# Patient Record
Sex: MALE | Race: WHITE | NOT HISPANIC OR LATINO | Employment: UNEMPLOYED | URBAN - METROPOLITAN AREA
[De-identification: names, ages, dates, MRNs, and addresses within clinical notes are randomized per-mention and may not be internally consistent; named-entity substitution may affect disease eponyms.]

---

## 2017-03-11 ENCOUNTER — APPOINTMENT (OUTPATIENT)
Dept: URGENT CARE | Age: 57
End: 2017-03-11
Payer: COMMERCIAL

## 2017-03-11 ENCOUNTER — APPOINTMENT (OUTPATIENT)
Dept: LAB | Age: 57
End: 2017-03-11
Payer: COMMERCIAL

## 2017-03-11 ENCOUNTER — TRANSCRIBE ORDERS (OUTPATIENT)
Dept: ADMINISTRATIVE | Age: 57
End: 2017-03-11

## 2017-03-11 DIAGNOSIS — E66.9 OBESITY, UNSPECIFIED: ICD-10-CM

## 2017-03-11 DIAGNOSIS — R73.01 IMPAIRED FASTING GLUCOSE: ICD-10-CM

## 2017-03-11 DIAGNOSIS — E78.1 PURE HYPERGLYCERIDEMIA: Primary | ICD-10-CM

## 2017-03-11 DIAGNOSIS — E03.9 UNSPECIFIED HYPOTHYROIDISM: ICD-10-CM

## 2017-03-11 DIAGNOSIS — E78.1 PURE HYPERGLYCERIDEMIA: ICD-10-CM

## 2017-03-11 LAB
ALBUMIN SERPL BCP-MCNC: 3.1 G/DL (ref 3.5–5)
ALP SERPL-CCNC: 82 U/L (ref 46–116)
ALT SERPL W P-5'-P-CCNC: 24 U/L (ref 12–78)
ANION GAP SERPL CALCULATED.3IONS-SCNC: 5 MMOL/L (ref 4–13)
AST SERPL W P-5'-P-CCNC: 10 U/L (ref 5–45)
BILIRUB SERPL-MCNC: 0.68 MG/DL (ref 0.2–1)
BUN SERPL-MCNC: 14 MG/DL (ref 5–25)
CALCIUM SERPL-MCNC: 8.9 MG/DL (ref 8.3–10.1)
CHLORIDE SERPL-SCNC: 108 MMOL/L (ref 100–108)
CHOLEST SERPL-MCNC: 161 MG/DL (ref 50–200)
CO2 SERPL-SCNC: 31 MMOL/L (ref 21–32)
CREAT SERPL-MCNC: 0.77 MG/DL (ref 0.6–1.3)
EST. AVERAGE GLUCOSE BLD GHB EST-MCNC: 126 MG/DL
GFR SERPL CREATININE-BSD FRML MDRD: >60 ML/MIN/1.73SQ M
GLUCOSE SERPL-MCNC: 89 MG/DL (ref 65–140)
HBA1C MFR BLD: 6 % (ref 4.2–6.3)
HDLC SERPL-MCNC: 36 MG/DL (ref 40–60)
LDLC SERPL CALC-MCNC: 101 MG/DL (ref 0–100)
POTASSIUM SERPL-SCNC: 4.2 MMOL/L (ref 3.5–5.3)
PROT SERPL-MCNC: 7.1 G/DL (ref 6.4–8.2)
SODIUM SERPL-SCNC: 144 MMOL/L (ref 136–145)
TRIGL SERPL-MCNC: 119 MG/DL
TSH SERPL DL<=0.05 MIU/L-ACNC: 2.79 UIU/ML (ref 0.36–3.74)

## 2017-03-11 PROCEDURE — 84443 ASSAY THYROID STIM HORMONE: CPT

## 2017-03-11 PROCEDURE — 36415 COLL VENOUS BLD VENIPUNCTURE: CPT

## 2017-03-11 PROCEDURE — 83036 HEMOGLOBIN GLYCOSYLATED A1C: CPT

## 2017-03-11 PROCEDURE — 80061 LIPID PANEL: CPT

## 2017-03-11 PROCEDURE — 80053 COMPREHEN METABOLIC PANEL: CPT

## 2017-03-13 ENCOUNTER — GENERIC CONVERSION - ENCOUNTER (OUTPATIENT)
Dept: OTHER | Facility: OTHER | Age: 57
End: 2017-03-13

## 2017-03-20 ENCOUNTER — ALLSCRIPTS OFFICE VISIT (OUTPATIENT)
Dept: OTHER | Facility: OTHER | Age: 57
End: 2017-03-20

## 2017-08-05 ENCOUNTER — TRANSCRIBE ORDERS (OUTPATIENT)
Dept: ADMINISTRATIVE | Age: 57
End: 2017-08-05

## 2017-08-05 ENCOUNTER — APPOINTMENT (OUTPATIENT)
Dept: LAB | Age: 57
End: 2017-08-05
Payer: COMMERCIAL

## 2017-08-05 DIAGNOSIS — R73.01 IMPAIRED FASTING GLUCOSE: ICD-10-CM

## 2017-08-05 DIAGNOSIS — R73.01 IMPAIRED FASTING GLUCOSE: Primary | ICD-10-CM

## 2017-08-05 LAB
ALBUMIN SERPL BCP-MCNC: 3.1 G/DL (ref 3.5–5)
ALP SERPL-CCNC: 77 U/L (ref 46–116)
ALT SERPL W P-5'-P-CCNC: 27 U/L (ref 12–78)
ANION GAP SERPL CALCULATED.3IONS-SCNC: 7 MMOL/L (ref 4–13)
AST SERPL W P-5'-P-CCNC: 10 U/L (ref 5–45)
BILIRUB SERPL-MCNC: 0.8 MG/DL (ref 0.2–1)
BUN SERPL-MCNC: 16 MG/DL (ref 5–25)
CALCIUM SERPL-MCNC: 8.2 MG/DL (ref 8.3–10.1)
CHLORIDE SERPL-SCNC: 106 MMOL/L (ref 100–108)
CHOLEST SERPL-MCNC: 163 MG/DL (ref 50–200)
CO2 SERPL-SCNC: 28 MMOL/L (ref 21–32)
CREAT SERPL-MCNC: 0.84 MG/DL (ref 0.6–1.3)
EST. AVERAGE GLUCOSE BLD GHB EST-MCNC: 134 MG/DL
GFR SERPL CREATININE-BSD FRML MDRD: 97 ML/MIN/1.73SQ M
GLUCOSE P FAST SERPL-MCNC: 94 MG/DL (ref 65–99)
HBA1C MFR BLD: 6.3 % (ref 4.2–6.3)
HDLC SERPL-MCNC: 31 MG/DL (ref 40–60)
LDLC SERPL CALC-MCNC: 108 MG/DL (ref 0–100)
POTASSIUM SERPL-SCNC: 4.3 MMOL/L (ref 3.5–5.3)
PROT SERPL-MCNC: 7 G/DL (ref 6.4–8.2)
SODIUM SERPL-SCNC: 141 MMOL/L (ref 136–145)
TRIGL SERPL-MCNC: 121 MG/DL
TSH SERPL DL<=0.05 MIU/L-ACNC: 2.01 UIU/ML (ref 0.36–3.74)

## 2017-08-05 PROCEDURE — 83036 HEMOGLOBIN GLYCOSYLATED A1C: CPT

## 2017-08-05 PROCEDURE — 80061 LIPID PANEL: CPT

## 2017-08-05 PROCEDURE — 80053 COMPREHEN METABOLIC PANEL: CPT

## 2017-08-05 PROCEDURE — 36415 COLL VENOUS BLD VENIPUNCTURE: CPT

## 2017-08-05 PROCEDURE — 84443 ASSAY THYROID STIM HORMONE: CPT

## 2017-08-06 ENCOUNTER — GENERIC CONVERSION - ENCOUNTER (OUTPATIENT)
Dept: OTHER | Facility: OTHER | Age: 57
End: 2017-08-06

## 2017-08-14 ENCOUNTER — ALLSCRIPTS OFFICE VISIT (OUTPATIENT)
Dept: OTHER | Facility: OTHER | Age: 57
End: 2017-08-14

## 2017-08-14 DIAGNOSIS — M54.42 LOW BACK PAIN WITH LEFT-SIDED SCIATICA: ICD-10-CM

## 2017-11-04 ENCOUNTER — TRANSCRIBE ORDERS (OUTPATIENT)
Dept: ADMINISTRATIVE | Age: 57
End: 2017-11-04

## 2017-11-04 ENCOUNTER — APPOINTMENT (OUTPATIENT)
Dept: LAB | Age: 57
End: 2017-11-04
Payer: COMMERCIAL

## 2017-11-04 DIAGNOSIS — I51.9 MYXEDEMA HEART DISEASE: ICD-10-CM

## 2017-11-04 DIAGNOSIS — R94.6 NONSPECIFIC ABNORMAL RESULTS OF THYROID FUNCTION STUDY: ICD-10-CM

## 2017-11-04 DIAGNOSIS — E78.1 PURE HYPERGLYCERIDEMIA: ICD-10-CM

## 2017-11-04 DIAGNOSIS — R73.01 IMPAIRED FASTING GLUCOSE: ICD-10-CM

## 2017-11-04 DIAGNOSIS — E03.9 MYXEDEMA HEART DISEASE: ICD-10-CM

## 2017-11-04 DIAGNOSIS — E78.1 PURE HYPERGLYCERIDEMIA: Primary | ICD-10-CM

## 2017-11-04 DIAGNOSIS — E66.9 LIFELONG OBESITY: ICD-10-CM

## 2017-11-04 DIAGNOSIS — Z12.5 SPECIAL SCREENING FOR MALIGNANT NEOPLASM OF PROSTATE: ICD-10-CM

## 2017-11-04 DIAGNOSIS — K40.90 INGUINAL HERNIA WITHOUT OBSTRUCTION OR GANGRENE, RECURRENCE NOT SPECIFIED, UNSPECIFIED LATERALITY: ICD-10-CM

## 2017-11-04 LAB
ALBUMIN SERPL BCP-MCNC: 3.3 G/DL (ref 3.5–5)
ALP SERPL-CCNC: 68 U/L (ref 46–116)
ALT SERPL W P-5'-P-CCNC: 25 U/L (ref 12–78)
ANION GAP SERPL CALCULATED.3IONS-SCNC: 5 MMOL/L (ref 4–13)
AST SERPL W P-5'-P-CCNC: 15 U/L (ref 5–45)
BASOPHILS # BLD AUTO: 0.02 THOUSANDS/ΜL (ref 0–0.1)
BASOPHILS NFR BLD AUTO: 0 % (ref 0–1)
BILIRUB SERPL-MCNC: 1.02 MG/DL (ref 0.2–1)
BUN SERPL-MCNC: 17 MG/DL (ref 5–25)
CALCIUM SERPL-MCNC: 8.2 MG/DL (ref 8.3–10.1)
CHLORIDE SERPL-SCNC: 105 MMOL/L (ref 100–108)
CHOLEST SERPL-MCNC: 173 MG/DL (ref 50–200)
CO2 SERPL-SCNC: 29 MMOL/L (ref 21–32)
CREAT SERPL-MCNC: 0.83 MG/DL (ref 0.6–1.3)
EOSINOPHIL # BLD AUTO: 0.1 THOUSAND/ΜL (ref 0–0.61)
EOSINOPHIL NFR BLD AUTO: 2 % (ref 0–6)
ERYTHROCYTE [DISTWIDTH] IN BLOOD BY AUTOMATED COUNT: 13 % (ref 11.6–15.1)
EST. AVERAGE GLUCOSE BLD GHB EST-MCNC: 117 MG/DL
GFR SERPL CREATININE-BSD FRML MDRD: 97 ML/MIN/1.73SQ M
GLUCOSE P FAST SERPL-MCNC: 91 MG/DL (ref 65–99)
HBA1C MFR BLD: 5.7 % (ref 4.2–6.3)
HCT VFR BLD AUTO: 41.7 % (ref 36.5–49.3)
HDLC SERPL-MCNC: 34 MG/DL (ref 40–60)
HGB BLD-MCNC: 13.4 G/DL (ref 12–17)
LDLC SERPL CALC-MCNC: 119 MG/DL (ref 0–100)
LYMPHOCYTES # BLD AUTO: 1.92 THOUSANDS/ΜL (ref 0.6–4.47)
LYMPHOCYTES NFR BLD AUTO: 34 % (ref 14–44)
MCH RBC QN AUTO: 29.5 PG (ref 26.8–34.3)
MCHC RBC AUTO-ENTMCNC: 32.1 G/DL (ref 31.4–37.4)
MCV RBC AUTO: 92 FL (ref 82–98)
MONOCYTES # BLD AUTO: 0.47 THOUSAND/ΜL (ref 0.17–1.22)
MONOCYTES NFR BLD AUTO: 8 % (ref 4–12)
NEUTROPHILS # BLD AUTO: 3.15 THOUSANDS/ΜL (ref 1.85–7.62)
NEUTS SEG NFR BLD AUTO: 56 % (ref 43–75)
NRBC BLD AUTO-RTO: 0 /100 WBCS
PLATELET # BLD AUTO: 282 THOUSANDS/UL (ref 149–390)
PMV BLD AUTO: 9.4 FL (ref 8.9–12.7)
POTASSIUM SERPL-SCNC: 4.6 MMOL/L (ref 3.5–5.3)
PROT SERPL-MCNC: 7.3 G/DL (ref 6.4–8.2)
PSA SERPL-MCNC: 1.7 NG/ML (ref 0–4)
RBC # BLD AUTO: 4.55 MILLION/UL (ref 3.88–5.62)
SODIUM SERPL-SCNC: 139 MMOL/L (ref 136–145)
TRIGL SERPL-MCNC: 100 MG/DL
TSH SERPL DL<=0.05 MIU/L-ACNC: 2.81 UIU/ML (ref 0.36–3.74)
WBC # BLD AUTO: 5.67 THOUSAND/UL (ref 4.31–10.16)

## 2017-11-04 PROCEDURE — 80061 LIPID PANEL: CPT

## 2017-11-04 PROCEDURE — 85025 COMPLETE CBC W/AUTO DIFF WBC: CPT

## 2017-11-04 PROCEDURE — 80053 COMPREHEN METABOLIC PANEL: CPT

## 2017-11-04 PROCEDURE — G0103 PSA SCREENING: HCPCS

## 2017-11-04 PROCEDURE — 84443 ASSAY THYROID STIM HORMONE: CPT

## 2017-11-04 PROCEDURE — 36415 COLL VENOUS BLD VENIPUNCTURE: CPT

## 2017-11-04 PROCEDURE — 83036 HEMOGLOBIN GLYCOSYLATED A1C: CPT

## 2017-11-06 ENCOUNTER — GENERIC CONVERSION - ENCOUNTER (OUTPATIENT)
Dept: OTHER | Facility: OTHER | Age: 57
End: 2017-11-06

## 2017-11-13 ENCOUNTER — ALLSCRIPTS OFFICE VISIT (OUTPATIENT)
Dept: OTHER | Facility: OTHER | Age: 57
End: 2017-11-13

## 2017-11-14 NOTE — PROGRESS NOTES
Assessment    1  Mixed hyperlipidemia (272 2) (E78 2)   2  Impaired fasting glucose (790 21) (R73 01)   3  Hypothyroidism (244 9) (E03 9)   4  Obesity (278 00) (E66 9)   5  Chronic bilateral low back pain with left-sided sciatica (724 2,724 3,338 29) (M54 42,G89 29)   6  BPH associated with nocturia (600 01,788 43) (N40 1,R35 1)    Plan  Borderline hypothyroidism, BPH associated with nocturia, Erectile dysfunction ofnon-organic origin, Essential hypertriglyceridemia, Hypothyroidism, Impaired fastingglucose    · (1) COMPREHENSIVE METABOLIC PANEL; Status:Hold For - Exact Date; Requestedfor:Approx [id number]; Borderline hypothyroidism, Essential hypertriglyceridemia, Hypothyroidism, Mixedhyperlipidemia, Paresthesia of both hands    · (1) TSH WITH FT4 REFLEX; Status:Hold For - Exact Date; Requested for:[id number];   BPH associated with nocturia, Chronic bilateral low back pain with left-sided sciatica,Essential hypertriglyceridemia, Hypothyroidism, Impaired fasting glucose    · (1) CBC/PLT/DIFF; Status:Hold For - Exact Date; Requested for:Approx [id number];   Essential hypertriglyceridemia    · (1) LIPID PANEL FASTING W DIRECT LDL REFLEX; Status:Hold For - Exact Date; Requested for:Approx [id number];   Essential hypertriglyceridemia, Impaired fasting glucose    · (1) HEMOGLOBIN A1C; Status:Hold For - Exact Date; Requested for:Approx [id number]; Health Maintenance    · *VB-Depression Screening; Status:Complete;   Done: [id number] 04:11PM  Obesity    · We recommend that you bring your body mass index down to 26 ; Status:Complete;  Done: [id number]    Discussion/Summary    1  Hypothyroidism- TSH normal  Continue Levothyroxine 25mcg daily  Hyperlipidemia- stable  Continue Omega 3  Follow a low fat/ low cholesterol diet  Obesity/ IFG- A1C better at 5 7%  Continue to work on weight loss  Follow a low carb diet  Chronic lower back pain- stable  Doing better with regular Yoga/ stretching  BPH- PSA at 1 7   We did discuss referring to Urology or starting Flomax for his symptoms  He does not want to pursue either of these options at this time, but will let us know if he changes his mind  last had in 2012- he is going to check his records to see where/ who performed this procedure  He thinks he may be due now for a repeat  the flu vaccine at his local [facility] 10/2017  to be done prior to next appointment6 months  Possible side effects of new medications were reviewed with the patient/guardian today  The treatment plan was reviewed with the patient/guardian  The patient/guardian understands and agrees with the treatment plan     Self Referrals: No      Chief Complaint  patient is here for a follow up for labs      Advance Directives  Advance Directive Located within [facility]:  NO - Patient does not have an advance health care directive  History of Present Illness  Pt presents by himself today for a routine follow up of chronic medical conditions  taking Levothyroxine 25mcg daily  Recent TSH normal at 2 810  recent lipid panel with / / / HDL 34  Taking Omega 3 daily  Approx  9 pound weight loss in the past 8 months  Is trying to be more mindful of his diet  Has also been doing Yoga a few times per week  recent FBS at 91, A1C improved at 5 7% (compared to 8/2017 at 6 3%)  B/L lower back pain- pain has improved with doing Yoga  No pain in about 2 months  Denies numbness or tingling down legs  No bowel or bladder incontinence  note nocturia once per night  Occasional trouble with initiating the flow of urine and/or maintaining the flow of urine  Does not happen often and is not bothersome to him  Denies dysuria, hematuria  Was told years ago he had BPH and was on Flomax for a few months but didn't see any improvement on this medication  Has never seen urology  Recent PSA at 1 7  last had in 2012      Review of Systems   Constitutional: no fever,-- not feeling poorly,-- no chills-- and-- not feeling tired    Cardiovascular: no chest pain,-- no intermittent leg claudication,-- no palpitations-- and-- no extremity edema  Respiratory: no shortness of breath,-- no cough,-- no wheezing-- and-- no shortness of breath during exertion  Gastrointestinal: no abdominal pain,-- no nausea,-- no constipation,-- no diarrhea-- and-- no blood in stools  Genitourinary: as noted in HPI,-- no dysuria-- and-- no incontinence--   The patient presents with complaints of nocturia (1 x per night)  The patient presents with complaints of lower back arthralgias  Integumentary: no rashes-- and-- no skin wound  Neurological: no headache,-- no numbness-- and-- no dizziness  Psychiatric: no anxiety-- and-- no depression  Endocrine: no feelings of weakness  Hematologic/Lymphatic: no swollen glands,-- no tendency for easy bleeding-- and-- no tendency for easy bruising  ROS reviewed  Active Problems    1  Borderline hypothyroidism (794 5) (R94 6)   2  Chronic bilateral low back pain with left-sided sciatica (724 2,724 3,338 29) (M54 42,G89 29)   3  Encounter for annual physical exam (V70 0) (Z00 00)   4  Encounter for prostate cancer screening (V76 44) (Z12 5)   5  Erectile dysfunction of non-organic origin (302 72) (F52 21)   6  Hypothyroidism (244 9) (E03 9)   7  Impaired fasting glucose (790 21) (R73 01)   8  Inguinal hernia (550 90) (K40 90)   9  Low back pain (724 2) (M54 5)   10  Mixed hyperlipidemia (272 2) (E78 2)   11  Musculoskeletal pain of left thigh (729 5) (M79 652)   12  Need for diphtheria-tetanus-pertussis (Tdap) vaccine (V06 1) (Z23)   13  Obesity (278 00) (E66 9)   14  Paresthesia of both hands (782 0) (R20 2)   15  Sciatica of right side (724 3) (M54 31)   16  Screening for colorectal cancer (V76 51) (Z12 11,Z12 12)   17  Screening for depression (V79 0) (Z13 89)    Past Medical History  1  History of Unable To Ejaculate (But Not Impotent)    The active problems and past medical history were reviewed and updated today        Surgical History  1  History of Appendectomy   2  History of Colonoscopy (Fiberoptic)    Family History  Mother    1  Family history of Diabetes Mellitus (V18 0)  Father    2  Family history of Hypertension    Social History     · Being A Social Drinker   · Former smoker (T07 28) (U36 277)  The social history was reviewed and updated today  The social history was reviewed and is unchanged  Current Meds   1  Levothyroxine Sodium 25 MCG Oral Tablet; TAKE 1 TABLET DAILY; Therapy: 86AHQ6201 to (Evaluate:22Jan2018)  Requested for: 65Sqq7178; Last Rx:57Mfz9531 Ordered   2  Multi-Vitamin Oral Tablet; TAKE 1 TABLET DAILY; Therapy: (Recorded:47Nkf1105) to Recorded   3  Omega-3-acid Ethyl Esters 1 GM Oral Capsule; TAKE 2 CAPSULES BY MOUTH EVERY DAY; Therapy: 51Qcs7092 to (Evaluate:16Apr2018)  Requested for: 59EPL8890; Last Rx:11Rhc4732 Ordered   4  Saw Palmetto CAPS; TAKE 1 CAPSULE DAILY; Therapy: (Recorded:26Jyv9754) to Recorded   5  Viagra 100 MG Oral Tablet; as directed; Therapy: 85HNS3894 to (Evaluate:15Mar2016); Last Rx:01Xof1889 Ordered    The medication list was reviewed and updated today  Allergies  1  Penicillins  2  Bee sting    Vitals  Vital Signs    Recorded: 42JHH3001 02:58PM   Temperature 97 5 F   Heart Rate 72   Respiration 16   Systolic 056   Diastolic 64   Height 5 ft 6 in   Weight 182 lb 6 4 oz   BMI Calculated 29 44   BSA Calculated 1 92       Physical Exam   Constitutional  General appearance: No acute distress, well appearing and well nourished  overweight  Eyes  Conjunctiva and lids: No swelling, erythema, or discharge  Pupils and irises: Equal, round and reactive to light  Pulmonary  Respiratory effort: No increased work of breathing or signs of respiratory distress  Auscultation of lungs: Clear to auscultation, equal breath sounds bilaterally, no wheezes, no rales, no rhonci  Cardiovascular  Auscultation of heart: Normal rate and rhythm, normal S1 and S2, without murmurs     Examination of extremities for edema and/or varicosities: Normal    Carotid pulses: Normal    Abdomen  Abdomen: Non-tender, no masses  Liver and spleen: No hepatomegaly or splenomegaly  Lymphatic  Palpation of lymph nodes in neck: No lymphadenopathy  Musculoskeletal  Gait and station: Normal    Skin  Skin and subcutaneous tissue: Normal without rashes or lesions  Neurologic Grossly intact  Psychiatric  Orientation to person, place and time: Normal    Mood and affect: Normal          Results/Data  *VB-Depression Screening [id number] 04:11PM Ike Bernabe     Test Name Result Flag Reference   Depression Scale Result      Depression Screen - Negative For Symptoms     (1) CBC/PLT/DIFF 80HBK7236 09:24AM Ike Bernabe     Test Name Result Flag Reference   WBC COUNT 5 67 Thousand/uL  4 31-10 16   RBC COUNT 4 55 Million/uL  3 88-5 62   HEMOGLOBIN 13 4 g/dL  12 0-17 0   HEMATOCRIT 41 7 %  36 5-49 3   MCV 92 fL  82-98   MCH 29 5 pg  26 8-34 3   MCHC 32 1 g/dL  31 4-37 4   RDW 13 0 %  11 6-15 1   MPV 9 4 fL  8 9-12 7   PLATELET COUNT 913 Thousands/uL  149-390   nRBC AUTOMATED 0 /100 WBCs     NEUTROPHILS RELATIVE PERCENT 56 %  43-75   LYMPHOCYTES RELATIVE PERCENT 34 %  14-44   MONOCYTES RELATIVE PERCENT 8 %  4-12   EOSINOPHILS RELATIVE PERCENT 2 %  0-6   BASOPHILS RELATIVE PERCENT 0 %  0-1   NEUTROPHILS ABSOLUTE COUNT 3 15 Thousands/? ??L  1 85-7 62   LYMPHOCYTES ABSOLUTE COUNT 1 92 Thousands/? ??L  0 60-4 47   MONOCYTES ABSOLUTE COUNT 0 47 Thousand/? ??L  0 17-1 22   EOSINOPHILS ABSOLUTE COUNT 0 10 Thousand/? ??L  0 00-0 61   BASOPHILS ABSOLUTE COUNT 0 02 Thousands/? ??L  0 00-0 10   This is a patient instruction: This test is non-fasting  Please drink two glasses of water morning of bloodwork  (1) COMPREHENSIVE METABOLIC PANEL 02ATY3356 06:86OE Ike Bernabe     Test Name Result Flag Reference   SODIUM 139 mmol/L  136-145   POTASSIUM 4 6 mmol/L  3 5-5 3   Slightly Hemolyzed;  Results May be Affected   CHLORIDE 105 mmol/L  100-108   CARBON DIOXIDE 29 mmol/L  21-32   ANION GAP (CALC) 5 mmol/L  4-13   BLOOD UREA NITROGEN 17 mg/dL  5-25   CREATININE 0 83 mg/dL  0 60-1 30   Standardized to IDMS reference method   CALCIUM 8 2 mg/dL L 8 3-10 1   BILI, TOTAL 1 02 mg/dL H 0 20-1 00   ALK PHOSPHATAS 68 U/L     ALT (SGPT) 25 U/L  12-78   Specimen collection should occur prior to Sulfasalazine and/or Sulfapyridine administration due to the potential for falsely depressed results  AST(SGOT) 15 U/L  5-45     Slightly Hemolyzed; Results May be Affected Specimen collection should occur prior to Sulfasalazine administration due to the potential for falsely depressed results  ALBUMIN 3 3 g/dL L 3 5-5 0   TOTAL PROTEIN 7 3 g/dL  6 4-8 2   eGFR 97 ml/min/1 73sq m       National Kidney Disease Education Program recommendations are as follows: GFR calculation is accurate only with a steady state creatinine Chronic Kidney disease less than 60 ml/min/1 73 sq  meters Kidney failure less than 15 ml/min/1 73 sq  meters  GLUCOSE FASTING 91 mg/dL  65-99   Specimen collection should occur prior to Sulfasalazine administration due to the potential for falsely depressed results  Specimen collection should occur prior to Sulfapyridine administration due to the potential for falsely elevated results  (1) PSA (SCREEN) (Dx V76 44 Screen for Prostate Cancer) [id number] 09:24AM Durga Gant     Test Name Result Flag Reference   PROSTATE SPECIFIC ANTIGEN 1 7 ng/mL  0 0-4 0     American Urological Association Guidelines define biochemical recurrence of prostate cancer as a detectable or rising PSA value post-radical prostatectomy that is greater than or equal to 0 2 ng/mL with a second confirmatory level of greater than or equal to 0 2 ng/mL  This is a patient instruction: This test is non-fasting  Please drink two glasses of water morning of bloodwork       (1) LIPID PANEL FASTING W DIRECT LDL REFLEX [id number] 09:24AM Durga Gant Test Name Result Flag Reference   CHOLESTEROL 173 mg/dL     LDL CHOLESTEROL CALCULATED 119 mg/dL H 0-100     This is a patient instruction: This is a fasting test  Water, black tea or black coffee only after 9:00pm the night before the test  Drink 2 glasses of water the morning of the test     Triglyceride:       Normal <150 mg/dl  Borderline High 150-199 mg/dl  High 200-499 mg/dl  Very High >499 mg/dl   Cholesterol:      Desirable <200 mg/dl   Borderline High 200-239 mg/dl   High >239 mg/dl   HDL Cholesterol:      High>59 mg/dL   Low <41 mg/dL   HDL Cholesterol:      High>59 mg/dL   Low <41 mg/dL   This screening LDL is a calculated result  It does not have the accuracy of the Direct Measured LDL in the monitoring of patients with hyperlipidemia and/or statin therapy  Direct Measure LDL (JRK763) must be ordered separately in these patients  TRIGLYCERIDES 100 mg/dL  <=150   Specimen collection should occur prior to N-Acetylcysteine or Metamizole administration due to the potential for falsely depressed results  HDL,DIRECT 34 mg/dL L 40-60   Specimen collection should occur prior to Metamizole administration due to the potential for falsley depressed results  (1) TSH WITH FT4 REFLEX [id number] 09:24AM Dulce Perez     Test Name Result Flag Reference   TSH 2 810 uIU/mL  0 358-3 740   Patients undergoing fluorescein dye angiography may retain small amounts of fluorescein in the body for 48-72 hours post procedure  Samples containing fluorescein can produce falsely depressed TSH values  If the patient had this procedure,a specimen should be resubmitted post fluorescein clearance  (1) HEMOGLOBIN A1C [id number] 09:24AM Dulce Perez     Test Name Result Flag Reference   HEMOGLOBIN A1C 5 7 %  4 2-6 3   EST  AVG  GLUCOSE 117 mg/dl       Attending Note  Collaborating Note: I did not interview and examine the patient-- and-- I agree with the Advanced Practitioner note        Future Appointments    Date/Time Provider Specialty Site   05/16/2018 03:00 PM Ramila Harden [facility] Family Medicine [facility]       Signatures   Electronically signed by : Ramila Padilla [facility]; Nov 13 2017  4:24PM EST                       (Author)    Electronically signed by :  Guy Galo MD; Nov 13 2017  5:29PM EST                       (Author)

## 2018-01-10 NOTE — RESULT NOTES
Message   Please let the patient know I reviewed his blood work results  His fasting glucose was elevated at 107- should be <100  Recommended to follow a low carb diet, exercise 30 minutes 5 x per week  Total cholesterol normal at 154  LDL (bad cholesterol) normal at 99  Triglycerides normalt at 101  HDL (good cholesterol) low at 35- should be at least >40  Blood counts (hemoglobin, WBC, platelets, etc ) all WNL  His TSH was okay for now- continue same dose of Levothyroxine  Please mail him lab slips to get done 1-2 weeks prior to his next appointment in March of 2017  Thank you  Verified Results  (1) CBC/PLT/DIFF [id number] 08:13AM Elfredia Cost     Test Name Result Flag Reference   WBC COUNT 5 96 Thousand/uL  4 31-10 16   RBC COUNT 4 52 Million/uL  3 88-5 62   HEMOGLOBIN 13 3 g/dL  12 0-17 0   HEMATOCRIT 41 3 %  36 5-49 3   MCV 91 fL  82-98   MCH 29 4 pg  26 8-34 3   MCHC 32 2 g/dL  31 4-37 4   RDW 13 0 %  11 6-15 1   MPV 9 4 fL  8 9-12 7   PLATELET COUNT 223 Thousands/uL  149-390   nRBC AUTOMATED 0 /100 WBCs     NEUTROPHILS RELATIVE PERCENT 58 %  43-75   LYMPHOCYTES RELATIVE PERCENT 30 %  14-44   MONOCYTES RELATIVE PERCENT 10 %  4-12   EOSINOPHILS RELATIVE PERCENT 2 %  0-6   BASOPHILS RELATIVE PERCENT 0 %  0-1   NEUTROPHILS ABSOLUTE COUNT 3 47 Thousands/?L  1 85-7 62   LYMPHOCYTES ABSOLUTE COUNT 1 79 Thousands/?L  0 60-4 47   MONOCYTES ABSOLUTE COUNT 0 57 Thousand/?L  0 17-1 22   EOSINOPHILS ABSOLUTE COUNT 0 10 Thousand/?L  0 00-0 61   BASOPHILS ABSOLUTE COUNT 0 02 Thousands/?L  0 00-0 10   - Patient Instructions: This bloodwork is non-fasting  Please drink two glasses of water morning of bloodwork  - Patient Instructions: This bloodwork is non-fasting  Please drink two glasses of water morning of bloodwork       (1) COMPREHENSIVE METABOLIC PANEL [id number] 19:07EN [employer] [facility]     Test Name Result Flag Reference   GLUCOSE,RANDM 107 mg/dL     If the patient is fasting, the ADA then defines impaired fasting glucose as > 100 mg/dL and diabetes as > or equal to 123 mg/dL  SODIUM 141 mmol/L  136-145   POTASSIUM 4 3 mmol/L  3 5-5 3   CHLORIDE 107 mmol/L  100-108   CARBON DIOXIDE 30 mmol/L  21-32   ANION GAP (CALC) 4 mmol/L  4-13   BLOOD UREA NITROGEN 15 mg/dL  5-25   CREATININE 0 84 mg/dL  0 60-1 30   Standardized to IDMS reference method   CALCIUM 8 9 mg/dL  8 3-10 1   BILI, TOTAL 0 62 mg/dL  0 20-1 00   ALK PHOSPHATAS 82 U/L     ALT (SGPT) 26 U/L  12-78   AST(SGOT) 11 U/L  5-45   ALBUMIN 3 3 g/dL L 3 5-5 0   TOTAL PROTEIN 7 0 g/dL  6 4-8 2   eGFR Non-African American      >60 0 ml/min/1 73sq m   - Patient Instructions: This is a fasting blood test  Water, black tea or black coffee only after 9:00pm the night before test Drink 2 glasses of water the morning of test   National Kidney Disease Education Program recommendations are as follows:  GFR calculation is accurate only with a steady state creatinine  Chronic Kidney disease less than 60 ml/min/1 73 sq  meters  Kidney failure less than 15 ml/min/1 73 sq  meters  (1) LIPID PANEL FASTING W DIRECT LDL REFLEX [id number] 08:13AM Dulce Perez     Test Name Result Flag Reference   CHOLESTEROL 154 mg/dL     LDL CHOLESTEROL CALCULATED 99 mg/dL  0-100   - Patient Instructions: This is a fasting blood test  Water, black tea or black coffee only after 9:00pm the night before test   Drink 2 glasses of water the morning of test     - Patient Instructions:  This is a fasting blood test  Water, black tea or black coffee only after 9:00pm the night before test Drink 2 glasses of water the morning of test   Triglyceride:         Normal              <150 mg/dl       Borderline High    150-199 mg/dl       High               200-499 mg/dl       Very High          >499 mg/dl  Cholesterol:         Desirable        <200 mg/dl      Borderline High  200-239 mg/dl      High             >239 mg/dl  HDL Cholesterol:        High    >59 mg/dL      Low     <41 mg/dL  LDL Cholesterol:        Optimal          <100 mg/dl        Near Optimal     100-129 mg/dl        Above Optimal          Borderline High   130-159 mg/dl          High              160-189 mg/dl          Very High        >189 mg/dl  LDL CALCULATED:    This screening LDL is a calculated result  It does not have the accuracy of the Direct Measured LDL in the monitoring of patients with hyperlipidemia and/or statin therapy  Direct Measure LDL ([id number]) must be ordered separately in these patients  TRIGLYCERIDES 101 mg/dL  <=150   Specimen collection should occur prior to N-Acetylcysteine or Metamizole administration due to the potential for falsely depressed results  HDL,DIRECT 35 mg/dL L 40-60   Specimen collection should occur prior to Metamizole administration due to the potential for falsely depressed results  (1) TSH WITH FT4 REFLEX [id number] 08:13AM Shen Amaya     Test Name Result Flag Reference   TSH 4 680 uIU/mL H 0 358-3 740   - Patient Instructions: This is a fasting blood test  Water, black tea or black coffee only after 9:00pm the night before test Drink 2 glasses of water the morning of test   Patients undergoing fluorescein dye angiography may retain small amounts of fluorescein in the body for 48-72 hours post procedure  Samples containing fluorescein can produce falsely depressed TSH values  If the patient had this procedure,a specimen should be resubmitted post fluorescein clearance  T4,FREE 0 81 ng/dL  0 76-1 46   - Patient Instructions: This is a fasting blood test  Water, black tea or black coffee only after 9:00pm the night before test Drink 2 glasses of water the morning of test        Plan  Essential hypertriglyceridemia, Hypothyroidism, Impaired fasting glucose, Obesity    · (1) LIPID PANEL FASTING W DIRECT LDL REFLEX; Status:Hold For - Exact Date;   Requested for:Approx [id number];   Hypothyroidism    · (1) TSH WITH FT4 REFLEX; Status:Hold For - Exact Date; Requested for:Approx  [id number];   Hypothyroidism, Impaired fasting glucose, Obesity    · (1) COMPREHENSIVE METABOLIC PANEL; Status:Hold For - Exact Date; Requested  for:Approx [id number];   Impaired fasting glucose    · (1) HEMOGLOBIN A1C; Status:Hold For - Exact Date;  Requested for:Approx [id number];

## 2018-01-12 VITALS
WEIGHT: 185.25 LBS | BODY MASS INDEX: 29.77 KG/M2 | TEMPERATURE: 98.7 F | DIASTOLIC BLOOD PRESSURE: 70 MMHG | RESPIRATION RATE: 16 BRPM | HEART RATE: 66 BPM | SYSTOLIC BLOOD PRESSURE: 100 MMHG | HEIGHT: 66 IN

## 2018-01-13 VITALS
HEIGHT: 66 IN | SYSTOLIC BLOOD PRESSURE: 108 MMHG | DIASTOLIC BLOOD PRESSURE: 64 MMHG | BODY MASS INDEX: 29.32 KG/M2 | HEART RATE: 72 BPM | WEIGHT: 182.4 LBS | RESPIRATION RATE: 16 BRPM | TEMPERATURE: 97.5 F

## 2018-01-13 VITALS
HEIGHT: 66 IN | DIASTOLIC BLOOD PRESSURE: 82 MMHG | HEART RATE: 76 BPM | TEMPERATURE: 98.5 F | SYSTOLIC BLOOD PRESSURE: 120 MMHG | WEIGHT: 191 LBS | RESPIRATION RATE: 16 BRPM | BODY MASS INDEX: 30.7 KG/M2

## 2018-01-13 NOTE — RESULT NOTES
Message   Please let the patient know I reviewed his blood work results  His TSH was now WNL- he should continue his Synthroid 25mcg once daily  His lipid panel also improved  His total cholesterol wss WNL at 166 (should be <200)  His LDL (bad cholesterol) was improved at 110  His Triglycerides greatly improved and are normal at 104 (should be <150 and were at 377 the last time we checked)  His HDL (good cholesterol) as improved and is 35  He should continue the Omega 3 as he is currently taking  We will recheck labs in 6 months  We can mail him these slips  Thank you  Verified Results  (1) LIPID PANEL FASTING W DIRECT LDL REFLEX [id number] 03:06PM Ike Harryinka   Triglyceride:         Normal              <150 mg/dl       Borderline High    150-199 mg/dl       High               200-499 mg/dl       Very High          >499 mg/dl  Cholesterol:         Desirable        <200 mg/dl      Borderline High  200-239 mg/dl      High             >239 mg/dl  HDL Cholesterol:        High    >59 mg/dL      Low     <41 mg/dL  LDL Cholesterol:        Optimal          <100 mg/dl         Near Optimal     100-129 mg/dl        Above Optimal          Borderline High   130-159 mg/dl          High              160-189 mg/dl          Very High        >189 mg/dl  LDL CALCULATED:    This screening LDL is a calculated result  It does not have the accuracy of the Direct Measured LDL in the monitoring of patients with hyperlipidemia and/or statin therapy  Direct Measure LDL ([id number]) must be ordered separately in these patients  Test Name Result Flag Reference   CHOLESTEROL 166 mg/dL     LDL CHOLESTEROL CALCULATED 110 mg/dL H 0-100   TRIGLYCERIDES 104 mg/dL  <=150   Specimen collection should occur prior to N-Acetylcysteine or Metamizole administration due to the potential for falsely depressed results     HDL,DIRECT 35 mg/dL L 40-60   Specimen collection should occur prior to Metamizole administration due to the potential for falsely depressed results  (1) TSH WITH FT4 REFLEX 27Apr2016 03:06PM Olivia Neil Query   Patients undergoing fluorescein dye angiography may retain small amounts of fluorescein in the body for 48-72 hours post procedure  Samples containing fluorescein can produce falsely depressed TSH values  If the patient had this procedure,a specimen should be resubmitted post fluorescein clearance  Test Name Result Flag Reference   TSH 2 850 uIU/mL  0 358-3 740       Plan  Essential hypertriglyceridemia    · (1) LIPID PANEL FASTING W DIRECT LDL REFLEX; Status:Active; Requested  for:28Apr2016;   Hypothyroidism    · (1) CBC/PLT/DIFF; Status:Hold For - Exact Date; Requested for:Approx [id number];    · (1) TSH WITH FT4 REFLEX; Status:Hold For - Exact Date; Requested for:Approx  [id number];   Hypothyroidism, Obesity    · (1) COMPREHENSIVE METABOLIC PANEL; Status:Hold For - Exact Date;  Requested  for:Approx [id number];

## 2018-01-14 NOTE — RESULT NOTES
Verified Results  (1) COMPREHENSIVE METABOLIC PANEL [id number] 75:44RB Barbara Copeland     Test Name Result Flag Reference   SODIUM 141 mmol/L  136-145   POTASSIUM 4 3 mmol/L  3 5-5 3   CHLORIDE 106 mmol/L  100-108   CARBON DIOXIDE 28 mmol/L  21-32   ANION GAP (CALC) 7 mmol/L  4-13   BLOOD UREA NITROGEN 16 mg/dL  5-25   CREATININE 0 84 mg/dL  0 60-1 30   Standardized to IDMS reference method   CALCIUM 8 2 mg/dL L 8 3-10 1   BILI, TOTAL 0 80 mg/dL  0 20-1 00   ALK PHOSPHATAS 77 U/L     ALT (SGPT) 27 U/L  12-78   AST(SGOT) 10 U/L  5-45   ALBUMIN 3 1 g/dL L 3 5-5 0   TOTAL PROTEIN 7 0 g/dL  6 4-8 2   eGFR 97 ml/min/1 73sq m     National Kidney Disease Education Program recommendations are as follows:  GFR calculation is accurate only with a steady state creatinine  Chronic Kidney disease less than 60 ml/min/1 73 sq  meters  Kidney failure less than 15 ml/min/1 73 sq  meters  GLUCOSE FASTING 94 mg/dL  65-99     (1) LIPID PANEL FASTING W DIRECT LDL REFLEX [id number] 10:06AM Barbara Copeland     Test Name Result Flag Reference   CHOLESTEROL 163 mg/dL     LDL CHOLESTEROL CALCULATED 108 mg/dL H 0-100   This is a fasting blood test  Water,black tea or black  coffee only after 9:00pm the night before test  Drink 2 glasses of water the morning of test         Triglyceride:         Normal              <150 mg/dl       Borderline High    150-199 mg/dl       High               200-499 mg/dl       Very High          >499 mg/dl  Cholesterol:         Desirable        <200 mg/dl      Borderline High  200-239 mg/dl      High             >239 mg/dl  HDL Cholesterol:        High    >59 mg/dL      Low     <41 mg/dL  LDL Cholesterol:        Optimal          <100 mg/dl        Near Optimal     100-129 mg/dl        Above Optimal          Borderline High   130-159 mg/dl          High              160-189 mg/dl          Very High        >189 mg/dl  LDL CALCULATED:    This screening LDL is a calculated result    It does not have the accuracy of the Direct Measured LDL in the monitoring of patients with hyperlipidemia and/or statin therapy  Direct Measure LDL (NAA524) must be ordered separately in these patients  TRIGLYCERIDES 121 mg/dL  <=150   Specimen collection should occur prior to N-Acetylcysteine or Metamizole administration due to the potential for falsely depressed results  HDL,DIRECT 31 mg/dL L 40-60   Specimen collection should occur prior to Metamizole administration due to the potential for falsely depressed results  (1) TSH WITH FT4 REFLEX [id number] 10:06AM Michaela Aspen     Test Name Result Flag Reference   TSH 2 010 uIU/mL  0 358-3 740   Patients undergoing fluorescein dye angiography may retain small amounts of fluorescein in the body for 48-72 hours post procedure  Samples containing fluorescein can produce falsely depressed TSH values  If the patient had this procedure,a specimen should be resubmitted post fluorescein clearance  (1) HEMOGLOBIN A1C [id number] 10:06AM Michaela Aspen     Test Name Result Flag Reference   HEMOGLOBIN A1C 6 3 %  4 2-6 3   EST  AVG   GLUCOSE 134 mg/dl

## 2018-01-14 NOTE — PROGRESS NOTES
Assessment    1  Erectile dysfunction of non-organic origin (302 72) (F52 21)   2  Inguinal hernia (550 90) (K40 90)   · BL   3  Encounter for preventive health examination (V70 0) (Z00 00)   4  Need for diphtheria-tetanus-pertussis (Tdap) vaccine (V06 1) (Z23)   5  Encounter for annual physical exam (V70 0) (Z00 00)   6  Obesity (278 00) (E66 9)   7  Screening for depression (V79 0) (Z13 89)    Plan  Erectile dysfunction of non-organic origin    · Viagra 100 MG Oral Tablet; as directed  Health Maintenance, Erectile dysfunction of non-organic origin    · (1) CBC/PLT/DIFF; Status:Resulted - Requires Verification;   Done: 06EWC9030 04:31PM   · (1) COMPREHENSIVE METABOLIC PANEL; Status:Resulted - Requires Verification;    Done: 26SCP5515 04:31PM   · (1) HEMOGLOBIN A1C; Status:Active; Requested for:36Clu5127;    · (1) LIPID PANEL FASTING W DIRECT LDL REFLEX; Status:Resulted - Requires  Verification;   Done: 37HMD1304 04:31PM   · (1) PSA (SCREEN) (Dx V76 44 Screen for Prostate Cancer); Status:Resulted - Requires  Verification;   Done: 90YTX3363 04:31PM   · (1) TSH WITH FT4 REFLEX; Status:Resulted - Requires Verification;   Done: 26FOV4535  04:31PM  Need for diphtheria-tetanus-pertussis (Tdap) vaccine    · Adacel 5-2-15 5 LF-MCG/0 5 Intramuscular Suspension  Screening for depression    · *VB-Depression Screening; Status:Complete;   Done: 13KHL8267 03:51PM   · Tdap (Adacel); INJECT 0 5  ML Intramuscular; To Be Done: 29RYJ4748    Discussion/Summary  Impression: health maintenance visit, healthy adult male  Currently, he eats an adequate diet and has an inadequate exercise regimen  Prostate cancer screening: the risks and benefits of prostate cancer screening were discussed and PSA was ordered  Testicular cancer screening: the risks and benefits of testicular cancer screening were discussed and monthly self testicular exam was advised   Colorectal cancer screening: the risks and benefits of colorectal cancer screening were discussed and colorectal cancer screening is current  Screening lab work includes glucose and lipid profile  The risks and benefits of immunizations were discussed and immunizations will be given as outlined in the orders  He was advised to be evaluated by a dentist  Advice and education were given regarding nutrition, aerobic exercise, calcium supplements, vitamin D supplements, cardiovascular risk reduction, sunscreen use, self skin examination and seat belt use  Patient discussion: discussed with the patient  1  Well adult exam   2  Small B/L inguinal hernias - patient is asymptomatic  Avoid heavy lifting, strenuous activities  Consider surgical consultation if develops pain, hernias increasing in size  3  Erectile dysfunction- script and coupon given for Viagra  To take 1/2 pill as discussed  4  Obesity- try to follow a low fat/ low cholesterol diet, try to exercise 30 minutes 5 x per week, lose weight  HM; Tdap vaccination administered today  Patient to find out name of urologist and GI doctor who performed colonoscopy and let us know so we can get notes  To get routine blood work done  f/u in one year / prn  Possible side effects of new medications were reviewed with the patient/guardian today  Chief Complaint    1  Erectile Dysfunction  Patient is here for his annual check visit exam physical      History of Present Illness  HM, Adult Male: The patient is being seen for a health maintenance evaluation  The last health maintenance visit was 2 year(s) ago  Social History: Household members include spouse and 1 daughter(s)  He is   Work status: working full time and occupation:  at [employer]  The patient is a former cigarette smoker and quit using smokeless tobacco 22  He has smoked for 20 year(s), has 20 pack year(s) of cigarette use and 1 ppd  He reports occasional alcohol use and drinking 1 drinks per week  The patient has no concerns about alcohol abuse   He has never used illicit drugs  General Health: The patient's health since the last visit is described as good  He does not have regular dental visits  He denies vision problems  Vision care includes wearing soft contact lenses and an eye examination within the last year  He denies hearing loss  Lifestyle:  He consumes a diverse and healthy diet  He does not have any weight concerns  He exercises regularly  He does not use tobacco  He consumes alcohol  He uses illicit drugs  Reproductive health:  the patient is sexually active  birth control is not being practiced  He complains of erectile dysfunction  Screening: Prostate cancer screening includes last prostate-specific antigen testing 2 years  and last digital rectal examination 2 years   Testicular cancer screening includes no self testicular examinations  Colorectal cancer screening includes last colonoscopy done 2 years ago , last fecal occult blood testing done 2 years ago  and no previous flexible sigmoidoscopy  Metabolic screening includes lipid profile performed 2 years , glucose screening performed 2 year ago, thyroid function test performed 2 years ago  and no previous DEXA  Cardiovascular risk factors: stress and family history of cardiovascular disease, but no hypertension, no diabetes, no high LDL cholesterol, no low HDL cholesterol, no obesity, no tobacco use, no illicit drug use and no sedentary lifestyle  General health risks: elevated prostate-specific antigen, but no undescended testis, no family history of prostate cancer, no previous colon polyps, no inflammatory bowel disease and no osteoporosis risk factors  Safety elements used: seat belt, sunscreen and smoke detector  Risk assessments performed include depression symptoms  Risk findings: no anxiety symptoms and no depression symptoms  HPI: 61year old male here for a routine physical exam  He presents by himself  Last dentist appt about 4 years ago     Last optometrist appointment one year ago  Last Tdap more than 10 years ago   Had colonoscopy 2 years ago- doesn't know name but will find out and let us know  C/o erectile dysfunction  Trouble maintaining erection  No trouble initiating erection  Reports trouble initiating flow of urine first thing in the morning  Denies nocturia  Has seen urologist about 3 years ago- enlarged prostate- no follow up needed per patient  Was never placed on meds  Is unsure of urologist's name, but will check with his wife and let us know  Erectile Dysfunction: Valente Villa presents with complaints of erectile dysfunction  Associated symptoms include inability to maintain erection, incomplete erections and inability to perform intercourse, but no inability to initiate erection, no excessive penile curvature, no lack of morning erections, no lack of nocturnal erections, no premature ejaculation, no decreased libido, no claudication, no back pain, no fatigue, no weight loss, no depression, no vision problems and no urinary impairment  Review of Systems    Constitutional: no fever, not feeling poorly, no chills and not feeling tired  Eyes: no eye pain and no eyesight problems  ENT: no earache, no sore throat, no hearing loss, no nasal discharge and no hoarseness  Cardiovascular: the heart rate was not slow, no chest pain, no intermittent leg claudication, the heart rate was not fast, no palpitations and no extremity edema  Respiratory: no shortness of breath, no cough, no wheezing and no shortness of breath during exertion  Gastrointestinal: No complaints of abdominal pain, no constipation, no nausea or vomiting, no diarrhea or bloody stools  Genitourinary: trouble initiating flow of urine first thing in the morning, but no dysuria, no incontinence, no nocturia and no testicular pain  Musculoskeletal: no arthralgias and no myalgias  Integumentary: no rashes and no skin lesions     Neurological: no headache, no numbness and no tingling  Psychiatric: no anxiety, no sleep disturbances and no depression  Endocrine: erectile dysfunction, but no muscle weakness and no feelings of weakness  Hematologic/Lymphatic: no swollen glands, no tendency for easy bleeding and no tendency for easy bruising  Over the past 2 weeks, how often have you been bothered by the following problems? 1 ) Little interest or pleasure in doing things? Not at all    2 ) Feeling down, depressed or hopeless? Not at all    3 ) Trouble falling asleep or sleeping too much? Not at all    4 ) Feeling tired or having little energy? Not at all    5 ) Poor appetite or overeating? Not at all    6 ) Feeling bad about yourself, or that you are a failure, or have let yourself or your family down? Not at all    7 ) Trouble concentrating on things, such as reading a newspaper or watching television? Not at all    8 ) Moving or speaking so slowly that other people could have noticed, or the opposite, moving or speaking faster than usual? Not at all  How difficult have these problems made it for you to do your work, take care of things at home, or get along with people? Not at all  Score 0     ROS reviewed  Active Problems    1  Borderline hypothyroidism (794 5) (R94 6)   2  Inguinal hernia (550 90) (K40 90)   3  Low back pain (724 2) (M54 5)   4  Musculoskeletal pain of left thigh (729 5) (M79 652)   5  Sciatica of right side (724 3) (M54 31)   6  Screening (V82 9) (Z13 9)    Past Medical History    · History of Unable To Ejaculate (But Not Impotent)    Surgical History    · History of Appendectomy   · History of Colonoscopy (Fiberoptic)    Family History    · Family history of Diabetes Mellitus (V18 0)    · Family history of Hypertension    Social History    · Being A Social Drinker   · Former smoker (W75 22) (I62 215)    Allergies    1   No Known Drug Allergies    Vitals   Recorded: 75Obz5801 03:04PM   Temperature 99 F   Heart Rate 86   Respiration 16   Systolic 867   Diastolic 80   Height 5 ft 6 2 in   Weight 188 lb 4 00 oz   BMI Calculated 30 2   BSA Calculated 1 96   O2 Saturation 98   Pain Scale 0     Physical Exam    Constitutional   General appearance: No acute distress, well appearing and well nourished  Head and Face   Head and face: Normal     Palpation of the face and sinuses: No sinus tenderness  Eyes   Conjunctiva and lids: No erythema, swelling or discharge  Pupils and irises: Equal, round, reactive to light  Ears, Nose, Mouth, and Throat   External inspection of ears and nose: Normal     Otoscopic examination: Tympanic membranes translucent with normal light reflex  Canals patent without erythema  Hearing: Normal     Nasal mucosa, septum, and turbinates: Normal without edema or erythema  Lips, teeth, and gums: Normal, good dentition  Oropharynx: Normal with no erythema, edema, exudate or lesions  Neck   Neck: Supple, symmetric, trachea midline, no masses  Thyroid: Normal, no thyromegaly  Pulmonary   Respiratory effort: No increased work of breathing or signs of respiratory distress  Auscultation of lungs: Clear to auscultation  Cardiovascular   Auscultation of heart: Normal rate and rhythm, normal S1 and S2, no murmurs  Carotid pulses: 2+ bilaterally  Abdominal aorta: Normal     Peripheral vascular exam: Normal     Examination of extremities for edema and/or varicosities: Normal     Abdomen   Abdomen: Non-tender, no masses  Liver and spleen: No hepatomegaly or splenomegaly  Examination for hernias: Abnormal   A(n) reducible right inguinal hernia was palpated  A(n) reducible left inguinal hernia was palpated  B/L inguinal hernias nontender  Stool sample for occult blood: Negative  Genitourinary   Digital rectal exam of prostate: Normal size, no masses  Lymphatic   Palpation of lymph nodes in neck: No lymphadenopathy      Musculoskeletal   Gait and station: Normal     Inspection/palpation of digits and nails: Normal without clubbing or cyanosis  Inspection/palpation of joints, bones, and muscles: Normal     Range of motion: Normal     Stability: Normal     Muscle strength/tone: Normal     Skin   Skin and subcutaneous tissue: Normal without rashes or lesions  Neurologic   Cranial nerves: Cranial nerves 2-12 intact  Reflexes: 2+ and symmetric  Sensation: No sensory loss  Psychiatric   Judgment and insight: Normal     Mood and affect: Normal        Results/Data  (1) CBC/PLT/DIFF [id number] 04:31PM Caitlyn Space Order Number: [id number]    TW Order Number: [id number]     Test Name Result Flag Reference   WBC COUNT 7 24 Thousand/uL  4 31-10 16   RBC COUNT 4 39 Million/uL  3 88-5 62   HEMOGLOBIN 13 1 g/dL  12 0-17 0   HEMATOCRIT 40 3 %  36 5-49 3   MCV 92 fL  82-98   MCH 29 8 pg  26 8-34 3   MCHC 32 5 g/dL  31 4-37 4   RDW 12 7 %  11 6-15 1   MPV 9 7 fL  8 9-12 7   PLATELET COUNT 950 Thousands/uL  149-390   nRBC AUTOMATED 0 /100 WBCs     NEUTROPHILS RELATIVE PERCENT 56 %  43-75   LYMPHOCYTES RELATIVE PERCENT 34 %  14-44   MONOCYTES RELATIVE PERCENT 9 %  4-12   EOSINOPHILS RELATIVE PERCENT 1 %  0-6   BASOPHILS RELATIVE PERCENT 0 %  0-1   NEUTROPHILS ABSOLUTE COUNT 3 99 Thousands/µL  1 85-7 62   LYMPHOCYTES ABSOLUTE COUNT 2 45 Thousands/µL  0 60-4 47   MONOCYTES ABSOLUTE COUNT 0 67 Thousand/µL  0 17-1 22   EOSINOPHILS ABSOLUTE COUNT 0 09 Thousand/µL  0 00-0 61   BASOPHILS ABSOLUTE COUNT 0 02 Thousands/µL  0 00-0 10     (1) COMPREHENSIVE METABOLIC PANEL [id number] 12:16RQ Kristi Aguilar    Order Number: [id number]      National Kidney Disease Education Program recommendations are as follows:  GFR calculation is accurate only with a steady state creatinine  Chronic Kidney disease less than 60 ml/min/1 73 sq  meters  Kidney failure less than 15 ml/min/1 73 sq  meters       Test Name Result Flag Reference   GLUCOSE,RANDM 110 mg/dL     SODIUM 140 mmol/L  136-145   POTASSIUM 4 5 mmol/L 3 5-5 3   CHLORIDE 107 mmol/L  100-108   CARBON DIOXIDE 29 mmol/L  21-32   ANION GAP (CALC) 4 mmol/L  4-13   BLOOD UREA NITROGEN 17 mg/dL  5-25   CREATININE 0 77 mg/dL  0 60-1 30   Standardized to IDMS reference method   CALCIUM 8 3 mg/dL  8 3-10 1   BILI, TOTAL 0 43 mg/dL  0 20-1 00   ALK PHOSPHATAS 79 U/L     ALT (SGPT) 29 U/L  12-78   AST(SGOT) 10 U/L  5-45   ALBUMIN 3 5 g/dL  3 5-5 0   TOTAL PROTEIN 6 9 g/dL  6 4-8 2   eGFR Non-African American      >60 0 ml/min/1 73sq m     (1) LIPID PANEL FASTING W DIRECT LDL REFLEX 26Feb2016 04:NATI Makd Order Number: [id number]      Triglyceride:         Normal              <150 mg/dl       Borderline High    150-199 mg/dl       High               200-499 mg/dl       Very High          >499 mg/dl  Cholesterol:         Desirable        <200 mg/dl      Borderline High  200-239 mg/dl      High             >239 mg/dl  HDL Cholesterol:        High    >59 mg/dL      Low     <41 mg/dL  LDL Cholesterol:        Optimal          <100 mg/dl         Near Optimal     100-129 mg/dl        Above Optimal          Borderline High   130-159 mg/dl          High              160-189 mg/dl          Very High        >189 mg/dl  LDL CALCULATED:    This screening LDL is a calculated result  It does not have the accuracy of the Direct Measured LDL in the monitoring of patients with hyperlipidemia and/or statin therapy  Direct Measure LDL (DBG191) must be ordered separately in these patients       Test Name Result Flag Reference   CHOLESTEROL 183 mg/dL     LDL CHOLESTEROL CALCULATED 81 mg/dL  0-100   TRIGLYCERIDES 377 mg/dL H <=150   HDL,DIRECT 27 mg/dL L 40-60     (1) TSH WITH FT4 REFLEX [id number] 04:31PJEAN CARLOS Toyin Siad Order Number: [id number]     Test Name Result Flag Reference   TSH 4 660 uIU/mL H 0 358-3 740   T4,FREE 0 73 ng/dL L 0 76-1 46     (1) PSA (SCREEN) (Dx V76 44 Screen for Prostate Cancer) [id number] 04:31P Elma Sky    Order Number: [id number]     Order Number: [id number]     Test Name Result Flag Reference   PROSTATE SPECIFIC ANTIGEN 1 4 ng/mL  0 0-4 0     *VB-Depression Screening [id number] 03:51PM Bartolo Almonte     Test Name Result Flag Reference   Depression Scale Result      Depression Screen - Negative For Symptoms       Attending Note  Collaborating Physician Note: Collaborating Physician: I agree with the Advanced Practitioner note  Future Appointments    Date/Time Provider Specialty Site   02/27/2017 03:00 PM Bartolo Almonte, [facility]     Signatures   Electronically signed by : CLAUDIO Padilla; Feb 26 2016  4:04PM EST                       (Author)    Electronically signed by :  Guy Galo MD; Feb 27 2016  8:25AM EST                       (Author)

## 2018-01-15 NOTE — RESULT NOTES
Verified Results  (1) CBC/PLT/DIFF [id number] 09:24AM Pipo Flattery     Test Name Result Flag Reference   WBC COUNT 5 67 Thousand/uL  4 31-10 16   RBC COUNT 4 55 Million/uL  3 88-5 62   HEMOGLOBIN 13 4 g/dL  12 0-17 0   HEMATOCRIT 41 7 %  36 5-49 3   MCV 92 fL  82-98   MCH 29 5 pg  26 8-34 3   MCHC 32 1 g/dL  31 4-37 4   RDW 13 0 %  11 6-15 1   MPV 9 4 fL  8 9-12 7   PLATELET COUNT 681 Thousands/uL  149-390   nRBC AUTOMATED 0 /100 WBCs     NEUTROPHILS RELATIVE PERCENT 56 %  43-75   LYMPHOCYTES RELATIVE PERCENT 34 %  14-44   MONOCYTES RELATIVE PERCENT 8 %  4-12   EOSINOPHILS RELATIVE PERCENT 2 %  0-6   BASOPHILS RELATIVE PERCENT 0 %  0-1   NEUTROPHILS ABSOLUTE COUNT 3 15 Thousands/? ??L  1 85-7 62   LYMPHOCYTES ABSOLUTE COUNT 1 92 Thousands/? ??L  0 60-4 47   MONOCYTES ABSOLUTE COUNT 0 47 Thousand/? ??L  0 17-1 22   EOSINOPHILS ABSOLUTE COUNT 0 10 Thousand/? ??L  0 00-0 61   BASOPHILS ABSOLUTE COUNT 0 02 Thousands/? ??L  0 00-0 10   This is a patient instruction: This test is non-fasting  Please drink two glasses of water morning of bloodwork  (1) COMPREHENSIVE METABOLIC PANEL [id number] 69:41AB [facility]     Test Name Result Flag Reference   SODIUM 139 mmol/L  136-145   POTASSIUM 4 6 mmol/L  3 5-5 3   Slightly Hemolyzed; Results May be Affected   CHLORIDE 105 mmol/L  100-108   CARBON DIOXIDE 29 mmol/L  21-32   ANION GAP (CALC) 5 mmol/L  4-13   BLOOD UREA NITROGEN 17 mg/dL  5-25   CREATININE 0 83 mg/dL  0 60-1 30   Standardized to IDMS reference method   CALCIUM 8 2 mg/dL L 8 3-10 1   BILI, TOTAL 1 02 mg/dL H 0 20-1 00   ALK PHOSPHATAS 68 U/L     ALT (SGPT) 25 U/L  12-78   Specimen collection should occur prior to Sulfasalazine and/or Sulfapyridine administration due to the potential for falsely depressed results  AST(SGOT) 15 U/L  5-45   Slightly Hemolyzed;  Results May be Affected  Specimen collection should occur prior to Sulfasalazine administration due to the potential for falsely depressed results  ALBUMIN 3 3 g/dL L 3 5-5 0   TOTAL PROTEIN 7 3 g/dL  6 4-8 2   eGFR 97 ml/min/1 73sq m     National Kidney Disease Education Program recommendations are as follows:  GFR calculation is accurate only with a steady state creatinine  Chronic Kidney disease less than 60 ml/min/1 73 sq  meters  Kidney failure less than 15 ml/min/1 73 sq  meters  GLUCOSE FASTING 91 mg/dL  65-99   Specimen collection should occur prior to Sulfasalazine administration due to the potential for falsely depressed results  Specimen collection should occur prior to Sulfapyridine administration due to the potential for falsely elevated results  (1) PSA (SCREEN) (Dx V76 44 Screen for Prostate Cancer) [id number] 09:24AM Jesse Stratton     Test Name Result Flag Reference   PROSTATE SPECIFIC ANTIGEN 1 7 ng/mL  0 0-4 0   American Urological Association Guidelines define biochemical recurrence of prostate cancer as a detectable or rising PSA value post-radical prostatectomy that is greater than or equal to 0 2 ng/mL with a second confirmatory level of greater than or equal to 0 2 ng/mL  This is a patient instruction: This test is non-fasting  Please drink two glasses of water morning of bloodwork  (1) LIPID PANEL FASTING W DIRECT LDL REFLEX [id number] 09:24AM Jesse Stratton     Test Name Result Flag Reference   CHOLESTEROL 173 mg/dL     LDL CHOLESTEROL CALCULATED 119 mg/dL H 0-100   This is a patient instruction:  This is a fasting test  Water, black tea or black coffee only after 9:00pm the night before the test  Drink 2 glasses of water the morning of the test         Triglyceride:        Normal <150 mg/dl   Borderline High 150-199 mg/dl   High 200-499 mg/dl   Very High >499 mg/dl      Cholesterol:       Desirable <200 mg/dl    Borderline High 200-239 mg/dl    High >239 mg/dl      HDL Cholesterol:       High>59 mg/dL    Low <41 mg/dL      HDL Cholesterol:       High>59 mg/dL    Low <41 mg/dL      This screening LDL is a calculated result  It does not have the accuracy of the Direct Measured LDL in the monitoring of patients with hyperlipidemia and/or statin therapy  Direct Measure LDL ([id number]) must be ordered separately in these patients  TRIGLYCERIDES 100 mg/dL  <=150   Specimen collection should occur prior to N-Acetylcysteine or Metamizole administration due to the potential for falsely depressed results  HDL,DIRECT 34 mg/dL L 40-60   Specimen collection should occur prior to Metamizole administration due to the potential for falsley depressed results  (1) TSH WITH FT4 REFLEX [id number] 09:24AM Romilda Crumble     Test Name Result Flag Reference   TSH 2 810 uIU/mL  0 358-3 740   Patients undergoing fluorescein dye angiography may retain small amounts of fluorescein in the body for 48-72 hours post procedure  Samples containing fluorescein can produce falsely depressed TSH values  If the patient had this procedure,a specimen should be resubmitted post fluorescein clearance  (1) HEMOGLOBIN A1C [id number] 09:24AM Romilda Crumble     Test Name Result Flag Reference   HEMOGLOBIN A1C 5 7 %  4 2-6 3   EST  AVG   GLUCOSE 117 mg/dl

## 2018-01-16 NOTE — RESULT NOTES
Verified Results  (1) HEMOGLOBIN A1C [id number] 10:10AM Faraz Adams     Test Name Result Flag Reference   HEMOGLOBIN A1C 6 0 %  4 2-6 3   EST  AVG  GLUCOSE 126 mg/dl       (1) COMPREHENSIVE METABOLIC PANEL [id number] 40:43YZ Faraz Adams     Test Name Result Flag Reference   GLUCOSE,RANDM 89 mg/dL     If the patient is fasting, the ADA then defines impaired fasting glucose as > 100 mg/dL and diabetes as > or equal to 123 mg/dL  SODIUM 144 mmol/L  136-145   POTASSIUM 4 2 mmol/L  3 5-5 3   CHLORIDE 108 mmol/L  100-108   CARBON DIOXIDE 31 mmol/L  21-32   ANION GAP (CALC) 5 mmol/L  4-13   BLOOD UREA NITROGEN 14 mg/dL  5-25   CREATININE 0 77 mg/dL  0 60-1 30   Standardized to IDMS reference method   CALCIUM 8 9 mg/dL  8 3-10 1   BILI, TOTAL 0 68 mg/dL  0 20-1 00   ALK PHOSPHATAS 82 U/L     ALT (SGPT) 24 U/L  12-78   AST(SGOT) 10 U/L  5-45   ALBUMIN 3 1 g/dL L 3 5-5 0   TOTAL PROTEIN 7 1 g/dL  6 4-8 2   eGFR Non-African American      >60 0 ml/min/1 73sq m   [facility] Disease Education Program recommendations are as follows:  GFR calculation is accurate only with a steady state creatinine  Chronic Kidney disease less than 60 ml/min/1 73 sq  meters  Kidney failure less than 15 ml/min/1 73 sq  meters       (1) LIPID PANEL FASTING W DIRECT LDL REFLEX [id number] 10:10AM Faraz Adams     Test Name Result Flag Reference   CHOLESTEROL 161 mg/dL     LDL CHOLESTEROL CALCULATED 101 mg/dL H 0-100   Triglyceride:         Normal              <150 mg/dl       Borderline High    150-199 mg/dl       High               200-499 mg/dl       Very High          >499 mg/dl  Cholesterol:         Desirable        <200 mg/dl      Borderline High  200-239 mg/dl      High             >239 mg/dl  HDL Cholesterol:        High    >59 mg/dL      Low     <41 mg/dL  LDL Cholesterol:        Optimal          <100 mg/dl        Near Optimal     100-129 mg/dl        Above Optimal          Borderline High   130-159 mg/dl          High              160-189 mg/dl          Very High        >189 mg/dl  LDL CALCULATED:    This screening LDL is a calculated result  It does not have the accuracy of the Direct Measured LDL in the monitoring of patients with hyperlipidemia and/or statin therapy  Direct Measure LDL ([id number]) must be ordered separately in these patients  TRIGLYCERIDES 119 mg/dL  <=150   Specimen collection should occur prior to N-Acetylcysteine or Metamizole administration due to the potential for falsely depressed results  HDL,DIRECT 36 mg/dL L 40-60   Specimen collection should occur prior to Metamizole administration due to the potential for falsely depressed results  (1) TSH WITH FT4 REFLEX [id number] 10:10AM Nerissaas Cata     Test Name Result Flag Reference   TSH 2 790 uIU/mL  0 358-3 740   Patients undergoing fluorescein dye angiography may retain small amounts of fluorescein in the body for 48-72 hours post procedure  Samples containing fluorescein can produce falsely depressed TSH values  If the patient had this procedure,a specimen should be resubmitted post fluorescein clearance

## 2018-01-16 NOTE — RESULT NOTES
Message   Please call the patient and have them make an appointment to discuss lab results  Thank you  Verified Results  (1) CBC/PLT/DIFF [id number] 04:31PM Malick Cruz   TW Order Number: [id number]    TW Order Number: [id number]     Test Name Result Flag Reference   WBC COUNT 7 24 Thousand/uL  4 31-10 16   RBC COUNT 4 39 Million/uL  3 88-5 62   HEMOGLOBIN 13 1 g/dL  12 0-17 0   HEMATOCRIT 40 3 %  36 5-49 3   MCV 92 fL  82-98   MCH 29 8 pg  26 8-34 3   MCHC 32 5 g/dL  31 4-37 4   RDW 12 7 %  11 6-15 1   MPV 9 7 fL  8 9-12 7   PLATELET COUNT 468 Thousands/uL  149-390   nRBC AUTOMATED 0 /100 WBCs     NEUTROPHILS RELATIVE PERCENT 56 %  43-75   LYMPHOCYTES RELATIVE PERCENT 34 %  14-44   MONOCYTES RELATIVE PERCENT 9 %  4-12   EOSINOPHILS RELATIVE PERCENT 1 %  0-6   BASOPHILS RELATIVE PERCENT 0 %  0-1   NEUTROPHILS ABSOLUTE COUNT 3 99 Thousands/µL  1 85-7 62   LYMPHOCYTES ABSOLUTE COUNT 2 45 Thousands/µL  0 60-4 47   MONOCYTES ABSOLUTE COUNT 0 67 Thousand/µL  0 17-1 22   EOSINOPHILS ABSOLUTE COUNT 0 09 Thousand/µL  0 00-0 61   BASOPHILS ABSOLUTE COUNT 0 02 Thousands/µL  0 00-0 10     (1) COMPREHENSIVE METABOLIC PANEL [id number] 52:74DB Malick Cruz    Order Number: [id number]      National Kidney Disease Education Program recommendations are as follows:  GFR calculation is accurate only with a steady state creatinine  Chronic Kidney disease less than 60 ml/min/1 73 sq  meters  Kidney failure less than 15 ml/min/1 73 sq  meters       Test Name Result Flag Reference   GLUCOSE,RANDM 110 mg/dL     SODIUM 140 mmol/L  136-145   POTASSIUM 4 5 mmol/L  3 5-5 3   CHLORIDE 107 mmol/L  100-108   CARBON DIOXIDE 29 mmol/L  21-32   ANION GAP (CALC) 4 mmol/L  4-13   BLOOD UREA NITROGEN 17 mg/dL  5-25   CREATININE 0 77 mg/dL  0 60-1 30   Standardized to IDMS reference method   CALCIUM 8 3 mg/dL  8 3-10 1   BILI, TOTAL 0 43 mg/dL  0 20-1 00   ALK PHOSPHATAS 79 U/L     ALT (SGPT) 29 U/L  12-78 AST(SGOT) 10 U/L  5-45   ALBUMIN 3 5 g/dL  3 5-5 0   TOTAL PROTEIN 6 9 g/dL  6 4-8 2   eGFR Non-African American      >60 0 ml/min/1 73sq m     (1) LIPID PANEL FASTING W DIRECT LDL REFLEX [id number] 04:NATI Santiago Jamie Order Number: [id number]      Triglyceride:         Normal              <150 mg/dl       Borderline High    150-199 mg/dl       High               200-499 mg/dl       Very High          >499 mg/dl  Cholesterol:         Desirable        <200 mg/dl      Borderline High  200-239 mg/dl      High             >239 mg/dl  HDL Cholesterol:        High    >59 mg/dL      Low     <41 mg/dL  LDL Cholesterol:        Optimal          <100 mg/dl         Near Optimal     100-129 mg/dl        Above Optimal          Borderline High   130-159 mg/dl          High              160-189 mg/dl          Very High        >189 mg/dl  LDL CALCULATED:    This screening LDL is a calculated result  It does not have the accuracy of the Direct Measured LDL in the monitoring of patients with hyperlipidemia and/or statin therapy  Direct Measure LDL ([id number]) must be ordered separately in these patients  Test Name Result Flag Reference   CHOLESTEROL 183 mg/dL     LDL CHOLESTEROL CALCULATED 81 mg/dL  0-100   TRIGLYCERIDES 377 mg/dL H <=150   HDL,DIRECT 27 mg/dL L 40-60     (1) HEMOGLOBIN A1C [id number] 04:NATI Santiago Jamie Order Number: [id number]      5 7-6 4% impaired fasting glucose  >=6 5% diagnosis of diabetes    Falsely low levels are seen in conditions linked to short RBC life span-  hemolytic anemia, and splenomegaly  Falsely elevated levels are seen in situations where there is an increased production of RBC- receipt of erythropoietin or blood transfusions  Adopted from ADA-Clinical Practice Recommendations     Test Name Result Flag Reference   HEMOGLOBIN A1C 5 8 % H 4 0-5 6   EST  AVG   GLUCOSE 120 mg/dl       (1) TSH WITH FT4 REFLEX [id number] 04:31PJEAN CARLOS SORENSEN Order Number: [id number]     Test Name Result Flag Reference   TSH 4 660 uIU/mL H 0 358-3 740   T4,FREE 0 73 ng/dL L 0 76-1 46     (1) PSA (SCREEN) (Dx V76 44 Screen for Prostate Cancer) [id number] 04:31PM Tyson Forde Order Number: [id number]     Order Number: [id number]     Test Name Result Flag Reference   PROSTATE SPECIFIC ANTIGEN 1 4 ng/mL  0 0-4 0

## 2018-02-13 DIAGNOSIS — E03.9 HYPOTHYROIDISM, UNSPECIFIED TYPE: Primary | ICD-10-CM

## 2018-02-13 PROBLEM — N40.1 BPH ASSOCIATED WITH NOCTURIA: Status: ACTIVE | Noted: 2017-11-13

## 2018-02-13 PROBLEM — R35.1 BPH ASSOCIATED WITH NOCTURIA: Status: ACTIVE | Noted: 2017-11-13

## 2018-02-13 PROBLEM — G89.29 CHRONIC BILATERAL LOW BACK PAIN WITH LEFT-SIDED SCIATICA: Status: ACTIVE | Noted: 2017-08-14

## 2018-02-13 PROBLEM — M54.42 CHRONIC BILATERAL LOW BACK PAIN WITH LEFT-SIDED SCIATICA: Status: ACTIVE | Noted: 2017-08-14

## 2018-02-13 RX ORDER — LEVOTHYROXINE SODIUM 0.03 MG/1
25 TABLET ORAL DAILY
Qty: 30 TABLET | Refills: 5 | Status: SHIPPED | OUTPATIENT
Start: 2018-02-13 | End: 2018-08-30 | Stop reason: SDUPTHER

## 2018-02-13 RX ORDER — LEVOTHYROXINE SODIUM 0.03 MG/1
TABLET ORAL
Qty: 90 TABLET | Refills: 1 | OUTPATIENT
Start: 2018-02-13

## 2018-02-13 RX ORDER — CHLORAL HYDRATE 500 MG
2 CAPSULE ORAL DAILY
COMMUNITY
Start: 2017-04-20 | End: 2018-05-16 | Stop reason: HOSPADM

## 2018-02-13 RX ORDER — SILDENAFIL 100 MG/1
TABLET, FILM COATED ORAL
COMMUNITY
Start: 2016-02-26 | End: 2019-01-11

## 2018-02-13 RX ORDER — BILBERRY FRUIT 1000 MG
1 CAPSULE ORAL DAILY
COMMUNITY
End: 2018-11-06 | Stop reason: ALTCHOICE

## 2018-02-13 RX ORDER — LEVOTHYROXINE SODIUM 0.03 MG/1
1 TABLET ORAL DAILY
COMMUNITY
Start: 2016-03-02 | End: 2018-02-13 | Stop reason: SDUPTHER

## 2018-03-17 DIAGNOSIS — E78.2 MIXED HYPERLIPIDEMIA: Primary | ICD-10-CM

## 2018-03-19 RX ORDER — OMEGA-3-ACID ETHYL ESTERS 1 G/1
CAPSULE, LIQUID FILLED ORAL
Qty: 60 CAPSULE | Refills: 5 | Status: SHIPPED | OUTPATIENT
Start: 2018-03-19 | End: 2018-10-31 | Stop reason: SDUPTHER

## 2018-05-12 ENCOUNTER — APPOINTMENT (OUTPATIENT)
Dept: LAB | Age: 58
End: 2018-05-12
Payer: COMMERCIAL

## 2018-05-12 ENCOUNTER — TRANSCRIBE ORDERS (OUTPATIENT)
Dept: ADMINISTRATIVE | Age: 58
End: 2018-05-12

## 2018-05-12 DIAGNOSIS — N13.8 ENLARGED PROSTATE WITH URINARY OBSTRUCTION: ICD-10-CM

## 2018-05-12 DIAGNOSIS — E78.1 PURE HYPERGLYCERIDEMIA: ICD-10-CM

## 2018-05-12 DIAGNOSIS — N13.8 ENLARGED PROSTATE WITH URINARY OBSTRUCTION: Primary | ICD-10-CM

## 2018-05-12 DIAGNOSIS — M54.42 ACUTE BACK PAIN WITH SCIATICA, LEFT: ICD-10-CM

## 2018-05-12 DIAGNOSIS — R20.2 PARESTHESIA: ICD-10-CM

## 2018-05-12 DIAGNOSIS — I51.9 MYXEDEMA HEART DISEASE: ICD-10-CM

## 2018-05-12 DIAGNOSIS — E03.9 MYXEDEMA HEART DISEASE: ICD-10-CM

## 2018-05-12 DIAGNOSIS — R73.01 IMPAIRED FASTING GLUCOSE: ICD-10-CM

## 2018-05-12 DIAGNOSIS — N40.1 ENLARGED PROSTATE WITH URINARY OBSTRUCTION: ICD-10-CM

## 2018-05-12 DIAGNOSIS — N40.1 ENLARGED PROSTATE WITH URINARY OBSTRUCTION: Primary | ICD-10-CM

## 2018-05-12 LAB
ALBUMIN SERPL BCP-MCNC: 3.4 G/DL (ref 3.5–5)
ALP SERPL-CCNC: 75 U/L (ref 46–116)
ALT SERPL W P-5'-P-CCNC: 32 U/L (ref 12–78)
ANION GAP SERPL CALCULATED.3IONS-SCNC: 4 MMOL/L (ref 4–13)
AST SERPL W P-5'-P-CCNC: 14 U/L (ref 5–45)
BASOPHILS # BLD AUTO: 0.02 THOUSANDS/ΜL (ref 0–0.1)
BASOPHILS NFR BLD AUTO: 0 % (ref 0–1)
BILIRUB SERPL-MCNC: 0.78 MG/DL (ref 0.2–1)
BUN SERPL-MCNC: 11 MG/DL (ref 5–25)
CALCIUM SERPL-MCNC: 8.8 MG/DL (ref 8.3–10.1)
CHLORIDE SERPL-SCNC: 104 MMOL/L (ref 100–108)
CHOLEST SERPL-MCNC: 174 MG/DL (ref 50–200)
CO2 SERPL-SCNC: 30 MMOL/L (ref 21–32)
CREAT SERPL-MCNC: 0.81 MG/DL (ref 0.6–1.3)
EOSINOPHIL # BLD AUTO: 0.1 THOUSAND/ΜL (ref 0–0.61)
EOSINOPHIL NFR BLD AUTO: 2 % (ref 0–6)
ERYTHROCYTE [DISTWIDTH] IN BLOOD BY AUTOMATED COUNT: 12.8 % (ref 11.6–15.1)
EST. AVERAGE GLUCOSE BLD GHB EST-MCNC: 123 MG/DL
GFR SERPL CREATININE-BSD FRML MDRD: 97 ML/MIN/1.73SQ M
GLUCOSE P FAST SERPL-MCNC: 94 MG/DL (ref 65–99)
HBA1C MFR BLD: 5.9 % (ref 4.2–6.3)
HCT VFR BLD AUTO: 44.5 % (ref 36.5–49.3)
HDLC SERPL-MCNC: 34 MG/DL (ref 40–60)
HGB BLD-MCNC: 14 G/DL (ref 12–17)
LDLC SERPL CALC-MCNC: 112 MG/DL (ref 0–100)
LYMPHOCYTES # BLD AUTO: 1.99 THOUSANDS/ΜL (ref 0.6–4.47)
LYMPHOCYTES NFR BLD AUTO: 36 % (ref 14–44)
MCH RBC QN AUTO: 29.2 PG (ref 26.8–34.3)
MCHC RBC AUTO-ENTMCNC: 31.5 G/DL (ref 31.4–37.4)
MCV RBC AUTO: 93 FL (ref 82–98)
MONOCYTES # BLD AUTO: 0.41 THOUSAND/ΜL (ref 0.17–1.22)
MONOCYTES NFR BLD AUTO: 7 % (ref 4–12)
NEUTROPHILS # BLD AUTO: 3 THOUSANDS/ΜL (ref 1.85–7.62)
NEUTS SEG NFR BLD AUTO: 55 % (ref 43–75)
NRBC BLD AUTO-RTO: 0 /100 WBCS
PLATELET # BLD AUTO: 272 THOUSANDS/UL (ref 149–390)
PMV BLD AUTO: 9.6 FL (ref 8.9–12.7)
POTASSIUM SERPL-SCNC: 4.6 MMOL/L (ref 3.5–5.3)
PROT SERPL-MCNC: 7.4 G/DL (ref 6.4–8.2)
RBC # BLD AUTO: 4.79 MILLION/UL (ref 3.88–5.62)
SODIUM SERPL-SCNC: 138 MMOL/L (ref 136–145)
TRIGL SERPL-MCNC: 141 MG/DL
TSH SERPL DL<=0.05 MIU/L-ACNC: 3.28 UIU/ML (ref 0.36–3.74)
WBC # BLD AUTO: 5.54 THOUSAND/UL (ref 4.31–10.16)

## 2018-05-12 PROCEDURE — 80061 LIPID PANEL: CPT

## 2018-05-12 PROCEDURE — 80053 COMPREHEN METABOLIC PANEL: CPT

## 2018-05-12 PROCEDURE — 36415 COLL VENOUS BLD VENIPUNCTURE: CPT

## 2018-05-12 PROCEDURE — 85025 COMPLETE CBC W/AUTO DIFF WBC: CPT

## 2018-05-12 PROCEDURE — 84443 ASSAY THYROID STIM HORMONE: CPT

## 2018-05-12 PROCEDURE — 83036 HEMOGLOBIN GLYCOSYLATED A1C: CPT

## 2018-05-16 ENCOUNTER — OFFICE VISIT (OUTPATIENT)
Dept: FAMILY MEDICINE CLINIC | Facility: CLINIC | Age: 58
End: 2018-05-16
Payer: COMMERCIAL

## 2018-05-16 VITALS
DIASTOLIC BLOOD PRESSURE: 76 MMHG | BODY MASS INDEX: 29.67 KG/M2 | TEMPERATURE: 97.5 F | HEIGHT: 66 IN | WEIGHT: 184.6 LBS | HEART RATE: 72 BPM | OXYGEN SATURATION: 97 % | SYSTOLIC BLOOD PRESSURE: 108 MMHG | RESPIRATION RATE: 16 BRPM

## 2018-05-16 DIAGNOSIS — Z12.11 SCREENING FOR COLORECTAL CANCER: ICD-10-CM

## 2018-05-16 DIAGNOSIS — E78.2 MIXED HYPERLIPIDEMIA: ICD-10-CM

## 2018-05-16 DIAGNOSIS — Z12.5 SCREENING FOR PROSTATE CANCER: ICD-10-CM

## 2018-05-16 DIAGNOSIS — N40.1 BPH ASSOCIATED WITH NOCTURIA: ICD-10-CM

## 2018-05-16 DIAGNOSIS — R35.1 BPH ASSOCIATED WITH NOCTURIA: ICD-10-CM

## 2018-05-16 DIAGNOSIS — R73.01 IMPAIRED FASTING GLUCOSE: ICD-10-CM

## 2018-05-16 DIAGNOSIS — H61.21 IMPACTED CERUMEN OF RIGHT EAR: ICD-10-CM

## 2018-05-16 DIAGNOSIS — Z00.00 ROUTINE PHYSICAL EXAMINATION: Primary | ICD-10-CM

## 2018-05-16 DIAGNOSIS — R86.9 SEMEN ABNORMAL: ICD-10-CM

## 2018-05-16 DIAGNOSIS — E03.9 ACQUIRED HYPOTHYROIDISM: ICD-10-CM

## 2018-05-16 DIAGNOSIS — Z12.12 SCREENING FOR COLORECTAL CANCER: ICD-10-CM

## 2018-05-16 PROCEDURE — 99396 PREV VISIT EST AGE 40-64: CPT | Performed by: NURSE PRACTITIONER

## 2018-05-16 PROCEDURE — 99214 OFFICE O/P EST MOD 30 MIN: CPT | Performed by: NURSE PRACTITIONER

## 2018-05-16 NOTE — PATIENT INSTRUCTIONS
Follow up with Urology   Schedule colonoscopy   Continue current medications  Work on regular exercise and weight loss  Labs to be done prior to next visit   Return to office in 6 months

## 2018-05-16 NOTE — PROGRESS NOTES
Assessment/Plan:   Diagnoses and all orders for this visit:    Acquired hypothyroidism  -     CBC and differential; Future  -     Lipid panel; Future  -     TSH, 3rd generation with T4 reflex; Future  -     Comprehensive metabolic panel; Future    Impaired fasting glucose  -     CBC and differential; Future  -     Lipid panel; Future  -     TSH, 3rd generation with T4 reflex; Future  -     Comprehensive metabolic panel; Future  -     HEMOGLOBIN A1C W/ EAG ESTIMATION; Future    BPH associated with nocturia  -     CBC and differential; Future  -     Comprehensive metabolic panel; Future  -     Ambulatory referral to Urology; Future    Mixed hyperlipidemia  -     CBC and differential; Future  -     Lipid panel; Future  -     TSH, 3rd generation with T4 reflex; Future  -     Comprehensive metabolic panel; Future    Impacted cerumen of right ear  -     Comprehensive metabolic panel; Future    Semen abnormal  -     Comprehensive metabolic panel; Future  -     Ambulatory referral to Urology; Future    Screening for prostate cancer  -     PSA, total and free; Future    Screening for colorectal cancer  -     Ambulatory referral to Colorectal Surgery; Future        Subjective:      Patient ID: Luis Oreilly is a 60 y o  male  Assessment/Plan    Healthy male exam     1  HM- no forms to complete today  Colonoscopy ordered  PSA UTD (had 11/2017)    2  Patient Counseling:  --Nutrition: Stressed importance of moderation in sodium/caffeine intake, saturated fat and cholesterol, caloric balance, sufficient intake of fresh fruits, vegetables, fiber, calcium, iron, and 1 mg of folate supplement per day (for females capable of pregnancy)  --Discussed the issue of estrogen replacement, calcium supplement, and the daily use of baby aspirin  --Exercise: Stressed the importance of regular exercise     --Substance Abuse: Discussed cessation/primary prevention of tobacco, alcohol, or other drug use; driving or other dangerous activities under the influence; availability of treatment for abuse  --Sexuality: Discussed sexually transmitted diseases, partner selection, use of condoms, avoidance of unintended pregnancy  and contraceptive alternatives  --Injury prevention: Discussed safety belts, safety helmets, smoke detector, smoking near bedding or upholstery  --Dental health: Discussed importance of regular tooth brushing, flossing, and dental visits  --Immunizations reviewed  --Discussed benefits of screening colonoscopy  --After hours service discussed with patient    3  Discussed the patient's BMI with him  The BMI is above average; BMI management plan is completed    4  Hypothyroidism-  Stable  Continue Levothyroxine 25 mcg daily     5  Hyperlipidemia- stable  Continue Omega 3 daily  Follow a low fat/ low cholesterol diet, work on weight loss    6  IFG- A1C at 5 9%, follow a low carb diet, watch sweets    7  BPH/ abnormal semen- referred to [address] [place] [address] Urology for further evaluation  PSA from 11/2017 at 1 7    8  Impacted cerumen- right ear lavage performed in the office today  Pt tolerated well, no complications  Right TM normal post lavage  No Qtips in inner ears  9  Follow up in 6 months  Subjective    Lida Letty MORENO is a 60 y o  male and is here for a comprehensive physical exam and a follow up of chronic medical conditions  Hyperlipidemia- recent lipid panel with / / / HDL 34, taking Omega 3 daily    IFG- recent A1C at 5 9%    Hypothyroidism TSH at 3 20, taking Levothyroxine 25 mcg     Obesity- was working out regularly but stopped over the past few months  He plans to start biking with his wife and following a more balanced diet     BPH-  No current medications, not following with urology  Voids once per night  PSA from 11/2017 at 1 7  Today, he c/o that his semen is "yellow" tinged and it takes a prolonged period of time to climax    Occasional ED issues but this has been better lately  He just recently a letter from Dr [facility] office that he is due for a Colonoscopy    Do you take any herbs or supplements that were not prescribed by a doctor? no  Are you taking calcium supplements? no  Are you taking aspirin daily? no    The following portions of the patient's history were reviewed and updated as appropriate: allergies, current medications, past medical history, past social history and problem list     Review of Systems  Do you have pain that bothers you in your daily life? No  Constitutional: negative  Eyes: negative  Ears, nose, mouth, throat, and face: negative, right ear feels blocked but no pain or drainage   Respiratory: negative  Cardiovascular: negative  Gastrointestinal: negative  Genitourinary:negative, nocturia, once per night  As noted above   Integument/breast: negative  Hematologic/lymphatic: negative  Musculoskeletal: occasional lower back pain, heating pad helps   Neurological: negative  Behavioral/Psych: negative  Endocrine: negative  Objective    General appearance: alert and oriented, in no acute distress  Head: Normocephalic, without obvious abnormality, atraumatic  Eyes: conjunctivae/corneas clear  PERRL, EOM's intact  Fundi benign  Ears: Right TM is completely obscured by impacted cerumen, left TM is normal without erythema   Nose: Nares normal  Septum midline  Mucosa normal  No drainage or sinus tenderness  Throat: lips, mucosa, and tongue normal; teeth and gums normal  Neck: no adenopathy, no carotid bruit, no JVD, supple, symmetrical, trachea midline and thyroid not enlarged, symmetric, no tenderness/mass/nodules  Back: symmetric, no curvature  ROM normal  No CVA tenderness    Lungs: clear to auscultation bilaterally  Heart: regular rate and rhythm, S1, S2 normal, no murmur, click, rub or gallop  Abdomen: soft, non-tender; bowel sounds normal; no masses,  no organomegaly  Extremities: extremities normal, warm and well-perfused; no cyanosis, clubbing, or edema  Skin: Skin color, texture, turgor normal  No rashes or lesions  Neurologic: Grossly normal                  The following portions of the patient's history were reviewed and updated as appropriate: allergies, current medications, past medical history, past social history and problem list     Review of Systems  see above     Objective:      /76 (BP Location: Left arm, Patient Position: Sitting, Cuff Size: Adult)   Pulse 72   Temp 97 5 °F (36 4 °C) (Tympanic)   Resp 16   Ht 5' 6" (1 676 m)   Wt 83 7 kg (184 lb 9 6 oz)   SpO2 97%   BMI 29 80 kg/m²          Physical Exam  see above

## 2018-05-16 NOTE — PROGRESS NOTES
Chief Complaint   Patient presents with    Follow-up     6 month follow up     Assessment/Plan:    No problem-specific Assessment & Plan notes found for this encounter  There are no diagnoses linked to this encounter  Subjective:      Patient ID: Spencer Tran is a 60 y o  male  HPI   Pt presents by himself today for a 6 month follow up    Hyperlipidemia- recent lipid panel with / / / HDL 34, taking Omega 3 daily    IFG- recent A1C at 5 9%    Hypothyroidism TSH at 3 20, taking Levothyroxine 25 mcg     Obesity-    BPH-  No current medications, not following with urology    PSA from 11/2017 at 1 7     Colonoscopy     The following portions of the patient's history were reviewed and updated as appropriate: allergies, current medications, past family history, past social history and problem list     Review of Systems      Objective:      /76 (BP Location: Left arm, Patient Position: Sitting, Cuff Size: Adult)   Pulse 72   Temp 97 5 °F (36 4 °C) (Tympanic)   Resp 16   Ht 5' 6" (1 676 m)   Wt 83 7 kg (184 lb 9 6 oz)   SpO2 97%   BMI 29 80 kg/m²          Physical Exam

## 2018-07-16 ENCOUNTER — TELEPHONE (OUTPATIENT)
Dept: UROLOGY | Facility: AMBULATORY SURGERY CENTER | Age: 58
End: 2018-07-16

## 2018-07-16 ENCOUNTER — OFFICE VISIT (OUTPATIENT)
Dept: UROLOGY | Facility: AMBULATORY SURGERY CENTER | Age: 58
End: 2018-07-16
Payer: COMMERCIAL

## 2018-07-16 VITALS
HEIGHT: 67 IN | WEIGHT: 184.4 LBS | SYSTOLIC BLOOD PRESSURE: 110 MMHG | DIASTOLIC BLOOD PRESSURE: 64 MMHG | BODY MASS INDEX: 28.94 KG/M2 | HEART RATE: 78 BPM

## 2018-07-16 DIAGNOSIS — N40.0 BENIGN PROSTATIC HYPERPLASIA, UNSPECIFIED WHETHER LOWER URINARY TRACT SYMPTOMS PRESENT: Primary | ICD-10-CM

## 2018-07-16 LAB
SL AMB  POCT GLUCOSE, UA: NORMAL
SL AMB LEUKOCYTE ESTERASE,UA: NORMAL
SL AMB POCT BILIRUBIN,UA: NORMAL
SL AMB POCT BLOOD,UA: NORMAL
SL AMB POCT CLARITY,UA: CLEAR
SL AMB POCT COLOR,UA: YELLOW
SL AMB POCT KETONES,UA: NORMAL
SL AMB POCT NITRITE,UA: NORMAL
SL AMB POCT PH,UA: 6
SL AMB POCT SPECIFIC GRAVITY,UA: 1.01
SL AMB POCT URINE PROTEIN: NORMAL

## 2018-07-16 PROCEDURE — 99244 OFF/OP CNSLTJ NEW/EST MOD 40: CPT | Performed by: UROLOGY

## 2018-07-16 PROCEDURE — 81002 URINALYSIS NONAUTO W/O SCOPE: CPT | Performed by: UROLOGY

## 2018-07-16 RX ORDER — TAMSULOSIN HYDROCHLORIDE 0.4 MG/1
0.4 CAPSULE ORAL
Qty: 30 CAPSULE | Refills: 11 | Status: SHIPPED | OUTPATIENT
Start: 2018-07-16 | End: 2018-11-20 | Stop reason: ALTCHOICE

## 2018-07-16 NOTE — LETTER
July 16, 2018     Silvana Gregorio, [address]    Patient: Lida Saenz II   YOB: 1960   Date of Visit: 7/16/2018       Dear Dr Victoria Hands:    Thank you for referring Gabby Duran to me for evaluation  Below are my notes for this consultation  If you have questions, please do not hesitate to call me  I look forward to following your patient along with you  Sincerely,        Liliana Araujo MD        CC: No Recipients  Liliana Araujo MD  7/16/2018  3:28 PM  Sign at close encounter  7/16/2018    Lida Saenz II  1960  [id number]        Assessment  Lower urinary tract symptoms  Ejaculatory dysfunction    Plan  We discussed possible alpha-blocker therapy  I explained that this may help with his urinary complaints  We discussed potential side effects, including ejaculatory dysfunction  He understands that this is a possibility but is willing to try it  He will follow up in 1 month re-evaluate his progress  We discussed that there is really no medication for Naomi Marquez is or dysfunction  May discuss imaging studies or cystoscopy in the future if needed based on his symptoms  History of Present Illness  Jacqueline Benitez is a 60 y o  male complaining of lower urinary tract symptoms and ejaculatory dysfunction  He complains of nocturia up to 2 times per night which has become bothersome  He also has episodes of daytime frequency and urgency  This has been ongoing for the last several months  He additionally complains of a checked or dysfunction, sometimes up to an hour until he can ejaculate  He has not had prostate examination  He has normal erections  AUA symptom score 14  AUA SYMPTOM SCORE      Most Recent Value   AUA SYMPTOM SCORE   How often have you had a sensation of not emptying your bladder completely after you finished urinating?   2   How often have you had to urinate again less than two hours after you finished urinating? 3   How often have you found you stopped and started again several times when you urinate? 2   How often have you found it difficult to postpone urination? 1   How often have you had a weak urinary stream?  3   How often have you had to push or strain to begin urination? 1   How many times did you most typically get up to urinate from the time you went to bed at night until the time you got up in the morning? 2   Quality of Life: If you were to spend the rest of your life with your urinary condition just the way it is now, how would you feel about that?  3   AUA SYMPTOM SCORE  14          Review of Systems  Review of Systems   Constitutional: Negative  HENT: Negative  Respiratory: Negative  Cardiovascular: Negative  Gastrointestinal: Negative  Genitourinary:        As per HPI   Musculoskeletal: Negative  Skin: Negative  Neurological: Negative  Hematological: Negative  Past Medical History  History reviewed  No pertinent past medical history  Past Social History  Past Surgical History:   Procedure Laterality Date    APPENDECTOMY      COLONOSCOPY  03/13/2012    NORMAL; RECHECK IN 5 YEARS       Past Family History  Family History   Problem Relation Age of Onset    Diabetes Mother     Hypertension Father        Past Social history  Social History     Social History    Marital status:      Spouse name: N/A    Number of children: N/A    Years of education: N/A     Occupational History    Not on file       Social History Main Topics    Smoking status: Former Smoker     Quit date: 5/16/1993    Smokeless tobacco: Never Used    Alcohol use Yes      Comment: SOCIAL     Drug use: No    Sexual activity: Not on file     Other Topics Concern    Not on file     Social History Narrative    No narrative on file     History   Smoking Status    Former Smoker    Quit date: 5/16/1993   Smokeless Tobacco    Never Used       Current Medications  Current Outpatient Prescriptions   Medication Sig Dispense Refill    levothyroxine 25 mcg tablet Take 1 tablet (25 mcg total) by mouth daily 30 tablet 5    Multiple Vitamin (MULTI-VITAMIN DAILY PO) Take 1 tablet by mouth daily      omega-3-acid ethyl esters (LOVAZA) 1 g capsule TAKE 2 CAPSULES BY MOUTH EVERY DAY 60 capsule 5    [place] 1000 MG CAPS Take 1 capsule by mouth daily      sildenafil (VIAGRA) 100 mg tablet Take by mouth       No current facility-administered medications for this visit  Allergies  Allergies   Allergen Reactions    Bee Venom Hives, Itching and Edema     Category: Allergy;     Penicillins Hives and Itching     Category: Allergy; Past Medical History, Social History, Family History, medications and allergies were reviewed  Vitals  Vitals:    07/16/18 1452   BP: 110/64   Pulse: 78   Weight: 83 6 kg (184 lb 6 4 oz)   Height: 5' 7" (1 702 m)       Physical Exam  Physical Exam   Constitutional: He is oriented to person, place, and time  He appears well-developed and well-nourished  Cardiovascular: Normal rate  Pulmonary/Chest: Effort normal    Abdominal: Soft  Genitourinary:   Genitourinary Comments: About 35 g, smooth, no nodules  Musculoskeletal: Normal range of motion  Neurological: He is alert and oriented to person, place, and time  Skin: Skin is warm, dry and intact  Psychiatric: He has a normal mood and affect  Vitals reviewed          Results  Lab Results   Component Value Date    PSA 1 7 11/04/2017    PSA 1 4 02/26/2016     Lab Results   Component Value Date    GLUCOSE 89 03/11/2017    CALCIUM 8 8 05/12/2018     05/12/2018    K 4 6 05/12/2018    CO2 30 05/12/2018     05/12/2018    BUN 11 05/12/2018    CREATININE 0 81 05/12/2018     Lab Results   Component Value Date    WBC 5 54 05/12/2018    HGB 14 0 05/12/2018    HCT 44 5 05/12/2018    MCV 93 05/12/2018     05/12/2018

## 2018-07-16 NOTE — PROGRESS NOTES
7/16/2018    Sariah Louie   1960  [id number]        Assessment  Lower urinary tract symptoms  Ejaculatory dysfunction    Plan  We discussed possible alpha-blocker therapy  I explained that this may help with his urinary complaints  We discussed potential side effects, including ejaculatory dysfunction  He understands that this is a possibility but is willing to try it  He will follow up in 1 month re-evaluate his progress  We discussed that there is really no medication for Karla Wallace is or dysfunction  May discuss imaging studies or cystoscopy in the future if needed based on his symptoms  History of Present Illness  Nichole Marshall is a 60 y o  male complaining of lower urinary tract symptoms and ejaculatory dysfunction  He complains of nocturia up to 2 times per night which has become bothersome  He also has episodes of daytime frequency and urgency  This has been ongoing for the last several months  He additionally complains of a checked or dysfunction, sometimes up to an hour until he can ejaculate  He has not had prostate examination  He has normal erections  AUA symptom score 14  AUA SYMPTOM SCORE      Most Recent Value   AUA SYMPTOM SCORE   How often have you had a sensation of not emptying your bladder completely after you finished urinating? 2   How often have you had to urinate again less than two hours after you finished urinating? 3   How often have you found you stopped and started again several times when you urinate? 2   How often have you found it difficult to postpone urination? 1   How often have you had a weak urinary stream?  3   How often have you had to push or strain to begin urination? 1   How many times did you most typically get up to urinate from the time you went to bed at night until the time you got up in the morning?   2   Quality of Life: If you were to spend the rest of your life with your urinary condition just the way it is now, how would you feel about that?  3   AUA SYMPTOM SCORE  14          Review of Systems  Review of Systems   Constitutional: Negative  HENT: Negative  Respiratory: Negative  Cardiovascular: Negative  Gastrointestinal: Negative  Genitourinary:        As per HPI   Musculoskeletal: Negative  Skin: Negative  Neurological: Negative  Hematological: Negative  Past Medical History  History reviewed  No pertinent past medical history  Past Social History  Past Surgical History:   Procedure Laterality Date    APPENDECTOMY      COLONOSCOPY  03/13/2012    NORMAL; RECHECK IN 5 YEARS       Past Family History  Family History   Problem Relation Age of Onset    Diabetes Mother     Hypertension Father        Past Social history  Social History     Social History    Marital status:      Spouse name: N/A    Number of children: N/A    Years of education: N/A     Occupational History    Not on file  Social History Main Topics    Smoking status: Former Smoker     Quit date: 5/16/1993    Smokeless tobacco: Never Used    Alcohol use Yes      Comment: SOCIAL     Drug use: No    Sexual activity: Not on file     Other Topics Concern    Not on file     Social History Narrative    No narrative on file     History   Smoking Status    Former Smoker    Quit date: 5/16/1993   Smokeless Tobacco    Never Used       Current Medications  Current Outpatient Prescriptions   Medication Sig Dispense Refill    levothyroxine 25 mcg tablet Take 1 tablet (25 mcg total) by mouth daily 30 tablet 5    Multiple Vitamin (MULTI-VITAMIN DAILY PO) Take 1 tablet by mouth daily      omega-3-acid ethyl esters (LOVAZA) 1 g capsule TAKE 2 CAPSULES BY MOUTH EVERY DAY 60 capsule 5    Saw [place] 1000 MG CAPS Take 1 capsule by mouth daily      sildenafil (VIAGRA) 100 mg tablet Take by mouth       No current facility-administered medications for this visit          Allergies  Allergies   Allergen Reactions    Bee Venom Hives, Itching and Edema     Category: Allergy;     Penicillins Hives and Itching     Category: Allergy; Past Medical History, Social History, Family History, medications and allergies were reviewed  Vitals  Vitals:    07/16/18 1452   BP: 110/64   Pulse: 78   Weight: 83 6 kg (184 lb 6 4 oz)   Height: 5' 7" (1 702 m)       Physical Exam  Physical Exam   Constitutional: He is oriented to person, place, and time  He appears well-developed and well-nourished  Cardiovascular: Normal rate  Pulmonary/Chest: Effort normal    Abdominal: Soft  Genitourinary:   Genitourinary Comments: About 35 g, smooth, no nodules  Musculoskeletal: Normal range of motion  Neurological: He is alert and oriented to person, place, and time  Skin: Skin is warm, dry and intact  Psychiatric: He has a normal mood and affect  Vitals reviewed          Results  Lab Results   Component Value Date    PSA 1 7 11/04/2017    PSA 1 4 02/26/2016     Lab Results   Component Value Date    GLUCOSE 89 03/11/2017    CALCIUM 8 8 05/12/2018     05/12/2018    K 4 6 05/12/2018    CO2 30 05/12/2018     05/12/2018    BUN 11 05/12/2018    CREATININE 0 81 05/12/2018     Lab Results   Component Value Date    WBC 5 54 05/12/2018    HGB 14 0 05/12/2018    HCT 44 5 05/12/2018    MCV 93 05/12/2018     05/12/2018

## 2018-08-30 DIAGNOSIS — E03.9 HYPOTHYROIDISM, UNSPECIFIED TYPE: ICD-10-CM

## 2018-08-30 RX ORDER — LEVOTHYROXINE SODIUM 0.03 MG/1
25 TABLET ORAL DAILY
Qty: 30 TABLET | Refills: 5 | Status: ON HOLD | OUTPATIENT
Start: 2018-08-30 | End: 2019-02-19 | Stop reason: CLARIF

## 2018-10-31 DIAGNOSIS — E78.2 MIXED HYPERLIPIDEMIA: ICD-10-CM

## 2018-10-31 RX ORDER — OMEGA-3-ACID ETHYL ESTERS 1 G/1
CAPSULE, LIQUID FILLED ORAL
Qty: 60 CAPSULE | Refills: 5 | Status: SHIPPED | OUTPATIENT
Start: 2018-10-31 | End: 2019-01-11

## 2018-11-06 ENCOUNTER — OFFICE VISIT (OUTPATIENT)
Dept: UROLOGY | Facility: AMBULATORY SURGERY CENTER | Age: 58
End: 2018-11-06
Payer: COMMERCIAL

## 2018-11-06 VITALS
SYSTOLIC BLOOD PRESSURE: 126 MMHG | WEIGHT: 185 LBS | BODY MASS INDEX: 29.03 KG/M2 | HEART RATE: 72 BPM | DIASTOLIC BLOOD PRESSURE: 78 MMHG | HEIGHT: 67 IN

## 2018-11-06 DIAGNOSIS — R35.0 URINARY FREQUENCY: Primary | ICD-10-CM

## 2018-11-06 DIAGNOSIS — N53.19 OTHER EJACULATORY DYSFUNCTION: ICD-10-CM

## 2018-11-06 PROCEDURE — 99213 OFFICE O/P EST LOW 20 MIN: CPT | Performed by: NURSE PRACTITIONER

## 2018-11-06 RX ORDER — OXYBUTYNIN CHLORIDE 10 MG/1
10 TABLET, EXTENDED RELEASE ORAL
Qty: 30 TABLET | Refills: 2 | Status: SHIPPED | OUTPATIENT
Start: 2018-11-06 | End: 2019-01-11

## 2018-11-06 NOTE — PROGRESS NOTES
11/6/2018    [facility]  1960  [id number]      Assessment  -Urinary urgency and frequency  -Routine prostate cancer screening    Discussion/Plan  Ofe Villegas is a 60 y o  male being managed by Dr Nolasco Record        -We discussed patient's lower urinary tract symptoms  Unfortunately he noticed no improvement with use of Flomax  His AUA symptom score has increased to 20 since last office visit  After further reviewing symptoms he states that they have been ongoing for the last 10 years  We discussed further evaluation with cystoscopy, however patient states this was performed over 3 years ago at our practice  Unfortunately, there are no records be on 2016 to confirm cystoscopy and findings  Will work on retrieving old records in all scripts  We discussed other medications such as anticholinergics for primary symptom if urinary frequency  Patient is amenable with this plan  Prescribed oxybutynin 10 mg  I reviewed potential side effects of urinary retention, constipation, dry mouth, and blurred vision  Unfortunately, there is limited treatment options for his report of ejaculation dysfunction  He states that this symptom has been ongoing for over 3 years  I did review the importance of hydration with water and component of stress  -Follow up in the next 4-6 weeks with [facility] and to evaluate urinary symptoms  -All questions answered, patient agrees with plan      History of Present Illness  60 y o  male with a history of urinary urgency, frequency, routine prostate cancer screening, and ejaculatory dysfunction presents today for follow up  At last office visit patient was started on flomax for reports of nocturia, urinary frequency, and urgency  He states that since starting medication he has seen absolutely no improvement in urinary symptoms  His AUA score today is now 20, previously 14  He continues to report episodes of urinary urgency, frequency, and straining with urination    He states the symptoms have been present for almost 15 years  He denies any episodes of gross hematuria or dysuria  Patient had also reported difficulty ejaculating  He states that this symptom is unchanged and continues to report delayed ejaculation  Patient denies any personal or work stress  He states that he continues to hydrate primarily with water and has an occasional small cup of coffee in the morning and juice throughout the day  Last PSA from 11/2017 was 1 7  Patient states that PCP performs routine prostate cancer screening  He denies any strong family history of prostate cancer  Review of Systems  Review of Systems   Constitutional: Negative  HENT: Negative  Respiratory: Negative  Cardiovascular: Negative  Gastrointestinal: Negative  Genitourinary: Positive for frequency and urgency  Negative for difficulty urinating, dysuria, flank pain and hematuria  Decreased urine volume: weak stream    Musculoskeletal: Negative  Skin: Negative  Neurological: Negative  Psychiatric/Behavioral: Negative  AUA SYMPTOM SCORE      Most Recent Value   AUA SYMPTOM SCORE   How often have you had a sensation of not emptying your bladder completely after you finished urinating? 3   How often have you had to urinate again less than two hours after you finished urinating? 2   How often have you found you stopped and started again several times when you urinate? 3   How often have you found it difficult to postpone urination? 3   How often have you had a weak urinary stream?  3   How often have you had to push or strain to begin urination? 2   How many times did you most typically get up to urinate from the time you went to bed at night until the time you got up in the morning?   2   Quality of Life: If you were to spend the rest of your life with your urinary condition just the way it is now, how would you feel about that?  2   AUA SYMPTOM SCORE  18            Past Medical History  No past medical history on file  Past Social History  Past Surgical History:   Procedure Laterality Date    APPENDECTOMY      COLONOSCOPY  03/13/2012    NORMAL; RECHECK IN 5 YEARS       Past Family History  Family History   Problem Relation Age of Onset    Diabetes Mother     Hypertension Father        Past Social history  Social History     Social History    Marital status:      Spouse name: N/A    Number of children: N/A    Years of education: N/A     Occupational History    Not on file  Social History Main Topics    Smoking status: Former Smoker     Quit date: 5/16/1993    Smokeless tobacco: Never Used    Alcohol use Yes      Comment: SOCIAL     Drug use: No    Sexual activity: Not on file     Other Topics Concern    Not on file     Social History Narrative    No narrative on file       Current Medications  Current Outpatient Prescriptions   Medication Sig Dispense Refill    levothyroxine 25 mcg tablet TAKE 1 TABLET (25 MCG TOTAL) BY MOUTH DAILY 30 tablet 5    Multiple Vitamin (MULTI-VITAMIN DAILY PO) Take 1 tablet by mouth daily      omega-3-acid ethyl esters (LOVAZA) 1 g capsule TAKE 2 CAPSULES BY MOUTH EVERY DAY 60 capsule 5    Saw [place] 1000 MG CAPS Take 1 capsule by mouth daily      sildenafil (VIAGRA) 100 mg tablet Take by mouth      tamsulosin (FLOMAX) 0 4 mg Take 1 capsule (0 4 mg total) by mouth daily with dinner for 30 days 30 capsule 11     No current facility-administered medications for this visit  Allergies  Allergies   Allergen Reactions    Bee Venom Hives, Itching and Edema     Category: Allergy;     Penicillins Hives and Itching     Category: Allergy; Past Medical History, Social History, Family History, medications and allergies were reviewed  Vitals  There were no vitals filed for this visit  Physical Exam  Physical Exam   Constitutional: He is oriented to person, place, and time  He appears well-developed and well-nourished     HENT:   Head: Normocephalic  Eyes: Pupils are equal, round, and reactive to light  Neck: Normal range of motion  Cardiovascular: Normal rate and regular rhythm  Pulmonary/Chest: Effort normal    Abdominal: Soft  Normal appearance  He exhibits no distension  There is no tenderness  There is no CVA tenderness  Genitourinary:   Genitourinary Comments: No suprapubic discomfort or distention   Musculoskeletal: Normal range of motion  Neurological: He is alert and oriented to person, place, and time  Skin: Skin is warm and dry  Psychiatric: He has a normal mood and affect  His behavior is normal  Judgment and thought content normal        Results    I have personally reviewed all pertinent lab results and reviewed with patient  Lab Results   Component Value Date    PSA 1 7 11/04/2017    PSA 1 4 02/26/2016     Lab Results   Component Value Date    CALCIUM 8 8 05/12/2018    K 4 6 05/12/2018    CO2 30 05/12/2018     05/12/2018    BUN 11 05/12/2018    CREATININE 0 81 05/12/2018     Lab Results   Component Value Date    WBC 5 54 05/12/2018    HGB 14 0 05/12/2018    HCT 44 5 05/12/2018    MCV 93 05/12/2018     05/12/2018     No results found for this or any previous visit (from the past 1 hour(s))

## 2018-11-09 ENCOUNTER — APPOINTMENT (OUTPATIENT)
Dept: LAB | Age: 58
End: 2018-11-09
Payer: COMMERCIAL

## 2018-11-09 DIAGNOSIS — E03.9 ACQUIRED HYPOTHYROIDISM: ICD-10-CM

## 2018-11-09 DIAGNOSIS — R86.9 SEMEN ABNORMAL: ICD-10-CM

## 2018-11-09 DIAGNOSIS — E78.2 MIXED HYPERLIPIDEMIA: ICD-10-CM

## 2018-11-09 DIAGNOSIS — N40.1 BPH ASSOCIATED WITH NOCTURIA: ICD-10-CM

## 2018-11-09 DIAGNOSIS — H61.21 IMPACTED CERUMEN OF RIGHT EAR: ICD-10-CM

## 2018-11-09 DIAGNOSIS — R35.1 BPH ASSOCIATED WITH NOCTURIA: ICD-10-CM

## 2018-11-09 DIAGNOSIS — R73.01 IMPAIRED FASTING GLUCOSE: ICD-10-CM

## 2018-11-09 DIAGNOSIS — Z12.5 SCREENING FOR PROSTATE CANCER: ICD-10-CM

## 2018-11-09 LAB
ALBUMIN SERPL BCP-MCNC: 3.7 G/DL (ref 3.5–5)
ALP SERPL-CCNC: 82 U/L (ref 46–116)
ALT SERPL W P-5'-P-CCNC: 39 U/L (ref 12–78)
ANION GAP SERPL CALCULATED.3IONS-SCNC: 6 MMOL/L (ref 4–13)
AST SERPL W P-5'-P-CCNC: 17 U/L (ref 5–45)
BASOPHILS # BLD AUTO: 0.03 THOUSANDS/ΜL (ref 0–0.1)
BASOPHILS NFR BLD AUTO: 1 % (ref 0–1)
BILIRUB SERPL-MCNC: 1.07 MG/DL (ref 0.2–1)
BUN SERPL-MCNC: 19 MG/DL (ref 5–25)
CALCIUM SERPL-MCNC: 8.9 MG/DL (ref 8.3–10.1)
CHLORIDE SERPL-SCNC: 102 MMOL/L (ref 100–108)
CHOLEST SERPL-MCNC: 190 MG/DL (ref 50–200)
CO2 SERPL-SCNC: 29 MMOL/L (ref 21–32)
CREAT SERPL-MCNC: 0.86 MG/DL (ref 0.6–1.3)
EOSINOPHIL # BLD AUTO: 0.1 THOUSAND/ΜL (ref 0–0.61)
EOSINOPHIL NFR BLD AUTO: 2 % (ref 0–6)
ERYTHROCYTE [DISTWIDTH] IN BLOOD BY AUTOMATED COUNT: 12.5 % (ref 11.6–15.1)
EST. AVERAGE GLUCOSE BLD GHB EST-MCNC: 126 MG/DL
GFR SERPL CREATININE-BSD FRML MDRD: 95 ML/MIN/1.73SQ M
GLUCOSE P FAST SERPL-MCNC: 95 MG/DL (ref 65–99)
HBA1C MFR BLD: 6 % (ref 4.2–6.3)
HCT VFR BLD AUTO: 44.2 % (ref 36.5–49.3)
HDLC SERPL-MCNC: 35 MG/DL (ref 40–60)
HGB BLD-MCNC: 13.8 G/DL (ref 12–17)
IMM GRANULOCYTES # BLD AUTO: 0.02 THOUSAND/UL (ref 0–0.2)
IMM GRANULOCYTES NFR BLD AUTO: 0 % (ref 0–2)
LDLC SERPL CALC-MCNC: 122 MG/DL (ref 0–100)
LYMPHOCYTES # BLD AUTO: 1.73 THOUSANDS/ΜL (ref 0.6–4.47)
LYMPHOCYTES NFR BLD AUTO: 29 % (ref 14–44)
MCH RBC QN AUTO: 29.4 PG (ref 26.8–34.3)
MCHC RBC AUTO-ENTMCNC: 31.2 G/DL (ref 31.4–37.4)
MCV RBC AUTO: 94 FL (ref 82–98)
MONOCYTES # BLD AUTO: 0.51 THOUSAND/ΜL (ref 0.17–1.22)
MONOCYTES NFR BLD AUTO: 9 % (ref 4–12)
NEUTROPHILS # BLD AUTO: 3.53 THOUSANDS/ΜL (ref 1.85–7.62)
NEUTS SEG NFR BLD AUTO: 59 % (ref 43–75)
NONHDLC SERPL-MCNC: 155 MG/DL
NRBC BLD AUTO-RTO: 0 /100 WBCS
PLATELET # BLD AUTO: 273 THOUSANDS/UL (ref 149–390)
PMV BLD AUTO: 9.7 FL (ref 8.9–12.7)
POTASSIUM SERPL-SCNC: 4.3 MMOL/L (ref 3.5–5.3)
PROT SERPL-MCNC: 7.7 G/DL (ref 6.4–8.2)
RBC # BLD AUTO: 4.7 MILLION/UL (ref 3.88–5.62)
SODIUM SERPL-SCNC: 137 MMOL/L (ref 136–145)
T4 FREE SERPL-MCNC: 0.77 NG/DL (ref 0.76–1.46)
TRIGL SERPL-MCNC: 167 MG/DL
TSH SERPL DL<=0.05 MIU/L-ACNC: 3.91 UIU/ML (ref 0.36–3.74)
WBC # BLD AUTO: 5.92 THOUSAND/UL (ref 4.31–10.16)

## 2018-11-09 PROCEDURE — 80053 COMPREHEN METABOLIC PANEL: CPT

## 2018-11-09 PROCEDURE — 84154 ASSAY OF PSA FREE: CPT

## 2018-11-09 PROCEDURE — 80061 LIPID PANEL: CPT

## 2018-11-09 PROCEDURE — 84439 ASSAY OF FREE THYROXINE: CPT

## 2018-11-09 PROCEDURE — 83036 HEMOGLOBIN GLYCOSYLATED A1C: CPT

## 2018-11-09 PROCEDURE — 85025 COMPLETE CBC W/AUTO DIFF WBC: CPT

## 2018-11-09 PROCEDURE — 84443 ASSAY THYROID STIM HORMONE: CPT

## 2018-11-09 PROCEDURE — 84153 ASSAY OF PSA TOTAL: CPT

## 2018-11-09 PROCEDURE — 36415 COLL VENOUS BLD VENIPUNCTURE: CPT

## 2018-11-10 LAB
PSA FREE MFR SERPL: 27.6 %
PSA FREE SERPL-MCNC: 0.47 NG/ML
PSA SERPL-MCNC: 1.7 NG/ML (ref 0–4)

## 2018-11-20 ENCOUNTER — OFFICE VISIT (OUTPATIENT)
Dept: FAMILY MEDICINE CLINIC | Facility: CLINIC | Age: 58
End: 2018-11-20
Payer: COMMERCIAL

## 2018-11-20 VITALS
TEMPERATURE: 99.5 F | HEIGHT: 67 IN | OXYGEN SATURATION: 96 % | DIASTOLIC BLOOD PRESSURE: 76 MMHG | HEART RATE: 74 BPM | WEIGHT: 187 LBS | SYSTOLIC BLOOD PRESSURE: 122 MMHG | RESPIRATION RATE: 16 BRPM | BODY MASS INDEX: 29.35 KG/M2

## 2018-11-20 DIAGNOSIS — E78.2 MIXED HYPERLIPIDEMIA: ICD-10-CM

## 2018-11-20 DIAGNOSIS — N40.1 BPH ASSOCIATED WITH NOCTURIA: ICD-10-CM

## 2018-11-20 DIAGNOSIS — Z12.11 SCREENING FOR COLON CANCER: Primary | ICD-10-CM

## 2018-11-20 DIAGNOSIS — R20.2 PARESTHESIA OF BILATERAL LEGS: ICD-10-CM

## 2018-11-20 DIAGNOSIS — R35.1 BPH ASSOCIATED WITH NOCTURIA: ICD-10-CM

## 2018-11-20 DIAGNOSIS — R29.898 WEAKNESS OF RIGHT LOWER EXTREMITY: ICD-10-CM

## 2018-11-20 DIAGNOSIS — E66.3 OVERWEIGHT (BMI 25.0-29.9): ICD-10-CM

## 2018-11-20 DIAGNOSIS — E03.9 ACQUIRED HYPOTHYROIDISM: Primary | ICD-10-CM

## 2018-11-20 DIAGNOSIS — R73.01 IMPAIRED FASTING GLUCOSE: ICD-10-CM

## 2018-11-20 DIAGNOSIS — M54.50 CHRONIC BILATERAL LOW BACK PAIN WITHOUT SCIATICA: ICD-10-CM

## 2018-11-20 DIAGNOSIS — G89.29 CHRONIC BILATERAL LOW BACK PAIN WITHOUT SCIATICA: ICD-10-CM

## 2018-11-20 PROCEDURE — 99215 OFFICE O/P EST HI 40 MIN: CPT | Performed by: FAMILY MEDICINE

## 2018-11-20 PROCEDURE — 1036F TOBACCO NON-USER: CPT | Performed by: FAMILY MEDICINE

## 2018-11-20 NOTE — PROGRESS NOTES
Chief Complaint   Patient presents with    Follow-up     6 month follow up     Health Maintenance   Topic Date Due    Hepatitis C Screening  1960    CRC Screening: Colonoscopy  1960    Depression Screening PHQ  11/20/2019    DTaP,Tdap,and Td Vaccines (2 - Td) 02/26/2026    INFLUENZA VACCINE  Completed     Assessment/Plan:    Hypothyroidism  Continue Levothyroxine 25 mcg daily  Will recheck TSH, T4 free in 6 months  Impaired fasting glucose  Recommended to follow a low carb diet, lose weight  Mixed hyperlipidemia  Goal for HDL > 40, trigl  < 150  Discussed a low-cholesterol, low-fat diet with patient  Recommended to increase exercise  Continue Omega 3 1000 mg daily  Recheck lipid panel in 6 months  Overweight (BMI 25 0-29  9)  Encouraged weight reduction  Chronic bilateral low back pain without sciatica  Check X-ray L-S spine to r/o bone abnormality  Recommended to schedule evaluation by orthopedic surgeon   Dr Mike Shay  Weakness of right lower extremity  Will order EMG  Consider ordering MRI of the lumbar spine  Paresthesia of bilateral legs  Schedule EMG  Check Vit B12 level to r/o Vit B12 deficiency  BPH associated with nocturia  Follow-up with urology on 12/4/18  HM: schedule screening colonoscopy  Discussed Shingrix vaccination  Patient will check with insurance regarding coverage  Diagnoses and all orders for this visit:    Acquired hypothyroidism  -     TSH, 3rd generation with Free T4 reflex; Future    Impaired fasting glucose  -     Comprehensive metabolic panel; Future  -     Hemoglobin A1C; Future    Mixed hyperlipidemia  -     Lipid panel; Future    Overweight (BMI 25 0-29  9)    Chronic bilateral low back pain without sciatica  -     Ambulatory referral to Orthopedic Surgery; Future  -     XR spine lumbar minimum 4 views non injury; Future    Weakness of right lower extremity  -     Ambulatory referral to Orthopedic Surgery;  Future  -     EMG 2 limb lower extremity; Future    Paresthesia of bilateral legs  -     Vitamin B12; Future    BPH associated with nocturia          Subjective:      Patient ID: Jina Cruz II is a 60 y o  male  HPI     Patient presents for 6 month follow-up office visit  He was previously seen by [facility]  PMHx: Hypothyroidism, impaired fasting glucose, BPH, Hyperlipidemia, chronic low back pain  Reviewed all current medications, blood work results from November 9, 2018  Hb A1c 6 0, fasting blood sugar 95, creatinine 0 86, potassium 4 3, AST, ALT - within normal limits  PSA total 1 7  Cholesterol 190, triglycerides 167, HDL 35, , TSH 3 910, T4 free 0 77  Hypothyroidism -patient takes Levothyroxine 25 mcg daily  Denies fatigue  Weight has been stable  Hyperlipidemia - currently taking Omega 3  1000 mg one caps  daily  He does a lot of walking at work  He wants to lose weight, plans to join gym  Patient has H/o chronic low back pain  C/o right lower extremity weakness, balance problems for the last 5 years  He tried physical therapy in the past with no significant improvement in symptoms  He had a few falls due to balance issues  C/o numbness, tingling in feet and lower extremities  Patient has not been seen by orthopedic specialist     Never had MRI of the lumbar spine  Denies history of falls or back injury  BPH - patient has been followed by urologist   Dr Ivonne Menezes  C/o nocturia  He stopped taking Flomax because did not see any  improvement in symptoms  He stopped taking Oxybutynin due to constipation  Former smoker  Drinks alcohol occasionally on weekends  Family H/o is positive for DM in his mother  Patient is due for colonoscopy  Flu shot done in 9/18      The following portions of the patient's history were reviewed and updated as appropriate: allergies, current medications, past medical history, past social history, past surgical history and problem list     Review of Systems   Constitutional: Negative for activity change, appetite change, chills, fatigue and fever  HENT: Negative for congestion, ear pain, nosebleeds, sore throat, tinnitus and trouble swallowing  Eyes: Negative for pain, discharge, redness, itching and visual disturbance  Respiratory: Negative for cough, chest tightness, shortness of breath and wheezing  Cardiovascular: Negative for chest pain, palpitations and leg swelling  Gastrointestinal: Positive for constipation  Negative for abdominal pain, blood in stool, diarrhea, nausea and vomiting  Genitourinary: Positive for frequency  Negative for difficulty urinating, dysuria, flank pain and hematuria  Nocturia   Musculoskeletal: Positive for back pain (chronic low back pain) and gait problem (balance problems)  Negative for joint swelling, myalgias and neck pain  R LE weakness   Skin: Negative for rash and wound  Neurological: Negative for dizziness, syncope and headaches  Tingling , numbness in feet, LE BL   Hematological: Negative  Psychiatric/Behavioral: Negative  Objective:      /76 (BP Location: Left arm, Patient Position: Sitting, Cuff Size: Adult)   Pulse 74   Temp 99 5 °F (37 5 °C) (Oral)   Resp 16   Ht 5' 6 75" (1 695 m)   Wt 84 8 kg (187 lb)   SpO2 96%   BMI 29 51 kg/m²          Physical Exam   Constitutional: He appears well-developed and well-nourished  HENT:   Head: Normocephalic and atraumatic  Right Ear: External ear normal    Left Ear: External ear normal    Mouth/Throat: Oropharynx is clear and moist    Eyes: Pupils are equal, round, and reactive to light  Conjunctivae are normal    Neck: Normal range of motion  Neck supple  No thyromegaly present  Cardiovascular: Normal rate, regular rhythm and normal heart sounds  No murmur heard    No BL LE edema  No carotid bruits BL  No abdominal bruits  DP pulses 2 + BL   Pulmonary/Chest: Effort normal and breath sounds normal  He has no wheezes  He has no rales  Abdominal: Soft  Bowel sounds are normal  There is no tenderness  Musculoskeletal:   Low back: no spinal or paraspinal tenderness  Slightly decreased ROM with flexion, extension  R foot drop   Neurological: Coordination normal    Slightly decreased sensation in lower legs  MS 4/5 in R LE    MS 5/5 in L LE  Skin: Skin is warm and dry  No rash noted  Psychiatric: He has a normal mood and affect  Nursing note and vitals reviewed  BMI Counseling: Body mass index is 29 51 kg/m²  Discussed with patient's BMI with him  The BMI is above average  BMI counseling and education was provided to the patient  Nutrition recommendations include reducing portion sizes, decreasing overall calorie intake, 3-5 servings of fruits/vegetables daily, reducing fast food intake, consuming healthier snacks, decreasing soda and/or juice intake, moderation in carbohydrate intake, increasing intake of lean protein, reducing intake of saturated fat and trans fat and reducing intake of cholesterol

## 2018-11-20 NOTE — ASSESSMENT & PLAN NOTE
Check X-ray L-S spine to r/o bone abnormality  Recommended to schedule evaluation by orthopedic surgeon   Dr Chela Brito

## 2018-11-20 NOTE — ASSESSMENT & PLAN NOTE
Goal for HDL > 40, trigl  < 150  Discussed a low-cholesterol, low-fat diet with patient  Recommended to increase exercise  Continue Omega 3 1000 mg daily  Recheck lipid panel in 6 months

## 2018-11-21 ENCOUNTER — TELEPHONE (OUTPATIENT)
Dept: FAMILY MEDICINE CLINIC | Facility: CLINIC | Age: 58
End: 2018-11-21

## 2018-11-21 DIAGNOSIS — R29.898 WEAKNESS OF RIGHT LOWER EXTREMITY: Primary | ICD-10-CM

## 2018-11-21 DIAGNOSIS — G89.29 CHRONIC BILATERAL LOW BACK PAIN WITHOUT SCIATICA: ICD-10-CM

## 2018-11-21 DIAGNOSIS — M54.50 CHRONIC BILATERAL LOW BACK PAIN WITHOUT SCIATICA: ICD-10-CM

## 2018-11-21 DIAGNOSIS — R20.2 PARESTHESIA OF BILATERAL LEGS: ICD-10-CM

## 2018-11-21 NOTE — TELEPHONE ENCOUNTER
Left message for patient telling him to schedule the MRI in order to see Dr Olivia Zuniga  Asked him to schedule it out a week or 2 to get authorization covered through insurance

## 2018-11-26 ENCOUNTER — HOSPITAL ENCOUNTER (OUTPATIENT)
Dept: RADIOLOGY | Facility: HOSPITAL | Age: 58
Discharge: HOME | End: 2018-11-26
Payer: COMMERCIAL

## 2018-11-26 ENCOUNTER — TRANSCRIBE ORDERS (OUTPATIENT)
Dept: RADIOLOGY | Facility: HOSPITAL | Age: 58
End: 2018-11-26

## 2018-11-26 ENCOUNTER — TELEPHONE (OUTPATIENT)
Dept: FAMILY MEDICINE CLINIC | Facility: CLINIC | Age: 58
End: 2018-11-26

## 2018-11-26 ENCOUNTER — LAB (OUTPATIENT)
Dept: LAB | Age: 58
End: 2018-11-26
Payer: COMMERCIAL

## 2018-11-26 DIAGNOSIS — G89.29 CHRONIC BILATERAL LOW BACK PAIN WITHOUT SCIATICA: ICD-10-CM

## 2018-11-26 DIAGNOSIS — R20.2 PARESTHESIA OF BILATERAL LEGS: ICD-10-CM

## 2018-11-26 DIAGNOSIS — M54.50 CHRONIC BILATERAL LOW BACK PAIN WITHOUT SCIATICA: ICD-10-CM

## 2018-11-26 PROCEDURE — 72110 X-RAY EXAM L-2 SPINE 4/>VWS: CPT

## 2018-11-26 PROCEDURE — 82607 VITAMIN B-12: CPT

## 2018-11-26 PROCEDURE — 36415 COLL VENOUS BLD VENIPUNCTURE: CPT

## 2018-11-26 NOTE — TELEPHONE ENCOUNTER
I called to let patient know we got the authorization for the MRI on 11/30/2018 approved  Authorization # is [id number] valid from 11/26/2018-2/24/2019

## 2018-11-27 LAB — VIT B12 SERPL-MCNC: 555 PG/ML (ref 100–900)

## 2018-11-30 ENCOUNTER — HOSPITAL ENCOUNTER (OUTPATIENT)
Dept: RADIOLOGY | Age: 58
Discharge: HOME | End: 2018-11-30
Payer: COMMERCIAL

## 2018-11-30 DIAGNOSIS — R20.2 PARESTHESIA OF BILATERAL LEGS: ICD-10-CM

## 2018-11-30 DIAGNOSIS — G89.29 CHRONIC BILATERAL LOW BACK PAIN WITHOUT SCIATICA: ICD-10-CM

## 2018-11-30 DIAGNOSIS — R29.898 WEAKNESS OF RIGHT LOWER EXTREMITY: ICD-10-CM

## 2018-11-30 DIAGNOSIS — M54.50 CHRONIC BILATERAL LOW BACK PAIN WITHOUT SCIATICA: ICD-10-CM

## 2018-11-30 PROCEDURE — 72158 MRI LUMBAR SPINE W/O & W/DYE: CPT

## 2018-11-30 PROCEDURE — A9585 GADOBUTROL INJECTION: HCPCS | Performed by: FAMILY MEDICINE

## 2018-11-30 RX ADMIN — GADOBUTROL 8 ML: 604.72 INJECTION INTRAVENOUS at 16:19

## 2018-12-04 ENCOUNTER — OFFICE VISIT (OUTPATIENT)
Dept: UROLOGY | Facility: CLINIC | Age: 58
End: 2018-12-04
Payer: COMMERCIAL

## 2018-12-04 VITALS
HEART RATE: 60 BPM | SYSTOLIC BLOOD PRESSURE: 118 MMHG | WEIGHT: 185 LBS | DIASTOLIC BLOOD PRESSURE: 66 MMHG | HEIGHT: 67 IN | BODY MASS INDEX: 29.03 KG/M2

## 2018-12-04 DIAGNOSIS — N13.8 BPH WITH OBSTRUCTION/LOWER URINARY TRACT SYMPTOMS: Primary | ICD-10-CM

## 2018-12-04 DIAGNOSIS — N40.1 BPH WITH OBSTRUCTION/LOWER URINARY TRACT SYMPTOMS: Primary | ICD-10-CM

## 2018-12-04 LAB — POST-VOID RESIDUAL VOLUME, ML POC: 119 ML

## 2018-12-04 PROCEDURE — 51798 US URINE CAPACITY MEASURE: CPT | Performed by: PHYSICIAN ASSISTANT

## 2018-12-04 PROCEDURE — 99213 OFFICE O/P EST LOW 20 MIN: CPT | Performed by: PHYSICIAN ASSISTANT

## 2018-12-04 NOTE — PROGRESS NOTES
UROLOGY PROGRESS NOTE   Patient Identifiers: Ivette Guthrie (MRN [id number])  Date of Service: 12/4/2018    Subjective:     29-year-old male history of voiding dysfunction and urinary frequency  He complains of slow flow he had a trial of tamsulosin complained of constipation and worsening symptoms on the medications so he stopped it  He thinks he had a cystoscopy several years ago all those records cannot be found  He is not on any medication currently he has no burning no hematuria  Patient has  complaints of   Slow flow and frequency  Objective:     VITALS:    Vitals:    12/04/18 1524   BP: 118/66   Pulse: 60     AUA SYMPTOM SCORE      Most Recent Value   AUA SYMPTOM SCORE   How often have you had a sensation of not emptying your bladder completely after you finished urinating? 3   How often have you had to urinate again less than two hours after you finished urinating? 2   How often have you found you stopped and started again several times when you urinate? 3   How often have you found it difficult to postpone urination? 2   How often have you had a weak urinary stream?  4   How often have you had to push or strain to begin urination? 4   How many times did you most typically get up to urinate from the time you went to bed at night until the time you got up in the morning?   2   Quality of Life: If you were to spend the rest of your life with your urinary condition just the way it is now, how would you feel about that?  3   AUA SYMPTOM SCORE  20            LABS:  Lab Results   Component Value Date    HGB 13 8 11/09/2018    HCT 44 2 11/09/2018    WBC 5 92 11/09/2018     11/09/2018   ]    Lab Results   Component Value Date    K 4 3 11/09/2018     11/09/2018    CO2 29 11/09/2018    BUN 19 11/09/2018    CREATININE 0 86 11/09/2018    CALCIUM 8 9 11/09/2018   ]        INPATIENT MEDS:    Current Outpatient Prescriptions:     levothyroxine 25 mcg tablet, TAKE 1 TABLET (25 MCG TOTAL) BY MOUTH DAILY, Disp: 30 tablet, Rfl: 5    Multiple Vitamin (MULTI-VITAMIN DAILY PO), Take 1 tablet by mouth daily, Disp: , Rfl:     omega-3-acid ethyl esters (LOVAZA) 1 g capsule, TAKE 2 CAPSULES BY MOUTH EVERY DAY, Disp: 60 capsule, Rfl: 5    oxybutynin (DITROPAN-XL) 10 MG 24 hr tablet, Take 1 tablet (10 mg total) by mouth daily at bedtime for 30 days (Patient not taking: Reported on 11/20/2018 ), Disp: 30 tablet, Rfl: 2    sildenafil (VIAGRA) 100 mg tablet, Take by mouth, Disp: , Rfl:       Physical Exam:   /66 (BP Location: Left arm, Patient Position: Sitting, Cuff Size: Adult)   Pulse 60   Ht 5' 6 75" (1 695 m)   Wt 83 9 kg (185 lb)   BMI 29 19 kg/m²   GEN: alert and oriented x 3    RESP: breathing comfortably with no accessory muscle use    ABD: soft, non-tender, non-distended   INCISION:    EXT: no significant peripheral edema       RADIOLOGY:    none     Assessment:    1   BPH     Plan:   - recommend he come back for a uroflow study  - will discuss trying a different alpha-blocker  - also consider cystoscopy in the office  -

## 2018-12-27 ENCOUNTER — OFFICE VISIT (OUTPATIENT)
Dept: OBGYN CLINIC | Facility: HOSPITAL | Age: 58
End: 2018-12-27
Payer: COMMERCIAL

## 2018-12-27 VITALS
BODY MASS INDEX: 29.73 KG/M2 | HEART RATE: 66 BPM | DIASTOLIC BLOOD PRESSURE: 75 MMHG | WEIGHT: 185 LBS | HEIGHT: 66 IN | SYSTOLIC BLOOD PRESSURE: 120 MMHG

## 2018-12-27 DIAGNOSIS — R29.898 WEAKNESS OF RIGHT LOWER EXTREMITY: ICD-10-CM

## 2018-12-27 DIAGNOSIS — R25.2 SPASTICITY: ICD-10-CM

## 2018-12-27 DIAGNOSIS — M54.50 CHRONIC BILATERAL LOW BACK PAIN WITHOUT SCIATICA: ICD-10-CM

## 2018-12-27 DIAGNOSIS — R26.9 NEUROLOGIC GAIT DYSFUNCTION: Primary | ICD-10-CM

## 2018-12-27 DIAGNOSIS — G89.29 CHRONIC BILATERAL LOW BACK PAIN WITHOUT SCIATICA: ICD-10-CM

## 2018-12-27 DIAGNOSIS — G95.9 MYELOPATHY (HCC): ICD-10-CM

## 2018-12-27 PROCEDURE — 99243 OFF/OP CNSLTJ NEW/EST LOW 30: CPT | Performed by: ORTHOPAEDIC SURGERY

## 2018-12-27 NOTE — PROGRESS NOTES
Assessment/Plan:    Myelopathy [facility])  Patient presents for evaluation of chronic right-sided lower back pain with radiation down into his extremities  He also reports bilateral upper extremity numbness as well as bilateral lower extremity numbness  He reports numbness from his right thigh all the way down into his toe  He has tried physical therapy in the past without relief  He has never had injections  He is not a diabetic        On physical exam he appears stated age in well-developed in no acute distress  He is awake alert and oriented x3  Affect is appropriate  He walks without assistive devices  He is unable to walk a tandem line  Tender to palpation right lumbosacral spine  5/5 strength L2-S1 bilaterally  Diminished sensation to light touch right L2-L3-L4-L5 distribution  Hyper reflexia at L4 and S1 symmetrically  Sustained clonus left foot  Present Babinski bilaterally  Good strength C5 through T1 bilaterally  Negative Noel's bilaterally  MRI lumbar spine demonstrates scattered areas of spondylosis and multilevel DDD from L3-S1  He has mostly left-sided stenosis L3-4 L4-5 and L5-S1    Minimal right-sided stenosis      Assessment and plan  Symptoms and concerning for upper motor neuron pathology including gait dysfunction, bilateral upper and lower extremity numbness  Spasticity to bilateral lower extremities and sustained clonus to the left foot and ankle  Given the above MRI of the cervical and thoracic spine are warranted to rule out spinal cord lesion or stenosis  EMG/nerve conduction velocity of the upper and lower extremities will be obtained  Follow-up after the above-listed studies    · Low back pain with right leg pain and anright leg weakness  · Bilateral upper extremity numbness and burning  · Positive clonus, babinski's, un-balanced, hypoactive reflexes  · Bilateral UE and LE EMG ordered  · Cervical and thoracic MRI ordered  · Follow up after diagnostics       Problem List Items Addressed This Visit     Chronic bilateral low back pain without sciatica    Relevant Orders    EMG 2 limb lower extremity    EMG 2 Limb Upper Extremity    MRI thoracic spine wo contrast    MRI cervical spine wo contrast    Weakness of right lower extremity    Relevant Orders    EMG 2 limb lower extremity    EMG 2 Limb Upper Extremity    MRI thoracic spine wo contrast    MRI cervical spine wo contrast    Neurologic gait dysfunction - Primary    Relevant Orders    EMG 2 limb lower extremity    EMG 2 Limb Upper Extremity    MRI thoracic spine wo contrast    MRI cervical spine wo contrast    Spasticity    Relevant Orders    EMG 2 limb lower extremity    EMG 2 Limb Upper Extremity    MRI thoracic spine wo contrast    MRI cervical spine wo contrast    Myelopathy (HCC)     Patient presents for evaluation of chronic right-sided lower back pain with radiation down into his extremities  He also reports bilateral upper extremity numbness as well as bilateral lower extremity numbness  He reports numbness from his right thigh all the way down into his toe  He has tried physical therapy in the past without relief  He has never had injections  He is not a diabetic        On physical exam he appears stated age in well-developed in no acute distress  He is awake alert and oriented x3  Affect is appropriate  He walks without assistive devices  He is unable to walk a tandem line  Tender to palpation right lumbosacral spine  5/5 strength L2-S1 bilaterally  Diminished sensation to light touch right L2-L3-L4-L5 distribution  Hyper reflexia at L4 and S1 symmetrically  Sustained clonus left foot  Present Babinski bilaterally  Good strength C5 through T1 bilaterally  Negative Noel's bilaterally  MRI lumbar spine demonstrates scattered areas of spondylosis and multilevel DDD from L3-S1  He has mostly left-sided stenosis L3-4 L4-5 and L5-S1    Minimal right-sided stenosis      Assessment and plan  Symptoms and concerning for upper motor neuron pathology including gait dysfunction, bilateral upper and lower extremity numbness  Spasticity to bilateral lower extremities and sustained clonus to the left foot and ankle  Given the above MRI of the cervical and thoracic spine are warranted to rule out spinal cord lesion or stenosis  EMG/nerve conduction velocity of the upper and lower extremities will be obtained  Follow-up after the above-listed studies         Relevant Orders    EMG 2 limb lower extremity    EMG 2 Limb Upper Extremity    MRI thoracic spine wo contrast    MRI cervical spine wo contrast            Subjective:      Patient ID: Kj Duffy II is a 60 y o  male  HPI   The patient present for low back pain  He is here from his PCP  He has had symptoms for 5 years initiated with a work related injury treated with physical therapy  Today he complains of intermittent right low back pain with numbness into right anterior thigh down to the foot and bilateral arm and hand numbness and tingling  He rates his pain at 5/10 and 9/10 at its worse  He has right ankle weakness increasing over time  He can fall due to the ankle weakness  Twisting, heavy lifting aggravates  Sitting and lying/sleeping alleviates  He did physical therapy 2 years ago with limited benefit  He denies injections or surgery  He will use ibuprofen on occasion  The following portions of the patient's history were reviewed and updated as appropriate: current medications, past family history, past medical history, past social history, past surgical history and problem list     Review of Systems   Constitutional: Negative for chills, fever and unexpected weight change  HENT: Negative for hearing loss, nosebleeds and sore throat  Eyes: Negative for pain, redness and visual disturbance  Respiratory: Negative for cough, shortness of breath and wheezing  Cardiovascular: Negative for chest pain, palpitations and leg swelling  Gastrointestinal: Negative for abdominal pain, nausea and vomiting  Endocrine: Negative for polydipsia and polyuria  Genitourinary: Negative for difficulty urinating and hematuria  Skin: Negative for rash and wound  Psychiatric/Behavioral: Negative for decreased concentration and suicidal ideas  The patient is not nervous/anxious  Objective:      /75   Pulse 66   Ht 5' 6" (1 676 m)   Wt 83 9 kg (185 lb)   BMI 29 86 kg/m²          Physical Exam   Constitutional: He is oriented to person, place, and time  He appears well-developed and well-nourished  HENT:   Right Ear: External ear normal    Left Ear: External ear normal    Nose: Nose normal    Eyes: Pupils are equal, round, and reactive to light  Conjunctivae and EOM are normal    Neck: Normal range of motion  Pulmonary/Chest: Effort normal    Neurological: He is alert and oriented to person, place, and time  Skin: Skin is warm and dry  Psychiatric: He has a normal mood and affect  His behavior is normal  Judgment and thought content normal        Patient ambulates without assistance   Tender to palpation: none  Moddified straight leg raise positive right, negative left  Strength L2-S1 5/5 bilaterally with exception of 4/5 right hip flexor  Sensation L2-S1 intact bilaterally  With diminished Right L2, L3 and L4  Reflexes hyper-reflexic at L4 and hyper-reflexic at S1  Clonus sustained left  Present Babinski bilaterally    Unable to balance on one foot    Imaging:  Lumbar MRI - 11/30/18      Radiologist IMPRESSION:     Scattered spondylolisthesis and spondylosis most pronounced on the left at L3-4 and L5-S1    Scribe Attestation    I,:   Lorena Silverio am acting as a scribe while in the presence of the attending physician :        I,:   Summer Chua MD personally performed the services described in this documentation    as scribed in my presence :

## 2018-12-27 NOTE — ASSESSMENT & PLAN NOTE
Patient presents for evaluation of chronic right-sided lower back pain with radiation down into his extremities  He also reports bilateral upper extremity numbness as well as bilateral lower extremity numbness  He reports numbness from his right thigh all the way down into his toe  He has tried physical therapy in the past without relief  He has never had injections  He is not a diabetic        On physical exam he appears stated age in well-developed in no acute distress  He is awake alert and oriented x3  Affect is appropriate  He walks without assistive devices  He is unable to walk a tandem line  Tender to palpation right lumbosacral spine  5/5 strength L2-S1 bilaterally  Diminished sensation to light touch right L2-L3-L4-L5 distribution  Hyper reflexia at L4 and S1 symmetrically  Sustained clonus left foot  Present Babinski bilaterally  Good strength C5 through T1 bilaterally  Negative Noel's bilaterally  MRI lumbar spine demonstrates scattered areas of spondylosis and multilevel DDD from L3-S1  He has mostly left-sided stenosis L3-4 L4-5 and L5-S1    Minimal right-sided stenosis      Assessment and plan  Symptoms and concerning for upper motor neuron pathology including gait dysfunction, bilateral upper and lower extremity numbness  Spasticity to bilateral lower extremities and sustained clonus to the left foot and ankle  Given the above MRI of the cervical and thoracic spine are warranted to rule out spinal cord lesion or stenosis  EMG/nerve conduction velocity of the upper and lower extremities will be obtained  Follow-up after the above-listed studies

## 2019-01-07 ENCOUNTER — TELEPHONE (OUTPATIENT)
Dept: PAIN MEDICINE | Facility: MEDICAL CENTER | Age: 59
End: 2019-01-07

## 2019-01-07 ENCOUNTER — HOSPITAL ENCOUNTER (OUTPATIENT)
Dept: RADIOLOGY | Age: 59
Discharge: HOME | End: 2019-01-07
Payer: COMMERCIAL

## 2019-01-07 DIAGNOSIS — R25.2 SPASTICITY: ICD-10-CM

## 2019-01-07 DIAGNOSIS — M54.50 CHRONIC BILATERAL LOW BACK PAIN WITHOUT SCIATICA: ICD-10-CM

## 2019-01-07 DIAGNOSIS — G89.29 CHRONIC BILATERAL LOW BACK PAIN WITHOUT SCIATICA: ICD-10-CM

## 2019-01-07 DIAGNOSIS — G95.9 MYELOPATHY (HCC): ICD-10-CM

## 2019-01-07 DIAGNOSIS — R26.9 NEUROLOGIC GAIT DYSFUNCTION: ICD-10-CM

## 2019-01-07 DIAGNOSIS — R29.898 WEAKNESS OF RIGHT LOWER EXTREMITY: ICD-10-CM

## 2019-01-07 PROCEDURE — 72156 MRI NECK SPINE W/O & W/DYE: CPT

## 2019-01-07 PROCEDURE — A9585 GADOBUTROL INJECTION: HCPCS | Performed by: ORTHOPAEDIC SURGERY

## 2019-01-07 PROCEDURE — 72157 MRI CHEST SPINE W/O & W/DYE: CPT

## 2019-01-07 RX ADMIN — GADOBUTROL 8 ML: 604.72 INJECTION INTRAVENOUS at 16:57

## 2019-01-07 NOTE — TELEPHONE ENCOUNTER
See previous task  Per Dr Tootie Jordan MRI w/o contrast as it was stated on original  order         Please call Dr Tootie Jordan at [facility]

## 2019-01-07 NOTE — TELEPHONE ENCOUNTER
MRI tech is calling stating that pt needs MRI of both cervical and thoracic with contrast     MRI will change it but will need an updated script co signed by Dr Rodri Douglass  He also stated that it will need to go through authorization to make sure insurance will cover  Pt is currently at the MRI location

## 2019-01-08 ENCOUNTER — HOSPITAL ENCOUNTER (OUTPATIENT)
Dept: NEUROLOGY | Facility: AMBULATORY SURGERY CENTER | Age: 59
Discharge: HOME | End: 2019-01-08
Payer: COMMERCIAL

## 2019-01-08 ENCOUNTER — TELEPHONE (OUTPATIENT)
Dept: OBGYN CLINIC | Facility: HOSPITAL | Age: 59
End: 2019-01-08

## 2019-01-08 DIAGNOSIS — G89.29 CHRONIC BILATERAL LOW BACK PAIN WITHOUT SCIATICA: ICD-10-CM

## 2019-01-08 DIAGNOSIS — R25.2 SPASTICITY: ICD-10-CM

## 2019-01-08 DIAGNOSIS — R26.9 NEUROLOGIC GAIT DYSFUNCTION: ICD-10-CM

## 2019-01-08 DIAGNOSIS — M54.50 CHRONIC BILATERAL LOW BACK PAIN WITHOUT SCIATICA: ICD-10-CM

## 2019-01-08 DIAGNOSIS — R29.898 WEAKNESS OF RIGHT LOWER EXTREMITY: ICD-10-CM

## 2019-01-08 DIAGNOSIS — G95.9 MYELOPATHY (HCC): ICD-10-CM

## 2019-01-08 PROCEDURE — 95886 MUSC TEST DONE W/N TEST COMP: CPT | Performed by: PSYCHIATRY & NEUROLOGY

## 2019-01-08 PROCEDURE — 95910 NRV CNDJ TEST 7-8 STUDIES: CPT | Performed by: PSYCHIATRY & NEUROLOGY

## 2019-01-08 NOTE — TELEPHONE ENCOUNTER
I will contact pt but received a message from the person preforming the emg that pt scheduling needs will be addressed

## 2019-01-08 NOTE — TELEPHONE ENCOUNTER
Caller: patient  Callback# [phone]  Dr Devon Reed        Patient called requesting to speak with Megan Meek in regards to EMG being cancel  Please advise thanks

## 2019-01-09 ENCOUNTER — OFFICE VISIT (OUTPATIENT)
Dept: OBGYN CLINIC | Facility: CLINIC | Age: 59
End: 2019-01-09
Payer: COMMERCIAL

## 2019-01-09 VITALS
BODY MASS INDEX: 29.73 KG/M2 | HEART RATE: 73 BPM | DIASTOLIC BLOOD PRESSURE: 72 MMHG | SYSTOLIC BLOOD PRESSURE: 132 MMHG | HEIGHT: 66 IN | WEIGHT: 185 LBS

## 2019-01-09 DIAGNOSIS — M54.50 CHRONIC BILATERAL LOW BACK PAIN WITHOUT SCIATICA: Primary | ICD-10-CM

## 2019-01-09 DIAGNOSIS — D49.2 THORACIC SPINE TUMOR: ICD-10-CM

## 2019-01-09 DIAGNOSIS — G89.29 CHRONIC BILATERAL LOW BACK PAIN WITHOUT SCIATICA: Primary | ICD-10-CM

## 2019-01-09 DIAGNOSIS — R29.898 WEAKNESS OF RIGHT LOWER EXTREMITY: ICD-10-CM

## 2019-01-09 DIAGNOSIS — D49.2 NEOPLASM OF CERVICAL SPINE: ICD-10-CM

## 2019-01-09 PROCEDURE — 99213 OFFICE O/P EST LOW 20 MIN: CPT | Performed by: ORTHOPAEDIC SURGERY

## 2019-01-09 NOTE — ASSESSMENT & PLAN NOTE
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

## 2019-01-09 NOTE — PROGRESS NOTES
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  Referral will be placed to Dr Peewee Whitney for further evaluation  In the interim patient is encouraged to use his walking cane        · Right low back pain with bilateral leg numbness  · Right leg weakness  · S/p cervical and thoracic MRI  · Patient referred to Neurosurgery  · Meghna Bailey  · Neoplasm of cervicothoracic   · Follow up PRN     Problem List Items Addressed This Visit     Chronic bilateral low back pain without sciatica - Primary    Weakness of right lower extremity    Neoplasm of cervical spine    Relevant Orders    Ambulatory referral to Neurosurgery    Thoracic spine tumor     Patient presents for follow-up regarding lower extremity spasticity  He reports ongoing symptoms  Denies any upper extremity symptoms at this time  He is here to review the MRI of the cervical and thoracic spine    Both MRIs are reviewed and these demonstrate an expansile intramedullary tumor stemming from C4 to the T3 level    Assessment and plan  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  Referral will be placed to Dr Peewee Whitney for further evaluation  In the interim patient is encouraged to use his walking cane                     Subjective:      Patient ID: Marie Fleming II is a 60 y o  male  HPI   The patient is here for follow up for low back pain with right leg pain and weakness    He is s/p cervical and thoracic MRI and LE EMG   He is scheduled for the UE EMG  Today he complains right low back pain with bilateral calf numbness and right leg weakness  He rates his pain at 4/10 and 10/10 months ago  Twisting, lifting aggravates  Sitting and lying alleviates  The following portions of the patient's history were reviewed and updated as appropriate: current medications, past family history, past medical history, past social history, past surgical history and problem list     Review of Systems   Constitutional: Negative for chills, fever and unexpected weight change  HENT: Negative for hearing loss, nosebleeds and sore throat  Eyes: Negative for pain, redness and visual disturbance  Respiratory: Negative for cough, shortness of breath and wheezing  Cardiovascular: Negative for chest pain, palpitations and leg swelling  Gastrointestinal: Negative for abdominal pain, nausea and vomiting  Endocrine: Negative for polydipsia and polyuria  Genitourinary: Negative for difficulty urinating and hematuria  Skin: Negative for rash and wound  Psychiatric/Behavioral: Negative for decreased concentration and suicidal ideas  The patient is not nervous/anxious  Objective:      /72   Pulse 73   Ht 5' 6" (1 676 m)   Wt 83 9 kg (185 lb)   BMI 29 86 kg/m²          Physical Exam   Constitutional: He is oriented to person, place, and time  He appears well-developed and well-nourished  HENT:   Right Ear: External ear normal    Left Ear: External ear normal    Nose: Nose normal    Eyes: Pupils are equal, round, and reactive to light  Conjunctivae and EOM are normal    Neck: Normal range of motion  Pulmonary/Chest: Effort normal    Neurological: He is alert and oriented to person, place, and time  Skin: Skin is warm and dry  Psychiatric: He has a normal mood and affect   His behavior is normal  Judgment and thought content normal        Patient ambulates without assistance  Tender to palpation; none  Modified straight leg raise positive right, negative left   Strength C5-T1 5/5 bilaterally  Strength L2-S1 5/5 bilaterally with exception of 4/5 right hip flexor  Sensation C5-T1 intact bilaterally  Reflexes1+ C5 C6  C7 1+ bilaterally  Sensation L2-S1 intact bilaterally with diminished right L2, L3 and L4  Reflexes 3+ at L4 and 3+ at S1  Clonus sustained bilaterally  Present babinski bilaterally  Unbalanced on one foot    Imaging:  Cervical and thoracic MRI reviewed with patient  Radiologist's IMPRESSION:     Heterogeneous neoplasm of the cervicothoracic junction consistent with primary glial tumor  Pathology extends from the C4-C5 disc space to the caudal T3 level  Mild peripheral enhancement   * I personally telephoned this result to [facility] on 1/8/2019 11:16 AM     Scribe Attestation    I,:   Rona Ramirez am acting as a scribe while in the presence of the attending physician :        I,:   Doris Wall MD personally performed the services described in this documentation    as scribed in my presence :

## 2019-01-11 ENCOUNTER — OFFICE VISIT (OUTPATIENT)
Dept: NEUROSURGERY | Facility: CLINIC | Age: 59
End: 2019-01-11
Payer: COMMERCIAL

## 2019-01-11 VITALS
WEIGHT: 180 LBS | HEART RATE: 84 BPM | BODY MASS INDEX: 28.25 KG/M2 | RESPIRATION RATE: 16 BRPM | TEMPERATURE: 98.4 F | SYSTOLIC BLOOD PRESSURE: 122 MMHG | HEIGHT: 67 IN | DIASTOLIC BLOOD PRESSURE: 68 MMHG

## 2019-01-11 DIAGNOSIS — D49.7 SPINAL CORD TUMOR: Primary | ICD-10-CM

## 2019-01-11 DIAGNOSIS — G95.9 INTRAMEDULLARY ABNORMALITY OF SPINAL CORD (HCC): ICD-10-CM

## 2019-01-11 DIAGNOSIS — D49.2 NEOPLASM OF CERVICAL SPINE: ICD-10-CM

## 2019-01-11 DIAGNOSIS — D49.7 SPINAL CORD NEOPLASM: ICD-10-CM

## 2019-01-11 DIAGNOSIS — G95.9 MYELOPATHY (HCC): ICD-10-CM

## 2019-01-11 DIAGNOSIS — G95.0 SYRINX OF SPINAL CORD (HCC): ICD-10-CM

## 2019-01-11 PROCEDURE — 99245 OFF/OP CONSLTJ NEW/EST HI 55: CPT | Performed by: NEUROLOGICAL SURGERY

## 2019-01-11 NOTE — LETTER
January 11, 2019     [facility]  [address] [address]    Patient: Aruna Sanchez III   YOB: 1960   Date of Visit: 1/11/2019       Dear Dr Brandi Conway: Thank you for referring Luis Fernando Nath to me for evaluation  Below are my notes for this consultation  If you have questions, please do not hesitate to call me  I look forward to following your patient along with you  Sincerely,        Calvin Day MD        CC: MD Calvin Jalloh MD  1/11/2019  1:16 PM  Sign at close encounter  Neurosurgery Office Note  Aruna Sanchez III 60 y o  male MRN: [id number]      Assessment/Plan      Diagnoses and all orders for this visit:    Spinal cord tumor    Neoplasm of cervical spine  -     Ambulatory referral to Neurosurgery  -     CT cervical spine without contrast; Future  -     CT thoracic spine without contrast; Future  -     XR entire spine (scoliosis) 6+ vw; Future    Myelopathy (HCC)    Intramedullary abnormality of spinal cord [facility])        Discussion:    53-year-old male who was noted numbness into his hands for multiple years  Over more recent years, has evolved into occasional burning in the left forearm and hand  This occurs about 1 time per day  He has noticed increasing difficulty with his walking, he has begun dragging the right leg over the last 2-3 years, and this is accelerating recently  He has had numerous falls over the last several months, and no longer feels study how ladder etc   He has had numbness in the right more than left leg and bilateral feet over this time as well  He notices in the shower his right lower extremity can be darker or more red than the left  From a bladder standpoint, he has been seen Urology over the last several months  He has had increased frequency at night, 3 times nocturia, more recently improved to just once nightly    He has had NC especially in the morning, some drilling, definitely urgency, but no obvious incontinence  Despite this he has been active, bowling, coughing until recently mostly because the right leg issues  He enjoys his yd work, gardening etc   He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, receiving, 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    He has had some back pain as well over the years, and was referred by his PCP to Dr Brandi Conway for gait issues  He had an MRI scan of the lumbar spine which was relatively unremarkable  Dr Brandi Conway noted him to have a spastic gait, and exam abnormalities, and referred him for MRI scans of the cervical and thoracic spine  Those findings led to referral here  On exam, the patient has decreased pinprick on the right forearm and hand, although strength is essentially full in the upper extremities  He does not have Noel's or hyperreflexia in the uppers  His lower extremities, iliopsoas strength on the right is 4/5, TA on the right is 4+/5, otherwise seems to be full strength  Decreased pinprick bilaterally shins downward at least   He has at least 2 beats of clonus bilaterally  His patellar reflexes are brisk 3-4 +bilaterally  Imaging demonstrates a heterogeneously enhancing intramedullary lesion 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/5 to T3, with a large cystic/syrinx from C7-T2  This was reviewed at [facility], and is felt to be most likely the pending mama, although astrocytoma and metastasis are not excluded  History certainly most consistent with ependymoma  I reviewed the patient and his son, Sixto Johnson, options for management  I am recommending posterior cervical laminectomy for decompression, fixation fusion because of the extensive bone work required, and then attempted resection  We will least fenestrate the cyst, and should have a reasonable plane, attempt to resect the mass rostrally and caudally as much as possible    In short, this would be a C4-T3 laminectomy, C2 or C3 fixation fusion to T4, resection of intramedullary mass/fenestration of intramedullary cyst/syrinx, additional levels as needed  I have referred for the patient for CT scan of the cervical and thoracic spine to better delineate bony anatomy for hardware placement  I have also sent the patient for scoliosis films to assess his baseline alignment  I will see the patient back in the next week or 2 after these are done to also discuss this process with the patient's wife  Tentatively he has agreed to surgery  We will obtain written consent at that follow-up appointment  I did review in great detail expected benefits and attendant risks to this approach  Risks include but not limited to infection, bleeding, VTE, spinal fluid leak, transient or permanent neurologic dysfunction  Metrics:  KPS 90, ECOG 0, mJOA 13/17, EQ5D5L 51938 or 0 840, VA S 75-80  CHIEF COMPLAINT    Chief Complaint   Patient presents with    Consult     NP Consult for cervicothoracic mass C4-T3; referred by Dr Crystal Zaldivar    History of Present Illness     60y o  year old male     HPI    See Discussion    REVIEW OF SYSTEMS    Review of Systems   HENT: Negative  Eyes: Negative  Respiratory: Negative  Cardiovascular: Negative  Gastrointestinal: Positive for abdominal pain (lower right abdomen)  Endocrine: Positive for cold intolerance (hands and feet are cold a lot)  Genitourinary: Positive for difficulty urinating  Nocturia was 3-4x, currently 1x  Occasional urinary leakage if waiting too long   Musculoskeletal: Positive for arthralgias (some right shoulder stiffness, comes and goes), back pain (LBP occasional) and gait problem (Right leg drag / drop)  6 falls in past 3 months, none in past 3 weeks   Skin: Negative  Allergic/Immunologic:        Bee venom   Neurological: Positive for tremors (hands B/L), weakness (right leg) and numbness (Right leg, occasional left leg, B/L hands)  Right leg drag/ drop   Hematological: Negative      Psychiatric/Behavioral: The patient is nervous/anxious (current health concerns only)  Meds/Allergies     Current Outpatient Prescriptions   Medication Sig Dispense Refill    levothyroxine 25 mcg tablet TAKE 1 TABLET (25 MCG TOTAL) BY MOUTH DAILY 30 tablet 5    Multiple Vitamin (MULTI-VITAMIN DAILY PO) Take 1 tablet by mouth daily       No current facility-administered medications for this visit  Allergies   Allergen Reactions    Bee Venom Hives, Itching and Edema     Category: Allergy;     Penicillins Hives and Itching     Category: Allergy;        PAST HISTORY    Past Medical History:   Diagnosis Date    BPH associated with nocturia     Disease of thyroid gland     Herniated disc, cervical     Hyperlipidemia     Impaired fasting glucose        Past Surgical History:   Procedure Laterality Date    APPENDECTOMY      COLONOSCOPY  03/13/2012    NORMAL; RECHECK IN 5 YEARS    WISDOM TOOTH EXTRACTION       No issues with anesthesia    Social History   Substance Use Topics    Smoking status: Former Smoker     Quit date: 5/16/1993    Smokeless tobacco: Never Used    Alcohol use Yes      Comment: SOCIAL        Family History   Problem Relation Age of Onset    Diabetes Mother     Hypertension Father          Above history personally reviewed  EXAM    Vitals:Blood pressure 122/68, pulse 84, temperature 98 4 °F (36 9 °C), temperature source Tympanic, resp  rate 16, height 5' 7" (1 702 m), weight 81 6 kg (180 lb)  ,Body mass index is 28 19 kg/m²  Physical Exam   Constitutional: He is oriented to person, place, and time  He appears well-developed  HENT:   Head: Normocephalic and atraumatic  Eyes: No scleral icterus  Neck: Neck supple  Cardiovascular: Normal rate  Pulmonary/Chest: Effort normal    Abdominal: Normal appearance  He exhibits no distension  Musculoskeletal:        Cervical back: He exhibits normal range of motion, no tenderness and no deformity  Neurological: He is alert and oriented to person, place, and time  No sensory deficit  Skin: Skin is warm and dry  Psychiatric: He has a normal mood and affect  His speech is normal and behavior is normal        Neurologic Exam     Mental Status   Oriented to person, place, and time  Attention: normal    Speech: speech is normal   Level of consciousness: alert    Cranial Nerves     CN VII   Facial expression full, symmetric  Motor Exam   Muscle bulk: normal  Overall muscle tone: normal    Strength   Right deltoid: 5/5  Left deltoid: 5/5  Right biceps: 5/5  Left biceps: 5/5  Right triceps: 5/5  Left triceps: 5/5  Right wrist extension: 5/5  Left wrist extension: 5/5  Right iliopsoas: 4/5  Left iliopsoas: 5/5  Right quadriceps: 5/5  Left quadriceps: 5/5  Right hamstrin/5  Left hamstrin/5  Right anterior tibial: 4/5  Left anterior tibial: 5/5  Moves all extremities, grossly normal     Sensory Exam   Right arm light touch: decreased from elbow  Right leg light touch: decreased from knee  Left leg light touch: decreased from knee  Right leg proprioception: decreased from toes  Right arm pinprick: decreased from elbow  Right leg pinprick: decreased from knee  Left leg pinprick: decreased from knee  Joint position sense 0/3 on right, 3/3 on left lower extremity     Gait, Coordination, and Reflexes     Tremor   Resting tremor: absent  Intention tremor: absent  Action tremor: absent  No aids  MEDICAL DECISION MAKING    Imaging Studies:     Mri Cervical Spine W Wo Contrast    Result Date: 2019  Narrative: MRI CERVICAL SPINE WITH AND WITHOUT CONTRAST INDICATION: M54 5: Low back pain G89 29: Other chronic pain R29 898: Other symptoms and signs involving the musculoskeletal system R26 9: Unspecified abnormalities of gait and mobility R25 2: Cramp and spasm G95 9: Disease of spinal cord, unspecified  COMPARISON:  None  TECHNIQUE:  Sagittal T1, sagittal T2, sagittal inversion recovery, axial 2D merge and axial T2   Sagittal T1 and axial T1 postcontrast   IV Contrast:  8 mL of Gadobutrol injection (SINGLE-DOSE) IMAGE QUALITY:  Diagnostic  FINDINGS: ALIGNMENT:  Minor degenerative changes with slight straightening of normal cervical lordosis, no subluxation or scoliosis  MARROW SIGNAL:  Normal marrow signal is identified within the visualized bony structures  No discrete marrow lesion  CERVICAL AND VISUALIZED UPPER THORACIC CORD:  A 43 mm in cephalocaudad dimension high T2 signal focus expands the cord from the caudal aspect of the C7 vertebral body to the cephalad aspect of the T3 vertebral body  Minor marginal enhancement  Signal abnormality extends cephalad within the cord from the upper border of the lesion to the upper C5 level  There is intermittent high signal with the low signal suggesting extension of tumor and associated hemosiderin, more commonly seen with ependymoma, within the central canal   Similar changes extend a short distance caudally from the lower margin of the lesion, to approximately the caudal T3 level  Slight cord edema is present inferiorly  No pathologic vascular enlargement  This likely represents a primary glial tumor  PREVERTEBRAL AND PARASPINAL SOFT TISSUES:  Normal  VISUALIZED POSTERIOR FOSSA:  The visualized posterior fossa demonstrates no abnormal signal  CERVICAL DISC SPACES: C2-C3:  Normal  C3-C4:  Minor left facet arthrosis C4-C5:  Minor bilateral facet arthrosis, slight bulge  C5-C6:  Moderate bilateral facet arthrosis, minor bulge, small marginal osteophytes  C6-C7:  Minor bulge, mild bilateral facet arthrosis C7-T1:  Normal  UPPER THORACIC DISC SPACES:  Normal  POSTCONTRAST IMAGING:  Irregular enhancement of the tumor much of which occurs along the periphery of the cystic appearing C7 T2 central mass  Impression: Heterogeneous neoplasm of the cervicothoracic junction consistent with primary glial tumor  Pathology extends from the C4-C5 disc space to the caudal T3 level  Mild peripheral enhancement   * I personally telephoned this result to [facility] on 1/8/2019 11:16 AM  Workstation performed: [id number]     Mri Thoracic Spine W Wo Contrast    Result Date: 1/8/2019  Narrative: MRI THORACIC SPINE WITH AND WITHOUT CONTRAST INDICATION: M54 5: Low back pain G89 29: Other chronic pain R29 898: Other symptoms and signs involving the musculoskeletal system R26 9: Unspecified abnormalities of gait and mobility R25 2: Cramp and spasm G95 9: Disease of spinal cord, unspecified  COMPARISON:  Contemporary MR of the cervical spine, also MR lumbar spine 11/30/2018 TECHNIQUE:  Sagittal T1, sagittal T2, sagittal inversion recovery, axial T2,  axial 2D MERGE  Sagittal and axial T1 postcontrast  IV Contrast:  8 mL of Gadobutrol injection (SINGLE-DOSE) IMAGE QUALITY:  Diagnostic  FINDINGS: ALIGNMENT:  Normal alignment of the thoracic spine  No compression fracture  No subluxation  No evidence of scoliosis  MARROW SIGNAL:  Normal marrow signal is identified within the visualized bony structures  No discrete marrow lesion  THORACIC CORD:  Complex cord tumor described in more detail on MR of the cervical spine  The prominent area of cord expansion in the presumed central cyst extending up to 45 mm in the cephalocaudad dimension from the caudal border of C7 to the cephalad border of T3  Signal abnormalities extend beyond this within the cervical cord and caudally to the T4 level  Mild edema extends caudally as well a short distance, to the mid T4  There appear to be blood products within the trailing edges of the lesion  PREVERTEBRAL AND PARASPINAL SOFT TISSUES:   Normal  THORACIC DEGENERATIVE CHANGE:  No disc herniation, canal stenosis or foraminal narrowing  No degenerative changes   POSTCONTRAST:  As described, enhancement along the periphery of the cord predominantly at the level of the cystic lesion     Impression: Cervicothoracic junction neoplasm with potential extension from the C4-C5 disc space to the T3-T4 disc space, findings consistent with primary glial tumor  * I personally telephoned this result to [facility] on 1/8/2019 11:16 AM  Workstation performed: [id number]       I have personally reviewed pertinent reports     and I have personally reviewed pertinent films in PACS with a Radiologist

## 2019-01-11 NOTE — PROGRESS NOTES
Neurosurgery Office Note  Aruna Sanchez III 60 y o  male MRN: [id number]      Assessment/Plan      Diagnoses and all orders for this visit:    Spinal cord tumor    Neoplasm of cervical spine  -     Ambulatory referral to Neurosurgery  -     CT cervical spine without contrast; Future  -     CT thoracic spine without contrast; Future  -     XR entire spine (scoliosis) 6+ vw; Future    Myelopathy (HCC)    Intramedullary abnormality of spinal cord [facility])        Discussion:    39-year-old male who was noted numbness into his hands for multiple years  Over more recent years, has evolved into occasional burning in the left forearm and hand  This occurs about 1 time per day  He has noticed increasing difficulty with his walking, he has begun dragging the right leg over the last 2-3 years, and this is accelerating recently  He has had numerous falls over the last several months, and no longer feels study how ladder etc   He has had numbness in the right more than left leg and bilateral feet over this time as well  He notices in the shower his right lower extremity can be darker or more red than the left  From a bladder standpoint, he has been seen Urology over the last several months  He has had increased frequency at night, 3 times nocturia, more recently improved to just once nightly  He has had NC especially in the morning, some drilling, definitely urgency, but no obvious incontinence  Despite this he has been active, bowling, coughing until recently mostly because the right leg issues  He enjoys his yd work, gardening etc   He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, receiving, 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    He has had some back pain as well over the years, and was referred by his PCP to Dr Brandi Conway for gait issues  He had an MRI scan of the lumbar spine which was relatively unremarkable  Dr Brandi Conway noted him to have a spastic gait, and exam abnormalities, and referred him for MRI scans of the cervical and thoracic spine  Those findings led to referral here  On exam, the patient has decreased pinprick on the right forearm and hand, although strength is essentially full in the upper extremities  He does not have Noel's or hyperreflexia in the uppers  His lower extremities, iliopsoas strength on the right is 4/5, TA on the right is 4+/5, otherwise seems to be full strength  Decreased pinprick bilaterally shins downward at least   He has at least 2 beats of clonus bilaterally  His patellar reflexes are brisk 3-4 +bilaterally  Imaging demonstrates a heterogeneously enhancing intramedullary lesion 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/5 to T3, with a large cystic/syrinx from C7-T2  This was reviewed at [facility], and 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, although astrocytoma and metastasis are not excluded  History certainly most consistent with ependymoma  I reviewed the patient and his son, Sixto Johnson, options for management  I am recommending posterior cervical laminectomy for decompression, fixation fusion because of the extensive bone work required, and then attempted resection  We will least fenestrate the cyst, and should have a reasonable plane, attempt to resect the mass rostrally and caudally as much as possible  In short, this would be a C4-T3 laminectomy, C2 or C3 fixation fusion to T4, resection of intramedullary mass/fenestration of intramedullary cyst/syrinx, additional levels as needed  I have referred for the patient for CT scan of the cervical and thoracic spine to better delineate bony anatomy for hardware placement  I have also sent the patient for scoliosis films to assess his baseline alignment  I will see the patient back in the next week or 2 after these are done to also discuss this process with the patient's wife  Tentatively he has agreed to surgery  We will obtain written consent at that follow-up appointment  I did review in great detail expected benefits and attendant risks to this approach    Risks include but not limited to infection, bleeding, VTE, spinal fluid leak, hardware failure, transient or permanent neurologic dysfunction  Metrics:  KPS 90, ECOG 0, mJOA 13/17, EQ5D5L 38450 or 0 840, VA S 75-80  CHIEF COMPLAINT    Chief Complaint   Patient presents with    Consult     NP Consult for cervicothoracic mass C4-T3; referred by Dr Cliff Cates    History of Present Illness     60y o  year old male     HPI    See Discussion    REVIEW OF SYSTEMS    Review of Systems   HENT: Negative  Eyes: Negative  Respiratory: Negative  Cardiovascular: Negative  Gastrointestinal: Positive for abdominal pain (lower right abdomen)  Endocrine: Positive for cold intolerance (hands and feet are cold a lot)  Genitourinary: Positive for difficulty urinating  Nocturia was 3-4x, currently 1x  Occasional urinary leakage if waiting too long   Musculoskeletal: Positive for arthralgias (some right shoulder stiffness, comes and goes), back pain (LBP occasional) and gait problem (Right leg drag / drop)  6 falls in past 3 months, none in past 3 weeks   Skin: Negative  Allergic/Immunologic:        Bee venom   Neurological: Positive for tremors (hands B/L), weakness (right leg) and numbness (Right leg, occasional left leg, B/L hands)  Right leg drag/ drop   Hematological: Negative  Psychiatric/Behavioral: The patient is nervous/anxious (current health concerns only)  Meds/Allergies     Current Outpatient Prescriptions   Medication Sig Dispense Refill    levothyroxine 25 mcg tablet TAKE 1 TABLET (25 MCG TOTAL) BY MOUTH DAILY 30 tablet 5    Multiple Vitamin (MULTI-VITAMIN DAILY PO) Take 1 tablet by mouth daily       No current facility-administered medications for this visit  Allergies   Allergen Reactions    Bee Venom Hives, Itching and Edema     Category: Allergy;     Penicillins Hives and Itching     Category:  Allergy;        PAST HISTORY    Past Medical History: Diagnosis Date    BPH associated with nocturia     Disease of thyroid gland     Herniated disc, cervical     Hyperlipidemia     Impaired fasting glucose        Past Surgical History:   Procedure Laterality Date    APPENDECTOMY      COLONOSCOPY  03/13/2012    NORMAL; RECHECK IN 5 YEARS    WISDOM TOOTH EXTRACTION       No issues with anesthesia    Social History   Substance Use Topics    Smoking status: Former Smoker     Quit date: 5/16/1993    Smokeless tobacco: Never Used    Alcohol use Yes      Comment: SOCIAL        Family History   Problem Relation Age of Onset    Diabetes Mother     Hypertension Father          Above history personally reviewed  EXAM    Vitals:Blood pressure 122/68, pulse 84, temperature 98 4 °F (36 9 °C), temperature source Tympanic, resp  rate 16, height 5' 7" (1 702 m), weight 81 6 kg (180 lb)  ,Body mass index is 28 19 kg/m²  Physical Exam   Constitutional: He is oriented to person, place, and time  He appears well-developed  HENT:   Head: Normocephalic and atraumatic  Eyes: No scleral icterus  Neck: Neck supple  Cardiovascular: Normal rate  Pulmonary/Chest: Effort normal    Abdominal: Normal appearance  He exhibits no distension  Musculoskeletal:        Cervical back: He exhibits normal range of motion, no tenderness and no deformity  Neurological: He is alert and oriented to person, place, and time  No sensory deficit  Skin: Skin is warm and dry  Psychiatric: He has a normal mood and affect  His speech is normal and behavior is normal        Neurologic Exam     Mental Status   Oriented to person, place, and time  Attention: normal    Speech: speech is normal   Level of consciousness: alert    Cranial Nerves     CN VII   Facial expression full, symmetric       Motor Exam   Muscle bulk: normal  Overall muscle tone: normal    Strength   Right deltoid: 5/5  Left deltoid: 5/5  Right biceps: 5/5  Left biceps: 5/5  Right triceps: 5/5  Left triceps: 5/5  Right wrist extension: 5/5  Left wrist extension: 5/5  Right iliopsoas: 4/5  Left iliopsoas: 5/5  Right quadriceps: 5/5  Left quadriceps: 5/5  Right hamstrin/5  Left hamstrin/5  Right anterior tibial: 4/5  Left anterior tibial: 5/5  Moves all extremities, grossly normal     Sensory Exam   Right arm light touch: decreased from elbow  Right leg light touch: decreased from knee  Left leg light touch: decreased from knee  Right leg proprioception: decreased from toes  Right arm pinprick: decreased from elbow  Right leg pinprick: decreased from knee  Left leg pinprick: decreased from knee  Joint position sense 0/3 on right, 3/3 on left lower extremity     Gait, Coordination, and Reflexes     Tremor   Resting tremor: absent  Intention tremor: absent  Action tremor: absent  No aids  MEDICAL DECISION MAKING    Imaging Studies:     Mri Cervical Spine W Wo Contrast    Result Date: 2019  Narrative: MRI CERVICAL SPINE WITH AND WITHOUT CONTRAST INDICATION: M54 5: Low back pain G89 29: Other chronic pain R29 898: Other symptoms and signs involving the musculoskeletal system R26 9: Unspecified abnormalities of gait and mobility R25 2: Cramp and spasm G95 9: Disease of spinal cord, unspecified  COMPARISON:  None  TECHNIQUE:  Sagittal T1, sagittal T2, sagittal inversion recovery, axial 2D merge and axial T2  Sagittal T1 and axial T1 postcontrast   IV Contrast:  8 mL of Gadobutrol injection (SINGLE-DOSE) IMAGE QUALITY:  Diagnostic  FINDINGS: ALIGNMENT:  Minor degenerative changes with slight straightening of normal cervical lordosis, no subluxation or scoliosis  MARROW SIGNAL:  Normal marrow signal is identified within the visualized bony structures  No discrete marrow lesion  CERVICAL AND VISUALIZED UPPER THORACIC CORD:  A 43 mm in cephalocaudad dimension high T2 signal focus expands the cord from the caudal aspect of the C7 vertebral body to the cephalad aspect of the T3 vertebral body    Minor marginal enhancement  Signal abnormality extends cephalad within the cord from the upper border of the lesion to the upper C5 level  There is intermittent high signal with the low signal suggesting extension of tumor and associated hemosiderin, more commonly seen with ependymoma, within the central canal   Similar changes extend a short distance caudally from the lower margin of the lesion, to approximately the caudal T3 level  Slight cord edema is present inferiorly  No pathologic vascular enlargement  This likely represents a primary glial tumor  PREVERTEBRAL AND PARASPINAL SOFT TISSUES:  Normal  VISUALIZED POSTERIOR FOSSA:  The visualized posterior fossa demonstrates no abnormal signal  CERVICAL DISC SPACES: C2-C3:  Normal  C3-C4:  Minor left facet arthrosis C4-C5:  Minor bilateral facet arthrosis, slight bulge  C5-C6:  Moderate bilateral facet arthrosis, minor bulge, small marginal osteophytes  C6-C7:  Minor bulge, mild bilateral facet arthrosis C7-T1:  Normal  UPPER THORACIC DISC SPACES:  Normal  POSTCONTRAST IMAGING:  Irregular enhancement of the tumor much of which occurs along the periphery of the cystic appearing C7 T2 central mass  Impression: Heterogeneous neoplasm of the cervicothoracic junction consistent with primary glial tumor  Pathology extends from the C4-C5 disc space to the caudal T3 level  Mild peripheral enhancement  * I personally telephoned this result to [facility] on 1/8/2019 11:16 AM  Workstation performed: [id number]     Mri Thoracic Spine W Wo Contrast    Result Date: 1/8/2019  Narrative: MRI THORACIC SPINE WITH AND WITHOUT CONTRAST INDICATION: M54 5: Low back pain G89 29: Other chronic pain R29 898: Other symptoms and signs involving the musculoskeletal system R26 9: Unspecified abnormalities of gait and mobility R25 2: Cramp and spasm G95 9: Disease of spinal cord, unspecified   COMPARISON:  Contemporary MR of the cervical spine, also MR lumbar spine 11/30/2018 TECHNIQUE: Sagittal T1, sagittal T2, sagittal inversion recovery, axial T2,  axial 2D MERGE  Sagittal and axial T1 postcontrast  IV Contrast:  8 mL of Gadobutrol injection (SINGLE-DOSE) IMAGE QUALITY:  Diagnostic  FINDINGS: ALIGNMENT:  Normal alignment of the thoracic spine  No compression fracture  No subluxation  No evidence of scoliosis  MARROW SIGNAL:  Normal marrow signal is identified within the visualized bony structures  No discrete marrow lesion  THORACIC CORD:  Complex cord tumor described in more detail on MR of the cervical spine  The prominent area of cord expansion in the presumed central cyst extending up to 45 mm in the cephalocaudad dimension from the caudal border of C7 to the cephalad border of T3  Signal abnormalities extend beyond this within the cervical cord and caudally to the T4 level  Mild edema extends caudally as well a short distance, to the mid T4  There appear to be blood products within the trailing edges of the lesion  PREVERTEBRAL AND PARASPINAL SOFT TISSUES:   Normal  THORACIC DEGENERATIVE CHANGE:  No disc herniation, canal stenosis or foraminal narrowing  No degenerative changes  POSTCONTRAST:  As described, enhancement along the periphery of the cord predominantly at the level of the cystic lesion     Impression: Cervicothoracic junction neoplasm with potential extension from the C4-C5 disc space to the T3-T4 disc space, findings consistent with primary glial tumor  * I personally telephoned this result to [facility] on 1/8/2019 11:16 AM  Workstation performed: [id number]       I have personally reviewed pertinent reports     and I have personally reviewed pertinent films in PACS with a Radiologist

## 2019-01-18 ENCOUNTER — HOSPITAL ENCOUNTER (OUTPATIENT)
Dept: RADIOLOGY | Age: 59
Discharge: HOME | End: 2019-01-18
Payer: COMMERCIAL

## 2019-01-18 ENCOUNTER — APPOINTMENT (OUTPATIENT)
Dept: RADIOLOGY | Age: 59
End: 2019-01-18
Payer: COMMERCIAL

## 2019-01-18 DIAGNOSIS — D49.2 NEOPLASM OF CERVICAL SPINE: ICD-10-CM

## 2019-01-18 PROCEDURE — 72125 CT NECK SPINE W/O DYE: CPT

## 2019-01-18 PROCEDURE — 72082 X-RAY EXAM ENTIRE SPI 2/3 VW: CPT

## 2019-01-18 PROCEDURE — 72128 CT CHEST SPINE W/O DYE: CPT

## 2019-01-20 ENCOUNTER — ANESTHESIA EVENT (OUTPATIENT)
Dept: PERIOP | Facility: HOSPITAL | Age: 59
DRG: 029 | End: 2019-01-20
Payer: COMMERCIAL

## 2019-01-21 ENCOUNTER — OFFICE VISIT (OUTPATIENT)
Dept: NEUROSURGERY | Facility: CLINIC | Age: 59
End: 2019-01-21
Payer: COMMERCIAL

## 2019-01-21 VITALS
DIASTOLIC BLOOD PRESSURE: 74 MMHG | BODY MASS INDEX: 28.09 KG/M2 | HEART RATE: 68 BPM | RESPIRATION RATE: 16 BRPM | TEMPERATURE: 97.4 F | HEIGHT: 67 IN | WEIGHT: 179 LBS | SYSTOLIC BLOOD PRESSURE: 140 MMHG

## 2019-01-21 DIAGNOSIS — G95.0 SYRINX OF SPINAL CORD (HCC): ICD-10-CM

## 2019-01-21 DIAGNOSIS — G95.9 MYELOPATHY (HCC): ICD-10-CM

## 2019-01-21 DIAGNOSIS — D49.2 NEOPLASM OF CERVICAL SPINE: ICD-10-CM

## 2019-01-21 DIAGNOSIS — D49.7 SPINAL CORD TUMOR: ICD-10-CM

## 2019-01-21 DIAGNOSIS — G95.9 INTRAMEDULLARY ABNORMALITY OF SPINAL CORD (HCC): Primary | ICD-10-CM

## 2019-01-21 PROCEDURE — 99215 OFFICE O/P EST HI 40 MIN: CPT | Performed by: NEUROLOGICAL SURGERY

## 2019-01-21 NOTE — ANESTHESIA PREPROCEDURE EVALUATION
Review of Systems/Medical History  Patient summary reviewed  Chart reviewed  No history of anesthetic complications     Cardiovascular  Exercise tolerance (METS): >4,  Hyperlipidemia,    Pulmonary  Negative pulmonary ROS        GI/Hepatic  Negative GI/hepatic ROS               Endo/Other  History of thyroid disease , hypothyroidism,      GYN       Hematology  Negative hematology ROS      Musculoskeletal  Negative musculoskeletal ROS        Neurology      Comment: Enhancing intramedullary lesion 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/5 to T3, with a large cystic/syrinx from C7-T2  Bilateral feet/hand parasthesia, not worsened by head movement/extension Psychology   Negative psychology ROS              Physical Exam    Airway    Mallampati score: II  TM Distance: >3 FB  Neck ROM: full     Dental       Cardiovascular      Pulmonary      Other Findings        Anesthesia Plan  ASA Score- 2     Anesthesia Type- general with ASA Monitors  Additional Monitors: arterial line  Airway Plan: ETT  Plan Factors-    Induction- intravenous  Postoperative Plan- Plan for postoperative opioid use  Informed Consent- Anesthetic plan and risks discussed with patient  I personally reviewed this patient with the CRNA  Discussed and agreed on the Anesthesia Plan with the CRNA  Cedric Slaughter

## 2019-01-21 NOTE — PROGRESS NOTES
Neurosurgery Office Note  Eduardo Nair III 60 y o  male MRN: [id number]      Assessment/Plan      Diagnoses and all orders for this visit:    Intramedullary abnormality of spinal cord ([facility] [address] )    Myelopathy ([facility] [address] )    Spinal cord tumor    Syrinx of spinal cord ([facility] [address] )    Neoplasm of cervical spine          Discussion:       79-year-old male who was noted numbness into his hands for multiple years  Over more recent years, has evolved into occasional burning in the left forearm and hand  This occurs about 1 time per day  He has noticed increasing difficulty with his walking, he has begun dragging the right leg over the last 2-3 years, and this is accelerating recently  He has had numerous falls over the last several months, and no longer feels steady on a lladder etc   He has had numbness in the right more than left leg and bilateral feet over this time as well  He notices in the shower his right lower extremity can be darker or more red than the left  From a bladder standpoint, he has been seen by Urology over the last several months  He has had increased frequency at night, 3 times nocturia, more recently improved to just once nightly  He has had urgency especially in the morning, some dribbling, definitely urgency, but no obvious incontinence  Despite this he has been active, bowling, golfing, etc , until recently mostly because the right leg issues  He enjoys his yard work, gardening etc   He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, receiving, 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     He has had some back pain as well over the years, and was referred by his PCP to Dr Venice Melgar for gait issues  He had an MRI scan of the lumbar spine which was relatively unremarkable  Dr Latonya Small as well as Venice Melgar noted him to have a spastic gait, and exam abnormalities, and referred him for MRI scans of the cervical and thoracic spine    Those findings led to referral here      On exam, the patient has decreased pinprick on the right forearm and hand, although strength is essentially full in the upper extremities  He does not have Noel's or hyperreflexia in the uppers  His lower extremities, iliopsoas strength on the right is 4/5, TA on the right is 4+/5, otherwise seems to be full strength  Decreased pinprick bilaterally shins downward at least   He has at least 2 beats of clonus bilaterally  His patellar reflexes are brisk 3-4 +bilaterally      Imaging demonstrates a heterogeneously enhancing intramedullary lesion 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/5 to T3, with a large cystic/syrinx from C7-T2  This was reviewed at [facility], and 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, although astrocytoma and metastasis are not excluded  History certainly most consistent with ependymoma      I reviewed the patient and his son, Teresa Late previously, and again with the patient and his wife Abhijeet Velazquez, his options for management  I am recommending posterior cervical laminectomy for decompression, fixation fusion because of the extensive bone work required, and then attempted resection  We will least fenestrate the cyst, and should have a reasonable plane, attempt to resect the mass rostrally and caudally as much as possible  In short, this would be a C4-T3 laminectomy, C3 fixation fusion to T4, additional levels as necessary, resection of intramedullary mass/fenestration of intramedullary cyst/syrinx  Written consent was obtained today  I did review in great detail expected benefits and attendant risks to this approach  Risks include but not limited to infection, bleeding, VTE, spinal fluid leak, hardware failure, transient or permanent neurologic dysfunction     I did refer for the patient for CT scan of the cervical and thoracic spine to better delineate bony anatomy for hardware placement, as well as for scoliosis films to assess his baseline alignment       Metrics:  KPS 90, ECOG 0, mJOA 13/17, EQ5D5L 39829 or 0 840, VA S 75-80  Time spent was a significant portion of this encounter   36' were spent, over half of which was on counseling and coordination of care  CHIEF COMPLAINT    Chief Complaint   Patient presents with    Follow-up     follow up with CT scans of C-spine and T-spine and XRs of entire spine       HISTORY    History of Present Illness     60y o  year old male     HPI    See Discussion    REVIEW OF SYSTEMS    Review of Systems   HENT: Negative  Eyes: Negative  Respiratory: Negative  Cardiovascular: Negative  Gastrointestinal: Negative  Endocrine: Positive for cold intolerance  Hands and feet are cold a lot   Genitourinary:        Nocturia 1x  Occasional leakage if waiting too long   Musculoskeletal: Positive for arthralgias (some right shoulder stiffness, comes and goes), back pain (occasional LBP), gait problem (right leg drag / drop) and neck pain (yesterday (and for a couple days prior) had neck pain at the base of the neck towards right side)  Skin: Negative  Allergic/Immunologic: Positive for environmental allergies (bee venom)  Neurological: Positive for tremors (hands B/L), weakness (right leg) and numbness (right leg > left, B/L hands)  Some trouble x3-6 months expressing himself, word finding and/or using the correct word    Hematological: Negative  Psychiatric/Behavioral: Positive for agitation (occasional with stress)  The patient is nervous/anxious (current health)  Meds/Allergies     Current Outpatient Prescriptions   Medication Sig Dispense Refill    levothyroxine 25 mcg tablet TAKE 1 TABLET (25 MCG TOTAL) BY MOUTH DAILY 30 tablet 5    Multiple Vitamin (MULTI-VITAMIN DAILY PO) Take 1 tablet by mouth daily       No current facility-administered medications for this visit  Allergies   Allergen Reactions    Bee Venom Hives, Itching and Edema     Category: Allergy;     Penicillins Hives and Itching     Category:  Allergy;        PAST HISTORY    Past Medical History:   Diagnosis Date    BPH associated with nocturia     Cervical spinal mass (HCC) 2019    cervicothoracic    Disease of thyroid gland     Herniated disc, cervical     Hyperlipidemia     Impaired fasting glucose        Past Surgical History:   Procedure Laterality Date    APPENDECTOMY      COLONOSCOPY  2012    NORMAL; RECHECK IN 5 YEARS    WISDOM TOOTH EXTRACTION         Social History   Substance Use Topics    Smoking status: Former Smoker     Quit date: 1993    Smokeless tobacco: Never Used    Alcohol use Yes      Comment: SOCIAL        Family History   Problem Relation Age of Onset    Diabetes Mother     Hypertension Father     Cerebral palsy Sister          Above history personally reviewed  EXAM    Vitals:Blood pressure 140/74, pulse 68, temperature (!) 97 4 °F (36 3 °C), temperature source Tympanic, resp  rate 16, height 5' 7" (1 702 m), weight 81 2 kg (179 lb)  ,Body mass index is 28 04 kg/m²  Physical Exam   Constitutional: He appears well-developed  HENT:   Head: Normocephalic and atraumatic  Eyes: No scleral icterus  Neck: Neck supple  Cardiovascular: Normal rate  Pulmonary/Chest: Effort normal    Abdominal: Normal appearance  Neurological: He is alert  Skin: Skin is warm and dry  Psychiatric: He has a normal mood and affect  His behavior is normal        Neurologic Exam    Mental Status   Oriented to person, place, and time     Attention: normal    Speech: speech is normal   Level of consciousness: alert     Cranial Nerves      CN VII   Facial expression full, symmetric       Motor Exam   Muscle bulk: normal  Overall muscle tone: normal     Strength   Right deltoid: 5/5  Left deltoid: 5/5  Right biceps: 5/5  Left biceps: 5/5  Right triceps: 5/5  Left triceps: 5/5  Right wrist extension: 5/5  Left wrist extension: 5/5  Right iliopsoas: 4/5  Left iliopsoas: 5/5  Right quadriceps: 5/5  Left quadriceps: 5/5  Right hamstrin/5  Left hamstrin/5  Right anterior tibial: 4/5  Left anterior tibial: 5/5  Moves all extremities, grossly normal      Sensory Exam   Right arm light touch: decreased from elbow  Right leg light touch: decreased from knee  Left leg light touch: decreased from knee  Right leg proprioception: decreased from toes  Right arm pinprick: decreased from elbow  Right leg pinprick: decreased from knee  Left leg pinprick: decreased from knee  Joint position sense 0/3 on right, 3/3 on left lower extremity      Gait, Coordination, and Reflexes      Tremor   Resting tremor: absent  Intention tremor: absent  Action tremor: absent  No aids          MEDICAL DECISION MAKING    Imaging Studies:     Ct Cervical Spine Without Contrast    Result Date: 1/21/2019  Narrative: CT CERVICAL SPINE - WITHOUT CONTRAST INDICATION:   D49 2: Neoplasm of unspecified behavior of bone, soft tissue, and skin  COMPARISON:  MRI cervical spine 1/7/2019 TECHNIQUE:  CT examination of the cervical spine was performed without intravenous contrast   Contiguous axial images were obtained  Sagittal and coronal reconstructions were performed  Radiation dose length product (DLP) for this visit:  632 mGy-cm   This examination, like all CT scans performed in the [facility], was performed utilizing techniques to minimize radiation dose exposure, including the use of iterative reconstruction and automated exposure control  IMAGE QUALITY:  Diagnostic  FINDINGS: ALIGNMENT:  Normal alignment of the cervical spine  No subluxation  VERTEBRAL BODIES:  No fracture or bony destructive process  DEGENERATIVE CHANGES:  Multilevel cervical degenerative changes are noted with disc space narrowing, endplate osteophytes and hypertrophic uncovertebral joint changes  The findings are most pronounced at C5-6 with a posterior disc osteophyte complex causing mild central canal stenosis and moderate bilateral neuroforaminal narrowing at this level  Scattered multilevel facet arthropathy   PREVERTEBRAL AND PARASPINAL SOFT TISSUES: Known cervicothoracic cord tumor is not well visualized on this noncontrast study  The thyroid is minimally heterogeneous without focal lesions detected  THORACIC INLET:  3 mm nodular density right apex series 2/127  Mucosal thickening visualized left maxillary sinus  Impression: No cervical spine fracture or bony destruction  Known cervicothoracic cord tumor is not well visualized on this noncontrast study  Multilevel cervical spondylosis most pronounced at C5-6  3 mm right apical pulmonary nodule  Follow-up CT chest in 3 months recommended to ensure stability  Workstation performed: [id number]     Ct Thoracic Spine Without Contrast    Result Date: 1/21/2019  Narrative: CT THORACIC SPINE INDICATION:   D49 2: Neoplasm of unspecified behavior of bone, soft tissue, and skin  COMPARISON:  MRI thoracic spine 1/7/2019 TECHNIQUE:  Contiguous axial images were obtained  Sagittal and coronal reconstructions were performed  Radiation dose length product (DLP) for this visit:  705 mGy-cm   This examination, like all CT scans performed in the [facility], was performed utilizing techniques to minimize radiation dose exposure, including the use of iterative reconstruction and automated exposure control  IMAGE QUALITY:  Diagnostic  FINDINGS: ALIGNMENT:  Normal alignment of the thoracic spine  No subluxation  VERTEBRAL BODIES: No fracture  DEGENERATIVE CHANGES:  Mild multilevel degenerative changes  PARASPINAL SOFT TISSUES:  Known cervicothoracic cord tumor on previous MRI is not well visualized without contrast  2-3 mm right apical pulmonary nodule  Mild basilar hypoventilatory changes  Mild fatty involution of the pancreas without acute findings  No adrenal nodules detected  Impression: No fracture or bony destruction  Mild thoracic degenerative changes  Known cervicothoracic cord tumor on previous MRI is not well visualized on this noncontrast study  2-3 mm right apical pulmonary nodule  Workstation performed: [id number]     Mri Cervical Spine W Wo Contrast    Result Date: 1/8/2019  Narrative: MRI CERVICAL SPINE WITH AND WITHOUT CONTRAST INDICATION: M54 5: Low back pain G89 29: Other chronic pain R29 898: Other symptoms and signs involving the musculoskeletal system R26 9: Unspecified abnormalities of gait and mobility R25 2: Cramp and spasm G95 9: Disease of spinal cord, unspecified  COMPARISON:  None  TECHNIQUE:  Sagittal T1, sagittal T2, sagittal inversion recovery, axial 2D merge and axial T2  Sagittal T1 and axial T1 postcontrast   IV Contrast:  8 mL of Gadobutrol injection (SINGLE-DOSE) IMAGE QUALITY:  Diagnostic  FINDINGS: ALIGNMENT:  Minor degenerative changes with slight straightening of normal cervical lordosis, no subluxation or scoliosis  MARROW SIGNAL:  Normal marrow signal is identified within the visualized bony structures  No discrete marrow lesion  CERVICAL AND VISUALIZED UPPER THORACIC CORD:  A 43 mm in cephalocaudad dimension high T2 signal focus expands the cord from the caudal aspect of the C7 vertebral body to the cephalad aspect of the T3 vertebral body  Minor marginal enhancement  Signal abnormality extends cephalad within the cord from the upper border of the lesion to the upper C5 level  There is intermittent high signal with the low signal suggesting extension of tumor and associated hemosiderin, more commonly seen with ependymoma, within the central canal   Similar changes extend a short distance caudally from the lower margin of the lesion, to approximately the caudal T3 level  Slight cord edema is present inferiorly  No pathologic vascular enlargement  This likely represents a primary glial tumor   PREVERTEBRAL AND PARASPINAL SOFT TISSUES:  Normal  VISUALIZED POSTERIOR FOSSA:  The visualized posterior fossa demonstrates no abnormal signal  CERVICAL DISC SPACES: C2-C3:  Normal  C3-C4:  Minor left facet arthrosis C4-C5:  Minor bilateral facet arthrosis, slight bulge  C5-C6:  Moderate bilateral facet arthrosis, minor bulge, small marginal osteophytes  C6-C7:  Minor bulge, mild bilateral facet arthrosis C7-T1:  Normal  UPPER THORACIC DISC SPACES:  Normal  POSTCONTRAST IMAGING:  Irregular enhancement of the tumor much of which occurs along the periphery of the cystic appearing C7 T2 central mass  Impression: Heterogeneous neoplasm of the cervicothoracic junction consistent with primary glial tumor  Pathology extends from the C4-C5 disc space to the caudal T3 level  Mild peripheral enhancement  * I personally telephoned this result to [facility] on 1/8/2019 11:16 AM  Workstation performed: [id number]     Mri Thoracic Spine W Wo Contrast    Result Date: 1/8/2019  Narrative: MRI THORACIC SPINE WITH AND WITHOUT CONTRAST INDICATION: M54 5: Low back pain G89 29: Other chronic pain R29 898: Other symptoms and signs involving the musculoskeletal system R26 9: Unspecified abnormalities of gait and mobility R25 2: Cramp and spasm G95 9: Disease of spinal cord, unspecified  COMPARISON:  Contemporary MR of the cervical spine, also MR lumbar spine 11/30/2018 TECHNIQUE:  Sagittal T1, sagittal T2, sagittal inversion recovery, axial T2,  axial 2D MERGE  Sagittal and axial T1 postcontrast  IV Contrast:  8 mL of Gadobutrol injection (SINGLE-DOSE) IMAGE QUALITY:  Diagnostic  FINDINGS: ALIGNMENT:  Normal alignment of the thoracic spine  No compression fracture  No subluxation  No evidence of scoliosis  MARROW SIGNAL:  Normal marrow signal is identified within the visualized bony structures  No discrete marrow lesion  THORACIC CORD:  Complex cord tumor described in more detail on MR of the cervical spine  The prominent area of cord expansion in the presumed central cyst extending up to 45 mm in the cephalocaudad dimension from the caudal border of C7 to the cephalad border of T3  Signal abnormalities extend beyond this within the cervical cord and caudally to the T4 level  Mild edema extends caudally as well a short distance, to the mid T4  There appear to be blood products within the trailing edges of the lesion  PREVERTEBRAL AND PARASPINAL SOFT TISSUES:   Normal  THORACIC DEGENERATIVE CHANGE:  No disc herniation, canal stenosis or foraminal narrowing  No degenerative changes  POSTCONTRAST:  As described, enhancement along the periphery of the cord predominantly at the level of the cystic lesion     Impression: Cervicothoracic junction neoplasm with potential extension from the C4-C5 disc space to the T3-T4 disc space, findings consistent with primary glial tumor  * I personally telephoned this result to [facility] on 1/8/2019 11:16 AM  Workstation performed: [id number]     Xr Entire Spine (scoliosis) 2-3 Vw    Result Date: 1/19/2019  Narrative: SCOLIOSIS INDICATION:   D49 2: Neoplasm of unspecified behavior of bone, soft tissue, and skin  COMPARISON:  Lumbar spine radiographs 11/26/2018 VIEWS:  XR ENTIRE SPINE (SCOLIOSIS) 2-3 VW FINDINGS: There is no fracture, pathologic bone lesion or congenital segmentation anomaly  No significant scoliosis  Straightening of the normal cervical lordosis  Minimal grade 1 anterolisthesis L5 on S1  Mild multilevel cervical degenerative changes more prominent at C5-6  Mild multilevel lumbar degenerative changes most prominent at L5-S1 and to lesser extent L3-4  No osseous destruction  Impression: No significant scoliosis  Mild multilevel cervical lumbar degenerative changes  Minimal grade 1 anterolisthesis of L5 on S1  Workstation performed: [id number]       I have personally reviewed pertinent reports     and I have personally reviewed pertinent films in PACS

## 2019-01-21 NOTE — PRE-PROCEDURE INSTRUCTIONS
Pre-Surgery Instructions:   Medication Instructions    levothyroxine 25 mcg tablet Instructed patient per Anesthesia Guidelines   Multiple Vitamin (MULTI-VITAMIN DAILY PO) Instructed patient per Anesthesia Guidelines  Spoke to pt  Medication list reviewed & instructed  As of 1 21 19 pt to stop MV  Instructed on tylenol only  Am DOS pt to take levothyroxine with 1-2 sips of water   Showering instructions to be given by office at appt 4:00 pm  All instructions verbally understood by patient  No further questions  Thyroxine Med Class     Continue to take this medication on your normal schedule  If this is an oral medication and you take it in the morning, then you may take this medicine with a sip of water

## 2019-01-22 ENCOUNTER — APPOINTMENT (OUTPATIENT)
Dept: LAB | Age: 59
DRG: 029 | End: 2019-01-22
Payer: COMMERCIAL

## 2019-01-22 ENCOUNTER — TELEPHONE (OUTPATIENT)
Dept: PREADMISSION TESTING | Facility: HOSPITAL | Age: 59
End: 2019-01-22

## 2019-01-22 DIAGNOSIS — D49.7 SPINAL CORD TUMOR: ICD-10-CM

## 2019-01-22 DIAGNOSIS — D49.7 SPINAL CORD NEOPLASM: ICD-10-CM

## 2019-01-22 DIAGNOSIS — G95.0 SYRINX OF SPINAL CORD (HCC): ICD-10-CM

## 2019-01-22 DIAGNOSIS — G95.9 INTRAMEDULLARY ABNORMALITY OF SPINAL CORD (HCC): ICD-10-CM

## 2019-01-22 DIAGNOSIS — D49.2 NEOPLASM OF CERVICAL SPINE: ICD-10-CM

## 2019-01-22 DIAGNOSIS — G95.9 MYELOPATHY (HCC): ICD-10-CM

## 2019-01-22 LAB
ALBUMIN SERPL BCP-MCNC: 3.7 G/DL (ref 3.5–5)
ALP SERPL-CCNC: 76 U/L (ref 46–116)
ALT SERPL W P-5'-P-CCNC: 30 U/L (ref 12–78)
ANION GAP SERPL CALCULATED.3IONS-SCNC: 5 MMOL/L (ref 4–13)
APTT PPP: 32 SECONDS (ref 26–38)
AST SERPL W P-5'-P-CCNC: 15 U/L (ref 5–45)
BASOPHILS # BLD AUTO: 0.02 THOUSANDS/ΜL (ref 0–0.1)
BASOPHILS NFR BLD AUTO: 0 % (ref 0–1)
BILIRUB SERPL-MCNC: 0.67 MG/DL (ref 0.2–1)
BILIRUB UR QL STRIP: NEGATIVE
BUN SERPL-MCNC: 21 MG/DL (ref 5–25)
CALCIUM SERPL-MCNC: 8.6 MG/DL (ref 8.3–10.1)
CHLORIDE SERPL-SCNC: 103 MMOL/L (ref 100–108)
CLARITY UR: CLEAR
CO2 SERPL-SCNC: 27 MMOL/L (ref 21–32)
COLOR UR: YELLOW
CREAT SERPL-MCNC: 0.8 MG/DL (ref 0.6–1.3)
EOSINOPHIL # BLD AUTO: 0.08 THOUSAND/ΜL (ref 0–0.61)
EOSINOPHIL NFR BLD AUTO: 1 % (ref 0–6)
ERYTHROCYTE [DISTWIDTH] IN BLOOD BY AUTOMATED COUNT: 12.4 % (ref 11.6–15.1)
EST. AVERAGE GLUCOSE BLD GHB EST-MCNC: 134 MG/DL
GFR SERPL CREATININE-BSD FRML MDRD: 98 ML/MIN/1.73SQ M
GLUCOSE SERPL-MCNC: 92 MG/DL (ref 65–140)
GLUCOSE UR STRIP-MCNC: NEGATIVE MG/DL
HBA1C MFR BLD: 6.3 % (ref 4.2–6.3)
HCT VFR BLD AUTO: 41 % (ref 36.5–49.3)
HGB BLD-MCNC: 13 G/DL (ref 12–17)
HGB UR QL STRIP.AUTO: NEGATIVE
IMM GRANULOCYTES # BLD AUTO: 0.03 THOUSAND/UL (ref 0–0.2)
IMM GRANULOCYTES NFR BLD AUTO: 0 % (ref 0–2)
INR PPP: 0.99 (ref 0.86–1.17)
KETONES UR STRIP-MCNC: NEGATIVE MG/DL
LEUKOCYTE ESTERASE UR QL STRIP: NEGATIVE
LYMPHOCYTES # BLD AUTO: 1.88 THOUSANDS/ΜL (ref 0.6–4.47)
LYMPHOCYTES NFR BLD AUTO: 22 % (ref 14–44)
MCH RBC QN AUTO: 29.3 PG (ref 26.8–34.3)
MCHC RBC AUTO-ENTMCNC: 31.7 G/DL (ref 31.4–37.4)
MCV RBC AUTO: 93 FL (ref 82–98)
MONOCYTES # BLD AUTO: 0.55 THOUSAND/ΜL (ref 0.17–1.22)
MONOCYTES NFR BLD AUTO: 7 % (ref 4–12)
NEUTROPHILS # BLD AUTO: 5.88 THOUSANDS/ΜL (ref 1.85–7.62)
NEUTS SEG NFR BLD AUTO: 70 % (ref 43–75)
NITRITE UR QL STRIP: NEGATIVE
NRBC BLD AUTO-RTO: 0 /100 WBCS
PH UR STRIP.AUTO: 6.5 [PH] (ref 4.5–8)
PLATELET # BLD AUTO: 303 THOUSANDS/UL (ref 149–390)
PMV BLD AUTO: 9.6 FL (ref 8.9–12.7)
POTASSIUM SERPL-SCNC: 4.2 MMOL/L (ref 3.5–5.3)
PROT SERPL-MCNC: 7.5 G/DL (ref 6.4–8.2)
PROT UR STRIP-MCNC: NEGATIVE MG/DL
PROTHROMBIN TIME: 13.2 SECONDS (ref 11.8–14.2)
RBC # BLD AUTO: 4.43 MILLION/UL (ref 3.88–5.62)
SODIUM SERPL-SCNC: 135 MMOL/L (ref 136–145)
SP GR UR STRIP.AUTO: 1 (ref 1–1.03)
UROBILINOGEN UR QL STRIP.AUTO: 0.2 E.U./DL
WBC # BLD AUTO: 8.44 THOUSAND/UL (ref 4.31–10.16)

## 2019-01-22 PROCEDURE — 86920 COMPATIBILITY TEST SPIN: CPT

## 2019-01-22 PROCEDURE — 85025 COMPLETE CBC W/AUTO DIFF WBC: CPT

## 2019-01-22 PROCEDURE — 85610 PROTHROMBIN TIME: CPT

## 2019-01-22 PROCEDURE — 85730 THROMBOPLASTIN TIME PARTIAL: CPT

## 2019-01-22 PROCEDURE — 36415 COLL VENOUS BLD VENIPUNCTURE: CPT

## 2019-01-22 PROCEDURE — 86850 RBC ANTIBODY SCREEN: CPT

## 2019-01-22 PROCEDURE — 86901 BLOOD TYPING SEROLOGIC RH(D): CPT

## 2019-01-22 PROCEDURE — 80053 COMPREHEN METABOLIC PANEL: CPT

## 2019-01-22 PROCEDURE — 86900 BLOOD TYPING SEROLOGIC ABO: CPT

## 2019-01-22 PROCEDURE — 81003 URINALYSIS AUTO W/O SCOPE: CPT | Performed by: NEUROLOGICAL SURGERY

## 2019-01-22 PROCEDURE — 83036 HEMOGLOBIN GLYCOSYLATED A1C: CPT

## 2019-01-22 PROCEDURE — 87081 CULTURE SCREEN ONLY: CPT

## 2019-01-23 ENCOUNTER — DOCUMENTATION (OUTPATIENT)
Dept: NEUROSURGERY | Facility: CLINIC | Age: 59
End: 2019-01-23

## 2019-01-23 LAB
ABO GROUP BLD: NORMAL
BLD GP AB SCN SERPL QL: NEGATIVE
RH BLD: POSITIVE
SPECIMEN EXPIRATION DATE: NORMAL

## 2019-01-24 ENCOUNTER — ANESTHESIA (OUTPATIENT)
Dept: PERIOP | Facility: HOSPITAL | Age: 59
DRG: 029 | End: 2019-01-24
Payer: COMMERCIAL

## 2019-01-24 ENCOUNTER — HOSPITAL ENCOUNTER (OUTPATIENT)
Dept: RADIOLOGY | Facility: HOSPITAL | Age: 59
Setting detail: SURGERY ADMIT
Discharge: HOME | DRG: 029 | End: 2019-01-24
Payer: COMMERCIAL

## 2019-01-24 ENCOUNTER — HOSPITAL ENCOUNTER (INPATIENT)
Facility: HOSPITAL | Age: 59
LOS: 7 days | DRG: 029 | End: 2019-01-31
Attending: NEUROLOGICAL SURGERY | Admitting: NEUROLOGICAL SURGERY
Payer: COMMERCIAL

## 2019-01-24 DIAGNOSIS — E78.2 MIXED HYPERLIPIDEMIA: ICD-10-CM

## 2019-01-24 DIAGNOSIS — D49.2 THORACIC SPINE TUMOR: ICD-10-CM

## 2019-01-24 DIAGNOSIS — D49.7 SPINAL CORD TUMOR: ICD-10-CM

## 2019-01-24 DIAGNOSIS — R29.898 WEAKNESS OF RIGHT LOWER EXTREMITY: ICD-10-CM

## 2019-01-24 DIAGNOSIS — G95.9 MYELOPATHY (HCC): ICD-10-CM

## 2019-01-24 DIAGNOSIS — E03.9 ACQUIRED HYPOTHYROIDISM: ICD-10-CM

## 2019-01-24 DIAGNOSIS — G95.9 INTRAMEDULLARY ABNORMALITY OF SPINAL CORD (HCC): ICD-10-CM

## 2019-01-24 DIAGNOSIS — D49.2 NEOPLASM OF CERVICAL SPINE: ICD-10-CM

## 2019-01-24 DIAGNOSIS — R26.9 NEUROLOGIC GAIT DYSFUNCTION: ICD-10-CM

## 2019-01-24 DIAGNOSIS — G95.0 SYRINX OF SPINAL CORD (HCC): ICD-10-CM

## 2019-01-24 DIAGNOSIS — R20.2 PARESTHESIA OF BILATERAL LEGS: ICD-10-CM

## 2019-01-24 DIAGNOSIS — C72.0 GLIOMA OF SPINAL CORD (HCC): Primary | ICD-10-CM

## 2019-01-24 LAB
GLUCOSE SERPL-MCNC: 191 MG/DL (ref 65–140)
GLUCOSE SERPL-MCNC: 194 MG/DL (ref 65–140)
MRSA NOSE QL CULT: NORMAL

## 2019-01-24 PROCEDURE — 88307 TISSUE EXAM BY PATHOLOGIST: CPT | Performed by: PATHOLOGY

## 2019-01-24 PROCEDURE — C1713 ANCHOR/SCREW BN/BN,TIS/BN: HCPCS | Performed by: NEUROLOGICAL SURGERY

## 2019-01-24 PROCEDURE — 22600 ARTHRD PST TQ 1NTRSPC CRV: CPT | Performed by: NEUROLOGICAL SURGERY

## 2019-01-24 PROCEDURE — 88331 PATH CONSLTJ SURG 1 BLK 1SPC: CPT | Performed by: NEUROLOGICAL SURGERY

## 2019-01-24 PROCEDURE — 72040 X-RAY EXAM NECK SPINE 2-3 VW: CPT

## 2019-01-24 PROCEDURE — 95940 IONM IN OPERATNG ROOM 15 MIN: CPT | Performed by: NEUROLOGICAL SURGERY

## 2019-01-24 PROCEDURE — 22843 INSERT SPINE FIXATION DEVICE: CPT | Performed by: NEUROLOGICAL SURGERY

## 2019-01-24 PROCEDURE — 88331 PATH CONSLTJ SURG 1 BLK 1SPC: CPT | Performed by: PATHOLOGY

## 2019-01-24 PROCEDURE — 82948 REAGENT STRIP/BLOOD GLUCOSE: CPT

## 2019-01-24 PROCEDURE — 22614 ARTHRD PST TQ 1NTRSPC EA ADD: CPT | Performed by: NEUROLOGICAL SURGERY

## 2019-01-24 PROCEDURE — 0RH404Z INSERTION OF INTERNAL FIXATION DEVICE INTO CERVICOTHORACIC VERTEBRAL JOINT, OPEN APPROACH: ICD-10-PCS | Performed by: NEUROLOGICAL SURGERY

## 2019-01-24 PROCEDURE — 63286 BX/EXC IDRL IMED LESN THRC: CPT | Performed by: NEUROLOGICAL SURGERY

## 2019-01-24 PROCEDURE — 88333 PATH CONSLTJ SURG CYTO XM 1: CPT | Performed by: PATHOLOGY

## 2019-01-24 PROCEDURE — 00BW0ZX EXCISION OF CERVICAL SPINAL CORD, OPEN APPROACH, DIAGNOSTIC: ICD-10-PCS | Performed by: NEUROLOGICAL SURGERY

## 2019-01-24 PROCEDURE — 20930 SP BONE ALGRFT MORSEL ADD-ON: CPT | Performed by: NEUROLOGICAL SURGERY

## 2019-01-24 PROCEDURE — 00BW0ZZ EXCISION OF CERVICAL SPINAL CORD, OPEN APPROACH: ICD-10-PCS | Performed by: NEUROLOGICAL SURGERY

## 2019-01-24 PROCEDURE — 99233 SBSQ HOSP IP/OBS HIGH 50: CPT | Performed by: ANESTHESIOLOGY

## 2019-01-24 PROCEDURE — 20936 SP BONE AGRFT LOCAL ADD-ON: CPT | Performed by: NEUROLOGICAL SURGERY

## 2019-01-24 DEVICE — CROSSLINK 3602526 MAS TO MAS M
Type: IMPLANTABLE DEVICE | Site: POSTERIOR CERVICAL | Status: FUNCTIONAL
Brand: INFINITY™ OCCIPITOCERVICAL UPPER THORACIC SYSTEM

## 2019-01-24 DEVICE — DBM 006010 PROGENIX PLUS 10CC
Type: IMPLANTABLE DEVICE | Status: FUNCTIONAL
Brand: PROGENIX® PUTTY AND PROGENIX® PLUS

## 2019-01-24 RX ORDER — FENTANYL CITRATE/PF 50 MCG/ML
25 SYRINGE (ML) INJECTION
Status: COMPLETED | OUTPATIENT
Start: 2019-01-24 | End: 2019-01-24

## 2019-01-24 RX ORDER — MEPERIDINE HYDROCHLORIDE 25 MG/ML
12.5 INJECTION INTRAMUSCULAR; INTRAVENOUS; SUBCUTANEOUS
Status: DISCONTINUED | OUTPATIENT
Start: 2019-01-24 | End: 2019-01-24 | Stop reason: HOSPADM

## 2019-01-24 RX ORDER — ALBUMIN, HUMAN INJ 5% 5 %
SOLUTION INTRAVENOUS CONTINUOUS PRN
Status: DISCONTINUED | OUTPATIENT
Start: 2019-01-24 | End: 2019-01-24 | Stop reason: SURG

## 2019-01-24 RX ORDER — LIDOCAINE HYDROCHLORIDE 10 MG/ML
INJECTION, SOLUTION INFILTRATION; PERINEURAL AS NEEDED
Status: DISCONTINUED | OUTPATIENT
Start: 2019-01-24 | End: 2019-01-24 | Stop reason: SURG

## 2019-01-24 RX ORDER — SUCCINYLCHOLINE CHLORIDE 20 MG/ML
INJECTION INTRAMUSCULAR; INTRAVENOUS AS NEEDED
Status: DISCONTINUED | OUTPATIENT
Start: 2019-01-24 | End: 2019-01-24 | Stop reason: SURG

## 2019-01-24 RX ORDER — PROPOFOL 10 MG/ML
INJECTION, EMULSION INTRAVENOUS CONTINUOUS PRN
Status: DISCONTINUED | OUTPATIENT
Start: 2019-01-24 | End: 2019-01-24 | Stop reason: SURG

## 2019-01-24 RX ORDER — OXYCODONE HYDROCHLORIDE 10 MG/1
10 TABLET ORAL EVERY 4 HOURS PRN
Status: DISCONTINUED | OUTPATIENT
Start: 2019-01-24 | End: 2019-01-31 | Stop reason: HOSPADM

## 2019-01-24 RX ORDER — MIDAZOLAM HYDROCHLORIDE 1 MG/ML
INJECTION INTRAMUSCULAR; INTRAVENOUS AS NEEDED
Status: DISCONTINUED | OUTPATIENT
Start: 2019-01-24 | End: 2019-01-24 | Stop reason: SURG

## 2019-01-24 RX ORDER — METHOCARBAMOL 750 MG/1
750 TABLET, FILM COATED ORAL EVERY 6 HOURS SCHEDULED
Status: DISCONTINUED | OUTPATIENT
Start: 2019-01-25 | End: 2019-01-31

## 2019-01-24 RX ORDER — MAGNESIUM HYDROXIDE/ALUMINUM HYDROXICE/SIMETHICONE 120; 1200; 1200 MG/30ML; MG/30ML; MG/30ML
30 SUSPENSION ORAL EVERY 6 HOURS PRN
Status: DISCONTINUED | OUTPATIENT
Start: 2019-01-24 | End: 2019-01-31 | Stop reason: HOSPADM

## 2019-01-24 RX ORDER — KETAMINE HCL IN NACL, ISO-OSM 100MG/10ML
SYRINGE (ML) INJECTION AS NEEDED
Status: DISCONTINUED | OUTPATIENT
Start: 2019-01-24 | End: 2019-01-24 | Stop reason: SURG

## 2019-01-24 RX ORDER — CHLORHEXIDINE GLUCONATE 0.12 MG/ML
15 RINSE ORAL ONCE
Status: COMPLETED | OUTPATIENT
Start: 2019-01-24 | End: 2019-01-24

## 2019-01-24 RX ORDER — SODIUM CHLORIDE 9 MG/ML
INJECTION, SOLUTION INTRAVENOUS CONTINUOUS PRN
Status: DISCONTINUED | OUTPATIENT
Start: 2019-01-24 | End: 2019-01-24 | Stop reason: SURG

## 2019-01-24 RX ORDER — VANCOMYCIN HYDROCHLORIDE 1 G/20ML
INJECTION, POWDER, LYOPHILIZED, FOR SOLUTION INTRAVENOUS AS NEEDED
Status: DISCONTINUED | OUTPATIENT
Start: 2019-01-24 | End: 2019-01-24 | Stop reason: HOSPADM

## 2019-01-24 RX ORDER — HEPARIN SODIUM 5000 [USP'U]/ML
5000 INJECTION, SOLUTION INTRAVENOUS; SUBCUTANEOUS EVERY 8 HOURS SCHEDULED
Status: DISCONTINUED | OUTPATIENT
Start: 2019-01-24 | End: 2019-01-24

## 2019-01-24 RX ORDER — ACETAMINOPHEN 325 MG/1
975 TABLET ORAL ONCE
Status: COMPLETED | OUTPATIENT
Start: 2019-01-24 | End: 2019-01-24

## 2019-01-24 RX ORDER — LEVOTHYROXINE SODIUM 0.03 MG/1
25 TABLET ORAL
Status: DISCONTINUED | OUTPATIENT
Start: 2019-01-25 | End: 2019-01-31 | Stop reason: HOSPADM

## 2019-01-24 RX ORDER — OXYCODONE HYDROCHLORIDE 5 MG/1
5 TABLET ORAL EVERY 4 HOURS PRN
Status: DISCONTINUED | OUTPATIENT
Start: 2019-01-24 | End: 2019-01-31 | Stop reason: HOSPADM

## 2019-01-24 RX ORDER — MAGNESIUM HYDROXIDE 1200 MG/15ML
LIQUID ORAL AS NEEDED
Status: DISCONTINUED | OUTPATIENT
Start: 2019-01-24 | End: 2019-01-24 | Stop reason: HOSPADM

## 2019-01-24 RX ORDER — ONDANSETRON 2 MG/ML
INJECTION INTRAMUSCULAR; INTRAVENOUS AS NEEDED
Status: DISCONTINUED | OUTPATIENT
Start: 2019-01-24 | End: 2019-01-24 | Stop reason: SURG

## 2019-01-24 RX ORDER — PROPOFOL 10 MG/ML
INJECTION, EMULSION INTRAVENOUS AS NEEDED
Status: DISCONTINUED | OUTPATIENT
Start: 2019-01-24 | End: 2019-01-24 | Stop reason: SURG

## 2019-01-24 RX ORDER — HYDROMORPHONE HCL/PF 1 MG/ML
0.5 SYRINGE (ML) INJECTION ONCE
Status: COMPLETED | OUTPATIENT
Start: 2019-01-24 | End: 2019-01-24

## 2019-01-24 RX ORDER — ONDANSETRON 2 MG/ML
4 INJECTION INTRAMUSCULAR; INTRAVENOUS ONCE AS NEEDED
Status: DISCONTINUED | OUTPATIENT
Start: 2019-01-24 | End: 2019-01-24 | Stop reason: HOSPADM

## 2019-01-24 RX ORDER — LIDOCAINE HYDROCHLORIDE AND EPINEPHRINE 10; 10 MG/ML; UG/ML
INJECTION, SOLUTION INFILTRATION; PERINEURAL AS NEEDED
Status: DISCONTINUED | OUTPATIENT
Start: 2019-01-24 | End: 2019-01-24 | Stop reason: HOSPADM

## 2019-01-24 RX ORDER — VANCOMYCIN HYDROCHLORIDE 1 G/200ML
1000 INJECTION, SOLUTION INTRAVENOUS EVERY 12 HOURS
Status: COMPLETED | OUTPATIENT
Start: 2019-01-24 | End: 2019-01-25

## 2019-01-24 RX ORDER — EPHEDRINE SULFATE 50 MG/ML
INJECTION, SOLUTION INTRAVENOUS AS NEEDED
Status: DISCONTINUED | OUTPATIENT
Start: 2019-01-24 | End: 2019-01-24 | Stop reason: SURG

## 2019-01-24 RX ORDER — SODIUM CHLORIDE, SODIUM LACTATE, POTASSIUM CHLORIDE, CALCIUM CHLORIDE 600; 310; 30; 20 MG/100ML; MG/100ML; MG/100ML; MG/100ML
125 INJECTION, SOLUTION INTRAVENOUS CONTINUOUS
Status: DISCONTINUED | OUTPATIENT
Start: 2019-01-24 | End: 2019-01-24

## 2019-01-24 RX ORDER — SODIUM CHLORIDE 9 MG/ML
50 INJECTION, SOLUTION INTRAVENOUS CONTINUOUS
Status: DISCONTINUED | OUTPATIENT
Start: 2019-01-24 | End: 2019-01-28

## 2019-01-24 RX ORDER — ALBUTEROL SULFATE 2.5 MG/3ML
2.5 SOLUTION RESPIRATORY (INHALATION) ONCE AS NEEDED
Status: DISCONTINUED | OUTPATIENT
Start: 2019-01-24 | End: 2019-01-24 | Stop reason: HOSPADM

## 2019-01-24 RX ORDER — HYDROMORPHONE HCL/PF 1 MG/ML
0.4 SYRINGE (ML) INJECTION
Status: DISCONTINUED | OUTPATIENT
Start: 2019-01-24 | End: 2019-01-24 | Stop reason: HOSPADM

## 2019-01-24 RX ORDER — ONDANSETRON 2 MG/ML
4 INJECTION INTRAMUSCULAR; INTRAVENOUS EVERY 6 HOURS PRN
Status: DISCONTINUED | OUTPATIENT
Start: 2019-01-24 | End: 2019-01-25

## 2019-01-24 RX ORDER — VANCOMYCIN HYDROCHLORIDE 1 G/200ML
1000 INJECTION, SOLUTION INTRAVENOUS ONCE
Status: COMPLETED | OUTPATIENT
Start: 2019-01-24 | End: 2019-01-24

## 2019-01-24 RX ORDER — ACETAMINOPHEN 325 MG/1
975 TABLET ORAL EVERY 8 HOURS SCHEDULED
Status: DISCONTINUED | OUTPATIENT
Start: 2019-01-24 | End: 2019-01-25

## 2019-01-24 RX ORDER — HYDROMORPHONE HCL/PF 1 MG/ML
0.5 SYRINGE (ML) INJECTION
Status: DISCONTINUED | OUTPATIENT
Start: 2019-01-24 | End: 2019-01-25

## 2019-01-24 RX ORDER — FENTANYL CITRATE 50 UG/ML
INJECTION, SOLUTION INTRAMUSCULAR; INTRAVENOUS AS NEEDED
Status: DISCONTINUED | OUTPATIENT
Start: 2019-01-24 | End: 2019-01-24 | Stop reason: SURG

## 2019-01-24 RX ORDER — DEXAMETHASONE SODIUM PHOSPHATE 4 MG/ML
4 INJECTION, SOLUTION INTRA-ARTICULAR; INTRALESIONAL; INTRAMUSCULAR; INTRAVENOUS; SOFT TISSUE EVERY 6 HOURS SCHEDULED
Status: DISCONTINUED | OUTPATIENT
Start: 2019-01-25 | End: 2019-01-26

## 2019-01-24 RX ORDER — BUPIVACAINE HYDROCHLORIDE AND EPINEPHRINE 5; 5 MG/ML; UG/ML
INJECTION, SOLUTION EPIDURAL; INTRACAUDAL; PERINEURAL AS NEEDED
Status: DISCONTINUED | OUTPATIENT
Start: 2019-01-24 | End: 2019-01-24 | Stop reason: HOSPADM

## 2019-01-24 RX ORDER — GABAPENTIN 100 MG/1
100 CAPSULE ORAL ONCE
Status: COMPLETED | OUTPATIENT
Start: 2019-01-24 | End: 2019-01-24

## 2019-01-24 RX ORDER — AMOXICILLIN 250 MG
2 CAPSULE ORAL DAILY
Status: DISCONTINUED | OUTPATIENT
Start: 2019-01-25 | End: 2019-01-31 | Stop reason: HOSPADM

## 2019-01-24 RX ADMIN — DEXAMETHASONE SODIUM PHOSPHATE 4 MG: 10 INJECTION INTRAMUSCULAR; INTRAVENOUS at 19:05

## 2019-01-24 RX ADMIN — Medication 40 MG: at 12:53

## 2019-01-24 RX ADMIN — METHOCARBAMOL 750 MG: 750 TABLET, FILM COATED ORAL at 23:31

## 2019-01-24 RX ADMIN — FENTANYL CITRATE 50 MCG: 50 INJECTION, SOLUTION INTRAMUSCULAR; INTRAVENOUS at 13:58

## 2019-01-24 RX ADMIN — FENTANYL CITRATE 100 MCG: 50 INJECTION, SOLUTION INTRAMUSCULAR; INTRAVENOUS at 12:46

## 2019-01-24 RX ADMIN — MIDAZOLAM 2 MG: 1 INJECTION INTRAMUSCULAR; INTRAVENOUS at 12:40

## 2019-01-24 RX ADMIN — EPHEDRINE SULFATE 10 MG: 50 INJECTION, SOLUTION INTRAMUSCULAR; INTRAVENOUS; SUBCUTANEOUS at 13:20

## 2019-01-24 RX ADMIN — VANCOMYCIN HYDROCHLORIDE 1000 MG: 1 INJECTION, SOLUTION INTRAVENOUS at 12:16

## 2019-01-24 RX ADMIN — FENTANYL CITRATE 25 MCG: 50 INJECTION, SOLUTION INTRAMUSCULAR; INTRAVENOUS at 22:09

## 2019-01-24 RX ADMIN — ALBUMIN (HUMAN): 12.5 SOLUTION INTRAVENOUS at 15:55

## 2019-01-24 RX ADMIN — PHENYLEPHRINE HYDROCHLORIDE 20 MCG/MIN: 10 INJECTION INTRAVENOUS at 21:45

## 2019-01-24 RX ADMIN — PROPOFOL 200 MG: 10 INJECTION, EMULSION INTRAVENOUS at 12:46

## 2019-01-24 RX ADMIN — FENTANYL CITRATE 50 MCG: 50 INJECTION, SOLUTION INTRAMUSCULAR; INTRAVENOUS at 20:50

## 2019-01-24 RX ADMIN — EPHEDRINE SULFATE 5 MG: 50 INJECTION, SOLUTION INTRAMUSCULAR; INTRAVENOUS; SUBCUTANEOUS at 13:38

## 2019-01-24 RX ADMIN — FENTANYL CITRATE 100 MCG: 50 INJECTION, SOLUTION INTRAMUSCULAR; INTRAVENOUS at 13:25

## 2019-01-24 RX ADMIN — ACETAMINOPHEN 975 MG: 325 TABLET ORAL at 23:31

## 2019-01-24 RX ADMIN — FENTANYL CITRATE 25 MCG: 50 INJECTION, SOLUTION INTRAMUSCULAR; INTRAVENOUS at 22:13

## 2019-01-24 RX ADMIN — DEXAMETHASONE SODIUM PHOSPHATE 4 MG: 4 INJECTION, SOLUTION INTRAMUSCULAR; INTRAVENOUS at 23:31

## 2019-01-24 RX ADMIN — FENTANYL CITRATE 25 MCG: 50 INJECTION, SOLUTION INTRAMUSCULAR; INTRAVENOUS at 22:05

## 2019-01-24 RX ADMIN — CHLORHEXIDINE GLUCONATE 15 ML: 1.2 RINSE ORAL at 11:00

## 2019-01-24 RX ADMIN — FENTANYL CITRATE 25 MCG: 50 INJECTION, SOLUTION INTRAMUSCULAR; INTRAVENOUS at 21:57

## 2019-01-24 RX ADMIN — PHENYLEPHRINE HYDROCHLORIDE 30 MCG/MIN: 10 INJECTION INTRAVENOUS at 17:05

## 2019-01-24 RX ADMIN — FENTANYL CITRATE 50 MCG: 50 INJECTION, SOLUTION INTRAMUSCULAR; INTRAVENOUS at 20:45

## 2019-01-24 RX ADMIN — ALBUMIN (HUMAN): 12.5 SOLUTION INTRAVENOUS at 15:38

## 2019-01-24 RX ADMIN — HYDROMORPHONE HYDROCHLORIDE 0.5 MG: 1 INJECTION, SOLUTION INTRAMUSCULAR; INTRAVENOUS; SUBCUTANEOUS at 23:04

## 2019-01-24 RX ADMIN — ACETAMINOPHEN 975 MG: 325 TABLET ORAL at 11:00

## 2019-01-24 RX ADMIN — FENTANYL CITRATE 50 MCG: 50 INJECTION, SOLUTION INTRAMUSCULAR; INTRAVENOUS at 13:51

## 2019-01-24 RX ADMIN — INSULIN HUMAN 8 UNITS: 100 INJECTION, SOLUTION PARENTERAL at 21:30

## 2019-01-24 RX ADMIN — EPHEDRINE SULFATE 10 MG: 50 INJECTION, SOLUTION INTRAMUSCULAR; INTRAVENOUS; SUBCUTANEOUS at 13:30

## 2019-01-24 RX ADMIN — SODIUM CHLORIDE: 0.9 INJECTION, SOLUTION INTRAVENOUS at 12:52

## 2019-01-24 RX ADMIN — GABAPENTIN 100 MG: 100 CAPSULE ORAL at 11:00

## 2019-01-24 RX ADMIN — SODIUM CHLORIDE, SODIUM LACTATE, POTASSIUM CHLORIDE, AND CALCIUM CHLORIDE: .6; .31; .03; .02 INJECTION, SOLUTION INTRAVENOUS at 15:25

## 2019-01-24 RX ADMIN — DEXMEDETOMIDINE HYDROCHLORIDE 0.3 MCG/KG/HR: 100 INJECTION, SOLUTION INTRAVENOUS at 13:05

## 2019-01-24 RX ADMIN — ONDANSETRON 4 MG: 2 INJECTION INTRAMUSCULAR; INTRAVENOUS at 22:55

## 2019-01-24 RX ADMIN — SODIUM CHLORIDE, SODIUM LACTATE, POTASSIUM CHLORIDE, AND CALCIUM CHLORIDE 125 ML/HR: .6; .31; .03; .02 INJECTION, SOLUTION INTRAVENOUS at 11:24

## 2019-01-24 RX ADMIN — ONDANSETRON 4 MG: 2 INJECTION INTRAMUSCULAR; INTRAVENOUS at 12:58

## 2019-01-24 RX ADMIN — PROPOFOL 110 MCG/KG/MIN: 10 INJECTION, EMULSION INTRAVENOUS at 13:05

## 2019-01-24 RX ADMIN — SODIUM CHLORIDE 100 ML/HR: 0.9 INJECTION, SOLUTION INTRAVENOUS at 21:59

## 2019-01-24 RX ADMIN — EPHEDRINE SULFATE 10 MG: 50 INJECTION, SOLUTION INTRAMUSCULAR; INTRAVENOUS; SUBCUTANEOUS at 13:00

## 2019-01-24 RX ADMIN — Medication 10 MG: at 19:05

## 2019-01-24 RX ADMIN — DEXAMETHASONE SODIUM PHOSPHATE 10 MG: 10 INJECTION INTRAMUSCULAR; INTRAVENOUS at 12:58

## 2019-01-24 RX ADMIN — SUCCINYLCHOLINE CHLORIDE 100 MG: 20 INJECTION, SOLUTION INTRAMUSCULAR; INTRAVENOUS at 12:46

## 2019-01-24 RX ADMIN — SODIUM CHLORIDE: 0.9 INJECTION, SOLUTION INTRAVENOUS at 18:15

## 2019-01-24 RX ADMIN — LIDOCAINE HYDROCHLORIDE 50 MG: 10 INJECTION, SOLUTION INFILTRATION; PERINEURAL at 12:45

## 2019-01-24 RX ADMIN — REMIFENTANIL HYDROCHLORIDE 0.25 MCG/KG/MIN: 1 INJECTION, POWDER, LYOPHILIZED, FOR SOLUTION INTRAVENOUS at 13:05

## 2019-01-24 NOTE — ANESTHESIA PROCEDURE NOTES
Arterial Line Insertion  Performed by: Nguyễn Villalta by: Moses Quiroga   Consent: Verbal consent obtained  Written consent obtained  Risks and benefits: risks, benefits and alternatives were discussed  Consent given by: patient  Patient understanding: patient states understanding of the procedure being performed  Patient consent: the patient's understanding of the procedure matches consent given  Procedure consent: procedure consent matches procedure scheduled  Required items: required blood products, implants, devices, and special equipment available  Patient identity confirmed: verbally with patient and arm band  Preparation: Patient was prepped and draped in the usual sterile fashion    Indications: hemodynamic monitoring  Orientation:  Right  Location: radial artery  Procedure Details:  Needle gauge: 20  Seldinger technique: Seldinger technique used  Number of attempts: 1    Post-procedure:  Post-procedure: dressing applied  Waveform: good waveform and waveform confirmed  Patient tolerance: Patient tolerated the procedure well with no immediate complications

## 2019-01-24 NOTE — H&P (VIEW-ONLY)
Neurosurgery Office Note  Quang Hinojosa III 60 y o  male MRN: [id number]      Assessment/Plan      Diagnoses and all orders for this visit:    Intramedullary abnormality of spinal cord ([facility] [address] )    Myelopathy ([facility] )    Spinal cord tumor    Syrinx of spinal cord ([facility] )    Neoplasm of cervical spine          Discussion:        61-year-old male who was noted numbness into his hands for multiple years  Over more recent years, has evolved into occasional burning in the left forearm and hand  This occurs about 1 time per day  He has noticed increasing difficulty with his walking, he has begun dragging the right leg over the last 2-3 years, and this is accelerating recently  He has had numerous falls over the last several months, and no longer feels steady on a lladder etc   He has had numbness in the right more than left leg and bilateral feet over this time as well  He notices in the shower his right lower extremity can be darker or more red than the left  From a bladder standpoint, he has been seen by Urology over the last several months  He has had increased frequency at night, 3 times nocturia, more recently improved to just once nightly  He has had urgency especially in the morning, some dribbling, definitely urgency, but no obvious incontinence  Despite this he has been active, bowling, golfing, etc , until recently mostly because the right leg issues  He enjoys his yard work, gardening etc   He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, receiving, 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     He has had some back pain as well over the years, and was referred by his PCP to Dr Tia Carrasquillo for gait issues  He had an MRI scan of the lumbar spine which was relatively unremarkable  Dr Stephen Stephenson as well as Tia Carrasquillo noted him to have a spastic gait, and exam abnormalities, and referred him for MRI scans of the cervical and thoracic spine    Those findings led to referral here      On exam, the patient has decreased pinprick on the right forearm and hand, although strength is essentially full in the upper extremities  He does not have Noel's or hyperreflexia in the uppers  His lower extremities, iliopsoas strength on the right is 4/5, TA on the right is 4+/5, otherwise seems to be full strength  Decreased pinprick bilaterally shins downward at least   He has at least 2 beats of clonus bilaterally  His patellar reflexes are brisk 3-4 +bilaterally      Imaging demonstrates a heterogeneously enhancing intramedullary lesion 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/5 to T3, with a large cystic/syrinx from C7-T2  This was reviewed at [facility], and 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, although astrocytoma and metastasis are not excluded  History certainly most consistent with ependymoma      I reviewed the patient and his son, Anjali Leal previously, and again with the patient and his wife Priscilla Opitz, his options for management  I am recommending posterior cervical laminectomy for decompression, fixation fusion because of the extensive bone work required, and then attempted resection  We will least fenestrate the cyst, and should have a reasonable plane, attempt to resect the mass rostrally and caudally as much as possible  In short, this would be a C4-T3 laminectomy, C3 fixation fusion to T4, additional levels as necessary, resection of intramedullary mass/fenestration of intramedullary cyst/syrinx  Written consent was obtained today  I did review in great detail expected benefits and attendant risks to this approach  Risks include but not limited to infection, bleeding, VTE, spinal fluid leak, hardware failure, transient or permanent neurologic dysfunction     I did refer for the patient for CT scan of the cervical and thoracic spine to better delineate bony anatomy for hardware placement, as well as for scoliosis films to assess his baseline alignment       Metrics:  KPS 90, ECOG 0, mJOA 13/17, EQ5D5L 75409 or 0 840, VA S 75-80  Time spent was a significant portion of this encounter   36' were spent, over half of which was on counseling and coordination of care  CHIEF COMPLAINT    Chief Complaint   Patient presents with    Follow-up     follow up with CT scans of C-spine and T-spine and XRs of entire spine       HISTORY    History of Present Illness     60y o  year old male     HPI    See Discussion    REVIEW OF SYSTEMS    Review of Systems   HENT: Negative  Eyes: Negative  Respiratory: Negative  Cardiovascular: Negative  Gastrointestinal: Negative  Endocrine: Positive for cold intolerance  Hands and feet are cold a lot   Genitourinary:        Nocturia 1x  Occasional leakage if waiting too long   Musculoskeletal: Positive for arthralgias (some right shoulder stiffness, comes and goes), back pain (occasional LBP), gait problem (right leg drag / drop) and neck pain (yesterday (and for a couple days prior) had neck pain at the base of the neck towards right side)  Skin: Negative  Allergic/Immunologic: Positive for environmental allergies (bee venom)  Neurological: Positive for tremors (hands B/L), weakness (right leg) and numbness (right leg > left, B/L hands)  Some trouble x3-6 months expressing himself, word finding and/or using the correct word    Hematological: Negative  Psychiatric/Behavioral: Positive for agitation (occasional with stress)  The patient is nervous/anxious (current health)  Meds/Allergies     Current Outpatient Prescriptions   Medication Sig Dispense Refill    levothyroxine 25 mcg tablet TAKE 1 TABLET (25 MCG TOTAL) BY MOUTH DAILY 30 tablet 5    Multiple Vitamin (MULTI-VITAMIN DAILY PO) Take 1 tablet by mouth daily       No current facility-administered medications for this visit  Allergies   Allergen Reactions    Bee Venom Hives, Itching and Edema     Category: Allergy;     Penicillins Hives and Itching     Category:  Allergy;        PAST HISTORY    Past Medical History:   Diagnosis Date    BPH associated with nocturia     Cervical spinal mass (HCC) 2019    cervicothoracic    Disease of thyroid gland     Herniated disc, cervical     Hyperlipidemia     Impaired fasting glucose        Past Surgical History:   Procedure Laterality Date    APPENDECTOMY      COLONOSCOPY  2012    NORMAL; RECHECK IN 5 YEARS    WISDOM TOOTH EXTRACTION         Social History   Substance Use Topics    Smoking status: Former Smoker     Quit date: 1993    Smokeless tobacco: Never Used    Alcohol use Yes      Comment: SOCIAL        Family History   Problem Relation Age of Onset    Diabetes Mother     Hypertension Father     Cerebral palsy Sister          Above history personally reviewed  EXAM    Vitals:Blood pressure 140/74, pulse 68, temperature (!) 97 4 °F (36 3 °C), temperature source Tympanic, resp  rate 16, height 5' 7" (1 702 m), weight 81 2 kg (179 lb)  ,Body mass index is 28 04 kg/m²  Physical Exam   Constitutional: He appears well-developed  HENT:   Head: Normocephalic and atraumatic  Eyes: No scleral icterus  Neck: Neck supple  Cardiovascular: Normal rate  Pulmonary/Chest: Effort normal    Abdominal: Normal appearance  Neurological: He is alert  Skin: Skin is warm and dry  Psychiatric: He has a normal mood and affect  His behavior is normal        Neurologic Exam    Mental Status   Oriented to person, place, and time     Attention: normal    Speech: speech is normal   Level of consciousness: alert     Cranial Nerves      CN VII   Facial expression full, symmetric       Motor Exam   Muscle bulk: normal  Overall muscle tone: normal     Strength   Right deltoid: 5/5  Left deltoid: 5/5  Right biceps: 5/5  Left biceps: 5/5  Right triceps: 5/5  Left triceps: 5/5  Right wrist extension: 5/5  Left wrist extension: 5/5  Right iliopsoas: 4/5  Left iliopsoas: 5/5  Right quadriceps: 5/5  Left quadriceps: 5/5  Right hamstrin/5  Left hamstrin/5  Right anterior tibial: 4/5  Left anterior tibial: 5/5  Moves all extremities, grossly normal      Sensory Exam   Right arm light touch: decreased from elbow  Right leg light touch: decreased from knee  Left leg light touch: decreased from knee  Right leg proprioception: decreased from toes  Right arm pinprick: decreased from elbow  Right leg pinprick: decreased from knee  Left leg pinprick: decreased from knee  Joint position sense 0/3 on right, 3/3 on left lower extremity      Gait, Coordination, and Reflexes      Tremor   Resting tremor: absent  Intention tremor: absent  Action tremor: absent  No aids          MEDICAL DECISION MAKING    Imaging Studies:     Ct Cervical Spine Without Contrast    Result Date: 1/21/2019  Narrative: CT CERVICAL SPINE - WITHOUT CONTRAST INDICATION:   D49 2: Neoplasm of unspecified behavior of bone, soft tissue, and skin  COMPARISON:  MRI cervical spine 1/7/2019 TECHNIQUE:  CT examination of the cervical spine was performed without intravenous contrast   Contiguous axial images were obtained  Sagittal and coronal reconstructions were performed  Radiation dose length product (DLP) for this visit:  632 mGy-cm   This examination, like all CT scans performed in the [facility], was performed utilizing techniques to minimize radiation dose exposure, including the use of iterative reconstruction and automated exposure control  IMAGE QUALITY:  Diagnostic  FINDINGS: ALIGNMENT:  Normal alignment of the cervical spine  No subluxation  VERTEBRAL BODIES:  No fracture or bony destructive process  DEGENERATIVE CHANGES:  Multilevel cervical degenerative changes are noted with disc space narrowing, endplate osteophytes and hypertrophic uncovertebral joint changes  The findings are most pronounced at C5-6 with a posterior disc osteophyte complex causing mild central canal stenosis and moderate bilateral neuroforaminal narrowing at this level  Scattered multilevel facet arthropathy   PREVERTEBRAL AND PARASPINAL SOFT TISSUES: Known cervicothoracic cord tumor is not well visualized on this noncontrast study  The thyroid is minimally heterogeneous without focal lesions detected  THORACIC INLET:  3 mm nodular density right apex series 2/127  Mucosal thickening visualized left maxillary sinus  Impression: No cervical spine fracture or bony destruction  Known cervicothoracic cord tumor is not well visualized on this noncontrast study  Multilevel cervical spondylosis most pronounced at C5-6  3 mm right apical pulmonary nodule  Follow-up CT chest in 3 months recommended to ensure stability  Workstation performed: [id number]     Ct Thoracic Spine Without Contrast    Result Date: 1/21/2019  Narrative: CT THORACIC SPINE INDICATION:   D49 2: Neoplasm of unspecified behavior of bone, soft tissue, and skin  COMPARISON:  MRI thoracic spine 1/7/2019 TECHNIQUE:  Contiguous axial images were obtained  Sagittal and coronal reconstructions were performed  Radiation dose length product (DLP) for this visit:  705 mGy-cm   This examination, like all CT scans performed in the [facility], was performed utilizing techniques to minimize radiation dose exposure, including the use of iterative reconstruction and automated exposure control  IMAGE QUALITY:  Diagnostic  FINDINGS: ALIGNMENT:  Normal alignment of the thoracic spine  No subluxation  VERTEBRAL BODIES: No fracture  DEGENERATIVE CHANGES:  Mild multilevel degenerative changes  PARASPINAL SOFT TISSUES:  Known cervicothoracic cord tumor on previous MRI is not well visualized without contrast  2-3 mm right apical pulmonary nodule  Mild basilar hypoventilatory changes  Mild fatty involution of the pancreas without acute findings  No adrenal nodules detected  Impression: No fracture or bony destruction  Mild thoracic degenerative changes  Known cervicothoracic cord tumor on previous MRI is not well visualized on this noncontrast study  2-3 mm right apical pulmonary nodule  Workstation performed: [id number]     Mri Cervical Spine W Wo Contrast    Result Date: 1/8/2019  Narrative: MRI CERVICAL SPINE WITH AND WITHOUT CONTRAST INDICATION: M54 5: Low back pain G89 29: Other chronic pain R29 898: Other symptoms and signs involving the musculoskeletal system R26 9: Unspecified abnormalities of gait and mobility R25 2: Cramp and spasm G95 9: Disease of spinal cord, unspecified  COMPARISON:  None  TECHNIQUE:  Sagittal T1, sagittal T2, sagittal inversion recovery, axial 2D merge and axial T2  Sagittal T1 and axial T1 postcontrast   IV Contrast:  8 mL of Gadobutrol injection (SINGLE-DOSE) IMAGE QUALITY:  Diagnostic  FINDINGS: ALIGNMENT:  Minor degenerative changes with slight straightening of normal cervical lordosis, no subluxation or scoliosis  MARROW SIGNAL:  Normal marrow signal is identified within the visualized bony structures  No discrete marrow lesion  CERVICAL AND VISUALIZED UPPER THORACIC CORD:  A 43 mm in cephalocaudad dimension high T2 signal focus expands the cord from the caudal aspect of the C7 vertebral body to the cephalad aspect of the T3 vertebral body  Minor marginal enhancement  Signal abnormality extends cephalad within the cord from the upper border of the lesion to the upper C5 level  There is intermittent high signal with the low signal suggesting extension of tumor and associated hemosiderin, more commonly seen with ependymoma, within the central canal   Similar changes extend a short distance caudally from the lower margin of the lesion, to approximately the caudal T3 level  Slight cord edema is present inferiorly  No pathologic vascular enlargement  This likely represents a primary glial tumor   PREVERTEBRAL AND PARASPINAL SOFT TISSUES:  Normal  VISUALIZED POSTERIOR FOSSA:  The visualized posterior fossa demonstrates no abnormal signal  CERVICAL DISC SPACES: C2-C3:  Normal  C3-C4:  Minor left facet arthrosis C4-C5:  Minor bilateral facet arthrosis, slight bulge  C5-C6:  Moderate bilateral facet arthrosis, minor bulge, small marginal osteophytes  C6-C7:  Minor bulge, mild bilateral facet arthrosis C7-T1:  Normal  UPPER THORACIC DISC SPACES:  Normal  POSTCONTRAST IMAGING:  Irregular enhancement of the tumor much of which occurs along the periphery of the cystic appearing C7 T2 central mass  Impression: Heterogeneous neoplasm of the cervicothoracic junction consistent with primary glial tumor  Pathology extends from the C4-C5 disc space to the caudal T3 level  Mild peripheral enhancement  * I personally telephoned this result to [facility] on 1/8/2019 11:16 AM  Workstation performed: [id number]     Mri Thoracic Spine W Wo Contrast    Result Date: 1/8/2019  Narrative: MRI THORACIC SPINE WITH AND WITHOUT CONTRAST INDICATION: M54 5: Low back pain G89 29: Other chronic pain R29 898: Other symptoms and signs involving the musculoskeletal system R26 9: Unspecified abnormalities of gait and mobility R25 2: Cramp and spasm G95 9: Disease of spinal cord, unspecified  COMPARISON:  Contemporary MR of the cervical spine, also MR lumbar spine 11/30/2018 TECHNIQUE:  Sagittal T1, sagittal T2, sagittal inversion recovery, axial T2,  axial 2D MERGE  Sagittal and axial T1 postcontrast  IV Contrast:  8 mL of Gadobutrol injection (SINGLE-DOSE) IMAGE QUALITY:  Diagnostic  FINDINGS: ALIGNMENT:  Normal alignment of the thoracic spine  No compression fracture  No subluxation  No evidence of scoliosis  MARROW SIGNAL:  Normal marrow signal is identified within the visualized bony structures  No discrete marrow lesion  THORACIC CORD:  Complex cord tumor described in more detail on MR of the cervical spine  The prominent area of cord expansion in the presumed central cyst extending up to 45 mm in the cephalocaudad dimension from the caudal border of C7 to the cephalad border of T3  Signal abnormalities extend beyond this within the cervical cord and caudally to the T4 level  Mild edema extends caudally as well a short distance, to the mid T4  There appear to be blood products within the trailing edges of the lesion  PREVERTEBRAL AND PARASPINAL SOFT TISSUES:   Normal  THORACIC DEGENERATIVE CHANGE:  No disc herniation, canal stenosis or foraminal narrowing  No degenerative changes  POSTCONTRAST:  As described, enhancement along the periphery of the cord predominantly at the level of the cystic lesion     Impression: Cervicothoracic junction neoplasm with potential extension from the C4-C5 disc space to the T3-T4 disc space, findings consistent with primary glial tumor  * I personally telephoned this result to [facility] on 1/8/2019 11:16 AM  Workstation performed: [id number]     Xr Entire Spine (scoliosis) 2-3 Vw    Result Date: 1/19/2019  Narrative: SCOLIOSIS INDICATION:   D49 2: Neoplasm of unspecified behavior of bone, soft tissue, and skin  COMPARISON:  Lumbar spine radiographs 11/26/2018 VIEWS:  XR ENTIRE SPINE (SCOLIOSIS) 2-3 VW FINDINGS: There is no fracture, pathologic bone lesion or congenital segmentation anomaly  No significant scoliosis  Straightening of the normal cervical lordosis  Minimal grade 1 anterolisthesis L5 on S1  Mild multilevel cervical degenerative changes more prominent at C5-6  Mild multilevel lumbar degenerative changes most prominent at L5-S1 and to lesser extent L3-4  No osseous destruction  Impression: No significant scoliosis  Mild multilevel cervical lumbar degenerative changes  Minimal grade 1 anterolisthesis of L5 on S1  Workstation performed: [id number]       I have personally reviewed pertinent reports     and I have personally reviewed pertinent films in PACS

## 2019-01-25 ENCOUNTER — APPOINTMENT (INPATIENT)
Dept: RADIOLOGY | Facility: HOSPITAL | Age: 59
DRG: 029 | End: 2019-01-25
Payer: COMMERCIAL

## 2019-01-25 LAB
ANION GAP SERPL CALCULATED.3IONS-SCNC: 8 MMOL/L (ref 4–13)
BUN SERPL-MCNC: 21 MG/DL (ref 5–25)
CALCIUM SERPL-MCNC: 7.7 MG/DL (ref 8.3–10.1)
CHLORIDE SERPL-SCNC: 108 MMOL/L (ref 100–108)
CO2 SERPL-SCNC: 22 MMOL/L (ref 21–32)
CREAT SERPL-MCNC: 0.75 MG/DL (ref 0.6–1.3)
ERYTHROCYTE [DISTWIDTH] IN BLOOD BY AUTOMATED COUNT: 12.8 % (ref 11.6–15.1)
GFR SERPL CREATININE-BSD FRML MDRD: 100 ML/MIN/1.73SQ M
GLUCOSE SERPL-MCNC: 139 MG/DL (ref 65–140)
GLUCOSE SERPL-MCNC: 145 MG/DL (ref 65–140)
GLUCOSE SERPL-MCNC: 151 MG/DL (ref 65–140)
HCT VFR BLD AUTO: 29.8 % (ref 36.5–49.3)
HGB BLD-MCNC: 9.5 G/DL (ref 12–17)
MCH RBC QN AUTO: 29.4 PG (ref 26.8–34.3)
MCHC RBC AUTO-ENTMCNC: 31.9 G/DL (ref 31.4–37.4)
MCV RBC AUTO: 92 FL (ref 82–98)
PLATELET # BLD AUTO: 226 THOUSANDS/UL (ref 149–390)
PMV BLD AUTO: 9 FL (ref 8.9–12.7)
POTASSIUM SERPL-SCNC: 4 MMOL/L (ref 3.5–5.3)
RBC # BLD AUTO: 3.23 MILLION/UL (ref 3.88–5.62)
SODIUM SERPL-SCNC: 138 MMOL/L (ref 136–145)
WBC # BLD AUTO: 9.96 THOUSAND/UL (ref 4.31–10.16)

## 2019-01-25 PROCEDURE — G8978 MOBILITY CURRENT STATUS: HCPCS

## 2019-01-25 PROCEDURE — 72040 X-RAY EXAM NECK SPINE 2-3 VW: CPT

## 2019-01-25 PROCEDURE — G8987 SELF CARE CURRENT STATUS: HCPCS

## 2019-01-25 PROCEDURE — G8979 MOBILITY GOAL STATUS: HCPCS

## 2019-01-25 PROCEDURE — 97167 OT EVAL HIGH COMPLEX 60 MIN: CPT

## 2019-01-25 PROCEDURE — 80048 BASIC METABOLIC PNL TOTAL CA: CPT | Performed by: EMERGENCY MEDICINE

## 2019-01-25 PROCEDURE — 97163 PT EVAL HIGH COMPLEX 45 MIN: CPT

## 2019-01-25 PROCEDURE — 72072 X-RAY EXAM THORAC SPINE 3VWS: CPT

## 2019-01-25 PROCEDURE — 99222 1ST HOSP IP/OBS MODERATE 55: CPT | Performed by: PHYSICAL MEDICINE & REHABILITATION

## 2019-01-25 PROCEDURE — 99233 SBSQ HOSP IP/OBS HIGH 50: CPT | Performed by: EMERGENCY MEDICINE

## 2019-01-25 PROCEDURE — 85027 COMPLETE CBC AUTOMATED: CPT | Performed by: NEUROLOGICAL SURGERY

## 2019-01-25 PROCEDURE — G8988 SELF CARE GOAL STATUS: HCPCS

## 2019-01-25 PROCEDURE — 82948 REAGENT STRIP/BLOOD GLUCOSE: CPT

## 2019-01-25 RX ORDER — ACETAMINOPHEN 325 MG/1
650 TABLET ORAL EVERY 6 HOURS SCHEDULED
Status: DISCONTINUED | OUTPATIENT
Start: 2019-01-25 | End: 2019-01-28

## 2019-01-25 RX ORDER — TAMSULOSIN HYDROCHLORIDE 0.4 MG/1
0.4 CAPSULE ORAL
Status: DISCONTINUED | OUTPATIENT
Start: 2019-01-25 | End: 2019-01-31 | Stop reason: HOSPADM

## 2019-01-25 RX ORDER — ONDANSETRON 2 MG/ML
4 INJECTION INTRAMUSCULAR; INTRAVENOUS ONCE
Status: COMPLETED | OUTPATIENT
Start: 2019-01-25 | End: 2019-01-25

## 2019-01-25 RX ORDER — ONDANSETRON 2 MG/ML
4 INJECTION INTRAMUSCULAR; INTRAVENOUS EVERY 4 HOURS PRN
Status: DISCONTINUED | OUTPATIENT
Start: 2019-01-25 | End: 2019-01-31 | Stop reason: HOSPADM

## 2019-01-25 RX ORDER — BUTALBITAL, ACETAMINOPHEN AND CAFFEINE 50; 325; 40 MG/1; MG/1; MG/1
1 TABLET ORAL EVERY 4 HOURS PRN
Status: DISCONTINUED | OUTPATIENT
Start: 2019-01-25 | End: 2019-01-31 | Stop reason: HOSPADM

## 2019-01-25 RX ORDER — HYDROMORPHONE HCL/PF 1 MG/ML
0.5 SYRINGE (ML) INJECTION
Status: DISCONTINUED | OUTPATIENT
Start: 2019-01-25 | End: 2019-01-28

## 2019-01-25 RX ADMIN — OXYCODONE HYDROCHLORIDE 5 MG: 5 TABLET ORAL at 04:13

## 2019-01-25 RX ADMIN — ACETAMINOPHEN 650 MG: 325 TABLET, FILM COATED ORAL at 10:50

## 2019-01-25 RX ADMIN — HYDROMORPHONE HYDROCHLORIDE 0.5 MG: 1 INJECTION, SOLUTION INTRAMUSCULAR; INTRAVENOUS; SUBCUTANEOUS at 11:16

## 2019-01-25 RX ADMIN — OXYCODONE HYDROCHLORIDE 5 MG: 5 TABLET ORAL at 00:10

## 2019-01-25 RX ADMIN — METHOCARBAMOL 750 MG: 750 TABLET, FILM COATED ORAL at 05:36

## 2019-01-25 RX ADMIN — METHOCARBAMOL 750 MG: 750 TABLET, FILM COATED ORAL at 17:45

## 2019-01-25 RX ADMIN — ONDANSETRON 4 MG: 2 INJECTION INTRAMUSCULAR; INTRAVENOUS at 12:56

## 2019-01-25 RX ADMIN — DEXAMETHASONE SODIUM PHOSPHATE 4 MG: 4 INJECTION, SOLUTION INTRAMUSCULAR; INTRAVENOUS at 05:36

## 2019-01-25 RX ADMIN — TAMSULOSIN HYDROCHLORIDE 0.4 MG: 0.4 CAPSULE ORAL at 16:30

## 2019-01-25 RX ADMIN — ONDANSETRON 4 MG: 2 INJECTION INTRAMUSCULAR; INTRAVENOUS at 17:45

## 2019-01-25 RX ADMIN — PHENYLEPHRINE HYDROCHLORIDE 20 MCG/MIN: 10 INJECTION INTRAVENOUS at 00:52

## 2019-01-25 RX ADMIN — OXYCODONE HYDROCHLORIDE 5 MG: 5 TABLET ORAL at 08:19

## 2019-01-25 RX ADMIN — DEXAMETHASONE SODIUM PHOSPHATE 4 MG: 4 INJECTION, SOLUTION INTRAMUSCULAR; INTRAVENOUS at 23:52

## 2019-01-25 RX ADMIN — VANCOMYCIN HYDROCHLORIDE 1000 MG: 1 INJECTION, SOLUTION INTRAVENOUS at 00:09

## 2019-01-25 RX ADMIN — INSULIN LISPRO 2 UNITS: 100 INJECTION, SOLUTION INTRAVENOUS; SUBCUTANEOUS at 00:49

## 2019-01-25 RX ADMIN — LEVOTHYROXINE SODIUM 25 MCG: 25 TABLET ORAL at 05:36

## 2019-01-25 RX ADMIN — BUTALBITAL, ACETAMINOPHEN, AND CAFFEINE 1 TABLET: 50; 325; 40 TABLET ORAL at 23:52

## 2019-01-25 RX ADMIN — SENNOSIDES AND DOCUSATE SODIUM 2 TABLET: 8.6; 5 TABLET ORAL at 08:19

## 2019-01-25 RX ADMIN — BUTALBITAL, ACETAMINOPHEN, AND CAFFEINE 1 TABLET: 50; 325; 40 TABLET ORAL at 14:10

## 2019-01-25 RX ADMIN — BUTALBITAL, ACETAMINOPHEN, AND CAFFEINE 1 TABLET: 50; 325; 40 TABLET ORAL at 17:56

## 2019-01-25 RX ADMIN — ACETAMINOPHEN 975 MG: 325 TABLET ORAL at 05:36

## 2019-01-25 RX ADMIN — SODIUM CHLORIDE 100 ML/HR: 0.9 INJECTION, SOLUTION INTRAVENOUS at 08:19

## 2019-01-25 RX ADMIN — INSULIN LISPRO 2 UNITS: 100 INJECTION, SOLUTION INTRAVENOUS; SUBCUTANEOUS at 06:25

## 2019-01-25 RX ADMIN — METHOCARBAMOL 750 MG: 750 TABLET, FILM COATED ORAL at 23:52

## 2019-01-25 RX ADMIN — DEXAMETHASONE SODIUM PHOSPHATE 4 MG: 4 INJECTION, SOLUTION INTRAMUSCULAR; INTRAVENOUS at 17:45

## 2019-01-25 RX ADMIN — ONDANSETRON 4 MG: 2 INJECTION INTRAMUSCULAR; INTRAVENOUS at 10:59

## 2019-01-25 RX ADMIN — DEXAMETHASONE SODIUM PHOSPHATE 4 MG: 4 INJECTION, SOLUTION INTRAMUSCULAR; INTRAVENOUS at 11:07

## 2019-01-25 RX ADMIN — ACETAMINOPHEN 650 MG: 325 TABLET, FILM COATED ORAL at 20:10

## 2019-01-25 RX ADMIN — HYDROMORPHONE HYDROCHLORIDE 0.5 MG: 1 INJECTION, SOLUTION INTRAMUSCULAR; INTRAVENOUS; SUBCUTANEOUS at 00:44

## 2019-01-25 NOTE — ORTHOTIC NOTE
Orthotic Note            Date: 1/25/2019      Patient Name: Gavin Galan III        Time: 14:50pm    Reason for Consult:  Patient Active Problem List   Diagnosis    BPH associated with nocturia    Chronic bilateral low back pain without sciatica    Erectile dysfunction of non-organic origin    Hypothyroidism    Impaired fasting glucose    Mixed hyperlipidemia    Overweight (BMI 25 0-29  9)    Weakness of right lower extremity    Paresthesia of bilateral legs    Neurologic gait dysfunction    Spasticity    Myelopathy (HCC)    Neoplasm of cervical spine    Thoracic spine tumor    Intramedullary abnormality of spinal cord (HCC)    Glioma of spinal cord (HCC)    Syrinx of spinal cord (HCC)   RLE MAFO   RLE Shoe Size: 10    I measured, fit and donned patient in Large RLE MAFO while patient was supine in bed  Patient tolerated well and MAFO is currently in Right shoe at bedside  The restorative team an I will continue to follow up daily  My contact information and instructions at bedside  RN present and aware at time  Recommendations:  Please call Mobility Coordinator at ext  [phone] in regards to bracing instruction and/or adjustment  Maynor Henriquez Mobility Coordinator LCFo, LCOF, ASOP R  O T, O B T

## 2019-01-25 NOTE — CONSULTS
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]  Syrinx of spinal cord ([address] ) [G95 0]  Neoplasm of cervical spine [D49 2]  Rehabilitation Diagnosis: NTSCI    CC: intramedullary mass with syrinx   History of Present Illness:  Jo Grider III is a 60 y o  male who  has a past medical history of BPH associated with nocturia; Cervical spinal mass ([address] ) (01/07/2019); Disease of thyroid gland; Herniated disc, cervical; Hyperlipidemia; and Impaired fasting glucose  who presented to the [address] [place] [address] for posterior C4-T3 laminectomy, resection of intramedullary mass, & C2-T5 fixation/fusion by Dr Karen Ruelas on 1/24 for treatment of intramedullary mass with syrinx     Location: spine  Quality: mass lesion  Severity: severe  Duration: unknown  Timing: acute on chronic  Context: spinal mass  Modifying factors: NA  Associating signs & symptoms: UE neuropathy       PM&R consulted for rehabilitation recommendations          Hospital Complications/Comorbidities:   Complications: As above  Comorbidities: As above        Functional History:     Prior to Admission:     I PTA      Present:  Physical Therapy Occupational Therapy   PENDING  PENDING      Past Medical History:   Past Surgical History:   Family History:     Past Medical History:   Diagnosis Date    BPH associated with nocturia     Cervical spinal mass ([address] ) 01/07/2019    cervicothoracic    Disease of thyroid gland     Herniated disc, cervical     Hyperlipidemia     Impaired fasting glucose     Past Surgical History:   Procedure Laterality Date    APPENDECTOMY      COLONOSCOPY  03/13/2012    NORMAL; RECHECK IN 5 YEARS    NC BX/EXCIS SPIN PATEL,INDUR,INMED,CERV N/A 1/24/2019    Procedure: Posterior C4-T3 laminectomy; resection of intramedullary mass C5-T3, including fenestration of syrinx C7-T2; C3-T4 fixation/fusion; additional levels as needed;  Surgeon: Pauline Bell MD;  Location: [facility];  Service: Neurosurgery    WISDOM TOOTH EXTRACTION       Family History   Problem Relation Age of Onset    Diabetes Mother     Hypertension Father     Cerebral palsy Sister            Medications:    Current Facility-Administered Medications:     acetaminophen (TYLENOL) tablet 650 mg, 650 mg, Oral, Q6H [address], Ochoa Brito MD, 650 mg at 01/25/19 1050    aluminum-magnesium hydroxide-simethicone (MYLANTA) 200-200-20 mg/5 mL oral suspension 30 mL, 30 mL, Oral, Q6H PRN, Pauline Bell MD    butalbital-acetaminophen-caffeine (FIORICET,ESGIC) -40 mg per tablet 1 tablet, 1 tablet, Oral, Q4H PRN, Simin Bell PA-C    dexamethasone (DECADRON) injection 4 mg, 4 mg, Intravenous, Q6H [address], Haven Reddy MD, 4 mg at 01/25/19 1107    [START ON 1/26/2019] enoxaparin (LOVENOX) subcutaneous injection 40 mg, 40 mg, Subcutaneous, Daily, Pauline Bell MD    HYDROmorphone (DILAUDID) injection 0 5 mg, 0 5 mg, Intravenous, Q3H PRN, Ochoa Brito MD, 0 5 mg at 01/25/19 1116    insulin lispro (HumaLOG) 100 units/mL subcutaneous injection 2-12 Units, 2-12 Units, Subcutaneous, Q6H [address], Haven Reddy MD, 2 Units at 01/25/19 8926    levothyroxine tablet 25 mcg, 25 mcg, Oral, Early Morning, Pauline Bell MD, 25 mcg at 01/25/19 0536    methocarbamol (ROBAXIN) tablet 750 mg, 750 mg, Oral, Q6H [address], Pauline Bell MD, 750 mg at 01/25/19 0536    naloxone (NARCAN) 0 04 mg/mL syringe 0 04 mg, 0 04 mg, Intravenous, Q1MIN PRN, Pauline Bell MD    ondansetron [facility]) injection 4 mg, 4 mg, Intravenous, Q4H PRN, Alva Lucas, 4 mg at 01/25/19 1256    oxyCODONE (ROXICODONE) immediate release tablet 10 mg, 10 mg, Oral, Q4H PRN, Balta De La Torre MD    oxyCODONE (ROXICODONE) IR tablet 5 mg, 5 mg, Oral, Q4H PRN, Balta De La Torre MD, 5 mg at 01/25/19 3383    phenylephrine (EBONIE-SYNEPHRINE) 50 mg (STANDARD CONCENTRATION) in sodium chloride 0 9% 250 mL,  mcg/min, Intravenous, Titrated, Porfirio Shone, MD, Last Rate: 14 mL/hr at 01/25/19 1137, 46 667 mcg/min at 01/25/19 1137    senna-docusate sodium (SENOKOT S) 8 6-50 mg per tablet 2 tablet, 2 tablet, Oral, Daily, Balta De La Torre MD, 2 tablet at 01/25/19 0819    sodium chloride 0 9 % infusion, 100 mL/hr, Intravenous, Continuous, Balta De La Torre MD, Last Rate: 100 mL/hr at 01/25/19 0819, 100 mL/hr at 01/25/19 0819    Allergies: Allergies   Allergen Reactions    Bee Venom Hives, Itching and Edema     Category: Allergy;     Penicillins Hives and Itching     Category: Allergy;         Social History:    Social History     Social History    Marital status: /Civil Union     Spouse name: Jackelyn Hoang Number of children: 3    Years of education: 15     Occupational History          Social History Main Topics    Smoking status: Former Smoker     Quit date: 5/16/1993    Smokeless tobacco: Never Used    Alcohol use Yes      Comment: SOCIAL     Drug use: No    Sexual activity: Not Asked     Other Topics Concern    None     Social History Narrative    Lives at home with spouse and 3 children    Has 3 biological children and 3 of spouses children total of 6 children        Quang Hinojosa III Lives with: lives with their family  He lives in a(n) single family home  The living area: bedroom on 2ND floor and bathroom on 2ND floor    The patient will not have 24 hour ARC Supervision/physical assistance: supervision/physical assistance available upon discharge  Review of Systems: A 10-point review of systems was performed  Negative except as listed above       Physical Exam:      Laboratory:      Lab Results   Component Value Date    HGB 9 5 (L) 01/25/2019    HCT 29 8 (L) 01/25/2019    WBC 9 96 01/25/2019     Lab Results   Component Value Date    BUN 21 01/25/2019    K 4 0 01/25/2019     01/25/2019    CREATININE 0 75 01/25/2019     Lab Results   Component Value Date    PROTIME 13 2 01/22/2019    INR 0 99 01/22/2019        General: alert, no apparent distress, cooperative and comfortable  HEENT: Normal, normocephalic, atraumatic  Eye: No conjunctival injection   Ears: Normal external ears  Nose: Normal external nose, mucus membranes  Neck: trachea midline  Pulmonary: respirations unlabored  Cardiovascular: +S1/2  Abdomen: no rebound tenderness noted  Neurologic: mental status, speech normal, alert and oriented x3, cranial nerves 2-12 intact and decreased lt touch RLE, 5/5 UE B/L, 4/5 RLE, 4-/5 LLE hip fleixon, 3 to 4-/5 dorsiflexion/plantar flexion  Psych: mood/affect currently stable       Imaging: Reviewed  Xr Spine Cervical 2 Or 3 Vw Injury    Result Date: 1/25/2019  Impression: Fluoroscopic guidance provided for surgical procedure  Please refer to the separate procedure notes for additional details  Workstation performed: [id number]       Assessment and Recommendations:  Malgorzata Hinson is a 60 y o  male who  has a past medical history of BPH associated with nocturia; Cervical spinal mass ([address] ) (01/07/2019); Disease of thyroid gland; Herniated disc, cervical; Hyperlipidemia; and Impaired fasting glucose  who presented to the [facility] for posterior C4-T3 laminectomy, resection of intramedullary mass, & C2-T5 fixation/fusion by Dr Jesse Zhang on 1/24 for treatment of intramedullary mass with syrinx  PM&R consulted for rehabilitation recommendations  Impairments:  Impaired functional mobility and ability to perform ADL's      Recommendations:      - awaiting PT/OT evals prior to making recs      Thank you for allowing the PM&R service to participate in the care of this patient   We will continue to follow Hailey Shepard Darling Harper III's progress with you   Please do not hesitate to call with questions or concerns

## 2019-01-25 NOTE — PROGRESS NOTES
Progress Note - 19 [facility] III 60 y o  male MRN: [id number]  Unit/Bed#: 3150 [facility] -01 Encounter: [id number]    Attending Physician: Lillie Greene MD      ______________________________________________________________________  Assessment and Plan:   Principal Problem:    Glioma of spinal cord [facility]  Active Problems:    Neurologic gait dysfunction    Neoplasm of cervical spine    Intramedullary abnormality of spinal cord (HCC)    Syrinx of spinal cord ([facility] )  Resolved Problems:    * No resolved hospital problems  *    Neuro:   · POD 1 s/p resection of intramedullary spinal cord tumor with C4-T5 laminectomy and C2-T5 fixation and fusion and resection of intramedullary mass  · Frozen section consistent with ependymoma  · MRI 1/7 demonstrated a heterogeneous neoplasm of the cervicothoracic junction consistent with a primary glial tumor, extending from C4-C5 to caudal T3 with syrinx of spinal cord  ·  Complains pain upper back, numbness tingling upper extremities as well as numbness in lower extremities more on the right than the left  · On exam, strength 5/5 both upper extremities, 5/5 LLE and 4/5 RLE  · Continue Decadron 4 mg every 6 hr  · Pain control:  -Tylenol 975 mg Q 8  ·                       - oxycodone 5 mg Q 4 p r n  Moderate pain and 10 mg q 4 severe                       - Dilaudid 0 5 mg  Q 3h  p r n  · Robaxin 750 mg Q 6  · Neurochecks Q 2 hours  · X-rays cervical spine s/p surgery  · PT/OT consult  · Neurosurgery following          CV:      · No acute issues  MAP goal > 85   · Salbador-Synephrine has been ordered  Currently @ 25    · We will administer peripherally for the time being                   Lung:   ·  No acute issues  Maintain O2 sats > 92%  ·  Incentive spirometry and pulmonary toilet                 GI:   ·  No acute issues     ·  Regular diet   ·  Bowel regimen:  Senokot 2 tablets daily                 FEN:   · IVF NS , will d/c pt on regular diet  · UOP-1 1L, net +2 2L, suction drain right back 350 mls  · Potassium 4 0, sodium 138, Ca 7 7     :   · Mann out this morning   · Will continue to monitor for voiding                 ID:   ·  No acute issues  · No leukocytosis WBC 9 9, no fevers                   Heme/Onc:   · Hemoglobin 9 5 status post surgery  · Plts 226  · DVT prophylaxis with Lovenox 40 mg subcutaneous  · F/u pathology for spinal cord neoplasm  · Management per Neurosurgery          Endo:   · Blood glucose 151-194, in the setting of prediabetes, CLQ0e-0 3%  · SSI  · Will continue to monitor    Hypothyroidism  · Levothyroxine 25 mcg daily    HLD  · History hyperlipidemia- diet controlled                            Msk/Skin:      · No acute issues  · Pressure ulcer prophylaxis  Disposition:  Per neurosurgery    Code Status: Level 1 - Full Code    Counseling / Coordination of Care  Total Critical Care time spent 30 minutes excluding procedures, teaching and family updates  ______________________________________________________________________    Chief Complaint:  Pain upper back    24 Hour Events:    patient complained of pain and headache overnight    ______________________________________________________________________    Physical Exam:   Physical Exam   Constitutional: He is oriented to person, place, and time  He appears well-developed and well-nourished  HENT:   Head: Normocephalic and atraumatic  Rigid cervical collar     Round surgical incision left temporal head   Eyes: Pupils are equal, round, and reactive to light  Cardiovascular: Normal rate, regular rhythm and normal heart sounds  Distal pulses palpable   Pulmonary/Chest: Effort normal and breath sounds normal    Abdominal: Soft  Bowel sounds are normal  He exhibits no distension  Neurological: He is alert and oriented to person, place, and time  No cranial nerve deficit     Strength 5/5 bilateral upper extremities  Left lower extremity 5/5, right lower extremity 4/5    Decreased sensation right lower extremity   Skin: Skin is warm and dry  Capillary refill takes less than 2 seconds  Vitals reviewed  ______________________________________________________________________  Vitals:    19 0410 19 0418 19 0500 19 0600   BP:       BP Location:       Pulse: 82 82  88   Resp:  20   Temp:       TempSrc:       SpO2: 99% 99%  99%   Weight:       Height:           Temperature:   Temp (24hrs), Av 2 °F (36 8 °C), Min:97 3 °F (36 3 °C), Max:99 3 °F (37 4 °C)    Current Temperature: 98 8 °F (37 1 °C)  Weights:   IBW: 66 1 kg    Body mass index is 29 kg/m²  Weight (last 2 days)     Date/Time   Weight    19 2245  84 (185 19)    19 2230  84 (185 19)            Hemodynamic Monitoring:  N/A     Non-Invasive/Invasive Ventilation Settings:  Respiratory    Lab Data (Last 4 hours)    None         O2/Vent Data (Last 4 hours)    None              No results found for: PHART, LEK0QNX, PO2ART, FGS1KBI, E0PXANDP, BEART, SOURCE    Intake and Outputs:  I/O        07 -  0700  07 -  0700    I V  (mL/kg)  3152 6 (37 5)    IV Piggyback  700    Total Intake(mL/kg)  3852 6 (45 9)    Urine (mL/kg/hr)  1150    Drains  350    Blood  350    Total Output   1850    Net   +2002                Nutrition:        Diet Orders            Start     Ordered    19  Diet Regular; Regular House; Consistent Carbohydrate Diet Level 3 (6 carb servings/90 grams CHO/meal)  Diet effective now     Question Answer Comment   Diet Type Regular    Regular Regular House    Other Restriction(s): Consistent Carbohydrate Diet Level 3 (6 carb servings/90 grams CHO/meal)    RD to adjust diet per protocol?  Yes        19        Labs:     Results from last 7 days  Lab Units 19  0540 19  1523   WBC Thousand/uL 9 96 8 44   HEMOGLOBIN g/dL 9 5* 13 0   HEMATOCRIT % 29 8* 41 0   PLATELETS Thousands/uL 226 303   NEUTROS PCT %  --  70 MONOS PCT %  --  7       Results from last 7 days  Lab Units 01/25/19  0540 01/22/19  1523   POTASSIUM mmol/L 4 0 4 2   CHLORIDE mmol/L 108 103   CO2 mmol/L 22 27   BUN mg/dL 21 21   CREATININE mg/dL 0 75 0 80   CALCIUM mg/dL 7 7* 8 6   ALK PHOS U/L  --  76   ALT U/L  --  30   AST U/L  --  15         No results found for: PHOS     Results from last 7 days  Lab Units 01/22/19  1523   INR  0 99   PTT seconds 32     No results found for: TROPONINI      ABG:No results found for: PHART, IIR4FVO, PO2ART, NCR0LIF, Q3XNRJFR, BEART, SOURCE     Imaging: MRI cervical spine 1/7:Heterogeneous neoplasm of the cervicothoracic junction consistent with primary glial tumor  Pathology extends from the C4-C5 disc space to the caudal T3 level  Mild peripheral enhancement  Micro:  No results found for: Indra Mura, SPUTUMCULTUR  Allergies: Allergies   Allergen Reactions    Bee Venom Hives, Itching and Edema     Category: Allergy;     Penicillins Hives and Itching     Category: Allergy;       Medications:   Scheduled Meds:    Current Facility-Administered Medications:  acetaminophen 975 mg Oral [facility] Renee Small MD    aluminum-magnesium hydroxide-simethicone 30 mL Oral Q6H PRN Renee Small MD    dexamethasone 4 mg Intravenous Q6H [facility] Krysten Desai MD    [START ON 1/26/2019] enoxaparin 40 mg Subcutaneous Daily Renee Small MD    HYDROmorphone 0 5 mg Intravenous Q1H PRN Renee Small MD    insulin lispro 2-12 Units Subcutaneous Q6H [facility] Krysten Desai MD    levothyroxine 25 mcg Oral Early Morning Renee Small MD    methocarbamol 750 mg Oral Q6H [facility] Renee Small MD    naloxone 0 04 mg Intravenous Q1MIN PRN Renee Small MD    ondansetron 4 mg Intravenous Q6H PRN Renee Small MD    oxyCODONE 10 mg Oral Q4H PRN Renee Small MD    oxyCODONE 5 mg Oral Q4H PRN Renee Small MD    phenylephine  mcg/min Intravenous Titrated Krysten Desai MD Last Rate: 25 mcg/min (01/25/19 0357) senna-docusate sodium 2 tablet Oral Daily Deuce Perla MD    sodium chloride 100 mL/hr Intravenous Continuous Deuce Perla MD Last Rate: 100 mL/hr (01/24/19 2159)     Continuous Infusions:    phenylephine  mcg/min Last Rate: 25 mcg/min (01/25/19 0357)   sodium chloride 100 mL/hr Last Rate: 100 mL/hr (01/24/19 2159)     PRN Meds:    aluminum-magnesium hydroxide-simethicone 30 mL Q6H PRN   HYDROmorphone 0 5 mg Q1H PRN   naloxone 0 04 mg Q1MIN PRN   ondansetron 4 mg Q6H PRN   oxyCODONE 10 mg Q4H PRN   oxyCODONE 5 mg Q4H PRN     VTE Pharmacologic Prophylaxis: Enoxaparin (Lovenox)  VTE Mechanical Prophylaxis: sequential compression device  Invasive lines and devices: Invasive Devices     Peripheral Intravenous Line            Peripheral IV 01/24/19 Left Hand less than 1 day          Arterial Line            Arterial Line 01/24/19 Right Radial less than 1 day          Drain            Closed/Suction Drain Right Back Bulb 7 Fr  less than 1 day                     Portions of the record may have been created with voice recognition software  Occasional wrong word or "sound a like" substitutions may have occurred due to the inherent limitations of voice recognition software  Read the chart carefully and recognize, using context, where substitutions have occurred      Therese López MD

## 2019-01-25 NOTE — OCCUPATIONAL THERAPY NOTE
[address] Evaluation     Patient Name: Loreta Zarco Date: 1/25/2019  Problem List  Patient Active Problem List   Diagnosis    BPH associated with nocturia    Chronic bilateral low back pain without sciatica    Erectile dysfunction of non-organic origin    Hypothyroidism    Impaired fasting glucose    Mixed hyperlipidemia    Overweight (BMI 25 0-29  9)    Weakness of right lower extremity    Paresthesia of bilateral legs    Neurologic gait dysfunction    Spasticity    Myelopathy (HCC)    Neoplasm of cervical spine    Thoracic spine tumor    Intramedullary abnormality of spinal cord (HCC)    Glioma of spinal cord (HCC)    Syrinx of spinal cord [facility])     Past Medical History  Past Medical History:   Diagnosis Date    BPH associated with nocturia     Cervical spinal mass ([address] ) 01/07/2019    cervicothoracic    Disease of thyroid gland     Herniated disc, cervical     Hyperlipidemia     Impaired fasting glucose      Past Surgical History  Past Surgical History:   Procedure Laterality Date    APPENDECTOMY      COLONOSCOPY  03/13/2012    NORMAL; RECHECK IN 5 YEARS    CO BX/EXCIS SPIN PATEL,INDUR,INMED,CERV N/A 1/24/2019    Procedure: Posterior C4-T3 laminectomy; resection of intramedullary mass C5-T3, including fenestration of syrinx C7-T2; C3-T4 fixation/fusion; additional levels as needed;  Surgeon: Phoenix Nieto MD;  [facility]: [facility];  Service: Neurosurgery    WISDOM TOOTH EXTRACTION             01/25/19 1248   Note Type   Note type Eval/Treat   Restrictions/Precautions   Weight Bearing Precautions Per Order No   Braces or Orthoses C/S Collar;MAFO;Other (Comment)  (MAFO R LE while ambulating)   Other Precautions Chair Alarm; Bed Alarm;Multiple lines;Telemetry; Fall Risk;Pain;Spinal precautions   Pain Assessment   Pain Assessment FLACC   Pain Type Acute pain   Pain Location Head   Pain Rating: FLACC (Rest) - Face 0   Pain Rating: FLACC (Rest) - Legs 0   Pain Rating: FLACC (Rest) - Activity 0   Pain Rating: FLACC (Rest) - Cry 0   Pain Rating: FLACC (Rest) - Consolability 0   Score: FLACC (Rest) 0   Pain Rating: FLACC (Activity) - Face 0   Pain Rating: FLACC (Activity) - Legs 0   Pain Rating: FLACC (Activity) - Activity 0   Pain Rating: FLACC (Activity) - Cry 0   Pain Rating: FLACC (Activity) - Consolability 0   Score: FLACC (Activity) 0   Home Living   Type of Home House   Home Layout Two level; Other (Comment);1/2 bath on main level;Bed/bath upstairs  (1 LYNNE)   Bathroom Shower/Tub Walk-in shower   Bathroom Toilet Standard   Bathroom Equipment Shower chair   Home Equipment Other (Comment)  (denies any)   Additional Comments Pt reports living in 2 SH, 1 LYNNE, 1/2 bath on 1st but main bed/bath upstairs   Prior Function   Level of [place] Independent with ADLs and functional mobility   Lives With Spouse;Daughter; Son   Ples Button Help From Family   ADL Assistance Independent   IADLs Independent   Falls in the last 6 months 1 to 4  (4)   Vocational Full time employment   Comments Pt reports being I w/ ADLS/IADLS, transfers and functional mobility PTA   Lifestyle   Autonomy I ADLS/IADLS, transfers/functional mobility PTA   Reciprocal Relationships Pt lives w/ spouse, dght, son and son's    Service to Others Pt works full time as a  at [employer] Pt enjoys being w/ family   Psychosocial   Psychosocial (WDL) WDL   Length of Time/Family Visitation 16-30 min  (spouse)   ADL   Eating Assistance 7  Independent   Grooming Assistance 5  Supervision/Setup   UB [address] 5  Supervision/Setup   LB [address] 4  Minimal Assistance   UB Dressing Assistance 5  Supervision/Setup   LB Dressing Assistance 4  [id number]  3  Moderate [address] 3  Moderate Assistance   Functional Deficit Steadying;Supervision/safety; Increased time to complete  (Ax2)   Bed Mobility   Supine to Sit 4  Minimal assistance   Additional items Assist x 1;HOB elevated; Increased time required;Verbal cues;LE management   Sit to Supine Unable to assess   Additional Comments Pt went from supine to sit w/ Min A x1 for UB support, LE management and HOB elevated for assist  Pt sat EOB w/ CTG for safety/balance   Transfers   Sit to Stand 3  Moderate assistance   Additional items Assist x 2; Increased time required;Verbal cues   Stand to Sit 3  Moderate assistance   Additional items Assist x 2; Increased time required;Verbal cues   Stand pivot 3  Moderate assistance   Additional items Assist x 2; Increased time required;Verbal cues   Additional Comments Pt performed sit-stand from EOB w/ Mod A x2 for moderate force production into standing and VC for safety  Pt then performed SPT from EOB to chair w/ Mod A x2, HHA used  Functional Mobility   Functional Mobility 3  Moderate assistance   Additional Comments Pt required Mod A x2, HHA, for small steps from EOB to chair   Additional items Hand hold assistance   Balance   Static Sitting Fair +   Static Standing Poor   Ambulatory Poor -   Activity Tolerance   Activity Tolerance Patient limited by fatigue;Patient limited by pain; Other (Comment)  (nausea)   Medical Staff Made Aware PTAriella   Nurse Made Aware yes, KJ   RUE Assessment   RUE Assessment WFL  (mild weakness/numbness)   LUE Assessment   LUE Assessment WFL   Hand Function   Gross Motor Coordination Functional   Fine Motor Coordination Functional   Cognition   Overall Cognitive Status WFL   Arousal/Participation Responsive; Cooperative   Attention Within functional limits   Orientation Level Oriented X4   Memory Within functional limits   Following Commands Follows all commands and directions without difficulty   Comments Pt is pleasant and cooperative   Assessment   Limitation Decreased ADL status; Decreased Safe judgement during ADL;Decreased UE strength;Decreased endurance;Decreased sensation;Decreased self-care trans;Decreased high-level ADLs   Prognosis Fair   Assessment Pt is a 59 y/o male seen for OT eval s/p adm to [facility] for planned neurosurgical procedure: posterior C4-T3 laminectomy, resection of intramedullary mass, C2-T5 fixation/fusion 2* to spinal cord tumor  Pt is dx'd w/ glioma of spinal cord  Pt has a C/S collar and active spinal precautions; pt has R LE MAFO for foot drop  Pt is s/p the following procedure performed on 19 "Posterior C4-T3 laminectomy; resection of intramedullary mass; C2-T5 fixation/fusion"  Comorbidities include a h/o glioma of spinal cord, BPH, chronic B/L low back pain, HLD, paresthesia of B/L legs, spasticity, myelopathy, thoracic spine tumor  Pt with active OT orders and up and OOB as tolerated orders  Pt lives with spouse, dght, and son in 2 SH, 1 LYNNE, 1/2 bath on  but main bed/bath upstairs  Pt was I w/ ADLS and IADLS, drove, & required no use of DME PTA  Pt is currently demonstrating the following occupational deficits: S UB ADLS, Min A LB ADLS, Mod A x2 transfers and functional mobility w/ HHA  These deficits that are impacting pt's baseline areas of occupation are a result of the following impairments: pain, endurance, activity tolerance, functional mobility, forward functional reach, functional standing tolerance, decreased I w/ ADLS/IADLS and strength  The following Occupational Performance Areas to address include: eating, grooming, bathing/shower, toilet hygiene, dressing, health maintenance, functional mobility, clothing management and job performance/volunteering  Pt scored overall 50/100 on the Barthel Index  Based on the aforementioned OT evaluation, functional performance deficits, and assessments, pt has been identified as a high complexity evaluation  Recommend STR upon D/C when medically stable   Pt to continue to benefit from acute immediate OT services to address the following goals 3-5x/week to  w/in 7-10 days:    Goals   Patient Goals to have less nausea and get back to work   LTG Time Frame 7-10   Long Term Goal #1 see below listed goals   Plan   Treatment Interventions ADL retraining;Functional transfer training;UE strengthening/ROM; Endurance training;Patient/family training;Equipment evaluation/education; Compensatory technique education;Continued evaluation; Energy conservation; Activityengagement   Goal Expiration Date 02/04/19   OT Frequency 3-5x/wk   Recommendation   Recommendation Physiatry Consult   OT Discharge Recommendation Short Term Rehab   OT - OK to Discharge Yes  (when medically stable)   Barthel Index   Feeding 10   Bathing 0   Grooming Score 5   Dressing Score 5   Bladder Score 10   Bowels Score 10   Toilet Use Score 5   Transfers (Bed/Chair) Score 5   Mobility (Level Surface) Score 0   Stairs Score 0   Barthel Index Score 50   Modified Placedo Scale   Modified Placedo Scale 4      GOALS    1) Pt will improve activity tolerance to G for min 30 min txment sessions for increase engagement in functional tasks    2) Pt will complete UB/LB dressing/self care w/ mod I using adaptive device and DME as needed    3) Pt will complete bathing w/ Mod I w/ use of AE and DME as needed    4) Pt will complete toileting w/ mod I w/ G hygiene/thoroughness using DME as needed    5) Pt will improve functional transfers to Mod I on/off all surfaces using DME as needed w/ G balance/safety     6) Pt will improve functional mobility during ADL/IADL/leisure tasks to Mod I using DME as needed w/ G balance/safety     7) Pt will be attentive 100% of the time during ongoing cognitive assessment w/ G participation to assist w/ safe d/c planning/recommendations    8) Pt will demonstrate G carryover of pt/caregiver education and training as appropriate w/o cues w/ good tolerance to increase safety during functional tasks    9) Pt will demonstrate 100% carryover of energy conservation techniques t/o functional I/ADL/leisure tasks w/o cues s/p skilled education to increase endurance during functional tasks       Tres Collier MS, OTR/L

## 2019-01-25 NOTE — UTILIZATION REVIEW
Initial Clinical Review    Admission: Date/Time/Statement: 1/24/19 @ 2218   Orders Placed This Encounter   Procedures    Inpatient Admission     Standing Status:   Standing     Number of Occurrences:   1     Order Specific Question:   Admitting Physician     Answer:   Kay Xiao [[id number]]     Order Specific Question:   Level of Care     Answer:   Critical Care [15]     Order Specific Question:   Estimated length of stay     Answer:   Inpatient Only Surgery     [phone]  Yulissa Gr         History of Illness:       60year old male admitted for  heterogeneously enhancing intramedullary lesion 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/5 to T3, with a large cystic/syrinx from C7-T2  Neuro surgery recommending posterior cervical laminectomy for decompression, fixation fusion because of the extensive bone work required, and then attempted resection  Marichuy Jarvis will least fenestrate the cyst, and should have a reasonable plane, attempt to resect the mass rostrally and caudally as much as possible   In short, this would be a C4-T3 laminectomy, C3 fixation fusion to T4, additional levels as necessary, resection of intramedullary mass/fenestration of intramedullary cyst/syrinx    01/24/19 1044 01/24/19 1044 01/24/19 1044 01/24/19 1044 01/24/19 1044   99 3 °F (37 4 °C) 77 20 119/77 100 %         Pain Score       2       01/24/19 84 kg (185 lb 3 oz)       Past Medical/Surgical History:     Diagnosis    BPH associated with nocturia    Chronic bilateral low back pain without sciatica    Erectile dysfunction of non-organic origin    Hypothyroidism    Impaired fasting glucose    Mixed hyperlipidemia    Overweight (BMI 25 0-29  9)    Weakness of right lower extremity    Paresthesia of bilateral legs    Neurologic gait dysfunction    Spasticity    Myelopathy (HCC)    Neoplasm of cervical spine    Thoracic spine tumor    Intramedullary abnormality of spinal cord (HCC)    Glioma of spinal cord (HCC)       Admitting Diagnosis:         Spinal cord tumor [D49 7]  Intramedullary abnormality of spinal cord (HCC) [G95 9]  Syrinx of spinal cord ([address] ) [G95 0]  Neoplasm of cervical spine [D49 2]         Age/Sex: 60 y o  male   Post op  Pain  9/19   Iv dilaudid  1-24 x1   1-25 x1         Assessment/Plan:        Anesthesia Start Date/Time: 01/24/19 1240   Procedure: Posterior C4-T3 laminectomy; resection of intramedullary mass C5-T3, including fenestration of syrinx C7-T2; C3-T4 fixation/fusion; additional levels as needed (N/A Spine Cervical)   Anesthesia type: general   Diagnosis:       Spinal cord tumor [D49 7]      Intramedullary abnormality of spinal cord ([address] ) [G95 9]      Syrinx of spinal cord ([address] ) [G95 0]      Neoplasm of cervical spine [D49 2]     Procedure:  Procedure(s):  Posterior C4-T3 laminectomy; resection of intramedullary mass; C2-T5 fixation/fusion    Findings:  Intramedullary mass - brownish tan, glioma on frozen section, gross appearance c/w ependymoma     Complications:  none    Admission Orders:    Wound drain   Neuro checks  q2 hr   Sequential compression device  Regular diet    acetaminophen 650 mg Oral Q6H [facility]   aluminum-magnesium hydroxide-simethicone 30 mL Oral Q6H PRN   butalbital-acetaminophen-caffeine 1 tablet Oral Q4H PRN   dexamethasone 4 mg Intravenous Q6H [facility]   [START ON 1/26/2019] enoxaparin 40 mg Subcutaneous Daily   HYDROmorphone 0 5 mg Intravenous Q3H PRN   insulin lispro 2-12 Units Subcutaneous Q6H [facility]   levothyroxine 25 mcg Oral Early Morning   methocarbamol 750 mg Oral Q6H [facility]   naloxone 0 04 mg Intravenous Q1MIN PRN   ondansetron 4 mg Intravenous Q4H PRN   oxyCODONE 10 mg Oral Q4H PRN   oxyCODONE 5 mg Oral Q4H PRN   phenylephine  mcg/min Intravenous Titrated   senna-docusate sodium 2 tablet Oral Daily   sodium chloride 100 mL/hr Intravenous Continuous

## 2019-01-25 NOTE — OP NOTE
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]     * Syrinx of spinal cord ([address] ) [G95 0]     * Neoplasm of cervical spine [D49 2]    Procedure:  Procedure(s):  Posterior C4-T3 laminectomy; resection of intramedullary mass; C2-T5 fixation/fusion    Surgeon: Surgeon(s) and Role:     * Krupa Magana MD - Primary     * Cristian Lemos MD - Assisting     Anesthesia: General    Staff:   Circulator: Beth Stern RN  Radiology Technologist: Monica Yee  Relief Circulator: Mela Mcneil RN; Flakito Coello RN; Larry Benavides RN  Relief Scrub: Zuleika Banks; Verito Sender  Scrub Person: Lorena Hatfield; Leonila Wilkins RN  No qualified Resident was available  Estimated Blood Loss: Minimal    Specimens:                  Order Name Source Comment Collection Info Order Time   PREPARE RBC    1/24/2019  1:25 PM    Special Requirements  None       Has consent been obtained? Yes       Date of Surgery  1/24/2019       Where is the Surgery Scheduled? 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, gross appearance c/w ependymoma    Complications:  none    OR note:    Indications    58-year-old male who was noted numbness into his hands for multiple years   Over more recent years, has evolved into occasional burning in the left forearm and hand   This occurs about 1 time per day  [facility] has noticed increasing difficulty with his walking, he has begun dragging the right leg over the last 2-3 years, and this is accelerating recently  [facility] has had numerous falls over the last several months, and no longer feels steady on a lladder etc  [facility] has had numbness in the right more than left leg and bilateral feet over this time as well   He notices in the shower his right lower extremity can be darker or more red than the left   From a bladder standpoint, he has been seen by Urology over the last several months  [facility] has had increased frequency at night, 3 times nocturia, more recently improved to just once nightly  [facility] has had urgency especially in the morning, some dribbling, definitely urgency, but no obvious incontinence   Despite this he has been active, bowling, golfing, etc , until recently mostly because the right leg issues  [facility] enjoys his yard work, gardening etc  [facility]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, receiving, 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     He has had some back pain as well over the years, and was referred by his PCP to Dr Kilo Emerson for gait issues  [facility] had an MRI scan of the lumbar spine which was relatively unremarkable   Dr Oliverio Mackenzie as well as Kathleen noted him to have a spastic gait, and exam abnormalities, and referred him for MRI scans of the cervical and thoracic spine   Those findings led to referral to me      On exam, the patient has decreased pinprick on the right forearm and hand, although strength is essentially full in the upper extremities   He does not have Noel's or hyperreflexia in the uppers   His lower extremities, iliopsoas strength on the right is 4/5, TA on the right is 4+/5, otherwise seems to be full strength   Decreased pinprick bilaterally shins downward at least   He has at least 2 beats of clonus bilaterally   His patellar reflexes are brisk 3-4 +bilaterally      Imaging demonstrates a heterogeneously enhancing intramedullary lesion 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/5 to T3, with a large cystic/syrinx from C7-T2   This was reviewed at [facility], and 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, although astrocytoma and metastasis are not excluded  [place] [facility] certainly most consistent with ependymoma      I reviewed the patient and his son, Daija Watson previously, and again with the patient and his wife Bebeto Arizmendi, his options for management   I am recommending posterior cervical laminectomy for decompression, fixation fusion because of the extensive bone work required, and then attempted resection   We will least fenestrate the cyst, and should have a reasonable plane, attempt to resect the mass rostrally and caudally as much as possible   In short, this would be a C4-T3 laminectomy, C3 fixation fusion to T4, additional levels as necessary, resection of intramedullary mass/fenestration of intramedullary cyst/syrinx  Written consent was obtained today   Nicholas Augustin did review in great detail expected benefits and attendant risks to this approach   Risks include but not limited to infection, bleeding, VTE, spinal fluid leak, hardware failure, transient or permanent neurologic dysfunction       Details of Procedure    The patient was brought to OR and successfully induced with general endotracheal anesthesia  A radial arterial line was placed  The patient was placed in Morton pins, and then log rolled onto chest rolls on the OR table, and the Morton connected to the OR table  A/P and lateral fluoroscopy was used to confirm good alignment, lordosis, etc      A midline posterior incision was marked from C2-T5  Hair was minimally clipped  The posterior cervical area was prepped and draped in the standard fashion  A timeout was performed  The patient received antibiotics per protocol, 10mg of Decadron, and MAPs were kept > 85 at all times       The skin was incised with a skin scalpel and dissection carried down through the midline raphe, and a subperiosteal dissection carried out over the intended posterior cervical and thoracic spine  A fluorograph was taken to confirm the levels exposed prior to any bone/ligamentous removal/disruption  The medial aspect of the pars of C2 was palpated with a nerve hook  A  2 mm round ball bur was used to create an entry point in the peak of the lamina/lateral mass ridge of C2  Directing toward the medial border, using lateral fluoroscopy to assess the angle, the trauma drill with the 16 mm depth guard was used to cannulate the bilateral pars  These  holes were sounded, found to be competent  They were tapped with a 3 5 mm tap, resounded, and then bilaterally 16 mm by 4 0 mm diameter screws were placed from the [address] [place] [address] set  The ball bur on the [place] Wilman drill was to create entry points to the posterior cortex of the lateral masses of C3-6 bilaterally  The lateral masses were then prepared by drilling in the typical up and out trajectory using a 14 mm depth guard  These were sounded and found to be competent  They were tapped as above  14 mm long by 3 5 mm diameter lateral mass screws were placed at these levels without difficulty  In line with the lateral mass screws, an entry point was selected at the superior portion of T1, at the C7-T1 facet  A/P fluoroscopic imaging was also used as needed  An entry point over the pedicle was started with a ball bur on the [place] Wilman drill  The Lenke probe was used to cannulate the T1 pedicle, as confirmed on lateral fluoroscopy  This was sounded, and there were no breaches in the pedicle  3 5mm then 4 0mm tap was used to prepare the pedicle, and after sounding, a 4 5 mm diameter by 26 mm long screw was placed in the pedicle of T1  This was repeated on the opposite side without difficulty  The same steps undertaken at T2, same size screws  Same steps undertaken at T3, screws here a 4 0 mm diameter by 30 mm long    Same steps and same sizes were placed at T4  At T5, same steps, and screws were 4 5 mm diameter by 30 mm long  Rods were cut and bent to fit, and partly secured this point  They were titanium, about 170 mm in length  We then proceeded to decompression  Troughs were drilled in the lamina bilaterally at C4 to T3  The laminar shelf spanning this range was elevated off and removed  Signals remained stable  Intraoperative ultrasound was used to evaluate the decompression  There did appear to be some overlap with the inferior aspect of the mass and our exposure, then thus the superior aspect of the T4 lamina was also removed  We then opened the dura and the midline 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-T3  The dura was tacked up  The underlying spinal cord was evaluated  The operative microscope was brought in to allow better visualization, illumination, and magnification, and micro-instruments were utilized for micro-dissection  The arachnoid was microdissected off the spinal cord  Posteriorly in the cord at T1 and T2, the cord was nearly translucent in places  The patient's symptoms were worse on the right side, and thus we made a myelotomy and the right posterior aspect, medial to the entering nerve roots, entering the tumor cyst   Straw yellow clear fluid emanated  The arachnoid was used to tack up the left side of the cord to maintain visualization  Within the center of the cord was a brownish tan neoplastic material   It did have a plane which we dissected using Rhoton micro dissector tools  Portions mass were sampled and sent for frozen pathology  Pathology felt this was consistent with glial abnormal tissue, final diagnosis deferred  We continued with the dissection, common circumferentially around the mass, developing the plane between it and the peripheral spinal cord    We we were able to come essentially completely around the mass on left and right side, but at this time, monitoring notified us that they had lost the motor evoked potentials to the right foot  They also saw a decrease in the somatosensory evoked potentials  Mean arterial pressures were kept above 85  At this point we ceased dissection and resection  The large portion of the mass at T1 and T2 we were able to excise  There was residual material caudally from T3 down  Also, there was material rostral   However, the cord was very well decompressed  There was no active bleeding from the resection cavity  We made the decision to defer any further resection and cause any further neurologic deficits  We began to close the dura  During this time, signals in the right leg improved to near baseline by the end of the case, both motor and somatosensory evoked potentials  The dura was closed with a running 6 0 Herndon-Mike suture  The suture line was sealed with DuraSeal and achieved a watertight closure  All tulips were secured to the alvin with set screws  A medium MAS crosslink was placed utilizing the tulips at C4  A medium alvin to alvin cross-link was placed in between the T3 and 4 pedicle screws  All set screws were final tightened  The incision was irrigated with 2 L of antibiotic containing saline  The exposed remaining cervical lateral mass as well as the facets and the thoracic transverse processes, pars and those facets were drilled out with the [address] drill  Morcelized autograft from the laminectomy was mixed with Progenix putty and applied over the decorticated bone and into the decorticated facet joints to create an arthrodesis from C2 to T5     1G of vancomycin powder was applied over the hardware to minimize infection risk  A 7 flat Jose-Vera drain was placed epidural and tunneled out through separate stab incision  The deep musculature and then the fascia was closed with interrupted 0 Vicryl Plus sutures  The fascia was again oversewn with 0 Vicryl running suture    The deep dermis was closed with inverted interrupted 2-0 Vicryl Plus sutures  The skin was closed running 3 0 Monocryl  Drain secured with drain stitch  Incision was blocked with 0 5% Marcaine with epinephrine  All instrument counts, sponge counts, and needle counts were correct prior to closure the skin  Incision was dressed with Acticoat, 4x4s, and Tegaderms  The drapes were withdrawn  The [place] head ayala detached from the OR table, and the patient placed in his cervical collar, and rotated supine onto his hospital bed  [place] pins were removed, there was no bleeding from the pin sites  The patient was awoken from general endotracheal anesthesia, extubated, and taken to the PACU in cardiovascularly stable condition  No qualified resident was available  Dr Jeet Beckman was present for the entire procedure, and provided essential assistance with with proper exposure, retraction, hemostasis, instrumentation placement, arthrodesis, tumor resection, and wound closure, which was necessary secondary to the complex nature of this case  Time was also significant factor here    Total case duration was approximately 7 hours, which was 50% longer than typical, due to the extensive rostral caudal nature of this surgery, the extensive size of mass, etc       Balta De La Torre MD     Date: 1/24/2019  Time: 9:27 PM

## 2019-01-25 NOTE — PROGRESS NOTES
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  3  Intramedullary abnormality of spinal cord  4  Syrinx of spinal cord  5  Chronic bilateral low back pain  6  Spinal cord injury   7  Weakness right lower extremity  8  Neurologic gait dysfunction  9  Lower extremity myelopathy  10  Spasticity  11  BPH associated with nocturia   12  Hypothyroidism  13  Mixed hyperlipidemia  14  Impaired Fasting glucose    Plan:  Neuro:  · Exam GCS 15,  Motor: Strength BUE 5/5, RLE HF/KF 4/5, PF 5/5, LLE 5/5, proprioception normal BUE/BLE, sensation normal to LT on BUE and LLE, decreased to LT on R foot, decreased to pinprick in bilateral hands from wrist, decreased to pinprick in RLE > LLE, RLE decreased medial, lateral and anterior from right hip to ankle, though slightly better on bilateral feet, Reflexes 3+ BUE/BLE, clonus bilaterally L> R  No drift bilaterally  Aspen Tx cervical brace in place  · Cervicothoracic incision clean,dry and intact with dressing  · Right thoracic JAMAR Drain with serosanguinous fluid  Output 350 cc/24 hrs, 170 cc/8hrs  Will maintain JAMAR drain due to high output  · Continue regular neurologic checks  · Imaging reviewed personally and by attending  Final results as below  · Upright  Xray Cervical and thoracic spine ordered  Will review when completed  · MRI Cervical and Thoracic w wo contrast 1/7/19: Heterogeneous neoplasm of the cervicothoracic junction consistent with primary glial tumor  Pathology extends from the C4-C5 disc space to the caudal T3 level  · Pain control   · Tylenol 650 mg p o  Q 8 hours scheduled  · Oxycodone 5 and 10 mg p o  Q 4 hours p r n   For moderate-to-severe pain  · Dilaudid 0 5 mg inj q 3hrs prn for breakthrough pain  · Fioricet q 4 hrs prn for HA  · Robaxin 750 mg p o  Q 6 hours scheduled for muscle spasms  · Narcan 0 04mg/ml syringe q 1 min prn for respiratory depression or opioid reversal    · Eval and mobilize per PT/OT-  Will likely need rehab- PMR consulted  · DVT PPX:  SCDs, hold pharm dvt today, start Lovenox 40 mg injection tomorrow  · Steroid therapy :  Decadron injection 4 mg IV q 6 hours  · Pathology: Intramedullary mass sent to pathology, results pending  Glioma on frozen section  · Aspen Tx cervical brace to be worn at all times, for showers switch to [place] collar  CV: MAP goal> 85 x 3 days, pt on [place]  Pulm: Pulmonary toileting with Incentive Spirometry  GI:   · No BM since surgery yesterday  · Bowel regimen with Senokot (Senna+ Docusate sodium) 8 6-50mg/tab 2 tabs PO daily  · Zofran inj 4mg q 6 hrs prn for nausea  · Mylanta prn for indigestion, heart burn  :  Bladder catheter taken out this AM, pt has not voided yet, continue to monitor I/O   Heme: Labs:  · BMP 1/25/19 : Na wnl at 138, K wnl at 4 0, Caden low at 7 7, corrected Ca at 8 1  · CBC 1/25/19: WBC wnl at 9 96, Hgb decreased at 9 5  · Will continue to monitor  BMP and CBC ordered for AM draw  Endo:   · Impaired fasting glucose/Hyperglycemia secondary to steroid use  · Insulin (Humalog) 100 units/ml subq injection per algorithm  · HgbA1C 6 3 %  · Glucose readings:  · Glucose on BMP 1/25/19 at 0540 151  · 1/24/19 fingerstick glucose at 23 56: 191  · Hypothyroidism: Levothyroxine 25 mcg PO daily  · Hyperlipidemia: Diet controlled  FEN:  · Fluids: NaCl 100ml/hr infusion, consider discontinuing once pt tolerating oral intake  · Nutrition: Regular carbohydrate controlled diet  ID: WBC wnl, Temp wnl at 98 5, no signs of infection at cervicothoracic incision  Rounding completed with RN- Rohit Quispe at bedside   Discussed the plan of care with the Critical Care resident- Dr Costa Eng       Neurosurgery will continue to follow as primary  Please call with any questions or concerns  Subjective/Objective   Chief Complaint: "I have some pain along the incision and I think the numbness in my right leg is alittle more than before" / Inpatient follow up POD 1 s/p Posterior C4-T3 laminectomy; resection of intramedullary mass; C2-T5 fixation/fusion    Subjective:   From prior report by Dr Dev Venegas, pt is a 59 yo male who had presented with numbness in his hands for multiple yrs  Recently had occasional burning in left forearm and hand  Pt also noted difficulty with walking with dragging the right leg  Pt has hx of mulple falls over the past few months  Pt also notes numbness in right greater than left leg and bilateral feet  For his BPH and bladder, pt noted increased frequency at night with nocturia and urgency especially in the morning with some dribbling  Pt sees urology  He has also had bilateral chronic low back pain and has seen Dr Delgado Stokes  Pt was deemed an appropriate candidate for a cervicothoracic decompression surgery and resection of the cervicothoracic  mass  Prior to surgery exam per Dr Dev Venegas: "decreased pinprick in right forearm and hand, strength full in BUE, No zacarias's or hyperreflexia in rle: ilipsoas 4/5, TA on right 4+/5, otherwise full strength  Decreased pinprick bilateral shins, 2 beats of clonus bilaterally, patellar reflexes brisk at 3-4+ " Pt was admitted for surgery yesterday and had the procedure  Pt is being seen today POD 1 s/p Posterior C4-T3 laminectomy; resection of intramedullary mass; C2-T5 fixation/fusion  Pt reports that he has constant pain in his upper and mid back along the incision  Pt reports the pain is made worse by moving and better when he is still   Currently as he lays still in bed he rates his pain as 2/10 on the pain scale  He reports that he was given pain medication which helped his pain   He reports that prior to pain medication his pain was 6-7/10 on the pain scale  Pt reports he also had a HA that resolved with Dialudid  Per RN- Glenys Lazo, pt began to have HA again and he was nauseas with 1 time vomiting  Pt denies low back pain at this time  Pt reports tingling in bilateral hands and numbness in bilateral legs, right greater then left as well as in bilateral feet  Pt thinks his numbness in the right leg is a little worse than prior to surgery  Pt reports he was told by Dr Alycia Harris this could get worse before it gets better, so he was expecting this  Pt also reports mild weakness in the right leg  Pt reports he has been able to tolerate a regular carbohydrate controlled diet and has a fair appetite  He ate most of his breakfast  Pt had his bladder catheter taken out this morning and he reports he has not been to the bathroom to void  He also reports he has not had a bowel movement yet since surgery yesterday  Pt denies chest pain, SOB, abdominal pain  Objective: Sitting in bed, alert and awake, NAD  I/O       01/23 0701 - 01/24 0700 01/24 0701 - 01/25 0700 01/25 0701 - 01/26 0700    I V  (mL/kg)  3367 6 (40 1)     IV Piggyback  700     Total Intake(mL/kg)  4067 6 (48 4)     Urine (mL/kg/hr)  1150     Drains  350     Blood  350     Total Output   1850      Net   +2217 6                   Invasive Devices     Peripheral Intravenous Line            Peripheral IV 01/24/19 Left Hand less than 1 day    Peripheral IV 01/25/19 Right Antecubital less than 1 day          Arterial Line            Arterial Line 01/24/19 Right Radial less than 1 day          Drain            Closed/Suction Drain Right Back Bulb 7 Fr  less than 1 day                Physical Exam:  Vitals: Blood pressure 118/61, pulse 76, temperature 98 5 °F (36 9 °C), temperature source Oral, resp  rate (!) 9, height 5' 7" (1 702 m), weight 84 kg (185 lb 3 oz), SpO2 95 %  ,Body mass index is 29 kg/m²      Hemodynamic Monitoring: MAP: Arterial Line MAP (mmHg): 86 mmHg    General appearance: alert, appears stated age, cooperative and no distress  Head: Normocephalic, without obvious abnormality, atraumatic  Eyes: EOMI, PERRL  Neck: supple, symmetrical, cervical aspen Tx brace in place, cervicothoracic incision with dressing intact, JAMAR drain on the right thoracic back with serosanguinous fluid  Lungs: non labored breathing  Heart: regular heart rate  Neurologic:   Mental status: Alert, thought content appropriate  Cranial nerves: grossly intact (Cranial nerves II-XII)  Sensory: normal to LT on BUE and LLE, decreased to LT on R foot, decreased to pinprick in bilateral hands from wrist, decreased to pinprick in RLE > LLE, RLE decreased medial, lateral and anterior from right hip to ankle, though slightly better on bilateral feet,  Motor: moving all extremities, Strength BUE 5/5, RLE HF/KF 4/5, PF 5/5, LLE 5/5  Reflexes: 3+ BUE/BLE, clonus bilaterally L> R  Coordination: no drift bilaterally      Lab Results:    Results from last 7 days  Lab Units 01/25/19  0540 01/22/19  1523   WBC Thousand/uL 9 96 8 44   HEMOGLOBIN g/dL 9 5* 13 0   HEMATOCRIT % 29 8* 41 0   PLATELETS Thousands/uL 226 303   NEUTROS PCT %  --  70   MONOS PCT %  --  7       Results from last 7 days  Lab Units 01/25/19  0540 01/22/19  1523   POTASSIUM mmol/L 4 0 4 2   CHLORIDE mmol/L 108 103   CO2 mmol/L 22 27   BUN mg/dL 21 21   CREATININE mg/dL 0 75 0 80   CALCIUM mg/dL 7 7* 8 6   ALK PHOS U/L  --  76   ALT U/L  --  30   AST U/L  --  15               Results from last 7 days  Lab Units 01/22/19  1523   INR  0 99   PTT seconds 32     Imaging Studies: I have personally reviewed pertinent reports  and I have personally reviewed pertinent films in PACS  Attending reviewed pertinent reports and films in PACS  MRI Cervical and Thoracic w wo contrast 1/7/19: Heterogeneous neoplasm of the cervicothoracic junction consistent with primary glial tumor  Pathology extends from the C4-C5 disc space to the caudal T3 level      EKG, Pathology, and Other Studies: I have personally reviewed pertinent reports  VTE Pharmacologic Prophylaxis: Reason for no pharmacologic prophylaxis hold today due to recent thoracolumbar spine surgery, start Lovenox tomorrow  VTE Mechanical Prophylaxis: sequential compression device    PLEASE NOTE:  This encounter was completed utilizing the [employer] Direct Speech Voice Recognition Software  Grammatical errors, random word insertions, pronoun errors and incomplete sentences are occasional consequences of the system due to software limitations, ambient noise and hardware issues  These may be missed by proof reading prior to affixing electronic signature  Any questions or concerns about the content, text or information contained within the body of this dictation should be directly addressed to the advanced practitioner or physician for clarification  Please do not hesitate to call me directly if you have any questions or concerns

## 2019-01-25 NOTE — ADDENDUM NOTE
Addendum  created 01/24/19 2125 by Lesa Gowers, CRNA    Anesthesia Intra Meds edited, Order list changed, Order sets accessed

## 2019-01-25 NOTE — PROGRESS NOTES
Pt seen by neurosurgery PA and medical critical care team  Pt continues with decreased sensation R lower extremity, but he feels is improved from pre op  C/O headache, given dose or Oxycodone at 0815  Headache worsened, pt C/O nausea  Given prn Zofran and Tylenol changed to q4hr  Required IV Dilaudid because headache did not improve

## 2019-01-25 NOTE — ADDENDUM NOTE
Addendum  created 01/24/19 2135 by Anthony Evans MD    Actions taken from a BestPractice Advisory, Order list changed

## 2019-01-25 NOTE — PLAN OF CARE
Problem: PHYSICAL THERAPY ADULT  Goal: Performs mobility at highest level of function for planned discharge setting  See evaluation for individualized goals  Treatment/Interventions: Functional transfer training, LE strengthening/ROM, Elevations, Therapeutic exercise, Patient/family training, Equipment eval/education, Bed mobility, Gait training, OT, Spoke to case management, Spoke to nursing, Spoke to MD  Equipment Recommended: Andrzej Dickson (JEY)       See flowsheet documentation for full assessment, interventions and recommendations  Prognosis: Good  Problem List: Decreased strength, Decreased endurance, Impaired balance, Decreased mobility, Impaired sensation, Pain  Assessment: PT COMPLETED EVALUATION OF 60YEAR OLD MALE AD[facility]ITTED TO [facility] ON 1/24/19 FOR THE FOLLOWING PLANNED NEUROSURGICAL PROCEDURE: POSTERIOR C4-T3 LAMINECTOMY, RESECTION OF INTRAMEDULLARY MASS, C2-T5 FIXATION/FUSION SECONDARY TO SPINAL CORD TUMOR  CURRENT MEDICAL AND PHYSICAL INSTABILITIES INCLUDE PAIN, NAUSEA, VOMITING, DIZZINESS, ICU ADMISSION, CONTINUOUS O2/HR/BP MONITORING, BED/CHAIR ALARM, AND FALLS RISK  PATIENT HAS CERVICAL COLLAR AND ACTIVE CERVICAL/THORACIC SPINAL PRECAUTIONS (NO BENDING, LIFTING > 10 LBS, OR TWISTING)  ALSO F LE MAFO FOR FOOT DROP  PMH IS SIGNIFICANT FOR HLD, HYPOTHYROIDISM, AND THE AFORE MENTIONED SC TUMOR  PRIOR TO THIS ADMISSION PATIENT RESIDED WITH SPOUSE AND CHILDREN IN A MULTILEVEL HOME (1/2 BATH FIRST FLOOR) WHERE HE WAS PREVIOUSLY I WITH MOBILITY (NO USE OF DME, 4 FALLS REPORTEDLY DUE TO ONSET OF R LE FOOT DROP), ADLS, AND IADLS  +  AND FULL TIME EMPLOYMENT  CURRENT IMPAIRMENTS INCLUDE PAIN, MILD STRENGTH DEFICITS IN R LE, DECREASED SENSATION IN R LE, DECREASED BALANCE AND GAIT DEVIATIONS  DURING PT EVALUATION PATIENT REQUIRED VENKAT-X1 FOR SUPINE-->SIT TRANSFER AND MAINTAINED GOOD STATIC SEATED BALANCE  HE REQUIRED MOD-AX2 FOR SIT-->STAND TRANSFER (RETROPULSIVE UPON STANDING WITH R SIDED KNEE BUCKLING)   HE REQUIRED MOD-AX2 FOR SHORT DISTANCE AMBULATION (2 FEET BED TO CHAIR) W/ B/L HHA PRESENTING WITH R FOOT DROP AND THEREFORE DECREASED R LE CLEARANCE  THIS PATIENT IS FUNCTIONING BELOW HIS I BASELINE POST-OP AND WOULD BENEFIT FROM STR (PM&R CONSULT)  HE WILL BENEFIT FROM CONTINUED SKILLED PT THIS ADMISSION TO ACHIEVE MAXIMAL FUNCTION AND SAFETY  Recommendation: (S) Short-term skilled PT (PM&R CONSULT )     PT - OK to Discharge: (S) Yes (TO STR WHEN MED CONCHIS )    See flowsheet documentation for full assessment     Varghese Merida, PT

## 2019-01-25 NOTE — PLAN OF CARE
Problem: OCCUPATIONAL THERAPY ADULT  Goal: Performs self-care activities at highest level of function for planned discharge setting  See evaluation for individualized goals  Treatment Interventions: ADL retraining, Functional transfer training, UE strengthening/ROM, Endurance training, Patient/family training, Equipment evaluation/education, Compensatory technique education, Continued evaluation, Energy conservation, Activityengagement          See flowsheet documentation for full assessment, interventions and recommendations  Limitation: Decreased ADL status, Decreased Safe judgement during ADL, Decreased UE strength, Decreased endurance, Decreased sensation, Decreased self-care trans, Decreased high-level ADLs  Prognosis: Fair  Assessment: Pt is a 61 y/o male seen for OT eval s/p adm to B for planned neurosurgical procedure: posterior C4-T3 laminectomy, resection of intramedullary mass, C2-T5 fixation/fusion 2* to spinal cord tumor  Pt is dx'd w/ glioma of spinal cord  Pt has a C/S collar and active spinal precautions; pt has R LE MAFO for foot drop  Pt is s/p the following procedure performed on 1/24/19 "Posterior C4-T3 laminectomy; resection of intramedullary mass; C2-T5 fixation/fusion"  Comorbidities include a h/o glioma of spinal cord, BPH, chronic B/L low back pain, HLD, paresthesia of B/L legs, spasticity, myelopathy, thoracic spine tumor  Pt with active OT orders and up and OOB as tolerated orders  Pt lives with spouse, dght, and son in 2 SH, 1 LYNNE, 1/2 bath on 1st but main bed/bath upstairs  Pt was I w/ ADLS and IADLS, drove, & required no use of DME PTA  Pt is currently demonstrating the following occupational deficits: S UB ADLS, Min A LB ADLS, Mod A x2 transfers and functional mobility w/ HHA   These deficits that are impacting pt's baseline areas of occupation are a result of the following impairments: pain, endurance, activity tolerance, functional mobility, forward functional reach, functional standing tolerance, decreased I w/ ADLS/IADLS and strength  The following Occupational Performance Areas to address include: eating, grooming, bathing/shower, toilet hygiene, dressing, health maintenance, functional mobility, clothing management and job performance/volunteering  Pt scored overall 50/100 on the Barthel Index  Based on the aforementioned OT evaluation, functional performance deficits, and assessments, pt has been identified as a high complexity evaluation  Recommend STR upon D/C when medically stable   Pt to continue to benefit from acute immediate OT services to address the following goals 3-5x/week to  w/in 7-10 days:   Recommendation: Physiatry Consult  OT Discharge Recommendation: Short Term Rehab  OT - OK to Discharge: Yes (when medically stable)      Comments: Kristi Ramachandran MS, OTR/L

## 2019-01-25 NOTE — PROGRESS NOTES
Pt C/O recurring headache, but denies incisional pain  Developed nausea and vomited small amount of bile emesis  Given extra dose of IV Zofran per medical CC  Neurosurgery PA notified of headache, Fioricet ordered

## 2019-01-25 NOTE — SOCIAL WORK
CM met with pt and his wife, Vicente Johnson, at bedside  CM introduced self/role with dcp  Pt resides with his wife, daughter and son, in a 2 story home with 1 LYNNE and bedroom/bathroom on 2nd floor  Pt has half bath on first floor  Pt independent with ADLs and ambulation PTA  Pt working full time and able to drive  Pt wife also works full time and able to drive  Pt has no hx of VNA, STR, MH, or drug/alcohol abuse  Pharmacy is [facility] [employer] on [address]  PT/OT recommending STR at d/c  Pt agreeable  CM reviewed acute rehabs and offered freedom of choice  Pt preference is [facility]  CM sent referral via [facility]  CM reviewed d/c planning process including the following: identifying help at home, patient preference for d/c planning needs, Discharge Lounge, [facility] Meds to Bed program, availability of treatment team to discuss questions or concerns patient and/or family may have regarding understanding medications and recognizing signs and symptoms once discharged  CM also encouraged patient to follow up with all recommended appointments after discharge  Patient advised of importance for patient and family to participate in managing patients medical well being

## 2019-01-25 NOTE — ANESTHESIA POSTPROCEDURE EVALUATION
Post-Op Assessment Note      CV Status:  Stable    Mental Status:  Alert and awake    Hydration Status:  Euvolemic    PONV Controlled:  Controlled    Airway Patency:  Patent    Post Op Vitals Reviewed: Yes          Staff: Anesthesiologist           BP   104/66   Temp 97 5   Pulse 109   Resp 16   SpO2 100

## 2019-01-25 NOTE — PHYSICAL THERAPY NOTE
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]  Syrinx of spinal cord ([address] ) [G95 0]  Neoplasm of cervical spine [D49 2]    Problem List  Patient Active Problem List   Diagnosis    BPH associated with nocturia    Chronic bilateral low back pain without sciatica    Erectile dysfunction of non-organic origin    Hypothyroidism    Impaired fasting glucose    Mixed hyperlipidemia    Overweight (BMI 25 0-29  9)    Weakness of right lower extremity    Paresthesia of bilateral legs    Neurologic gait dysfunction    Spasticity    Myelopathy (HCC)    Neoplasm of cervical spine    Thoracic spine tumor    Intramedullary abnormality of spinal cord (HCC)    Glioma of spinal cord (HCC)    Syrinx of spinal cord (HCC)       Past Medical History  Past Medical History:   Diagnosis Date    BPH associated with nocturia     Cervical spinal mass (HCC) 01/07/2019    cervicothoracic    Disease of thyroid gland     Herniated disc, cervical     Hyperlipidemia     Impaired fasting glucose        Past Surgical History  Past Surgical History:   Procedure Laterality Date    APPENDECTOMY      COLONOSCOPY  03/13/2012    NORMAL; RECHECK IN 5 YEARS    AL BX/EXCIS SPIN PATEL,INDUR,INMED,CERV N/A 1/24/2019    Procedure: Posterior C4-T3 laminectomy; resection of intramedullary mass C5-T3, including fenestration of syrinx C7-T2; C3-T4 fixation/fusion; additional levels as needed;  Surgeon: Deuce Perla MD;  [facility]: [facility];  Service: Neurosurgery    WISDOM TOOTH EXTRACTION        01/25/19 7660   Note Type   Note type Eval only   Pain Assessment   Pain Assessment FLACC   Pain Type Acute pain   Pain Location Head   Pain Rating: FLACC (Rest) - Face 0   Pain Rating: FLACC (Rest) - Legs 0   Pain Rating: FLACC (Rest) - Activity 0   Pain Rating: FLACC (Rest) - Cry 0   Pain Rating: FLACC (Rest) - Consolability 0   Score: FLACC (Rest) 0   Pain Rating: FLACC (Activity) - Face 0   Pain Rating: FLACC (Activity) - Legs 0   Pain Rating: FLACC (Activity) - Activity 0   Pain Rating: FLACC (Activity) - Cry 0   Pain Rating: FLACC (Activity) - Consolability 0   Score: FLACC (Activity) 0   Home Living   Type of Home House   Home Layout Multi-level; Able to live on main level with bedroom/bathroom   Home Equipment (DENIES)   Prior Function   Level of Sac Independent with ADLs and functional mobility   Lives With Spouse;Daughter; Son   Bean Help From Family   ADL Assistance Independent   IADLs Independent   Falls in the last 6 months 1 to 4  (4)   Vocational Full time employment   Restrictions/Precautions   [place] Bearing Precautions Per Order No   Braces or Orthoses MAFO;C/S Collar  (MAFO R LE WHILE AMBULATING )   Other Precautions Spinal precautions;Pain; Fall Risk;Telemetry;Multiple lines; Chair Alarm  (CHAIR ALARM ACTIVE POST PT EVAL )   General   Family/Caregiver Present Yes  (SPOUSE)   Cognition   Overall Cognitive Status WFL   RUE Assessment   RUE Assessment WFL  (REPORTS MILD WEAKNESS/NUMBNESS)   LUE Assessment   LUE Assessment WFL   RLE Assessment   RLE Assessment X   Strength RLE   R Hip Flexion 3+/5   R Knee Flexion 4-/5   R Knee Extension 4-/5   R Ankle Dorsiflexion 2-/5   LLE Assessment   LLE Assessment WFL   Light Touch   RLE Light Touch Impaired   RLE Light Touch Comments REPORTS DECREASED SENSATION    LLE Light Touch Grossly intact   Bed Mobility   Supine to Sit 4  Minimal assistance   Additional items Assist x 1; Increased time required;Verbal cues;HOB elevated   Sit to Supine Unable to assess   Transfers   Sit to Stand 3  Moderate assistance   Additional items Assist x 2; Increased time required;Verbal cues   Stand to Sit 3  Moderate assistance   Additional items Assist x 2; Increased time required;Verbal cues   Ambulation/Elevation   Gait pattern Excessively slow; Step to;Short stride;Decreased foot clearance   Gait Assistance 3  Moderate assist   Additional items Assist x 2   Assistive Device Other (Comment)  (B/L HHA)   Distance 2 FEET (LIMITED BY IBANEZ, NAUSEA)    Balance   Static Sitting Good   Static Standing Poor   Ambulatory Poor -   Endurance Deficit   Endurance Deficit Yes   Endurance Deficit Description HA, NAUSEA, VOMITTING   Activity Tolerance   Activity Tolerance Treatment limited secondary to medical complications (Comment)  (HA, NAUSEA, VOMITTING)   Medical Staff Made Aware TIGER TEXT Hayley Padilla WITH NEUROSURGERY TO REQUEST MAFO  ORDER PLACED  TIGER TEXT LISA WILSON TO MAKE HIM AWARE OF ORDER  [address] [place] [address]   Nurse Made Aware RN KJ    Assessment   Prognosis Good   Problem List Decreased strength;Decreased endurance; Impaired balance;Decreased mobility; Impaired sensation;Pain   Assessment PT COMPLETED EVALUATION OF 60YEAR OLD MALE AD[facility]ITTED [facility] ON [facility]/24[facility] FOR THE FOLLOWING PLANNED NEUROSURGICAL PROCEDURE: POSTERIOR C4-T3 LAMINECTOMY, RESECTION OF INTRAMEDULLARY MASS, C2-T5 FIXATION/FUSION SECONDARY TO SPINAL CORD TUMOR  CURRENT MEDICAL AND PHYSICAL INSTABILITIES INCLUDE PAIN, NAUSEA, VOMITING, DIZZINESS, ICU ADMISSION, CONTINUOUS O2/HR/BP MONITORING, BED/CHAIR ALARM, AND FALLS RISK  PATIENT HAS CERVICAL COLLAR AND ACTIVE CERVICAL/THORACIC SPINAL PRECAUTIONS (NO BENDING, LIFTING > 10 LBS, OR TWISTING)  ALSO F LE MAFO FOR FOOT DROP  PMH IS SIGNIFICANT FOR HLD, HYPOTHYROIDISM, AND THE AFORE MENTIONED SC TUMOR  PRIOR TO THIS ADMISSION PATIENT RESIDED WITH SPOUSE AND CHILDREN IN A MULTILEVEL HOME (1/2 BATH FIRST FLOOR) WHERE HE WAS PREVIOUSLY I WITH MOBILITY (NO USE OF DME, 4 FALLS REPORTEDLY DUE TO ONSET OF R LE FOOT DROP), ADLS, AND IADLS  +  AND FULL TIME EMPLOYMENT  CURRENT IMPAIRMENTS INCLUDE PAIN, MILD STRENGTH DEFICITS IN R LE, DECREASED SENSATION IN R LE, DECREASED BALANCE AND GAIT DEVIATIONS   DURING PT EVALUATION PATIENT REQUIRED VENKAT-X1 FOR SUPINE-->SIT TRANSFER AND MAINTAINED GOOD STATIC SEATED BALANCE  HE REQUIRED MOD-AX2 FOR SIT-->STAND TRANSFER (RETROPULSIVE UPON STANDING WITH R SIDED KNEE BUCKLING)  HE REQUIRED MOD-AX2 FOR SHORT DISTANCE AMBULATION (2 FEET BED TO CHAIR) W/ B/L HHA PRESENTING WITH R FOOT DROP AND THEREFORE DECREASED R LE CLEARANCE  THIS PATIENT IS FUNCTIONING BELOW HIS I BASELINE POST-OP AND WOULD BENEFIT FROM STR (PM&R CONSULT)  HE WILL BENEFIT FROM CONTINUED SKILLED PT THIS ADMISSION TO ACHIEVE MAXIMAL FUNCTION AND SAFETY  Goals   Patient Goals TO HAVE LESS NAUSEA    LTG Expiration Date 02/08/19   Long Term Goal #1 1) PERFORM BED MOBILITY MOD-I TO PARTICIPATE IN FREQUENT REPOSITIONING AND IMPROVE SKIN INTEGRITY; 2) PERFORM SIT<-->STAND TRANSFER MOD-I TO PROMOTE I WITH TOILETING AND OOB MOBILITY; 3) AMBULATE 200 FEET MOD-I W/ LEAST RESTRICTIVE DEVICE TO PARTICIPATE IN HOUSEHOLD AND COMMUNITY LEVEL AMBULATION; 4) IMPROVE B/L LE STRENGTH BY 1/2 GRADE TO IMPROVE EFFICIENCY OF TRANSFERS; 5) IMPROVE BALANCE BY 1 GRADE TO REDUCE RISK FOR FALLS; 6) NAVIGATE 12 STEPS S LEVEL IN ORDER TO SAFELY NAVIGATE MULTIPLE FLOORS AT HOME  Treatment Day 0   Plan   Treatment/Interventions Functional transfer training;LE strengthening/ROM; Elevations; Therapeutic exercise;Patient/family training;Equipment eval/education; Bed mobility;Gait training;OT;Spoke to case management;Spoke to nursing;Spoke to MD   PT Frequency Other (Comment)  (4-6X/WK)   Recommendation   Recommendation Short-term skilled PT  (PM&R CONSULT )   Equipment Recommended Walker  (RW)   PT - OK to Discharge Yes  (TO STR WHEN MED CONCHIS )   Barthel Index   Feeding 10   Bathing 5   Grooming Score 5   Dressing Score 5   Bladder Score 10   Bowels Score 10   Toilet Use Score 5   Transfers (Bed/Chair) Score 10   Mobility (Level Surface) Score 0   Stairs Score 0   Barthel Index Score 60           Gwendolyn Aftably, PT

## 2019-01-25 NOTE — ORTHOTIC NOTE
Orthotic Note            Date: 1/25/2019      Patient Name: Osiris Marlow III        Time: 11:00am    Reason for Consult:  Patient Active Problem List   Diagnosis    BPH associated with nocturia    Chronic bilateral low back pain without sciatica    Erectile dysfunction of non-organic origin    Hypothyroidism    Impaired fasting glucose    Mixed hyperlipidemia    Overweight (BMI 25 0-29  9)    Weakness of right lower extremity    Paresthesia of bilateral legs    Neurologic gait dysfunction    Spasticity    Myelopathy (HCC)    Neoplasm of cervical spine    Thoracic spine tumor    Intramedullary abnormality of spinal cord (HCC)    Glioma of spinal cord (HCC)    Syrinx of spinal cord [facility])   [place]/Dana Shower Collar       I was present to follow up with patient  I re-measured, fit, and donned [employer]  Pt tolerated well and instructions/adjustments reviewed at this time  I molded bilateral sides and adjusted anterior chin plate to level 2 for optimal fit  Instructions, vista replacement pads, my contact information, and Dana shower collar at bedside  RN aware  I will continue daily follow up with patient  RN present and aware  Recommendations:  Please call Mobility Coordinator at ext  [phone] in regards to bracing instruction and/or adjustment  Uma Prajapati Mobility Coordinator LCFo, LCOF, ASOP R  O T, O B T

## 2019-01-26 LAB
ERYTHROCYTE [DISTWIDTH] IN BLOOD BY AUTOMATED COUNT: 13.1 % (ref 11.6–15.1)
GLUCOSE SERPL-MCNC: 130 MG/DL (ref 65–140)
GLUCOSE SERPL-MCNC: 131 MG/DL (ref 65–140)
GLUCOSE SERPL-MCNC: 143 MG/DL (ref 65–140)
GLUCOSE SERPL-MCNC: 173 MG/DL (ref 65–140)
HCT VFR BLD AUTO: 27.8 % (ref 36.5–49.3)
HGB BLD-MCNC: 8.8 G/DL (ref 12–17)
MCH RBC QN AUTO: 29.3 PG (ref 26.8–34.3)
MCHC RBC AUTO-ENTMCNC: 31.7 G/DL (ref 31.4–37.4)
MCV RBC AUTO: 93 FL (ref 82–98)
PLATELET # BLD AUTO: 206 THOUSANDS/UL (ref 149–390)
PMV BLD AUTO: 9 FL (ref 8.9–12.7)
RBC # BLD AUTO: 3 MILLION/UL (ref 3.88–5.62)
WBC # BLD AUTO: 14.29 THOUSAND/UL (ref 4.31–10.16)

## 2019-01-26 PROCEDURE — 99233 SBSQ HOSP IP/OBS HIGH 50: CPT | Performed by: EMERGENCY MEDICINE

## 2019-01-26 PROCEDURE — 85027 COMPLETE CBC AUTOMATED: CPT | Performed by: PHYSICIAN ASSISTANT

## 2019-01-26 PROCEDURE — 82948 REAGENT STRIP/BLOOD GLUCOSE: CPT

## 2019-01-26 RX ORDER — DEXAMETHASONE SODIUM PHOSPHATE 4 MG/ML
4 INJECTION, SOLUTION INTRA-ARTICULAR; INTRALESIONAL; INTRAMUSCULAR; INTRAVENOUS; SOFT TISSUE EVERY 8 HOURS SCHEDULED
Status: COMPLETED | OUTPATIENT
Start: 2019-01-26 | End: 2019-01-28

## 2019-01-26 RX ORDER — SODIUM CHLORIDE, SODIUM GLUCONATE, SODIUM ACETATE, POTASSIUM CHLORIDE, MAGNESIUM CHLORIDE, SODIUM PHOSPHATE, DIBASIC, AND POTASSIUM PHOSPHATE .53; .5; .37; .037; .03; .012; .00082 G/100ML; G/100ML; G/100ML; G/100ML; G/100ML; G/100ML; G/100ML
1000 INJECTION, SOLUTION INTRAVENOUS ONCE
Status: COMPLETED | OUTPATIENT
Start: 2019-01-26 | End: 2019-01-26

## 2019-01-26 RX ADMIN — DEXAMETHASONE SODIUM PHOSPHATE 4 MG: 4 INJECTION, SOLUTION INTRAMUSCULAR; INTRAVENOUS at 21:45

## 2019-01-26 RX ADMIN — PHENYLEPHRINE HYDROCHLORIDE 130 MCG/MIN: 10 INJECTION INTRAVENOUS at 16:00

## 2019-01-26 RX ADMIN — BUTALBITAL, ACETAMINOPHEN, AND CAFFEINE 1 TABLET: 50; 325; 40 TABLET ORAL at 05:51

## 2019-01-26 RX ADMIN — ACETAMINOPHEN 650 MG: 325 TABLET, FILM COATED ORAL at 18:22

## 2019-01-26 RX ADMIN — ACETAMINOPHEN 650 MG: 325 TABLET, FILM COATED ORAL at 23:50

## 2019-01-26 RX ADMIN — DEXAMETHASONE SODIUM PHOSPHATE 4 MG: 4 INJECTION, SOLUTION INTRAMUSCULAR; INTRAVENOUS at 13:59

## 2019-01-26 RX ADMIN — BUTALBITAL, ACETAMINOPHEN, AND CAFFEINE 1 TABLET: 50; 325; 40 TABLET ORAL at 21:44

## 2019-01-26 RX ADMIN — SODIUM CHLORIDE 100 ML/HR: 0.9 INJECTION, SOLUTION INTRAVENOUS at 07:31

## 2019-01-26 RX ADMIN — ENOXAPARIN SODIUM 40 MG: 40 INJECTION SUBCUTANEOUS at 09:08

## 2019-01-26 RX ADMIN — DEXAMETHASONE SODIUM PHOSPHATE 4 MG: 4 INJECTION, SOLUTION INTRAMUSCULAR; INTRAVENOUS at 05:51

## 2019-01-26 RX ADMIN — SENNOSIDES AND DOCUSATE SODIUM 2 TABLET: 8.6; 5 TABLET ORAL at 09:08

## 2019-01-26 RX ADMIN — METHOCARBAMOL 750 MG: 750 TABLET, FILM COATED ORAL at 18:22

## 2019-01-26 RX ADMIN — METHOCARBAMOL 750 MG: 750 TABLET, FILM COATED ORAL at 23:51

## 2019-01-26 RX ADMIN — METHOCARBAMOL 750 MG: 750 TABLET, FILM COATED ORAL at 05:51

## 2019-01-26 RX ADMIN — SODIUM CHLORIDE, SODIUM GLUCONATE, SODIUM ACETATE, POTASSIUM CHLORIDE, MAGNESIUM CHLORIDE, SODIUM PHOSPHATE, DIBASIC, AND POTASSIUM PHOSPHATE 1000 ML: .53; .5; .37; .037; .03; .012; .00082 INJECTION, SOLUTION INTRAVENOUS at 12:46

## 2019-01-26 RX ADMIN — LEVOTHYROXINE SODIUM 25 MCG: 25 TABLET ORAL at 05:51

## 2019-01-26 RX ADMIN — BUTALBITAL, ACETAMINOPHEN, AND CAFFEINE 1 TABLET: 50; 325; 40 TABLET ORAL at 13:59

## 2019-01-26 RX ADMIN — TAMSULOSIN HYDROCHLORIDE 0.4 MG: 0.4 CAPSULE ORAL at 16:37

## 2019-01-26 RX ADMIN — METHOCARBAMOL 750 MG: 750 TABLET, FILM COATED ORAL at 11:50

## 2019-01-26 RX ADMIN — ACETAMINOPHEN 650 MG: 325 TABLET, FILM COATED ORAL at 11:49

## 2019-01-26 RX ADMIN — PHENYLEPHRINE HYDROCHLORIDE 30 MCG/MIN: 10 INJECTION INTRAVENOUS at 07:29

## 2019-01-26 NOTE — ORTHOTIC NOTE
Orthotic Note            Date: 1/26/2019     Time: 12:40     Patient Name: Osiris Marlow III         Reason for Consult:  Patient Active Problem List   Diagnosis    BPH associated with nocturia    Chronic bilateral low back pain without sciatica    Erectile dysfunction of non-organic origin    Hypothyroidism    Impaired fasting glucose    Mixed hyperlipidemia    Overweight (BMI 25 0-29  9)    Weakness of right lower extremity    Paresthesia of bilateral legs    Neurologic gait dysfunction    Spasticity    Myelopathy (HCC)    Neoplasm of cervical spine    Thoracic spine tumor    Intramedullary abnormality of spinal cord (HCC)    Glioma of spinal cord (HCC)    Syrinx of spinal cord (HCC)     Pt sitting up in his chair, but nursing stating the [facility] is not fitting correctly  The patient did have the MAFO in place with his sneakers on, but his heel was not fully placed into the MAFO  Sneaker and MAFO were removed on the right foot  PROM performed on the patient's right foot to achieve a 90* bend at the ankle  The MAFO was then able to be donned with pt's heel fitting fully into the brace  Sneaker placed back onto pt's right foot  Assisted pt's nurse with ambulating the patient  Will continue to follow daily  Recommendations:  Please call the ortho tech, ext [phone], with any bracing questions and/or adjustments      [employer],

## 2019-01-26 NOTE — PROGRESS NOTES
Progress Note - Critical Care   Haseeb Ponce III 60 y o  male MRN: [id number]  Unit/Bed#: 3150 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  · Continue Decadron  · Continue Tylenol, oxycodone, Dilaudid for pain  · Continue scheduled Robaxin  · Neuro checks q 4 hours  · PT/OT consult  · Continue to appreciate neurosurgery input - likely removal of drain on 01/27/2019                 CV:   · Continue with MAP goal greater than 85 mmHg  · Continue Salbador GTT and titrate as necessary  · Continue cardiopulmonary monitoring                 Lung:   · No active issues  · Oxygen saturation goal greater than 92%  · Incentive spirometry                 GI:   · Last bowel movement 01/23/2019  · Continue Senokot  · Continue to monitor and consider MiraLax if no bowel movement by tomorrow                 FEN:   · Continue normal saline at 100 cc/hour  · Will bolus with 1 L isolyte in an attempt to decrease phenylephrine load  · Goal potassium greater than 4, phosphorus greater than 3, magnesium greater than 2  · Regular diet                 :   · Started on tamsulosin yesterday, will continue  · Patient with urinary retention which has resolved  · Continue urinary retention protocol                 ID:   · Mild leukocytosis, no fever  · Will trend fever curve, WBC  · Will monitor off antibiotics at this time                 Heme:   · Hemoglobin 8 8 with MCV 93  · Continue to monitor for bleeding  · Continue Lovenox and SCD for VTE prophylaxis                 Endo:   · Blood sugar goal 140-180  · Continue SSI  · Continue levothyroxine                            Msk/Skin:   · Ambulate patient                 Disposition:  Continue ICU level care for monitoring post neuro surgical procedure, to maintain mean arterial pressure goals greater than 85 mm Hg  Chief Complaint:  Patient with mild 3/10 frontal headache which he states is bearable  No bowel movement since 2019  No blurry vision, dizziness, abdominal pain, nausea, chest pain, shortness of breath, pelvic pressure, fever, diarrhea  HPI/24hr events:  Patient had multiple headaches yesterday which were relieved by Fioricet  Urine output decreased after Mann removed yesterday, was started on tamsulosin  Patient's urine output improved drastically after starting this medication  Physical Exam:     Vitals:    19 1200 19 1230 19 1300 19 1400   BP: 106/61 128/56 149/71 127/67   Pulse: 68 64 64 60   Resp: (!) 24 (!) 30 (!) 31 21   Temp: 98 2 °F (36 8 °C)      TempSrc: Oral      SpO2: 100% 100% 100% 99%   Weight:       Height:         Arterial Line BP: 138/62  Arterial Line MAP (mmHg): 88 mmHg    Temperature:   Temp (24hrs), Av 7 °F (37 1 °C), Min:98 2 °F (36 8 °C), Max:99 1 °F (37 3 °C)    Current: Temperature: 98 2 °F (36 8 °C)    Weights:   IBW: 66 1 kg    Body mass index is 29 kg/m²    Weight (last 2 days)     Date/Time   Weight    19 2245  84 (185 19)    19 2230  84 (185 19)              Hemodynamic Monitoring:  N/A     Non-Invasive/Invasive Ventilation Settings:  Respiratory    Lab Data (Last 4 hours)    None         O2/Vent Data (Last 4 hours)    None              No results found for: PHART, HHE6YUJ, PO2ART, JOB6BXW, A6QXZFJB, BEART, SOURCE  SpO2: SpO2: 99 %, SpO2 Device: O2 Device: None (Room air)    Intake and Outputs:  I/O        0701 -  0700 01/25 0701 - 01/26 0700 01/26 0701 - 01/27 0700    P  O   1120 360    I V  (mL/kg) 3367 6 (40 1) 2610 4 (31 1) 983 8 (11 7)    IV Piggyback 700      Total Intake(mL/kg) 4067 6 (48 4) 3730 4 (44 4) 1343 8 (16)    Urine (mL/kg/hr) 1150 4410 (2 2) 1025 (1 5)    Drains 350 570 173    Blood 350      Total Output 1850 4980 1198    Net +2217 6 -1249 6 +145 8           Unmeasured Urine Occurrence  1 x     Unmeasured Emesis Occurrence  1 x         UOP: 1 5 cc/kg/hour   Nutrition:        Diet Orders            Start     Ordered    01/24/19 2241  Diet Regular; Regular House; Consistent Carbohydrate Diet Level 3 (6 carb servings/90 grams CHO/meal)  Diet effective now     Question Answer Comment   Diet Type Regular    Regular Regular House    Other Restriction(s): Consistent Carbohydrate Diet Level 3 (6 carb servings/90 grams CHO/meal)    RD to adjust diet per protocol? Yes        01/24/19 2241          Physical Exam   Constitutional: He is oriented to person, place, and time  He appears well-developed and well-nourished  No distress  HENT:   Head: Normocephalic and atraumatic  Mouth/Throat: No oropharyngeal exudate  Eyes: Pupils are equal, round, and reactive to light  Conjunctivae and EOM are normal  No scleral icterus  Neck: Normal range of motion  Neck supple  No JVD present  No thyromegaly present  JAMAR drain, site C/D/I, C-spine collar   Cardiovascular: Normal rate, regular rhythm and intact distal pulses  Exam reveals no gallop and no friction rub  No murmur heard  Pulmonary/Chest: Effort normal and breath sounds normal  No respiratory distress  He has no wheezes  Abdominal: Soft  Bowel sounds are normal  He exhibits no distension  There is no tenderness  Musculoskeletal: Normal range of motion  He exhibits no edema or deformity  Lymphadenopathy:     He has no cervical adenopathy  Neurological: He is alert and oriented to person, place, and time  No cranial nerve deficit   He exhibits normal muscle tone    Partially medially rotated ankles bilaterally, decreased tactile sensation right foot   Skin: Skin is warm and dry  No rash noted  He is not diaphoretic  No erythema  No pallor  Psychiatric: He has a normal mood and affect  His behavior is normal    Nursing note and vitals reviewed  Labs:     Results from last 7 days  Lab Units 01/26/19  0532 01/25/19  0540 01/22/19  1523   WBC Thousand/uL 14 29* 9 96 8 44   HEMOGLOBIN g/dL 8 8* 9 5* 13 0   HEMATOCRIT % 27 8* 29 8* 41 0   PLATELETS Thousands/uL 206 226 303   NEUTROS PCT %  --   --  70   MONOS PCT %  --   --  7      Results from last 7 days  Lab Units 01/25/19  0540 01/22/19  1523   SODIUM mmol/L 138 135*   POTASSIUM mmol/L 4 0 4 2   CHLORIDE mmol/L 108 103   CO2 mmol/L 22 27   BUN mg/dL 21 21   CREATININE mg/dL 0 75 0 80   CALCIUM mg/dL 7 7* 8 6   ALK PHOS U/L  --  76   ALT U/L  --  30   AST U/L  --  15                Results from last 7 days  Lab Units 01/22/19  1523   INR  0 99   PTT seconds 32         No results found for: TROPONINI    Imaging:  Pending x-ray of thoracic and cervical spine  I have personally reviewed pertinent reports  EKG:  No EKG over the last 24 hours  Micro:  No results found for: TATA Burns    Allergies: Allergies   Allergen Reactions    Bee Venom Hives, Itching and Edema     Category: Allergy;     Penicillins Hives and Itching     Category: Allergy;         Medications:   Scheduled Meds:  Current Facility-Administered Medications:  acetaminophen 650 mg Oral Q6H [facility] Merleen Jeans, MD    aluminum-magnesium hydroxide-simethicone 30 mL Oral Q6H PRN Kleber Boyd MD    butalbital-acetaminophen-caffeine 1 tablet Oral Q4H PRN Abhijit Cochran PA-C    dexamethasone 4 mg Intravenous [facility] Kleber Boyd MD    enoxaparin 40 mg Subcutaneous Daily Kleber Boyd MD    HYDROmorphone 0 5 mg Intravenous Q3H PRN Merleen Jeans, MD    insulin lispro 2-12 Units Subcutaneous 4x Daily (AC & HS) Nicci Gann MD    levothyroxine 25 mcg Oral Early Morning Candy Henderson MD    methocarbamol 750 mg Oral Q6H [facility] Candy Henderson MD    naloxone 0 04 mg Intravenous Q1MIN PRN Candy Henderson MD    ondansetron 4 mg Intravenous Q4H PRN Kjdmitriy Tonytia    oxyCODONE 10 mg Oral Q4H PRN Candy Henderson MD    oxyCODONE 5 mg Oral Q4H PRN Candy Henderson MD    phenylephine  mcg/min Intravenous Titrated Kimi Watts MD Last Rate: 150 mcg/min (01/26/19 1203)   senna-docusate sodium 2 tablet Oral Daily Candy Henderson MD    sodium chloride 100 mL/hr Intravenous Continuous Candy Henderson MD Last Rate: 100 mL/hr (01/26/19 0731)   tamsulosin 0 4 mg Oral Daily With Hira Hoffman MD      Continuous Infusions:  phenylephine  mcg/min Last Rate: 150 mcg/min (01/26/19 1203)   sodium chloride 100 mL/hr Last Rate: 100 mL/hr (01/26/19 0731)     PRN Meds:    aluminum-magnesium hydroxide-simethicone 30 mL Q6H PRN   butalbital-acetaminophen-caffeine 1 tablet Q4H PRN   HYDROmorphone 0 5 mg Q3H PRN   naloxone 0 04 mg Q1MIN PRN   ondansetron 4 mg Q4H PRN   oxyCODONE 10 mg Q4H PRN   oxyCODONE 5 mg Q4H PRN       VTE Pharmacologic Prophylaxis: Enoxaparin (Lovenox)  VTE Mechanical Prophylaxis: sequential compression device    Invasive lines and devices: Invasive Devices     Peripheral Intravenous Line            Peripheral IV 01/24/19 Left Hand 2 days    Peripheral IV 01/25/19 Right Antecubital 1 day          Arterial Line            Arterial Line 01/24/19 Right Radial 1 day          Drain            Closed/Suction Drain Right Back Bulb 7 Fr  1 day                      Code Status: Level 1 - Full Code     Portions of the record may have been created with voice recognition software  Occasional wrong word or "sound a like" substitutions may have occurred due to the inherent limitations of voice recognition software  Read the chart carefully and recognize, using context, where substitutions have occurred       Liannehy July, MD

## 2019-01-26 NOTE — RESTORATIVE TECHNICIAN NOTE
Restorative Specialist Mobility Note       Activity: Ambulate in castaneda     Assistive Device: Front wheel walker

## 2019-01-26 NOTE — PROGRESS NOTES
Progress Note - Neurosurgery   Baldemar Masterson III 60 y o  male MRN: [id number]  Unit/Bed#:  -01 Encounter: [id number]    Assessment/Plan:    · POD #2 from Procedure(s): Posterior C4-T3 laminectomy; resection of intramedullary mass,  C 2-T 5 fixation/fusion  · Neuro:  Stable to mildly improved  PT OT to assess today  Drain to gravity, consistent with spinal fluid  Spinal headaches addressed with Fioricet  Likely will DC tomorrow  · Path pending  · Collar at all times  · CV:  Continue map greater than 85 until tomorrow  · Pulm:  Room air, no issues, IS   · FEN:  Tolerating p o  Ricardo Po · ID:  Off antibiotics, leukocytosis, secondary to steroids  · Heme:  Hemoglobin 8 8, dilutional   Follow vital signs  · GI:  Tolerating p o  · Endo:  Last glucose 131, sliding-scale  On 4Q 6 steroid, wean to 4 Q 8 today  Ordered  · :  Mann out, voiding  · VTE ppx:  SCDs, Enox  · Pain control, her the clock Tylenol, Robaxin, with as needed narcotic      Subjective:     I am doing okay    Denies new weakness numbness tingling  Feels sensation right leg might be slightly better than yesterday  Incisional pain mild, rated 1-3/10    Objective:     Vitals: Blood pressure 132/66, pulse 68, temperature 99 1 °F (37 3 °C), temperature source Oral, resp  rate 19, height 5' 7" (1 702 m), weight 84 kg (185 lb 3 oz), SpO2 99 %  ,Body mass index is 29 kg/m²  JAMAR 570 yesterday, 150 last 12, nearly clear in character    Awake, alert    No acute distress  Fluent  Collar well fitted  Upper Extremities full strength  Right lower extremity IP 4/5, dorsiflexion plantar flexion on the right 4+/5  Clean dry intact    Data:    Labs:      Results from last 7 days  Lab Units 01/26/19  0532 01/25/19  0540 01/22/19  1523   WBC Thousand/uL 14 29* 9 96 8 44   HEMOGLOBIN g/dL 8 8* 9 5* 13 0   HEMATOCRIT % 27 8* 29 8* 41 0   PLATELETS Thousands/uL 206 226 303   NEUTROS PCT %  --   --  70   MONOS PCT %  --   --  7       Results from last 7 days  Lab Units 01/25/19  0540 01/22/19  1523   POTASSIUM mmol/L 4 0 4 2   CHLORIDE mmol/L 108 103   CO2 mmol/L 22 27   BUN mg/dL 21 21   CREATININE mg/dL 0 75 0 80   CALCIUM mg/dL 7 7* 8 6   ALK PHOS U/L  --  76   ALT U/L  --  30   AST U/L  --  15       Results from last 7 days  Lab Units 01/22/19  1523   INR  0 99   PTT seconds 32       Imaging:    Cervical and thoracic spine x-rays postop:    Imaging Studies: I have personally reviewed pertinent reports     and I have personally reviewed pertinent films in PACS

## 2019-01-27 LAB
ABO GROUP BLD BPU: NORMAL
ABO GROUP BLD BPU: NORMAL
ANION GAP SERPL CALCULATED.3IONS-SCNC: 5 MMOL/L (ref 4–13)
BPU ID: NORMAL
BPU ID: NORMAL
BUN SERPL-MCNC: 15 MG/DL (ref 5–25)
CALCIUM SERPL-MCNC: 7.7 MG/DL (ref 8.3–10.1)
CHLORIDE SERPL-SCNC: 110 MMOL/L (ref 100–108)
CO2 SERPL-SCNC: 25 MMOL/L (ref 21–32)
CREAT SERPL-MCNC: 0.65 MG/DL (ref 0.6–1.3)
CROSSMATCH: NORMAL
CROSSMATCH: NORMAL
ERYTHROCYTE [DISTWIDTH] IN BLOOD BY AUTOMATED COUNT: 13.5 % (ref 11.6–15.1)
GFR SERPL CREATININE-BSD FRML MDRD: 106 ML/MIN/1.73SQ M
GLUCOSE SERPL-MCNC: 104 MG/DL (ref 65–140)
GLUCOSE SERPL-MCNC: 108 MG/DL (ref 65–140)
GLUCOSE SERPL-MCNC: 116 MG/DL (ref 65–140)
GLUCOSE SERPL-MCNC: 145 MG/DL (ref 65–140)
GLUCOSE SERPL-MCNC: 171 MG/DL (ref 65–140)
GLUCOSE SERPL-MCNC: 191 MG/DL (ref 65–140)
HCT VFR BLD AUTO: 29 % (ref 36.5–49.3)
HGB BLD-MCNC: 9.1 G/DL (ref 12–17)
MCH RBC QN AUTO: 29.1 PG (ref 26.8–34.3)
MCHC RBC AUTO-ENTMCNC: 31.4 G/DL (ref 31.4–37.4)
MCV RBC AUTO: 93 FL (ref 82–98)
PLATELET # BLD AUTO: 241 THOUSANDS/UL (ref 149–390)
PMV BLD AUTO: 9.3 FL (ref 8.9–12.7)
POTASSIUM SERPL-SCNC: 3.9 MMOL/L (ref 3.5–5.3)
RBC # BLD AUTO: 3.13 MILLION/UL (ref 3.88–5.62)
SODIUM SERPL-SCNC: 140 MMOL/L (ref 136–145)
UNIT DISPENSE STATUS: NORMAL
UNIT DISPENSE STATUS: NORMAL
UNIT PRODUCT CODE: NORMAL
UNIT PRODUCT CODE: NORMAL
UNIT RH: NORMAL
UNIT RH: NORMAL
WBC # BLD AUTO: 16.04 THOUSAND/UL (ref 4.31–10.16)

## 2019-01-27 PROCEDURE — 97116 GAIT TRAINING THERAPY: CPT

## 2019-01-27 PROCEDURE — 85027 COMPLETE CBC AUTOMATED: CPT | Performed by: STUDENT IN AN ORGANIZED HEALTH CARE EDUCATION/TRAINING PROGRAM

## 2019-01-27 PROCEDURE — 99233 SBSQ HOSP IP/OBS HIGH 50: CPT | Performed by: EMERGENCY MEDICINE

## 2019-01-27 PROCEDURE — 82948 REAGENT STRIP/BLOOD GLUCOSE: CPT

## 2019-01-27 PROCEDURE — 97535 SELF CARE MNGMENT TRAINING: CPT | Performed by: STUDENT IN AN ORGANIZED HEALTH CARE EDUCATION/TRAINING PROGRAM

## 2019-01-27 PROCEDURE — 97112 NEUROMUSCULAR REEDUCATION: CPT

## 2019-01-27 PROCEDURE — 80048 BASIC METABOLIC PNL TOTAL CA: CPT | Performed by: STUDENT IN AN ORGANIZED HEALTH CARE EDUCATION/TRAINING PROGRAM

## 2019-01-27 RX ADMIN — SENNOSIDES AND DOCUSATE SODIUM 2 TABLET: 8.6; 5 TABLET ORAL at 08:24

## 2019-01-27 RX ADMIN — SODIUM CHLORIDE 50 ML/HR: 0.9 INJECTION, SOLUTION INTRAVENOUS at 23:30

## 2019-01-27 RX ADMIN — TAMSULOSIN HYDROCHLORIDE 0.4 MG: 0.4 CAPSULE ORAL at 17:47

## 2019-01-27 RX ADMIN — DEXAMETHASONE SODIUM PHOSPHATE 4 MG: 4 INJECTION, SOLUTION INTRAMUSCULAR; INTRAVENOUS at 05:59

## 2019-01-27 RX ADMIN — METHOCARBAMOL 750 MG: 750 TABLET, FILM COATED ORAL at 12:19

## 2019-01-27 RX ADMIN — SODIUM CHLORIDE 100 ML/HR: 0.9 INJECTION, SOLUTION INTRAVENOUS at 03:13

## 2019-01-27 RX ADMIN — BUTALBITAL, ACETAMINOPHEN, AND CAFFEINE 1 TABLET: 50; 325; 40 TABLET ORAL at 21:58

## 2019-01-27 RX ADMIN — ACETAMINOPHEN 650 MG: 325 TABLET, FILM COATED ORAL at 17:47

## 2019-01-27 RX ADMIN — BUTALBITAL, ACETAMINOPHEN, AND CAFFEINE 1 TABLET: 50; 325; 40 TABLET ORAL at 13:40

## 2019-01-27 RX ADMIN — PHENYLEPHRINE HYDROCHLORIDE 25 MCG/MIN: 10 INJECTION INTRAVENOUS at 10:39

## 2019-01-27 RX ADMIN — LEVOTHYROXINE SODIUM 25 MCG: 25 TABLET ORAL at 06:00

## 2019-01-27 RX ADMIN — SODIUM CHLORIDE 50 ML/HR: 0.9 INJECTION, SOLUTION INTRAVENOUS at 15:58

## 2019-01-27 RX ADMIN — METHOCARBAMOL 750 MG: 750 TABLET, FILM COATED ORAL at 06:00

## 2019-01-27 RX ADMIN — DEXAMETHASONE SODIUM PHOSPHATE 4 MG: 4 INJECTION, SOLUTION INTRAMUSCULAR; INTRAVENOUS at 13:40

## 2019-01-27 RX ADMIN — ACETAMINOPHEN 650 MG: 325 TABLET, FILM COATED ORAL at 12:18

## 2019-01-27 RX ADMIN — ENOXAPARIN SODIUM 40 MG: 40 INJECTION SUBCUTANEOUS at 08:24

## 2019-01-27 RX ADMIN — METHOCARBAMOL 750 MG: 750 TABLET, FILM COATED ORAL at 17:47

## 2019-01-27 RX ADMIN — BUTALBITAL, ACETAMINOPHEN, AND CAFFEINE 1 TABLET: 50; 325; 40 TABLET ORAL at 06:04

## 2019-01-27 RX ADMIN — METHOCARBAMOL 750 MG: 750 TABLET, FILM COATED ORAL at 23:22

## 2019-01-27 RX ADMIN — DEXAMETHASONE SODIUM PHOSPHATE 4 MG: 4 INJECTION, SOLUTION INTRAMUSCULAR; INTRAVENOUS at 21:21

## 2019-01-27 NOTE — PROGRESS NOTES
Progress Note - ICU Transfer to SD/MS tele   Samson Groves III 60 y o  male MRN: [id number]  [facility]   Unit/Bed#El Dietrich -01 Encounter: [id number]    Code Status: Level 1 - Full Code  POA:    POLST:      Reason for ICU admission: Post neurosurgery monitoring    Active problems:   Principal Problem:    Glioma of spinal cord ([address] )  Active Problems:    Neurologic gait dysfunction    Neoplasm of cervical spine    Intramedullary abnormality of spinal cord ([facility] )    Syrinx of spinal cord ([facility] [address] )  Resolved Problems:    * No resolved hospital problems  *      Consultants:   Neurosurgery    History of Present Illness:  50-year-old male with past medical history of hypothyroidism, prediabetes and hyperlipidemia who presented to the ICU status post C4-T3 laminectomy, resection of intramedullary mass and C2-T5 fixation/fusion  Frozen section was  consistent with ependymoma  Patient had JAMAR drain placed  Patient has been experiencing bilateral hand numbness for the past several years that has been recently worsening with associated weakness in both legs especially on the right lower extremity  He also reported frequent falls  MRI of the lumbar spine was negative  Due to spastic gait and abnormality on his  neuro exam, a concern about having an upper motor neuron lesion was entertained and an MRI of the cervical spine was performed which demonstrated a probable neoplasm of the cervical spine extending from C4-C5 to caudal T3  Neurosurgery was consulted and surgery was scheduled  Summary of clinical course:   Patient has been doing well postsurgery  Headaches well controlled on pain meds  His neurological symptoms have mildly improved  Per Neurosurgery, There was considerable output when the drain was placed suction today  Will allow drainage to gravity with likely removal this afternoon         Outstanding/pending diagnostics:   Tissue exam         Mobilization Plan:   PT/OT following and recommend post acute IP rehab    Nutrition Plan:   Regular diet    VTE Pharmacologic Prophylaxis: Enoxaparin (Lovenox)  VTE Mechanical Prophylaxis: sequential compression device    Discharge Plan:   Per Neurosurgery team      Specific Diagnosis Plan:    Please refer to progress note      Spoke with Armando Potter  regarding transfer  Please call [phone] with any questions or concerns  Portions of the record may have been created with voice recognition software  Occasional wrong word or "sound a like" substitutions may have occurred due to the inherent limitations of voice recognition software  Read the chart carefully and recognize, using context, where substitutions have occurred      Claudia Hopkins MD

## 2019-01-27 NOTE — OCCUPATIONAL THERAPY NOTE
01/27/19 1017   Restrictions/Precautions   Weight Bearing Precautions Per Order No   Braces or Orthoses MAFO;C/S Collar  (MAFO RLE while ambulating)   Other Precautions Chair Alarm; Bed Alarm;Multiple lines;Telemetry;Spinal precautions; Fall Risk   Pain Assessment   Pain Assessment No/denies pain   Pain Score No Pain   ADL   Where Assessed Edge of bed   Grooming Assistance 5  Supervision/Setup   Grooming Deficit Setup   UB Bathing Assistance 5  Supervision/Setup   UB Bathing Deficit Setup   LB Bathing Assistance 3  Moderate Assistance   LB Bathing Deficit Setup; Buttocks;Right lower leg including foot   UB Dressing Assistance 4  Minimal Assistance   UB Dressing Deficit Setup;Pull around back   LB Dressing Assistance 3  Moderate Assistance   LB Dressing Deficit Setup;Don/doff R sock; Thread RLE into pants;Supervision/safety   LB Dressing Comments don/doff socks and pants   Toileting Assistance  1  Total Assistance   Toileting Deficit Setup;Steadying;Use of bedpan/urinal setup;Supervison/safety   Toileting Comments bladder management   Functional Standing Tolerance   Time 5 min   Activity static standing HHA   Bed Mobility   Supine to Sit 4  Minimal assistance   Additional items Assist x 1   Sit to Supine 3  Moderate assistance   Additional items Assist x 1   Transfers   Sit to Stand 4  Minimal assistance   Additional items Assist x 1   Stand to Sit 4  Minimal assistance   Additional items Assist x 1   Stand pivot 4  Minimal assistance   Additional items Assist x 1   Functional Mobility   Functional Mobility 4  Minimal assistance   Additional Comments SBAx1 line management, HHA min Ax1   Additional items Hand hold assistance   Cognition   Overall Cognitive Status WFL   Arousal/Participation Responsive   Attention Within functional limits   Orientation Level Oriented X4   Memory Decreased recall of precautions   Following Commands Follows all commands and directions without difficulty   Activity Tolerance   Activity Tolerance Patient tolerated treatment well   Assessment   Assessment Pt participates in OT session with focus on bathing, dressing, toileting, activity tolerance, and transfers to increase I for d/c  Pt min A supine to sit EOB  Pt min Ax1 sit to stand with HHA and SBAx1 for line management for functional mobility into hallway  Pt trialed ambulation without AD this session with min Ax1  Pt CGA standing tolerance activity for reaching activity to reach for common everyday objects from table and maintaining spinal precautions during activity  Pt dependent toileting with steadying A for use of urnial for bladder management  Pt min A bathing with SB while seated EOB and requires A to wash R foot and buttocks 2* decreased ROM  Pt min A UB dressing to don/doff gown  Pt mod A LB dressing to don/doff socks and pants and requires A to don sock on RLE and to thread RLE into pants  Pt requires vc for rest breaks and reminders to take deep breaths  Pt setup grooming to brush teeth and wash face  Pt mod A sit to supine from EOB  Bed alarm turned on post session  Pt will continue to benefit from activity tolerance, adls, and transfers  Plan   Treatment Interventions ADL retraining;Functional transfer training; Endurance training; Activityengagement   Goal Expiration Date 02/04/19   Treatment Day 1   OT Frequency 3-5x/wk   Recommendation   OT Discharge Recommendation Short Term Rehab

## 2019-01-27 NOTE — PROGRESS NOTES
Progress Note - Neurosurgery   [facility] III 60 y o  male MRN: [id number]  Unit/Bed#:  -01 Encounter: [id number]    Assessment/Plan:    · POD #3 from Procedure(s): Posterior C4-T3 laminectomy; resection of intramedullary mass,  C 2-T 5 fixation/fusion  · Neuro:  Stable to mildly improved  · PT recommended MAFO for right foot  Recommend short-term skilled PT  Will place PM&R consult  · Drain to gravity, consistent with spinal fluid  When placed briefly on suction today, considerable output  Will maintain until least this afternoon  Spinal headaches addressed with Fioricet  · Path pending  · Collar at all times   · CV:  Relax MAP parameter for exam, wean pressor off as neurologically tolerated  · Pulm:  Room air, no issues, IS   · FEN:  Tolerating p o ; wean IV fluids down to 50 cc/hour as tolerated  Watch for headaches  · ID:  Off antibiotics, leukocytosis, secondary to steroids  · Heme:  Hemoglobin 9 1, stable  · GI:  Tolerating p o  · Endo:  Last glucose 104, sliding-scale  On 4Q 8h, wean down tomorrow  · :  Mann out, voiding, on Flomax  · VTE ppx:  SCDs, Enox  · Pain control, round the clock Tylenol, Robaxin, with as needed narcotic  · Discussed with CC JEAN CARLOS      Subjective:     I am doing fine    Denies new weakness numbness tingling  Ambulated yesterday with walker  Feels sensation right leg might be slightly better than yesterday  Incisional pain mild  Headaches bifrontal, improved sitting up  Objective:     Vitals: Blood pressure 115/72, pulse (!) 52, temperature 98 7 °F (37 1 °C), temperature source Oral, resp  rate 18, height 5' 7" (1 702 m), weight 84 kg (185 lb 3 oz), SpO2 96 %  ,Body mass index is 29 kg/m²  JAMAR 243 yesterday, 70 last 12, nearly clear in character  Additional 60 cc briskly emanated 1 drain placed on suction    Awake, alert    No acute distress  Fluent  Collar well fitted  Upper Extremities full strength  Right lower extremity IP 4 +/5, dorsiflexion 4/5, plantar flexion 4+/5  Mild drainage around drain site  Dressing changed, incision clean dry intact flat    Data:    Labs:      Results from last 7 days  Lab Units 01/27/19  0550 01/26/19  0532 01/25/19  0540 01/22/19  1523   WBC Thousand/uL 16 04* 14 29* 9 96 8 44   HEMOGLOBIN g/dL 9 1* 8 8* 9 5* 13 0   HEMATOCRIT % 29 0* 27 8* 29 8* 41 0   PLATELETS Thousands/uL 241 206 226 303   NEUTROS PCT %  --   --   --  70   MONOS PCT %  --   --   --  7       Results from last 7 days  Lab Units 01/27/19  0550 01/25/19  0540 01/22/19  1523   POTASSIUM mmol/L 3 9 4 0 4 2   CHLORIDE mmol/L 110* 108 103   CO2 mmol/L 25 22 27   BUN mg/dL 15 21 21   CREATININE mg/dL 0 65 0 75 0 80   CALCIUM mg/dL 7 7* 7 7* 8 6   ALK PHOS U/L  --   --  76   ALT U/L  --   --  30   AST U/L  --   --  15       Results from last 7 days  Lab Units 01/22/19  1523   INR  0 99   PTT seconds 32       Imaging:    Cervical and thoracic spine x-rays postop:    Imaging Studies: I have personally reviewed pertinent reports     and I have personally reviewed pertinent films in PACS

## 2019-01-27 NOTE — PROGRESS NOTES
Progress Note - 19 [facility] III 60 y o  male MRN: [id number]  Unit/Bed#: [address] -01 Encounter: [id number]    Attending Physician: Renee Small MD      ______________________________________________________________________  Assessment and Plan:   Principal Problem:    Glioma of spinal cord [facility])  Active Problems:    Neurologic gait dysfunction    Neoplasm of cervical spine    Intramedullary abnormality of spinal cord (HCC)    Syrinx of spinal cord ([facility] [address] )  Resolved Problems:    * No resolved hospital problems  *        Neuro:  POD 3 Status post laminectomy and resection of intramedullary mass  Alert and oriented x3, GCS 15,   Neuro exam normal today, muscle strength 5/5 in all 4 extremities  Decreased sensation right lower extremity improving per patient  JAMAR drain in place  Continue Decadron 4 mg Q 8  Continue pain meds:  Tylenol, oxycodone and  Fioricet  Continue Robaxin 750 Q 6  Neurochecks Q 4  PT/OT consult  Per Neurosurgery, patient may be transferred to the floor today if blood pressures acceptable after weaning pressors          CV:   Gradually wean off Salbador and monitor blood pressures  Continuous telemetry  Pulm:   No active issues  Maintain O2 sats fall more than 92%  Incentive spirometry       GI:   Patient reports he has not had a bowel movement  Bowel regimen:  Continue Senokot, may add MiraLax    :  IV fluids decreased to 50 cc/hour  · UOP 3 7 L over past 24H , JAMAR drain to 43 males serosanguineous fluid for last 24 hr  · Continue tamsulosin 0 4 mg daily  · Monitor I&Os, net+ 239 mL over past 24H  · Cr 0 65,     F/E/N:   1   Fluids/Electrolytes  · IVF normal saline running at 50 mL/hr currently  · Replete as needed       2   Nutrition, regular diet    ID:   No evidence of infection, afebrile,   leukocytosis at 16, likely due to steroid use      Heme:   Hemoglobin 9 1 , platelets 740  Will continue to monitor for bleeding  Will continue Lovenox for DVT prophylaxis      Endo:   Glucose well controlled over the last 24 hr  Continue SSI  Continue levothyroxine     Msk/Skin:   · General skin care  · PT/OT following    Disposition:  Per Neurosurgery patient may be transferred to the floor if blood pressure is at goal after weaned off pressors  Code Status: Level 1 - Full Code    Counseling / Coordination of Care  Total Critical Care time spent 30 minutes excluding procedures, teaching and family updates  ______________________________________________________________________    Chief Complaint:  Has no complaints today    24 Hour Events:   Reported headaches the resolved on Fioricet    ______________________________________________________________________    Physical Exam:   Physical Exam   Constitutional: He is oriented to person, place, and time  He appears well-developed and well-nourished  HENT:   Head: Normocephalic and atraumatic  Surgical wounds healing well   Neck:   C spine - collar in place   Cardiovascular: Normal rate, regular rhythm, normal heart sounds and intact distal pulses  Pulmonary/Chest: Effort normal  He has no rales  Abdominal: Soft  Bowel sounds are normal    Neurological: He is alert and oriented to person, place, and time  No cranial nerve deficit  Sensation right lower extremity improving   Skin: Skin is warm and dry  Midline incision scar lower neck to mid back, surgical incision clean dry and intact  Psychiatric: He has a normal mood and affect  Vitals reviewed          ______________________________________________________________________  Vitals:    19 0400 19 0500 19 0600 19 0700   BP: 133/72 144/61 150/77 115/72   Pulse: (!) 46 74 (!) 50 (!) 52   Resp: 18 19 18 18   Temp:       TempSrc:       SpO2: 98% 97% 100% 96%   Weight:       Height:           Temperature:   Temp (24hrs), Av 8 °F (37 1 °C), Min:98 2 °F (36 8 °C), Max:99 5 °F (37 5 °C)    Current Temperature: 98 7 °F (37 1 °C)  Weights:   IBW: 66 1 kg    Body mass index is 29 kg/m²  Weight (last 2 days)     None        Hemodynamic Monitoring:  N/A     Non-Invasive/Invasive Ventilation Settings:  Respiratory    Lab Data (Last 4 hours)    None         O2/Vent Data (Last 4 hours)    None              No results found for: PHART, BGV6PZN, PO2ART, PEY7MSS, F7DLXMVV, BEART, SOURCE    Intake and Outputs:  I/O       01/25 0701 - 01/26 0700 01/26 0701 - 01/27 0700    P  O  1120 480    I V  (mL/kg) 2610 4 (31 1) 3875 6 (46 1)    Total Intake(mL/kg) 3730 4 (44 4) 4355 6 (51 9)    Urine (mL/kg/hr) 4410 (2 2) 3775 (1 9)    Drains 570 203    Total Output 4980 3978    Net -1249 6 +377 6          Unmeasured Urine Occurrence 1 x     Unmeasured Emesis Occurrence 1 x           Nutrition:        Diet Orders            Start     Ordered    01/24/19 2241  Diet Regular; Regular House; Consistent Carbohydrate Diet Level 3 (6 carb servings/90 grams CHO/meal)  Diet effective now     Question Answer Comment   Diet Type Regular    Regular Regular House    Other Restriction(s): Consistent Carbohydrate Diet Level 3 (6 carb servings/90 grams CHO/meal)    RD to adjust diet per protocol?  Yes        01/24/19 2241          Labs:     Results from last 7 days  Lab Units 01/27/19  0550 01/26/19  0532 01/25/19  0540 01/22/19  1523   WBC Thousand/uL 16 04* 14 29* 9 96 8 44   HEMOGLOBIN g/dL 9 1* 8 8* 9 5* 13 0   HEMATOCRIT % 29 0* 27 8* 29 8* 41 0   PLATELETS Thousands/uL 241 206 226 303   NEUTROS PCT %  --   --   --  70   MONOS PCT %  --   --   --  7       Results from last 7 days  Lab Units 01/27/19  0550 01/25/19  0540 01/22/19  1523   POTASSIUM mmol/L 3 9 4 0 4 2   CHLORIDE mmol/L 110* 108 103   CO2 mmol/L 25 22 27   BUN mg/dL 15 21 21   CREATININE mg/dL 0 65 0 75 0 80   CALCIUM mg/dL 7 7* 7 7* 8 6   ALK PHOS U/L  --   --  76   ALT U/L  --   --  30   AST U/L  --   --  15         No results found for: PHOS     Results from last 7 days  Lab Units 01/22/19  1523   INR 0 99   PTT seconds 32     No results found for: TROPONINI      ABG:No results found for: PHART, PWL9NDZ, PO2ART, VSI1LQH, D7RZDTTQ, BEART, SOURCE       Imaging:  XR Spine 1/25: Posterior spinal fusion instrumentation from C2 through T5 is present  There is no evidence of hardware failure    Thoracic vertebral alignment is within normal limits  There is mild degenerative disc disease at C5-C6  Micro:  No results found for: Saray Stewart, SPUTUMCULTUR  Allergies: Allergies   Allergen Reactions    Bee Venom Hives, Itching and Edema     Category: Allergy;     Penicillins Hives and Itching     Category: Allergy;       Medications:   Scheduled Meds:    Current Facility-Administered Medications:  acetaminophen 650 mg Oral Q6H [address] Meka Thomas MD    aluminum-magnesium hydroxide-simethicone 30 mL Oral Q6H PRN Bolivar Wells MD    butalbital-acetaminophen-caffeine 1 tablet Oral Q4H PRN Forest Storey PA-C    dexamethasone 4 mg Intravenous [facility] Bolivar Wells MD    enoxaparin 40 mg Subcutaneous Daily Bolivar Wells MD    HYDROmorphone 0 5 mg Intravenous Q3H PRN Meka Thomas MD    insulin lispro 2-12 Units Subcutaneous 4x Daily (AC & HS) Alok Goldstein MD    levothyroxine 25 mcg Oral Early Morning Bolivar Wells MD    methocarbamol 750 mg Oral Q6H [address] Bolivar Wells MD    naloxone 0 04 mg Intravenous Q1MIN PRN Bolivar Wells MD    ondansetron 4 mg Intravenous Q4H PRN Alva Lucas    oxyCODONE 10 mg Oral Q4H PRN Bolivar Wells MD    oxyCODONE 5 mg Oral Q4H PRN Bolivar Wells MD    phenylephine  mcg/min Intravenous Titrated Stepan Irizarry MD Last Rate: 45 mcg/min (01/27/19 0827)   senna-docusate sodium 2 tablet Oral Daily Bolivar Wells MD    sodium chloride 50 mL/hr Intravenous Continuous Alva Lucas Last Rate: 50 mL/hr (01/27/19 0828)   tamsulosin 0 4 mg Oral Daily With Lesly Eng MD      Continuous Infusions:    phenylephine  mcg/min Last Rate: 45 mcg/min (01/27/19 0827)   sodium chloride 50 mL/hr Last Rate: 50 mL/hr (01/27/19 0828)     PRN Meds:    aluminum-magnesium hydroxide-simethicone 30 mL Q6H PRN   butalbital-acetaminophen-caffeine 1 tablet Q4H PRN   HYDROmorphone 0 5 mg Q3H PRN   naloxone 0 04 mg Q1MIN PRN   ondansetron 4 mg Q4H PRN   oxyCODONE 10 mg Q4H PRN   oxyCODONE 5 mg Q4H PRN     VTE Pharmacologic Prophylaxis: Enoxaparin (Lovenox)  VTE Mechanical Prophylaxis: sequential compression device  Invasive lines and devices: Invasive Devices     Peripheral Intravenous Line            Peripheral IV 01/24/19 Left Hand 2 days    Peripheral IV 01/25/19 Right Antecubital 2 days          Arterial Line            Arterial Line 01/24/19 Right Radial 2 days          Drain            Closed/Suction Drain Right Back Bulb 7 Fr  2 days                     Portions of the record may have been created with voice recognition software  Occasional wrong word or "sound a like" substitutions may have occurred due to the inherent limitations of voice recognition software  Read the chart carefully and recognize, using context, where substitutions have occurred      David Cook MD

## 2019-01-27 NOTE — PLAN OF CARE
Problem: PHYSICAL THERAPY ADULT  Goal: Performs mobility at highest level of function for planned discharge setting  See evaluation for individualized goals  Treatment/Interventions: Functional transfer training, LE strengthening/ROM, Elevations, Therapeutic exercise, Patient/family training, Equipment eval/education, Bed mobility, Gait training, OT, Spoke to case management, Spoke to nursing, Spoke to MD  Equipment Recommended: Andrzej Dickson (RW)       See flowsheet documentation for full assessment, interventions and recommendations  Outcome: Progressing  Prognosis: Good  Problem List: Decreased strength, Decreased range of motion, Decreased endurance, Decreased mobility, Impaired balance, Decreased coordination, Impaired sensation, Decreased skin integrity, Pain, Orthopedic restrictions  Assessment: Patient engaged in PT treatment session focussing on gait training and neuro re-ed  PT/OT co-treat performed and appropriate at this time 2* medical complexity in ICU, balance deficits, and generalized weakness  Gait performed in hallways with no use of RW in which patient required moderate Ax1 + CGA/Suzy x1 with HHA  Patient with improved gait mechanics this session using RLE MAFO however, patient with occasional LOB in which he demonstrated impaired righting reactions and need for assist to recover  Patient notable unsteady during unsupported stance and dynamic reach activites in which Suzy was required during tasks  OT  assessing self care, reach, etc at this time while PT focussing on balance  Patient is a good rehab candidate and requires skilled PT intervention at this time to maximize funciton and reduce caregiver burden  Recommendation: Post acute IP rehab     PT - OK to Discharge: Yes (to rehab when medically appropriate )    See flowsheet documentation for full assessment

## 2019-01-27 NOTE — PLAN OF CARE
Problem: Potential for Falls  Goal: Patient will remain free of falls  INTERVENTIONS:  - Assess patient frequently for physical needs  -  Identify cognitive and physical deficits and behaviors that affect risk of falls  -  [place] fall precautions as indicated by assessment   - Educate patient/family on patient safety including physical limitations  - Instruct patient to call for assistance with activity based on assessment  - Modify environment to reduce risk of injury  - Consider OT/PT consult to assist with strengthening/mobility   Outcome: Progressing      Problem: NEUROSENSORY - ADULT  Goal: Achieves stable or improved neurological status  INTERVENTIONS  - Monitor and report changes in neurological status  - Initiate measures to prevent increased intracranial pressure  - Maintain blood pressure and fluid volume within ordered parameters to optimize cerebral perfusion  - Monitor temperature, glucose, and sodium or any other associated labs   Initiate appropriate interventions as ordered  - Monitor for seizure activity   - Administer anti-seizure medications as ordered   Outcome: Progressing    Goal: Achieves maximal functionality and self care  INTERVENTIONS  - Monitor swallowing and airway patency with patient fatigue and changes in neurological status  - Encourage and assist patient to increase activity and self care with guidance from rehab services  - Encourage visually impaired, hearing impaired and aphasic patients to use assistive/communication devices   Outcome: Progressing      Problem: SKIN/TISSUE INTEGRITY - ADULT  Goal: Skin integrity remains intact  INTERVENTIONS  - Identify patients at risk for skin breakdown  - Assess and monitor skin integrity  - Assess and monitor nutrition and hydration status  - Monitor labs (i e  albumin)  - Assess for incontinence   - Turn and reposition patient  - Assist with mobility/ambulation  - Relieve pressure over bony prominences  - Avoid friction and shearing  - Provide appropriate hygiene as needed including keeping skin clean and dry  - Evaluate need for skin moisturizer/barrier cream  - Collaborate with interdisciplinary team (i e  Nutrition, Rehabilitation, etc )   - Patient/family teaching   Outcome: Progressing    Goal: Incision(s), wounds(s) or drain site(s) healing without S/S of infection  INTERVENTIONS  - Assess and document risk factors for skin impairment   - Assess and document dressing, incision, wound bed, drain sites and surrounding tissue  - Initiate Nutrition services consult and/or wound management as needed   Outcome: Progressing    Goal: Oral mucous membranes remain intact  INTERVENTIONS  - Assess oral mucosa and hygiene practices  - Implement preventative oral hygiene regimen  - Implement oral medicated treatments as ordered  - Initiate Nutrition services referral as needed   Outcome: Progressing      Problem: MUSCULOSKELETAL - ADULT  Goal: Maintain or return mobility to safest level of function  INTERVENTIONS:  - Assess patient's ability to carry out ADLs; assess patient's baseline for ADL function and identify physical deficits which impact ability to perform ADLs (bathing, care of mouth/teeth, toileting, grooming, dressing, etc )  - Assess/evaluate cause of self-care deficits   - Assess range of motion  - Assess patient's mobility; develop plan if impaired  - Assess patient's need for assistive devices and provide as appropriate  - Encourage maximum independence but intervene and supervise when necessary  - Involve family in performance of ADLs  - Assess for home care needs following discharge   - Request OT consult to assist with ADL evaluation and planning for discharge  - Provide patient education as appropriate   Outcome: Progressing    Goal: Maintain proper alignment of affected body part  INTERVENTIONS:  - Support, maintain and protect limb and body alignment  - Provide pt/fam with appropriate education   Outcome: Progressing      Problem: Prexisting or High Potential for Compromised Skin Integrity  Goal: Skin integrity is maintained or improved  INTERVENTIONS:  - Identify patients at risk for skin breakdown  - Assess and monitor skin integrity  - Assess and monitor nutrition and hydration status  - Monitor labs (i e  albumin)  - Assess for incontinence   - Turn and reposition patient  - Assist with mobility/ambulation  - Relieve pressure over bony prominences  - Avoid friction and shearing  - Provide appropriate hygiene as needed including keeping skin clean and dry  - Evaluate need for skin moisturizer/barrier cream  - Collaborate with interdisciplinary team (i e  Nutrition, Rehabilitation, etc )   - Patient/family teaching   Outcome: Progressing      Problem: Nutrition/Hydration-ADULT  Goal: Nutrient/Hydration intake appropriate for improving, restoring or maintaining nutritional needs  Monitor and assess patient's nutrition/hydration status for malnutrition (ex- brittle hair, bruises, dry skin, pale skin and conjunctiva, muscle wasting, smooth red tongue, and disorientation)  Collaborate with interdisciplinary team and initiate plan and interventions as ordered  Monitor patient's weight and dietary intake as ordered or per policy  Utilize nutrition screening tool and intervene per policy  Determine patient's food preferences and provide high-protein, high-caloric foods as appropriate       INTERVENTIONS:  - Monitor oral intake, urinary output, labs, and treatment plans  - Assess nutrition and hydration status and recommend course of action  - Evaluate amount of meals eaten  - Assist patient with eating if necessary   - Allow adequate time for meals  - Recommend/ encourage appropriate diets, oral nutritional supplements, and vitamin/mineral supplements  - Order, calculate, and assess calorie counts as needed  - Recommend, monitor, and adjust tube feedings and TPN/PPN based on assessed needs  - Assess need for intravenous fluids  - Provide specific nutrition/hydration education as appropriate  - Include patient/family/caregiver in decisions related to nutrition   Outcome: Progressing      Problem: PAIN - ADULT  Goal: Verbalizes/displays adequate comfort level or baseline comfort level  Interventions:  - Encourage patient to monitor pain and request assistance  - Assess pain using appropriate pain scale  - Administer analgesics based on type and severity of pain and evaluate response  - Implement non-pharmacological measures as appropriate and evaluate response  - Consider cultural and social influences on pain and pain management  - Notify physician/advanced practitioner if interventions unsuccessful or patient reports new pain   Outcome: Progressing      Problem: DISCHARGE PLANNING - CARE MANAGEMENT  Goal: Discharge to post-acute care or home with appropriate resources  INTERVENTIONS:  - Conduct assessment to determine patient/family and health care team treatment goals, and need for post-acute services based on payer coverage, community resources, and patient preferences, and barriers to discharge  - Address psychosocial, clinical, and financial barriers to discharge as identified in assessment in conjunction with the patient/family and health care team  - Arrange appropriate level of post-acute services according to patient's   needs and preference and payer coverage in collaboration with the physician and health care team  - Communicate with and update the patient/family, physician, and health care team regarding progress on the discharge plan  - Arrange appropriate transportation to post-acute venues  - Pt to d/c with appropriate resources when medically stable     Outcome: Progressing

## 2019-01-27 NOTE — PLAN OF CARE
Problem: OCCUPATIONAL THERAPY ADULT  Goal: Performs self-care activities at highest level of function for planned discharge setting  See evaluation for individualized goals  Treatment Interventions: ADL retraining, Functional transfer training, UE strengthening/ROM, Endurance training, Patient/family training, Equipment evaluation/education, Compensatory technique education, Continued evaluation, Energy conservation, Activityengagement          See flowsheet documentation for full assessment, interventions and recommendations  Outcome: Progressing  Limitation: Decreased ADL status, Decreased Safe judgement during ADL, Decreased UE strength, Decreased endurance, Decreased sensation, Decreased self-care trans, Decreased high-level ADLs  Prognosis: Fair  Assessment: Pt participates in OT session with focus on bathing, dressing, toileting, activity tolerance, and transfers to increase I for d/c  Pt min A supine to sit EOB  Pt min Ax1 sit to stand with HHA and SBAx1 for line management for functional mobility into hallway  Pt trialed ambulation without AD this session with min Ax1  Pt CGA standing tolerance activity for reaching activity to reach for common everyday objects from table and maintaining spinal precautions during activity  Pt dependent toileting with steadying A for use of urnial for bladder management  Pt min A bathing with SB while seated EOB and requires A to wash R foot and buttocks 2* decreased ROM  Pt min A UB dressing to don/doff gown  Pt mod A LB dressing to don/doff socks and pants and requires A to don sock on RLE and to thread RLE into pants  Pt requires vc for rest breaks and reminders to take deep breaths  Pt setup grooming to brush teeth and wash face  Pt mod A sit to supine from EOB  Bed alarm turned on post session  Pt will continue to benefit from activity tolerance, adls, and transfers    Recommendation: Physiatry Consult  OT Discharge Recommendation: Short Term Rehab  OT - OK to Discharge: Yes (when medically stable)

## 2019-01-27 NOTE — PHYSICAL THERAPY NOTE
PT TREATMENT       01/27/19 0954   Pain Assessment   Pain Assessment No/denies pain   Pain Score No Pain   Precautions   Spinal Precautions Cervical Spine Precuations: no bending, no lifting, no twisting  Upon arrival, patient did not recall 3/3 spinal precautions; continue to reinforce    Restrictions/Precautions   Weight Bearing Precautions Per Order No   Braces or Orthoses MAFO;C/S Collar  (MAFO RLE )   Other Precautions Chair Alarm; Bed Alarm;Multiple lines;Telemetry; Fall Risk;Pain;Spinal precautions   General   Chart Reviewed Yes   Response to Previous Treatment Patient with no complaints from previous session  Family/Caregiver Present No   Cognition   Overall Cognitive Status WFL   Arousal/Participation Cooperative   Attention Within functional limits   Orientation Level Oriented X4   Memory Decreased recall of precautions   Following Commands Follows multistep commands with increased time or repetition   Subjective   Subjective Upon arrival, patient working with OT  Agreeable for PT session   Bed Mobility   Additional Comments Pt sitting EOB upon arrival and remained EOB with OT upon PT departure    Transfers   Sit to Stand 4  Minimal assistance   Additional items Assist x 1   Stand to Sit 4  Minimal assistance   Additional items Assist x 1   Stand pivot 3  Moderate assistance  (HHA, no RW)   Additional items Assist x 1   Ambulation/Elevation   Gait pattern R Hemiparesis; Poor UE support; Improper Weight shift; Antalgic;Narrow LEAH; Decreased foot clearance;Decreased R stance; Short stride; Step to   Gait Assistance 3  Moderate assist   Additional items Assist x 2  (overall Ax1 with CGA 2nd person and assist with lines )   Assistive Device Rolling walker;None   Distance 5' with RW + 100' with HHA    Balance   Static Sitting Fair +   Dynamic Sitting Fair   Static Standing Fair   Dynamic Standing Fair -   Ambulatory Poor   Endurance Deficit   Endurance Deficit Yes   Endurance Deficit Description fatigue, rest breaks required    Activity Tolerance   Activity Tolerance Patient tolerated treatment well;Patient limited by fatigue   Medical Staff Made Aware Tiana Ramos for PT/OT co-treat   Nurse Made Aware KIMBERLY Grajeda   Exercises   Balance training  ambulation within room with HHA- lateral and retro ambulation, single step forward/back for counter access, reaching activity with no UE support; 60 sec x2 normal LEAH with no UE support    Assessment   Prognosis Good   Problem List Decreased strength;Decreased range of motion;Decreased endurance;Decreased mobility; Impaired balance;Decreased coordination; Impaired sensation;Decreased skin integrity;Pain;Orthopedic restrictions   Assessment Patient engaged in PT treatment session focussing on gait training and neuro re-ed  PT/OT co-treat performed and appropriate at this time 2* medical complexity in ICU, balance deficits, and generalized weakness  Gait performed in hallways with no use of RW in which patient required moderate Ax1 + CGA/Suzy x1 with HHA  Patient with improved gait mechanics this session using RLE MAFO however, patient with occasional LOB in which he demonstrated impaired righting reactions and need for assist to recover  Patient notable unsteady during unsupported stance and dynamic reach activites in which Suzy was required during tasks  OT  assessing self care, reach, etc at this time while PT focussing on balance  Patient is a good rehab candidate and requires skilled PT intervention at this time to maximize funciton and reduce caregiver burden      Goals   Patient Goals Patient would like to walk    LTG Expiration Date 02/08/19   Long Term Goal #1 1) PERFORM BED MOBILITY MOD-I TO PARTICIPATE IN FREQUENT REPOSITIONING AND IMPROVE SKIN INTEGRITY; 2) PERFORM SIT<-->STAND TRANSFER MOD-I TO PROMOTE I WITH TOILETING AND OOB MOBILITY; 3) AMBULATE 200 FEET MOD-I W/ LEAST RESTRICTIVE DEVICE TO PARTICIPATE IN HOUSEHOLD AND COMMUNITY LEVEL AMBULATION; 4) IMPROVE B/L LE STRENGTH BY 1/2 GRADE TO IMPROVE EFFICIENCY OF TRANSFERS; 5) IMPROVE BALANCE BY 1 GRADE TO REDUCE RISK FOR FALLS; 6) NAVIGATE 12 STEPS S LEVEL IN ORDER TO SAFELY NAVIGATE MULTIPLE FLOORS AT HOME  Treatment Day 1   Plan   Treatment/Interventions Functional transfer training;LE strengthening/ROM; Therapeutic exercise; Endurance training;Cognitive reorientation;Patient/family training;Equipment eval/education; Bed mobility;Gait training; Compensatory technique education   Progress Progressing toward goals   PT Frequency Other (Comment)  (4-6x/wk)   Recommendation   Recommendation Post acute IP rehab   PT - OK to Discharge Yes  (to rehab when medically appropriate )   Additional Comments Sitting EOB upon PT departure with OT at bedside          Lorrie Cabot, PT

## 2019-01-28 PROBLEM — R00.1 BRADYCARDIA: Status: ACTIVE | Noted: 2019-01-28

## 2019-01-28 PROBLEM — D72.829 LEUKOCYTOSIS: Status: ACTIVE | Noted: 2019-01-28

## 2019-01-28 PROBLEM — R73.03 PREDIABETES: Status: ACTIVE | Noted: 2019-01-28

## 2019-01-28 LAB
GLUCOSE SERPL-MCNC: 103 MG/DL (ref 65–140)
GLUCOSE SERPL-MCNC: 119 MG/DL (ref 65–140)
GLUCOSE SERPL-MCNC: 170 MG/DL (ref 65–140)
GLUCOSE SERPL-MCNC: 177 MG/DL (ref 65–140)

## 2019-01-28 PROCEDURE — 82948 REAGENT STRIP/BLOOD GLUCOSE: CPT

## 2019-01-28 PROCEDURE — 99232 SBSQ HOSP IP/OBS MODERATE 35: CPT | Performed by: NURSE PRACTITIONER

## 2019-01-28 PROCEDURE — 99024 POSTOP FOLLOW-UP VISIT: CPT | Performed by: PHYSICIAN ASSISTANT

## 2019-01-28 PROCEDURE — 97535 SELF CARE MNGMENT TRAINING: CPT

## 2019-01-28 RX ORDER — DEXAMETHASONE 2 MG/1
2 TABLET ORAL EVERY 12 HOURS SCHEDULED
Status: DISCONTINUED | OUTPATIENT
Start: 2019-02-04 | End: 2019-01-31 | Stop reason: HOSPADM

## 2019-01-28 RX ORDER — DEXAMETHASONE 2 MG/1
2 TABLET ORAL EVERY 6 HOURS SCHEDULED
Status: DISCONTINUED | OUTPATIENT
Start: 2019-01-29 | End: 2019-01-31 | Stop reason: HOSPADM

## 2019-01-28 RX ORDER — DEXAMETHASONE 2 MG/1
2 TABLET ORAL DAILY
Status: DISCONTINUED | OUTPATIENT
Start: 2019-02-07 | End: 2019-01-31 | Stop reason: HOSPADM

## 2019-01-28 RX ORDER — DEXAMETHASONE 2 MG/1
2 TABLET ORAL EVERY 8 HOURS SCHEDULED
Status: DISCONTINUED | OUTPATIENT
Start: 2019-02-01 | End: 2019-01-31 | Stop reason: HOSPADM

## 2019-01-28 RX ORDER — ACETAMINOPHEN 325 MG/1
975 TABLET ORAL EVERY 8 HOURS SCHEDULED
Status: DISCONTINUED | OUTPATIENT
Start: 2019-01-28 | End: 2019-01-31 | Stop reason: HOSPADM

## 2019-01-28 RX ADMIN — TAMSULOSIN HYDROCHLORIDE 0.4 MG: 0.4 CAPSULE ORAL at 17:28

## 2019-01-28 RX ADMIN — METHOCARBAMOL 750 MG: 750 TABLET, FILM COATED ORAL at 05:56

## 2019-01-28 RX ADMIN — ACETAMINOPHEN 650 MG: 325 TABLET, FILM COATED ORAL at 05:56

## 2019-01-28 RX ADMIN — DEXAMETHASONE SODIUM PHOSPHATE 4 MG: 4 INJECTION, SOLUTION INTRAMUSCULAR; INTRAVENOUS at 14:28

## 2019-01-28 RX ADMIN — DEXAMETHASONE SODIUM PHOSPHATE 4 MG: 4 INJECTION, SOLUTION INTRAMUSCULAR; INTRAVENOUS at 05:56

## 2019-01-28 RX ADMIN — METHOCARBAMOL 750 MG: 750 TABLET, FILM COATED ORAL at 12:50

## 2019-01-28 RX ADMIN — ENOXAPARIN SODIUM 40 MG: 40 INJECTION SUBCUTANEOUS at 08:50

## 2019-01-28 RX ADMIN — LEVOTHYROXINE SODIUM 25 MCG: 25 TABLET ORAL at 05:56

## 2019-01-28 RX ADMIN — SENNOSIDES AND DOCUSATE SODIUM 2 TABLET: 8.6; 5 TABLET ORAL at 08:50

## 2019-01-28 RX ADMIN — DEXAMETHASONE SODIUM PHOSPHATE 4 MG: 4 INJECTION, SOLUTION INTRAMUSCULAR; INTRAVENOUS at 21:54

## 2019-01-28 RX ADMIN — ACETAMINOPHEN 975 MG: 325 TABLET, FILM COATED ORAL at 21:54

## 2019-01-28 RX ADMIN — METHOCARBAMOL 750 MG: 750 TABLET, FILM COATED ORAL at 17:28

## 2019-01-28 NOTE — SOCIAL WORK
Cm reviewed patient during care coordination rounds  Cm informed [facility] willing to accept  Per rehab facility insurance authorization request was submitted  Cm awaiting insurance determination

## 2019-01-28 NOTE — PROGRESS NOTES
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  3  Intramedullary abnormality of spinal cord  4  Syrinx of spinal cord  5  Chronic bilateral low back pain  6  Spinal cord injury   7  Weakness right lower extremity  8  Neurologic gait dysfunction  9  Lower extremity myelopathy  10  Spasticity  11  BPH associated with nocturia   12  Hypothyroidism  13  Mixed hyperlipidemia  14  Impaired Fasting glucose    Plan:  Neuro:  · Exam GCS 15,  Motor: Strength BUE 5/5, RLE HF 4/5, HF/PF /DF 5/5, LLE 5/5, proprioception normal BUE/BLE, sensation normal to LTx 4, decreased to LT on R foot, decreased to pinprick in bilateral hands from wrist, decreased to pinprick in RLE > LLE, Reflexes 3+ BUE/BLE, clonus bilaterally L> R  No drift bilaterally  Aspen Tx cervical brace in place  ? Posterior cervicothoracic incision clean,dry and intact with dressing  Dressing changed with sterile gauze and tape  · Right thoracic JAMAR Drain fluid consistent with spinal fluid- clear with mild blood tinge  Output 145 cc/24 hrs, 85 cc/8hrs  Discontinued today, suture tied  Pt tolerated drain removal well  No drainage after drain removal    · Continue regular neurologic checks  · Imaging reviewed personally and by attending  Final results as below  ? Upright  Xray Cervical and thoracic spine 1/25/19 Posterior spinal fusion instrumentation from C2 through T5 without evidence of hardware failure  ? MRI Cervical and Thoracic w wo contrast 1/7/19: Heterogeneous neoplasm of the cervicothoracic junction consistent with primary glial tumor   Pathology extends from the C4-C5 disc space to the caudal T3 level  · Pain control per primary team- SLIM  · Eval and mobilize per PT/OT-    ?  MAFO boot for right foot drop  ? Will likely need rehab- Plan is for discharge to [facility]  · DVT PPX:  SCDs, Lovenox  · Steroid therapy :  Decadron injection 4 mg IV q  8 hrs today  Begin 2 mg po q 6 hrs tomorrow and wean every 72 hrs  · Pathology: Intramedullary mass sent to pathology, results pending  Glioma on frozen section  · Aspen Tx cervical brace to be worn at all times, for showers switch to [place] collar       CV:Bradycardia- SLIM monitoring  Pulm: Pulmonary toileting with Incentive Spirometry  GI:   · Bowel regimen with Senokot (Senna+ Docusate sodium) 8 6-50mg/tab 2 tabs PO daily  · Zofran inj 4mg q 6 hrs prn for nausea  · Mylanta prn for indigestion, heart burn  :  pt has been voiding on urinal, Flomax 0 4 mg PO daily  Heme: Labs:  · BMP 1/27/19 : Na wnl at 140, K wnl at 3 9  · CBC 1/27/19: Hgb decreased at 9 1 but improved from prior 1/26/19 at 8 8  · Will continue to monitor  BMP and CBC ordered for AM draw  Endo:   · Impaired fasting glucose/Hyperglycemia secondary to steroid use  ? Insulin (Humalog) 100 units/ml subq injection per algorithm  ? HgbA1C 6 3 %  · Hypothyroidism: Levothyroxine 25 mcg PO daily   · Hyperlipidemia: Diet controlled  FEN:  · Nutrition:Pt tolerating regular carbohydrate controlled diet  ID: Leukocytosis: WBC at 16 04 secondary to steroids,  Temp at 99  No signs of infection at incision site       · Dispo: Pt will be discharged to [facility] for rehab today vs tomorrow  Pt is okay for discharge to [facility] from neurosurgery standpoint  · Neurosurgery will see pt as needed during the remainder of his hospitalization  Pt has a 2 week post op visit with Dr Maxx Ivy on Feb 6th for discussion of pathology and incision check   Please call with any questions or concerns       Subjective/Objective   Chief Complaint: "I just have pain around my incision, but do not have a headache today"/ Follow up POD 4 s/p Posterior C4-T3 laminectomy; resection of intramedullary mass; C2-T5 fixation/fusion    Subjective:  Patient reports that he continues to have posterior cervicothoracic pain around the incision  Patient reports that his pain at present is well controlled on current pain medication  Patient denies headache at this time reports that the Fioricet helped him and he has not needed it the past 24 hours  Patient reports he continues to numbness in the right lower extremity  Patient reports that he believes this has slightly improved since his surgery  Patient reports the chronic numbness in bilateral hands has not changed  Patient reports has not had a bowel movement in the last 2 days but denies nausea, vomiting or abdominal pain  Patient reports he has been able to void using the urinal   Patient denies losing control of his bowel or bladder movement  Patient denies chest pain, palpitations or shortness of breath  Objective: AAO X 3, NAD  I/O       01/26 0701 - 01/27 0700 01/27 0701 - 01/28 0700 01/28 0701 - 01/29 0700    P  O  480 770 300    I V  (mL/kg) 4127 4 (49 1) 1080 (12 9)     Total Intake(mL/kg) 4607 4 (54 9) 1850 (22) 300 (3 6)    Urine (mL/kg/hr) 4125 (2) 2070 (1) 225 (1 2)    Drains 243 145     Total Output 4368 2215 225    Net +239 4 -365 +75                 Invasive Devices     Peripheral Intravenous Line            Peripheral IV 01/24/19 Left Hand 3 days    Peripheral IV 01/25/19 Right Antecubital 3 days          Drain            Closed/Suction Drain Right Back Bulb 7 Fr  3 days                Physical Exam:  Vitals: Blood pressure 116/58, pulse (!) 53, temperature 99 °F (37 2 °C), resp  rate 18, height 5' 7" (1 702 m), weight 84 kg (185 lb 3 oz), SpO2 97 %  ,Body mass index is 29 kg/m²      Hemodynamic Monitoring: MAP: Arterial Line MAP (mmHg): 73 mmHg    General appearance: alert, appears stated age, cooperative and no distress  Head: Normocephalic, without obvious abnormality, atraumatic  Eyes: EOMI, PERRL   Neck: supple, symmetrical, trachea midline, cervical collar in place, posterior cervicothoracic incision with clean dressing and sutures intact, clean and dry  Drain had clear fluid with mild blood tinge consistent with spinal fluid  Drain was discontinued and suture tied  No signs of infection at incisions  Back: no kyphosis present, no tenderness to percussion or palpation  Lungs: non labored breathing  Heart: Bradycardia  Neurologic:   Mental status: Alert, oriented x 3, thought content appropriate  Cranial nerves: grossly intact (Cranial nerves II-XII)  Sensory: normal to LT X 4  Motor: moving all extremities,  Strength BUE 5/5, RLE HF 4/5, HF/PF /DF 5/5, LLE 5/5  Reflexes: decreased to LT on R foot, decreased to pinprick in bilateral hands from wrist, decreased to pinprick in RLE > LLE  Coordination: finger to nose normal bilaterally, no drift bilaterally       Lab Results:    Results from last 7 days  Lab Units 01/27/19  0550 01/26/19  0532 01/25/19  0540 01/22/19  1523   WBC Thousand/uL 16 04* 14 29* 9 96 8 44   HEMOGLOBIN g/dL 9 1* 8 8* 9 5* 13 0   HEMATOCRIT % 29 0* 27 8* 29 8* 41 0   PLATELETS Thousands/uL 241 206 226 303   NEUTROS PCT %  --   --   --  70   MONOS PCT %  --   --   --  7       Results from last 7 days  Lab Units 01/27/19  0550 01/25/19  0540 01/22/19  1523   POTASSIUM mmol/L 3 9 4 0 4 2   CHLORIDE mmol/L 110* 108 103   CO2 mmol/L 25 22 27   BUN mg/dL 15 21 21   CREATININE mg/dL 0 65 0 75 0 80   CALCIUM mg/dL 7 7* 7 7* 8 6   ALK PHOS U/L  --   --  76   ALT U/L  --   --  30   AST U/L  --   --  15               Results from last 7 days  Lab Units 01/22/19  1523   INR  0 99   PTT seconds 32     Imaging Studies: I have personally reviewed pertinent reports  and I have personally reviewed pertinent films in PACS  Attending reviewed pertinent reports and films in PACS  Xr Cervical Spine 2 Or 3 Views    Result Date: 1/26/2019  Impression: Posterior spinal fusion instrumentation from C2 through T5 without evidence of hardware failure  Workstation performed: [id number]     Xr Spine Cervical 2 Or 3 Vw Injury    Result Date: 1/25/2019  Impression: Fluoroscopic guidance provided for surgical procedure  Please refer to the separate procedure notes for additional details  Workstation performed: [id number]     Xr Spine Thoracic 3 Vw    Result Date: 1/26/2019  Impression: Posterior spinal fusion instrumentation from C2 through T5 without radiographic evidence of hardware failure  Workstation performed: [id number]       EKG, Pathology, and Other Studies: I have personally reviewed pertinent reports  VTE Pharmacologic Prophylaxis: Enoxaparin (Lovenox)    VTE Mechanical Prophylaxis: sequential compression device    PLEASE NOTE:  This encounter was completed utilizing the [employer]/Fluency Direct Speech Voice Recognition Software  Grammatical errors, random word insertions, pronoun errors and incomplete sentences are occasional consequences of the system due to software limitations, ambient noise and hardware issues  These may be missed by proof reading prior to affixing electronic signature  Any questions or concerns about the content, text or information contained within the body of this dictation should be directly addressed to the advanced practitioner or physician for clarification  Please do not hesitate to call me directly if you have any questions or concerns

## 2019-01-28 NOTE — PROGRESS NOTES
[address] [facility]    Progress Note - Rosmery Mercer 1960, 60 y o  male MRN: [id number]    Unit/Bed#: [facility] 626-01 Encounter: [id number]    Primary Care Provider: Patrice Pretty MD   Date and time admitted to hospital: 1/24/2019 10:34 AM    * Glioma of spinal cord (HCC)   Assessment & Plan    · POD 4 Posterior C4-T3 Laminectomy, resection of intramedullary mass, C2-T5 fixation/fusion by Dr Mee Ivy  Transferred from ICU 1/27  · JAMAR drain in place, neurosurgery to discontinue today  · Neuro exam is stable  Continue neurochecks Q4 hours and nursing to notify neurosurgery with changes  · Pain control with ATC tylenol, Robaxin and PRN Oxycodone  Fiorcet for HA  Pain well controlled per patient  · Continue IV Decadron, appreciate neurosurgery recommendations in regards to steroid course  · Plan for discharge to rehab [facility]) pending insurance authorization  Neurologic gait dysfunction   Assessment & Plan    · PT/OT recommending rehab  Awaiting insurance auth to [facility] which has accepted patient  Leukocytosis   Assessment & Plan    · Has been trending up  Afebrile  Likely 2/2 steroids  · Will monitor closely     Hypothyroidism   Assessment & Plan    · Continue Synthroid  Prediabetes   Assessment & Plan    · A1C 6 3  Currently on SSI given IV Steroids  Will continue along with diabetic diet  · Monitor accuchecks   · Outpatient follow up     Bradycardia   Assessment & Plan    · Noted, rates into upper 40's at times  Not on any medications that can alter HR  Patient is asymptomatic  · Will discontinue telemetry and monitor on [employer] system  · Patient states that his HR is always on the lower side  Pharmacologic: Enoxaparin (Lovenox)  Mechanical VTE Prophylaxis in Place: Yes    Patient Centered Rounds: I have performed bedside rounds with nursing staff today      Discussions with Specialists or Other Care Team Provider: nursing, case management, neurosurgery Education and Discussions with Family / Patient: patient  Declined update to wife  Time Spent for Care: 30 minutes  More than 50% of total time spent on counseling and coordination of care as described above  Current Length of Stay: 4 day(s)    Current Patient Status: Inpatient   Certification Statement: The patient will continue to require additional inpatient hospital stay due to pending neurosurgery clearance, discharge plan to rehab [facility]    Discharge Plan / Estimated Discharge Date: Pending neuro surgery clearance and insurance auth to [facility], likely tomorrow  Code Status: Level 1 - Full Code      Subjective:   Patient offers no complaints  Denies any chest pain, shortness of breath, palpitations, dizziness or lightheadedness  States that he feels like his heart rate is always on the lower side  Pain is manageable with current regimen  Eating and drinking okay  Feeling well overall  Has not been OOB today, but yesterday was a minimal assist   He was able to wash himself up today without much difficulty  Objective:     Vitals:   Temp (24hrs), Av °F (37 2 °C), Min:98 7 °F (37 1 °C), Max:99 3 °F (37 4 °C)    Temp:  [98 7 °F (37 1 °C)-99 3 °F (37 4 °C)] 99 °F (37 2 °C)  HR:  [52-62] 53  Resp:  [16-23] 18  BP: ()/(51-59) 116/58  SpO2:  [96 %-100 %] 97 %  Body mass index is 29 kg/m²  Input and Output Summary (last 24 hours): Intake/Output Summary (Last 24 hours) at 19 1348  Last data filed at 19 0838   Gross per 24 hour   Intake             1150 ml   Output             1730 ml   Net             -580 ml       Physical Exam:     Physical Exam   Constitutional: He is oriented to person, place, and time  No distress  HENT:   Head: Normocephalic  Eyes: Pupils are equal, round, and reactive to light  Neck: Normal range of motion  Cardiovascular: Normal rate and intact distal pulses  No murmur heard    Bradycardic at times   Pulmonary/Chest: Effort normal and breath sounds normal    Abdominal: Soft  Bowel sounds are normal    Neurological: He is alert and oriented to person, place, and time  Skin: Skin is warm and dry  Nursing note and vitals reviewed  Additional Data:     Labs:      Results from last 7 days  Lab Units 01/27/19  0550  01/22/19  1523   WBC Thousand/uL 16 04*  < > 8 44   HEMOGLOBIN g/dL 9 1*  < > 13 0   HEMATOCRIT % 29 0*  < > 41 0   PLATELETS Thousands/uL 241  < > 303   NEUTROS PCT %  --   --  70   LYMPHS PCT %  --   --  22   MONOS PCT %  --   --  7   EOS PCT %  --   --  1   < > = values in this interval not displayed  Results from last 7 days  Lab Units 01/27/19  0550  01/22/19  1523   POTASSIUM mmol/L 3 9  < > 4 2   CHLORIDE mmol/L 110*  < > 103   CO2 mmol/L 25  < > 27   BUN mg/dL 15  < > 21   CREATININE mg/dL 0 65  < > 0 80   CALCIUM mg/dL 7 7*  < > 8 6   ALK PHOS U/L  --   --  76   ALT U/L  --   --  30   AST U/L  --   --  15   < > = values in this interval not displayed      Results from last 7 days  Lab Units 01/22/19  1523   INR  0 99         Recent Cultures (last 7 days):           Last 24 Hours Medication List:     Current Facility-Administered Medications:  acetaminophen 975 mg Oral Q8H [address] CLAUDIO Villa   aluminum-magnesium hydroxide-simethicone 30 mL Oral Q6H PRN Renee Small MD   butalbital-acetaminophen-caffeine 1 tablet Oral Q4H PRN Johanna Calhoun PA-C   dexamethasone 4 mg Intravenous Q8H [address] Renee Small MD   enoxaparin 40 mg Subcutaneous Daily Renee Small MD   insulin lispro 2-12 Units Subcutaneous 4x Daily (AC & HS) Gibson Murrell MD   levothyroxine 25 mcg Oral Early Morning Renee Small MD   methocarbamol 750 mg Oral Q6H [address] Renee Small MD   naloxone 0 04 mg Intravenous Q1MIN PRN Renee Small MD   ondansetron 4 mg Intravenous Q4H PRN Alva Lucas   oxyCODONE 10 mg Oral Q4H PRN Renee Small MD   oxyCODONE 5 mg Oral Q4H PRN Renee Small MD   senna-docusate sodium 2 tablet Oral Daily Renee Small, MD   tamsulosin 0 4 mg Oral Daily With Mardee Bamberger, MD        Today, Patient Was Seen By: CLAUDIO Brown    ** Please Note: Dragon 360 Dictation voice to text software may have been used in the creation of this document   **

## 2019-01-28 NOTE — ASSESSMENT & PLAN NOTE
· A1C 6 3  Currently on SSI given IV Steroids  Will continue along with diabetic diet     · Monitor accuchecks   · Outpatient follow up

## 2019-01-28 NOTE — PLAN OF CARE
DISCHARGE PLANNING - CARE MANAGEMENT     Discharge to post-acute care or home with appropriate resources Progressing        MUSCULOSKELETAL - ADULT     Maintain or return mobility to safest level of function Progressing     Maintain proper alignment of affected body part Progressing        NEUROSENSORY - ADULT     Achieves stable or improved neurological status Progressing     Achieves maximal functionality and self care Progressing        Nutrition/Hydration-ADULT     Nutrient/Hydration intake appropriate for improving, restoring or maintaining nutritional needs Progressing        PAIN - ADULT     Verbalizes/displays adequate comfort level or baseline comfort level Progressing        Potential for Falls     Patient will remain free of falls Progressing        Prexisting or High Potential for Compromised Skin Integrity     Skin integrity is maintained or improved Progressing        SKIN/TISSUE INTEGRITY - ADULT     Skin integrity remains intact Progressing     Incision(s), wounds(s) or drain site(s) healing without S/S of infection Progressing     Oral mucous membranes remain intact Progressing

## 2019-01-28 NOTE — ASSESSMENT & PLAN NOTE
· POD 4 Posterior C4-T3 Laminectomy, resection of intramedullary mass, C2-T5 fixation/fusion by Dr Leeanna Guaman  Transferred from ICU 1/27  · JAMAR drain in place, neurosurgery to discontinue today  · Neuro exam is stable  Continue neurochecks Q4 hours and nursing to notify neurosurgery with changes  · Pain control with ATC tylenol, Robaxin and PRN Oxycodone  Fiorcet for HA  Pain well controlled per patient  · Continue IV Decadron, appreciate neurosurgery recommendations in regards to steroid course  · Plan for discharge to rehab [facility]) pending insurance authorization

## 2019-01-28 NOTE — ASSESSMENT & PLAN NOTE
· Noted, rates into upper 40's at times  Not on any medications that can alter HR  Patient is asymptomatic  · Will discontinue telemetry and monitor on [employer] system  · Patient states that his HR is always on the lower side

## 2019-01-28 NOTE — PROGRESS NOTES
PHYSICAL MEDICINE AND REHABILITATION PROGRESS NOTE  Una Cobian III 60 y o  male MRN: [id number]  Unit/Bed#: [facility] 626-01 Encounter: [id number]    Requested by (Physician/Service): Bulmaro Harper MD      Chief complaint: right foot drop and right sided numbness     HPI: Miko Duff is a 60 y o  male with a PMH of bilateral hand numbness for several years  He was also experiencing increased difficulty with walking with multiple falls over the last several months as well as occasional burning pain in the left forearm and hand  Imaging showed heterogeneously enhancing intramedullary lesion 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/5 to T3, with a large cystic/syrinx from C7-T and C2-T5 fixation/fusion  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  He underwent a posterior C4-T3 laminectomy for resection of the mass  He is currently POD #4  PM&R was consulted for rehabilitation recommendations        Subjective: The patient was seen in his room  He reports he continues with right foot drop and he still has numbness right > left but has improved  His neuropathic pain has improved as well  He has been fitted for a MAFO which he states has helped with his ambulation  He denies any urinary retention, he has not had a bowel movement post op  He lives with his wife who does work and he will be alone from about 11 to 5 or 6 during the week  He does not have 1st floor set up available        Review of Systems: A 10-point review of systems was performed  Negative except as listed above  - Chart reviewed  - Labs reviewed  - Imaging reviewed     Assessment/Plan:   - Agree with MAFO for right foot drop  - Mann has been d/c and the patient is voiding, he is on flomax as an outpt, continue to monitor for urinary retention  Patient reports that he has had PVR checked and they have been WNL  - The patient reports he has not has a bowel movement post op  Senokot ordered, consider suppository PRN    - The patient is a good candidate for acute inpatient rehabilitation once medically stable  The patient will require insurance authorization  Thank you for allowing the PM&R service to participate in the care of this patient  We will continue to follow Jo Grider III's progress with you  Please do not hesitate to call with questions or concerns       PT OT    01/27/19 0909   Pain Assessment   Pain Assessment No/denies pain   Pain Score No Pain   Precautions   Spinal Precautions Cervical Spine Precuations: no bending, no lifting, no twisting  Upon arrival, patient did not recall 3/3 spinal precautions; continue to reinforce    Restrictions/Precautions   Weight Bearing Precautions Per Order No   Braces or Orthoses MAFO;C/S Collar  (MAFO RLE )   Other Precautions Chair Alarm; Bed Alarm;Multiple lines;Telemetry; Fall Risk;Pain;Spinal precautions   General   Chart Reviewed Yes   Response to Previous Treatment Patient with no complaints from previous session  Family/Caregiver Present No   Cognition   Overall Cognitive Status WFL   Arousal/Participation Cooperative   Attention Within functional limits   Orientation Level Oriented X4   Memory Decreased recall of precautions   Following Commands Follows multistep commands with increased time or repetition   Subjective   Subjective Upon arrival, patient working with OT  Agreeable for PT session   Bed Mobility   Additional Comments Pt sitting EOB upon arrival and remained EOB with OT upon PT departure    Transfers   Sit to Stand 4  Minimal assistance   Additional items Assist x 1   Stand to Sit 4  Minimal assistance   Additional items Assist x 1   Stand pivot 3  Moderate assistance  (HHA, no RW)   Additional items Assist x 1   Ambulation/Elevation   Gait pattern R Hemiparesis; Poor UE support; Improper Weight shift; Antalgic;Narrow LEAH; Decreased foot clearance;Decreased R stance; Short stride; Step to   Gait Assistance 3  Moderate assist   Additional items Assist x 2  (overall Ax1 with CGA 2nd person and assist with lines )   Assistive Device Rolling walker;None   Distance 5' with RW + 100' with HHA    Balance   Static Sitting Fair +   Dynamic Sitting Fair   Static Standing Fair   Dynamic Standing Fair -   Ambulatory Poor   Endurance Deficit   Endurance Deficit Yes   Endurance Deficit Description fatigue, rest breaks required    Activity Tolerance   Activity Tolerance Patient tolerated treatment well;Patient limited by fatigue   Medical Staff Made Aware Odilia Hernandez for PT/OT co-treat   Nurse Made Aware KIMBERLY Grajeda   Exercises   Balance training  ambulation within room with HHA- lateral and retro ambulation, single step forward/back for counter access, reaching activity with no UE support; 60 sec x2 normal LEAH with no UE support    Assessment   Prognosis Good   Problem List Decreased strength;Decreased range of motion;Decreased endurance;Decreased mobility; Impaired balance;Decreased coordination; Impaired sensation;Decreased skin integrity;Pain;Orthopedic restrictions   Assessment Patient engaged in PT treatment session focussing on gait training and neuro re-ed  PT/OT co-treat performed and appropriate at this time 2* medical complexity in ICU, balance deficits, and generalized weakness  Gait performed in hallways with no use of RW in which patient required moderate Ax1 + CGA/Suzy x1 with HHA  Patient with improved gait mechanics this session using RLE MAFO however, patient with occasional LOB in which he demonstrated impaired righting reactions and need for assist to recover  Patient notable unsteady during unsupported stance and dynamic reach activites in which Suzy was required during tasks  OT  assessing self care, reach, etc at this time while PT focussing on balance  Patient is a good rehab candidate and requires skilled PT intervention at this time to maximize funciton and reduce caregiver burden          01/27/19 1017   Restrictions/Precautions   Weight Bearing Precautions Per Order No   Braces or Orthoses MAFO;C/S Collar  (MAFO RLE while ambulating)   Other Precautions Chair Alarm; Bed Alarm;Multiple lines;Telemetry;Spinal precautions; Fall Risk   Pain Assessment   Pain Assessment No/denies pain   Pain Score No Pain   ADL   Where Assessed Edge of bed   Grooming Assistance 5  Supervision/Setup   Grooming Deficit Setup   UB Bathing Assistance 5  Supervision/Setup   UB Bathing Deficit Setup   LB Bathing Assistance 3  Moderate Assistance   LB Bathing Deficit Setup; Buttocks;Right lower leg including foot   UB Dressing Assistance 4  Minimal Assistance   UB Dressing Deficit Setup;Pull around back   LB Dressing Assistance 3  Moderate Assistance   LB Dressing Deficit Setup;Don/doff R sock; Thread RLE into pants;Supervision/safety   LB Dressing Comments don/doff socks and pants   Toileting Assistance  1  Total Assistance   Toileting Deficit Setup;Steadying;Use of bedpan/urinal setup;Supervison/safety   Toileting Comments bladder management   Functional Standing Tolerance   Time 5 min   Activity static standing HHA   Bed Mobility   Supine to Sit 4  Minimal assistance   Additional items Assist x 1   Sit to Supine 3  Moderate assistance   Additional items Assist x 1   Transfers   Sit to Stand 4  Minimal assistance   Additional items Assist x 1   Stand to Sit 4  Minimal assistance   Additional items Assist x 1   Stand pivot 4  Minimal assistance   Additional items Assist x 1   Functional Mobility   Functional Mobility 4  Minimal assistance   Additional Comments SBAx1 line management, HHA min Ax1   Additional items Hand hold assistance   Cognition   Overall Cognitive Status WFL   Arousal/Participation Responsive   Attention Within functional limits   Orientation Level Oriented X4   Memory Decreased recall of precautions   Following Commands Follows all commands and directions without difficulty   Activity Tolerance   Activity Tolerance Patient tolerated treatment well   Assessment   Assessment Pt participates in OT session with focus on bathing, dressing, toileting, activity tolerance, and transfers to increase I for d/c  Pt min A supine to sit EOB  Pt min Ax1 sit to stand with HHA and SBAx1 for line management for functional mobility into hallway  Pt trialed ambulation without AD this session with min Ax1  Pt CGA standing tolerance activity for reaching activity to reach for common everyday objects from table and maintaining spinal precautions during activity  Pt dependent toileting with steadying A for use of urnial for bladder management  Pt min A bathing with SB while seated EOB and requires A to wash R foot and buttocks 2* decreased ROM  Pt min A UB dressing to don/doff gown  Pt mod A LB dressing to don/doff socks and pants and requires A to don sock on RLE and to thread RLE into pants  Pt requires vc for rest breaks and reminders to take deep breaths  Pt setup grooming to brush teeth and wash face  Pt mod A sit to supine from EOB  Bed alarm turned on post session  Pt will continue to benefit from activity tolerance, adls, and transfers  Physical Exam:  General: alert, no apparent distress, cooperative and comfortable, cervical collar in place  HEENT:  Head: Normal, normocephalic, atraumatic  ABDOMEN:  normal findings: non-tender  EXTREMITIES:  extremities normal, warm and well-perfused; no cyanosis, clubbing, or edema  NEURO:   right foot drop, right > left sided numbness to touch  PSYCH:  Normal   Musculoskeletal - Strength:   Right  Left  Site  Right  Left  Site    5 5  S Ab: Shoulder Abductors  4 4- HF: Hip Flexors    5 5  EF: Elbow Flexors       5  5  EE: Elbow Extensors  4 4 KE: Knee Extensors    5  5  WE: Wrist Extensors  3 4 DR: Dorsi Flexors    5  5  FF: Finger Flexors  4 5 PF: Plantar Flexors    5  5  HI: Hand Intrinsics  3 4 EHL: Extensor Hallucis Longus     Mliss Bouquet III Lives with: lives with their spouse    He lives in [facility]) single family home  The living area: bedroom on 2nd floor and bathroom on 2nd floor  Equipment in home: None  There 1 step to enter the home  Patient/family's goals: Return to previous home/apartment  The patient will not have 24 hour ARC Supervision/physical assistance: supervision/physical assistance available upon discharge      Allergies: Allergies   Allergen Reactions    Bee Venom Hives, Itching and Edema     Category: Allergy;     Penicillins Hives and Itching     Category: Allergy;          Past Medical History:   Past Surgical History:   Family History:   Social history:   Past Medical History:   Diagnosis Date    BPH associated with nocturia     Cervical spinal mass ([facility] [address] ) 01/07/2019    cervicothoracic    Disease of thyroid gland     Herniated disc, cervical     Hyperlipidemia     Impaired fasting glucose     Past Surgical History:   Procedure Laterality Date    APPENDECTOMY      COLONOSCOPY  03/13/2012    NORMAL; RECHECK IN 5 YEARS    NV BX/EXCIS SPIN PATEL,INDUR,INMED,CERV N/A 1/24/2019    Procedure: Posterior C4-T3 laminectomy; resection of intramedullary mass C5-T3, including fenestration of syrinx C7-T2; C3-T4 fixation/fusion; additional levels as needed;  Surgeon: Mp Bolanos MD;  [facility]: [facility];  Service: Neurosurgery    WISDOM TOOTH EXTRACTION       Family History   Problem Relation Age of Onset    Diabetes Mother     Hypertension Father     Cerebral palsy Sister       Social History     Social History    Marital status: /Civil Union     Spouse name: Sakshi De La Torre Number of children: 3    Years of education: 15     Occupational History          Social History Main Topics    Smoking status: Former Smoker     Quit date: 5/16/1993    Smokeless tobacco: Never Used    Alcohol use Yes      Comment: SOCIAL     Drug use: No    Sexual activity: Not Asked     Other Topics Concern    None     Social History Narrative    Lives at home with spouse and 3 children    Has 3 biological children and 3 of spouses children total of 6 children      Vital Signs: Reviewed    Temp:  [98 7 °F (37 1 °C)-99 3 °F (37 4 °C)] 99 °F (37 2 °C)  HR:  [52-62] 53  Resp:  [16-23] 18  BP: ()/(51-59) 116/58  Arterial Line BP: (101-108)/(48-60) 101/60   Intake/Output Summary (Last 24 hours) at 01/28/19 1043  Last data filed at 01/28/19 6253   Gross per 24 hour   Intake             1470 ml   Output             2180 ml   Net             -710 ml        Laboratory: Reviewed    Lab Results   Component Value Date    HGB 9 1 (L) 01/27/2019    HCT 29 0 (L) 01/27/2019    WBC 16 04 (H) 01/27/2019     Lab Results   Component Value Date    BUN 15 01/27/2019    K 3 9 01/27/2019     (H) 01/27/2019    CREATININE 0 65 01/27/2019     Lab Results   Component Value Date    PROTIME 13 2 01/22/2019    INR 0 99 01/22/2019        Imaging: Reviewed  Xr Cervical Spine 2 Or 3 Views    Result Date: 1/26/2019  Impression: Posterior spinal fusion instrumentation from C2 through T5 without evidence of hardware failure  Workstation performed: [id number]     Xr Spine Cervical 2 Or 3 Vw Injury    Result Date: 1/25/2019  Impression: Fluoroscopic guidance provided for surgical procedure  Please refer to the separate procedure notes for additional details  Workstation performed: [id number]     Xr Spine Thoracic 3 Vw    Result Date: 1/26/2019  Impression: Posterior spinal fusion instrumentation from C2 through T5 without radiographic evidence of hardware failure   Workstation performed: [id number]       Current Medications:     Current Facility-Administered Medications:     acetaminophen (TYLENOL) tablet 650 mg, 650 mg, Oral, Q6H [address], Laura Acuna MD, 650 mg at 01/28/19 0556    aluminum-magnesium hydroxide-simethicone (MYLANTA) 200-200-20 mg/5 mL oral suspension 30 mL, 30 mL, Oral, Q6H PRN, Lucila Tirado MD    butalbital-acetaminophen-[facility]) -40 mg per tablet 1 tablet, 1 tablet, Oral, Q4H PRN, Payton Hashimoto, PA-C, 1 tablet at 01/27/19 8454    dexamethasone (DECADRON) injection 4 mg, 4 mg, Intravenous, Q8H [facility], Shannan Gomez MD, 4 mg at 01/28/19 0556    enoxaparin (LOVENOX) subcutaneous injection 40 mg, 40 mg, Subcutaneous, Daily, Shannan Gomez MD, 40 mg at 01/28/19 0850    HYDROmorphone (DILAUDID) injection 0 5 mg, 0 5 mg, Intravenous, Q3H PRN, Daria Myers MD, 0 5 mg at 01/25/19 1116    insulin lispro (HumaLOG) 100 units/mL subcutaneous injection 2-12 Units, 2-12 Units, Subcutaneous, 4x Daily (AC & HS), Constance Rodriguez MD, 2 Units at 01/27/19 2202    levothyroxine tablet 25 mcg, 25 mcg, Oral, Early Morning, Shannan Gomez MD, 25 mcg at 01/28/19 0556    methocarbamol (ROBAXIN) tablet 750 mg, 750 mg, Oral, Q6H [facility], Shannan Gomez MD, 750 mg at 01/28/19 0556    naloxone (NARCAN) 0 04 mg/mL syringe 0 04 mg, 0 04 mg, Intravenous, Q1MIN PRN, Shannan Gomez MD    ondansetron [facility]) injection 4 mg, 4 mg, Intravenous, Q4H PRN, Alva Lucas, 4 mg at 01/25/19 1745    oxyCODONE (ROXICODONE) immediate release tablet 10 mg, 10 mg, Oral, Q4H PRN, Shannan Gomez MD    oxyCODONE (ROXICODONE) IR tablet 5 mg, 5 mg, Oral, Q4H PRN, Shannan Gomez MD, 5 mg at 01/25/19 8951    senna-docusate sodium (SENOKOT S) 8 6-50 mg per tablet 2 tablet, 2 tablet, Oral, Daily, Shannan Gomez MD, 2 tablet at 01/28/19 0850    sodium chloride 0 9 % infusion, 50 mL/hr, Intravenous, Continuous, Alva Lucas, Last Rate: 50 mL/hr at 01/27/19 2330, 50 mL/hr at 01/27/19 2330    tamsulosin (FLOMAX) capsule 0 4 mg, 0 4 mg, Oral, Daily With Katelynn Singleton MD, 0 4 mg at 01/27/19 4601

## 2019-01-29 LAB
ANION GAP SERPL CALCULATED.3IONS-SCNC: 8 MMOL/L (ref 4–13)
BASOPHILS # BLD AUTO: 0 THOUSANDS/ΜL (ref 0–0.1)
BASOPHILS NFR BLD AUTO: 0 % (ref 0–1)
BUN SERPL-MCNC: 22 MG/DL (ref 5–25)
CALCIUM SERPL-MCNC: 8.2 MG/DL (ref 8.3–10.1)
CHLORIDE SERPL-SCNC: 105 MMOL/L (ref 100–108)
CO2 SERPL-SCNC: 26 MMOL/L (ref 21–32)
CREAT SERPL-MCNC: 0.73 MG/DL (ref 0.6–1.3)
EOSINOPHIL # BLD AUTO: 0.01 THOUSAND/ΜL (ref 0–0.61)
EOSINOPHIL NFR BLD AUTO: 0 % (ref 0–6)
ERYTHROCYTE [DISTWIDTH] IN BLOOD BY AUTOMATED COUNT: 13.4 % (ref 11.6–15.1)
GFR SERPL CREATININE-BSD FRML MDRD: 102 ML/MIN/1.73SQ M
GLUCOSE SERPL-MCNC: 101 MG/DL (ref 65–140)
GLUCOSE SERPL-MCNC: 118 MG/DL (ref 65–140)
GLUCOSE SERPL-MCNC: 136 MG/DL (ref 65–140)
GLUCOSE SERPL-MCNC: 143 MG/DL (ref 65–140)
GLUCOSE SERPL-MCNC: 160 MG/DL (ref 65–140)
HCT VFR BLD AUTO: 28.8 % (ref 36.5–49.3)
HGB BLD-MCNC: 9.3 G/DL (ref 12–17)
IMM GRANULOCYTES # BLD AUTO: 0.17 THOUSAND/UL (ref 0–0.2)
IMM GRANULOCYTES NFR BLD AUTO: 2 % (ref 0–2)
LYMPHOCYTES # BLD AUTO: 1.81 THOUSANDS/ΜL (ref 0.6–4.47)
LYMPHOCYTES NFR BLD AUTO: 17 % (ref 14–44)
MCH RBC QN AUTO: 29.8 PG (ref 26.8–34.3)
MCHC RBC AUTO-ENTMCNC: 32.3 G/DL (ref 31.4–37.4)
MCV RBC AUTO: 92 FL (ref 82–98)
MONOCYTES # BLD AUTO: 0.61 THOUSAND/ΜL (ref 0.17–1.22)
MONOCYTES NFR BLD AUTO: 6 % (ref 4–12)
NEUTROPHILS # BLD AUTO: 8.18 THOUSANDS/ΜL (ref 1.85–7.62)
NEUTS SEG NFR BLD AUTO: 75 % (ref 43–75)
NRBC BLD AUTO-RTO: 0 /100 WBCS
PLATELET # BLD AUTO: 238 THOUSANDS/UL (ref 149–390)
PMV BLD AUTO: 9.3 FL (ref 8.9–12.7)
POTASSIUM SERPL-SCNC: 3.9 MMOL/L (ref 3.5–5.3)
RBC # BLD AUTO: 3.12 MILLION/UL (ref 3.88–5.62)
SODIUM SERPL-SCNC: 139 MMOL/L (ref 136–145)
TSH SERPL DL<=0.05 MIU/L-ACNC: 1.93 UIU/ML (ref 0.36–3.74)
WBC # BLD AUTO: 10.78 THOUSAND/UL (ref 4.31–10.16)

## 2019-01-29 PROCEDURE — 97530 THERAPEUTIC ACTIVITIES: CPT

## 2019-01-29 PROCEDURE — 80048 BASIC METABOLIC PNL TOTAL CA: CPT | Performed by: NURSE PRACTITIONER

## 2019-01-29 PROCEDURE — 82948 REAGENT STRIP/BLOOD GLUCOSE: CPT

## 2019-01-29 PROCEDURE — 84443 ASSAY THYROID STIM HORMONE: CPT | Performed by: NURSE PRACTITIONER

## 2019-01-29 PROCEDURE — 85025 COMPLETE CBC W/AUTO DIFF WBC: CPT | Performed by: NURSE PRACTITIONER

## 2019-01-29 PROCEDURE — 99024 POSTOP FOLLOW-UP VISIT: CPT | Performed by: PHYSICIAN ASSISTANT

## 2019-01-29 PROCEDURE — 99231 SBSQ HOSP IP/OBS SF/LOW 25: CPT | Performed by: NURSE PRACTITIONER

## 2019-01-29 PROCEDURE — 97116 GAIT TRAINING THERAPY: CPT

## 2019-01-29 RX ADMIN — DEXAMETHASONE 2 MG: 2 TABLET ORAL at 11:55

## 2019-01-29 RX ADMIN — DEXAMETHASONE 2 MG: 2 TABLET ORAL at 00:14

## 2019-01-29 RX ADMIN — DEXAMETHASONE 2 MG: 2 TABLET ORAL at 06:05

## 2019-01-29 RX ADMIN — TAMSULOSIN HYDROCHLORIDE 0.4 MG: 0.4 CAPSULE ORAL at 17:10

## 2019-01-29 RX ADMIN — ACETAMINOPHEN 975 MG: 325 TABLET, FILM COATED ORAL at 06:05

## 2019-01-29 RX ADMIN — METHOCARBAMOL 750 MG: 750 TABLET, FILM COATED ORAL at 00:13

## 2019-01-29 RX ADMIN — LEVOTHYROXINE SODIUM 25 MCG: 25 TABLET ORAL at 06:05

## 2019-01-29 RX ADMIN — METHOCARBAMOL 750 MG: 750 TABLET, FILM COATED ORAL at 23:53

## 2019-01-29 RX ADMIN — SENNOSIDES AND DOCUSATE SODIUM 2 TABLET: 8.6; 5 TABLET ORAL at 08:45

## 2019-01-29 RX ADMIN — ACETAMINOPHEN 975 MG: 325 TABLET, FILM COATED ORAL at 22:05

## 2019-01-29 RX ADMIN — DEXAMETHASONE 2 MG: 2 TABLET ORAL at 23:53

## 2019-01-29 RX ADMIN — METHOCARBAMOL 750 MG: 750 TABLET, FILM COATED ORAL at 06:05

## 2019-01-29 RX ADMIN — ACETAMINOPHEN 975 MG: 325 TABLET, FILM COATED ORAL at 13:17

## 2019-01-29 RX ADMIN — METHOCARBAMOL 750 MG: 750 TABLET, FILM COATED ORAL at 17:10

## 2019-01-29 RX ADMIN — ENOXAPARIN SODIUM 40 MG: 40 INJECTION SUBCUTANEOUS at 08:45

## 2019-01-29 RX ADMIN — METHOCARBAMOL 750 MG: 750 TABLET, FILM COATED ORAL at 11:55

## 2019-01-29 RX ADMIN — DEXAMETHASONE 2 MG: 2 TABLET ORAL at 17:10

## 2019-01-29 NOTE — ASSESSMENT & PLAN NOTE
· A1C 6 3  Currently on SSI given Steroids  Will continue along with diabetic diet     · Monitor accuchecks   · Outpatient follow up

## 2019-01-29 NOTE — ASSESSMENT & PLAN NOTE
· PT/OT recommending rehab  Awaiting insurance auth to [facility] which has accepted patient

## 2019-01-29 NOTE — PLAN OF CARE
Problem: PHYSICAL THERAPY ADULT  Goal: Performs mobility at highest level of function for planned discharge setting  See evaluation for individualized goals  Treatment/Interventions: Functional transfer training, LE strengthening/ROM, Elevations, Therapeutic exercise, Patient/family training, Equipment eval/education, Bed mobility, Gait training, OT, Spoke to case management, Spoke to nursing, Spoke to MD  Equipment Recommended: Elizabeth Lopez (JEY)       See flowsheet documentation for full assessment, interventions and recommendations  Outcome: Progressing  Prognosis: Good  Problem List: Decreased strength, Decreased endurance, Impaired balance, Decreased mobility, Impaired sensation, Orthopedic restrictions, Pain  Assessment: Patient lying supine in bed agreeable to participate in therapy  He demonstrates improved bed mobility, able to recite 3/3 cervical spinal precautions  He sat EOB able to don socks and L shoe, requiring assitance with R shoe/MAFO  He was able to ambulate increased distances wtih standing rest breaks inside RW, improved gait with RW  He demonstrates noted gait deviations and able to self correct throughout session  He would continue to benefit from skilled PT to maximize functional independence  Recommendation: Post acute IP rehab     PT - OK to Discharge: Yes (to rehab when medically stable)    See flowsheet documentation for full assessment

## 2019-01-29 NOTE — PHYSICAL THERAPY NOTE
Physical Therapy Progress Note        01/29/19 1035   Pain Assessment   Pain Assessment 0-10   Pain Score No Pain   Hospital Pain Intervention(s) Ambulation/increased activity;Repositioned   Response to Interventions Improving   Precautions   Spinal Precautions (Able to recall 3/3 precautions)   Restrictions/Precautions   Braces or Orthoses MAFO;C/S Collar  (MAFO RLE)   General   Chart Reviewed Yes   Response to Previous Treatment Patient with no complaints from previous session  Family/Caregiver Present No   Cognition   Overall Cognitive Status WFL   Arousal/Participation Alert; Cooperative   Comments Patient is pleasant and cooperative throughtout session   Bed Mobility   Supine to Sit 4  Minimal assistance   Additional items Assist x 1   Transfers   Sit to Stand 4  Minimal assistance   Additional items Assist x 1   Stand to Sit 4  Minimal assistance   Additional items Assist x 1   Stand pivot 4  Minimal assistance   Additional items Assist x 1; Increased time required   Ambulation/Elevation   Gait pattern R Hemiparesis; Improper Weight shift;Scissoring; Antalgic;Narrow LEAH; Decreased R stance; Excessively slow; Step to;Short stride   Gait Assistance 4  Minimal assist   Additional items Assist x 1   Assistive Device Rolling walker   Distance 60 feet x 1, 120 feet x 1, 80 feet x1, 40 feet x1, 100 feet x1    Balance   Static Sitting Fair +   Dynamic Sitting Fair   Static Standing Fair   Dynamic Standing Fair -   Endurance Deficit   Endurance Deficit Yes   Endurance Deficit Description Requires rest breaks due to fatigue   Activity Tolerance   Activity Tolerance Patient tolerated treatment well;Patient limited by fatigue   Nurse Made Aware Appropriate to see per RN   Exercises   Knee AROM Long Arc Quad Sitting;20 reps;AROM; Bilateral   Ankle Pumps Sitting;20 reps;AROM; Bilateral   Balance training  Standing balance inside RW less UE support, however unstable    Assessment   Prognosis Good   Problem List Decreased strength;Decreased endurance; Impaired balance;Decreased mobility; Impaired sensation;Orthopedic restrictions;Pain   Assessment Patient lying supine in bed agreeable to participate in therapy  He demonstrates improved bed mobility, able to recite 3/3 cervical spinal precautions  He sat EOB able to don socks and L shoe, requiring assitance with R shoe/MAFO  He was able to ambulate increased distances wtih standing rest breaks inside RW, improved gait with RW  He demonstrates noted gait deviations and able to self correct throughout session  He would continue to benefit from skilled PT to maximize functional independence  Goals   Patient Goals To go to rehab   LTG Expiration Date 02/08/19   Treatment Day 2   Plan   Treatment/Interventions Functional transfer training;LE strengthening/ROM; Therapeutic exercise; Endurance training;Bed mobility;Gait training;Spoke to nursing   Progress Progressing toward goals   PT Frequency (4-6x a week)   Recommendation   Recommendation Post acute IP rehab   Equipment Recommended Walker  (RW)   PT - OK to Discharge Yes  (to rehab when medically stable)     Jefry Fortune, PTA

## 2019-01-29 NOTE — PROGRESS NOTES
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  3  Intramedullary abnormality of spinal cord  4  Syrinx of spinal cord  5  Chronic bilateral low back pain  6  Spinal cord injury   7  Weakness right lower extremity  8  Neurologic gait dysfunction  9  Lower extremity myelopathy  10  Spasticity  11  BPH associated with nocturia   12  Hypothyroidism  13  Mixed hyperlipidemia  14  Impaired Fasting glucose    Plan:  Neuro:  · Exam GCS 15,  Motor: Strength BUE 5/5, RLE HF 4/5, HF/PF /DF 5/5, LLE 5/5, proprioception normal BUE/BLE, sensation normal to LTx 4  Reflexes 3+ BUE/BLE, clonus bilaterally L> R  No drift bilaterally  Aspen Tx cervical brace in place  ? Posterior cervicothoracic incision clean,dry and intact with dressing  ? Right thoracic JAMAR Drain was discontinued yesterday  The gauze at the drain site from yesterday was clean this morning without drainage  RN- confirmed that gauze had remained dry throughout the night to this morning  Clean sterile gauze applied today with tape  · Continue regular neurologic checks  · Imaging reviewed personally and by attending  Final results as below  ? Upright  Xray Cervical and thoracic spine 1/25/19 Posterior spinal fusion instrumentation from C2 through T5 without evidence of hardware failure  ? MRI Cervical and Thoracic w wo contrast 1/7/19: Heterogeneous neoplasm of the cervicothoracic junction consistent with primary glial tumor   Pathology extends from the C4-C5 disc space to the caudal T3 level  · Ongoing medical management by primary team- SLIM   ?   Insulin algorithm for hyperglycemia secondary to steroid use  · Pain control per primary team- SLIM  · Eval and mobilize per PT/OT-    ? SHIRAZO boot for right foot drop  ? Will likely need rehab- Plan is for discharge to [facility]  · DVT PPX:  SCDs, Lovenox  · Steroid therapy :  Decadron injection 4 mg IV q  8 hrs today  Begin 2 mg po q 6 hrs tomorrow and wean every 72 hrs  · Pathology: Intramedullary mass sent to pathology, results pending  Glioma on frozen section  · Aspen Tx cervical brace to be worn at all times, for showers switch to magnetU 88 collar        · Dispo: Pt will be discharged to [facility] for rehab  Pt is okay for discharge to [facility] from neurosurgery standpoint        · Neurosurgery will see pt as needed during the remainder of his hospitalization  Pt has a 2 week post op visit with Dr Alycia Harris on Feb 6th for discussion of pathology and incision check  Please call with any questions or concerns  Subjective/Objective   Chief Complaint: "I have some neck pain"/  Follow up POD 5 s/p Posterior C4-T3 laminectomy; resection of intramedullary mass; C2-T5 fixation/fusion    Subjective: Pt denies having a HA last night  He admits to neck stiffness and posterior cervicothoracic incisional pain  Pt reports he was able to ambulate with PT/OT down the hallway  He continues to have numbness in RLE  He reports he continues to tolerate a regular diet without nausea, vomiting or abdominal pain  He denies chest pain or SOB  Objective: Sitting in bed, alert and awake  NAD  I/O       01/27 0701 - 01/28 0700 01/28 0701 - 01/29 0700 01/29 0701 - 01/30 0700    P  O  770 780     I V  (mL/kg) 1080 (12 9) 675 (8)     Total Intake(mL/kg) 1850 (22) 1455 (17 3)     Urine (mL/kg/hr) 2070 (1) 1800 (0 9) 300 (3 2)    Drains 145      Total Output 2215 1800 300    Net -365 -345 -300                 Invasive Devices     Peripheral Intravenous Line            Peripheral IV 01/25/19 Right Antecubital 4 days          Drain            Closed/Suction Drain Right Back Bulb 7 Fr  4 days                Physical Exam:  Vitals: Blood pressure 108/51, pulse 56, temperature 97 9 °F (36 6 °C), temperature source Oral, resp  rate 18, height 5' 7" (1 702 m), weight 84 kg (185 lb 3 oz), SpO2 96 %  ,Body mass index is 29 kg/m²  General appearance: alert, appears stated age, cooperative and no distress  Head: Normocephalic, without obvious abnormality, atraumatic  Eyes: EOMI, PERRL  Neck: supple, symmetrical, trachea midline, collar in place, posterior cervicothoracic incision with sutures and drain site with suture, CDI   Back: no kyphosis present, no tenderness to percussion or palpation  Lungs: non labored breathing  Heart: regular heart rate  Neurologic:   Mental status: Alert,  thought content appropriate  Cranial nerves: grossly intact (Cranial nerves II-XII)  Sensory: normal to LT X4  Motor: moving all extremities, Strength BUE 5/5, RLE HF 4/5, HF/PF /DF 5/5, LLE 5/5  Reflexes: 3+ BUE/BLE, clonus bilaterally L> R  Coordination: no drift bilaterally    Lab Results:    Results from last 7 days  Lab Units 01/29/19  0546 01/27/19  0550 01/26/19  0532  01/22/19  1523   WBC Thousand/uL 10 78* 16 04* 14 29*  < > 8 44   HEMOGLOBIN g/dL 9 3* 9 1* 8 8*  < > 13 0   HEMATOCRIT % 28 8* 29 0* 27 8*  < > 41 0   PLATELETS Thousands/uL 238 241 206  < > 303   NEUTROS PCT % 75  --   --   --  70   MONOS PCT % 6  --   --   --  7   < > = values in this interval not displayed  Results from last 7 days  Lab Units 01/29/19  0546 01/27/19  0550 01/25/19  0540 01/22/19  1523   POTASSIUM mmol/L 3 9 3 9 4 0 4 2   CHLORIDE mmol/L 105 110* 108 103   CO2 mmol/L 26 25 22 27   BUN mg/dL 22 15 21 21   CREATININE mg/dL 0 73 0 65 0 75 0 80   CALCIUM mg/dL 8 2* 7 7* 7 7* 8 6   ALK PHOS U/L  --   --   --  76   ALT U/L  --   --   --  30   AST U/L  --   --   --  15               Results from last 7 days  Lab Units 01/22/19  1523   INR  0 99   PTT seconds 32     Imaging Studies: I have personally reviewed pertinent reports     and I have personally reviewed pertinent films in PACS    EKG, Pathology, and Other Studies: I have personally reviewed pertinent reports  VTE Pharmacologic Prophylaxis: Enoxaparin (Lovenox)    VTE Mechanical Prophylaxis: sequential compression device    PLEASE NOTE:  This encounter was completed utilizing the [employer] Direct Speech Voice Recognition Software  Grammatical errors, random word insertions, pronoun errors and incomplete sentences are occasional consequences of the system due to software limitations, ambient noise and hardware issues  These may be missed by proof reading prior to affixing electronic signature  Any questions or concerns about the content, text or information contained within the body of this dictation should be directly addressed to the advanced practitioner or physician for clarification  Please do not hesitate to call me directly if you have any questions or concerns

## 2019-01-29 NOTE — ASSESSMENT & PLAN NOTE
· POD 5 Posterior C4-T3 Laminectomy, resection of intramedullary mass, C2-T5 fixation/fusion by Dr Magdi Messer  Pathology is pending  Transferred from ICU 1/27  · C-Collar at all times  Dana collar for showers  · JAMAR drain pulled 1/28  Mid upper back with dressing CDI  · Neuro exam is stable  Continue neurochecks Qshift and nursing to notify neurosurgery with changes  · Pain control with ATC tylenol, Robaxin and PRN Oxycodone  Minimal pain per patient  · Continue with Decacron taper as per neurosx  · Medically stable for discharge to [facility] pending insurance auth

## 2019-01-29 NOTE — OCCUPATIONAL THERAPY NOTE
Occupational Therapy Treatment Note:       01/28/19 1641   Restrictions/Precautions   Other Precautions Chair Alarm   Functional Mobility   Functional Mobility 3  Moderate assistance   Additional Comments ataxic * requires trunk support with mobility no a d  recommend s of a second  for safety   Cognition   Arousal/Participation Alert; Cooperative   Attention Within functional limits   Orientation Level Oriented X4   Memory Within functional limits   Following Commands Follows all commands and directions without difficulty   Activity Tolerance   Activity Tolerance Patient tolerated treatment well   Assessment   Assessment pt participated in pm ot session and was seen focusing on toileting, functional mobility short distance and pt / family education  pt with ataxia during functional mobility, pt required mod asst with body support and or with use of rw  pt with slight improvement with rw , asst to manage / steer  pt reports numbness r le, difficulty to tell posture in sitting " am I sliding" pt required minimal encouragement for oob sitting in chair however after explaination did so with ease  pt is cooperaitive and had very supportive female visitor present   Plan   Treatment Interventions ADL retraining;Functional transfer training; Activityengagement; Endurance training;Patient/family training;Equipment evaluation/education   Goal Expiration Date 02/04/19   Treatment Day 2   OT Frequency 3-5x/wk   Recommendation   Recommendation Physiatry Consult   OT Discharge Recommendation Short Term Rehab   Barthel Index   Feeding 10   Bathing 5   Grooming Score 5   Dressing Score 5   Bladder Score 10   Bowels Score 10   Toilet Use Score 5   Transfers (Bed/Chair) Score 5   Mobility (Level Surface) Score 0   Stairs Score 0   Barthel Index Score 55   Modified Paula Scale   Modified Van Scale 4   April A HAMILTON Daley

## 2019-01-29 NOTE — PROGRESS NOTES
[facility]    Progress Note - Gibson Benavides 1960, 60 y o  male MRN: [id number]    Unit/Bed#: [facility] 626-01 Encounter: [id number]    Primary Care Provider: Danielle Palma MD   Date and time admitted to hospital: 1/24/2019 10:34 AM    * Glioma of spinal cord (HCC)   Assessment & Plan    · POD 5 Posterior C4-T3 Laminectomy, resection of intramedullary mass, C2-T5 fixation/fusion by Dr Aris Jeronimo  Pathology is pending  Transferred from ICU 1/27  · C-Collar at all times  Dana collar for showers  · JAMAR drain pulled 1/28  Mid upper back with dressing CDI  · Neuro exam is stable  Continue neurochecks Qshift and nursing to notify neurosurgery with changes  · Pain control with ATC tylenol, Robaxin and PRN Oxycodone  Minimal pain per patient  · Continue with Decacron taper as per neurosx  · Medically stable for discharge to [facility] pending insurance auth  Neurologic gait dysfunction   Assessment & Plan    · PT/OT recommending rehab  Awaiting insurance auth to [facility] which has accepted patient  Leukocytosis   Assessment & Plan    · Stable at 28 8  Afebrile  Likely 2/2 steroids  · Will monitor closely     Hypothyroidism   Assessment & Plan    · Continue Synthroid  Prediabetes   Assessment & Plan    · A1C 6 3  Currently on SSI given Steroids  Will continue along with diabetic diet  · Monitor accuchecks   · Outpatient follow up     Bradycardia   Assessment & Plan    · Noted, rates into upper 40's at times  Not on any medications that can alter HR  Patient is asymptomatic  · Monitor on [employer] system  · Patient states that his HR is always on the lower side  Pharmacologic: Enoxaparin (Lovenox)  Mechanical VTE Prophylaxis in Place: No    Patient Centered Rounds: I have performed bedside rounds with nursing staff today      Discussions with Specialists or Other Care Team Provider: nursing, case management     Education and Discussions with Family / Patient: patient  Refused family update  Time Spent for Care: 20 minutes  More than 50% of total time spent on counseling and coordination of care as described above  Current Length of Stay: 5 day(s)    Current Patient Status: Inpatient   Certification Statement: The patient will continue to require additional inpatient hospital stay due to awaiting insurance authorization to [facility]    Discharge Plan / Estimated Discharge Date: pending insurance auth to [facility]      Code Status: Level 1 - Full Code      Subjective:   Patient feels good today  Minimal pain  States that it felt good to get up and walk with PT  Still not at baseline and he is hopeful for rehab ASAP  No headaches  No fevers or chills  No CP, SOB or palpitations  Objective:     Vitals:   Temp (24hrs), Av 5 °F (36 9 °C), Min:97 9 °F (36 6 °C), Max:99 °F (37 2 °C)    Temp:  [97 9 °F (36 6 °C)-99 °F (37 2 °C)] 97 9 °F (36 6 °C)  HR:  [56-60] 56  Resp:  [18] 18  BP: (108-109)/(51-53) 108/51  SpO2:  [96 %-98 %] 96 %  Body mass index is 29 kg/m²  Input and Output Summary (last 24 hours): Intake/Output Summary (Last 24 hours) at 19 1350  Last data filed at 19 1201   Gross per 24 hour   Intake             1080 ml   Output             1875 ml   Net             -795 ml       Physical Exam:     Physical Exam   Constitutional: He is oriented to person, place, and time  No distress  HENT:   Head: Normocephalic  Eyes: Pupils are equal, round, and reactive to light  Neck: Normal range of motion  Cardiovascular: Normal rate, regular rhythm and intact distal pulses  No murmur heard  Bradycardic at times  Pulmonary/Chest: Effort normal and breath sounds normal    Abdominal: Soft  Bowel sounds are normal    Musculoskeletal: Normal range of motion  He exhibits no edema  Neurological: He is alert and oriented to person, place, and time  Skin: Skin is warm and dry     Mid upper back with dressing CDI s/p JAMAR drain removal  Psychiatric: He has a normal mood and affect  Nursing note and vitals reviewed  Additional Data:     Labs:      Results from last 7 days  Lab Units 01/29/19  0546   WBC Thousand/uL 10 78*   HEMOGLOBIN g/dL 9 3*   HEMATOCRIT % 28 8*   PLATELETS Thousands/uL 238   NEUTROS PCT % 75   LYMPHS PCT % 17   MONOS PCT % 6   EOS PCT % 0       Results from last 7 days  Lab Units 01/29/19  0546  01/22/19  1523   POTASSIUM mmol/L 3 9  < > 4 2   CHLORIDE mmol/L 105  < > 103   CO2 mmol/L 26  < > 27   BUN mg/dL 22  < > 21   CREATININE mg/dL 0 73  < > 0 80   CALCIUM mg/dL 8 2*  < > 8 6   ALK PHOS U/L  --   --  76   ALT U/L  --   --  30   AST U/L  --   --  15   < > = values in this interval not displayed      Results from last 7 days  Lab Units 01/22/19  1523   INR  0 99         Recent Cultures (last 7 days):           Last 24 Hours Medication List:     Current Facility-Administered Medications:  acetaminophen 975 mg Oral Q8H [facility] CLAUDIO Villa   aluminum-magnesium hydroxide-simethicone 30 mL Oral Q6H PRN Neisha Jean MD   butalbital-acetaminophen-caffeine 1 tablet Oral Q4H PRN Edwin Garcia PA-C   dexamethasone 2 mg Oral Q6H [facility] Edwin Garcia PA-C   [START ON 2/1/2019] dexamethasone 2 mg Oral Q8H [facility] Yessi Bell PA-C   [START ON 2/4/2019] dexamethasone 2 mg Oral Q12H [facility] Yessi Bell PA-C   [START ON 2/7/2019] dexamethasone 2 mg Oral Daily Belen Bell PA-C   enoxaparin 40 mg Subcutaneous Daily Neisha Jean MD   insulin lispro 2-12 Units Subcutaneous 4x Daily (AC & HS) Sony Spears MD   levothyroxine 25 mcg Oral Early Morning Neisha Jean MD   methocarbamol 750 mg Oral Q6H [facility] Neisha Jean MD   naloxone 0 04 mg Intravenous Q1MIN PRN Neisha Jean MD   ondansetron 4 mg Intravenous Q4H PRN Alva Lucas   oxyCODONE 10 mg Oral Q4H PRN Neisha Jean MD   oxyCODONE 5 mg Oral Q4H PRN Neisha Jean MD   senna-docusate sodium 2 tablet Oral Daily Neisha Jean MD   tamsulosin 0 4 mg Oral Daily With Matthew Mary MD        Today, Patient Was Seen By: CLAUDIO Dubois    ** Please Note: Dragon 360 Dictation voice to text software may have been used in the creation of this document   **

## 2019-01-29 NOTE — UTILIZATION REVIEW
Continued Stay Review    Date: 1/29    Vital Signs: /51 (BP Location: Right arm)   Pulse 56   Temp 97 9 °F (36 6 °C) (Oral)   Resp 18   Ht 5' 7" (1 702 m)   Wt 84 kg (185 lb 3 oz)   SpO2 96%   BMI 29 00 kg/m²      Age/Sex: 60 y o  male     Assessment/Plan:   Glioma of spinal cord  JAMAR drain pulled 1/28  Mid upper back with dressing CDI  Pain control with ATC tylenol, Robaxin and PRN Oxycodone  Continue with Decacron taper as per neurosx  Medications:   Scheduled Meds:   Current Facility-Administered Medications:  acetaminophen 975 mg Oral Q8H [address]   dexamethasone 2 mg Oral Q6H [address]   [START ON 2/1/2019] dexamethasone 2 mg Oral Q8H [address]   [START ON 2/4/2019] dexamethasone 2 mg Oral Q12H [address]   [START ON 2/7/2019] dexamethasone 2 mg Oral Daily   enoxaparin 40 mg Subcutaneous Daily   insulin lispro 2-12 Units Subcutaneous 4x Daily (AC & HS)   levothyroxine 25 mcg Oral Early Morning   methocarbamol 750 mg Oral Q6H [address]   senna-docusate sodium 2 tablet Oral Daily   tamsulosin 0 4 mg Oral Daily With Dinner     Continuous Infusions:    PRN Meds:    butalbital-acetaminophen-caffeine    naloxone    ondansetron    oxyCODONE    Pertinent Labs/Diagnostic Results:    01/29/19 0546    WBC 4 31 - 10 16 Thousand/uL 10 78     RBC 3 88 - 5 62 Million/uL 3 12     Hemoglobin 12 0 - 17 0 g/dL 9 3     Hematocrit 36 5 - 49 3 % 28 8        Discharge Plan: Accepted by [facility]   [address] [facility] [address]

## 2019-01-29 NOTE — PROGRESS NOTES
01/29/19 [address]  Affiliation None   Psychosocial   Patient Behaviors/Mood Appropriate for situation   Coping Responses   Patient Coping Open/discussion   Plan of Care   Comments Provided chaplaincy education, listened empathetically, cultivated relationship of care and support   Assessment Completed by: Unit visit

## 2019-01-29 NOTE — ASSESSMENT & PLAN NOTE
· Noted, rates into upper 40's at times  Not on any medications that can alter HR  Patient is asymptomatic  · Monitor on [employer] system  · Patient states that his HR is always on the lower side

## 2019-01-29 NOTE — PLAN OF CARE
Problem: OCCUPATIONAL THERAPY ADULT  Goal: Performs self-care activities at highest level of function for planned discharge setting  See evaluation for individualized goals  Treatment Interventions: ADL retraining, Functional transfer training, UE strengthening/ROM, Endurance training, Patient/family training, Equipment evaluation/education, Compensatory technique education, Continued evaluation, Energy conservation, Activityengagement          See flowsheet documentation for full assessment, interventions and recommendations  Outcome: Progressing  Limitation: Decreased ADL status, Decreased Safe judgement during ADL, Decreased UE strength, Decreased endurance, Decreased sensation, Decreased self-care trans, Decreased high-level ADLs  Prognosis: Fair  Assessment: pt participated in pm ot session and was seen focusing on toileting, functional mobility short distance and pt / family education  pt with ataxia during functional mobility, pt required mod asst with body support and or with use of rw  pt with slight improvement with rw , asst to manage / steer  pt reports numbness r le, difficulty to tell posture in sitting " am I sliding" pt required minimal encouragement for oob sitting in chair however after explaination did so with ease   pt is cooperaitive and had very supportive female visitor present  Recommendation: [address]  OT Discharge Recommendation: Short Term Rehab  OT - OK to Discharge: Yes (when medically stable)  April A HAMILTON Daley

## 2019-01-30 ENCOUNTER — TELEPHONE (OUTPATIENT)
Dept: NEUROSURGERY | Facility: CLINIC | Age: 59
End: 2019-01-30

## 2019-01-30 LAB
GLUCOSE SERPL-MCNC: 123 MG/DL (ref 65–140)
GLUCOSE SERPL-MCNC: 178 MG/DL (ref 65–140)
GLUCOSE SERPL-MCNC: 192 MG/DL (ref 65–140)
GLUCOSE SERPL-MCNC: 97 MG/DL (ref 65–140)

## 2019-01-30 PROCEDURE — 97535 SELF CARE MNGMENT TRAINING: CPT

## 2019-01-30 PROCEDURE — 99024 POSTOP FOLLOW-UP VISIT: CPT | Performed by: PHYSICIAN ASSISTANT

## 2019-01-30 PROCEDURE — 82948 REAGENT STRIP/BLOOD GLUCOSE: CPT

## 2019-01-30 RX ADMIN — LEVOTHYROXINE SODIUM 25 MCG: 25 TABLET ORAL at 05:45

## 2019-01-30 RX ADMIN — DEXAMETHASONE 2 MG: 2 TABLET ORAL at 23:56

## 2019-01-30 RX ADMIN — DEXAMETHASONE 2 MG: 2 TABLET ORAL at 05:45

## 2019-01-30 RX ADMIN — OXYCODONE HYDROCHLORIDE 5 MG: 5 TABLET ORAL at 09:26

## 2019-01-30 RX ADMIN — SENNOSIDES AND DOCUSATE SODIUM 2 TABLET: 8.6; 5 TABLET ORAL at 09:25

## 2019-01-30 RX ADMIN — DEXAMETHASONE 2 MG: 2 TABLET ORAL at 17:26

## 2019-01-30 RX ADMIN — TAMSULOSIN HYDROCHLORIDE 0.4 MG: 0.4 CAPSULE ORAL at 17:25

## 2019-01-30 RX ADMIN — METHOCARBAMOL 750 MG: 750 TABLET, FILM COATED ORAL at 23:56

## 2019-01-30 RX ADMIN — ACETAMINOPHEN 975 MG: 325 TABLET, FILM COATED ORAL at 13:25

## 2019-01-30 RX ADMIN — METHOCARBAMOL 750 MG: 750 TABLET, FILM COATED ORAL at 05:45

## 2019-01-30 RX ADMIN — ENOXAPARIN SODIUM 40 MG: 40 INJECTION SUBCUTANEOUS at 09:25

## 2019-01-30 RX ADMIN — ACETAMINOPHEN 975 MG: 325 TABLET, FILM COATED ORAL at 21:41

## 2019-01-30 RX ADMIN — METHOCARBAMOL 750 MG: 750 TABLET, FILM COATED ORAL at 11:43

## 2019-01-30 RX ADMIN — METHOCARBAMOL 750 MG: 750 TABLET, FILM COATED ORAL at 17:25

## 2019-01-30 RX ADMIN — DEXAMETHASONE 2 MG: 2 TABLET ORAL at 11:43

## 2019-01-30 RX ADMIN — ACETAMINOPHEN 975 MG: 325 TABLET, FILM COATED ORAL at 05:45

## 2019-01-30 NOTE — OCCUPATIONAL THERAPY NOTE
Occupational Therapy Treatment Note       01/30/19 0855   Restrictions/Precautions   Weight Bearing Precautions Per Order No   Braces or Orthoses MAFO;C/S Collar  (MAFO RLE)   Other Precautions Fall Risk   Lifestyle   Autonomy I ADLS/IADLS, transfers/functional mobility PTA   Reciprocal Relationships Pt lives w/ spouse, dght, son and son's    Service to Others Pt works full time as a  at [employer] Pt enjoys being w/ family   Pain Assessment   Pain Assessment 0-10   Pain Score 2   Pain Type Acute pain   Pain Location Neck   ADL   Where Assessed Standing at sink   Grooming Assistance 4  Minimal Assistance   Grooming Deficit Increased time to complete; Teeth care   UB Bathing Assistance 5  Supervision/Setup   UB Bathing Deficit Setup; Increased time to complete   LB Bathing Assistance 4  Minimal Assistance   LB Bathing Deficit Right lower leg including foot; Left lower leg including foot   UB Dressing Assistance 5  Supervision/Setup   UB Dressing Deficit Increased time to complete   LB Dressing Assistance 4  Minimal Assistance   LB Dressing Deficit Thread RLE into pants   Toileting Assistance  5  Supervision/Setup   Toileting Deficit Setup   Bed Mobility   Supine to Sit 5  Supervision   Additional items Increased time required   Transfers   Sit to Stand 4  Minimal assistance   Additional items Assist x 1   Stand to Sit 4  Minimal assistance   Additional items Assist x 1   Functional Mobility   Functional Mobility 4  Minimal assistance   Additional items Rolling walker   Cognition   Overall Cognitive Status [facility]   Arousal/Participation Alert; Cooperative   Attention Within functional limits   Orientation Level Oriented X4   Memory Within functional limits   Following Commands Follows all commands and directions without difficulty   Activity Tolerance   Activity Tolerance Patient tolerated treatment well   Assessment   Assessment Pt participated in occupational therapy with focus on activity tolerance, bed mob, functional transfers/mob, standing tolerance and balance for pt engagement in UB/LB self-care and toileting/toilet transfers  Pt cleared by RN/Pb for pt participation in occupational therapy  Pt received /HOB raised/supine pt sitting upright and agreeable to therapy following pt Identifiers confirmed  Pt reported his goal today to get stronger and go home with family  Pt required Min A for functional transfers including toilet transfers 2* pt balance deficits  Pt was able to trial standing at bathroom sink/with chair set up to brush teeth  Pt however required assist 2* pt balance deficits and pt unable to release his grasp on RW with his L UE   Pt then required to complete AM self-care seated at bathroom sink with Assist required for SBA and Min A for LB dressing 2* pt balance deficits  Pt will require in-pt rehab to continue to address pt noted deficits which currently impair pt ADL and functional mob       Plan   Goal Expiration Date 02/04/19   Treatment Day 3   OT Frequency 3-5x/wk   Recommendation   OT Discharge Recommendation Short Term Rehab   Barthel Index   Feeding 10   Bathing 5   Grooming Score 5   Dressing Score 5   Bladder Score 10   Bowels Score 10   Toilet Use Score 5   Transfers (Bed/Chair) Score 5   Mobility (Level Surface) Score 0   Stairs Score 0   Barthel Index Score 55   Modified Paula Scale   Modified Paula Scale 4     Curly Imus  VILLASENOR/L

## 2019-01-30 NOTE — TELEPHONE ENCOUNTER
01/30/2019-DAVID (01/28/2019)PT TO BE DISCHARGED TO [facility]  2 WK FOLLOW UP APT WITH DR TAN ON FEB 6th FOR PATH DISCUSSION/INCISION CHECK      [address] [place] [address]  02/06/2019-2WK REY TRIANA IS AWARE AND WILL CONTACT PT ONCE DISCHARGED TO CONFIRM APTS

## 2019-01-30 NOTE — PLAN OF CARE
Problem: OCCUPATIONAL THERAPY ADULT  Goal: Performs self-care activities at highest level of function for planned discharge setting  See evaluation for individualized goals  Treatment Interventions: ADL retraining, Functional transfer training, UE strengthening/ROM, Endurance training, Patient/family training, Equipment evaluation/education, Compensatory technique education, Continued evaluation, Energy conservation, Activityengagement          See flowsheet documentation for full assessment, interventions and recommendations  Outcome: Progressing  Limitation: Decreased ADL status, Decreased Safe judgement during ADL, Decreased UE strength, Decreased endurance, Decreased sensation, Decreased self-care trans, Decreased high-level ADLs  Prognosis: Fair  Assessment: Pt participated in occupational therapy with focus on activity tolerance, bed mob, functional transfers/mob, standing tolerance and balance for pt engagement in UB/LB self-care and toileting/toilet transfers  Pt cleared by RN/Pb for pt participation in occupational therapy  Pt received /HOB raised/supine pt sitting upright and agreeable to therapy following pt Identifiers confirmed  Pt reported his goal today to get stronger and go home with family  Pt required Min A for functional transfers including toilet transfers 2* pt balance deficits  Pt was able to trial standing at bathroom sink/with chair set up to brush teeth  Pt however required assist 2* pt balance deficits and pt unable to release his grasp on RW with his L UE   Pt then required to complete AM self-care seated at bathroom sink with Assist required for SBA and Min A for LB dressing 2* pt balance deficits  Pt will require in-pt rehab to continue to address pt noted deficits which currently impair pt ADL and functional mob      Recommendation: Physiatry Consult  OT Discharge Recommendation: Short Term Rehab  OT - OK to Discharge: Yes (when medically stable)

## 2019-01-30 NOTE — PROGRESS NOTES
Patient not seen or examined however chart reviewed and patient discussed with neurosurgery  Per Rich Woods PA-C neurosx would like to assume primary care for patient  Attending will be switched to Dr Larry Son and patient will remain under care of neurosx  Medicine will sign off  Patient is medically stable for discharge, he remains in house pending insurance authorization to rehab ([facility])--CM is following   Rest of plan as per primary service which is now neurosx  Kerri Mcnamara

## 2019-01-30 NOTE — SOCIAL WORK
Cm reviewed patient during care coordination rounds  Cm informed by [facility] liaison patient is approved for admission  [facility] liaison also stated insurance company provided authorization  However, no bed available today at rehab facility but patient will be transported over tomorrow to room 455  Cm informed medical team and patient

## 2019-01-30 NOTE — PROGRESS NOTES
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  3  Intramedullary abnormality of spinal cord  4  Syrinx of spinal cord  5  Chronic bilateral low back pain  6  Spinal cord injury   7  Weakness right lower extremity  8  Neurologic gait dysfunction  9  Lower extremity myelopathy  10  Spasticity  11  BPH associated with nocturia   12  Hypothyroidism  13  Mixed hyperlipidemia  14  Impaired Fasting glucose    Plan:  Neuro:  · Exam GCS 15,  Motor: Strength 5/5 throughout, proprioception normal BUE/BLE, sensation normal to LTx 3  Decreased to LT on RLE, decreased to pinprick RLE> LLE and bilat hands from wrist, Reflexes 3+ BUE/BLE, clonus bilaterally L> R  No drift bilaterally  Aspen Tx cervical brace in place  ? Posterior cervicothoracic incision clean,dry and intact with dressing  ? Right thoracic JAMAR Drain was discontinued 1/28/19 and suture tied  Dressing in place, CDI  · Continue regular neurologic checks  · Imaging reviewed personally and by attending  Final results as below  ? Upright  Xray Cervical and thoracic spine 1/25/19 Posterior spinal fusion instrumentation from C2 through T5 without evidence of hardware failure  ? MRI Cervical and Thoracic w wo contrast 1/7/19: Heterogeneous neoplasm of the cervicothoracic junction consistent with primary glial tumor   Pathology extends from the C4-C5 disc space to the caudal T3 level  · Pain control   ? Tylenol 975 mg p o  Q 8 hours scheduled  ? Oxycodone 5 and 10 mg p o  Q 4 hours p r n  For moderate-to-severe pain  ? Fioricet q 4 hrs prn for HA  ? Robaxin 750 mg p o  Q 6 hours scheduled for muscle spasms  ?  Narcan 0 04mg/ml syringe q 1 min prn for respiratory depression or opioid reversal  · Eval and mobilize per PT/OT-    ? MAFO boot for right foot drop   ? PT/OT Recommend inpatient rehab- Plan is for discharge to [facility]  · DVT PPX:  SCDs, Lovenox  · Steroid therapy :  Decadron injection 2 mg IV q  6 hrs today, wean every 72 hrs to off  · Pathology: Intramedullary mass sent to pathology, results pending  Glioma on frozen section  · Aspen Tx cervical brace to be worn at all times, for showers switch to [place] collar  CV: Regular rate  Pulm: Pulmonary toileting with Incentive Spirometry    GI:   · Bowel regimen with Senokot (Senna+ Docusate sodium) 8 6-50mg/tab 2 tabs PO daily  · Zofran inj 4mg q 6 hrs prn for nausea  · Mylanta prn for indigestion, heart burn  :  pt has been voiding on urinal, Flomax 0 4 mg PO daily  Heme: Labs:  · BMP 1/29/19 : Na wnl at 139, K wnl at 3 9, Ca at 8 2, Cr wnl at 0 73, BUN wnl at 22  · CBC 1/29/19: Hgb decreased at 9 3 but improved from prior 1/27/19 at 9 1, WBC improved/decreased at 10 78 from 16 04 on 1/27/19    Endo:   · Impaired fasting glucose/Hyperglycemia secondary to steroid use  ? Insulin (Humalog) 100 units/ml subq injection per algorithm  ? HgbA1C 6 3 %  · Hypothyroidism: Levothyroxine 25 mcg PO daily   · Hyperlipidemia: Diet controlled  FEN:  · Nutrition:Pt tolerating regular carbohydrate controlled diet  ID: Leukocytosis: WBC at 10 78 (improved),  Tmax 99 5  No signs of infection at incision site       · Rounding completed with KIMBERLY Roberts at bedside  · Dispo: Pt awaiting insurance authorization for [facility]  Pt is okay for discharge to [facility] from neurosurgery standpoint        · Neurosurgery will continue to follow up as primary  Pt has a 2 week post op visit with Dr Karol Frias on Feb 6th for discussion of pathology and incision check  Please call with any questions or concerns       Subjective/Objective   Chief Complaint: "I am doing okay, no pain in my neck today"/ Follow up POD 6 s/p Posterior C4-T3 laminectomy; resection of intramedullary mass; C2-T5 fixation/fusion    Subjective:  Patient reports he is doing okay today  Patient denies neck pain or headache  She reports continues to tolerate a regular diet without nausea or vomiting or abdominal pain  Patient denies chest pain, shortness of breath, or palpitations  Patient reports has been able to ambulate to where we had PT and OT to the hallway and to the bathroom  Patient reports has not had any drainage from his back and has noted a change in the dressing since a clean dressing was placed yesterday  Objective:  Sitting in chair, alert and awake, no acute distress  I/O       01/28 0701 - 01/29 0700 01/29 0701 - 01/30 0700 01/30 0701 - 01/31 0700    P  O  780 1640     I V  (mL/kg) 675 (8)      Total Intake(mL/kg) 1455 (17 3) 1640 (20)     Urine (mL/kg/hr) 1800 (0 9) 2850 (1 4)     Total Output 1800 2850      Net -345 -1210                   Invasive Devices     Drain            Closed/Suction Drain Right Back Bulb 7 Fr  5 days                Physical Exam:  Vitals: Blood pressure 111/60, pulse 56, temperature 99 5 °F (37 5 °C), resp  rate 18, height 5' 7" (1 702 m), weight 82 1 kg (181 lb), SpO2 96 %  ,Body mass index is 28 35 kg/m²  General appearance: alert, appears stated age, cooperative and no distress  Head: Normocephalic, without obvious abnormality, atraumatic  Eyes: EOMI, PERRL  Neck: supple, symmetrical, trachea midline, cervical brace in place  Posterior cervical/thoracic incision with dressing, clean, dry and intact  Lungs: non labored breathing  Heart: regular heart rate  Neurologic:   Mental status: Alert, thought content appropriate  Cranial nerves: grossly intact (Cranial nerves II-XII)  Sensory: normal to LT x 3, decreased to LT on RLE, decreased to pinprick RLE> LLE and bilat hands from wrist  Motor: moving all extremities, strength 5/5 throughout    Reflexes: 3+ and symmetric  Coordination: no drift bilaterally    Lab Results:    Results from last 7 days  Lab Units 01/29/19  0546 01/27/19  0550 01/26/19  0532   WBC Thousand/uL 10 78* 16 04* 14 29*   HEMOGLOBIN g/dL 9 3* 9 1* 8 8*   HEMATOCRIT % 28 8* 29 0* 27 8*   PLATELETS Thousands/uL 238 241 206   NEUTROS PCT % 75  --   --    MONOS PCT % 6  --   --        Results from last 7 days  Lab Units 01/29/19  0546 01/27/19  0550 01/25/19  0540   POTASSIUM mmol/L 3 9 3 9 4 0   CHLORIDE mmol/L 105 110* 108   CO2 mmol/L 26 25 22   BUN mg/dL 22 15 21   CREATININE mg/dL 0 73 0 65 0 75   CALCIUM mg/dL 8 2* 7 7* 7 7*                 Imaging Studies: I have personally reviewed pertinent reports  and I have personally reviewed pertinent films in PACS    Xr Cervical Spine 2 Or 3 Views    Result Date: 1/26/2019  Impression: Posterior spinal fusion instrumentation from C2 through T5 without evidence of hardware failure  Workstation performed: [id number]     Xr Spine Thoracic 3 Vw    Result Date: 1/26/2019  Impression: Posterior spinal fusion instrumentation from C2 through T5 without radiographic evidence of hardware failure  Workstation performed: [id number]       EKG, Pathology, and Other Studies: I have personally reviewed pertinent reports  VTE Pharmacologic Prophylaxis: Enoxaparin (Lovenox)    VTE Mechanical Prophylaxis: sequential compression device    PLEASE NOTE:  This encounter was completed utilizing the [employer] Direct Speech Voice Recognition Software  Grammatical errors, random word insertions, pronoun errors and incomplete sentences are occasional consequences of the system due to software limitations, ambient noise and hardware issues  These may be missed by proof reading prior to affixing electronic signature  Any questions or concerns about the content, text or information contained within the body of this dictation should be directly addressed to the advanced practitioner or physician for clarification  Please do not hesitate to call me directly if you have any questions or concerns

## 2019-01-31 ENCOUNTER — HOSPITAL ENCOUNTER (INPATIENT)
Facility: HOSPITAL | Age: 59
LOS: 19 days | Discharge: HOME | DRG: 055 | End: 2019-02-19
Attending: PHYSICAL MEDICINE & REHABILITATION | Admitting: PHYSICAL MEDICINE & REHABILITATION
Payer: COMMERCIAL

## 2019-01-31 ENCOUNTER — TRANSITIONAL CARE MANAGEMENT (OUTPATIENT)
Dept: FAMILY MEDICINE CLINIC | Facility: CLINIC | Age: 59
End: 2019-01-31

## 2019-01-31 VITALS
RESPIRATION RATE: 18 BRPM | SYSTOLIC BLOOD PRESSURE: 117 MMHG | OXYGEN SATURATION: 98 % | BODY MASS INDEX: 28.41 KG/M2 | HEART RATE: 55 BPM | DIASTOLIC BLOOD PRESSURE: 59 MMHG | WEIGHT: 181 LBS | HEIGHT: 67 IN | TEMPERATURE: 98.2 F

## 2019-01-31 DIAGNOSIS — R73.01 IMPAIRED FASTING GLUCOSE: Primary | ICD-10-CM

## 2019-01-31 DIAGNOSIS — D64.9 ANEMIA: ICD-10-CM

## 2019-01-31 DIAGNOSIS — C72.0 SPINAL CORD EPENDYMOMA (HCC): ICD-10-CM

## 2019-01-31 PROBLEM — E78.5 HYPERLIPIDEMIA: Status: ACTIVE | Noted: 2019-01-31

## 2019-01-31 PROBLEM — G95.89 SPINAL CORD MASS (HCC): Status: ACTIVE | Noted: 2019-01-11

## 2019-01-31 LAB
GLUCOSE SERPL-MCNC: 107 MG/DL (ref 65–140)
GLUCOSE SERPL-MCNC: 141 MG/DL (ref 65–140)
GLUCOSE SERPL-MCNC: 82 MG/DL (ref 65–140)

## 2019-01-31 PROCEDURE — 82948 REAGENT STRIP/BLOOD GLUCOSE: CPT

## 2019-01-31 PROCEDURE — 99024 POSTOP FOLLOW-UP VISIT: CPT | Performed by: PHYSICIAN ASSISTANT

## 2019-01-31 PROCEDURE — 99223 1ST HOSP IP/OBS HIGH 75: CPT | Performed by: PHYSICAL MEDICINE & REHABILITATION

## 2019-01-31 RX ORDER — LEVOTHYROXINE SODIUM 0.03 MG/1
25 TABLET ORAL
Status: DISCONTINUED | OUTPATIENT
Start: 2019-02-01 | End: 2019-02-19 | Stop reason: HOSPADM

## 2019-01-31 RX ORDER — DEXAMETHASONE 2 MG/1
2 TABLET ORAL DAILY
Status: COMPLETED | OUTPATIENT
Start: 2019-02-07 | End: 2019-02-09

## 2019-01-31 RX ORDER — AMOXICILLIN 250 MG
2 CAPSULE ORAL DAILY
Status: DISCONTINUED | OUTPATIENT
Start: 2019-02-01 | End: 2019-02-02 | Stop reason: ALTCHOICE

## 2019-01-31 RX ORDER — DEXAMETHASONE 2 MG/1
2 TABLET ORAL EVERY 6 HOURS SCHEDULED
Status: COMPLETED | OUTPATIENT
Start: 2019-01-31 | End: 2019-01-31

## 2019-01-31 RX ORDER — OXYCODONE HYDROCHLORIDE 10 MG/1
10 TABLET ORAL EVERY 4 HOURS PRN
Status: CANCELLED | OUTPATIENT
Start: 2019-01-31

## 2019-01-31 RX ORDER — MAGNESIUM HYDROXIDE/ALUMINUM HYDROXICE/SIMETHICONE 120; 1200; 1200 MG/30ML; MG/30ML; MG/30ML
30 SUSPENSION ORAL EVERY 6 HOURS PRN
Status: CANCELLED | OUTPATIENT
Start: 2019-01-31

## 2019-01-31 RX ORDER — TAMSULOSIN HYDROCHLORIDE 0.4 MG/1
0.4 CAPSULE ORAL
Status: DISCONTINUED | OUTPATIENT
Start: 2019-02-01 | End: 2019-01-31

## 2019-01-31 RX ORDER — TAMSULOSIN HYDROCHLORIDE 0.4 MG/1
0.4 CAPSULE ORAL
Status: DISCONTINUED | OUTPATIENT
Start: 2019-01-31 | End: 2019-02-04

## 2019-01-31 RX ORDER — OXYCODONE HYDROCHLORIDE 5 MG/1
5 TABLET ORAL EVERY 4 HOURS PRN
Status: DISCONTINUED | OUTPATIENT
Start: 2019-01-31 | End: 2019-02-19

## 2019-01-31 RX ORDER — METHOCARBAMOL 500 MG/1
500 TABLET, FILM COATED ORAL EVERY 8 HOURS SCHEDULED
Status: DISCONTINUED | OUTPATIENT
Start: 2019-01-31 | End: 2019-01-31 | Stop reason: HOSPADM

## 2019-01-31 RX ORDER — ACETAMINOPHEN 325 MG/1
975 TABLET ORAL EVERY 8 HOURS SCHEDULED
Status: CANCELLED | OUTPATIENT
Start: 2019-01-31

## 2019-01-31 RX ORDER — AMOXICILLIN 250 MG
2 CAPSULE ORAL DAILY
Status: CANCELLED | OUTPATIENT
Start: 2019-02-01

## 2019-01-31 RX ORDER — POLYETHYLENE GLYCOL 3350 17 G/17G
17 POWDER, FOR SOLUTION ORAL DAILY PRN
Status: DISCONTINUED | OUTPATIENT
Start: 2019-01-31 | End: 2019-02-09 | Stop reason: SDUPTHER

## 2019-01-31 RX ORDER — DEXAMETHASONE 2 MG/1
2 TABLET ORAL EVERY 6 HOURS SCHEDULED
Status: CANCELLED | OUTPATIENT
Start: 2019-01-31 | End: 2019-01-31

## 2019-01-31 RX ORDER — TAMSULOSIN HYDROCHLORIDE 0.4 MG/1
0.4 CAPSULE ORAL
Status: CANCELLED | OUTPATIENT
Start: 2019-01-31

## 2019-01-31 RX ORDER — OXYCODONE HYDROCHLORIDE 5 MG/1
5 TABLET ORAL EVERY 4 HOURS PRN
Status: CANCELLED | OUTPATIENT
Start: 2019-01-31

## 2019-01-31 RX ORDER — DEXAMETHASONE 2 MG/1
2 TABLET ORAL EVERY 8 HOURS SCHEDULED
Status: CANCELLED | OUTPATIENT
Start: 2019-02-01 | End: 2019-02-04

## 2019-01-31 RX ORDER — LEVOTHYROXINE SODIUM 0.03 MG/1
25 TABLET ORAL
Status: CANCELLED | OUTPATIENT
Start: 2019-02-01

## 2019-01-31 RX ORDER — DEXAMETHASONE 2 MG/1
2 TABLET ORAL EVERY 8 HOURS
Status: COMPLETED | OUTPATIENT
Start: 2019-02-01 | End: 2019-02-03

## 2019-01-31 RX ORDER — DEXAMETHASONE 2 MG/1
2 TABLET ORAL EVERY 12 HOURS SCHEDULED
Status: COMPLETED | OUTPATIENT
Start: 2019-02-04 | End: 2019-02-06

## 2019-01-31 RX ORDER — ONDANSETRON 2 MG/ML
4 INJECTION INTRAMUSCULAR; INTRAVENOUS EVERY 8 HOURS PRN
Status: CANCELLED | OUTPATIENT
Start: 2019-01-31

## 2019-01-31 RX ORDER — DEXAMETHASONE 2 MG/1
2 TABLET ORAL DAILY
Status: CANCELLED | OUTPATIENT
Start: 2019-02-07 | End: 2019-02-10

## 2019-01-31 RX ORDER — ACETAMINOPHEN 325 MG/1
975 TABLET ORAL EVERY 8 HOURS SCHEDULED
Status: DISCONTINUED | OUTPATIENT
Start: 2019-01-31 | End: 2019-02-19

## 2019-01-31 RX ORDER — DEXAMETHASONE 2 MG/1
2 TABLET ORAL EVERY 12 HOURS SCHEDULED
Status: CANCELLED | OUTPATIENT
Start: 2019-02-04 | End: 2019-02-07

## 2019-01-31 RX ORDER — OXYCODONE HYDROCHLORIDE 10 MG/1
10 TABLET ORAL EVERY 4 HOURS PRN
Status: DISCONTINUED | OUTPATIENT
Start: 2019-01-31 | End: 2019-02-19

## 2019-01-31 RX ORDER — METHOCARBAMOL 500 MG/1
500 TABLET, FILM COATED ORAL EVERY 8 HOURS SCHEDULED
Status: CANCELLED | OUTPATIENT
Start: 2019-01-31

## 2019-01-31 RX ORDER — METHOCARBAMOL 500 MG/1
500 TABLET, FILM COATED ORAL EVERY 6 HOURS PRN
Status: DISCONTINUED | OUTPATIENT
Start: 2019-01-31 | End: 2019-02-19

## 2019-01-31 RX ORDER — BUTALBITAL, ACETAMINOPHEN AND CAFFEINE 50; 325; 40 MG/1; MG/1; MG/1
1 TABLET ORAL EVERY 4 HOURS PRN
Status: CANCELLED | OUTPATIENT
Start: 2019-01-31

## 2019-01-31 RX ADMIN — LEVOTHYROXINE SODIUM 25 MCG: 25 TABLET ORAL at 05:58

## 2019-01-31 RX ADMIN — TAMSULOSIN HYDROCHLORIDE 0.4 MG: 0.4 CAPSULE ORAL at 17:26

## 2019-01-31 RX ADMIN — METHOCARBAMOL 500 MG: 500 TABLET, FILM COATED ORAL at 13:24

## 2019-01-31 RX ADMIN — ACETAMINOPHEN 975 MG: 325 TABLET, FILM COATED ORAL at 05:59

## 2019-01-31 RX ADMIN — METHOCARBAMOL 750 MG: 750 TABLET, FILM COATED ORAL at 05:58

## 2019-01-31 RX ADMIN — ACETAMINOPHEN 975 MG: 325 TABLET, FILM COATED ORAL at 13:24

## 2019-01-31 RX ADMIN — METHOCARBAMOL 500 MG: 500 TABLET, FILM COATED ORAL at 23:24

## 2019-01-31 RX ADMIN — POLYETHYLENE GLYCOL 3350 17 G: 17 POWDER, FOR SOLUTION ORAL at 19:29

## 2019-01-31 RX ADMIN — DEXAMETHASONE 2 MG: 2 TABLET ORAL at 12:06

## 2019-01-31 RX ADMIN — DEXAMETHASONE 2 MG: 2 TABLET ORAL at 23:23

## 2019-01-31 RX ADMIN — ACETAMINOPHEN 975 MG: 325 TABLET ORAL at 21:26

## 2019-01-31 RX ADMIN — DEXAMETHASONE 2 MG: 2 TABLET ORAL at 17:27

## 2019-01-31 RX ADMIN — DEXAMETHASONE 2 MG: 2 TABLET ORAL at 05:59

## 2019-01-31 RX ADMIN — SENNOSIDES AND DOCUSATE SODIUM 2 TABLET: 8.6; 5 TABLET ORAL at 09:20

## 2019-01-31 RX ADMIN — ENOXAPARIN SODIUM 40 MG: 40 INJECTION SUBCUTANEOUS at 09:20

## 2019-01-31 NOTE — ASSESSMENT & PLAN NOTE
· was Omega 3 FA 1000 mg qd PTA   · D/W neurosx and have cleared patient to resume home Omega 3 FA 1000 mg qd

## 2019-01-31 NOTE — PROGRESS NOTES
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  3  Intramedullary abnormality of spinal cord  4  Syrinx of spinal cord  5  Chronic bilateral low back pain  6  Spinal cord injury   7  Weakness right lower extremity  8  Neurologic gait dysfunction  9  Lower extremity myelopathy  10  Spasticity  11  BPH associated with nocturia   12  Hypothyroidism  13  Mixed hyperlipidemia  14  Impaired Fasting glucose    Plan:  Neuro:  · Exam GCS 15,  Motor: Strength 5/5 throughout, proprioception normal BUE/BLE, sensation normal to LTx 3  Decreased to LT on RLE, Reflexes 3+ BUE/BLE, clonus bilaterally L> R  No drift bilaterally  Aspen Tx cervical brace in place  ? Posterior cervicothoracic incision clean,dry and intact with dressing  ? Right thoracic JAMAR Drain was discontinued 1/28/19 and suture tied  Dressing in place, CDI  · Continue regular neurologic checks  · Imaging reviewed personally and by attending  Final results as below  ? Upright  Xray Cervical and thoracic spine 1/25/19 Posterior spinal fusion instrumentation from C2 through T5 without evidence of hardware failure  ? MRI Cervical and Thoracic w wo contrast 1/7/19: Heterogeneous neoplasm of the cervicothoracic junction consistent with primary glial tumor   Pathology extends from the C4-C5 disc space to the caudal T3 level  · Pain control   ? Tylenol 975 mg p o  Q 8 hours scheduled  ? Oxycodone 5 and 10 mg p o  Q 4 hours p r n  For moderate-to-severe pain  ? Fioricet q 4 hrs prn for HA, not used since 1/27  ? Robaxin decreased from 750 to 500 mg p o  Q 8 hours scheduled for muscle spasms  ?  Narcan 0 04mg/ml syringe q 1 min prn for respiratory depression or opioid reversal    · Eval and mobilize per PT/OT-    ? MAFO boot for right foot drop   ? PT/OT Recommend inpatient rehab- Plan is for discharge to [facility]  · DVT PPX:  SCDs, Lovenox  · Steroid therapy :  Decadron injection 2 mg IV q  6 hrs today, wean every 72 hrs to off  · Pathology: Intramedullary mass sent to pathology, results pending  Glioma on frozen section  · Aspen Tx cervical brace to be worn at all times, for showers switch to [place] collar  CV: Regular rate  Pulm: Pulmonary toileting with Incentive Spirometry    GI:   · Bowel regimen with Senokot (Senna+ Docusate sodium) 8 6-50mg/tab 2 tabs PO daily  · Zofran inj 4mg q 6 hrs prn for nausea  · Mylanta prn for indigestion, heart burn  :  pt has been voiding on urinal, Flomax 0 4 mg PO daily  Heme: Labs:  · BMP 1/29/19 : Na wnl at 139, K wnl at 3 9, Ca at 8 2, Cr wnl at 0 73, BUN wnl at 22  · CBC 1/29/19: Hgb decreased at 9 3 but improved from prior 1/27/19 at 9 1, WBC improved/decreased at 10 78 from 16 04 on 1/27/19     Endo:   · Impaired fasting glucose/Hyperglycemia secondary to steroid use  ? Insulin (Humalog) 100 units/ml subq injection per algorithm  ? HgbA1C 6 3 %  · Hypothyroidism: Levothyroxine 25 mcg PO daily   · Hyperlipidemia: Diet controlled  FEN:  · Nutrition:Pt tolerating regular carbohydrate controlled diet  ID: 1/29/19 WBC at 10 78 (improved),  Temp 98 2  No signs of infection at incision site       · Rounding completed with KIMBERLY Mack  · Dispo: Pt to be disharged to [facility] today     · Neurosurgery will continue to follow up as primary  Pt has a 2 week post op visit with Dr Jayesh Disla on Feb 6th for discussion of pathology and incision check  Please call with any questions or concerns        Subjective/Objective      Chief Complaint: "I am doing okay, I do not have pain at this time" / Follow up POD 7 s/p Posterior C4-T3 laminectomy; resection of intramedullary mass; C2-T5 fixation/fusion    Subjective: Pt reports he does not have pain today   He reports yesterday evening the had a mild HA and some pain that was well controlled with Tylenol and he only used Oxycodone 5 mg once yesterday  Pt reports he continues to work with PT/OT and has been able to ambulate with them  Pt reports the chronic numbness in his right leg remains stable  He reports he also continue to have chronic mild numbness in the left leg and bilateral hands  Patient reports has not had a bowel movement since his surgery though he denies abdominal pain  He reports he continues to void without concerns  Patient reports he continues to tolerate a regular diet without nausea, vomiting  Patient denies chest pain, shortness of breath, fever or chills  Objective:  Alert and awake, no acute distress  I/O       01/29 0701 - 01/30 0700 01/30 0701 - 01/31 0700 01/31 0701 - 02/01 0700    P  O  1640 1080     Total Intake(mL/kg) 1640 (20) 1080 (13 2)     Urine (mL/kg/hr) 2850 (1 4) 1825 (0 9) 250 (0 8)    Total Output 2850 1825 250    Net -1210 -745 -250                 Invasive Devices     Drain            Closed/Suction Drain Right Back Bulb 7 Fr  6 days                Physical Exam:  Vitals: Blood pressure 117/59, pulse 55, temperature 98 2 °F (36 8 °C), resp  rate 18, height 5' 7" (1 702 m), weight 82 1 kg (181 lb), SpO2 98 %  ,Body mass index is 28 35 kg/m²  General appearance: alert, appears stated age, cooperative and no distress  Head: Normocephalic, without obvious abnormality, atraumatic  Eyes: EOMI, PERRL  Neck: supple, symmetrical, trachea midline   Back: no kyphosis present, no tenderness to percussion or palpation  Lungs: non labored breathing  Heart: regular heart rate  Neurologic:   Mental status: Alert, thought content appropriate  Cranial nerves: grossly intact (Cranial nerves II-XII)  Sensory: normal to LT X 3, decreased to LT on the right shin from right knee to right ankle  Motor: moving all extremities without focal weakness, strength 5/5 throughout     Reflexes: 3+ and symmetric  Coordination: no drift bilaterally      Lab Results:    Results from last 7 days  Lab Units 01/29/19  0546 01/27/19  0550 01/26/19  0532   WBC Thousand/uL 10 78* 16 04* 14 29*   HEMOGLOBIN g/dL 9 3* 9 1* 8 8*   HEMATOCRIT % 28 8* 29 0* 27 8*   PLATELETS Thousands/uL 238 241 206   NEUTROS PCT % 75  --   --    MONOS PCT % 6  --   --        Results from last 7 days  Lab Units 01/29/19  0546 01/27/19  0550 01/25/19  0540   POTASSIUM mmol/L 3 9 3 9 4 0   CHLORIDE mmol/L 105 110* 108   CO2 mmol/L 26 25 22   BUN mg/dL 22 15 21   CREATININE mg/dL 0 73 0 65 0 75   CALCIUM mg/dL 8 2* 7 7* 7 7*                   Imaging Studies: I have personally reviewed pertinent reports  and I have personally reviewed pertinent films in PACS  Xr Cervical Spine 2 Or 3 Views    Result Date: 1/26/2019  Impression: Posterior spinal fusion instrumentation from C2 through T5 without evidence of hardware failure  Workstation performed: [id number]     Xr Spine Cervical 2 Or 3 Vw Injury    Result Date: 1/25/2019  Impression: Fluoroscopic guidance provided for surgical procedure  Please refer to the separate procedure notes for additional details  Workstation performed: [id number]     Xr Spine Thoracic 3 Vw    Result Date: 1/26/2019  Impression: Posterior spinal fusion instrumentation from C2 through T5 without radiographic evidence of hardware failure  Workstation performed: [id number]       EKG, Pathology, and Other Studies: I have personally reviewed pertinent reports  VTE Pharmacologic Prophylaxis: Enoxaparin (Lovenox)    VTE Mechanical Prophylaxis: sequential compression device    PLEASE NOTE:  This encounter was completed utilizing the [employer] Direct Speech Voice Recognition Software  Grammatical errors, random word insertions, pronoun errors and incomplete sentences are occasional consequences of the system due to software limitations, ambient noise and hardware issues  These may be missed by proof reading prior to affixing electronic signature  Any questions or concerns about the content, text or information contained within the body of this dictation should be directly addressed to the advanced practitioner or physician for clarification  Please do not hesitate to call me directly if you have any questions or concerns

## 2019-01-31 NOTE — PROGRESS NOTES
PHYSICAL MEDICINE AND REHABILITATION   PREADMISSION ASSESSMENT     Projected [facility] Diagnoses:  Impairment of mobility, safety and Activities of Daily Living (ADLs) due to Spinal Cord Dysfunction:  Non-Traumatic:  04 130 Other Non-Traumatic     Etiologic Dx: Cervical Spinal Cord Glioma with Myelopathy  Date of Onset: 1/24/2019   Date of surgery: 1/24/2019 Posterior C4-T3 laminectomy; resection of intramedullary mass; C2-T5 fixation/fusion    PATIENT INFORMATION  Name: Ilya Feng III Phone #: [phone] (home) [phone] (work)  Address: [address] [place] [id number]  YOB: 1960 Age: 60 y o  SS#   Marital Status: /Civil Union  Ethnicity:   Employment Status: currently employed  Extended Emergency Contact Information  Primary Emergency Contact: Nieves Ibrahim  Address: [address] [place] [address] of [address] [place] [address] Phone: [phone]  Relation: Spouse  Secondary Emergency Contact: [address] [place] [address] of [address] [place] [address] Phone: [phone]  Relation: Son  Advance Directive: Level 1 Full Code - unknown advanced directive    INSURANCE/COVERAGE:     Primary Payor: Beth Becket / Plan: Beth Becket PPO / Product Type: PPO /   Secondary Payer: Private Pay   Payer Contact: Sheela Phillips Contact:   Contact Phone: [phone]  Fax #: [phone] Contact Phone:   Authorization #: [id number]  Coverage Dates: 1/31-2/6 (7 days)  LCD: 2/6 with review  Medicare Days:  Medical Record #: [id number]    REFERRAL SOURCE:   Referring provider: Pauline Bell MD  Referring facility: [address] [place] [address]  Room: [facility] 62/[facility] 626-  PCP: Karol Jacobsen MD PCP phone number: [phone]    MEDICAL INFORMATION  HPI: Patient is a 60year old male that presented to [place] [address] on 1/24/2019 for an elective neurosurgical procedure   Patient had presented to Neurosurgery outpatient office with complaints of hand numbness for multiple years, that had progressed to numbness in bilateral lower extremities (right greater than left) with increased difficulty walking  Patient was also noted to have increased urinary frequency at night, and has been following with Urology  MRI of the lumbar spine was relatively unremarkable, despite patient having severe back pain  Cervical/thoracic spine MRI revealed heterogeneously enhancing intramedullary lesion 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/5 to T3 with large cystic/syrinx from C7 to T2; 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  Patient underwent posterior C4 to T3 laminectomy, resection of intramedullary mass, C2 to T5 fixation and fusion with Neurosurgery on 1/24, with JAMAR drain intact  OR findings were significant for intramedullary mass brownish tan in color with glioma on frozen section, and gross appearance consistent with ependymoma  Recommendations were made to maintain MAP greater than 85, and required Salbador-Synephrine gtt, and was initiated on Decadron  Aspen collar was in place, and is to be worn at all times  During patient's hospital course, patient had complaints of severe headache, which was likely spinal headache in nature, and was managed with Fioricet  Patient had urinary retention, with resolved after initiation of Flomax  Patient states that numbness and weakness has improved slightly, however still continues with symptoms, right greater than left  JAMAR drain was discontinued on 1/28 without difficulty  Patient is currently on a Diabetic diet with aspiration precautions in place due to cervical collar, and is tolerating well  PT/OT therapies and PMR were consulted, and they are recommending patient for inpatient Acute Rehab  He has demonstrated that he can tolerate and participate in 3 hours of therapy per day  Past Medical History:   Past Surgical History:    Allergies:     Past Medical History:   Diagnosis Date    BPH associated with nocturia     Cervical spinal mass ([facility] [address] ) 01/07/2019    cervicothoracic    Disease of thyroid gland     Herniated disc, cervical     Hyperlipidemia     Impaired fasting glucose     Past Surgical History:   Procedure Laterality Date    APPENDECTOMY      COLONOSCOPY  03/13/2012    NORMAL; RECHECK IN 5 YEARS    SC BX/EXCIS SPIN PATEL,INDUR,INMED,CERV N/A 1/24/2019    Procedure: Posterior C4-T3 laminectomy; resection of intramedullary mass C5-T3, including fenestration of syrinx C7-T2; C3-T4 fixation/fusion; additional levels as needed;  Surgeon: Lucila Tirado MD;  Location: [facility];  Service: Neurosurgery    WISDOM TOOTH EXTRACTION       Allergies   Allergen Reactions    Bee Venom Hives, Itching and Edema     Category: Allergy;     Penicillins Hives and Itching     Category: Allergy; Comorbidities: Ambulatory dysfunction, B/L UE & B/L LE parasthesias (R>L), myelopathy, ABLA, urinary retention, leukocytosis, BPH, hypothyroid, HLD, appendectomy, former smoker  CURRENT VITAL SIGNS:   Temp:  [98 2 °F (36 8 °C)-98 4 °F (36 9 °C)] 98 2 °F (36 8 °C)  HR:  [55-67] 55  Resp:  [16-18] 18  BP: ()/(48-59) 117/59   Intake/Output Summary (Last 24 hours) at 01/31/19 1139  Last data filed at 01/31/19 0745   Gross per 24 hour   Intake              840 ml   Output             1775 ml   Net             -935 ml        LABORATORY RESULTS:      Lab Results   Component Value Date    HGB 9 3 (L) 01/29/2019    HCT 28 8 (L) 01/29/2019    WBC 10 78 (H) 01/29/2019     Lab Results   Component Value Date    BUN 22 01/29/2019    K 3 9 01/29/2019     01/29/2019    CREATININE 0 73 01/29/2019     Lab Results   Component Value Date    PROTIME 13 2 01/22/2019    INR 0 99 01/22/2019        DIAGNOSTIC STUDIES:  Xr Cervical Spine 2 Or 3 Views    Result Date: 1/26/2019  Impression: Posterior spinal fusion instrumentation from C2 through T5 without evidence of hardware failure   Workstation performed: [id number]     Xr Spine Cervical 2 Or 3 Vw Injury    Result Date: 1/25/2019  Impression: Fluoroscopic guidance provided for surgical procedure  Please refer to the separate procedure notes for additional details  Workstation performed: [id number]     Xr Spine Thoracic 3 Vw    Result Date: 1/26/2019  Impression: Posterior spinal fusion instrumentation from C2 through T5 without radiographic evidence of hardware failure  Workstation performed: [id number]       PRECAUTIONS/SPECIAL NEEDS:  Tobacco:   History   Smoking Status    Former Smoker    Quit date: 5/16/1993   Smokeless Tobacco    Never Used   , Alcohol:    History   Alcohol Use    Yes     Comment: SOCIAL    , Splints/Braces: Aspen collar at all times, Anticoagulation:  Lovenox 40mg SQ daily, Blood Sugar Management: as per MD recommendations, Edema Management, Safety Concerns, Pain Management,  Aspiration Risk/Precautions, Dietary Restrictions: Diabetic, Language Preference: English, Cervical Spinal Precautions and Fall Precautions      MEDICATIONS:     Current Facility-Administered Medications:     acetaminophen (TYLENOL) tablet 975 mg, 975 mg, Oral, Q8H [facility], CLAUDIO Villa, 975 mg at 01/31/19 0559    aluminum-magnesium hydroxide-simethicone (MYLANTA) 200-200-20 mg/5 mL oral suspension 30 mL, 30 mL, Oral, Q6H PRN, Darion Espinoza MD    butalbital-acetaminophen-[facility]) -40 mg per tablet 1 tablet, 1 tablet, Oral, Q4H PRN, Rachell Velasco PA-C, 1 tablet at 01/27/19 2158    dexamethasone (DECADRON) tablet 2 mg, 2 mg, Oral, Q6H [facility], 2 mg at 01/31/19 0559    [START ON 2/1/2019] dexamethasone (DECADRON) tablet 2 mg, 2 mg, Oral, Q8H [facility], Rachell Velasco PA-C    [START ON 2/4/2019] dexamethasone (DECADRON) tablet 2 mg, 2 mg, Oral, Q12H [facility]    [START ON 2/7/2019] dexamethasone (DECADRON) tablet 2 mg, 2 mg, Oral, Daily, Yessi Bell PA-C    enoxaparin (LOVENOX) subcutaneous injection 40 mg, 40 mg, Subcutaneous, Daily, Darion Espinoza MD, 40 mg at 01/31/19 0920    insulin lispro (HumaLOG) 100 units/mL subcutaneous injection 2-12 Units, 2-12 Units, Subcutaneous, 4x Daily (AC & HS), Josiane Parnell MD, 2 Units at 01/30/19 1727    levothyroxine tablet 25 mcg, 25 mcg, Oral, Early Morning, Jie Maradiaga MD, 25 mcg at 01/31/19 0558    methocarbamol (ROBAXIN) tablet 500 mg, 500 mg, Oral, Q8H [address], Yessi Bell PA-C    naloxone (NARCAN) 0 04 mg/mL syringe 0 04 mg, 0 04 mg, Intravenous, Q1MIN PRN, Jie Maradiaga MD    ondansetron [facility]) injection 4 mg, 4 mg, Intravenous, Q4H PRN, Alva Lucas, 4 mg at 01/25/19 1745    oxyCODONE (ROXICODONE) immediate release tablet 10 mg, 10 mg, Oral, Q4H PRN, Jie Maradiaga MD    oxyCODONE (ROXICODONE) IR tablet 5 mg, 5 mg, Oral, Q4H PRN, Jie Maradiaga MD, 5 mg at 01/30/19 5437    senna-docusate sodium (SENOKOT S) 8 6-50 mg per tablet 2 tablet, 2 tablet, Oral, Daily, Jie Maradiaga MD, 2 tablet at 01/31/19 0920    tamsulosin (FLOMAX) capsule 0 4 mg, 0 4 mg, Oral, Daily With Wang Adames MD, 0 4 mg at 01/30/19 1725    SKIN INTEGRITY:   no rashes, no erythema, Incision: healing well, no significant drainage, no dehiscence, no significant erythema, posterior neck incision with DSD    PRIOR LEVEL OF FUNCTION:  He lives in [facility] single family home  Kj Nones III is  and lives with their spouse, daughter and son  Self Care: Independent, Indoor Mobility: Independent, Stairs (in/outdoor): Independent and Cognition: Independent    HOME ENVIRONMENT:  The living area: bedroom on 2nd floor and bathroom on 2nd floor; 1/2 bath on main level  There is 1 step to enter the home, with full flight of stairs to second level of home  The patient will not have 24 hour supervision/physical assistance available upon discharge  Patient's spouse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, and patient is home alone for approximately 6-7 hours per day  PREVIOUS DME:  Equipment in home (previous DME):  Shower Chair    FUNCTIONAL STATUS:  Physical Therapy Occupational Therapy Speech Therapy   1/29/2019, per PTA    Bed Mobility   Supine to Sit 4  Minimal assistance   Additional items Assist x 1   Transfers   Sit to Stand 4  Minimal assistance   Additional items Assist x 1   Stand to Sit 4  Minimal assistance   Additional items Assist x 1   Stand pivot 4  Minimal assistance   Additional items Assist x 1; Increased time required   Ambulation/Elevation   Gait pattern R Hemiparesis; Improper Weight shift;Scissoring; Antalgic;Narrow LEAH; Decreased R stance; Excessively slow; Step to;Short stride   Gait Assistance 4  Minimal assist   Additional items Assist x 1   Assistive Device Rolling walker   Distance 60 feet x 1, 120 feet x 1, 80 feet x1, 40 feet x1, 100 feet x1    Balance   Static Sitting Fair +   Dynamic Sitting Fair   Static Standing Fair   Dynamic Standing Fair -   Endurance Deficit   Endurance Deficit Yes   Endurance Deficit Description Requires rest breaks due to fatigue   Activity Tolerance   Activity Tolerance Patient tolerated treatment well;Patient limited by fatigue   Nurse Made Aware Appropriate to see per RN   Exercises   Knee AROM Long Arc Quad Sitting;20 reps;AROM; Bilateral   Ankle Pumps Sitting;20 reps;AROM; Bilateral   Balance training  Standing balance inside RW less UE support, however unstable    Assessment   Prognosis Good   Problem List Decreased strength;Decreased endurance; Impaired balance;Decreased mobility; Impaired sensation;Orthopedic restrictions;Pain   Assessment Patient lying supine in bed agreeable to participate in therapy  He demonstrates improved bed mobility, able to recite 3/3 cervical spinal precautions  He sat EOB able to don socks and L shoe, requiring assitance with R shoe/MAFO  He was able to ambulate increased distances wtih standing rest breaks inside RW, improved gait with RW  He demonstrates noted gait deviations and able to self correct throughout session   He would continue to benefit from skilled PT to maximize functional independence  1/30/2019, per VILLASENOR    ADL   Where Assessed Standing at sink   Grooming Assistance 4  Minimal Assistance   Grooming Deficit Increased time to complete; Teeth care   UB Bathing Assistance 5  Supervision/Setup   UB Bathing Deficit Setup; Increased time to complete   LB Bathing Assistance 4  Minimal Assistance   LB Bathing Deficit Right lower leg including foot; Left lower leg including foot   UB Dressing Assistance 5  Supervision/Setup   UB Dressing Deficit Increased time to complete   LB Dressing Assistance 4  Minimal Assistance   LB Dressing Deficit Thread RLE into pants   Toileting Assistance  5  Supervision/Setup   Toileting Deficit Setup   Bed Mobility   Supine to Sit 5  Supervision   Additional items Increased time required   Transfers   Sit to Stand 4  Minimal assistance   Additional items Assist x 1   Stand to Sit 4  Minimal assistance   Additional items Assist x 1   Functional Mobility   Functional Mobility 4  Minimal assistance   Additional items Rolling walker   Cognition   Overall Cognitive Status [facility]   Arousal/Participation Alert; Cooperative   Attention Within functional limits   Orientation Level Oriented X4   Memory Within functional limits   Following Commands Follows all commands and directions without difficulty   Activity Tolerance   Activity Tolerance Patient tolerated treatment well   Assessment   Assessment Pt participated in occupational therapy with focus on activity tolerance, bed mob, functional transfers/mob, standing tolerance and balance for pt engagement in UB/LB self-care and toileting/toilet transfers  Pt cleared by RN/Pb for pt participation in occupational therapy  Pt received /HOB raised/supine pt sitting upright and agreeable to therapy following pt Identifiers confirmed  Pt reported his goal today to get stronger and go home with family    Pt required Min A for functional transfers including toilet transfers 2* pt balance deficits  Pt was able to trial standing at bathroom sink/with chair set up to brush teeth  Pt however required assist 2* pt balance deficits and pt unable to release his grasp on RW with his L UE   Pt then required to complete AM self-care seated at bathroom sink with Assist required for SBA and Min A for LB dressing 2* pt balance deficits  Pt will require in-pt rehab to continue to address pt noted deficits which currently impair pt ADL and functional mob  N/A     CURRENT GAP IN FUNCTION     Prior to Admission:     Functional Status: Patient was independent with mobility/ambulation, transfers, ADL's, IADL's  Estimated length of stay: 10 to 14 days    Anticipated Post-Discharge Disposition/Treatment  Disposition: Return to previous home/apartment  Outpatient Services: Physical Therapy (PT) and Occupational Therapy (OT)    BARRIERS TO DISCHARGE  Lovenox, Weakness, Pain, Fatigue, Home Accessibility, Caregiver Accessibility, Financial Resources, Equipment Needs and Resource Availability    INTERVENTIONS FOR DISCHARGE  Adaptive equipment, Patient/Family/Caregiver Education, [employer], Home Evaluation, Support Group, Financial Assistance, Arrange DME needs, Medication Changes as per MD recommendations and Therapy exercises    REQUIRED THERAPY:  Patient will require PT and OT 90 minutes each per day, five days per week to achieve rehab goals       REQUIRED FUNCTIONAL AND MEDICAL MANAGEMENT FOR INPATIENT REHABILITATION:  Skin:  There are no pressure sores currently, posterior neck incision with DSD, Pain Management: Overall pain is well controlled, Deep Vein Thrombosis (DVT) Prophylaxis:  SCD's while in bed and Lovenox 40mg SQ daily, Diabetes Management: continue sliding scale insulin, patient to do finger sticks as ordered, SLIM to continue to manage diabetes, and further IM management of additional medical conditions while on the AR[facility], he needs PT/OT intervention, patient/family education and training, possible Neuropsych and Neurosurgery consults with any other needed consults prn, nursing medication review and management of bowel/bladder function  RECOMMENDED LEVEL OF CARE:  Patient presented to [place] [address] on 1/24/2019 for an elective neurosurgical procedure  Patient had presented to Neurosurgery outpatient office with complaints of hand numbness for multiple years, that had progressed to numbness in bilateral lower extremities (right greater than left) with increased difficulty walking  Patient was also noted to have increased urinary frequency at night, and has been following with Urology  Cervical/thoracic spine MRI revealed heterogeneously enhancing intramedullary lesion 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/5 to T3 with large cystic/syrinx from C7 to T2; 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  Patient underwent posterior C4 to T3 laminectomy, resection of intramedullary mass, C2 to T5 fixation and fusion with Neurosurgery on 1/24, with JAMAR drain intact  OR findings were significant for intramedullary mass brownish tan in color with glioma on frozen section, and gross appearance consistent with ependymoma  Aspen collar was in place, and is to be worn at all times  Patient had urinary retention, with resolved after initiation of Flomax  Patient states that numbness and weakness has improved slightly, however still continues with symptoms, right greater than left  JAMAR drain was discontinued on 1/28 without difficulty  Patient is currently on a Diabetic diet with aspiration precautions in place due to cervical collar, and is tolerating well  Prior to patient's admission, patient was fully Independent with ADL's and IADL's, including working full-time and driving  Currently, patient is Min assist with RW for gait and transfers, and Supervision with UB ADL's, Min assist with LB ADL's   Close medical management and PM&R management is recommended at this time while patient is on the [facility]  Inpatient acute rehab is recommended for patient to maximize overall strength and mobility to Modified Independent upon discharge to home with support of family

## 2019-01-31 NOTE — PLAN OF CARE
MUSCULOSKELETAL - ADULT     Maintain or return mobility to safest level of function Progressing     Maintain proper alignment of affected body part Progressing        NEUROSENSORY - ADULT     Achieves stable or improved neurological status Progressing     Achieves maximal functionality and self care Progressing        Nutrition/Hydration-ADULT     Nutrient/Hydration intake appropriate for improving, restoring or maintaining nutritional needs Progressing        PAIN - ADULT     Verbalizes/displays adequate comfort level or baseline comfort level Progressing        Potential for Falls     Patient will remain free of falls Progressing        Prexisting or High Potential for Compromised Skin Integrity     Skin integrity is maintained or improved Progressing        SKIN/TISSUE INTEGRITY - ADULT     Skin integrity remains intact Progressing     Incision(s), wounds(s) or drain site(s) healing without S/S of infection Progressing     Oral mucous membranes remain intact Progressing

## 2019-01-31 NOTE — H&P
PHYSICAL MEDICINE AND REHABILITATION H&P/ADMISSION NOTE  Samson Groves III 60 y o  male MRN: [id number]  Unit/Bed#: -01 Encounter: [id number]     Rehab Diagnosis: NTSCI    History of Present Illness:   Samson Groves III is a 60 y o  male who presented to the [address] with h/o sensory deficits and was found to have spinal cord mass therefore underwent tumor resection and C4-T3 laminectomy and C2-T5 fixation/fusion by Dr Wil Christianson on 1/24    Subjective: d/w patient rehab program     Review of Systems: A 10-point review of systems was performed  Negative except as listed above      Plan:     Anemia   Assessment & Plan    · Likely ABLA  · Hg currently stable at 9 3     Leukocytosis   Assessment & Plan    · Mild at 10 78 (ULN at 10 16)  · On decadron taper      Hyperlipidemia   Assessment & Plan    · was Omega 3 FA 1000 mg qd PTA   · Unlikely to pose risk of increase bleeding after surgery however will confirm with neurosurgery if they would like to continue to hold      Spinal cord mass [facility])   Assessment & Plan    · S/p resection and C4-T3 laminectomy and C2-T5 fixation/fusion by Dr Bhavik Mcmanus and Dr Tarah Christianson on 1/24  · decardron taper complete on 2/9  · Spine precautions  · c-collar at all times  · Path pending  · OP FU on 2/6     Impaired fasting glucose   Assessment & Plan    · PCP aware  · Diet controlled      Hypothyroidism   Assessment & Plan    · On home synthroid 25 mcg qd      BPH associated with nocturia   Assessment & Plan    · Per patient stopped taking flomax 0 4 mg qpm in the past because it was not helping the urinary retention   · Per patient stopped taking ditropan XL 10 mg qd in the past because  it was not helping the urinary retention   · Per patient stopped taking viagra 100 mg in the past because  it was not helping the urinary retention   · Per patient was no longer on any meds for urinary retention/BPH PTA as he was working with urology as OP and further tests were being planned as patient medications had not been effective however in acute care patient was restarted on flomax and post-operatively patient has been urinating well   · Will check PVR and if PVR is sufficiently low will trial off of flomax (which patient had stopped taking PTA because it was not effective)  · Follows with Edna Fox PA-C/Dr Kimberly Walker as OP        Drug regimen reviewed, all potential adverse effects identified and addressed:    Scheduled Meds:    Current Facility-Administered Medications:  acetaminophen 975 mg Oral Q8H Scott Crawley MD   [START ON 2/1/2019] dexamethasone 2 mg Oral Edinson Wallace MD   [START ON 2/4/2019] dexamethasone 2 mg Oral Q12H MD Yousif Hernández Presume ON 2/7/2019] dexamethasone 2 mg Oral Daily MD Yousif Hameed Presume ON 2/1/2019] enoxaparin 40 mg Subcutaneous Daily Romulo Alexis MD   [START ON 2/1/2019] levothyroxine 25 mcg Oral Early Morning Romulo Alexis MD   methocarbamol 500 mg Oral Q6H PRN Romulo Alexis MD   oxyCODONE 10 mg Oral Q4H PRN Romulo Alexis MD   oxyCODONE 5 mg Oral Q4H PRN Romulo Alexis MD   polyethylene glycol 17 g Oral Daily PRN MD Yousif Hameed Presume ON 2/1/2019] senna-docusate sodium 2 tablet Oral Daily Romulo Alexis MD   tamsulosin 0 4 mg Oral Daily With Mary Ann Lopes MD        Incidental findings:  1) 2-3 mm pulmonary nodule: per radiology report of 1/18/19 recommend repeat CT chest in 3 months however will defer to PCP  2) fatty involution of pancreas: OP FU with PCP with further testing/treatment and/or specialist referral at PCP's discretion  3) L maxillary sinus thickening: asymptomatic, OP FU with PCP with further testing/treatment and/or specialist referral at PCP's discretion      DVT ppx: SCDs, cleared for lovenox 40 mg sc qd by neurosx in acute care (and has been on it since 1/26              Functional History - Prior to Admission:      I PTA     Functional Status Upon Admission to [facility]:  Mobility: min  Transfers: min  ADLs: min     Isaiah Almazan III lives with their family  He lives in [facility]) single family home  The living area: bedroom on 2nd floor and bathroom on 2nd floor    There 1 step to enter the home        The patient will not have 24 hour supervision/physical assistance available upon discharge      Physical Exam:    General: alert, no apparent distress, cooperative and comfortable  HEENT:  +C-Collar  CARDIAC:  +S1/2  LUNGS:  respirations unlabored   ABDOMEN:  soft NT  EXTREMITIES:  no significant LE edema  NEURO:   mental status, speech normal, alert and oriented x3, cranial nerves 2-12 intact, finger to nose and cerebellar exam normal and lt touch decreased LE B/L, 5/5 UE B/L, 4+/5 LLE, 3 to 4-/5 RLE  PSYCH:  mood/affect currently stable      Laboratory:      Results from last 7 days  Lab Units 01/29/19  0546 01/27/19  0550 01/26/19  0532   HEMOGLOBIN g/dL 9 3* 9 1* 8 8*   HEMATOCRIT % 28 8* 29 0* 27 8*   WBC Thousand/uL 10 78* 16 04* 14 29*       Results from last 7 days  Lab Units 01/29/19  0546 01/27/19  0550 01/25/19  0540   BUN mg/dL 22 15 21   SODIUM mmol/L 139 140 138   POTASSIUM mmol/L 3 9 3 9 4 0   CHLORIDE mmol/L 105 110* 108   CREATININE mg/dL 0 73 0 65 0 75                    Past Medical History:   Past Surgical History:   Family History:   Social history:   Past Medical History:   Diagnosis Date    BPH associated with nocturia     Cervical spinal mass ([facility] [address] ) 01/07/2019    cervicothoracic    Disease of thyroid gland     Herniated disc, cervical     Hyperlipidemia     Impaired fasting glucose     Past Surgical History:   Procedure Laterality Date    APPENDECTOMY      COLONOSCOPY  03/13/2012    NORMAL; RECHECK IN 5 YEARS    [place] BX/EXCIS SPIN PATEL,INDUR,INMED,CERV N/A 1/24/2019    Procedure: Posterior C4-T3 laminectomy; resection of intramedullary mass C5-T3, including fenestration of syrinx C7-T2; C3-T4 fixation/fusion; additional levels as needed;  Surgeon: Mp Bolanos MD; Location: [facility];  Service: Neurosurgery    WISDOM TOOTH EXTRACTION       Family History   Problem Relation Age of Onset    Diabetes Mother     Hypertension Father     Cerebral palsy Sister       Social History     Social History    Marital status: /Civil Union     Spouse name: Joyce Vazquez Number of children: 3    Years of education: 15     Occupational History          Social History Main Topics    Smoking status: Former Smoker     Quit date: 5/16/1993    Smokeless tobacco: Never Used    Alcohol use Yes      Comment: SOCIAL     Drug use: No    Sexual activity: Not on file     Other Topics Concern    Not on file     Social History Narrative    Lives at home with spouse and 3 children    Has 3 biological children and 3 of spouses children total of 6 children          Current Medical Diagnosis Allergies   Patient Active Problem List   Diagnosis    BPH associated with nocturia    Hypothyroidism    Impaired fasting glucose    Spinal cord mass (HCC)    Leukocytosis    Anemia    Hyperlipidemia    Allergies   Allergen Reactions    Bee Venom Hives, Itching and Edema     Category: Allergy;     Penicillins Hives and Itching     Category: Allergy; Medical Necessity Criteria for [facility] Admission: ABLA  In addition, the preadmission screen, post-admission physical evaluation, overall plan of care and admissions order demonstrate a reasonable expectation that the following criteria were met at the time of admission to the [facility]  1  The patient requires active and ongoing therapeutic intervention of multiple therapy disciplines (physical therapy, occupational therapy, speech-language pathology, or prosthetics/orthotics), one of which is physical or occupational therapy      2  Patient requires an intensive rehabilitation therapy program, as defined in Chapter 1, section 110 2 2 of the CMS Medicare Policy Manual  This intensive rehabilitation therapy program will consist of at least 3 hours of therapy per day at least 5 days per week or at least 15 hours of intensive rehabilitation therapy within a 7 consecutive day period, beginning with the date of admission to the [facility]  3  The patient is reasonably expected to actively participate in, and benefit significantly from, the intensive rehabilitation therapy program as defined in Chapter 1, section 110 2 2 of the CMS Medicare Policy Manual at this time of admission to the [facility]  He can reasonably be expected to make measurable improvement (that will be of practical value to improve the patients functional capacity or adaptation to impairments) as a result of the rehabilitation treatment, as defined in section 110 3, and such improvement can be expected to be made within the prescribed period of time  As noted in the CMS Medicare Policy Manual, the patient need not be expected to achieve complete independence in the domain of self-care nor be expected to return to his or her prior level of functioning in order to meet this standard  4  The patient must require physician supervision by a rehabilitation physician  As such, a rehabilitation physician will conduct face-to-face visits with the patient at least 3 days per week throughout the patients stay in the [facility] to assess the patient both medically and functionally, as well as to modify the course of treatment as needed to maximize the patients capacity to benefit from the rehabilitation process    5  The patient requires an intensive and coordinated interdisciplinary approach to providing rehabilitation, as defined in Chapter 1, section 110 2 5 of the CMS Medicare Policy Manual  This will be achieved through periodic team conferences, conducted at least once in a 7-day period, and comprising of an interdisciplinary team of medical professionals consisting of: a rehabilitation physician, registered nurse,  and/or , and a licensed/certified therapist from each therapy discipline involved in treating the patient  Changes Since Pre-admission Assessment: None -This patient's participation in rehab continues to be reasonable, necessary and appropriate  CMS Required Post-Admission Physician Evaluation Elements  History and Physical, including medical history, functional history and active comorbidities as in above text      PostAdmission Physician Evaluation:  The patient has the potential to make improvement and is in need of physical, occupational, and/or therapy services  The patient may also need nutritional services  Given the patient's complex medical condition and risk of further medical complications, rehabilitative services cannot be safely provided at a lower level of care, such as a skilled nursing facility  I have reviewed the patient's functional and medical status at the time of the preadmission screening and they are the same as on the day of this admission  I acknowledge that I have personally performed a full physical examination on this patient within 24 hours of admission   The patient and/or family demonstrated understanding the rehabilitation program and the discharge process after we discussed them      Agree in entirety: yes  Minor adaptions: none    Major changes: none     Linda Huizar MD  Physical Medicine and Rehabilitation

## 2019-01-31 NOTE — SOCIAL WORK
Cm contacted [facility]-liasion to confirm patient still has a bed at facility for today patient to transfer to room 455  Cm awaiting transfer time from [facility]  Cm later informed by liaison that patient will transfer to rehab room at 2pm      Delay in transfer due to bed availability  Transfer to room 960 will occur at 4:45pm today  RN and patient aware  Patient to contact family, once they are done with work with room transfer information

## 2019-01-31 NOTE — ASSESSMENT & PLAN NOTE
· Per patient stopped taking flomax 0 4 mg qpm in the past because it was not helping the urinary retention   · Per patient stopped taking ditropan XL 10 mg qd in the past because  it was not helping the urinary retention   · Per patient stopped taking viagra 100 mg in the past because  it was not helping the urinary retention   · Per patient was no longer on any meds for urinary retention/BPH PTA as he was working with urology as OP and further tests were being planned as patient medications had not been effective however in acute care patient was restarted on flomax and post-operatively patient has been urinating well   · successful trial off of flomax (which patient had stopped taking PTA because it was not effective); PVRs were 57, 38, & 87 (PVRs are from greater than 24 hours after last dose of flomax)   · Follows with Jeanette Salvador PA-C/Dr Jamie Roque as OP

## 2019-01-31 NOTE — DISCHARGE SUMMARY
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]  Syrinx of spinal cord ([address] ) [G95 0]  Neoplasm of cervical spine [D49 2]  Glioma of spinal cord ([address] ) [C72 0]    Discharge Diagnosis:   1  s/p Posterior C4-T3 laminectomy; resection of intramedullary mass; C2-T5 fixation/fusion    2  Intramedullary abnormality of spinal cord  3  Syrinx of spinal cord  4  Neurologic gait dysfunction  5  Lower extremity myelopathy  6  BPH associated with nocturia   7  Hypothyroidism  8  Mixed hyperlipidemia  9  Impaired Fasting glucose    Resolved Problems  Date Reviewed: 1/29/2019    None          Attending: Dr Darion Espinoza    Procedures Performed:Posterior C4-T3 laminectomy; resection of intramedullary mass; C2-T5 fixation/fusion     Pathology: Intramedullary mass sent to pathology, results pending  Hospital Course: Patient is a 59 yo male who had presented to the neurosurgery with numbness in his hands for multiple yrs  Recently had occasional burning in left forearm and hand  Pt also noted difficulty with walking with dragging the right leg  Pt has hx of mulple falls over the past few months  Pt also notes numbness in right greater than left leg and bilateral feet  MRI of Cervical and Thoracic spine in 1/7/19 showed a heterogenous neoplasm from the C4-C5 disc space to the Caudal T3 level  Pt was deemed an appropriate candidate for decompressive surgery and resection of the neoplasm  On 1/24/19 pt was admitted for surgery  He had Posterior C4-T3 laminectomy; resection of intramedullary mass and C2-T5 fixation/fusion  A specimen of the intramedullary was sent to pathology, results pending   There were no complications during the procedure and the pt was taken to the recovery room in stable condition  POD 1 pt had pain around his incision in the posterior cervicothoracic region  His pain decreased with administration of pain medication  He also had a HA and was given Fioricet which helped with his HA  Pt was started on Decadron 4 mg q 6 hrs which was then weaned every 72 hrs and will be weaned to off  On exam: He had full strength bilateral upper extremities (BUE) and mild decreased strength in RLE- chronic  Pt  Also had decreased sensation to light touch and pinprick in RLE which was chronic  Post op Upright X rays of the Cervical/Thoracic spine showed hardware in good position  Labs showed Na and K wnl, WBC wnl and Hgb decreased at 9 5  Pt's glucose was monitored due to steroid use  He also had insulin algorithm placed for hyperglycemia  Pt was able to void without concerns after his martinez was removed  POD 4 pt had the drain removed and the drain suture tied  No further drainage after drain removed  Pt was clear to discharge for discharge from neurosurgery standpoint  Pt remained stable during the remainder of his hospitalization POD 5- POD 7  Pt was evaluated by PT/OT who recommended discharge to inpatient acute rehab center  Pt was medically and neurologically stable for discharge  Pt has a 2 week post op visit with neurosurgery with Dr Judith Aparicio for pathology discussion and incision check  Condition at Discharge: good     Discharge instructions/Information to patient and family:   See after visit summary for information provided to patient and family  Provisions for Follow-Up Care:  See after visit summary for information related to follow-up care and any pertinent home health orders  Disposition: Short-term rehab at [facility]  Planned Readmission: No    Discharge Statement   I spent 30 minutes discharging the patient  This time was spent on the day of discharge   I had direct contact with the patient on the day of discharge  Additional documentation is required if more than 30 minutes were spent on discharge  Discharge Medications:  See after visit summary for reconciled discharge medications provided to patient and family

## 2019-01-31 NOTE — ASSESSMENT & PLAN NOTE
· S/p resection and C4-T3 laminectomy and C2-T5 fixation/fusion by Dr Dede Givens and Dr Kareem Sherman on 1/24  · decardron taper completed  · Spine precautions  · c-collar at all times  · Path revealed grade 2 ependymoma; further management per team at [place] [address] [facility] tumor [facility] (appt scheduled for 2 pm on 3/6)   · All required FU imaging (MRI brain & spine, XR spine, LP) already ordered by Neurosx team electronically in EMR  · OP FU w/neurosx scheduled for 2/6 done as inpt by neurosx team

## 2019-01-31 NOTE — PLAN OF CARE
DISCHARGE PLANNING     Discharge to home or other facility with appropriate resources Progressing        INFECTION - ADULT     Absence or prevention of progression during hospitalization Progressing        PAIN - ADULT     Verbalizes/displays adequate comfort level or baseline comfort level Progressing        Potential for Falls     Patient will remain free of falls Progressing        SAFETY ADULT     Patient will remain free of falls Progressing     Maintain or return to baseline ADL function Progressing     Maintain or return mobility status to optimal level Progressing

## 2019-01-31 NOTE — DISCHARGE INSTRUCTIONS
[facility] Neurosurgery discharge instructions   Please keep incision clean and dry  Avoid applying creams, lotion or antiseptic to incision area   If you have steri-strips you may remove them after 14 days if they do not fall off   Check the wound daily  If the incision becomes red, swollen, tender, warm, or has increased drainage please notify MD immediately   May shower since pt is more than 3 days since his surgery, but do not soak in a tub and no swimming   Pat incision dry after showering   No bending or heavy lifting greater than 10lbs   No prolonged sitting greater than 30 minutes, but may walk as tolerated   Please take pain medications to relieve incision pain, and muscle relaxants to prevent spasms as directed by MD or Extender  See Med  Rec  completed at the time of discharge   No driving for 2 weeks or longer if taking narcotics or muscle relaxers   OK to be passenger in car for short distances   If taking Coumadin, Aspirin, or Plavix, you may resume these medications when cleared by Neurosurgery   To avoid constipation while on narcotics increase your water and fiber intake   May use over the counter stool softeners such as colace and senna   Limit steps to 2-3 times a day   Continue use of Incentive spirometer   Return for your follow up appointment in 2 weeks for an incision check and staple/suture removal as scheduled  Follow-up 6 weeks after surgery as scheduled  **Please notify MD if you experience a fever 101  F, chills or have increased pain, numbness, or weakness in your legs**

## 2019-01-31 NOTE — NURSING NOTE
Clinically Significant Medication Issue  Time of Stay: Admission     Did a complete drug regimen review identify potential clinically significant medication issues?     No

## 2019-02-01 LAB
GLUCOSE SERPL-MCNC: 120 MG/DL (ref 65–140)
GLUCOSE SERPL-MCNC: 127 MG/DL (ref 65–140)
GLUCOSE SERPL-MCNC: 127 MG/DL (ref 65–140)
GLUCOSE SERPL-MCNC: 144 MG/DL (ref 65–140)
GLUCOSE SERPL-MCNC: 191 MG/DL (ref 65–140)

## 2019-02-01 PROCEDURE — 82948 REAGENT STRIP/BLOOD GLUCOSE: CPT

## 2019-02-01 PROCEDURE — 97530 THERAPEUTIC ACTIVITIES: CPT

## 2019-02-01 PROCEDURE — 97162 PT EVAL MOD COMPLEX 30 MIN: CPT

## 2019-02-01 PROCEDURE — 97167 OT EVAL HIGH COMPLEX 60 MIN: CPT

## 2019-02-01 PROCEDURE — 97110 THERAPEUTIC EXERCISES: CPT

## 2019-02-01 PROCEDURE — 99232 SBSQ HOSP IP/OBS MODERATE 35: CPT | Performed by: PHYSICAL MEDICINE & REHABILITATION

## 2019-02-01 PROCEDURE — 97535 SELF CARE MNGMENT TRAINING: CPT

## 2019-02-01 RX ADMIN — ACETAMINOPHEN 975 MG: 325 TABLET ORAL at 05:25

## 2019-02-01 RX ADMIN — SENNOSIDES AND DOCUSATE SODIUM 2 TABLET: 8.6; 5 TABLET ORAL at 09:00

## 2019-02-01 RX ADMIN — LEVOTHYROXINE SODIUM 25 MCG: 25 TABLET ORAL at 05:25

## 2019-02-01 RX ADMIN — DEXAMETHASONE 2 MG: 2 TABLET ORAL at 16:07

## 2019-02-01 RX ADMIN — ENOXAPARIN SODIUM 40 MG: 40 INJECTION SUBCUTANEOUS at 09:00

## 2019-02-01 RX ADMIN — ACETAMINOPHEN 975 MG: 325 TABLET ORAL at 13:06

## 2019-02-01 RX ADMIN — DEXAMETHASONE 2 MG: 2 TABLET ORAL at 23:47

## 2019-02-01 RX ADMIN — DEXAMETHASONE 2 MG: 2 TABLET ORAL at 08:59

## 2019-02-01 RX ADMIN — TAMSULOSIN HYDROCHLORIDE 0.4 MG: 0.4 CAPSULE ORAL at 16:07

## 2019-02-01 RX ADMIN — ACETAMINOPHEN 975 MG: 325 TABLET ORAL at 21:27

## 2019-02-01 NOTE — PROGRESS NOTES
Internal Medicine Progress Note  Patient: Lukasz Fishman III  Age/sex: 60 y o  male  Medical Record #: [id number]      ASSESSMENT/PLAN:  Lukasz Fishman III is seen and examined and management for following issues:    Medullary mass removal with fixation/fusion of C2-3 T5 1/24/19:  cytology is pending  Maintain cervical collar all times; steroid wean per Neurosurgery  Monitor incision site/ fever curve/WBC count   Still has numbness of legs to hip level R>L; burning has resolved right hand/arm with slight burning left hand; still with some slight numbness sensation palms of hands      Post-op urinary retention/hx BPH: resolved with starting Flomax     Hypothyroidism:  Continue Levothyroxine 25 mcg qd     Elevated fasting blood sugars:  Continue DM diet/ Accuchecks QID with SSI; will assess if needs Metformin      Asymptomatic bradycardia:  noted postoperatively; monitor for symptoms --> will consult Cardiology if needed     ABLA:  Stable; w/o sx; cont to monitor      Subjective: offers no complaints    ROS:   GI: denies abdominal pain, change bowel habits or reflux symptoms  Neuro: No new neurologic changes  Respiratory: No Cough, SOB  Cardiovascular: No CP, palpitations     Scheduled Meds:    Current Facility-Administered Medications:  acetaminophen 975 mg Oral Q8H [address] Romulo Alexis MD   dexamethasone 2 mg Oral Q8H Romulo Alexis MD   [START ON 2/4/2019] dexamethasone 2 mg Oral Q12H MD Yousif Richmond Presume ON 2/7/2019] dexamethasone 2 mg Oral Daily Romulo Alexis MD   enoxaparin 40 mg Subcutaneous Daily Romulo Alexis MD   insulin lispro 1-5 Units Subcutaneous TID AC Ulises Thompson, DO   insulin lispro 1-5 Units Subcutaneous HS Ulises Thompson, DO   levothyroxine 25 mcg Oral Early Morning Romulo Alexis MD   methocarbamol 500 mg Oral Q6H PRN Romulo Alexis MD   oxyCODONE 10 mg Oral Q4H PRN Romulo Alexis MD   oxyCODONE 5 mg Oral Q4H PRN Romulo Alexis MD   polyethylene glycol 17 g Oral Daily PRN Romulo Alexis MD senna-docusate sodium 2 tablet Oral Daily Santosh Jackson MD   tamsulosin 0 4 mg Oral Daily With Lynda Perea MD       Labs:       Results from last 7 days  Lab Units 01/29/19  0546 01/27/19  0550   WBC Thousand/uL 10 78* 16 04*   HEMOGLOBIN g/dL 9 3* 9 1*   HEMATOCRIT % 28 8* 29 0*   PLATELETS Thousands/uL 238 241       Results from last 7 days  Lab Units 01/29/19  0546 01/27/19  0550   SODIUM mmol/L 139 140   POTASSIUM mmol/L 3 9 3 9   CHLORIDE mmol/L 105 110*   CO2 mmol/L 26 25   BUN mg/dL 22 15   CREATININE mg/dL 0 73 0 65   CALCIUM mg/dL 8 2* 7 7*                  Results from last 7 days  Lab Units 02/01/19  0632 01/31/19  2135 01/31/19  1206   POC GLUCOSE mg/dl 127 141* 107     [unfilled]    Labs reviewed    Physical Examination:  Vitals:   Vitals:    01/31/19 1747 01/31/19 1812 01/31/19 2040 02/01/19 0519   BP: 107/55  106/57 103/60   BP Location: Left arm  Left arm Left arm   Pulse: 60  58 57   Resp: 18  18 20   Temp: 98 7 °F (37 1 °C)  98 9 °F (37 2 °C) 98 4 °F (36 9 °C)   TempSrc: Oral  Oral Oral   SpO2: 97%  96% 96%   Weight:  78 kg (172 lb)     Height: 5' 7" (1 702 m)        Constitutional:  NAD; pleasant; nontoxic  HEENT:  AT/NC; oropharynx negative for thrush on tongue   Neck: negative for JVD  CV:  +S1, S2;  RRR; no rub/murmur  Pulmonary:  BBS without crackles/wheeze/rhonci; resp are unlabored  Abdominal:  soft, +BS, ND/NT; no mass  Skin:  no rashes  Musculoskeletal:  no edema  :  no martinez  Neurological/Psych:  AAO;  CAIN 5/5; no depression/anxiety        [x ] Total time spent: 30 Mins and greater than 50% of this time was spent counseling/coordinating care  ** Please Note: Dragon 360 Dictation voice to text software may have been used in the creation of this document   **

## 2019-02-01 NOTE — CONSULTS
Consultation - Eduardo Nair III 60 y o  male MRN: [id number]    Unit/Bed#: -01 Encounter: [id number]        History of Present Illness     HPI: Eduardo Nair III is a 60y o  year old male who presents with chronic history of arm and hand numbness which led to evaluation imaging found evidence of cervical mass patient underwent elective procedure for removal this appeared to be benign lesion consistent with glioma, at same time fixation fusion of cervical C2 through T5 undertaken  Patient did well postoperatively showed improvement in arm and hand numbness  Did have some postoperative urinary retention but relieved after a few days with the institution of Flomax  Now in need of acute rehab services met criteria for acute rehab services      ROS:  Constitutional: Negative  HENT: Negative  Respiratory: Negative  Cardiovascular: Negative  Gastrointestinal: Negative  Musculoskeletal: Negative  Neurological: Negative  Psychiatric/Behavioral: Negative          Historical Information   Past Medical History:   Diagnosis Date    BPH associated with nocturia     Cervical spinal mass ([facility] [address] ) 01/07/2019    cervicothoracic    Disease of thyroid gland     Herniated disc, cervical     Hyperlipidemia     Impaired fasting glucose      Past Surgical History:   Procedure Laterality Date    APPENDECTOMY      COLONOSCOPY  03/13/2012    NORMAL; RECHECK IN 5 YEARS    [place] BX/EXCIS SPIN PATEL,INDUR,INMED,CERV N/A 1/24/2019    Procedure: Posterior C4-T3 laminectomy; resection of intramedullary mass C5-T3, including fenestration of syrinx C7-T2; C3-T4 fixation/fusion; additional levels as needed;  Surgeon: Krupa Magana MD;  Location: [facility];  Service: Neurosurgery    WISDOM TOOTH EXTRACTION       Social History   History   Alcohol Use    Yes     Comment: SOCIAL      History   Drug Use No     History   Smoking Status    Former Smoker    Quit date: 5/16/1993   Smokeless Tobacco    Never Used Family History   Problem Relation Age of Onset    Diabetes Mother     Hypertension Father     Cerebral palsy Sister        Meds/Allergies   current meds:  Current Facility-Administered Medications   Medication Dose Route Frequency    acetaminophen (TYLENOL) tablet 975 mg  975 mg Oral Q8H [address]    [START ON 2/1/2019] dexamethasone (DECADRON) tablet 2 mg  2 mg Oral Q8H    [START ON 2/4/2019] dexamethasone (DECADRON) tablet 2 mg  2 mg Oral Q12H [address]    [START ON 2/7/2019] dexamethasone (DECADRON) tablet 2 mg  2 mg Oral Daily    [START ON 2/1/2019] enoxaparin (LOVENOX) subcutaneous injection 40 mg  40 mg Subcutaneous Daily    [START ON 2/1/2019] levothyroxine tablet 25 mcg  25 mcg Oral Early Morning    methocarbamol (ROBAXIN) tablet 500 mg  500 mg Oral Q6H PRN    oxyCODONE (ROXICODONE) immediate release tablet 10 mg  10 mg Oral Q4H PRN    oxyCODONE (ROXICODONE) IR tablet 5 mg  5 mg Oral Q4H PRN    polyethylene glycol (MIRALAX) packet 17 g  17 g Oral Daily PRN    [START ON 2/1/2019] senna-docusate sodium (SENOKOT S) 8 6-50 mg per tablet 2 tablet  2 tablet Oral Daily    tamsulosin (FLOMAX) capsule 0 4 mg  0 4 mg Oral Daily With Dinner       PTA meds:   Prescriptions Prior to Admission   Medication    levothyroxine 25 mcg tablet    Multiple Vitamin (MULTI-VITAMIN DAILY PO)     Allergies   Allergen Reactions    Bee Venom Hives, Itching and Edema     Category: Allergy;     Penicillins Hives and Itching     Category: Allergy;        Objective   Vitals: Blood pressure 106/57, pulse 58, temperature 98 9 °F (37 2 °C), temperature source Oral, resp  rate 18, height 5' 7" (1 702 m), weight 78 kg (172 lb), SpO2 96 %  Physical Exam      HENT:  Normocephalic  EOM are normal  PERRLAt  Neck supple  Cardiovascular: Normal rate and regular rhythm  Pulmonary: Breath sounds normal  No respiratory distress  Pt has no wheezes nor rales  Abdominal: Soft  Bowel sounds are normal  Pt exhibits no distension   There is no tenderness  There is no rebound and no guarding  Neurological: Pt is alert and oriented to person, place, and time  Psychiatric: Pt has a normal mood and affect  Lab Results:   Results from last 7 days  Lab Units 01/29/19  0546 01/27/19  0550   WBC Thousand/uL 10 78* 16 04*   HEMOGLOBIN g/dL 9 3* 9 1*   HEMATOCRIT % 28 8* 29 0*   PLATELETS Thousands/uL 238 241       Results from last 7 days  Lab Units 01/29/19  0546 01/27/19  0550   SODIUM mmol/L 139 140   POTASSIUM mmol/L 3 9 3 9   CHLORIDE mmol/L 105 110*   CO2 mmol/L 26 25   BUN mg/dL 22 15   CREATININE mg/dL 0 73 0 65   CALCIUM mg/dL 8 2* 7 7*                 Results from last 7 days  Lab Units 01/31/19  1206 01/31/19  0734 01/30/19  2124   POC GLUCOSE mg/dl 107 82 123     [unfilled]    Labs reviewed    Imaging: reviewed  EKG, Pathology, and Other Studies: I have personally reviewed pertinent reports  VTE Prophylaxis: Enoxaparin (Lovenox)    Code Status: Level 1 - Full Code   Advance Directive and Living Will:      Power of :    POLST:      Assessment/Plan     # status post cervical neck showed medullary mass removal and fixation and fusion of C2-3 T5:  Maintain cervical collar all times continue steroid wean per Neurosurgery  Monitor incision site  Monitor fever curve and WBC count  Ongoing monitoring of postoperative improvement arm and hand numbness  # postoperative urinary retention:  Seems to be now resolved continue with Flomax and monitor for any evidence of recurrent urinary retention  # hypothyroidism:  Continue supplemental hormone replacement with levothyroxine 25 mcg    # Elevated fasting blood sugars:  Continue with consistent carbohydrate diet Accu-Chek and cover with sliding scale pending ongoing results if appears to be elevated with consistency with institute therapy with metformin      # Asymptomatic bradycardia:  Seems to be noted postoperatively will monitor this if any symptoms do present time cells further evaluation workup with assistance of Cardiology as necessary  Counseling / Coordination of Care  Total floor / unit time spent today 75 minutes  Greater than 50% of total time was spent with the patient and / or family counseling and / or coordination of care  ** Please Note: Dragon 360 Dictation voice to text software may have been used in the creation of this document   **

## 2019-02-01 NOTE — TREATMENT PLAN
Physical Medicine and Rehabilitation Progress Note  Brian Ruiz III 60 y o  male MRN: [id number]  Unit/Bed#: -01 Encounter: [id number]    HPI: Brian Ruiz III is a 60 y o  male who presented to the [address] with h/o sensory deficits and was found to have spinal cord mass therefore underwent tumor resection and C4-T3 laminectomy and C2-T5 fixation/fusion by Dr Villar Due Dr Ana M Low on 1/24       Chief Complaint: spinal mass s/p resection     Subjective: patient denies any events overnight     ROS:  negative unless noted above    Assessment/Plan:      Anemia   Assessment & Plan    · Likely ABLA  · Hg currently stable at 9 3     Leukocytosis   Assessment & Plan    · Mild at 10 78 (ULN at 10 16)  · On decadron taper      Hyperlipidemia   Assessment & Plan    · was Omega 3 FA 1000 mg qd PTA   · Unlikely to pose risk of increase bleeding after surgery however will confirm with neurosurgery if they would like to continue to hold      Impaired fasting glucose   Assessment & Plan    · PCP aware  · Diet controlled      Hypothyroidism   Assessment & Plan    · On home synthroid 25 mcg qd      BPH associated with nocturia   Assessment & Plan    · Per patient stopped taking flomax 0 4 mg qpm in the past because it was not helping the urinary retention   · Per patient stopped taking ditropan XL 10 mg qd in the past because  it was not helping the urinary retention   · Per patient stopped taking viagra 100 mg in the past because  it was not helping the urinary retention   · Per patient was no longer on any meds for urinary retention/BPH PTA as he was working with urology as OP and further tests were being planned as patient medications had not been effective however in acute care patient was restarted on flomax and post-operatively patient has been urinating well   · Will check PVR and if PVR is sufficiently low will trial off of flomax (which patient had stopped taking PTA because it was not effective)  · Follows with Nunzio Homans PA-C/Dr Abdoul Sellers as OP     * Spinal cord mass [facility])   Assessment & Plan    · S/p resection and C4-T3 laminectomy and C2-T5 fixation/fusion by Dr Dolores Kanner and Dr Sukhjinder Lazo on 1/24  · decardron taper complete on 2/9  · Spine precautions  · c-collar at all times  · Path pending  · OP FU on 2/6        Scheduled Meds:    Current Facility-Administered Medications:  acetaminophen 975 mg Oral Q8H [facility] Sylvester Aschoff, MD   dexamethasone 2 mg Oral Q8H Sylvester Aschoff, MD   [START ON 2/4/2019] dexamethasone 2 mg Oral Q12H MD Liliana Quinonez ON 2/7/2019] dexamethasone 2 mg Oral Daily Sylvester Aschoff, MD   enoxaparin 40 mg Subcutaneous Daily Sylvester Aschoff, MD   insulin lispro 1-5 Units Subcutaneous TID AC Serjio Dennis DO   insulin lispro 1-5 Units Subcutaneous HS Serjio Dennis DO   levothyroxine 25 mcg Oral Early Morning Sylvester Aschoff, MD   methocarbamol 500 mg Oral Q6H PRN Sylvester Aschoff, MD   oxyCODONE 10 mg Oral Q4H PRN Sylvester Aschoff, MD   oxyCODONE 5 mg Oral Q4H PRN Sylvester Aschoff, MD   polyethylene glycol 17 g Oral Daily PRN Sylvester Aschoff, MD   senna-docusate sodium 2 tablet Oral Daily Sylvester Aschoff, MD   tamsulosin 0 4 mg Oral Daily With Sarina Noriega MD        Incidental findings:  1) 2-3 mm pulmonary nodule: per radiology report of 1/18/19 recommend repeat CT chest in 3 months however will defer to PCP  2) fatty involution of pancreas: OP FU with PCP with further testing/treatment and/or specialist referral at PCP's discretion  3) L maxillary sinus thickening: asymptomatic, OP FU with PCP with further testing/treatment and/or specialist referral at PCP's discretion      DVT ppx: SCDs, cleared for lovenox 40 mg sc qd by neurosx in acute care (and has been on it since 1/26)       Objective:    Functional Update:  Mobility: min-mod A (1-2)    Transfers: min-mod  ADLs: PENDING         Physical Exam:    Vitals:    02/01/19 1317   BP: 115/60   Pulse: 62   Resp: 20   Temp: 98 2 °F (36 8 °C)   SpO2: 97%         General: alert, no apparent distress, cooperative and comfortable  HEENT:  +C-collar  CARDIAC:  +S1/2  LUNGS:  respirations unlabored  ABDOMEN:  soft NT  EXTREMITIES:  no significant LE edema  NEURO:   mental status, speech normal, alert and oriented x3  PSYCH:  mood/affect currently stable      Laboratory:     Results from last 7 days  Lab Units 01/29/19  0546 01/27/19  0550 01/26/19  0532   HEMOGLOBIN g/dL 9 3* 9 1* 8 8*   HEMATOCRIT % 28 8* 29 0* 27 8*   WBC Thousand/uL 10 78* 16 04* 14 29*       Results from last 7 days  Lab Units 01/29/19  0546 01/27/19  0550   BUN mg/dL 22 15   SODIUM mmol/L 139 140   POTASSIUM mmol/L 3 9 3 9   CHLORIDE mmol/L 105 110*   CREATININE mg/dL 0 73 0 65            Patient Active Problem List   Diagnosis    BPH associated with nocturia    Hypothyroidism    Impaired fasting glucose    Spinal cord mass (HCC)    Leukocytosis    Anemia    Hyperlipidemia           Total visit time:  At least 25 minutes, with more than 50% spent counseling/coordinating care

## 2019-02-01 NOTE — PROGRESS NOTES
OT EVALUATION     02/01/19 0700   Patient Data   Rehab Impairment non traumatic spinal cord dysfunction   Etiologic Diagnosis cervical spinal cord glioma with myelopathy   Date of Onset 01/24/19   Home Setup   Type of Home Multi Level   Method of Entry Stairs;Hand Rail Left   Number of Stairs 2  (1 into house and 1 into kitchen/dining room)   Number of Stairs in Home 15   In Home Hand Rail Left   First Floor Bathroom Half   Second Floor Bathroom Full;Tub;Curtain   Home Modifications Necessary? Yes   Home Modification Comment grab bars in shower    Available Equipment (none)   Prior IADL Participation   Money Management Identify Money;Estimate Costs;Estimate Change;Combine Bills;Manage Checkbook   Meal Preparation Full Participation   Laundry Full Participation   Home Cleaning Partial Participation   Prior Level of Function   Self-Care 3  Independent - Patient completed the activities by him/herself, with or without an assistive device, with no assistance from a helper  Indoor-Mobility (Ambulation) 3  Independent - Patient completed the activities by him/herself, with or without an assistive device, with no assistance from a helper  Stairs 3  Independent - Patient completed the activities by him/herself, with or without an assistive device, with no assistance from a helper  Functional Cognition 3  Independent - Patient completed the activities by him/herself, with or without an assistive device, with no assistance from a helper     Prior Assistance Needed for (none)   Prior Device Used Other (comments)  (none)   Psychosocial   Psychosocial (WDL) WDL   Restrictions/Precautions   Precautions Fall Risk;Spinal precautions   Braces or Orthoses MAFO  (RLE)   Pain Assessment   Pain Assessment 0-10   Pain Score 3   Pain Type Acute pain   Pain Location Neck   Pain Descriptors Aching   QI: [address] Provided by Ot[place] No physical assistance   Eating CARE Score 6   Eating Assessment   Food To Mouth Yes   Able To Cut Yes   Meal Assessed Breakfast   QI: Swallowing/Nutritional Status Regular food   Eating (FIM) 7 - Patient completely independent   QI: [address] Provided by Pearl No physical assistance   Oral Hygiene CARE Score 6   Grooming   Able To Comb/Brush Hair;Wash/Dry Face;Brush/Clean Teeth;Wash/Dry Hands   Grooming (FIM) 7 - No helper, safely, timely and completes all tasks independently   QI: Shower/Bathe Self   Assistance Needed Physical assistance   Assistance Provided by Pearl 25%-49%   Comment Pt requires assist to wash buttock throughly and assist to wash R foot   Shower/Bathe Self CARE Score 3   Bathing   Assessed Bath Style Sponge Bath   Anticipated D/C Bath Style Tub   Able to Gather/Transport No   Able to [employer] Temperature No   Able to Wash/Rinse/Dry (body part) Left Arm;Right Arm;L Upper Leg;R Upper Leg;L Lower Leg/Foot;Chest;Abdomen;Perineal Area   Limitations Noted in Balance; Endurance;Problem Solving;ROM; Sequencing;Strength   Positioning Seated;Standing   Bathing (FIM) 4 - Patient completes 8/10 or 9/10 parts   QI: Upper Body Dressing   Assistance Needed Physical assistance   Assistance Provided by Pearl 50%-74%   Comment Pt requires assist to to bring shirt over head and assist to pull shirt down  Pt was able to thread b/l UE's into shirt  Upper Body Dressing CARE Score 2   QI: Lower Body Dressing   Assistance Needed Physical assistance   Assistance Provided by Pearl 25%-49%   Comment Pt requires assist to thread RLE into underwear  Pt requires steadying assist to pull pants and underwear up over hips   Lower Body Dressing CARE Score 3   QI: Putting On/Taking Off Footwear   Assistance Needed Physical assistance   Assistance Provided by Pearl 50%-74%   Comment Pt requires assist to don socks on b/l LE's  Pt able to doff sock on LLE but requires assist to doff on RLE   Pt requires assist to meño MAFO and shoe on RLE but ablet o cross leg over opposing knee to don shoe on LLE  Pt with instruction to maintain spinal precautions and not tuck chin while crossing leg over opposing knee  Putting On/Taking Off Footwear CARE Score 2   Dressing/Undressing Clothing   Remove UB Clothes (hospital gown)   Remove LB Clothes Undergarment;Socks   Don UB Clothes Pullover [address] LB Clothes Pants; Undergarment;TEDs; Shoes   Limitations Noted In Balance; Endurance;Problem Solving;Strength;ROM; Sequencing   Positioning Supported Sit;Standing   UB Dressing (FIM) 3 - Patient completes  50-74% of all tasks   LB Dressing (FIM) 3 - Patient completes  50-74% of all tasks   QI: [address] Needed Physical assistance   Assistance Provided by Sparks 25%-49%   Comment Pt requires assist for wiping for thoroughness  Pt compelted hygiene care while in stance  Pt required steadying assist for balance support   Toileting Hygiene CARE Score 3   Toileting   Able to [address] down yes, up yes  Able to Manage Clothing Closures No   Manage Hygiene Bowel   Limitations Noted In Balance;Problem Solving;ROM;Safety;LE Strength   Toileting (FIM) 3 - Patient completes  50-74% of all tasks   QI: Roll Left and Right   Assistance Needed Physical assistance   Assistance Provided by Sparks 25%-49%   Roll Left and Right CARE Score 3   QI: Sit to Lying   Assistance Needed Physical assistance   Assistance Provided by Sparks 50%-74%   Sit to Lying CARE Score 2   QI: Lying to Sitting on Side of Bed   Assistance Needed Physical assistance   Assistance Provided by Sparks 50%-74%   Lying to Sitting on Side of Bed CARE Score 2   QI: Sit to Stand   Assistance Needed Physical assistance   Assistance Provided by Sparks 50%-74%   Comment Pt requried boost from chair with cueing for hand placement and for R foot placement      Sit to Stand CARE Score 2   QI: Chair/Bed-to-Chair Transfer   Assistance Needed Physical assistance   Assistance Provided by Mason 25%-49%   Comment Pt completed stand pivot transfer with RW  Pt requires gaurding due to decreased proprioception in RLE  Chair/Bed-to-Chair Transfer CARE Score 3   Transfer Bed/Chair/Wheelchair   Limitations Noted In Balance; Endurance;Problem Solving; Sequencing;LE Strength   Adaptive Equipment Roller Walker   Stand Pivot Moderate Assist;Minimal Assist   Sit to Stand Minimal;Moderate Assist   Stand to Sit Minimal   Bed, Chair, Wheelchair Transfer (FIM) 3 - Mason needs to lift, boost or assist to stand OR sit   QI: Toilet Transfer   Assistance Needed Physical assistance   Assistance Provided by Mason 25%-49%   Toilet Transfer CARE Score 3   Toilet Transfer   Surface Assessed Standard Toilet   Transfer Technique Stand Pivot   Limitations Noted In Sequencing;LE Strength; Endurance;Balance   Adaptive Equipment Grab Bar   Findings Pt requires assist to boost from toilet with RLE requiring repositioning  Toilet Transfer (FIM) 3 - Mason needs to lift, boost or assist to stand OR sit   Tub/Shower Transfer   Not Assessed Sponge Bath;Medical   Comprehension   Assist Devices Glasses   Auditory Complex   Visual Complex   QI: Comprehension 4  Undestands: Clear comprehension without cues or repetitions   Comprehension (FIM) 6 - Understands complex/abstract but requires  glasses for visual comp   Expression   Non-Verbal Complex   Intelligibility Sentence   QI: Expression 4  Express complex messages without difficulty and with speech that is clear and easy to [place]   Expression (FIM) 6 - Expresses complex/abstract but requires:  more time   Social Interaction   Cooperation with staff   Medications needed to control mood/behavior?  Yes   Social Interaction (FIM) 6 - Interacts appropriately with others BUT requires extra  time   Problem Solving   Routine Manages call bell;[place] precautions;Manages ADL   Problem solving (FIM) 6 - Solves complex problems BUT requires extra time   Memory   Recognize People No Remember Routine No   Initiates Tasks No   Short-Term Intact   Long Term Intact   Memory (FIM) 6 - Recognizes with extra time   RUE Assessment   RUE Assessment WFL   LUE Assessment   LUE Assessment WFL   Sensation   Light Touch Partial deficits in the RLE   Propioception Partial deficits in the RLE   Cognition   Overall Cognitive Status [facility]   Arousal/Participation Alert; Cooperative   Attention Within functional limits   Orientation Level Oriented X4   Memory Within functional limits   Following Commands Follows all commands and directions without difficulty   Discharge Information   Patient's Discharge Plan to d/c home with family support from wife    Barriers to Discharge [address] Pt presents s/p cervical spinal cord glioma myelopathy s/p laminectomy resection and intramedullary mass removal  PTA pt was expericing foot drop and had h/o 7 falls in past 2 years  PTA pt was working full time in [employer] and was IND with ADLs and IADLs  Pt was using no AD or braces for fxnl mobility  Pt now presents with dec sensation in RLE, dec balance, dec endurance, pain, cervical precautions impacting his independence with ADLs and IADLs  Pt currently is mod A for LB ADLs, min A for bathing, and min-mod A for fxnl transfers  Pt would benefit from 2-3 week LOS to achieve mod I goals and return to PLOF  Pt demonstrates good rehab potential to achieve stated goals     OT Therapy Minutes   OT Time In 0700   OT Time Out 0830   OT Total Time (minutes) 90   OT Mode of treatment - Individual (minutes) 90   OT Mode of treatment - Concurrent (minutes) 0   OT Mode of treatment - Group (minutes) 0   OT Mode of treatment - Co-treat (minutes) 0   OT Mode of Teatment - Total time(minutes) 90 minutes

## 2019-02-01 NOTE — PROGRESS NOTES
02/01/19 1000   Patient Data   Rehab Impairment Impairment of mobility, safety and Activities of Daily Living (ADLs) due to Spinal Cord Dysfunction   Etiologic Diagnosis Cervical Spinal Cord Glioma with Myelopathy   Date of Onset 01/24/19   Support System   Relationship wife   Able to provide 24 hour supervision No   Home Setup   Type of Home Multi Level   Method of Entry Stairs   Number of Stairs 1  (1 curb step to enter and 1 curb step in house)   Number of Stairs in Home 15   In Home Hand Rail Left   First Floor Bathroom Half   Second Floor Bathroom Full   Available Equipment (none)   Prior Level of Function   Self-Care 3  Independent - Patient completed the activities by him/herself, with or without an assistive device, with no assistance from a helper  Indoor-Mobility (Ambulation) 3  Independent - Patient completed the activities by him/herself, with or without an assistive device, with no assistance from a helper  Stairs 3  Independent - Patient completed the activities by him/herself, with or without an assistive device, with no assistance from a helper  Functional Cognition 3  Independent - Patient completed the activities by him/herself, with or without an assistive device, with no assistance from a helper  Patient Preference   Nickname (Patient Preference) Clifford   Psychosocial   Psychosocial (WDL) WDL   Restrictions/Precautions   Precautions Fall Risk;Pain   Pain Assessment   Pain Score 2   Pain Location [facility] Pain Intervention(s) Repositioned; Ambulation/increased activity   Response to Interventions 2/10   QI: Roll Left and Right   Assistance Needed Physical assistance   Assistance Provided by [place] 50%-74%   Roll Left and Right CARE Score 2   QI: Sit to Lying   Assistance Needed Physical assistance   Assistance Provided by [place] 50%-74%   Sit to Lying CARE Score 2   QI: Lying to Sitting on Side of Bed   Assistance Needed Physical assistance   Assistance Provided by [place] 50%-74% Lying to Sitting on Side of Bed CARE Score 2   QI: Sit to Stand   Assistance Needed Physical assistance; Adaptive equipment   Assistance Provided by [place] 25%-49%   Sit to Stand CARE Score 3   QI: Chair/Bed-to-Chair Transfer   Assistance Needed Physical assistance; Adaptive equipment   Assistance Provided by [place] 25%-49%   Chair/Bed-to-Chair Transfer CARE Score 3   QI: Car Transfer   Assistance Needed Physical assistance; Adaptive equipment   Assistance Provided by [place] Less than 25%   Car Transfer CARE Score 3   Transfer Bed/Chair/Wheelchair   Limitations Noted In Balance; Endurance;UE Strength;LE Strength;Sensation   Adaptive Equipment Roller Walker   Stand Pivot Moderate Assist;Minimal Assist   Sit to Stand Minimal;Moderate Assist   Stand to Sit Minimal   Supine to Sit Moderate Assist   Sit to Supine Moderate Assist   Bed, Chair, Wheelchair Transfer (FIM) 2 - [place] needs to lift or boost to rise AND assist to sit   QI: Walk 10 Feet   Assistance Needed Physical assistance; Adaptive equipment   Assistance Provided by [place] Total assistance   Walk 10 Feet CARE Score 1   QI: Walk 50 Feet with Two Turns   Assistance Needed Physical assistance; Adaptive equipment   Assistance Provided by [place] Total assistance   Walk 50 Feet with Two Turns CARE Score 1   QI: Walk 150 Feet   Assistance Needed Physical assistance; Adaptive equipment   Assistance Provided by [place] Total assistance   Walk 150 Feet CARE Score 1   QI: Walking 10 Feet on Uneven Surfaces   Reason if not Attempted Safety concerns   Walking 10 Feet on Uneven Surfaces CARE Score 88   Ambulation   Does the patient walk? 2  Yes   Primary Discharge Mode of Locomotion Walk   Walk Assist Level Minimum Assist;Moderate Assist;Chair Follow  (Ax1-2)   Gait Pattern Inconsistant Sheryl; Slow Sheryl;Decreased foot clearance;Narrow LEAH;Step through; Improper weight shift   Assist Device Miguel Angel Rhodes Walked (feet) 50 ft  (A x2, 350 with RW and CFA at [place]) Limitations Noted In Balance; Endurance; Heel Strike;Speed;Strength;Swing   Findings pt has R trendelenburg; pt presents with NBOS RLE partly due to hip abd weakness and dec sensation in R foot  48' walk was performed without RW/without R AFO, and walking with RW performed with AFO  AFO currently is making a large difference in RLE stability in stance and foot clearance; pt reports feeling much more stable with AFO on  Walking (FIM) 1 - Patient requires assist of two people   Wheelchair mobility   QI: Does the patient use a wheelchair? 0  No   QI: 1 Step (Curb)   Reason if not Attempted Safety concerns   1 Step (Curb) CARE Score 88   QI: 4 Steps   Assistance Needed Physical assistance; Adaptive equipment   Assistance Provided by [place] Less than 25%   4 Steps CARE Score 3   QI: 12 Steps   Reason if not Attempted Safety concerns   12 Steps CARE Score 88   Stairs   Type  Steps   # of Steps 6   Weight Bearing Precautions Fall Risk   Assist Devices Bilateral Rail   Findings up with LLE and down with RLE nonreciprocal pattern due to RLE weakness  Practiced step ups with RLE on first step and LLE up/down with BHR  WIll need to transition to 1 HR to mimic home setup  Stairs (FIM) 2 - Patient goes up and down 4 - 11 stairs regardless of assist/device/setup   Comprehension   QI: Comprehension 4  Undestands: Clear comprehension without cues or repetitions   Comprehension (FIM) 6 - Understands complex/abstract but requires  glasses for visual comp   Expression   QI: Expression 4   Express complex messages without difficulty and with speech that is clear and easy to [address]   Expression (FIM) 6 - Has only MILD difficulty with complex/abstract info   Social Interaction   Social Interaction (FIM) 6 - Interacts appropriately with others BUT requires extra  time   Problem Solving   Problem solving (FIM) 5 - Solves basic problems 90% of time   Memory   Memory (FIM) 5 - [place] cues patient   RLE Assessment   RLE Assessment (gross strength 3/5)   LLE Assessment   LLE Assessment (gross MMT 4/5)   Cognition   Arousal/Participation Cooperative   Objective Measure   PT Measure(s) supine therex: AAROM and AROM heel slides (RLE and alternating legs)  ankle pumps, AROM hip abd/add in hookling and then with red theraband (both legs moving and then one still/other one moving)  Pt has limited control RLE and not fully aware of where R foot placement is, but can feel pressure on mat  NuStep for 12 min level 0 BLE only, SPM initially 50-60 then 80-90  reviewed log roll technique for bed mobility  Pt able to don/doff red theraband for therex so provided this for pt in his room  Discharge Information   Patient's Discharge Plan to dc home with family support from wife, who works full time   Patient's Rehab Expectations to get better and back to normal as much as possible   Barriers to Discharge Home Limited Family Support;Decreased Strength;Decreased Endurance;Pain; Safety Considerations  (steps, balance)   Impressions Pt presents s/p Posterior C4-T3 laminectomy; resection of intramedullary mass; C2-T5 fixation/fusion, with residual sensation and motor deficits in RLE more than LLE  Pt presents with adequate LLE strength for sit<>stands and for WNL foot placement with walking, however due to dec sensation and dec R hip abd strength walks with NBOS RLE and is not fully aware of foot placement to correct it; this is also made more difficult by need to wear collar and pt is not able to use vision to compensation for sensation/proprioception loss  Pt presents with good safety awareness, but does get fatigued during session  Pt will benefit from cont skilled PT to further progress RLE strength, overall balance and righting reactions to progress to Johnny level with RW  Pt doesn't own a RW but reports that one will fit at home   Pt currently prefers to wear AFO RLE due to noticeable inc in stability with walking and trasnfers; discussed with pt need to keep an eye on skin integrity in case it hits his foot on a bony prominance  Pt currently at inc fall risk due to previously mentioned impairments and will also benefit from skilled PT to address safety concerns with mobility      PT Therapy Minutes   PT Time In 1000   PT Time Out 1130   PT Total Time (minutes) 90   PT Mode of treatment - Individual (minutes) 90   PT Mode of treatment - Concurrent (minutes) 0   PT Mode of treatment - Group (minutes) 0   PT Mode of treatment - Co-treat (minutes) 0   PT Mode of Teatment - Total time(minutes) 90 minutes

## 2019-02-01 NOTE — TREATMENT PLAN
Individualized Plan of [address] III 60 y o  male MRN: [id number]  Unit/Bed#: -01 Encounter: [id number]     PATIENT INFORMATION  ADMISSION DATE: 1/31/2019  4:50 PM [place] CATEGORY: NTSCI   ADMISSION DIAGNOSIS: Malignant neoplasm of spinal cord [C72 0]  EXPECTED LOS: 1-2 wk     MEDICAL/FUNCTIONAL PROGNOSIS  Based on my assessment of the patient's medical conditions and current functional status, the prognosis for attaining medical and functional goals or the IRF stay is:  Fair     Medical Goals: pain control     ANTICIPATED DISCHARGE DISPOSITION AND SERVICES  COMMUNITY SETTING: PT/OT      ANTICIPATED FOLLOW-UP SERVICE:   Outpatient Therapy Services: PT/OT    DISCIPLINE SPECIFIC PLANS:  Required Disciplines & Services: PT/OT    REQUIRED THERAPY:  Therapy Hours per Day Days per Week Total Days   Physical Therapy 1 5 5 10   Occupational Therapy 1 5 5 10   Speech/Language Therapy 0 0 0   NOTE: Additional therapy time(s) may be added as appropriate to meet patient needs and to achieve functional goals          ANTICIPATED FUNCTIONAL OUTCOMES:  ADL:  patient will be independent with ADLs with least restrictive device upon completion of the rehab program   Bladder/Bowel:   patient will be independent with bladder/bowel managment with least restrictive device upon completion of the rehab program   Transfers:   patient will be independent with transfers with least restrictive device upon completion of the rehab program   Locomotion:   patient will be independent with locomotion with least restrictive device upon completion of the rehab program   Cognitive:  patient appears to be at baseline      [address] [facility] needs: TBD      REHAB ANTICIPATED PARTICIPATION RESTRICTIONS:  None

## 2019-02-01 NOTE — PROGRESS NOTES
Physical Medicine and Rehabilitation Progress Note  Karis Silva III 60 y o  male MRN: [id number]  Unit/Bed#: -01 Encounter: [id number]    HPI: Karis Silva III is a 60 y o  male who presented to the [address] with h/o sensory deficits and was found to have spinal cord mass therefore underwent tumor resection and C4-T3 laminectomy and C2-T5 fixation/fusion by Dr Marcell Bracken Dr Benna Lefort on 1/24       Chief Complaint: spinal mass s/p resection     Subjective: patient denies any events overnight     ROS:  negative unless noted above    Assessment/Plan:      Anemia   Assessment & Plan    · Likely ABLA  · Hg currently stable at 9 3     Leukocytosis   Assessment & Plan    · Mild at 10 78 (ULN at 10 16)  · On decadron taper      Hyperlipidemia   Assessment & Plan    · was Omega 3 FA 1000 mg qd PTA   · Unlikely to pose risk of increase bleeding after surgery however will confirm with neurosurgery if they would like to continue to hold      Impaired fasting glucose   Assessment & Plan    · PCP aware  · Diet controlled      Hypothyroidism   Assessment & Plan    · On home synthroid 25 mcg qd      BPH associated with nocturia   Assessment & Plan    · Per patient stopped taking flomax 0 4 mg qpm in the past because it was not helping the urinary retention   · Per patient stopped taking ditropan XL 10 mg qd in the past because  it was not helping the urinary retention   · Per patient stopped taking viagra 100 mg in the past because  it was not helping the urinary retention   · Per patient was no longer on any meds for urinary retention/BPH PTA as he was working with urology as OP and further tests were being planned as patient medications had not been effective however in acute care patient was restarted on flomax and post-operatively patient has been urinating well   · Will check PVR and if PVR is sufficiently low will trial off of flomax (which patient had stopped taking PTA because it was not effective)  · Follows with Savanah Ramsay PA-C/Dr Elder Pires as OP     * Spinal cord mass [facility])   Assessment & Plan    · S/p resection and C4-T3 laminectomy and C2-T5 fixation/fusion by Dr Divya Ortiz and   [facility] on 1/24  · decardron taper complete on 2/9  · Spine precautions  · c-collar at all times  · Path pending  · OP FU on 2/6        Scheduled Meds:    Current Facility-Administered Medications:  acetaminophen 975 mg Oral Q8H [address] Robert Buchanan MD   dexamethasone 2 mg Oral Q8H Robert Buchanan MD   [START ON 2/4/2019] dexamethasone 2 mg Oral Q12H [address], MD Jimenez Hemp ON 2/7/2019] dexamethasone 2 mg Oral Daily Robert Buchanan MD   enoxaparin 40 mg Subcutaneous Daily Robert Buchanan MD   insulin lispro 1-5 Units Subcutaneous TID AC Alvino Rubinstein, DO   insulin lispro 1-5 Units Subcutaneous HS Alvino Rubinstein, DO   levothyroxine 25 mcg Oral Early Morning Robert Buchanan MD   methocarbamol 500 mg Oral Q6H PRN Robert Buchanan MD   oxyCODONE 10 mg Oral Q4H PRN Robert Buchanan MD   oxyCODONE 5 mg Oral Q4H PRN Robert Buchanan MD   polyethylene glycol 17 g Oral Daily PRN Robert Buchanan MD   senna-docusate sodium 2 tablet Oral Daily Robert Buchanan MD   tamsulosin 0 4 mg Oral Daily With Carla Arce MD        Incidental findings:  1) 2-3 mm pulmonary nodule: per radiology report of 1/18/19 recommend repeat CT chest in 3 months however will defer to PCP  2) fatty involution of pancreas: OP FU with PCP with further testing/treatment and/or specialist referral at PCP's discretion  3) L maxillary sinus thickening: asymptomatic, OP FU with PCP with further testing/treatment and/or specialist referral at PCP's discretion      DVT ppx: SCDs, cleared for lovenox 40 mg sc qd by neurosx in acute care (and has been on it since 1/26)       Objective:    Functional Update:  Mobility: min-mod A (1-2)    Transfers: min-mod  ADLs: PENDING         Physical Exam:    Vitals:    02/01/19 1317   BP: 115/60   Pulse: 62   Resp: 20   Temp: 98 2 °F (36 8 °C)   SpO2: 97%         General: alert, no apparent distress, cooperative and comfortable  HEENT:  +C-collar  CARDIAC:  +S1/2  LUNGS:  respirations unlabored  ABDOMEN:  soft NT  EXTREMITIES:  no significant LE edema  NEURO:   mental status, speech normal, alert and oriented x3  PSYCH:  mood/affect currently stable      Laboratory:     Results from last 7 days  Lab Units 01/29/19  0546 01/27/19  0550 01/26/19  0532   HEMOGLOBIN g/dL 9 3* 9 1* 8 8*   HEMATOCRIT % 28 8* 29 0* 27 8*   WBC Thousand/uL 10 78* 16 04* 14 29*       Results from last 7 days  Lab Units 01/29/19  0546 01/27/19  0550   BUN mg/dL 22 15   SODIUM mmol/L 139 140   POTASSIUM mmol/L 3 9 3 9   CHLORIDE mmol/L 105 110*   CREATININE mg/dL 0 73 0 65            Patient Active Problem List   Diagnosis    BPH associated with nocturia    Hypothyroidism    Impaired fasting glucose    Spinal cord mass (HCC)    Leukocytosis    Anemia    Hyperlipidemia           Total visit time:  At least 25 minutes, with more than 50% spent counseling/coordinating care

## 2019-02-01 NOTE — SOCIAL WORK
Met w/pt and reviewed rehab routine and cm role  Pt resides with spouse and 3 kids  Spouse works during the day and pt is concerned about being alone  Pt hopes to be able to perform all necessary tasks prior to dc  Cm informed of goals for dc, potential los, insurance update due on 2/6  Team mtg process reviewed as well as potential therapy as an outpt when he returns home  [facility] [address] [facility] [place] and is [facility] of [facility]  Pt has been to [facility] in the past for therapy  He has 2 richar with 15 to second flr there is a half bath on the first level  Following to assist w/dc plannign need and contd stay revewi

## 2019-02-02 LAB
GLUCOSE SERPL-MCNC: 122 MG/DL (ref 65–140)
GLUCOSE SERPL-MCNC: 147 MG/DL (ref 65–140)
GLUCOSE SERPL-MCNC: 239 MG/DL (ref 65–140)
GLUCOSE SERPL-MCNC: 90 MG/DL (ref 65–140)

## 2019-02-02 PROCEDURE — 97110 THERAPEUTIC EXERCISES: CPT

## 2019-02-02 PROCEDURE — 97530 THERAPEUTIC ACTIVITIES: CPT

## 2019-02-02 PROCEDURE — 97535 SELF CARE MNGMENT TRAINING: CPT

## 2019-02-02 PROCEDURE — 99232 SBSQ HOSP IP/OBS MODERATE 35: CPT | Performed by: PHYSICAL MEDICINE & REHABILITATION

## 2019-02-02 PROCEDURE — 82948 REAGENT STRIP/BLOOD GLUCOSE: CPT

## 2019-02-02 RX ORDER — DOCUSATE SODIUM 100 MG/1
100 CAPSULE, LIQUID FILLED ORAL DAILY
Status: DISCONTINUED | OUTPATIENT
Start: 2019-02-02 | End: 2019-02-19

## 2019-02-02 RX ORDER — SENNOSIDES 8.6 MG
2 TABLET ORAL DAILY
Status: DISCONTINUED | OUTPATIENT
Start: 2019-02-02 | End: 2019-02-19

## 2019-02-02 RX ADMIN — LEVOTHYROXINE SODIUM 25 MCG: 25 TABLET ORAL at 05:08

## 2019-02-02 RX ADMIN — DEXAMETHASONE 2 MG: 2 TABLET ORAL at 08:27

## 2019-02-02 RX ADMIN — ENOXAPARIN SODIUM 40 MG: 40 INJECTION SUBCUTANEOUS at 08:27

## 2019-02-02 RX ADMIN — DEXAMETHASONE 2 MG: 2 TABLET ORAL at 23:42

## 2019-02-02 RX ADMIN — SENNOSIDES AND DOCUSATE SODIUM 2 TABLET: 8.6; 5 TABLET ORAL at 08:27

## 2019-02-02 RX ADMIN — ACETAMINOPHEN 975 MG: 325 TABLET ORAL at 21:34

## 2019-02-02 RX ADMIN — ACETAMINOPHEN 975 MG: 325 TABLET ORAL at 05:08

## 2019-02-02 RX ADMIN — DEXAMETHASONE 2 MG: 2 TABLET ORAL at 15:44

## 2019-02-02 RX ADMIN — TAMSULOSIN HYDROCHLORIDE 0.4 MG: 0.4 CAPSULE ORAL at 15:44

## 2019-02-02 RX ADMIN — INSULIN LISPRO 2 UNITS: 100 INJECTION, SOLUTION INTRAVENOUS; SUBCUTANEOUS at 11:13

## 2019-02-02 RX ADMIN — ACETAMINOPHEN 975 MG: 325 TABLET ORAL at 14:42

## 2019-02-02 NOTE — PROGRESS NOTES
Internal Medicine Progress Note  Patient: Aruna Sanchez III  Age/sex: 60 y o  male  Medical Record #: [id number]      ASSESSMENT/PLAN:  Aruna Sanchez III is seen and examined and management for following issues:    Medullary mass removal with fixation/fusion of C2-3 T5 1/24/19:  cytology is pending  Maintain cervical collar all times; steroid wean per Neurosurgery  Monitor incision site/ fever curve/WBC count   Still has numbness of legs to hip level R>L; burning has resolved right hand/arm with slight burning left hand; still with some slight numbness sensation palms of hands all unchanged today      Post-op urinary retention/hx BPH: resolved with starting Flomax     Hypothyroidism:  Continue Levothyroxine 25 mcg qd     Elevated fasting blood sugars:  Continue DM diet/ Accuchecks QID with SSI; may need Metformin but will first change CC diet from level 3 to level 1      Asymptomatic bradycardia:  noted postoperatively; monitor for symptoms --> will consult Cardiology if needed but looks to be stable/resolved    ABLA:  Stable; w/o sx; cont to monitor      Subjective: offers no complaints    ROS:   GI: denies abdominal pain, change bowel habits or reflux symptoms  Neuro: No new neurologic changes  Respiratory: No Cough, SOB  Cardiovascular: No CP, palpitations     Scheduled Meds:    Current Facility-Administered Medications:  acetaminophen 975 mg Oral Q8H [address] Holli Howard MD   dexamethasone 2 mg Oral Q8H Holli Howard MD   [START ON 2/4/2019] dexamethasone 2 mg Oral Q12H MD Ale Richmond ON 2/7/2019] dexamethasone 2 mg Oral Daily Holli Howard MD   senna 2 tablet Oral Daily Holli Howard MD   And       docusate sodium 100 mg Oral Daily Holli Howard MD   enoxaparin 40 mg Subcutaneous Daily Holli Howard MD   insulin lispro 1-5 Units Subcutaneous TID AC Marc Angeles DO   insulin lispro 1-5 Units Subcutaneous HS Marc Angeles DO   levothyroxine 25 mcg Oral Early Morning Holli Howard MD   methocarbamol 500 mg Oral Q6H PRN Nicolle Rosales MD   oxyCODONE 10 mg Oral Q4H PRN Nicolle Rosales MD   oxyCODONE 5 mg Oral Q4H PRN Nicolle Rosales MD   polyethylene glycol 17 g Oral Daily PRN Nicolle Rosales MD   tamsulosin 0 4 mg Oral Daily With Latanya Elam MD       Labs:       Results from last 7 days  Lab Units 01/29/19  0546 01/27/19  0550   WBC Thousand/uL 10 78* 16 04*   HEMOGLOBIN g/dL 9 3* 9 1*   HEMATOCRIT % 28 8* 29 0*   PLATELETS Thousands/uL 238 241       Results from last 7 days  Lab Units 01/29/19  0546 01/27/19  0550   SODIUM mmol/L 139 140   POTASSIUM mmol/L 3 9 3 9   CHLORIDE mmol/L 105 110*   CO2 mmol/L 26 25   BUN mg/dL 22 15   CREATININE mg/dL 0 73 0 65   CALCIUM mg/dL 8 2* 7 7*                  Results from last 7 days  Lab Units 02/02/19  0637 02/01/19 2052 02/01/19 2036   POC GLUCOSE mg/dl 147* 120 191*     [unfilled]    Labs reviewed    Physical Examination:  Vitals:   Vitals:    02/01/19 0519 02/01/19 1317 02/01/19 2002 02/02/19 0449   BP: 103/60 115/60 118/64 112/65   BP Location: Left arm Left arm Left arm Left arm   Pulse: 57 62 57 69   Resp: 20 20 18 18   Temp: 98 4 °F (36 9 °C) 98 2 °F (36 8 °C) 99 °F (37 2 °C) 99 °F (37 2 °C)   TempSrc: Oral Oral Oral Oral   SpO2: 96% 97% 97% 98%   Weight:       Height:         Constitutional:  NAD; pleasant; nontoxic  HEENT:  AT/NC; oropharynx negative for thrush on tongue   Neck: negative for JVD  CV:  +S1, S2;  RRR; no rub/murmur  Pulmonary:  BBS without crackles/wheeze/rhonci; resp are unlabored  Abdominal:  soft, +BS, ND/NT; no mass  Skin:  no rashes  Musculoskeletal:  no edema  :  no martinez  Neurological/Psych:  AAO;  CAIN 5/5; no depression/anxiety        [x ] Total time spent: 30 Mins and greater than 50% of this time was spent counseling/coordinating care  ** Please Note: Dragon 360 Dictation voice to text software may have been used in the creation of this document   **

## 2019-02-02 NOTE — PROGRESS NOTES
02/02/19 0700   Pain Assessment   Pain Assessment 0-10   Pain Score 2   Pain Type Acute pain;Surgical pain   Pain Location Neck   Pain Orientation Posterior   Pain Descriptors Aching; Sore   Pain Frequency Constant/continuous   Pain Onset Gradual   Hospital Pain Intervention(s) Repositioned; Rest   Restrictions/Precautions   Precautions Fall Risk;Spinal precautions   ROM Restrictions (cervical spinal precautions)   Braces or Orthoses MAFO  (R foot)   QI: Oral Hygiene   Assistance Needed Incidental touching;Physical assistance   Assistance Provided by [place] Less than 25%   Comment CGA/Suzy in stance 2* fatigue   Oral Hygiene CARE Score 3   Grooming   Able To Initiate Tasks; Acquire Items;Comb/Brush Hair;Wash/Dry Face;Brush/Clean Teeth;Wash/Dry Hands   Limitation Noted In Strength; Safety   Findings Pt completed grooming in stance at sink  Pt limited by endurance and limited vision due to restrictions with C/S collar req cuing to locate items   Grooming (FIM) 4 - Patient requires steadying assist or light touching   QI: Shower/Bathe Self   Assistance Needed Physical assistance   Assistance Provided by [place] Less than 25%   Comment A to maintaing balance in stance and to wash R foot; may benefit from [address] sponge   Shower/Bathe Self CARE Score 3   Bathing   Assessed Bath Style Shower   Anticipated D/C Bath Style Tub   Able to Gather/Transport No   Able to [employer] Temperature Yes   Able to Wash/Rinse/Dry (body part) Left Arm;Right Arm;R Upper Leg;L Upper Leg;L Lower Leg/Foot;Chest;Abdomen;Perineal Area; Buttocks   Limitations Noted in Balance; Endurance;ROM;Strength   Positioning Seated;Standing   Adaptive Equipment Shower Bars;Tub Bench;Hand Held Shower   Findings  Pt completed shower with tub bench and grab bars 2* dec balance and endurance limiting safety for bathing in stance  Pt able to cross L leg over R knee to wash lower leg and foot  Pt attempted to cross R leg over but unable to due to dec strength    As per MD shower order, pt edu to limit direct spray or soaking of incision  Clean bandages applied over incision immediately following shower by NSG  Bathing (FIM) 4 - Patient completes 8/10 or 9/10 parts   Tub/Shower Transfer   Limitations Noted In Balance; Endurance; Safety;LE Strength   Adaptive Equipment Grab Bars;Transfer Bench   Assessed Shower   Findings Pt approached shower with RW and then transferred hands to grab bar to side step into walk-in shower  Pt req Suzy over uneven surfaces and v/c to advance legs around edge of tub bench  f   Shower Transfer (FIM) 4 - Patient completes 75% of all tasks   QI: Upper Body Dressing   Assistance Needed Physical assistance   Assistance Provided by [place] 25%-49%   Comment able to don over head and thread LUE  Pt req A to hold shirt to make room for RUE to thread through  Pt able to pull shirt down  Pt req OT assist to adjust collar of shirt around [place] collar   Upper Body Dressing CARE Score 3   QI: Lower Body Dressing   Assistance Needed Incidental touching   Assistance Provided by [place] Less than 25%   Comment steadying A for CM and v/c for LHAE and leg cross over tech for LB dressing   Lower Body Dressing CARE Score 3   QI: Putting On/Taking Off Footwear   Assistance Needed Physical assistance   Assistance Provided by [place] 25%-49%   Comment Pt donned socks with sockaide with v/c for sequencing and orientation  A to don R MAFO and sneaker and to tie shoes 2* dec ROM  Pt utilized LH shoe horn to slide foot into L shoe   Putting On/Taking Off Footwear CARE Score 3   Dressing/Undressing Clothing   Remove UB Clothes Pullover Shirt   Remove LB Clothes Pants; Undergarment;Socks   Don UB [address] [place] [address]; Undergarment;Socks; Shoes   Limitations Noted In Balance; Endurance; Safety;Strength;ROM   Positioning Supported Sit;Standing   Findings Pt fatigues quickly during tasks req frequent rest breaks    UB Dressing (FIM) 3 - Patient completes  50-74% of all tasks   LB Dressing (FIM) 3 - Patient completes  50-74% of all tasks   QI: Roll Left and Right   Assistance Needed Incidental touching;Verbal cues   Assistance Provided by [place] Less than 25%   Comment v/c for log roll tech   Roll Left and Right CARE Score 3   QI: Lying to Sitting on Side of Bed   Assistance Needed Physical assistance   Assistance Provided by [place] Less than 25%   Comment Suzy   Lying to Sitting on Side of Bed CARE Score 3   QI: Sit to Stand   Assistance Needed Incidental touching   Assistance Provided by [place] Less than 25%   Sit to Stand CARE Score 3   QI: Chair/Bed-to-Chair Transfer   Assistance Needed Physical assistance   Assistance Provided by [place] Less than 25%   Comment CGA/Suzy with fatigue; pt req v/c for location of RLE during transfers 2* dec proprioception   Chair/Bed-to-Chair Transfer CARE Score 3   Transfer Bed/Chair/Wheelchair   Limitations Noted In Balance; Endurance; Sequencing;LE Strength   Adaptive Equipment Roller Walker   Bed, Chair, Wheelchair Transfer (FIM) 4 - Patient completes 75% of all tasks   Functional Standing Tolerance   Time 3 25min   Activity grooming in stance    Cognition   Overall Cognitive Status WFL   Arousal/Participation Cooperative   Attention Within functional limits   Orientation Level Oriented X4   Memory Within functional limits   Following Commands Follows all commands and directions without difficulty   Comments Pt maintained cervical spinal precuations throughout entire session   Activity Tolerance   Activity Tolerance Patient limited by fatigue   Medical Staff Made Aware Pt reported dizziness/lightheadedness after transfer to shower  /69 HR 68  NSG notified of event  Pt reported that symptoms disappated quickly and did not return during session  Assessment   Treatment Assessment Pt participated in skilled OT session focusing on ADL retraining, Ecociclus education, and functional transfers   Pt willing to learn Ecociclus but "hoping to not need this with healing and progress " Pt cont to be limited by dec endurance, dec balance, and dec proprioception of RLE during transfers and tasks in stance  Pt would benefit from cont therapy focusing on above listed deficits and begin edu on [place] Collar management to inc indep upon discharge  Cont with POC  Prognosis Good   Problem List Decreased strength;Decreased endurance; Impaired balance;Decreased mobility; Impaired sensation;Orthopedic restrictions;Pain;Decreased coordination   Barriers to Discharge Inaccessible home environment;Decreased caregiver support   Plan   Treatment/Interventions ADL retraining;Functional transfer training; Therapeutic exercise; Endurance training;Bed mobility; Patient/family training;Equipment eval/education   Progress Progressing toward goals   OT Therapy Minutes   OT Time In 0700   OT Time Out 0830   OT Total Time (minutes) 90   OT Mode of treatment - Individual (minutes) 90   OT Mode of treatment - Concurrent (minutes) 0   OT Mode of treatment - Group (minutes) 0   OT Mode of treatment - Co-treat (minutes) 0   OT Mode of Teatment - Total time(minutes) 90 minutes   Therapy Time missed   Time missed?  No

## 2019-02-02 NOTE — PROGRESS NOTES
02/02/19 1300   Pain Assessment   Pain Score 5   Pain Location Neck   Pain Orientation Posterior   Hospital Pain Intervention(s) Repositioned   Response to Interventions pt reported inc pain when practicing stairs with 1 HR; better ith rest  d/c stairs with 1 HR and used BHR  1-2/10 at end of session   Restrictions/Precautions   Precautions Fall Risk;Spinal precautions   Cognition   Arousal/Participation Cooperative   Subjective   Subjective pt reported feeling tired from ADL this morning with OT but ready for therapy    QI: Roll Left and Right   Assistance Needed Physical assistance   Assistance Provided by [place] 50%-74%   Roll Left and Right CARE Score 2   QI: Sit to Lying   Assistance Needed Physical assistance   Assistance Provided by [place] 50%-74%   Sit to Lying CARE Score 2   QI: Lying to Sitting on Side of Bed   Assistance Needed Physical assistance   Assistance Provided by [place] 50%-74%   Lying to Sitting on Side of Bed CARE Score 2   QI: Sit to Stand   Assistance Needed Physical assistance   Assistance Provided by [place] Less than 25%   Sit to Stand CARE Score 3   QI: Chair/Bed-to-Chair Transfer   Assistance Needed Physical assistance; Adaptive equipment   Assistance Provided by [place] Less than 25%   Chair/Bed-to-Chair Transfer CARE Score 3   Transfer Bed/Chair/Wheelchair   Limitations Noted In Balance; Endurance;UE Strength;LE Strength;Sensation   Adaptive Equipment Roller Walker   Stand Pivot Minimal Assist   Sit to Stand Minimal Assist   Stand to Sit Minimal Assist   Supine to Sit Moderate Assist   Sit to Supine Moderate Assist   Bed, Chair, Wheelchair Transfer (FIM) 2 - [place] needs to lift or boost to rise AND assist to sit   QI: Walk 10 Feet   Assistance Needed Physical assistance; Adaptive equipment   Assistance Provided by [place] Less than 25%   Walk 10 Feet CARE Score 3   QI: Walk 50 Feet with Two Turns   Assistance Needed Physical assistance; Adaptive equipment   Assistance Provided by [place] Less than 25%   Walk 50 Feet with Two Turns CARE Score 3   QI: Walk 150 Feet   Assistance Needed Physical assistance; Adaptive equipment   Assistance Provided by [place] Less than 25%   Walk 150 Feet CARE Score 3   Ambulation   Does the patient walk? 2  Yes   Primary Discharge Mode of Locomotion Walk   Walk Assist Level Minimum Assist;Contact Guard   Gait Pattern Inconsistant Sheryl; Slow Sheryl;Decreased foot clearance; Improper weight shift;Trendelenburg; Step through;Narrow LEAH   Assist Device Roller Duyen Rhodes Walked (feet) 150 ft  (x2, 350x2 )   Limitations Noted In Balance; Endurance; Heel Strike;Swing;Strength;Speed   Walking (FIM) 4 - Patient requires steadying assist or light touching AND distance 150 feet or more, no rest   QI: 4 Steps   Assistance Needed Physical assistance; Adaptive equipment   Assistance Provided by [place] 25%-49%   4 Steps CARE Score 3   Stairs   Type Stairs   # of Steps 8   Assist Devices Single Rail;Bilateral Rail   Findings reciprocal pattern, alternating between lead leg, Suzy with BHR and ModA with 1 HR  practiced step ups (forward and down back) on 6" side with BHR and practicing with both legs for strengthening   Stairs (FIM) 2 - Patient goes up and down 4 - 11 stairs regardless of assist/device/setup   Therapeutic Interventions   Strengthening supine on mat, hip abd against red theraband with both legs moving and then one leg still and the other moving  sidelying on L with towel roll to support neck for R hip abd in sidelying, pt needed A for this due to weakness  pt is uncomfortable lying on R side  With mat at 24", worked on ant weight shift, sit<>stands without using hands to facilitate BLE strength and balance, with AFO on RLE; noted improvement during session that carried over into transfers later in session, but pt still needs VC to lean farther forward when sitting down      Flexibility bilat calves 2x60 sec    Equipment Use   NuStep 10 min BLE only level 1, focus on keeping RLE In line to work on hip abd strength    Assessment   Treatment Assessment Pt cont to ambulate with R trendelenburg due to R hip abd weakness; this improved with manual TF to glutes while in stance phase of walking  due to poor sensation and proprioception, pt does rely on BUE support on RW for balance/safety when walking, also in conjunction with RLE weakness that occasioanlly results in NBOS  Pt had more difficulty descending stairs with LLE first due to RLE weakness; to cont to work on strengthening BLE to progress safety and (I) with stairs  Pt cont to demonstrate good motivation to participate in therapy  To confirm with medical team what the prognosis is for return of sensation/motor function  Barriers to Discharge Inaccessible home environment;Decreased caregiver support   PT Barriers   Physical Impairment Decreased strength;Decreased range of motion;Decreased endurance; Impaired balance;Decreased mobility; Impaired sensation   Functional Limitation Car transfers;Stair negotiation;Standing;Transfers; Walking   Plan   Treatment/Interventions Functional transfer training;LE strengthening/ROM; Elevations; Therapeutic exercise; Endurance training;Patient/family training;Equipment eval/education; Bed mobility;Gait training   Progress Progressing toward goals   Recommendation   Recommendation Outpatient PT; Home with family support   Equipment Recommended Walker   PT Therapy Minutes   PT Time In 1300   PT Time Out 1430   PT Total Time (minutes) 90   PT Mode of treatment - Individual (minutes) 90   PT Mode of treatment - Concurrent (minutes) 0   PT Mode of treatment - Group (minutes) 0   PT Mode of treatment - Co-treat (minutes) 0   PT Mode of Teatment - Total time(minutes) 90 minutes   Therapy Time missed   Time missed?  No

## 2019-02-02 NOTE — PROGRESS NOTES
Physical Medicine and Rehabilitation Progress Note  Konstantin Rico III 60 y o  male MRN: [id number]  Unit/Bed#: -01 Encounter: [id number]    Chief Complaint:  f/u SCI    Etiology/Reason for Admission: Impairment of mobility, safety and Activities of Daily Living (ADLs) due to Spinal Cord Dysfunction:  Non-Traumatic:  04 111  Paraplegia, Incomplete    Interval History: no acute events overnight  Patient without complaint  Assessment/Plan - Continue plan of care as per primary team, unless otherwise stated below:      · Rehab - Continue PT and OT  · Bowel - Patient reports no constipation  · Bladder - Patient voiding spontaneously  · Skin -  Encourage regular turning as patient at risk for skin breakdown  · Pain - Continue tylenol, for max of 3gm daily  · Continue oxycodone   · DM - Stable, continue ISS, current diabetic regimen as per IM  · Anemia - Stable with last Hgb at 9 3, monitor with intermittent CBC  · Leukocytosis -  stable at 10 78 latest check, continue to monitor  Patient without fevers, chills    Daryle Big, MD MPH  Physical Medicine and Rehabilitation    Review of Systems:    General ROS: negative for - chills or fever  Psychological ROS: negative for - anxiety  Ophthalmic ROS: negative for - blurry vision or double vision  Respiratory ROS: no cough, shortness of breath, or wheezing  Cardiovascular ROS: no chest pain or dyspnea on exertion  Gastrointestinal ROS: no abdominal pain, change in bowel habits, or black or bloody stools  Genito-Urinary ROS: no dysuria, trouble voiding, or hematuria  Musculoskeletal ROS: negative  Neurological ROS: negative  Dermatological ROS: negative    Current Functional Update:  Mobility:  Moderate Assistance  Transfers:   Moderate Assistance  ADLs:  Contact Guard / Minimal Assistance    Physical Exam:  Temp:  [98 6 °F (37 °C)-99 °F (37 2 °C)] 98 6 °F (37 °C)  HR:  [57-69] 64  Resp:  [18] 18  BP: (102-118)/(63-65) 102/63  SpO2:  [97 %-99 %] 99 %    General: alert, no apparent distress, cooperative and comfortable  HEENT:  Head: Normal, normocephalic, atraumatic  Eye: Normal external eye, conjunctiva, lids cornea, BHAVIN  Nose: Normal external nose, mucus membranes and septum  LUNGS:  no abnormal respiratory pattern, no retractions noted  ABDOMEN:  soft, non-tender  Bowel sounds normal  No masses, no organomegaly  EXTREMITIES:  extremities normal, warm and well-perfused; no cyanosis, clubbing, or edema  NEURO:   mental status, speech normal, alert and oriented x3  PSYCH:  Alert and oriented, appropriate affect  Laboratory:  Reviewed    Results from last 7 days  Lab Units 01/29/19  0546 01/27/19  0550   HEMOGLOBIN g/dL 9 3* 9 1*   HEMATOCRIT % 28 8* 29 0*   WBC Thousand/uL 10 78* 16 04*       Results from last 7 days  Lab Units 01/29/19  0546 01/27/19  0550   BUN mg/dL 22 15   SODIUM mmol/L 139 140   POTASSIUM mmol/L 3 9 3 9   CHLORIDE mmol/L 105 110*   CREATININE mg/dL 0 73 0 65            Diagnostic Studies: Reviewed   No orders to display         ** Please Note: Fluency Direct voice to text software may have been used in the creation of this document   **      Current Facility-Administered Medications:     acetaminophen (TYLENOL) tablet 975 mg, 975 mg, Oral, Q8H [address], Santosh Jackson MD, 975 mg at 02/02/19 1442    dexamethasone (DECADRON) tablet 2 mg, 2 mg, Oral, Q8H, Santosh Jackson MD, 2 mg at 02/02/19 1544    [START ON 2/4/2019] dexamethasone (DECADRON) tablet 2 mg, 2 mg, Oral, Q12H Counts include 234 beds at the [facility], Santosh Jackson MD  [address] [place] [address]  [START ON 2/7/2019] dexamethasone (DECADRON) tablet 2 mg, 2 mg, Oral, Daily, Santosh Jackson MD    senna (SENOKOT) tablet 17 2 mg, 2 tablet, Oral, Daily **AND** docusate sodium (COLACE) capsule 100 mg, 100 mg, Oral, Daily, Santosh Jackson MD    enoxaparin (LOVENOX) subcutaneous injection 40 mg, 40 mg, Subcutaneous, Daily, Santosh Jackson MD, 40 mg at 02/02/19 0827    insulin lispro (HumaLOG) 100 units/mL subcutaneous injection 1-5 Units, 1-5 Units, Subcutaneous, TID AC, 2 Units at 02/02/19 1113 **AND** Fingerstick Glucose (POCT), , , TID AC, Addie Arenas, DO    insulin lispro (HumaLOG) 100 units/mL subcutaneous injection 1-5 Units, 1-5 Units, Subcutaneous, HS, Addie Large, DO    levothyroxine tablet 25 mcg, 25 mcg, Oral, Early Morning, Fidelina Henderson MD, 25 mcg at 02/02/19 0508    methocarbamol (ROBAXIN) tablet 500 mg, 500 mg, Oral, Q6H PRN, Fidelina Henderson MD, 500 mg at 01/31/19 2324    oxyCODONE (ROXICODONE) immediate release tablet 10 mg, 10 mg, Oral, Q4H PRN, Fidelina Henderson MD    oxyCODONE (ROXICODONE) IR tablet 5 mg, 5 mg, Oral, Q4H PRN, Fidelina Henderson MD    polyethylene glycol (MIRALAX) packet 17 g, 17 g, Oral, Daily PRN, Fidelina Henderson MD, 17 g at 01/31/19 1929    tamsulosin (FLOMAX) capsule 0 4 mg, 0 4 mg, Oral, Daily With Jaja Bailey MD, 0 4 mg at 02/02/19 6728

## 2019-02-03 LAB
GLUCOSE SERPL-MCNC: 100 MG/DL (ref 65–140)
GLUCOSE SERPL-MCNC: 111 MG/DL (ref 65–140)
GLUCOSE SERPL-MCNC: 130 MG/DL (ref 65–140)
GLUCOSE SERPL-MCNC: 198 MG/DL (ref 65–140)

## 2019-02-03 PROCEDURE — 97530 THERAPEUTIC ACTIVITIES: CPT

## 2019-02-03 PROCEDURE — 82948 REAGENT STRIP/BLOOD GLUCOSE: CPT

## 2019-02-03 PROCEDURE — 97112 NEUROMUSCULAR REEDUCATION: CPT

## 2019-02-03 PROCEDURE — 97110 THERAPEUTIC EXERCISES: CPT

## 2019-02-03 RX ORDER — PANTOPRAZOLE SODIUM 40 MG/1
40 TABLET, DELAYED RELEASE ORAL
Status: DISCONTINUED | OUTPATIENT
Start: 2019-02-03 | End: 2019-02-19

## 2019-02-03 RX ORDER — CALCIUM CARBONATE 200(500)MG
1000 TABLET,CHEWABLE ORAL ONCE
Status: COMPLETED | OUTPATIENT
Start: 2019-02-03 | End: 2019-02-03

## 2019-02-03 RX ORDER — CALCIUM CARBONATE 200(500)MG
500 TABLET,CHEWABLE ORAL DAILY PRN
Status: DISCONTINUED | OUTPATIENT
Start: 2019-02-03 | End: 2019-02-19

## 2019-02-03 RX ORDER — BISACODYL 10 MG
10 SUPPOSITORY, RECTAL RECTAL DAILY PRN
Status: DISCONTINUED | OUTPATIENT
Start: 2019-02-03 | End: 2019-02-04

## 2019-02-03 RX ADMIN — DOCUSATE SODIUM 100 MG: 100 CAPSULE, LIQUID FILLED ORAL at 08:09

## 2019-02-03 RX ADMIN — ENOXAPARIN SODIUM 40 MG: 40 INJECTION SUBCUTANEOUS at 08:08

## 2019-02-03 RX ADMIN — ACETAMINOPHEN 975 MG: 325 TABLET ORAL at 13:00

## 2019-02-03 RX ADMIN — LEVOTHYROXINE SODIUM 25 MCG: 25 TABLET ORAL at 05:10

## 2019-02-03 RX ADMIN — DEXAMETHASONE 2 MG: 2 TABLET ORAL at 15:54

## 2019-02-03 RX ADMIN — POLYETHYLENE GLYCOL 3350 17 G: 17 POWDER, FOR SOLUTION ORAL at 07:43

## 2019-02-03 RX ADMIN — BISACODYL 10 MG: 10 SUPPOSITORY RECTAL at 19:19

## 2019-02-03 RX ADMIN — ACETAMINOPHEN 975 MG: 325 TABLET ORAL at 21:27

## 2019-02-03 RX ADMIN — TAMSULOSIN HYDROCHLORIDE 0.4 MG: 0.4 CAPSULE ORAL at 15:54

## 2019-02-03 RX ADMIN — ACETAMINOPHEN 975 MG: 325 TABLET ORAL at 05:10

## 2019-02-03 RX ADMIN — CALCIUM CARBONATE (ANTACID) CHEW TAB 500 MG 500 MG: 500 CHEW TAB at 21:27

## 2019-02-03 RX ADMIN — STANDARDIZED SENNA CONCENTRATE 17.2 MG: 8.6 TABLET ORAL at 08:09

## 2019-02-03 RX ADMIN — CALCIUM CARBONATE (ANTACID) CHEW TAB 500 MG 1000 MG: 500 CHEW TAB at 03:55

## 2019-02-03 RX ADMIN — DEXAMETHASONE 2 MG: 2 TABLET ORAL at 07:43

## 2019-02-03 RX ADMIN — PANTOPRAZOLE SODIUM 40 MG: 40 TABLET, DELAYED RELEASE ORAL at 12:59

## 2019-02-03 RX ADMIN — INSULIN LISPRO 1 UNITS: 100 INJECTION, SOLUTION INTRAVENOUS; SUBCUTANEOUS at 16:30

## 2019-02-03 NOTE — PROGRESS NOTES
02/03/19 1100   Pain Assessment   Pain Assessment 0-10   Pain Score 2   Pain Type Acute pain;Surgical pain   Pain Location Neck   Pain Orientation Posterior   Pain Descriptors Aching; Sore   Pain Frequency Constant/continuous   Pain Onset Ongoing   Hospital Pain Intervention(s) Rest;Repositioned   Response to Interventions pt tolerated session   Restrictions/Precautions   Precautions Fall Risk;Spinal precautions   ROM Restrictions (cervical spinal precautions)   Braces or Orthoses MAFO  (R)   QI: Sit to Stand   Assistance Needed Supervision   Assistance Provided by [place] No physical assistance   Sit to Stand CARE Score 4   QI: Chair/Bed-to-Chair Transfer   Assistance Needed Incidental touching   Assistance Provided by [place] Less than 25%   Comment CGA 2* fatigue and dec proprioception in RLE during mobility   Chair/Bed-to-Chair Transfer CARE Score 3   Transfer Bed/Chair/Wheelchair   Limitations Noted In Balance; Endurance;LE Strength   Adaptive Equipment Roller [employer], Chair, Wheelchair Transfer (FIM) 4 - Patient requires steadying assist or light touching   Health Management   Health Management Level of Assistance Distant supervision;Minimal verbal cues   Health Management COLLAR MANAGEMENT: Pt given handout regarding VISTA collar and edu on cleaning, changing pads, and donning/doffing collar  Pt verbalized understanding  Pt had [place] [employer] donned to simulate pad change after showering  Pt demo ability to remove soiled pads, apply clean pads, and properly clean pads  Pt edu on sitting in front of mirror to ensure head and neck kept still while donning/doffing collar  Pt req A today to switch between shower collar and V[place] collar 2* dec ability to maintain head position  Pt reports that wife willing to assist with collar management at home and would benefit from education  Cognition   Overall Cognitive Status WFL   Arousal/Participation Alert; Cooperative   Attention Within functional limits Orientation Level Oriented X4   Memory Within functional limits   Following Commands Follows all commands and directions without difficulty   Activity Tolerance   Activity Tolerance Patient tolerated treatment well   Assessment   Treatment Assessment Pt participated in skilled OT session focusing on functional mobility, standing tolerance, and C/S collar management  Pt reports slight discomfort in neck but does not limit therapy today  Pt able to mobilize at [facility] in room around obstacles  Pt limited by inability to scan floor due to C/S collar at times  In stance during pad cleaning, pt had slight unsteadiness but able to self-correct  Pt would cont to benefit from skilled therapy focusing on act tolerance, dynamic unsupported balance, and self care tasks overall to inc indep upon discharge home  Cont with POC  Prognosis Good   Problem List Decreased strength;Decreased endurance; Impaired balance;Decreased mobility; Decreased coordination; Impaired sensation;Orthopedic restrictions   Barriers to Discharge Inaccessible home environment;Decreased caregiver support   Plan   Treatment/Interventions Functional transfer training;ADL retraining; Therapeutic exercise; Endurance training;Patient/family training   Progress Progressing toward goals   OT Therapy Minutes   OT Time In 1100   OT Time Out 1130   OT Total Time (minutes) 30   OT Mode of treatment - Individual (minutes) 30   OT Mode of treatment - Concurrent (minutes) 0   OT Mode of treatment - Group (minutes) 0   OT Mode of treatment - Co-treat (minutes) 0   OT Mode of Teatment - Total time(minutes) 30 minutes   Therapy Time missed   Time missed?  No

## 2019-02-03 NOTE — PROGRESS NOTES
Internal Medicine Progress Note  Patient: Brian Ruiz III  Age/sex: 60 y o  male  Medical Record #: [id number]      ASSESSMENT/PLAN:  Brian Ruiz III is seen and examined and management for following issues:    Medullary mass removal with fixation/fusion of C2-3 T5 1/24/19:  cytology is pending  Maintain cervical collar all times; steroid wean per Neurosurgery  Monitor incision site/ fever curve/WBC count  Still has numbness of legs to hip level R>L; burning has resolved right hand/arm with slight burning left hand that is intermittent; still with some slight numbness sensation palms of hands all unchanged today      Post-op urinary retention/hx BPH: resolved with starting Flomax     Hypothyroidism:  Continue Levothyroxine 25 mcg qd     Elevated fasting blood sugars:  Continue DM diet/ Accuchecks QID with SSI; may need Metformin but will first change CC diet from level 3 to level 1      Asymptomatic bradycardia:  noted postoperatively; monitor for symptoms --> will consult Cardiology if needed but looks to be stable/resolved    ABLA:  Stable; w/o sx; cont to monitor    Heartburn:  Needed TUMS last night since was severe; will add Protonix since on steroids and order prn TUMS    He gets heartburn at home if eats certain foods      Subjective: offers no complaints    ROS:   GI: denies abdominal pain, change bowel habits or reflux symptoms  Neuro: No new neurologic changes  Respiratory: No Cough, SOB  Cardiovascular: No CP, palpitations     Scheduled Meds:    Current Facility-Administered Medications:  acetaminophen 975 mg Oral Q8H [address] Klarissa Foley MD   dexamethasone 2 mg Oral Q8H Klarissa Foley MD   [START ON 2/4/2019] dexamethasone 2 mg Oral Q12H MD Patti [place] ON 2/7/2019] dexamethasone 2 mg Oral Daily Klarissa Foley MD   senna 2 tablet Oral Daily Klarissa Floey MD   And       docusate sodium 100 mg Oral Daily Klarissa Foley MD   enoxaparin 40 mg Subcutaneous Daily Klarissa Foley MD   insulin lispro 1-5 Units Subcutaneous TID AC Letha Duverney, DO   insulin lispro 1-5 Units Subcutaneous HS Letha Duverney, DO   levothyroxine 25 mcg Oral Early Morning Modesto Sinclair MD   methocarbamol 500 mg Oral Q6H PRN Modesto Sinclair MD   oxyCODONE 10 mg Oral Q4H PRN Modesto Sinclair MD   oxyCODONE 5 mg Oral Q4H PRN Modesto Sinclair MD   polyethylene glycol 17 g Oral Daily PRN Modesto Sinclair MD   tamsulosin 0 4 mg Oral Daily With Ian Welch MD       Labs:       Results from last 7 days  Lab Units 01/29/19  0546   WBC Thousand/uL 10 78*   HEMOGLOBIN g/dL 9 3*   HEMATOCRIT % 28 8*   PLATELETS Thousands/uL 238       Results from last 7 days  Lab Units 01/29/19  0546   SODIUM mmol/L 139   POTASSIUM mmol/L 3 9   CHLORIDE mmol/L 105   CO2 mmol/L 26   BUN mg/dL 22   CREATININE mg/dL 0 73   CALCIUM mg/dL 8 2*                  Results from last 7 days  Lab Units 02/03/19  1048 02/03/19  0612 02/02/19  2057   POC GLUCOSE mg/dl 100 111 122     [unfilled]    Labs reviewed    Physical Examination:  Vitals:   Vitals:    02/02/19 0449 02/02/19 1446 02/02/19 2253 02/03/19 0508   BP: 112/65 102/63 122/61 111/67   BP Location: Left arm Right arm Left arm Left arm   Pulse: 69 64 60 64   Resp: 18 18 20 18   Temp: 99 °F (37 2 °C) 98 6 °F (37 °C) 99 2 °F (37 3 °C) 99 2 °F (37 3 °C)   TempSrc: Oral Oral Oral Oral   SpO2: 98% 99% 99% 96%   Weight:       Height:         Constitutional:  NAD; pleasant; nontoxic  HEENT:  AT/NC; oropharynx negative for thrush on tongue   Neck: negative for JVD  CV:  +S1, S2;  RRR; no rub/murmur  Pulmonary:  BBS without crackles/wheeze/rhonci; resp are unlabored  Abdominal:  soft, +BS, ND/NT; no mass  Skin:  no rashes  Musculoskeletal:  no edema  :  no martinez  Neurological/Psych:  AAO;  CAIN 5/5; no depression/anxiety        [x ] Total time spent: 30 Mins and greater than 50% of this time was spent counseling/coordinating care      ** Please Note: Dragon 360 Dictation voice to text software may have been used in the creation of this document   **

## 2019-02-03 NOTE — PROGRESS NOTES
02/03/19 0900   Pain Assessment   Pain Assessment No/denies pain   Pain Score No Pain   Restrictions/Precautions   Braces or Orthoses MAFO   Cognition   Overall Cognitive Status WFL   Arousal/Participation Alert; Cooperative   Attention Within functional limits   Orientation Level Oriented X4   Memory Within functional limits   Following Commands Follows all commands and directions without difficulty   Subjective   Subjective pt reports not sleeping well last night due to acid reflux, stating he is tired this morning but remains agreeable and motivated for PT   QI: Sit to Stand   Assistance Needed Incidental touching   Assistance Provided by [place] Less than 25%   Sit to Stand CARE Score 3   QI: Chair/Bed-to-Chair Transfer   Assistance Needed Incidental touching   Assistance Provided by [place] Less than 25%   Comment CGA/CS   Chair/Bed-to-Chair Transfer CARE Score 3   Transfer Bed/Chair/Wheelchair   Limitations Noted In Balance;Sensation;LE Strength   Adaptive Equipment Roller Walker   Stand Pivot Contact Guard   Sit to [employer]   Stand to [facility], Chair, Wheelchair Transfer (FIM) 4 - Patient requires steadying assist or light touching   QI: Wheel 50 Feet with Two [address] / clean-up   Assistance Provided by [place] No physical assistance   Wheel 50 Feet with Two Turns CARE Score 5   QI: Wheel 150 Feet   Assistance Needed Set-up / clean-up   Assistance Provided by [place] No physical assistance   Wheel 150 Feet CARE Score 5   Wheelchair mobility   QI: Does the patient use a wheelchair? 1  Yes   QI: Indicate the type of wheelchair 1  Manual   Wheelchair Assist Level Supervision   Method Right upper extremity; Left upper extremity   Distance Level Surface (feet) 150 ft   Wheelchair (FIM) 5 - [place] sets up equipment or applies device such as orthosis/prosthesis AND distance 150 feet or more, no rest   QI: 1 Step (Curb)   Assistance Needed Physical assistance   Assistance Provided by [place] Less than 25%   1 Step (Curb) CARE Score 3   QI: 4 Steps   Assistance Needed Physical assistance   Assistance Provided by [place] Less than 25%   4 Steps CARE Score 3   QI: 12 Steps   Reason if not Attempted Medical concerns   12 Steps CARE Score 88   Stairs   Type Stairs   # of Steps 8   Assist Devices Cane;Single Rail   Findings LHR and SPC, step to pattern  CGA especially for turns at top/bottom of steps  increased time needed and number of steps limited by fatigue   Stairs (FIM) 2 - Patient goes up and down 4 - 11 stairs regardless of assist/device/setup   QI: Picking Up Object   Reason if not Attempted Safety concerns   Picking Up Object CARE Score 88   Therapeutic Interventions   Strengthening Standing in // bars: lateral step ups to 3" step x20, forward step taps with contralateral UE support (8" step wtih LLE and 4" step wtih RLE); sidestepping wt red theraband x3 laps   Equipment Use   NuStep 10min at L3, LE's only   Assessment   Treatment Assessment pt demonstrated significantly imrpoved performance of elevations with use of LHR and SPC, versus only 1 HR, however cont to need CGA for safety during turns at top/bottom of steps  standing endurance also cont to be limited due to LE fatigue but gradually increasing each session  pt will benefit from ongoing RLE strengthening and balance activities to decrease fall risk and maximize independnece   Problem List Decreased strength;Decreased endurance; Impaired balance;Decreased mobility; Decreased coordination; Impaired sensation;Orthopedic restrictions   Barriers to Discharge Inaccessible home environment;Decreased caregiver support  (wife works full time)   PT Barriers   Functional Limitation Stair negotiation;Standing;Transfers; Walking   Plan   Treatment/Interventions Functional transfer training;LE strengthening/ROM; Elevations; Therapeutic exercise; Endurance training;Bed mobility;Gait training; Compensatory technique education   Progress Progressing toward goals   Recommendation   Recommendation Home with family support; Outpatient PT   Equipment Recommended Walker   PT Therapy Minutes   PT Time In 0900   PT Time Out 1000   PT Total Time (minutes) 60   PT Mode of treatment - Individual (minutes) 60   PT Mode of treatment - Concurrent (minutes) 0   PT Mode of treatment - Group (minutes) 0   PT Mode of treatment - Co-treat (minutes) 0   PT Mode of Teatment - Total time(minutes) 60 minutes   Therapy Time missed   Time missed?  No

## 2019-02-04 PROBLEM — E87.1 HYPONATREMIA: Status: ACTIVE | Noted: 2019-02-04

## 2019-02-04 LAB
ANION GAP SERPL CALCULATED.3IONS-SCNC: 6 MMOL/L (ref 4–13)
BASOPHILS # BLD AUTO: 0.02 THOUSANDS/ΜL (ref 0–0.1)
BASOPHILS NFR BLD AUTO: 0 % (ref 0–1)
BUN SERPL-MCNC: 20 MG/DL (ref 5–25)
CALCIUM SERPL-MCNC: 8.4 MG/DL (ref 8.3–10.1)
CHLORIDE SERPL-SCNC: 101 MMOL/L (ref 100–108)
CO2 SERPL-SCNC: 26 MMOL/L (ref 21–32)
CREAT SERPL-MCNC: 0.6 MG/DL (ref 0.6–1.3)
EOSINOPHIL # BLD AUTO: 0.09 THOUSAND/ΜL (ref 0–0.61)
EOSINOPHIL NFR BLD AUTO: 1 % (ref 0–6)
ERYTHROCYTE [DISTWIDTH] IN BLOOD BY AUTOMATED COUNT: 13.7 % (ref 11.6–15.1)
GFR SERPL CREATININE-BSD FRML MDRD: 110 ML/MIN/1.73SQ M
GLUCOSE P FAST SERPL-MCNC: 79 MG/DL (ref 65–99)
GLUCOSE SERPL-MCNC: 100 MG/DL (ref 65–140)
GLUCOSE SERPL-MCNC: 110 MG/DL (ref 65–140)
GLUCOSE SERPL-MCNC: 111 MG/DL (ref 65–140)
GLUCOSE SERPL-MCNC: 181 MG/DL (ref 65–140)
GLUCOSE SERPL-MCNC: 79 MG/DL (ref 65–140)
HCT VFR BLD AUTO: 29.6 % (ref 36.5–49.3)
HGB BLD-MCNC: 9.4 G/DL (ref 12–17)
IMM GRANULOCYTES # BLD AUTO: 0.12 THOUSAND/UL (ref 0–0.2)
IMM GRANULOCYTES NFR BLD AUTO: 1 % (ref 0–2)
LYMPHOCYTES # BLD AUTO: 3.22 THOUSANDS/ΜL (ref 0.6–4.47)
LYMPHOCYTES NFR BLD AUTO: 26 % (ref 14–44)
MCH RBC QN AUTO: 29.7 PG (ref 26.8–34.3)
MCHC RBC AUTO-ENTMCNC: 31.8 G/DL (ref 31.4–37.4)
MCV RBC AUTO: 94 FL (ref 82–98)
MONOCYTES # BLD AUTO: 1 THOUSAND/ΜL (ref 0.17–1.22)
MONOCYTES NFR BLD AUTO: 8 % (ref 4–12)
NEUTROPHILS # BLD AUTO: 8.06 THOUSANDS/ΜL (ref 1.85–7.62)
NEUTS SEG NFR BLD AUTO: 64 % (ref 43–75)
NRBC BLD AUTO-RTO: 0 /100 WBCS
PLATELET # BLD AUTO: 382 THOUSANDS/UL (ref 149–390)
PMV BLD AUTO: 9.1 FL (ref 8.9–12.7)
POTASSIUM SERPL-SCNC: 4 MMOL/L (ref 3.5–5.3)
RBC # BLD AUTO: 3.16 MILLION/UL (ref 3.88–5.62)
SODIUM SERPL-SCNC: 133 MMOL/L (ref 136–145)
WBC # BLD AUTO: 12.51 THOUSAND/UL (ref 4.31–10.16)

## 2019-02-04 PROCEDURE — 99232 SBSQ HOSP IP/OBS MODERATE 35: CPT | Performed by: PHYSICAL MEDICINE & REHABILITATION

## 2019-02-04 PROCEDURE — 97110 THERAPEUTIC EXERCISES: CPT

## 2019-02-04 PROCEDURE — 97530 THERAPEUTIC ACTIVITIES: CPT

## 2019-02-04 PROCEDURE — 80048 BASIC METABOLIC PNL TOTAL CA: CPT | Performed by: NURSE PRACTITIONER

## 2019-02-04 PROCEDURE — 82948 REAGENT STRIP/BLOOD GLUCOSE: CPT

## 2019-02-04 PROCEDURE — 85025 COMPLETE CBC W/AUTO DIFF WBC: CPT | Performed by: NURSE PRACTITIONER

## 2019-02-04 PROCEDURE — 97116 GAIT TRAINING THERAPY: CPT

## 2019-02-04 RX ORDER — BISACODYL 10 MG
10 SUPPOSITORY, RECTAL RECTAL DAILY PRN
Status: DISCONTINUED | OUTPATIENT
Start: 2019-02-04 | End: 2019-02-19

## 2019-02-04 RX ADMIN — ENOXAPARIN SODIUM 40 MG: 40 INJECTION SUBCUTANEOUS at 08:23

## 2019-02-04 RX ADMIN — ACETAMINOPHEN 975 MG: 325 TABLET ORAL at 13:15

## 2019-02-04 RX ADMIN — PANTOPRAZOLE SODIUM 40 MG: 40 TABLET, DELAYED RELEASE ORAL at 05:02

## 2019-02-04 RX ADMIN — DEXAMETHASONE 2 MG: 2 TABLET ORAL at 05:02

## 2019-02-04 RX ADMIN — STANDARDIZED SENNA CONCENTRATE 17.2 MG: 8.6 TABLET ORAL at 08:23

## 2019-02-04 RX ADMIN — DOCUSATE SODIUM 100 MG: 100 CAPSULE, LIQUID FILLED ORAL at 08:23

## 2019-02-04 RX ADMIN — ACETAMINOPHEN 975 MG: 325 TABLET ORAL at 05:01

## 2019-02-04 RX ADMIN — LEVOTHYROXINE SODIUM 25 MCG: 25 TABLET ORAL at 05:01

## 2019-02-04 RX ADMIN — DEXAMETHASONE 2 MG: 2 TABLET ORAL at 17:20

## 2019-02-04 RX ADMIN — ACETAMINOPHEN 975 MG: 325 TABLET ORAL at 21:08

## 2019-02-04 RX ADMIN — INSULIN LISPRO 1 UNITS: 100 INJECTION, SOLUTION INTRAVENOUS; SUBCUTANEOUS at 21:08

## 2019-02-04 NOTE — PROGRESS NOTES
02/04/19 0700   Pain Assessment   Pain Assessment 0-10   Pain Score 2   Pain Type Acute pain;Surgical pain   Pain Location Neck   Pain Orientation Posterior   Pain Descriptors Aching; Sore   Pain Frequency Constant/continuous   Pain Onset Ongoing   Hospital Pain Intervention(s) Repositioned; Rest   Response to Interventions pt performed all exercises to tolerance and reported no inc pain   Restrictions/Precautions   Precautions Fall Risk;Spinal precautions   ROM Restrictions (cervical spinal precautions)   Braces or Orthoses MAFO  (R)   QI: [address] Provided by [place] No physical assistance   Eating CARE Score 6   Eating Assessment   Food To Mouth Yes   Able To Cut Yes   Meal Assessed Breakfast   Eating (FIM) 7 - Patient completely independent   QI: Putting On/Taking Off Footwear   Assistance Needed Physical assistance   Assistance Provided by [place] 25%-49%   Comment Pt able to don socks with leg crossover method  Pt demo inc success with crossed R leg over L leg as compared to prior sessions  Pt able to slip on L shoe but req A for R shoe 2* MAFO  A to tie b/l shoes   Putting On/Taking Off Footwear CARE Score 3   QI: Picking Up Object   Assistance Needed Physical assistance; Adaptive equipment   Assistance Provided by [place] Less than 25%   Comment Suzy with RW and LH reacher to collect laundry items from floor  Pt req Suzy while performing slight squat to reach items on ground without bending 2* dec strength and coordination in LEs   Pt may benefit from longer reacher to inc indep with tasks   Picking Up Object CARE Score 3   QI: Sit to [address] Provided by [place] No physical assistance   Comment cuing for hand placement during stand>sit and cuing to ensure legs are against chair to inc safety   Sit to Stand CARE Score 4   QI: Chair/Bed-to-Chair Transfer   Assistance Needed Incidental touching   Assistance Provided by [place] Less than 25%   Comment CGA w/ RW   Chair/Bed-to-Chair Transfer CARE Score 3   Transfer Bed/Chair/Wheelchair   Limitations Noted In Balance; Endurance;LE Strength   Adaptive Equipment Roller [employer], Chair, Wheelchair Transfer (FIM) 4 - Patient requires steadying assist or light touching   Meal Prep   Meal Prep Level Wheelchair   Meal Prep Level of Assistance Minimum assistance;Minimal verbal cues   Kitchen Mobility   Kitchen-Mobility Level Walker   Kitchen Activity Retrieve items;Transport items   Kitchen Mobility Comments Pt reports that he is responsible for dinner at home PTA  Pt engaged in simple meal prep and kitchen mobility to make coffee  Pt edu prior to task to stay within RW during mobility, utilize counter space to transport items, and stabilize with counter when reaching into overhead cabinets or into fridge for supplies  Pt then completed task req MIN v/c to recall safety techniques  Pt able to maintain stance and perform mobility at overall Suzy level  Pt had slight difficulty with advancing RLE during task but had insight  Pt edu that bending to retrieve items in lower cabinets should be limited to maintain safety with balance and ensure cervical spinal precautions upheld  Pt reports that he can keep common items on countertops to inc indep  Pt ordering LH reacher indep to inc indep with IADL tasks  Functional Standing Tolerance   Time 5min   Activity kitchen mobility in stance  Pt became fatigued req seated rest break but was able to verbalize need and mobilize to chair prior to sitting  Therapeutic Excerise-Strength   UE Strength Yes   Right Upper Extremity- Strength   Equipment [place]  (3#)   R Weight/Reps/Sets 10/3   RUE Strength Comment Pt engaged in UE therex to inc UE strength and endurance for functional transfers, mobility, and supported stance for ADL and IADL tasks   Pt tolerated bicep curls and chest presses below shoulder height to maintain precautions and limit discomfort in cervical spine  Left Upper Extremity-Strength   LUE Strength Comment Refer to RUE   Exercise Tools   Exercise Tools Yes   Other Exercise Tool 1 Pt reports tightness in shoulders and upper back  Pt engaged in towel glides on table top to promote UE ROM for dynamic reach and anterior weight shifting to inc core stability for inc balance during mobility  Pt reports no in pain during stretch  Cognition   Overall Cognitive Status WFL   Arousal/Participation Alert; Cooperative   Attention Within functional limits   Orientation Level Oriented X4   Memory Within functional limits   Following Commands Follows all commands and directions without difficulty   Activity Tolerance   Activity Tolerance Patient tolerated treatment well   Assessment   Treatment Assessment Pt participated on skilled OT session focusing on UE ROM and strength, standing tolerance, kitchen mobility, and item retrieval from floor  Pt reports that wife will be in more often at end of week and would benefit from family training regarding C/S collar management to inc safety and maintenance of cerv spinal precautions at home  Pt will talk to wife and inform OT of scheduled times later in week  Pt would benefit from cont therapy focusing on dynamic balance and strength with ADL and IADL completion  Cont with POC  Prognosis Good   Problem List Decreased strength;Decreased endurance; Impaired balance;Decreased mobility; Decreased coordination; Impaired sensation;Orthopedic restrictions   Barriers to Discharge Inaccessible home environment;Decreased caregiver support   Plan   Treatment/Interventions ADL retraining;Functional transfer training; Therapeutic exercise; Endurance training;Patient/family training;Bed mobility   Progress Progressing toward goals   Recommendation   Equipment Recommended (Pt reports that he will order [facility] independently)   OT Therapy Minutes   OT Time In 0700   OT Time Out 0830   OT Total Time (minutes) 90   OT Mode of treatment - Individual (minutes) 90   OT Mode of treatment - Concurrent (minutes) 0   OT Mode of treatment - Group (minutes) 0   OT Mode of treatment - Co-treat (minutes) 0   OT Mode of Teatment - Total time(minutes) 90 minutes   Therapy Time missed   Time missed?  No

## 2019-02-04 NOTE — PROGRESS NOTES
Internal Medicine Progress Note  Patient: Baldemar Masterson III  Age/sex: 60 y o  male  Medical Record #: [id number]      ASSESSMENT/PLAN:  Baldemar Masterson III is seen and examined and management for following issues:    Medullary mass removal with fixation/fusion of C2-3 T5 1/24/19:  Maintain cervical collar all times; steroid wean per Neurosurgery  Still has numbness of legs to hip level R>L; some RLE weakness he says he gets when fatigued; burning has resolved right hand/arm with slight burning left hand that is intermittent; still with some slight numbness sensation palms of hands all unchanged today      Post-op urinary retention/hx BPH: resolved with starting Flomax     Hypothyroidism:  Continue Levothyroxine 25 mcg qd     Elevated fasting blood sugars:  Continue DM diet/ Accuchecks QID with SSI; may need Metformin; cont DM diet      Asymptomatic bradycardia:  noted postoperatively;  looks to be resolved    ABLA:  Stable; w/o sx; cont to monitor    Heartburn:  Needed TUMS saturday night since was severe; yesterday added Protonix since on steroids and ordered prn TUMS  He gets heartburn at home if eats certain foods and gets 3x/week    Told him to go to PCP as OP when he recovers and have workup with EGD    Leukocytosis: from Decadron and has had no fevers    Hyponatremia: mild, will watch      Subjective: offers no complaints; had very mild heartburn last night rel with 1 TUMS    ROS:   GI: denies abdominal pain, change bowel habits or reflux symptoms  Neuro: No new neurologic changes  Respiratory: No Cough, SOB  Cardiovascular: No CP, palpitations     Scheduled Meds:    Current Facility-Administered Medications:  acetaminophen 975 mg Oral Q8H [facility] Rafa Le MD   bisacodyl 10 mg Rectal Daily PRN Pam Bhattil, DO   calcium carbonate 500 mg Oral Daily PRN CLAUDIO Hernandez   dexamethasone 2 mg Oral Q12H [facility] MD Yunier Francisco ON 2/7/2019] dexamethasone 2 mg Oral Daily MD kiersten Francisco 2 tablet Oral Daily Robert Buchanan MD   And       docusate sodium 100 mg Oral Daily Robert Buchanan MD   enoxaparin 40 mg Subcutaneous Daily Robert Buchanan MD   insulin lispro 1-5 Units Subcutaneous TID AC Alvino Rubinstein, DO   insulin lispro 1-5 Units Subcutaneous HS Alvino Rubinstein, DO   levothyroxine 25 mcg Oral Early Morning Robert Buchanan MD   methocarbamol 500 mg Oral Q6H PRN Robert Buchanan MD   oxyCODONE 10 mg Oral Q4H PRN Robert Buchanan MD   oxyCODONE 5 mg Oral Q4H PRN Robert Buchanan MD   pantoprazole 40 mg Oral Early Morning CLAUDIO Rust   polyethylene glycol 17 g Oral Daily PRN Robert Buchanan MD   tamsulosin 0 4 mg Oral Daily With Carla Arce MD       Labs:       Results from last 7 days  Lab Units 02/04/19  0456 01/29/19  0546   WBC Thousand/uL 12 51* 10 78*   HEMOGLOBIN g/dL 9 4* 9 3*   HEMATOCRIT % 29 6* 28 8*   PLATELETS Thousands/uL 382 238       Results from last 7 days  Lab Units 02/04/19  0456 01/29/19  0546   SODIUM mmol/L 133* 139   POTASSIUM mmol/L 4 0 3 9   CHLORIDE mmol/L 101 105   CO2 mmol/L 26 26   BUN mg/dL 20 22   CREATININE mg/dL 0 60 0 73   CALCIUM mg/dL 8 4 8 2*                  Results from last 7 days  Lab Units 02/04/19  0641 02/03/19  2042 02/03/19  1603   POC GLUCOSE mg/dl 100 130 198*     [unfilled]    Labs reviewed    Physical Examination:  Vitals:   Vitals:    02/02/19 2253 02/03/19 0508 02/03/19 1246 02/03/19 2039   BP: 122/61 111/67 114/65 116/62   BP Location: Left arm Left arm Left arm Right arm   Pulse: 60 64 66 61   Resp: 20 18 18 18   Temp: 99 2 °F (37 3 °C) 99 2 °F (37 3 °C) 98 6 °F (37 °C) 99 7 °F (37 6 °C)   TempSrc: Oral Oral Oral Oral   SpO2: 99% 96% 97% 100%   Weight:       Height:         Constitutional:  NAD; pleasant; nontoxic  HEENT:  AT/NC; oropharynx negative for thrush on tongue   Neck: negative for JVD  CV:  +S1, S2;  RRR; no rub/murmur  Pulmonary:  BBS without crackles/wheeze/rhonci; resp are unlabored  Abdominal:  soft, +BS, ND/NT; no mass  Skin:  no rashes  Musculoskeletal:  no edema  :  no martinez  Neurological/Psych:  AAO;  CAIN 5/5 usually but says gets RLE weaker when he is fatigued although I didn't notice before (he says has been present all along and PT notes as well); no depression/anxiety        [x ] Total time spent: 30 Mins and greater than 50% of this time was spent counseling/coordinating care  ** Please Note: Dragon 360 Dictation voice to text software may have been used in the creation of this document   **

## 2019-02-04 NOTE — PROGRESS NOTES
02/04/19 1010   Pain Assessment   Pain Assessment No/denies pain   Restrictions/Precautions   Precautions Fall Risk   Braces or Orthoses C/S Collar   Cognition   Arousal/Participation Cooperative   Subjective   Subjective pt reported feeling well start of session  pt reported tired at end of session but okay  QI: Sit to Stand   Assistance Needed Physical assistance   Assistance Provided by [place] Less than 25%   Sit to Stand CARE Score 3   QI: Chair/Bed-to-Chair Transfer   Assistance Needed Physical assistance   Assistance Provided by [place] Less than 25%   Chair/Bed-to-Chair Transfer CARE Score 3   Transfer Bed/Chair/Wheelchair   Limitations Noted In Balance; Endurance;UE Strength;LE Strength   Adaptive Equipment Roller Walker;None   Stand Pivot Minimal Assist;Supervision   Sit to Stand Minimal Assist;Supervision   Stand to Sit Minimal Assist;Supervision   Findings [place] no RW and S with RW   Bed, Chair, Wheelchair Transfer (FIM) 2 - [place] needs to lift or boost to rise AND assist to sit   QI: Walk 10 Feet   Assistance Needed Physical assistance   Assistance Provided by [place] 25%-49%   Walk 10 Feet CARE Score 3   QI: Walk 50 Feet with Two Turns   Assistance Needed Physical assistance   Assistance Provided by [place] 25%-49%   Walk 50 Feet with Two Turns CARE Score 3   QI: Walk 150 Feet   Assistance Needed Adaptive equipment;Supervision   Walk 150 Feet CARE Score 4   Ambulation   Primary Discharge Mode of Locomotion Walk   Walk Assist Level Minimum Assist;Moderate Assist;Supervision   Gait Pattern Inconsistant Sheryl;Decreased foot clearance; Slow Sheryl; Step through; Improper weight shift;Trendelenburg;Narrow LEAH   Assist Device Roller Walker;Hand Hold   Distance Walked (feet) 350 ft  (150 x5)   Limitations Noted In Balance; Endurance; Heel Strike;Speed;Strength; Other;Swing   Findings CS with RW and Min-ModA with HHA and gait belt, all with R AFO   Walking (FIM) 3 - Patient completes 50 - 74% of all tasks, needs more than steadying or light touch AND distance 150 feet or more, no rest   Stairs   Type Stairs   Findings step through pattern practicing up forward and down backward 6"  steps  intermixed with walking    Therapeutic Interventions   Strengthening side step ups onto 2" blocl 2x8 BLE with BUE support on // bars for balance  repeated step through pattern on 6"  steps with BHR, R knee buckled x2 since it wasn't flexed before he came down with leading with RLE (came down backwards)  4 sets total each leg 5-6 reps at a time  Flexibility bilat calf 2x60 sec    Balance walking backwards, sideways both directions, walking without RW but with R AFO and HHA one hand and other hand by hallway rail if needed  Other Comments   Comments session focused on repeated walking practice, including walking towards tall standing mirror for pt to have a visual aide of where his R foot placement is , stairs, strengthening for hip abd, and rest breaks as needed    Assessment   Treatment Assessment Pt cont to make gradual progress towards LTGs, demonstrating improvement in ambulatory balance and activity tolernace  Pt cont to present with fall risk components due to needing UE support for walking and transfers, cont dec strength BLE , dec sensation/proprioception and dec righting reactions  Pt will cont to benefit from skilled PT to further progress safety and (I) with mobility  Barriers to Discharge Inaccessible home environment;Decreased caregiver support   PT Barriers   Physical Impairment Decreased strength;Decreased range of motion;Decreased endurance; Impaired balance;Decreased mobility; Decreased coordination; Impaired sensation   Functional Limitation Car transfers;Stair negotiation;Standing;Transfers; Walking   Plan   Treatment/Interventions Functional transfer training;LE strengthening/ROM; Therapeutic exercise;Elevations; Endurance training;Patient/family training;Equipment eval/education; Bed mobility;Gait training   Progress Progressing toward goals   Recommendation   Recommendation Outpatient PT; Home with family support   Equipment Recommended Walker   PT Therapy Minutes   PT Time In 1010   PT Time Out 1140   PT Total Time (minutes) 90   PT Mode of treatment - Individual (minutes) 90   PT Mode of treatment - Concurrent (minutes) 0   PT Mode of treatment - Group (minutes) 0   PT Mode of treatment - Co-treat (minutes) 0   PT Mode of Teatment - Total time(minutes) 90 minutes   Therapy Time missed   Time missed?  No

## 2019-02-04 NOTE — PROGRESS NOTES
Physical Medicine and Rehabilitation Progress Note  Una Cobian III 60 y o  male MRN: [id number]  Unit/Bed#: -01 Encounter: [id number]    HPI: Miko Duff is a 60 y o  male who [facility] [address] with h/o sensory deficits and was found to have spinal cord mass therefore underwent tumor resection and C4-T3 laminectomy and C2-T5 fixation/fusion by Dr Carlyle Akins on 1/24       Chief Complaint: spinal mass s/p resection     Subjective: D/W patient trial off of flomax and patient agreeable     ROS:  negative unless noted above    Assessment/Plan:      Anemia   Assessment & Plan    · Likely ABLA  · Hg currently stable at 9 4     Hyponatremia   Assessment & Plan    · Mild at 133  · IM monitoring     Leukocytosis   Assessment & Plan    · Likely due to steroids which are being tapered (currently 12 51)      Hyperlipidemia   Assessment & Plan    · was Omega 3 FA 1000 mg qd PTA   · Unlikely to pose risk of increase bleeding after surgery however will confirm with neurosurgery if they would like to continue to hold      Impaired fasting glucose   Assessment & Plan    · PCP aware  · Diet controlled      Hypothyroidism   Assessment & Plan    · On home synthroid 25 mcg qd      BPH associated with nocturia   Assessment & Plan    · Per patient stopped taking flomax 0 4 mg qpm in the past because it was not helping the urinary retention   · Per patient stopped taking ditropan XL 10 mg qd in the past because  it was not helping the urinary retention   · Per patient stopped taking viagra 100 mg in the past because  it was not helping the urinary retention   · Per patient was no longer on any meds for urinary retention/BPH PTA as he was working with urology as OP and further tests were being planned as patient medications had not been effective however in acute care patient was restarted on flomax and post-operatively patient has been urinating well   ·  will trial off of flomax and monitor PVRs (which patient had stopped taking PTA because it was not effective)  · Follows with Garth Gann PA-C/Dr Ivonne Menezes as OP     * Spinal cord mass [facility])   Assessment & Plan    · S/p resection and C4-T3 laminectomy and C2-T5 fixation/fusion by Dr Gilbert Gates and Dr Ariana Hartman on 1/24  · decardron taper complete on 2/9  · Spine precautions  · c-collar at all times  · Path pending  · OP FU on 2/6        Scheduled Meds:    Current Facility-Administered Medications:  acetaminophen 975 mg Oral Q8H [address] Juan C Garcia MD   bisacodyl 10 mg Rectal Daily PRN Molly Genera, DO   calcium carbonate 500 mg Oral Daily PRN CLAUDIO Watson   dexamethasone 2 mg Oral Q12H MD Darrick Richmond ON 2/7/2019] dexamethasone 2 mg Oral Daily Juan C Garcia MD   senna 2 tablet Oral Daily Juan C Garcia MD   And       docusate sodium 100 mg Oral Daily Juan C Garcia MD   enoxaparin 40 mg Subcutaneous Daily Juan C Garcia MD   insulin lispro 1-5 Units Subcutaneous TID AC Molly Genera, DO   insulin lispro 1-5 Units Subcutaneous HS Molly Tamanna, DO   levothyroxine 25 mcg Oral Early Morning Juan C Garcia MD   methocarbamol 500 mg Oral Q6H PRN Juan C Garcia MD   oxyCODONE 10 mg Oral Q4H PRN Juan C Garcia MD   oxyCODONE 5 mg Oral Q4H PRN Juan C Garcia MD   pantoprazole 40 mg Oral Early Morning CLAUDIO Watson   polyethylene glycol 17 g Oral Daily PRN Juan C Garcia MD        Incidental findings:  1) 2-3 mm pulmonary nodule: per radiology report of 1/18/19 recommend repeat CT chest in 3 months however will defer to PCP  2) fatty involution of pancreas: OP FU with PCP with further testing/treatment and/or specialist referral at PCP's discretion  3) L maxillary sinus thickening: asymptomatic, OP FU with PCP with further testing/treatment and/or specialist referral at PCP's discretion      DVT ppx: SCDs, cleared for lovenox 40 mg sc qd by neurosx in acute care (and has been on it since 1/26) Objective:    Functional Update:  Mobility: min-mod A (1-2)    Transfers: min-mod  ADLs: mod         Physical Exam:        General: alert, no apparent distress, cooperative and comfortable  HEENT:  +C-collar  CARDIAC:  +S1/2  LUNGS:  respirations unlabored  ABDOMEN:  soft NT  EXTREMITIES:  no significant LE edema  NEURO:   mental status, speech normal, alert and oriented x3  PSYCH:  mood/affect currently stable      Laboratory:     Results from last 7 days  Lab Units 02/04/19  0456 01/29/19  0546   HEMOGLOBIN g/dL 9 4* 9 3*   HEMATOCRIT % 29 6* 28 8*   WBC Thousand/uL 12 51* 10 78*       Results from last 7 days  Lab Units 02/04/19  0456 01/29/19  0546   BUN mg/dL 20 22   SODIUM mmol/L 133* 139   POTASSIUM mmol/L 4 0 3 9   CHLORIDE mmol/L 101 105   CREATININE mg/dL 0 60 0 73            Patient Active Problem List   Diagnosis    BPH associated with nocturia    Hypothyroidism    Impaired fasting glucose    Spinal cord mass (HCC)    Leukocytosis    Anemia    Hyperlipidemia    Hyponatremia           Total visit time:  At least 25 minutes, with more than 50% spent counseling/coordinating care

## 2019-02-05 LAB
GLUCOSE SERPL-MCNC: 119 MG/DL (ref 65–140)
GLUCOSE SERPL-MCNC: 127 MG/DL (ref 65–140)

## 2019-02-05 PROCEDURE — 97535 SELF CARE MNGMENT TRAINING: CPT

## 2019-02-05 PROCEDURE — 82948 REAGENT STRIP/BLOOD GLUCOSE: CPT

## 2019-02-05 PROCEDURE — 97116 GAIT TRAINING THERAPY: CPT

## 2019-02-05 PROCEDURE — 99232 SBSQ HOSP IP/OBS MODERATE 35: CPT | Performed by: PHYSICAL MEDICINE & REHABILITATION

## 2019-02-05 PROCEDURE — 97112 NEUROMUSCULAR REEDUCATION: CPT

## 2019-02-05 PROCEDURE — 97530 THERAPEUTIC ACTIVITIES: CPT

## 2019-02-05 RX ADMIN — DEXAMETHASONE 2 MG: 2 TABLET ORAL at 05:17

## 2019-02-05 RX ADMIN — DOCUSATE SODIUM 100 MG: 100 CAPSULE, LIQUID FILLED ORAL at 08:44

## 2019-02-05 RX ADMIN — ACETAMINOPHEN 975 MG: 325 TABLET ORAL at 05:17

## 2019-02-05 RX ADMIN — DEXAMETHASONE 2 MG: 2 TABLET ORAL at 17:08

## 2019-02-05 RX ADMIN — LEVOTHYROXINE SODIUM 25 MCG: 25 TABLET ORAL at 05:17

## 2019-02-05 RX ADMIN — ACETAMINOPHEN 975 MG: 325 TABLET ORAL at 22:10

## 2019-02-05 RX ADMIN — ACETAMINOPHEN 975 MG: 325 TABLET ORAL at 13:14

## 2019-02-05 RX ADMIN — ENOXAPARIN SODIUM 40 MG: 40 INJECTION SUBCUTANEOUS at 08:44

## 2019-02-05 RX ADMIN — STANDARDIZED SENNA CONCENTRATE 17.2 MG: 8.6 TABLET ORAL at 08:44

## 2019-02-05 RX ADMIN — PANTOPRAZOLE SODIUM 40 MG: 40 TABLET, DELAYED RELEASE ORAL at 05:17

## 2019-02-05 NOTE — PROGRESS NOTES
Internal Medicine Progress Note  Patient: Haseeb Ponce III  Age/sex: 60 y o  male  Medical Record #: [id number]      ASSESSMENT/PLAN:  Haseeb Ponce III is seen and examined and management for following issues:    Medullary mass removal with fixation/fusion of C2-3 T5 1/24/19:  Maintain cervical collar all times; steroid wean per Neurosurgery  Still has numbness of legs to hip level R>L; some RLE weakness he says he gets when fatigued; burning has resolved right hand/arm with slight burning left hand that is intermittent; still with some slight numbness sensation palms of hands all unchanged today      Post-op urinary retention/hx BPH: resolved with starting Flomax (new for him) = primary service stopped now     Hypothyroidism:  Continue Levothyroxine 25 mcg qd     Elevated fasting blood sugars; HbA1C 1/22 = 6 3:  Continue DM diet but will stop Accuchecks QID     Asymptomatic bradycardia:  noted postoperatively;  looks to be resolved    ABLA:  Stable; w/o sx; cont to monitor    Heartburn:  Needed TUMS saturday night since was severe; yesterday added Protonix since on steroids and ordered prn TUMS  He gets heartburn at home if eats certain foods and gets 3x/week    Told him to go to PCP as OP when he recovers and have workup with EGD    Leukocytosis: from Decadron and has had no fevers    Hyponatremia: mild, will watch      Subjective: offers no complaints    ROS:   GI: denies abdominal pain, change bowel habits or reflux symptoms  Neuro: No new neurologic changes  Respiratory: No Cough, SOB  Cardiovascular: No CP, palpitations     Scheduled Meds:    Current Facility-Administered Medications:  acetaminophen 975 mg Oral Q8H [address] Dahiana Noble MD   bisacodyl 10 mg Rectal Daily PRN Dairl Jesse, DO   calcium carbonate 500 mg Oral Daily PRN CLAUDIO Stevens   dexamethasone 2 mg Oral Q12H [address] MD Maddie Daugherty ON 2/7/2019] dexamethasone 2 mg Oral Daily Dahiana Noble MD   senna 2 tablet Oral Daily Alyson Gonzalez MD   And       docusate sodium 100 mg Oral Daily Alyson Gonzalez MD   enoxaparin 40 mg Subcutaneous Daily Alyson Gonzalez MD   insulin lispro 1-5 Units Subcutaneous TID [facility] Oleksandr Mcnair DO   insulin lispro 1-5 Units Subcutaneous HS Oleksandr Mcnair DO   levothyroxine 25 mcg Oral Early Morning Alyson Gonzalez MD   methocarbamol 500 mg Oral Q6H PRN Alyson Gonzalez MD   oxyCODONE 10 mg Oral Q4H PRN Alyson Gonzalez MD   oxyCODONE 5 mg Oral Q4H PRN Alyson Gonzalez MD   pantoprazole 40 mg Oral Early Morning CLAUDIO Kim   polyethylene glycol 17 g Oral Daily PRN Alyson Gonzalez MD       Labs:       Results from last 7 days  Lab Units 02/04/19  0456   WBC Thousand/uL 12 51*   HEMOGLOBIN g/dL 9 4*   HEMATOCRIT % 29 6*   PLATELETS Thousands/uL 382       Results from last 7 days  Lab Units 02/04/19  0456   SODIUM mmol/L 133*   POTASSIUM mmol/L 4 0   CHLORIDE mmol/L 101   CO2 mmol/L 26   BUN mg/dL 20   CREATININE mg/dL 0 60   CALCIUM mg/dL 8 4                  Results from last 7 days  Lab Units 02/05/19  0644 02/04/19  2103 02/04/19  1551   POC GLUCOSE mg/dl 127 181* 110     [unfilled]    Labs reviewed    Physical Examination:  Vitals:   Vitals:    02/03/19 2039 02/04/19 1316 02/04/19 2035 02/05/19 0501   BP: 116/62 112/52 113/59 112/63   BP Location: Right arm Left arm Left arm Left arm   Pulse: 61 74 64 61   Resp: 18 18 18 19   Temp: 99 7 °F (37 6 °C) 98 6 °F (37 °C) 98 5 °F (36 9 °C) 98 8 °F (37 1 °C)   TempSrc: Oral Oral Oral Oral   SpO2: 100% 98% 97% 98%   Weight:       Height:         Constitutional:  NAD; pleasant; nontoxic  HEENT:  AT/NC; oropharynx negative for thrush on tongue   Neck: negative for JVD  CV:  +S1, S2;  RRR; no rub/murmur  Pulmonary:  BBS without crackles/wheeze/rhonci; resp are unlabored  Abdominal:  soft, +BS, ND/NT; no mass  Skin:  no rashes  Musculoskeletal:  no edema  :  no martinez  Neurological/Psych:  AAO;  CAIN 5/5 except RLE 4+/5; no depression/anxiety        [x ] Total time spent: 30 Mins and greater than 50% of this time was spent counseling/coordinating care  ** Please Note: Dragon 360 Dictation voice to text software may have been used in the creation of this document   **

## 2019-02-05 NOTE — PCC PHYSICAL THERAPY
Pt making progress towards d/c goals  Pt functioning at CG/Suzy with RW, with focus of no AD during therapy sessions to improve balance and improve functional gait  Pt with LE weakness and coordination limiting stair training and functional Ind  Pt to cont focus on improving deficits to progress with safety and decrease fall risk for safe d/c home  Pt making good progress towards LTGs  Pt cont to amb with no AD during therapy session, but recommendation for RW for d/c to improve funcitonal ind  Pt no longer using AFO during gait training but cont to demonstrate decrease DF strength and RLE weakness    Pt will benefit from skilled PT to improve strength, balance and righting reactions to decrease fall risk and progress with functional ind

## 2019-02-05 NOTE — PCC OCCUPATIONAL THERAPY
Pt making progress towards LTGs  He continues to be limited by orthopedic restrictions, decreased standing balance, and decreased general strength  Pt making G progress towards Kathy goals, currently overall CS for functional transfers and Suzy for dressing  Pts wife has been present for FT on C collar management and demos G understanding and is agreeable to A pt with C collar upon d/c home  Pt will continue to benefit from OT services following POC with focus on above mentioned deficits to increase safety and independence with I/ADL tasks

## 2019-02-05 NOTE — PROGRESS NOTES
Physical Medicine and Rehabilitation Progress Note  Kamran Bee III 60 y o  male MRN: [id number]  Unit/Bed#: -01 Encounter: [id number]    HPI: Luis Levi is a 60 y o  male who presented to the [address] with h/o sensory deficits and was found to have spinal cord mass therefore underwent tumor resection and C4-T3 laminectomy and C2-T5 fixation/fusion by Dr Alexandre Guthrie on 1/24       Chief Complaint: spinal mass s/p resection     Subjective: notified patient of PVR results thus far     ROS:  negative unless noted above    Assessment/Plan:      Anemia   Assessment & Plan    · Likely ABLA  · Hg currently stable at 9 4     Hyponatremia   Assessment & Plan    · Mild at 133  · IM monitoring     Leukocytosis   Assessment & Plan    · Likely due to steroids which are being tapered (currently 12 51)      Hyperlipidemia   Assessment & Plan    · was Omega 3 FA 1000 mg qd PTA   · Unlikely to pose risk of increase bleeding after surgery however will confirm with neurosurgery if they would like to continue to hold      Impaired fasting glucose   Assessment & Plan    · PCP aware  · Diet controlled      Hypothyroidism   Assessment & Plan    · On home synthroid 25 mcg qd      BPH associated with nocturia   Assessment & Plan    · Per patient stopped taking flomax 0 4 mg qpm in the past because it was not helping the urinary retention   · Per patient stopped taking ditropan XL 10 mg qd in the past because  it was not helping the urinary retention   · Per patient stopped taking viagra 100 mg in the past because  it was not helping the urinary retention   · Per patient was no longer on any meds for urinary retention/BPH PTA as he was working with urology as OP and further tests were being planned as patient medications had not been effective however in acute care patient was restarted on flomax and post-operatively patient has been urinating well   ·  will trial off of flomax and monitor PVRs (which patient had stopped taking PTA because it was not effective); PVRs 57 & 38 (PVRs are from greater than 24 hours after last dose of flomax)   · Follows with Jeanette Salvador PA-C/Dr Jamie Roque as OP     * Spinal cord mass [facility])   Assessment & Plan    · S/p resection and C4-T3 laminectomy and C2-T5 fixation/fusion by Dr David Odell and Dr Lorrie Degroot on 1/24  · decardron taper complete on 2/9  · Spine precautions  · c-collar at all times  · Path revealed grade 2 ependymoma; further management per team at [place] [address] [facility]   · OP FU on 2/6        Scheduled Meds:    Current Facility-Administered Medications:  acetaminophen 975 mg Oral Q8H Mauricio Cole MD   bisacodyl 10 mg Rectal Daily PRN Sudie Felty, DO   calcium carbonate 500 mg Oral Daily PRN Mickel Dakins, CRNP   dexamethasone 2 mg Oral Q12H MD Marie Richmond ON 2/7/2019] dexamethasone 2 mg Oral Daily Nicki Dominguez MD   senna 2 tablet Oral Daily Nicki Dominguez MD   And       docusate sodium 100 mg Oral Daily Nicki Dominguez MD   enoxaparin 40 mg Subcutaneous Daily Nicki Dominguez MD   insulin lispro 1-5 Units Subcutaneous TID AC Sudie Felty, DO   insulin lispro 1-5 Units Subcutaneous HS Sudie Felty, DO   levothyroxine 25 mcg Oral Early Morning Nicki Dominguez MD   methocarbamol 500 mg Oral Q6H PRN Nicki Dominguez MD   oxyCODONE 10 mg Oral Q4H PRN Nicki Dominguez MD   oxyCODONE 5 mg Oral Q4H PRN Nicki Dominguez MD   pantoprazole 40 mg Oral Early Morning Mickel Dakins, CRNP   polyethylene glycol 17 g Oral Daily PRN Nicki Dominguez MD        Incidental findings:  1) 2-3 mm pulmonary nodule: per radiology report of 1/18/19 recommend repeat CT chest in 3 months however will defer to PCP  2) fatty involution of pancreas: OP FU with PCP with further testing/treatment and/or specialist referral at PCP's discretion  3) L maxillary sinus thickening: asymptomatic, OP FU with PCP with further testing/treatment and/or specialist referral at PCP's discretion      DVT ppx: SCDs, cleared for lovenox 40 mg sc qd by neurosx in acute care (and has been on it since 1/26)       Objective:    Functional Update:  Mobility: min-mod A (1-2)    Transfers: min-mod  ADLs: mod         Physical Exam:    Vitals:    02/05/19 0501   BP: 112/63   Pulse: 61   Resp: 19   Temp: 98 8 °F (37 1 °C)   SpO2: 98%         General: alert, no apparent distress, cooperative and comfortable  HEENT:  +C-collar  CARDIAC:  +S1/2  LUNGS:  respirations unlabored  ABDOMEN:  soft NT  EXTREMITIES:  no significant LE edema  NEURO:   mental status, speech normal, alert and oriented x3  PSYCH:  mood/affect currently stable      Laboratory:     Results from last 7 days  Lab Units 02/04/19  0456   HEMOGLOBIN g/dL 9 4*   HEMATOCRIT % 29 6*   WBC Thousand/uL 12 51*       Results from last 7 days  Lab Units 02/04/19  0456   BUN mg/dL 20   SODIUM mmol/L 133*   POTASSIUM mmol/L 4 0   CHLORIDE mmol/L 101   CREATININE mg/dL 0 60            Patient Active Problem List   Diagnosis    BPH associated with nocturia    Hypothyroidism    Impaired fasting glucose    Spinal cord mass (HCC)    Leukocytosis    Anemia    Hyperlipidemia    Hyponatremia           Total visit time:  At least 25 minutes, with more than 50% spent counseling/coordinating care

## 2019-02-05 NOTE — PROGRESS NOTES
02/05/19 1000   Pain Assessment   Pain Assessment No/denies pain   Restrictions/Precautions   Precautions Fall Risk;Spinal precautions;Supervision on toilet/commode   Braces or Orthoses C/S Collar   Cognition   Overall Cognitive Status WFL   Subjective   Subjective pt with no complaints start of session, pt ready for therapy   QI: Roll Left and Right   Assistance Needed Verbal cues; Supervision   Roll Left and Right CARE Score 4   QI: Sit to Lying   Assistance Needed Supervision   Sit to Lying CARE Score 4   QI: Lying to Sitting on Side of Bed   Assistance Needed Supervision   Lying to Sitting on Side of Bed CARE Score 4   QI: Sit to Stand   Assistance Needed Incidental touching   Sit to Stand CARE Score 4   QI: Chair/Bed-to-Chair Transfer   Assistance Needed Incidental touching; Adaptive equipment   Comment with and without RW   Chair/Bed-to-Chair Transfer CARE Score 4   Transfer Bed/Chair/Wheelchair   Limitations Noted In Balance;LE Strength; Endurance   Adaptive Equipment Roller Walker;None   Stand Pivot Contact Guard;Minimal Assist   Sit to [employer] Guard;Minimal Assist   Stand to Sit Contact Guard   Supine to Sit Supervision   Sit to Supine Supervision   Findings CG wtih RW, Suzy no AD   Bed, Chair, Wheelchair Transfer (FIM) 3 - [place] needs to lift, boost or assist to stand OR sit   QI: Walk 10 Feet   Assistance Needed Physical assistance   Assistance Provided by [place] 25%-49%   Walk 10 Feet CARE Score 3   QI: Walk 50 Feet with Two Turns   Assistance Needed Physical assistance   Assistance Provided by [place] 25%-49%   Walk 50 Feet with Two Turns CARE Score 3   QI: Walk 150 Feet   Assistance Needed Physical assistance   Assistance Provided by [place] 25%-49%   Walk 150 Feet CARE Score 3   QI: Walking 10 Feet on Uneven Surfaces   Reason if not Attempted Safety concerns   Walking 10 Feet on Uneven Surfaces CARE Score 88   Ambulation   Does the patient walk? 2   Yes   Primary Discharge Mode of Locomotion Walk Walk Assist Level Minimum Assist   Gait Pattern Inconsistant Sheryl; Slow Sheryl;Decreased foot clearance;Narrow LEAH; Improper weight shift   Assist Device Hand Hold   Distance Walked (feet) 350 ft  (x2)   Limitations Noted In Balance; Coordination; Endurance; Heel Strike; Sequencing;Speed;Strength;Swing   Findings HHA on the L, second person on R for safety;  AFO on R   poor wt shift to the L to clear R LE   decrease foot clearance with fatigue  slow gait speed at this time   Walking (FIM) 1 - Patient requires assist of two people   Wheelchair mobility   QI: Does the patient use a wheelchair? 0  No   QI: 4 Steps   Assistance Needed Physical assistance; Adaptive equipment   Assistance Provided by [place] 25%-49%   Comment FF   4 Steps CARE Score 3   QI: 12 Steps   Assistance Needed Physical assistance; Adaptive equipment   Assistance Provided by [place] 25%-49%   12 Steps CARE Score 3   Stairs   Type Stairs   # of Steps 12   Assist Devices Single Rail   Findings FF; nonreciprocal descending, reciprocal ascending   Stairs (FIM) 3 - Patient completes 50 - 74% of all tasks, needs more than steadying or light touch AND goes up and down full flight (12- 14 stairs)   QI: Toilet Transfer   Assistance Needed Incidental touching   Comment stands to urinate   Toilet Transfer CARE Score 4   Toilet Transfer   Findings pt stands to urinate   Toilet Transfer (FIM) 4 - Patient requires steadying assist or light touching   Therapeutic Interventions   Strengthening supine abd no tband (attempted to use theraband,  had difficulty with RLE) manual resistance  resisted KTC x30    Flexibility B/L gastroc and HS    Neuromuscular Re-Education swing through step ups on 6" steps, x30 R and L   Other unsupported STS x10, cues to improve fwd flex    Assessment   Treatment Assessment pt tolerated tx well with focus on gait training with no AD to improve balance and R foot clearance  pt with weak proximal hip muscles decrease rotation during amb  pt to cont focus on gait training, balance and neuro re-ed of LEs to progress with functional mobility and safety  Problem List Decreased strength;Decreased endurance;Decreased range of motion; Impaired balance;Decreased mobility;Orthopedic restrictions;Pain; Impaired sensation   Barriers to Discharge Inaccessible home environment;Decreased caregiver support   PT Barriers   Physical Impairment Decreased strength;Decreased range of motion;Decreased endurance; Impaired balance;Decreased mobility; Decreased coordination; Impaired sensation   Functional Limitation Car transfers;Stair negotiation;Standing;Transfers; Walking   Plan   Treatment/Interventions Functional transfer training;LE strengthening/ROM; Endurance training;Patient/family training;Bed mobility;Gait training   Progress Progressing toward goals   Recommendation   Recommendation Outpatient PT; Home with family support   PT Therapy Minutes   PT Time In 1000   PT Time Out 1130   PT Total Time (minutes) 90   PT Mode of treatment - Individual (minutes) 90   PT Mode of treatment - Concurrent (minutes) 0   PT Mode of treatment - Group (minutes) 0   PT Mode of treatment - Co-treat (minutes) 0   PT Mode of Teatment - Total time(minutes) 90 minutes   Therapy Time missed   Time missed?  No

## 2019-02-06 LAB — GLUCOSE SERPL-MCNC: 114 MG/DL (ref 65–140)

## 2019-02-06 PROCEDURE — 82948 REAGENT STRIP/BLOOD GLUCOSE: CPT

## 2019-02-06 PROCEDURE — 97110 THERAPEUTIC EXERCISES: CPT

## 2019-02-06 PROCEDURE — 97112 NEUROMUSCULAR REEDUCATION: CPT

## 2019-02-06 PROCEDURE — 97116 GAIT TRAINING THERAPY: CPT

## 2019-02-06 PROCEDURE — 97530 THERAPEUTIC ACTIVITIES: CPT

## 2019-02-06 PROCEDURE — 99233 SBSQ HOSP IP/OBS HIGH 50: CPT | Performed by: PHYSICAL MEDICINE & REHABILITATION

## 2019-02-06 PROCEDURE — 97535 SELF CARE MNGMENT TRAINING: CPT

## 2019-02-06 RX ADMIN — ACETAMINOPHEN 975 MG: 325 TABLET ORAL at 05:11

## 2019-02-06 RX ADMIN — LEVOTHYROXINE SODIUM 25 MCG: 25 TABLET ORAL at 05:11

## 2019-02-06 RX ADMIN — DEXAMETHASONE 2 MG: 2 TABLET ORAL at 05:11

## 2019-02-06 RX ADMIN — ACETAMINOPHEN 975 MG: 325 TABLET ORAL at 13:23

## 2019-02-06 RX ADMIN — DEXAMETHASONE 2 MG: 2 TABLET ORAL at 18:29

## 2019-02-06 RX ADMIN — DOCUSATE SODIUM 100 MG: 100 CAPSULE, LIQUID FILLED ORAL at 08:21

## 2019-02-06 RX ADMIN — PANTOPRAZOLE SODIUM 40 MG: 40 TABLET, DELAYED RELEASE ORAL at 05:11

## 2019-02-06 RX ADMIN — STANDARDIZED SENNA CONCENTRATE 17.2 MG: 8.6 TABLET ORAL at 08:21

## 2019-02-06 RX ADMIN — ENOXAPARIN SODIUM 40 MG: 40 INJECTION SUBCUTANEOUS at 08:21

## 2019-02-06 RX ADMIN — ACETAMINOPHEN 975 MG: 325 TABLET ORAL at 21:28

## 2019-02-06 NOTE — PROGRESS NOTES
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 1/24/19:  Maintain cervical collar all times; steroid wean per Neurosurgery  Still has numbness of legs to hip level R>L; some RLE weakness he says he gets when fatigued; burning has resolved right hand/arm with slight burning left hand that is intermittent; still with some slight numbness sensation palms of hands all unchanged today      Post-op urinary retention/hx BPH: resolved with starting Flomax (new for him) = primary service stopped now     Hypothyroidism:  Continue Levothyroxine 25 mcg qd     Elevated fasting blood sugars; HbA1C 1/22 = 6 3:  Continue DM diet but stopped Accuchecks      Asymptomatic bradycardia:  noted postoperatively;  looks to be resolved    ABLA:  Stable; w/o sx; cont to monitor    Heartburn:  added Protonix since on steroids and ordered prn TUMS  He gets heartburn at home if eats certain foods and gets 3x/week  Told him to go to PCP as OP when he recovers and have workup with EGD    Leukocytosis: from Decadron and has had no fevers    Hyponatremia: mild, will watch    BMP in AM      Subjective: offers no complaints    ROS:   GI: denies abdominal pain, change bowel habits or reflux symptoms  Neuro: No new neurologic changes  Respiratory: No Cough, SOB  Cardiovascular: No CP, palpitations     Scheduled Meds:    Current Facility-Administered Medications:  acetaminophen 975 mg Oral Q8H [address] Alex Valencia MD   bisacodyl 10 mg Rectal Daily PRN Abigail Wiseman,    calcium carbonate 500 mg Oral Daily PRN CLAUDIO Soriano   dexamethasone 2 mg Oral Q12H [address] MD Rayna Chou ON 2/7/2019] dexamethasone 2 mg Oral Daily Alex Valencia MD   senna 2 tablet Oral Daily Alex Valencia MD   And       docusate sodium 100 mg Oral Daily Dorothe Blizzard, MD   enoxaparin 40 mg Subcutaneous Daily Dorothe Blizzard, MD   insulin lispro 1-5 Units Subcutaneous HS Paulie Seay    levothyroxine 25 mcg Oral Early Morning Dorothe Blizzard, MD   methocarbamol 500 mg Oral Q6H PRN Dorothe Blizzard, MD   oxyCODONE 10 mg Oral Q4H PRN Dorothe Blizzard, MD   oxyCODONE 5 mg Oral Q4H PRN Dorothe Blizzard, MD   pantoprazole 40 mg Oral Early Morning CLAUDIO Kapadia   polyethylene glycol 17 g Oral Daily PRN Dorothe Blizzard, MD       Labs:       Results from last 7 days  Lab Units 02/04/19  0456   WBC Thousand/uL 12 51*   HEMOGLOBIN g/dL 9 4*   HEMATOCRIT % 29 6*   PLATELETS Thousands/uL 382       Results from last 7 days  Lab Units 02/04/19  0456   SODIUM mmol/L 133*   POTASSIUM mmol/L 4 0   CHLORIDE mmol/L 101   CO2 mmol/L 26   BUN mg/dL 20   CREATININE mg/dL 0 60   CALCIUM mg/dL 8 4                  Results from last 7 days  Lab Units 02/05/19  1108 02/05/19  0644 02/04/19  2103   POC GLUCOSE mg/dl 119 127 181*     [unfilled]    Labs reviewed    Physical Examination:  Vitals:   Vitals:    02/05/19 0501 02/05/19 1304 02/05/19 2032 02/06/19 0524   BP: 112/63 95/50 112/56 110/54   BP Location: Left arm Right arm Left arm Left arm   Pulse: 61 78 65 66   Resp: 19 20 18 19   Temp: 98 8 °F (37 1 °C) 98 5 °F (36 9 °C) 98 8 °F (37 1 °C) 97 6 °F (36 4 °C)   TempSrc: Oral Oral Oral Oral   SpO2: 98% 97% 97% 98%   Weight:       Height:         Constitutional:  NAD; pleasant; nontoxic  HEENT:  AT/NC; oropharynx negative for thrush on tongue   Neck: negative for JVD  CV:  +S1, S2;  RRR; no rub/murmur  Pulmonary:  BBS without crackles/wheeze/rhonci; resp are unlabored  Abdominal:  soft, +BS, ND/NT; no mass  Skin:  no rashes  Musculoskeletal:  no edema  :  no martinez  Neurological/Psych:  AAO;  CAIN 5/5 except RLE 4+/5; no depression/anxiety        [x ] Total time spent: 30 Mins and greater than 50% of this time was spent counseling/coordinating care      ** Please Note: Dragon 360 Dictation voice to text software may have been used in the creation of this document   **

## 2019-02-06 NOTE — SOCIAL WORK
Met w/pt and reviewed team update and barriers to dc  Pt in agreement with current plan and aware of insurance update that was completed

## 2019-02-06 NOTE — PCC NURSING
Patient is a 60 y  o  male who presented to the [facility] h/o sensory deficits and was found to have spinal cord mass therefore underwent tumor resection and C4-T3 laminectomy and C2-T5 fixation/fusion by Dr Jonathan Hebert Needle on 1/24  History of HLD, cervical spine mass, disease of thyroid gland, herniated cervical disc, and impaired fasting glucose  No skin issues noted at this time except for an incision to his posterior neck  Cervical collar to be worn at all times  Impaired fasting glucose manage with diabetic diet  Pain managed with scheduled Tylenol, PRN oxycodone, and PRN robaxin--pt uses tylenol only  Decadron taper complete  Patient is continent for bowel and bladder  This week we will encourage independence with ADL's  We will moniotr lab results and vital signs  We will provide DM diet education  We will monitor for adequate pain control  We will monitor for incision healing and s/s of infection  We will educate pt regarding offloading to prevent skin breakdown and perform routine skin checks  We will monitor for constipation and medicate according to ordered bowel regimen    We will increase safety awareness with transfers and keep pt free from falls

## 2019-02-06 NOTE — PROGRESS NOTES
02/06/19 1400   Pain Assessment   Pain Assessment No/denies pain   Restrictions/Precautions   Precautions Fall Risk;Spinal precautions;Supervision on toilet/commode   Braces or Orthoses C/S Collar   Cognition   Overall Cognitive Status WFL   Subjective   Subjective pt needs to use bathroom before going to therapy   QI: Roll Left and Right   Assistance Needed Verbal cues; Supervision   Comment log rolling   Roll Left and Right CARE Score 4   QI: Sit to Lying   Assistance Needed Supervision   Sit to Lying CARE Score 4   QI: Lying to Sitting on Side of Bed   Assistance Needed Supervision   Lying to Sitting on Side of Bed CARE Score 4   QI: Sit to Stand   Assistance Needed Supervision   Sit to Stand CARE Score 4   QI: Chair/Bed-to-Chair Transfer   Assistance Needed Incidental touching   Comment HHA   Chair/Bed-to-Chair Transfer CARE Score 4   Transfer Bed/Chair/Wheelchair   Limitations Noted In Balance; Coordination;UE Strength;LE Strength   Adaptive Equipment None   Stand Pivot Contact Guard   Sit to Stand Supervision   Stand to Sit Supervision   Supine to Sit Supervision   Sit to Supine Supervision   Findings no AD during tx session; HHA on L, cues for log rolling    Bed, Chair, Wheelchair Transfer (FIM) 4 - Patient requires steadying assist or light touching   QI: Walk 10 Feet   Assistance Needed Physical assistance   Assistance Provided by [place] Less than 25%   Comment HHA on L   Walk 10 Feet CARE Score 3   QI: Walk 50 Feet with Two Turns   Assistance Needed Physical assistance   Assistance Provided by [place] Less than 25%   Comment HHA on L   Walk 50 Feet with Two Turns CARE Score 3   QI: Walk 150 Feet   Assistance Needed Physical assistance   Assistance Provided by [place] Less than 25%   Comment HHA on L   Walk 150 Feet CARE Score 3   QI: Walking 10 Feet on Uneven Surfaces   Reason if not Attempted Safety concerns   Walking 10 Feet on Uneven Surfaces CARE Score 88   Ambulation   Does the patient walk? 2   Yes Primary Discharge Mode of Locomotion Walk   Walk Assist Level Contact Guard;Minimum Assist   Gait Pattern Inconsistant Sheryl; Slow Sheryl;Decreased foot clearance; Lateral deviation;Narrow LEAH;Scissoring;Decreased R stance;Decreased L stance; Improper weight shift   Assist Device Hand Hold  (on the L)   Distance Walked (feet) 500 ft  (x2)   Limitations Noted In Balance; Coordination; Endurance; Heel Strike; Safety;Speed;Strength;Swing   Findings occasional scissoring; AFO on RLE, improved foot clearance today  cont to have decrease gait speed   Walking (FIM) 3 - Patient completes 50 - 74% of all tasks, needs more than steadying or light touch AND distance 150 feet or more, no rest   QI: Wheel 50 Feet with Two Turns   Reason if not Attempted Activity not applicable   Wheel 50 Feet with Two Turns CARE Score 9   QI: Wheel 150 Feet   Reason if not Attempted Activity not applicable   Wheel 419 Feet CARE Score 9   Wheelchair mobility   QI: Does the patient use a wheelchair? 0  No   QI: 4 Steps   Assistance Needed Incidental touching; Adaptive equipment   4 Steps CARE Score 4   QI: 12 Steps   Assistance Needed Incidental touching; Adaptive equipment   Comment FF with L HR   12 Steps CARE Score 4   Stairs   Type Stairs   # of Steps 24   Weight Bearing Precautions Cervical;Fall Risk   Assist Devices Single Rail   Findings FF x2 consecutively; had pt perform ascension and descension reciprocal pattern for control and improve swing through on RLE   Stairs (FIM) 4 - Patient requires steadying assist or light touching AND patient goes up and down full flight (12- 14 stairs)   QI: Toilet Transfer   Assistance Needed Supervision   Comment CS; pt stands to urinate   Toilet Transfer CARE Score 4   Therapeutic Interventions   Strengthening sidelying clamshells,  swing through with RLE off side of mat until fatigued  standing squats 2x10    STS x10 from edge of mat, unsupported   Flexibility seated gastroc and HS   Balance sidestepping 20' x2 R and L each  stepping over weighted bars on floor, difficulty leading with L foot and advancing R forward  Equipment Use   NuStep 20mins L2, LEs only   Assessment   Treatment Assessment pt cont to focus on functional strengthening and hip strength on RLE  pt demonstrates decrease glute strength and hip abd strength  pt with with difficulty increasing stance during gait due to weakness causing occasional scissoring and lateral deviation  pt cont to work on not using AD during tx session to improve balance and righting reactions with functional mobility  pt to cont focus on RLE strengthening, dynamic balance and activity tolerance to progress with funcitonal mboility and decrease fall risk  Problem List Decreased endurance;Decreased range of motion; Impaired balance;Decreased mobility;Orthopedic restrictions;Pain; Impaired sensation   Barriers to Discharge Inaccessible home environment;Decreased caregiver support   PT Barriers   Physical Impairment Decreased strength;Decreased range of motion;Decreased endurance; Impaired balance;Decreased mobility; Decreased coordination; Impaired sensation   Functional Limitation Car transfers;Stair negotiation;Standing;Transfers; Walking   Plan   Treatment/Interventions Functional transfer training;LE strengthening/ROM; Endurance training;Patient/family training;Bed mobility;Gait training   Progress Progressing toward goals   Recommendation   Recommendation Outpatient PT; Home with family support   Equipment Recommended Walker   PT Therapy Minutes   PT Time In 1400   PT Time Out 1530   PT Total Time (minutes) 90   PT Mode of treatment - Individual (minutes) 90   PT Mode of treatment - Concurrent (minutes) 0   PT Mode of treatment - Group (minutes) 0   PT Mode of treatment - Co-treat (minutes) 0   PT Mode of Teatment - Total time(minutes) 90 minutes   Therapy Time missed   Time missed?  No

## 2019-02-06 NOTE — TEAM CONFERENCE
Acute RehabilitationTeam Conference Note  Date: 2/6/2019   Time: 9:01 AM       Patient Name:  Brian Ruiz III       Medical Record Number: [id number]   YOB: 1960  Sex: Male          Room/Bed:  [facility]0/[facility] 960-01  Payor Info:  Payor: Abdoul Gomez / Plan: Abdoul Gomez PPO / Product Type: PPO /      Admitting Diagnosis: Malignant neoplasm of spinal cord [C72 0]   Admit Date/Time:  1/31/2019  4:50 PM  Admission Comments: No comment available     Primary Diagnosis:  Spinal cord mass ([facility] )  Principal Problem: Spinal cord mass [facility])    Patient Active Problem List    Diagnosis Date Noted    Hyponatremia 02/04/2019    Anemia 01/31/2019    Hyperlipidemia 01/31/2019    Leukocytosis 01/28/2019    Spinal cord mass ([facility] ) 01/11/2019    BPH associated with nocturia 11/13/2017    Impaired fasting glucose 11/07/2016    Hypothyroidism 03/02/2016       Physical Therapy:    Weight Bearing Status: Full Weight Bearing  Transfers: Contact Guard  Bed Mobility: Supervision  Amulation Distance (ft): 350 feet  Ambulation: Minimal Assistance  Assistive Device for Ambulation: Hand Hold Assistance  Number of Stairs: 12  Assistive Device for Stairs: [address] Assistance: Minimal Assistance  Discharge Recommendations: Home with:  [address] with[de-identified] Family Support, Outpatient Physical Therapy    Pt making progress towards d/c goals  Pt functioning at CG/Suzy with RW, with focus of no AD during therapy sessions to improve balance and improve functional gait  Pt with LE weakness and coordination limiting stair training and functional Ind  Pt to cont focus on improving deficits to progress with safety and decrease fall risk for safe d/c home      Occupational Therapy:  Eating: Independent  Grooming: Minimal Assistance  Bathing: Minimal Assistance  Bathing: Minimal Assistance  Upper Body Dressing: Minimal Assistance  Lower Body Dressing: Minimal Assistance  Toileting: Minimal Assistance  Tub/Shower Transfer: Minimal Assistance  Toilet Transfer: Minimal Assistance  Cognition: Within Defined Limits  Orientation: Person, Place, Time, Situation  Discharge Recommendations: Other       Pt presents with impairments in activity tolerance, endurance and standing balance/tolerance  Additional functional barriers include spinal precautions, decreased caregiver support, risk for falls, home environment and impaired (R) LE strength  Pt is making good progress towards mod I level goals, currently at overall min assist level using RW for ADLS  Pt will continue to benefit from skilled OT services to address above mentioned barriers and maximize functional independence in baseline areas of occupation  OT D/C recommendation is for re-team to maximize pt's independence and safety  Speech Therapy:           No notes on file    Nursing Notes:  Appetite: Good  Diet Type: Diabetic                      Diet Patient/Family Education Complete: Yes    Type of Wound (LDA): Incision           Incision 01/24/19 Cervical Neck Posterior Other (Comment) (Active)   Incision Description Clean;Dry; Intact 2/6/2019  7:55 AM   Isabella-wound Assessment Clean;Dry; Intact 2/6/2019  7:55 AM   Closure Hystocryl 2/6/2019  7:55 AM   Drainage Amount None 2/6/2019  7:55 AM   Drainage Description KAPIL 1/31/2019 11:30 PM   Treatments Cleansed;Site care 2/5/2019  7:20 AM   Dressing ABD 2/6/2019  7:55 AM   Dressing Changed Reinforced 2/6/2019  7:55 AM   Patient Tolerance Tolerated well 2/6/2019  7:55 AM   Dressing Status Clean;Dry; Intact 2/6/2019  7:55 AM           Type of Wound Patient/Family Education: Yes  Bladder: 5 - Supervision     Bladder Patient/Family Education: Yes  Bowel: 5 - Supervision     Bowel Patient/Family Education: Yes  Pain Location: Neck  Pain Orientation: Posterior  Pain Score: 0                       Hospital Pain Intervention(s): Repositioned, Rest  Pain Patient/Family Education: Yes  Medication Management/Safety  Safe Administration: Yes  Medication Patient/Family Education Complete: Yes    Patient is a 60 y  o  male who presented to the "[employer] [address] h/o sensory deficits and was found to have spinal cord mass therefore underwent tumor resection and C4-T3 laminectomy and C2-T5 fixation/fusion by Dr Bouchra Rocha on 1/24  History of HLD, cervical spine mass, disease of thyroid gland, herniated cervical disc, and impaired fasting glucose  No skin issues noted at this time except for an incision to his posterior neck  Cervical collar to be worn at all times  Impaired fasting glucose manage with blood sugars and insulin  Pain managed with scheduled Tylenol, PRN oxycodone, and PRN robaxin  Decadron currently ordered history of spinal cord mass  Alarms not required for safety at this time, patient rings appropriately  Patient is continent for bowel and bladder  Carb controlled/diabetic diet is ordered for the patient  This week we will encourage independence with ADLs while monitoring labs and vitals and maintaining skin integrity  We will increase safety awareness with transfers and keep patient free from falls through patient education         Case Management:     Discharge Planning  Living Arrangements: Spouse/significant other, Children  Support Systems: Spouse/significant other, Children  Assistance Needed: TBD  Type of Current Residence: Private residence  [address]: No  Pt is participating well with therapy and is expected to dc to home  Pt will be alone during the day and goals are for mod I  Clinical update to be completed today  Following to assist w/dc planning needs  Is the patient actively participating in therapies?  yes  List any modifications to the treatment plan:     Barriers Interventions   Balance, tolerance to standing Therapy exercises, use of device   Proprioception, righting reactions, sensation Safety education techniques   Spinal precautions with leg bending Increase leg strength vs adaptive equipment             Is the patient making expected progress toward goals? yes  List any update or changes to goals:     Medical Goals: Patient will be medically stable for discharge to [facility] upon completion of rehab program and Patient will be able to manage medical conditions and comorbid conditions with medications and follow up upon completion of rehab program    Weekly Team Goals:   Rehab Team Goals  ADL Team Goal: Patient will be independent with ADLs with least restrictive device upon completion of rehab program  Transfer Team Goal: Patient will be independent with transfers with least restrictive device upon completion of rehab program  Locomotion Team Goal: Patient will be independent with locomotion with least restrictive device upon completion of rehab program  Cognitive Team Goal: Patient will be independent for basic and complex tasks upon completion of rehab program    Discussion: pt presents with the above barriers and is making good progress towards mod I goals  Wife is coming in for training for collar  Mgmt this coming weekend  Pt is min a overall with adls and tranfers  Recommendations are for an additional two weeks to achieve goals  Anticipated Discharge Date:  reteam [facility] Team Members Present: The following team members are supervising care for this patient and were present during this Weekly Team Conference      Physician: Dr Gordon Barr MD  : MARIBELL Stapleton  Registered Nurse: Jenell Sandhoff, KIMBERLY  Physical Therapist: Summer Shaw DPT  Occupational Therapist: Kayleen Helton MS, OTR/L, CBIS  Speech Therapist:   Other:

## 2019-02-06 NOTE — PCC CARE MANAGEMENT
Pt is participating well with therapy and is expected to dc to home  Pt will be alone during the day and goals are for mod I  Pt is approved with lcd 2/20  Following to assist w/dc planning needs

## 2019-02-06 NOTE — PROGRESS NOTES
Physical Medicine and Rehabilitation Progress Note  Moo Cabral III 60 y o  male MRN: [id number]  Unit/Bed#: -01 Encounter: [id number]    HPI: Gibson Benavides is a 60 y o  male who [facility] with h/o sensory deficits and was found to have spinal cord mass therefore underwent tumor resection and C4-T3 laminectomy and C2-T5 fixation/fusion by Dr Wesley Mcqueen on 1/24       Chief Complaint: spinal mass s/p resection     Subjective: d/w patient plan for continued reahb stay and patient is agreeable     ROS:  negative unless noted above    Assessment/Plan:      Anemia   Assessment & Plan    · Likely ABLA  · Hg currently stable at 9 4     Hyponatremia   Assessment & Plan    · Mild at 133  · IM monitoring     Leukocytosis   Assessment & Plan    · Likely due to steroids which are being tapered (currently 12 51)      Hyperlipidemia   Assessment & Plan    · was Omega 3 FA 1000 mg qd PTA   · Unlikely to pose risk of increase bleeding after surgery however will confirm with neurosurgery if they would like to continue to hold      Impaired fasting glucose   Assessment & Plan    · PCP aware  · Diet controlled      Hypothyroidism   Assessment & Plan    · On home synthroid 25 mcg qd      BPH associated with nocturia   Assessment & Plan    · Per patient stopped taking flomax 0 4 mg qpm in the past because it was not helping the urinary retention   · Per patient stopped taking ditropan XL 10 mg qd in the past because  it was not helping the urinary retention   · Per patient stopped taking viagra 100 mg in the past because  it was not helping the urinary retention   · Per patient was no longer on any meds for urinary retention/BPH PTA as he was working with urology as OP and further tests were being planned as patient medications had not been effective however in acute care patient was restarted on flomax and post-operatively patient has been urinating well · successful trial off of flomax (which patient had stopped taking PTA because it was not effective); PVRs were 57, 38, & 87 (PVRs are from greater than 24 hours after last dose of flomax)   · Follows with Suman Guajardo PA-C/Dr Adela Suarez as OP     * Spinal cord mass [facility])   Assessment & Plan    · S/p resection and C4-T3 laminectomy and C2-T5 fixation/fusion by Dr Papa Ritter and Dr Deonna Tate on 1/24  · decardron taper complete on 2/9  · Spine precautions  · c-collar at all times  · Path revealed grade 2 ependymoma; further management per team at [place] [id number] [facility] spinal tumor center   · OP FU on 2/6        Scheduled Meds:    Current Facility-Administered Medications:  acetaminophen 975 mg Oral Q8H Mauricio Cole MD   bisacodyl 10 mg Rectal Daily PRN Marc Angeles, DO   calcium carbonate 500 mg Oral Daily PRN CLAUDIO Sky   dexamethasone 2 mg Oral Q12H MD Ale Richmond ON 2/7/2019] dexamethasone 2 mg Oral Daily Holli Howard MD   senna 2 tablet Oral Daily Holli Howard MD   And       docusate sodium 100 mg Oral Daily Holli Howard MD   enoxaparin 40 mg Subcutaneous Daily Holli Howard MD   insulin lispro 1-5 Units Subcutaneous HS Marc Angeles, DO   levothyroxine 25 mcg Oral Early Morning Holli Howard MD   methocarbamol 500 mg Oral Q6H PRN Holli Howard MD   oxyCODONE 10 mg Oral Q4H PRN Holli Howard MD   oxyCODONE 5 mg Oral Q4H PRN Holli Howard MD   pantoprazole 40 mg Oral Early Morning CLAUDIO Sky   polyethylene glycol 17 g Oral Daily PRN Holli Howard MD        Incidental findings:  1) 2-3 mm pulmonary nodule: per radiology report of 1/18/19 recommend repeat CT chest in 3 months however will defer to PCP  2) fatty involution of pancreas: OP FU with PCP with further testing/treatment and/or specialist referral at PCP's discretion  3) L maxillary sinus thickening: asymptomatic, OP FU with PCP with further testing/treatment and/or specialist referral at PCP's discretion      DVT ppx: SCDs, cleared for lovenox 40 mg sc qd by neurosx in acute care (and has been on it since 1/26)  Dispo: reteam       Objective:    Functional Update:  Mobility: min    Transfers: CG  ADLs: min        Physical Exam:    Vitals:    02/06/19 0524   BP: 110/54   Pulse: 66   Resp: 19   Temp: 97 6 °F (36 4 °C)   SpO2: 98%         General: alert, no apparent distress, cooperative and comfortable  HEENT:  +C-collar  CARDIAC:  +S1/2  LUNGS:  respirations unlabored  ABDOMEN:  soft NT  EXTREMITIES:  no significant LE edema  NEURO:   mental status, speech normal, alert and oriented x3  PSYCH:  mood/affect currently stable      Laboratory:     Results from last 7 days  Lab Units 02/04/19  0456   HEMOGLOBIN g/dL 9 4*   HEMATOCRIT % 29 6*   WBC Thousand/uL 12 51*       Results from last 7 days  Lab Units 02/04/19  0456   BUN mg/dL 20   SODIUM mmol/L 133*   POTASSIUM mmol/L 4 0   CHLORIDE mmol/L 101   CREATININE mg/dL 0 60            Patient Active Problem List   Diagnosis    BPH associated with nocturia    Hypothyroidism    Impaired fasting glucose    Spinal cord mass (HCC)    Leukocytosis    Anemia    Hyperlipidemia    Hyponatremia           Total time spent: At least 35 minutes, with more than 50% spent counseling/coordinating care  In addition, this patient was discussed by the interdisciplinary team in weekly case conference today  The care of the patient was extensively discussed with all care providers and an appropriate rehabilitation plan was formulated unique for this patient  Barriers were identified preventing progression of therapy and appropriate interventions were discussed with each discipline  Please see the team note for input from all disciplines regarding barriers, intervention, and discharge planning

## 2019-02-06 NOTE — PROGRESS NOTES
02/06/19 0830   Pain Assessment   Pain Assessment No/denies pain   Pain Score 3   Pain Type Acute pain   Pain Location Leg   Pain Orientation Right   Pain Descriptors Numbness   Pain Frequency Intermittent   Hospital Pain Intervention(s) Distraction; Emotional support; Environmental changes   Restrictions/Precautions   Precautions Fall Risk;Spinal precautions;Supervision on toilet/commode   Braces or Orthoses C/S Collar;MAFO   Tub/Shower Transfer   Limitations Noted In Balance; Endurance; Safety;LE Strength   Adaptive Equipment Transfer Bench;Grab Bars   Assessed Tub   Findings Pt completes DRY tub transfer using home setup with shower stool with back  Pt unable to clear RLE requiring A x 2, one person to steady pt while another A getting foot over side of tub  Pt then completes same transfer with use of a transfer bench with sit swivel method and is able to successfully complete transfer with supervision  OTAs and supervising VILLASENOR/L educate pt on transfer bench being safer option but that if he would prefer to use shower stool then we would work towards using the shower stool  Pt reports that he feels safer using the transfer bench and would prefer to use one of those  Pt reports that they are going to purchase one on their own, supervising VILLASENOR/L provides handout on what to look for before purchasing one  Also educated on recommendation for hand held shower and shower head ayala to increase safety during seated bathing tasks  QI: Sit to Stand   Assistance Needed Incidental touching   Assistance Provided by [place] Less than 25%   Comment Pt requires CGA with RW 2* to decreased RLE strength and decreased standing balance  Sit to Stand CARE Score 3   QI: Chair/Bed-to-Chair Transfer   Assistance Needed Incidental touching   Assistance Provided by [place] Less than 25%   Comment Pt requires CGA with RW 2* to decreased standing balance and RLE strength     Chair/Bed-to-Chair Transfer CARE Score 3   Transfer Bed/Chair/Wheelchair   Limitations Noted In Balance; Endurance;LE Strength   Adaptive Equipment Roller Walker   Sit to Stand Minimal   Stand to [employer], Chair, Wheelchair Transfer (FIM) 4 - Patient requires steadying assist or light touching   Exercise Tools   Exercise Tools Yes   Theraband Pt engaged in theraband HEP to increase strength in BUE to increase independence in I/ADL tasks  Pt engaged in activity using red theraband and completing 1 x 10 for each exercise  Pt educated on correct form and on only doing elbow and chest exercises 2* to ROM restrictions due to incision on neck  NO OH shoulder exercies! Pt demos G understanding of program    Cognition   Overall Cognitive Status WFL   Arousal/Participation Cooperative   Attention Within functional limits   Orientation Level Oriented X4   Memory Within functional limits   Following Commands Follows all commands and directions without difficulty   Activity Tolerance   Activity Tolerance Patient tolerated treatment well   Assessment   Treatment Assessment Pt participated in skilled OT services to focus on functional transfers, LB dressing techniques, HEP, and family training  See above for details on functional transfers, HEP and family training  Pt educated on LB dressing techniques to increase independence with dressing R foot  Supervising VILLASENOR/L educates pt on trying to put on sock and shoe while seated on EOB to decrease possibility of leg sliding back down to the floor  Pt demos difficulty using this technique, VILLASENOR/L educates pt on using a slipper with good tread in house instead of socks and shoes  Pt receptive to recommendation and reports that he just got a new pair of slippers for indoor/outdoor use  Pt also reports he would have help putting on socks and shoes if needed  Pt demos compliance with orthopedic restrictions   Pt demos G progress toward LTGs, however, continues to be limited by decreased standing balance, decreased LB strength, and orthopedic restrictions which limits his ability to complete I/ADL tasks independently  Pt would benefit from skilled OT services to focus on family training, increased standing balance, and further assessment for DME needs  OT Family training done with: Wife   Assessment of family training Supervising VILLASENOR/L and YEN reviewed C collar management, changing pads, and changing into shower collar with Pt's wife  YEN demonstrated how to change and line up pads  VILLASENOR/L went over how to switch collars and how to clean pads  VILLASENOR/L provides wife with handout to refer to  Pt's wife joey and phil SO understanding of pad change via teachback method as she performs pad change on practice collar after demonstration with 100% accuracy  Pt declines to get hands on with C collar change at this time  Requests further training on shaving with collar  Briefly reviewed but may benefit from further hands on practice  Prognosis Good   Problem List Decreased endurance;Decreased range of motion; Impaired balance;Decreased mobility;Orthopedic restrictions;Pain; Impaired sensation   Plan   Treatment/Interventions ADL retraining;Functional transfer training; Therapeutic exercise; Endurance training;Patient/family training; Compensatory technique education   Progress Progressing toward goals   Recommendation   OT Discharge Recommendation Home with family support   OT Therapy Minutes   OT Time In 0830   OT Time Out 1000   OT Total Time (minutes) 90   OT Mode of treatment - Individual (minutes) 90   OT Mode of treatment - Concurrent (minutes) 0   OT Mode of treatment - Group (minutes) 0   OT Mode of treatment - Co-treat (minutes) 0   OT Mode of Teatment - Total time(minutes) 90 minutes   Therapy Time missed   Time missed?  No

## 2019-02-06 NOTE — SOCIAL WORK
Clinical update faxed to lele at [facility] [phone]   Requesting additional 7 days, awaiting determination

## 2019-02-07 PROBLEM — D75.839 THROMBOCYTOSIS: Status: ACTIVE | Noted: 2019-02-07

## 2019-02-07 LAB
ANION GAP SERPL CALCULATED.3IONS-SCNC: 5 MMOL/L (ref 4–13)
BASOPHILS # BLD AUTO: 0.02 THOUSANDS/ΜL (ref 0–0.1)
BASOPHILS NFR BLD AUTO: 0 % (ref 0–1)
BUN SERPL-MCNC: 22 MG/DL (ref 5–25)
CALCIUM SERPL-MCNC: 8.5 MG/DL (ref 8.3–10.1)
CHLORIDE SERPL-SCNC: 104 MMOL/L (ref 100–108)
CO2 SERPL-SCNC: 28 MMOL/L (ref 21–32)
CREAT SERPL-MCNC: 0.66 MG/DL (ref 0.6–1.3)
EOSINOPHIL # BLD AUTO: 0.03 THOUSAND/ΜL (ref 0–0.61)
EOSINOPHIL NFR BLD AUTO: 0 % (ref 0–6)
ERYTHROCYTE [DISTWIDTH] IN BLOOD BY AUTOMATED COUNT: 13.8 % (ref 11.6–15.1)
GFR SERPL CREATININE-BSD FRML MDRD: 106 ML/MIN/1.73SQ M
GLUCOSE SERPL-MCNC: 92 MG/DL (ref 65–140)
HCT VFR BLD AUTO: 30.8 % (ref 36.5–49.3)
HGB BLD-MCNC: 9.6 G/DL (ref 12–17)
IMM GRANULOCYTES # BLD AUTO: 0.08 THOUSAND/UL (ref 0–0.2)
IMM GRANULOCYTES NFR BLD AUTO: 1 % (ref 0–2)
LYMPHOCYTES # BLD AUTO: 1.72 THOUSANDS/ΜL (ref 0.6–4.47)
LYMPHOCYTES NFR BLD AUTO: 17 % (ref 14–44)
MCH RBC QN AUTO: 29.8 PG (ref 26.8–34.3)
MCHC RBC AUTO-ENTMCNC: 31.2 G/DL (ref 31.4–37.4)
MCV RBC AUTO: 96 FL (ref 82–98)
MONOCYTES # BLD AUTO: 0.72 THOUSAND/ΜL (ref 0.17–1.22)
MONOCYTES NFR BLD AUTO: 7 % (ref 4–12)
NEUTROPHILS # BLD AUTO: 7.85 THOUSANDS/ΜL (ref 1.85–7.62)
NEUTS SEG NFR BLD AUTO: 75 % (ref 43–75)
NRBC BLD AUTO-RTO: 0 /100 WBCS
PLATELET # BLD AUTO: 394 THOUSANDS/UL (ref 149–390)
PMV BLD AUTO: 8.8 FL (ref 8.9–12.7)
POTASSIUM SERPL-SCNC: 4.4 MMOL/L (ref 3.5–5.3)
RBC # BLD AUTO: 3.22 MILLION/UL (ref 3.88–5.62)
SODIUM SERPL-SCNC: 137 MMOL/L (ref 136–145)
WBC # BLD AUTO: 10.42 THOUSAND/UL (ref 4.31–10.16)

## 2019-02-07 PROCEDURE — 97530 THERAPEUTIC ACTIVITIES: CPT

## 2019-02-07 PROCEDURE — 97112 NEUROMUSCULAR REEDUCATION: CPT

## 2019-02-07 PROCEDURE — 99024 POSTOP FOLLOW-UP VISIT: CPT | Performed by: NEUROLOGICAL SURGERY

## 2019-02-07 PROCEDURE — 85025 COMPLETE CBC W/AUTO DIFF WBC: CPT | Performed by: NURSE PRACTITIONER

## 2019-02-07 PROCEDURE — 97535 SELF CARE MNGMENT TRAINING: CPT

## 2019-02-07 PROCEDURE — 97116 GAIT TRAINING THERAPY: CPT

## 2019-02-07 PROCEDURE — 99232 SBSQ HOSP IP/OBS MODERATE 35: CPT | Performed by: PHYSICAL MEDICINE & REHABILITATION

## 2019-02-07 PROCEDURE — 80048 BASIC METABOLIC PNL TOTAL CA: CPT | Performed by: NURSE PRACTITIONER

## 2019-02-07 RX ORDER — CHLORAL HYDRATE 500 MG
1000 CAPSULE ORAL DAILY
Status: DISCONTINUED | OUTPATIENT
Start: 2019-02-08 | End: 2019-02-19 | Stop reason: HOSPADM

## 2019-02-07 RX ADMIN — PANTOPRAZOLE SODIUM 40 MG: 40 TABLET, DELAYED RELEASE ORAL at 05:43

## 2019-02-07 RX ADMIN — ACETAMINOPHEN 975 MG: 325 TABLET ORAL at 13:48

## 2019-02-07 RX ADMIN — DEXAMETHASONE 2 MG: 2 TABLET ORAL at 08:01

## 2019-02-07 RX ADMIN — STANDARDIZED SENNA CONCENTRATE 17.2 MG: 8.6 TABLET ORAL at 08:01

## 2019-02-07 RX ADMIN — ENOXAPARIN SODIUM 40 MG: 40 INJECTION SUBCUTANEOUS at 08:01

## 2019-02-07 RX ADMIN — ACETAMINOPHEN 975 MG: 325 TABLET ORAL at 21:29

## 2019-02-07 RX ADMIN — ACETAMINOPHEN 975 MG: 325 TABLET ORAL at 05:43

## 2019-02-07 RX ADMIN — DOCUSATE SODIUM 100 MG: 100 CAPSULE, LIQUID FILLED ORAL at 08:01

## 2019-02-07 RX ADMIN — LEVOTHYROXINE SODIUM 25 MCG: 25 TABLET ORAL at 05:43

## 2019-02-07 NOTE — PROGRESS NOTES
Physical Medicine and Rehabilitation Progress Note  Narayan Kang III 60 y o  male MRN: [id number]  Unit/Bed#: -01 Encounter: [id number]    HPI: Sebas Jennings is a 60 y o  male who presented to the [address] with h/o sensory deficits and was found to have spinal cord mass therefore underwent tumor resection and C4-T3 laminectomy and C2-T5 fixation/fusion by Dr Finnegan Draft Dr Myrtle Ashton on 1/24       Chief Complaint: spinal mass s/p resection     Subjective: patient w/o complaint currently     ROS:  negative unless noted above    Assessment/Plan:      Anemia   Assessment & Plan    · Likely ABLA  · Hg currently stable at 9 6  · IM monitoring      Hyponatremia   Assessment & Plan    · Currently WNL      Thrombocytosis (HCC)   Assessment & Plan    · Mild at 394  · IM monitoring      Leukocytosis   Assessment & Plan    · Likely due to steroids which are being tapered (currently mild at 10 42)      Hyperlipidemia   Assessment & Plan    · was Omega 3 FA 1000 mg qd PTA   · D/W neurosx and have cleared patient to resume home Omega 3 FA 1000 mg qd     Impaired fasting glucose   Assessment & Plan    · PCP aware  · Diet controlled      Hypothyroidism   Assessment & Plan    · On home synthroid 25 mcg qd      BPH associated with nocturia   Assessment & Plan    · Per patient stopped taking flomax 0 4 mg qpm in the past because it was not helping the urinary retention   · Per patient stopped taking ditropan XL 10 mg qd in the past because  it was not helping the urinary retention   · Per patient stopped taking viagra 100 mg in the past because  it was not helping the urinary retention   · Per patient was no longer on any meds for urinary retention/BPH PTA as he was working with urology as OP and further tests were being planned as patient medications had not been effective however in acute care patient was restarted on flomax and post-operatively patient has been urinating well · successful trial off of flomax (which patient had stopped taking PTA because it was not effective); PVRs were 57, 38, & 87 (PVRs are from greater than 24 hours after last dose of flomax)   · Follows with Lopez Rangel PA-C/Dr Ana Paula Hopkins as OP     * 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[facility])   Assessment & Plan    · S/p resection and C4-T3 laminectomy and C2-T5 fixation/fusion by Dr Dede Givens and Dr Kareem Sherman on   · decardron taper complete on   · Spine precautions  · c-collar at all times  · Path revealed grade 2 ependymoma; further management per team at [facility]   · OP FU w/neurosx scheduled for  done as inpt by neurosx team        Scheduled Meds:    Current Facility-Administered Medications:  acetaminophen 975 mg Oral Q8H Mauricio Cole MD   bisacodyl 10 mg Rectal Daily PRN Adela Mazariegos DO   calcium carbonate 500 mg Oral Daily PRN Carmencita Mohs, CRNP   dexamethasone 2 mg Oral Daily Delisa Ham MD   senna 2 tablet Oral Daily Delisa Ham MD   And       docusate sodium 100 mg Oral Daily Delisa Ham MD   enoxaparin 40 mg Subcutaneous Daily MD Richard Torre [facility] ON 2019] fish oil 1,000 mg Oral Daily Delisa Ham MD   levothyroxine 25 mcg Oral Early Morning Delisa Ham MD   methocarbamol 500 mg Oral Q6H PRN Delisa Ham MD   oxyCODONE 10 mg Oral Q4H PRN Delisa Ham MD   oxyCODONE 5 mg Oral Q4H PRN Delisa Ham MD   pantoprazole 40 mg Oral Early Morning Carmencita Mohs, CRNP   polyethylene glycol 17 g Oral Daily PRN Delisa Ham MD        Incidental findings:  1) 2-3 mm pulmonary nodule: per radiology report of 19 recommend repeat CT chest in 3 months however will defer to PCP  2) fatty involution of pancreas: OP FU with PCP with further testing/treatment and/or specialist referral at PCP's discretion  3) L maxillary sinus thickening: asymptomatic, OP FU with PCP with further testing/treatment and/or specialist referral at PCP's discretion      DVT ppx: SCDs, cleared for lovenox 40 mg sc qd by neurosx in acute care (and has been on it since 1/26)  Dispo: reteam       Objective:    Functional Update:  Mobility: min    Transfers: CG  ADLs: min        Physical Exam:        General: alert, no apparent distress, cooperative and comfortable  HEENT:  +C-collar  CARDIAC:  +S1/2  LUNGS:  respirations unlabored  ABDOMEN:  soft NT  EXTREMITIES:  no significant LE edema  NEURO:   mental status, speech normal, alert and oriented x3  PSYCH:  mood/affect currently stable      Laboratory:     Results from last 7 days  Lab Units 02/07/19  0448 02/04/19  0456   HEMOGLOBIN g/dL 9 6* 9 4*   HEMATOCRIT % 30 8* 29 6*   WBC Thousand/uL 10 42* 12 51*       Results from last 7 days  Lab Units 02/07/19  0448 02/04/19  0456   BUN mg/dL 22 20   SODIUM mmol/L 137 133*   POTASSIUM mmol/L 4 4 4 0   CHLORIDE mmol/L 104 101   CREATININE mg/dL 0 66 0 60            Patient Active Problem List   Diagnosis    BPH associated with nocturia    Hypothyroidism    Impaired fasting glucose    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HCC)    Leukocytosis    Anemia    Hyperlipidemia    Hyponatremia    Thrombocytosis ([facility] [place] [address] )           Total visit time:  At least 25 minutes, with more than 50% spent counseling/coordinating care

## 2019-02-07 NOTE — PROGRESS NOTES
02/07/19 1430   Pain Assessment   Pain Assessment No/denies pain   Restrictions/Precautions   Precautions Fall Risk;Spinal precautions   Braces or Orthoses C/S Collar;AFO   Cognition   Arousal/Participation Cooperative   Subjective   Subjective Pt reports feeling better with his progress    QI: Sit to Stand   Assistance Needed Supervision   Sit to Stand CARE Score 4   QI: Chair/Bed-to-Chair Transfer   Assistance Needed Physical assistance   Assistance Provided by [place] Less than 25%   Chair/Bed-to-Chair Transfer CARE Score 3   Transfer Bed/Chair/Wheelchair   Limitations Noted In Balance; Endurance;UE Strength;LE Strength   Adaptive Equipment Roller Walker;None   Stand Pivot Minimal Assist   Sit to Stand Supervision   Stand to [address]  [place], Chair, Wheelchair Transfer (FIM) 4 - Patient requires steadying assist or light touching   QI: Walk 10 Feet   Assistance Needed Physical assistance   Assistance Provided by [place] Less than 25%   Walk 10 Feet CARE Score 3   QI: Walk 50 Feet with Two Turns   Assistance Needed Physical assistance   Assistance Provided by [place] Less than 25%   Walk 50 Feet with Two Turns CARE Score 3   QI: Walk 150 Feet   Assistance Needed Physical assistance   Assistance Provided by [place] Less than 25%   Walk 150 Feet CARE Score 3   Ambulation   Primary Discharge Mode of Locomotion Walk   Walk Assist Level Minimum Assist;Contact Guard   Gait Pattern Inconsistant Sheryl; Slow Sheryl;Decreased foot clearance; Step through;Trendelenburg; Improper weight shift;Decreased L stance;Decreased R stance   Assist Device Hand Hold   Distance Walked (feet) 350 ft  (x3, 150 x1)   Limitations Noted In Balance; Endurance; Heel Strike;Speed;Strength;Swing   Walking (FIM) 4 - Patient requires steadying assist or light touching AND distance 150 feet or more, no rest   QI: 4 Steps   Assistance Needed Incidental touching; Adaptive equipment   4 Steps CARE Score 4   QI: 12 Steps   Assistance Needed Incidental touching; Adaptive equipment   12 Steps CARE Score 4   Stairs   Type Stairs   # of Steps 22   Weight Bearing Precautions Fall Risk   Assist Devices Single Rail   Findings nonreciprocal down and reciprocal pattern up, CGA with gait belt, AFO   Stairs (FIM) 4 - Patient requires steadying assist or light touching AND patient goes up and down full flight (12- 14 stairs)   Therapeutic Interventions   Strengthening repeated sit<>stands from mat with hands in lap, repeated bumping up and down side of mat with hands in lap, S for all  sit<>stands with hands in lap and green theraband around knees to facilitate R hip abd  standing calf raises with BUE support on counter  repeated step through pattern on 6"  step, CS, one HR, x10 each way  Balance walking backwards, sideways both directions,  fowards at faster speeds  Equipment Use   NuStep 12 min level 3 BLE SPM 70-80   Other Comments   Comments 10 M walk test, gait speed improved to 0 95 at faster and 0 83 at self selected velocity    Assessment   Treatment Assessment Pt cont to make progress towards LTGs, demonstrating improvements in ambulatory balance and RLE strength/sensation, that allowed him to begin walking without AFO and with HHA, and then without HHA but with RLE AFO  Pt reports he is feeling more of his RLE and feels good about his progress  Pt will cont to benefit from skilled PT to progress dynamic balance/righting reactions to promote (I) with mobility  Talked with pt about modifications re:meal prep, bringing food from meals to table when he is home alone and he will talk with his family about how they want to do this (RW tray, help, prep, etc)  Barriers to Discharge Decreased caregiver support   PT Barriers   Physical Impairment Decreased strength;Decreased range of motion;Decreased endurance; Impaired balance;Decreased mobility; Impaired sensation   Functional Limitation Car transfers;Stair negotiation;Standing;Transfers; Walking   Plan Treatment/Interventions Functional transfer training;LE strengthening/ROM; Elevations; Therapeutic exercise; Endurance training;Patient/family training;Equipment eval/education; Bed mobility;Gait training   Progress Progressing toward goals   Recommendation   Recommendation Outpatient PT; Home with family support   Equipment Recommended Walker   PT Therapy Minutes   PT Time In 1430   PT Time Out 1550   PT Total Time (minutes) 80   PT Mode of treatment - Individual (minutes) 70   PT Mode of treatment - Concurrent (minutes) 10   PT Mode of treatment - Group (minutes) 0   PT Mode of treatment - Co-treat (minutes) 0   PT Mode of Teatment - Total time(minutes) 80 minutes   Therapy Time missed   Time missed?  No

## 2019-02-07 NOTE — SOCIAL WORK
Received fax from [facility] stating pt is approved for an additional 6 days with lcd 2/12 and update due that day

## 2019-02-07 NOTE — PROGRESS NOTES
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, WHO grade II - I reviewed this with the patient  · By NCCN guidelines, for staging patient will need MRI scans w/ and w/o gado of neuro-axis (I e  Brain, CSpine, TSpine, LSpine) as well as an LP for cytology to be done >2 weeks after surgery  These can be done either during rehab stay or as an outpatient, and will dictate format of recommended adjuvant RT  · Will plan to set up f/u outpatient once D/C'd from [facility] and above studies are done  F/U can be at multi-disciplinary [facility] with [address]  We will arrange  · Neurologically continues to improve with continued PT/OT/PMR at [facility]  · Call with questions  Subjective:     "I feel pretty good " Denies new W/N/T  Denies H/A  States right hand much better than before surgery, & left hand "50-60% better" than prior to surgery  RLE strength still most limiting, but improving  Objective:     Vitals: Blood pressure 111/60, pulse (!) 54, temperature 98 4 °F (36 9 °C), temperature source Oral, resp  rate 18, height 5' 7" (1 702 m), weight 78 kg (172 lb), SpO2 97 %  ,Body mass index is 26 94 kg/m²  AA, FC  Appropriate, NAD  FS  UE appear FS  RLE at Q at least 4/5  Incision CDI, flat, no drainage  Collar well fitted

## 2019-02-07 NOTE — QUICK NOTE
Drain suture removal      · POD #14 from subtotal resection of cervicothoracic spinal cord intramedullary mass  · Right thoracic JAMAR Drain was discontinued 1/28/19, with 1 suture in place, CDI  Removed the 1 drain suture using a suture removal kit  Pt tolerated well   No drainage after suture removal

## 2019-02-07 NOTE — PROGRESS NOTES
Internal Medicine Progress Note  Patient: Una Cobian III  Age/sex: 60 y o  male  Medical Record #: [id number]      ASSESSMENT/PLAN:  Una Cobian III is seen and examined and management for following issues:    Medullary mass removal with fixation/fusion of C2-3 T5 1/24/19:  Maintain cervical collar all times; steroid wean per Neurosurgery  Still has numbness of legs to hip level R>L; some RLE weakness he says he gets when fatigued; burning has resolved right hand/arm with slight burning left hand that is intermittent; still with some slight numbness sensation palms of hands all unchanged today      Post-op urinary retention/hx BPH: resolved with starting Flomax (new for him) = primary service stopped now     Hypothyroidism:  Continue Levothyroxine 25 mcg qd     Elevated fasting blood sugars; HbA1C 1/22 = 6 3:  Continue DM diet but stopped Accuchecks      Asymptomatic bradycardia:  noted postoperatively; resolved    ABLA:  Stable; w/o sx; cont to monitor    Heartburn:  added Protonix since on steroids and ordered prn TUMS  He gets heartburn at home if eats certain foods and gets 3x/week    Told him to go to PCP as OP when he recovers and have workup with EGD    Leukocytosis: from Decadron and has had no fevers    Hyponatremia: resolved today      Subjective: offers no complaints    ROS:   GI: denies abdominal pain, change bowel habits or reflux symptoms  Neuro: No new neurologic changes  Respiratory: No Cough, SOB  Cardiovascular: No CP, palpitations     Scheduled Meds:    Current Facility-Administered Medications:  acetaminophen 975 mg Oral Q8H [facility] Rafa Le MD   bisacodyl 10 mg Rectal Daily PRN Sharie Pallas, DO   calcium carbonate 500 mg Oral Daily PRN CLAUDIO Rodrigues   dexamethasone 2 mg Oral Daily Leandro Ann MD   senna 2 tablet Oral Daily Leandro Ann MD   And       docusate sodium 100 mg Oral Daily Leandro Ann MD   enoxaparin 40 mg Subcutaneous Daily Leandro Ann MD   insulin lispro 1-5 Units Subcutaneous HS Francois DO Kelly   levothyroxine 25 mcg Oral Early Morning Matt Elise MD   methocarbamol 500 mg Oral Q6H PRN Matt Elise MD   oxyCODONE 10 mg Oral Q4H PRN Matt Elise MD   oxyCODONE 5 mg Oral Q4H PRN Matt Elise MD   pantoprazole 40 mg Oral Early Morning CLAUDIO Howard   polyethylene glycol 17 g Oral Daily PRN Matt Elise MD       Labs:       Results from last 7 days  Lab Units 02/07/19 0448 02/04/19  0456   WBC Thousand/uL 10 42* 12 51*   HEMOGLOBIN g/dL 9 6* 9 4*   HEMATOCRIT % 30 8* 29 6*   PLATELETS Thousands/uL 394* 382       Results from last 7 days  Lab Units 02/07/19 0448 02/04/19  0456   SODIUM mmol/L 137 133*   POTASSIUM mmol/L 4 4 4 0   CHLORIDE mmol/L 104 101   CO2 mmol/L 28 26   BUN mg/dL 22 20   CREATININE mg/dL 0 66 0 60   CALCIUM mg/dL 8 5 8 4                  Results from last 7 days  Lab Units 02/06/19  2127 02/05/19  1108 02/05/19  0644   POC GLUCOSE mg/dl 114 119 127     [unfilled]    Labs reviewed    Physical Examination:  Vitals:   Vitals:    02/06/19 0524 02/06/19 1306 02/06/19 2007 02/07/19 0441   BP: 110/54 113/59 105/57 111/60   BP Location: Left arm Right arm Left arm Left arm   Pulse: 66 62 64 (!) 54   Resp: 19 18 18 18   Temp: 97 6 °F (36 4 °C) 98 4 °F (36 9 °C) 98 6 °F (37 °C) 98 4 °F (36 9 °C)   TempSrc: Oral Oral Oral Oral   SpO2: 98% 100% 98% 97%   Weight:       Height:         Constitutional:  NAD; pleasant; nontoxic  HEENT:  AT/NC; oropharynx negative for thrush on tongue   Neck: negative for JVD  CV:  +S1, S2;  RRR; no rub/murmur  Pulmonary:  BBS without crackles/wheeze/rhonci; resp are unlabored  Abdominal:  soft, +BS, ND/NT; no mass  Skin:  no rashes  Musculoskeletal:  no edema  :  no martinez  Neurological/Psych:  AAO;  CAIN 5/5 except RLE 4+/5; no depression/anxiety        [x ] Total time spent: 30 Mins and greater than 50% of this time was spent counseling/coordinating care      ** Please Note: Dragon 360 Dictation voice to text software may have been used in the creation of this document   **

## 2019-02-07 NOTE — PROGRESS NOTES
02/07/19 0800   Pain Assessment   Pain Assessment 0-10   Pain Score 3   Pain Type Acute pain   Pain Location Leg   Pain Orientation Right   Pain Descriptors Numbness   Pain Frequency Intermittent   Hospital Pain Intervention(s) Distraction; Emotional support; Environmental changes   Restrictions/Precautions   Precautions Supervision on toilet/commode; Fall Risk;Spinal precautions  (Orthopedic Restrictions)   Braces or Orthoses C/S Collar;MAFO   QI: Oral Hygiene   Assistance Needed Supervision   Assistance Provided by [place] No physical assistance   Comment Pt completes hygiene in stance at sink and requires supervision 2* to decreased standing balance  Oral Hygiene CARE Score 4   Grooming   Able To Initiate Tasks;Comb/Brush Hair;Wash/Dry Face;Brush/Clean Teeth;Wash/Dry Hands   Limitation Noted In Safety;Strength   Findings Pt completes grooming task in stance with BUE release at sink and requires supervision 2* to decreased standing balance  Grooming (FIM) 5 - Patient requires supervision/monitoring   QI: Shower/Bathe Self   Assistance Needed Incidental touching   Assistance Provided by [place] No physical assistance   Comment Pt requires CGA while in stance to complete rear and fang hygiene 2* to decreased standing tolerance  Pt utilizes cross leg technique to bath lower legs 2* to spinal precautions  Pt requires Min VC's to keep chin up and comply with spinal precautions  Shower/Bathe Self CARE Score 4   Bathing   Assessed Bath Style Shower   Anticipated D/C Bath Style Tub   Able to Gather/Transport Yes   Able to [employer] Temperature Yes   Able to Wash/Rinse/Dry (body part) Left Arm;Right Arm;L Upper Leg;R Upper Leg;L Lower Leg/Foot;R Lower Leg/Foot;Chest;Abdomen;Perineal Area; Buttocks   Limitations Noted in Balance; Endurance;ROM;Strength   Positioning Seated;Standing   Adaptive Equipment Tub Bench; Shower Bars;Hand Held Shower   Bathing (FIM) 4 - Patient requires steadying assist or light touching Tub/Shower Transfer   Limitations Noted In Balance; Endurance; Safety;LE Strength   Adaptive Equipment Transfer Bench;Grab Bars   Assessed Shower   Findings Pt continues to require CGA for transfers with RW to tub bench 2* decreased standing balance and weakness in RLE  Pt simulated tub transfer by using UEs to lift RLE over simulated tub  Plan to trial tub transfer with tub bench during next ADL session  Pt reports buying tub bench for use at home  Shower Transfer (FIM) 4 - Patient requires steadying assist or light touching   QI: Upper Body Dressing   Assistance Needed Physical assistance   Assistance Provided by [place] Less than 25%   Comment Pt requires assistance to adjust shirt around back of collar while seated  Upper Body Dressing CARE Score 3   QI: Lower Body Dressing   Assistance Needed Incidental touching   Assistance Provided by [place] No physical assistance   Comment Pt requires CGA in stance to pull pants and underwear up over hips 2* to decreased standing balance  Pt demos G carryover of unilateral release from RW to pull up pants  Lower Body Dressing CARE Score 4   QI: Putting On/Taking Off Footwear   Assistance Needed Physical assistance   Assistance Provided by [place] 25%-49%   Comment Pt requires assistance with donning R shoe and tying shoelaces 2* to decreased standing tolerance and spinal precautions  Pt educated on one handed dressing technique to don R sock using cross leg technique  Utilizing these techniques pt demos progress in LB dressing, able to don R sock with supervision  Pt requires min VC's for breathing techniques and initiation of rest breaks during LB dressing due to fatigue  Putting On/Taking Off Footwear CARE Score 3   QI: Picking Up Object   Assistance Needed Incidental touching; Adaptive equipment   Assistance Provided by [place] No physical assistance   Comment Pt requires CGA while using reacher to  bean bags off floor   Pt educated on lining up RW with item on floor and squatting with reacher to retrieve it  Pt educated on safety concerns with reaching without AE  Pt reports that he knows he will reach for things if he does not have a reacher  Supervising OTR/L educated pt on different options to purchase reacher  Picking Up Object CARE Score 4   Dressing/Undressing Clothing   Remove UB Clothes Pullover Shirt   Remove LB Clothes Undergarment;Socks; Shoes;Pants   Don UB [address] [place] [address]; Undergarment;Socks; Shoes   Limitations Noted In Balance; Endurance; Safety;ROM;Strength   Positioning Supported Sit;Standing   UB Dressing (FIM) 4 - Patient requires steadying assist or light touching   LB Dressing (FIM) 4 - Patient requires steadying assist or light touching   QI: Sit to Stand   Assistance Needed Incidental touching   Assistance Provided by [place] Less than 25%   Comment Pt requires CGA 2* to decreased standing balance  Sit to Stand CARE Score 3   QI: Chair/Bed-to-Chair Transfer   Assistance Needed Incidental touching; Adaptive equipment   Assistance Provided by [place] Less than 25%   Comment Pt requires CGA 2* to decreased standing balance  Chair/Bed-to-Chair Transfer CARE Score 3   Transfer Bed/Chair/Wheelchair   Limitations Noted In Balance; Endurance;LE Strength   Adaptive Equipment Roller Walker   Sit to Stand Minimal   Stand to Sit Minimal   Findings Pt requires CGA for functional transfer 2* decreased standing balance and decreased strength in RLE  Bed, Chair, Wheelchair Transfer (FIM) 4 - Patient requires steadying assist or light touching   QI: Toilet Transfer   Assistance Needed Physical assistance   Assistance Provided by [place] 25%-49%   Comment OTAS and supervising OTR/L trial toilet transfer with pt to simulate home environment  Pt reports having a sink by the toilet on the second floor and would like to use sink as support to stand at    Pt tried transfer in shower room using window sill of similar height to stand and was successful but was noted to be Min A for steadying assist  Pt then educated on folding commode to go over the toilet on first level of home as opposed to installing grab bars  Pt then trialed [facility] transfer and was able to complete with CGA  Pt reports that he likes the [facility]  Pt receptive to all education on DME equipment and safety needs  OT will continue to practice transfers and wait to further assess DME needs pending pt progress  Toilet Transfer CARE Score 3   Toilet Transfer   Surface Assessed Standard Commode  (Standard toilet)   Transfer Technique Standard   Limitations Noted In Balance; Endurance;LE Strength; Safety   Toilet Transfer (FIM) 4 - Patient requires steadying assist or light touching   Cognition   Overall Cognitive Status WFL   Arousal/Participation Cooperative   Attention Within functional limits   Orientation Level Oriented X4   Memory Within functional limits   Following Commands Follows all commands and directions without difficulty   Activity Tolerance   Activity Tolerance Patient tolerated treatment well   Medical Staff Made Aware KIMBERLY Costello present to view incision and apply ABD pads   Assessment   Treatment Assessment Pt participated in skilled OT services to focus on ADL retraining, and functional transfers  See above for details on ADL and functional transfers  Pt tolerates treatment well  Pt demos Fair compliance with spinal precautions 2* requiring VC's to avoid neck flexion during ADLs  Pt additionally requires Min VC's for breathing strategies and rest breaks due to fatigue  Pt making G progress towards LTGs but continues to be limited by decreased standing balance, decreased endurance, decreased RLE strength and orthopedic restrictions which limits pt's ability to complete I/ADL tasks independently   Pt would benefit from skilled OT services to focus on functional transfers to tub, BSC, and toilet with LUE support on window sill, C/S collar management, further evaluation of DME needs, standing balance with UE release, and family training  Upon discharge pt will be home alone during the day and will be required to function at mod I level for functional transfer and mobility, toileting, grooming, meal prep, and kitchen mobility  Prognosis Good   Problem List Decreased endurance;Decreased range of motion; Impaired balance;Decreased mobility;Orthopedic restrictions;Pain; Impaired sensation   Plan   Treatment/Interventions ADL retraining;Functional transfer training; Therapeutic exercise; Endurance training;Equipment eval/education;Patient/family training; Compensatory technique education   Progress Progressing toward goals   Recommendation   OT Discharge Recommendation Home with family support   OT Therapy Minutes   OT Time In 0800   OT Time Out 0930   OT Total Time (minutes) 90   OT Mode of treatment - Individual (minutes) 90   OT Mode of treatment - Concurrent (minutes) 0   OT Mode of treatment - Group (minutes) 0   OT Mode of treatment - Co-treat (minutes) 0   OT Mode of Teatment - Total time(minutes) 90 minutes   Therapy Time missed   Time missed?  No

## 2019-02-08 PROCEDURE — 99232 SBSQ HOSP IP/OBS MODERATE 35: CPT | Performed by: PHYSICAL MEDICINE & REHABILITATION

## 2019-02-08 PROCEDURE — 97110 THERAPEUTIC EXERCISES: CPT

## 2019-02-08 PROCEDURE — 97112 NEUROMUSCULAR REEDUCATION: CPT

## 2019-02-08 PROCEDURE — 97535 SELF CARE MNGMENT TRAINING: CPT

## 2019-02-08 PROCEDURE — 97530 THERAPEUTIC ACTIVITIES: CPT

## 2019-02-08 PROCEDURE — 97116 GAIT TRAINING THERAPY: CPT

## 2019-02-08 RX ADMIN — ENOXAPARIN SODIUM 40 MG: 40 INJECTION SUBCUTANEOUS at 09:18

## 2019-02-08 RX ADMIN — ACETAMINOPHEN 975 MG: 325 TABLET ORAL at 05:20

## 2019-02-08 RX ADMIN — ACETAMINOPHEN 975 MG: 325 TABLET ORAL at 13:57

## 2019-02-08 RX ADMIN — ACETAMINOPHEN 975 MG: 325 TABLET ORAL at 21:41

## 2019-02-08 RX ADMIN — DOCUSATE SODIUM 100 MG: 100 CAPSULE, LIQUID FILLED ORAL at 09:18

## 2019-02-08 RX ADMIN — LEVOTHYROXINE SODIUM 25 MCG: 25 TABLET ORAL at 05:20

## 2019-02-08 RX ADMIN — DEXAMETHASONE 2 MG: 2 TABLET ORAL at 09:18

## 2019-02-08 RX ADMIN — STANDARDIZED SENNA CONCENTRATE 17.2 MG: 8.6 TABLET ORAL at 09:18

## 2019-02-08 RX ADMIN — Medication 1000 MG: at 09:18

## 2019-02-08 RX ADMIN — PANTOPRAZOLE SODIUM 40 MG: 40 TABLET, DELAYED RELEASE ORAL at 05:20

## 2019-02-08 NOTE — PROGRESS NOTES
Internal Medicine Progress Note  Patient: Jazmín Garcia III  Age/sex: 60 y o  male  Medical Record #: [id number]      ASSESSMENT/PLAN:  Jazmín Garcia III is seen and examined and management for following issues:    Medullary mass removal with fixation/fusion of C2-3 T5 1/24/19:  Maintain cervical collar all times; steroid wean per Neurosurgery  Still has numbness of legs to hip level R>L but can feel feet on floor; some RLE weakness he says he gets when fatigued; burning has resolved hands     Post-op urinary retention/hx BPH: resolved with starting Flomax (new for him) = primary service stopped now     Hypothyroidism:  Continue Levothyroxine 25 mcg qd     Elevated fasting blood sugars; HbA1C 1/22 = 6 3:  Continue DM diet but stopped Accuchecks      Asymptomatic bradycardia:  noted postoperatively; still occurs intermitt but during sleep    ABLA:  Stable; w/o sx; cont to monitor    Heartburn:  added Protonix since on steroids and ordered prn TUMS  He gets heartburn at home if eats certain foods and gets 3x/week    Told him to go to PCP as OP when he recovers and have workup with EGD    Leukocytosis: from Decadron and has had no fevers      Subjective: offers no complaints    ROS:   GI: denies abdominal pain, change bowel habits or reflux symptoms  Neuro: No new neurologic changes  Respiratory: No Cough, SOB  Cardiovascular: No CP, palpitations     Scheduled Meds:    Current Facility-Administered Medications:  acetaminophen 975 mg Oral Q8H [facility] Rafa Le MD   bisacodyl 10 mg Rectal Daily PRN Augustina Witt DO   calcium carbonate 500 mg Oral Daily PRN CLAUDIO Regan   dexamethasone 2 mg Oral Daily Giselle Cardoso MD   senna 2 tablet Oral Daily Giselle Cardoso MD   And       docusate sodium 100 mg Oral Daily Giselle Cardoso MD   enoxaparin 40 mg Subcutaneous Daily Giselle Cardoso MD   fish oil 1,000 mg Oral Daily Giselle Cardoso MD   levothyroxine 25 mcg Oral Early Morning Giselle Cardoso MD   methocarbamol 500 mg Oral Q6H PRN Holli Howard MD   oxyCODONE 10 mg Oral Q4H PRN Holli Howard MD   oxyCODONE 5 mg Oral Q4H PRN Holli Howard MD   pantoprazole 40 mg Oral Early Morning CLAUDIO Sky   polyethylene glycol 17 g Oral Daily PRN Holli Howard MD       Labs:       Results from last 7 days  Lab Units 02/07/19  0448 02/04/19  0456   WBC Thousand/uL 10 42* 12 51*   HEMOGLOBIN g/dL 9 6* 9 4*   HEMATOCRIT % 30 8* 29 6*   PLATELETS Thousands/uL 394* 382       Results from last 7 days  Lab Units 02/07/19  0448 02/04/19  0456   SODIUM mmol/L 137 133*   POTASSIUM mmol/L 4 4 4 0   CHLORIDE mmol/L 104 101   CO2 mmol/L 28 26   BUN mg/dL 22 20   CREATININE mg/dL 0 66 0 60   CALCIUM mg/dL 8 5 8 4                  Results from last 7 days  Lab Units 02/06/19  2127 02/05/19  1108 02/05/19  0644   POC GLUCOSE mg/dl 114 119 127     [unfilled]    Labs reviewed    Physical Examination:  Vitals:   Vitals:    02/07/19 1306 02/07/19 2003 02/08/19 0516 02/08/19 0520   BP: 95/59 102/65 113/62    BP Location: Right arm Left arm Left arm    Pulse: 64 62 (!) 49 (!) 51   Resp: 18 19 19    Temp: 98 3 °F (36 8 °C) 98 2 °F (36 8 °C) 98 3 °F (36 8 °C)    TempSrc: Oral Oral Oral    SpO2: 96% 99% 99%    Weight:       Height:         Constitutional:  NAD; pleasant; nontoxic  HEENT:  AT/NC; oropharynx negative for thrush on tongue   Neck: negative for JVD  CV:  +S1, S2;  RRR; no rub/murmur  Pulmonary:  BBS without crackles/wheeze/rhonci; resp are unlabored  Abdominal:  soft, +BS, ND/NT; no mass  Skin:  no rashes  Musculoskeletal:  no edema  :  no martinez  Neurological/Psych:  AAO;  CAIN 5/5 except RLE 4+/5; no depression/anxiety        [x ] Total time spent: 30 Mins and greater than 50% of this time was spent counseling/coordinating care  ** Please Note: Dragon 360 Dictation voice to text software may have been used in the creation of this document   **

## 2019-02-08 NOTE — PROGRESS NOTES
02/08/19 0700   Pain Assessment   Pain Assessment 0-10   Pain Score 2   Pain Type Acute pain   Pain Location Leg   Pain Orientation Right   Pain Descriptors Numbness   Pain Frequency Intermittent   Pain Onset Ongoing   Hospital Pain Intervention(s) Distraction; Emotional support; Environmental changes   Response to Interventions Pt tolerates session well   Restrictions/Precautions   Precautions Fall Risk;Spinal precautions;Supervision on toilet/commode   Braces or Orthoses C/S Collar;MAFO   Lifestyle   Autonomy "I'm not having any pain in my neck but I am having some numbness in my leg "   QI: [address] Provided by [place] No physical assistance   Eating CARE Score 6   Eating Assessment   Positioning Out of Bed;Upright   Meal Assessed Breakfast   QI: Swallowing/Nutritional Status Regular food   Eating (FIM) 7 - Patient completely independent   QI: Oral Hygiene   Assistance Needed Supervision   Assistance Provided by [place] No physical assistance   Comment Pt completes hygiene in stance with RW at sink and continues to require supervision 2* to decreased standing balance  Oral Hygiene CARE Score 4   Grooming   Able To Initiate Tasks;Comb/Brush Hair;Wash/Dry Face;Brush/Clean Teeth;Wash/Dry Hands   Limitation Noted In Safety;Strength   Findings Pt able to complete grooming task in stance with BUE release from RW and requires supervision 2* decreased standing balance  Grooming (FIM) 5 - Patient requires supervision/monitoring   QI: Shower/Bathe Self   Assistance Needed Supervision   Assistance Provided by [place] No physical assistance   Comment Pt requires supervision while in stance with unilateral UE support on grab bar to complete rear and fang hygiene 2* to decreased standing balance  Pt utilizes cross leg technique to bath both lower legs and feet  Pt demos compliance with spinal precautions by keeping chin up   Pt also demos increased safety awareness by initiating rest breaks appropriately  Shower/Bathe Self CARE Score 4   Bathing   Assessed Bath Style Tub   Anticipated D/C Bath Style Tub   Able to Gather/Transport No   Able to [employer] Temperature Yes   Able to Wash/Rinse/Dry (body part) Left Arm;Right Arm;L Upper Leg;R Upper Leg;L Lower Leg/Foot;R Lower Leg/Foot;Chest;Abdomen;Perineal Area; Buttocks   Limitations Noted in Balance; Endurance; Safety;Strength   Positioning Seated;Standing   Adaptive Equipment Tub Bench; Shower Bars;Hand Held Shower   Bathing (FIM) 5 - Patient requires supervision/monitoring but completes 10/10 parts   Tub/Shower Transfer   Limitations Noted In Balance; Endurance; Safety;LE Strength   Adaptive Equipment Transfer Bench   Assessed Tub   Findings Pt approaches tub bench with RW and then completes sit swivel method on tub transfer bench to clear BLEs into tub with overall CS  Pt demos increased ability to clear BLEs into tub this session with increased time for task completion  Tub Transfer (FIM) 5 - Patient requires supervision/monitoring   QI: Upper Body Dressing   Assistance Needed Physical assistance   Assistance Provided by [place] Less than 25%   Comment Pt requires assistance to adjust shirt around back of spinal collar while seated  Pt requires Max A for C collar management, which his wife has been present for training on as she will complete at home  Upper Body Dressing CARE Score 3   QI: Lower Body Dressing   Assistance Needed Supervision   Assistance Provided by [place] No physical assistance   Comment Pt requires supervision in stance at RW to pull pants up over hips 2* to decreased standing balance  Pt demos G carryover of unilateral UE release from RW to pull up pants     Lower Body Dressing CARE Score 4   QI: Putting On/Taking Off Footwear   Assistance Needed Physical assistance   Assistance Provided by [place] 25%-49%   Comment Pt attempts to don slipper with tread to walk from shower to simulated room where pt reports getting dressed after the shower  Pt able to don L slipper but requires assistance donning R slipper  Pt then removes slippers and dons sneakers  Educated pt on concerns with wearing slippers long term around house 2* to decreased LE support and need for [facility], suggested wearing briefly for tub transfer only  Pt receptive  Pt requires assistance to don shoe and tie shoelaces on RLE 2* to spinal precautions  Continuing to recommend use of elastic shoe laces to increase independence  Pt demos G carryover of one handed dressing technique to don R sock using cross leg technique  Pt demos increased ability to initiate rest breaks and pace his breathing  Putting On/Taking Off Footwear CARE Score 3   QI: Picking Up Object   Assistance Needed Incidental touching; Adaptive equipment   Assistance Provided by [place] No physical assistance   Comment Pt unable to retrieve shoe from floor without use of LH reacher  Educated pt on use of reacher to complete task independently  Picking Up Object CARE Score 4   Dressing/Undressing Clothing   Remove UB Clothes Pullover Shirt   Remove LB Clothes Pants; Undergarment;Socks; Shoes   Don UB Clothes Pullover Shirt   Don LB Clothes Pants; Undergarment;Socks; Shoes   Limitations Noted In Balance; Endurance; Safety;Strength;ROM   Positioning Supported Sit;Standing   UB Dressing (FIM) 4 - Patient requires steadying assist or light touching   LB Dressing (FIM) 4 - Patient completes 75% of all tasks   QI: Lying to Sitting on Side of Bed   Assistance Needed Supervision   Assistance Provided by [place] No physical assistance   Lying to Sitting on Side of Bed CARE Score 4   QI: Sit to Stand   Assistance Needed Supervision; Adaptive equipment   Assistance Provided by [place] No physical assistance   Comment Pt requires supervision 2* decreased standing balance  Sit to Stand CARE Score 4   QI: Chair/Bed-to-Chair Transfer   Assistance Needed Supervision; Adaptive equipment   Assistance Provided by [place] No physical assistance   Comment Pt requires supervision 2* decreased standing balance  Chair/Bed-to-Chair Transfer CARE Score 4   Transfer Bed/Chair/Wheelchair   Limitations Noted In Balance; Endurance;LE Strength   Adaptive Equipment Roller Walker   Sit to TXU Adri   Stand to Sit Supervision   Supine to Sit Supervision   Findings Pt requires supervision for functional transfers 2* decreased standing balance and decreased strength in RLE  Bed, Chair, Wheelchair Transfer (FIM) 5 - Patient requires supervision/monitoring   QI: [address] Provided by [place] No physical assistance   Comment Pt requires supervision while in stance 2* decreased standing balance  Toileting Hygiene CARE Score 4   Toileting   Able to [address] down yes, up yes  Manage Hygiene Bladder   Limitations Noted In Balance;LE Strength   Adaptive Equipment Grab Bar   Findings Pt completes toileting in stance   Toileting (FIM) 5 - Patient requires supervision/monitoring   QI: Toilet Transfer   Assistance Needed Supervision   Assistance Provided by [place] No physical assistance   Comment Pt practiced toilet transfer using window to assist in standing to simulate home environment  Pt able to complete transfer with supervision  Pt reports that [facility] will be moved from first floor toilet to second floor at night to go over toilet so pt will not have to use sink that is next to toilet  Toilet Transfer CARE Score 4   Toilet Transfer   Surface Assessed Standard Toilet   Transfer Technique Standard   Limitations Noted In Balance; Endurance;LE Strength   Toilet Transfer (FIM) 5 - Patient requires supervision/monitoring   Light Housekeeping   Light Housekeeping Level Walker   Light Housekeeping Level of Assistance Close supervision   Light Housekeeping Pt participates in laundry folding task to increase independence with I/ADL tasks   Pt reports having top load washer and side load dryer at home on the second floor  Pt also reports that he shares laundry tasks with his wife  Pt reports sitting on the couch to fold laundry  Pt completes task seated on mat table with laundry placed on tray table in front of him  Pt able to fold laundry and place it into basket  Pt educated on keeping arms at shoulder level when folding laundry to decrease strain on neck  Pt demos G compliance by refraining from neck flexion during activity  Cognition   Overall Cognitive Status WFL   Arousal/Participation Alert; Cooperative   Attention Within functional limits   Orientation Level Oriented X4   Memory Within functional limits   Following Commands Follows all commands and directions without difficulty   Activity Tolerance   Activity Tolerance Patient tolerated treatment well   Assessment   Treatment Assessment Pt participated in skilled OT services to focus on ADL retraining, functional transfers and laundry tasks  See above for details on ADL, functional transfers, and laundry tasks  Pt demos improved balance during transfers requiring supervision for all functional transfers  Pt demos increased safety awareness with spinal precautions  Pt demos G carryover of LB dressing techniques  Pt demos G progress towards LTGs however continues to be limited by decreased standing balance, decreased endurance, decreased RLE strength and orthopedic restrictions which limits pt's ability to complete I/ADL tasks independently  Pt would benefit from skilled OT services to focus on above mention deficits  Prognosis Good   Problem List Decreased endurance;Decreased range of motion; Impaired balance;Decreased mobility;Orthopedic restrictions;Pain; Impaired sensation   Plan   Treatment/Interventions ADL retraining;Functional transfer training; Therapeutic exercise; Endurance training;Patient/family training; Compensatory technique education   Progress Progressing toward goals   Recommendation   OT Discharge Recommendation Home with family support   OT Equipment ordered hip kit, RW, and BSC   Date ordered 02/08/19   OT Therapy Minutes   OT Time In 0700   OT Time Out 0830   OT Total Time (minutes) 90   OT Mode of treatment - Individual (minutes) 90   OT Mode of treatment - Concurrent (minutes) 0   OT Mode of treatment - Group (minutes) 0   OT Mode of treatment - Co-treat (minutes) 0   OT Mode of Teatment - Total time(minutes) 90 minutes   Therapy Time missed   Time missed?  No

## 2019-02-08 NOTE — PROGRESS NOTES
02/08/19 1030   Pain Assessment   Pain Assessment No/denies pain   Restrictions/Precautions   Precautions Fall Risk;Spinal precautions   Braces or Orthoses C/S Collar   Cognition   Overall Cognitive Status WFL   Subjective   Subjective pt with no complaints today   QI: Sit to Stand   Assistance Needed Supervision   Sit to Stand CARE Score 4   QI: Chair/Bed-to-Chair Transfer   Assistance Needed Supervision   Chair/Bed-to-Chair Transfer CARE Score 4   Transfer Bed/Chair/Wheelchair   Limitations Noted In Balance; Coordination;LE Strength   Adaptive Equipment None   Stand Pivot Contact Guard   Sit to Stand Supervision   Stand to Sit Supervision   Findings HHA no device   Bed, Chair, Wheelchair Transfer (FIM) 4 - Patient requires steadying assist or light touching   QI: Walk 10 Feet   Assistance Needed Physical assistance   Assistance Provided by [place] Less than 25%   Comment HHA on the L   Walk 10 Feet CARE Score 3   QI: Walk 50 Feet with Two Turns   Assistance Needed Physical assistance   Assistance Provided by [place] Less than 25%   Walk 50 Feet with Two Turns CARE Score 3   QI: Walk 150 Feet   Assistance Needed Physical assistance   Assistance Provided by [place] Less than 25%   Walk 150 Feet CARE Score 3   QI: Walking 10 Feet on Uneven Surfaces   Reason if not Attempted Safety concerns   Walking 10 Feet on Uneven Surfaces CARE Score 88   Ambulation   Does the patient walk? 2  Yes   Primary Discharge Mode of Locomotion Walk   Walk Assist Level Contact Guard;Minimum Assist   Gait Pattern Inconsistant Sheryl;Decreased foot clearance;Narrow LEAH;Lateral deviation;Scissoring;Decreased R stance;Decreased L stance; Improper weight shift   Assist Device Hand Hold   Distance Walked (feet) 350 ft  (x3)   Limitations Noted In Balance; Coordination; Heel Strike;Speed;Strength;Swing   Findings occasional scissoring and lateral deviation with fatigue    amb with no AFO   Walking (FIM) 3 - Patient completes 50 - 74% of all tasks, needs more than steadying or light touch AND distance 150 feet or more, no rest   QI: Wheel 50 Feet with Two Turns   Reason if not Attempted Activity not applicable   Wheel 50 Feet with Two Turns CARE Score 9   QI: Wheel 150 Feet   Reason if not Attempted Activity not applicable   Wheel 112 Feet CARE Score 9   Wheelchair mobility   QI: Does the patient use a wheelchair? 0  No   QI: 4 Steps   Assistance Needed Incidental touching; Adaptive equipment   4 Steps CARE Score 4   QI: 12 Steps   Assistance Needed Incidental touching   Assistance Provided by [place] Less than 25%   12 Steps CARE Score 3   Stairs   # of Steps 22   Weight Bearing Precautions Fall Risk   Assist Devices Single Rail   Findings nonreciproal descending, reciprocal ascending   Stairs (FIM) 4 - Patient requires steadying assist or light touching AND patient goes up and down full flight (12- 14 stairs)   Therapeutic Interventions   Strengthening seated resisted DF/PF with black theraband RLE   Flexibility seated B gastroc and HS   Balance fwd walking and sidestepping over weight bars  difficulty wt shifting sidestepping    Equipment Use   NuStep 10mins L3, LEs only   Assessment   Treatment Assessment pt cont to demonstrate progress with balance and functional mobility  without AFO pt with decrease balance and foot clearance on the R   pt instructed to increase gait speed pt maintain balance better  pt has difficulty with wt shifting exs due to weakness on the R   will cont to work on balance, righting reactions and safety to progress with funcitonal mobility with LRAD nad decrease fall risk for d/c home  Problem List Decreased endurance;Decreased range of motion; Impaired balance;Decreased mobility;Orthopedic restrictions;Pain; Impaired sensation   Barriers to Discharge Decreased caregiver support   PT Barriers   Physical Impairment Decreased strength;Decreased range of motion;Decreased endurance; Impaired balance;Decreased mobility; Impaired sensation Functional Limitation Car transfers;Stair negotiation;Standing;Transfers; Walking   Plan   Treatment/Interventions Functional transfer training;LE strengthening/ROM; Endurance training;Patient/family training;Bed mobility;Gait training   Progress Progressing toward goals   Recommendation   Recommendation Outpatient PT; Home with family support   PT Therapy Minutes   PT Time In 1030   PT Time Out 1130   PT Total Time (minutes) 60   PT Mode of treatment - Individual (minutes) 60   PT Mode of treatment - Concurrent (minutes) 0   PT Mode of treatment - Group (minutes) 0   PT Mode of treatment - Co-treat (minutes) 0   PT Mode of Teatment - Total time(minutes) 60 minutes   Therapy Time missed   Time missed?  No

## 2019-02-08 NOTE — NURSING NOTE
Patient resting comfortably in chair reading newspaper  No complaints of pain or needs at this time  Call bell within reach

## 2019-02-08 NOTE — PROGRESS NOTES
Physical Medicine and Rehabilitation Progress Note  Kamran Bee III 60 y o  male MRN: [id number]  Unit/Bed#: -01 Encounter: [id number]    HPI: Kamran Bee III is a 60 y o  male who presented to the [address] with h/o sensory deficits and was found to have spinal cord mass therefore underwent tumor resection and C4-T3 laminectomy and C2-T5 fixation/fusion by Dr Alexandre Guthrie on 1/24       Chief Complaint: spinal mass s/p resection     Subjective: D/W patient that neurosx team has cleared him to resume fish oil     ROS:  negative unless noted above    Assessment/Plan:      Anemia   Assessment & Plan    · Likely ABLA  · Hg currently stable at 9 6  · IM monitoring      Thrombocytosis (HCC)   Assessment & Plan    · Mild at 394  · IM monitoring      Leukocytosis   Assessment & Plan    · Likely due to steroids which are being tapered (currently mild at 10 42)      Hyperlipidemia   Assessment & Plan    · was Omega 3 FA 1000 mg qd PTA   · D/W neurosx and have cleared patient to resume home Omega 3 FA 1000 mg qd     Impaired fasting glucose   Assessment & Plan    · PCP aware  · Diet controlled      Hypothyroidism   Assessment & Plan    · On home synthroid 25 mcg qd      BPH associated with nocturia   Assessment & Plan    · Per patient stopped taking flomax 0 4 mg qpm in the past because it was not helping the urinary retention   · Per patient stopped taking ditropan XL 10 mg qd in the past because  it was not helping the urinary retention   · Per patient stopped taking viagra 100 mg in the past because  it was not helping the urinary retention   · Per patient was no longer on any meds for urinary retention/BPH PTA as he was working with urology as OP and further tests were being planned as patient medications had not been effective however in acute care patient was restarted on flomax and post-operatively patient has been urinating well   · successful trial off of flomax (which patient had stopped taking PTA because it was not effective); PVRs were 57, 38, & 87 (PVRs are from greater than 24 hours after last dose of flomax)   · Follows with Garth Gann PA-C/Dr Ivonne Menezes as OP     * 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[facility])   Assessment & Plan    · S/p resection and C4-T3 laminectomy and C2-T5 fixation/fusion by Dr Gilbert Gates and Dr Ariana Hartman on 1/24  · decardron taper complete on 2/9  · Spine precautions  · c-collar at all times  · Path revealed grade 2 ependymoma; further management per team at [facility]   · OP FU w/neurosx scheduled for 2/6 done as inpt by neurosx team        Scheduled Meds:    Current Facility-Administered Medications:  acetaminophen 975 mg Oral Q8H Mauricio Cole MD   bisacodyl 10 mg Rectal Daily PRN Mollyalbino Nolen, DO   calcium carbonate 500 mg Oral Daily PRN CLAUDIO Watson   dexamethasone 2 mg Oral Daily Juan C Garcia MD   senna 2 tablet Oral Daily Juan C Garcia MD   And       docusate sodium 100 mg Oral Daily Juan C Garcia MD   enoxaparin 40 mg Subcutaneous Daily Juan C Garcia MD   fish oil 1,000 mg Oral Daily Juan C Garcia MD   levothyroxine 25 mcg Oral Early Morning Juan C Garcia MD   methocarbamol 500 mg Oral Q6H PRN Juan C Garcia MD   oxyCODONE 10 mg Oral Q4H PRN Juan C Garcia MD   oxyCODONE 5 mg Oral Q4H PRN Juan C Garcia MD   pantoprazole 40 mg Oral Early Morning CLAUDIO Watson   polyethylene glycol 17 g Oral Daily PRN Juan C Garcia MD        Incidental findings:  1) 2-3 mm pulmonary nodule: per radiology report of 1/18/19 recommend repeat CT chest in 3 months however will defer to PCP  2) fatty involution of pancreas: OP FU with PCP with further testing/treatment and/or specialist referral at PCP's discretion  3) L maxillary sinus thickening: asymptomatic, OP FU with PCP with further testing/treatment and/or specialist referral at PCP's discretion      DVT ppx: SCDs, cleared for lovenox 40 mg sc qd by neurosx in acute care (and has been on it since 1/26)  Dispo: reteam       Objective:    Functional Update:  Mobility: min    Transfers: CG  ADLs: min        Physical Exam:          General: alert, no apparent distress, cooperative and comfortable  HEENT:  +C-collar  CARDIAC:  +S1/2  LUNGS:  respirations unlabored  ABDOMEN:  soft NT  EXTREMITIES:  no significant LE edema  NEURO:   mental status, speech normal, alert and oriented x3  PSYCH:  mood/affect currently stable      Laboratory:     Results from last 7 days  Lab Units 02/07/19  0448 02/04/19  0456   HEMOGLOBIN g/dL 9 6* 9 4*   HEMATOCRIT % 30 8* 29 6*   WBC Thousand/uL 10 42* 12 51*       Results from last 7 days  Lab Units 02/07/19  0448 02/04/19  0456   BUN mg/dL 22 20   SODIUM mmol/L 137 133*   POTASSIUM mmol/L 4 4 4 0   CHLORIDE mmol/L 104 101   CREATININE mg/dL 0 66 0 60            Patient Active Problem List   Diagnosis    BPH associated with nocturia    Hypothyroidism    Impaired fasting glucose    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HCC)    Leukocytosis    Anemia    Hyperlipidemia    Thrombocytosis ([facility] [address] )           Total visit time:  At least 25 minutes, with more than 50% spent counseling/coordinating care

## 2019-02-08 NOTE — PROGRESS NOTES
02/08/19 1430   Pain Assessment   Pain Assessment No/denies pain   Restrictions/Precautions   Precautions Fall Risk;Spinal precautions   Braces or Orthoses C/S Collar   General   Change In Medical/Functional Status can take short walks with nursing   Cognition   Overall Cognitive Status WFL   Subjective   Subjective pt c/o RLE feeling numb from sitting; pt states he does not like the c/s collar   QI: Sit to Stand   Assistance Needed Supervision   Sit to Stand CARE Score 4   QI: Chair/Bed-to-Chair Transfer   Assistance Needed Incidental touching   Chair/Bed-to-Chair Transfer CARE Score 4   Transfer Bed/Chair/Wheelchair   Limitations Noted In Balance; Coordination;LE Strength   Adaptive Equipment Roller Walker;None   Stand Pivot Contact Guard   Sit to Stand Supervision   Stand to W  R  [place], Chair, Wheelchair Transfer (FIM) 4 - Patient requires steadying assist or light touching   QI: Walk 10 Feet   Assistance Needed Incidental touching   Walk 10 Feet CARE Score 4   QI: Walk 50 Feet with Two Turns   Assistance Needed Incidental touching   Walk 50 Feet with Two Turns CARE Score 4   QI: Walk 150 Feet   Assistance Needed Incidental touching   Walk 150 Feet CARE Score 4   QI: Walking 10 Feet on Uneven Surfaces   Reason if not Attempted Safety concerns   Walking 10 Feet on Uneven Surfaces CARE Score 88   Ambulation   Does the patient walk? 2  Yes   Primary Discharge Mode of Locomotion Walk   Walk Assist Level Contact Guard   Gait Pattern Inconsistant Sheryl;Decreased foot clearance;Narrow LEAH; Decreased R stance;Decreased L stance; Improper weight shift   Assist Device Hand Hold   Distance Walked (feet) 350 ft  (x1; 500x1)   Limitations Noted In Balance; Coordination; Heel Strike; Sequencing;Speed;Strength;Swing   Findings pt had AFO on this session; improved heel strike and LEAH   Walking (FIM) 4 - Patient requires steadying assist or light touching AND distance 150 feet or more, no rest   Wheelchair mobility QI: Does the patient use a wheelchair? 0  No   Therapeutic Interventions   Strengthening STS wtih green tband for abd upon standing and sitting  HS curls with blue tband, LLE, manual resistance on R   Flexibility seated B gastroc and HS   Balance bwd walking 30'x2   Assessment   Treatment Assessment pt cont to show improvements with activity tolerance nad balance with gait training  pt with better heel strike and LEAH with AFO on and demonstrates decrease deviation with it on   will cont to work on functional strengthening, balance and safety to progress with functional mobility and Ind for d/c home  Problem List Decreased endurance;Decreased range of motion; Impaired balance;Decreased mobility;Orthopedic restrictions;Pain; Impaired sensation   Barriers to Discharge Decreased caregiver support   PT Barriers   Physical Impairment Decreased strength;Decreased range of motion;Decreased endurance; Impaired balance;Decreased mobility; Impaired sensation   Functional Limitation Car transfers;Stair negotiation;Standing;Transfers; Walking   Plan   Treatment/Interventions Functional transfer training;LE strengthening/ROM; Endurance training;Bed mobility;Gait training   Progress Progressing toward goals   Recommendation   Recommendation Outpatient PT; Home with family support   PT Therapy Minutes   PT Time In 1430   PT Time Out 1500   PT Total Time (minutes) 30   PT Mode of treatment - Individual (minutes) 0   PT Mode of treatment - Concurrent (minutes) 30   PT Mode of treatment - Group (minutes) 0   PT Mode of treatment - Co-treat (minutes) 0   PT Mode of Teatment - Total time(minutes) 30 minutes   Therapy Time missed   Time missed?  No

## 2019-02-09 PROCEDURE — 97112 NEUROMUSCULAR REEDUCATION: CPT

## 2019-02-09 PROCEDURE — 97110 THERAPEUTIC EXERCISES: CPT

## 2019-02-09 PROCEDURE — 99232 SBSQ HOSP IP/OBS MODERATE 35: CPT

## 2019-02-09 PROCEDURE — 97530 THERAPEUTIC ACTIVITIES: CPT

## 2019-02-09 PROCEDURE — 97535 SELF CARE MNGMENT TRAINING: CPT

## 2019-02-09 RX ORDER — SODIUM PHOSPHATE, DIBASIC AND SODIUM PHOSPHATE, MONOBASIC 7; 19 G/133ML; G/133ML
1 ENEMA RECTAL ONCE
Status: DISCONTINUED | OUTPATIENT
Start: 2019-02-10 | End: 2019-02-09

## 2019-02-09 RX ORDER — SODIUM PHOSPHATE, DIBASIC AND SODIUM PHOSPHATE, MONOBASIC 7; 19 G/133ML; G/133ML
1 ENEMA RECTAL ONCE AS NEEDED
Status: DISCONTINUED | OUTPATIENT
Start: 2019-02-09 | End: 2019-02-14

## 2019-02-09 RX ORDER — POLYETHYLENE GLYCOL 3350 17 G/17G
17 POWDER, FOR SOLUTION ORAL DAILY PRN
Status: DISCONTINUED | OUTPATIENT
Start: 2019-02-09 | End: 2019-02-09

## 2019-02-09 RX ORDER — POLYETHYLENE GLYCOL 3350 17 G/17G
17 POWDER, FOR SOLUTION ORAL ONCE
Status: COMPLETED | OUTPATIENT
Start: 2019-02-09 | End: 2019-02-09

## 2019-02-09 RX ORDER — BISACODYL 10 MG
10 SUPPOSITORY, RECTAL RECTAL ONCE
Status: COMPLETED | OUTPATIENT
Start: 2019-02-09 | End: 2019-02-09

## 2019-02-09 RX ORDER — POLYETHYLENE GLYCOL 3350 17 G/17G
17 POWDER, FOR SOLUTION ORAL ONCE
Status: DISCONTINUED | OUTPATIENT
Start: 2019-02-09 | End: 2019-02-09

## 2019-02-09 RX ADMIN — PANTOPRAZOLE SODIUM 40 MG: 40 TABLET, DELAYED RELEASE ORAL at 05:26

## 2019-02-09 RX ADMIN — ENOXAPARIN SODIUM 40 MG: 40 INJECTION SUBCUTANEOUS at 08:06

## 2019-02-09 RX ADMIN — POLYETHYLENE GLYCOL 3350 17 G: 17 POWDER, FOR SOLUTION ORAL at 21:29

## 2019-02-09 RX ADMIN — Medication 10 MG: at 21:30

## 2019-02-09 RX ADMIN — DOCUSATE SODIUM 100 MG: 100 CAPSULE, LIQUID FILLED ORAL at 08:06

## 2019-02-09 RX ADMIN — LEVOTHYROXINE SODIUM 25 MCG: 25 TABLET ORAL at 05:25

## 2019-02-09 RX ADMIN — ACETAMINOPHEN 975 MG: 325 TABLET ORAL at 13:38

## 2019-02-09 RX ADMIN — ACETAMINOPHEN 975 MG: 325 TABLET ORAL at 05:26

## 2019-02-09 RX ADMIN — DEXAMETHASONE 2 MG: 2 TABLET ORAL at 08:06

## 2019-02-09 RX ADMIN — Medication 1000 MG: at 08:06

## 2019-02-09 RX ADMIN — STANDARDIZED SENNA CONCENTRATE 17.2 MG: 8.6 TABLET ORAL at 08:06

## 2019-02-09 RX ADMIN — ACETAMINOPHEN 975 MG: 325 TABLET ORAL at 21:30

## 2019-02-09 NOTE — PROGRESS NOTES
Internal Medicine Progress Note  Patient: Haseeb Ponce III  Age/sex: 60 y o  male  Medical Record #: [id number]      ASSESSMENT/PLAN:  Haseeb Ponce III is seen and examined and management for following issues:    Medullary mass removal with fixation/fusion of C2-3 T5 1/24/19:  Maintain cervical collar all times; steroid wean per Neurosurgery  Still has numbness of legs to hip level R>L but can feel feet on floor; some RLE weakness he says he gets when fatigued; burning has resolved hands     Post-op urinary retention/hx BPH: resolved with starting Flomax (new for him) = primary service stopped now     Hypothyroidism:  Continue Levothyroxine 25 mcg qd     Elevated fasting blood sugars; HbA1C 1/22 = 6 3:  Continue DM diet but stopped Accuchecks      Asymptomatic bradycardia:  noted postoperatively; still occurs intermitt but during sleep    ABLA:  Stable; w/o sx; cont to monitor    Heartburn:  added Protonix since on steroids and ordered prn TUMS  He gets heartburn at home if eats certain foods and gets 3x/week  Told him to go to PCP as OP when he recovers and have workup with EGD    Leukocytosis: from Decadron and has had no fevers      Subjective:  Patient reports he is doing well  He denies any current complaints      ROS:   GI: denies abdominal pain, change bowel habits or reflux symptoms  Neuro: No new neurologic changes  Respiratory: No Cough, SOB  Cardiovascular: No CP, palpitations     Scheduled Meds:    Current Facility-Administered Medications:  acetaminophen 975 mg Oral Q8H [address] Dahiana Noble MD   bisacodyl 10 mg Rectal Daily PRN Dairl Jesse, DO   calcium carbonate 500 mg Oral Daily PRN CLAUDIO Stevens   senna 2 tablet Oral Daily Dahiana Noble MD   And       docusate sodium 100 mg Oral Daily Dahiana Noble MD   enoxaparin 40 mg Subcutaneous Daily Dahiana Noble MD   fish oil 1,000 mg Oral Daily Dahiana Noble MD   levothyroxine 25 mcg Oral Early Morning Dahiana Noble MD   methocarbamol 500 mg Oral Q6H PRN James Marrero MD   oxyCODONE 10 mg Oral Q4H PRN James Marrero MD   oxyCODONE 5 mg Oral Q4H PRN James Marrero MD   pantoprazole 40 mg Oral Early Morning CLAUDIO Dumont   polyethylene glycol 17 g Oral Daily PRN James Marrero MD       Labs:       Results from last 7 days  Lab Units 02/07/19  0448 02/04/19  0456   WBC Thousand/uL 10 42* 12 51*   HEMOGLOBIN g/dL 9 6* 9 4*   HEMATOCRIT % 30 8* 29 6*   PLATELETS Thousands/uL 394* 382       Results from last 7 days  Lab Units 02/07/19  0448 02/04/19  0456   SODIUM mmol/L 137 133*   POTASSIUM mmol/L 4 4 4 0   CHLORIDE mmol/L 104 101   CO2 mmol/L 28 26   BUN mg/dL 22 20   CREATININE mg/dL 0 66 0 60   CALCIUM mg/dL 8 5 8 4                  Results from last 7 days  Lab Units 02/06/19  2127 02/05/19  1108 02/05/19  0644   POC GLUCOSE mg/dl 114 119 127     [unfilled]    Labs reviewed    Physical Examination:  Vitals:   Vitals:    02/08/19 0520 02/08/19 1310 02/08/19 2019 02/09/19 0511   BP:  101/55 107/59 97/58   BP Location:  Right arm Left arm Left arm   Pulse: (!) 51 68 63 (!) 50   Resp:  20 18 20   Temp:  98 5 °F (36 9 °C) 98 8 °F (37 1 °C) 98 3 °F (36 8 °C)   TempSrc:  Oral Oral Oral   SpO2:  98% 98% 97%   Weight:       Height:         Constitutional:  NAD; pleasant; nontoxic  HEENT:  AT/NC; oropharynx negative for thrush on tongue   Neck: negative for JVD  CV:  +S1, S2;  RRR; no rub/murmur  Pulmonary:  BBS without crackles/wheeze/rhonci; resp are unlabored  Abdominal:  soft, +BS, ND/NT; no mass  Skin:  no rashes  Musculoskeletal:  no edema  :  no martinez  Neurological/Psych:  AAO;  CAIN 5/5 except RLE 4+/5; no depression/anxiety        [ X ] Total time spent: 30 Mins and greater than 50% of this time was spent counseling/coordinating care  ** Please Note: Dragon 360 Dictation voice to text software may have been used in the creation of this document   **

## 2019-02-09 NOTE — NURSING NOTE
Patient assessed at the bedside  No changes from prior assessment  Patient noted pleasant and cooperative with his care  Currently on room air without any distress or discomfort  [place] maintained currently in place and maintained at all time  Continent of bowel and bladder for this shift  Denies pain  Call bell and bedside table within reach   SD 2/8/2019 3462

## 2019-02-09 NOTE — PROGRESS NOTES
02/09/19 1430   Pain Assessment   Pain Assessment 0-10   Pain Score No Pain   Restrictions/Precautions   Precautions Fall Risk;Spinal precautions   Braces or Orthoses C/S Collar   Cognition   Overall Cognitive Status WFL   Arousal/Participation Alert; Cooperative   Attention Within functional limits   Orientation Level Oriented X4   Memory Within functional limits   Following Commands Follows all commands and directions without difficulty   Subjective   Subjective reports tightness in trap on his R   QI: Sit to Stand   Assistance Needed Supervision   Sit to Stand CARE Score 4   QI: Chair/Bed-to-Chair Transfer   Assistance Needed Incidental touching   Chair/Bed-to-Chair Transfer CARE Score 4   Transfer Bed/Chair/Wheelchair   Limitations Noted In Balance; Endurance;UE Strength;LE Strength   Adaptive Equipment Roller Walker   Stand Pivot Contact Guard   Sit to Stand Supervision   Stand to W  R  Eve, Chair, Wheelchair Transfer (FIM) 4 - Patient requires steadying assist or light touching   QI: Walk 10 Feet   Assistance Needed Incidental touching   Walk 10 Feet CARE Score 4   QI: Walk 50 Feet with Two Turns   Assistance Needed Incidental touching   Walk 50 Feet with Two Turns CARE Score 4   QI: Walk 150 Feet   Assistance Needed Incidental touching   Walk 150 Feet CARE Score 4   Ambulation   Does the patient walk? 2  Yes   Primary Discharge Mode of Locomotion Walk   Walk Assist Level Contact Guard   Gait Pattern Inconsistant Sheryl; Slow Sheryl;Decreased foot clearance;Narrow LEAH;Step through; Improper weight shift   Assist Device (gait belt)   Distance Walked (feet) 500 ft   Limitations Noted In Balance; Heel Strike;Speed;Strength;Swing   Findings AFO RLE and gait belt   Walking (FIM) 4 - Patient requires steadying assist or light touching AND distance 150 feet or more, no rest   Wheelchair mobility   QI: Does the patient use a wheelchair? 0   No   QI: 4 Steps   Assistance Needed Incidental touching   4 Steps CARE Score 4   QI: 12 Steps   Assistance Needed Incidental touching   12 Steps CARE Score 4   Stairs   Type Stairs   # of Steps 20   Weight Bearing Precautions Fall Risk   Assist Devices Single Rail   Stairs (FIM) 4 - Patient requires steadying assist or light touching AND patient goes up and down full flight (12- 14 stairs)   Therapeutic Interventions   Flexibility hamstring and gastroc 60'x2   Balance amb no AD 500x3 with gait belt and AFO for RLE, slight LOB but he is able to correct most occasionally needs min A to recover; STS with no UE support, concentrating on maintaining knee in midline position   Neuromuscular Re-Education stair exercise with 1 foot on step and other from floor to top and back down and then switch legs 20x2 each leg   Assessment   Treatment Assessment weakness in RLE for DF, uses AFO and he is able to clear the floor; balance is impaired but he has righting reactions which are present but slow and may not be adequate to prevent a fall if he does not have UE support; trialed stair exercise as above and he had decreased balance with no UE support and fair balance with single UE support; to progress towards goals, increase strength, static and dynamic balance, and endurance for functional mobility and activity; continue POC as per PT   Problem List Decreased endurance;Decreased range of motion; Impaired balance;Decreased mobility;Orthopedic restrictions; Impaired sensation;Decreased strength   Barriers to Discharge Decreased caregiver support   PT Barriers   Functional Limitation Car transfers;Stair negotiation;Standing;Transfers; Walking   Plan   Treatment/Interventions ADL retraining;LE strengthening/ROM; Elevations; Functional transfer training; Therapeutic exercise; Endurance training;Patient/family training;Equipment eval/education; Bed mobility;Gait training   Progress Progressing toward goals   Recommendation   Recommendation Outpatient PT; Home with family support   Equipment Recommended Walker   PT Therapy Minutes   PT Time In 1430   PT Time Out 1530   PT Total Time (minutes) 60   PT Mode of treatment - Individual (minutes) 60   PT Mode of treatment - Concurrent (minutes) 0   PT Mode of treatment - Group (minutes) 0   PT Mode of treatment - Co-treat (minutes) 0   PT Mode of Teatment - Total time(minutes) 60 minutes   Therapy Time missed   Time missed?  No

## 2019-02-09 NOTE — PROGRESS NOTES
02/09/19 0830   Pain Assessment   Pain Assessment 0-10   Pain Score 1   Pain Type Acute pain   Pain Location Neck   Pain Orientation Posterior   Pain Descriptors Discomfort;Dull   Pain Frequency Intermittent   Pain Onset Ongoing   Effect of Pain on Daily Activities no impact on fxn   Hospital Pain Intervention(s) Ambulation/increased activity; Rest   Response to Interventions Pt tolerated Tx   Restrictions/Precautions   Precautions Fall Risk;Spinal precautions   Braces or Orthoses C/S Collar   QI: Putting On/Taking Off Footwear   Assistance Needed Physical assistance   Assistance Provided by [place] Less than 25%   Comment Pt able to don R shoe and RLE brace w/ cross leg method this session  Pt reports "I've never been able to do that before" 2* RLE weakness  However pt cont to require A for tying shoes 2* difficulty keeping RLE crossed over LLE w/ b/l release of hands to tie sneakers  OT trialed elastic shoe laces and had pt lace shoe up w/ L hand w/ focus on L hand FMC/strengthening 2* pt c/o weakness  Pt then completed fxnl ambulation short distance w/ elastic laces in place, however pt reports, "it feels too loose, I feel more support in my leg/ankle w/ regular laces"  OT not recommending elastic laces at this time  Pt reports his wife can A w/ tying sneakers if needed  Putting On/Taking Off Footwear CARE Score 3   QI: Sit to Stand   Assistance Needed Supervision   Assistance Provided by [place] No physical assistance   Comment VC to control stand>sit to inc pt safety and protect cervical spine from quick movements/impact  Sit to Stand CARE Score 4   QI: Chair/Bed-to-Chair Transfer   Assistance Needed Incidental touching   Assistance Provided by [place] Less than 25%   Comment CG-CS w/ RW   Chair/Bed-to-Chair Transfer CARE Score 3   Transfer Bed/Chair/Wheelchair   Limitations Noted In Balance; Endurance;LE Strength;Sensation   Adaptive Equipment Roller Walker;None   Stand Pivot Contact Guard   Sit to Stand Supervision   Stand to Sit Supervision   Findings Pt completes fxnl ambulation room<>OT gym w/ RW at CS-CG level  No rest break required  Bed, Chair, Wheelchair Transfer (FIM) 5 - Patient requires supervision/monitoring   Right Upper Extremity- Strength   Equipment [place]   R Weight/Reps/Sets 1 5# fwd chest press   RUE Strength Comment Pt CS in stance to engage in fwd chest press for light UE stretching/strengthening while maintaining spinal precautions  Focus of activity to encourage improved upright posture and promote G fxnl reach within pt limits  Pt tolerated exercise and states it "feels good"  Pt noted w/ fwd flexion of cervical neck and requires VC from OT to correct alignment to dec stress on neck, pt engaged in 3x10 scapular retraction while seated to further promote fxnl reach and pt optimal comfort  Pt w/ no c/o pain t/o  Left Upper Extremity-Strength   LUE Strength Comment See RUE for detail  Cognition   Overall Cognitive Status WFL   Arousal/Participation Alert; Cooperative   Attention Within functional limits   Orientation Level Oriented X4   Memory Within functional limits   Following Commands Follows all commands and directions without difficulty   Additional Activities   Additional Activities Comments Pt engaged in theract of bean bag toss w/ no AD at CS/CG level 2 trials w/ rest break in between while retreiving bean bags from high/low suraces  Pt required to squat while maitaining head in neutral position and maintaining spinal precautions  Pt able to squat to retreive various bean bags at CS level from OT  Pt required to complete full stand pivot in all different directions prior to retrieving bean bags w/ focus on maintaining spinal precautions during fxnl reaching activities  Focus of activity on endurance, dynamic standing balance, b/l release, LE/UE strengthening, and safety awareness for spinal precautions to maximize pt indep w/ ADLs/IADLs and all fxnl transfers   Pt tolerated activity w/ no c/o pain and slight fatigue noted, however managed w/ rest breaks  Pt requires VC at times for wider LEAH and RLE placement 2* dec sensation and to inc pt safety  Activity Tolerance   Activity Tolerance Patient tolerated treatment well   Assessment   Treatment Assessment Pt participated in skilled OT Tx session w/ focus on dynamic standing balance, fxnl reaching, endurance, UE/LE strengthening, and donning/doffing socks/sneakers  See above note for activity detail  Cont OT POC to maximize pt indep w/ ADLs/IADLs and all fxnl transfers  Plan   Treatment/Interventions ADL retraining;Functional transfer training;LE strengthening/ROM; Therapeutic exercise; Endurance training; Compensatory technique education   Progress Progressing toward goals   Recommendation   OT Discharge Recommendation Home with family support   OT Therapy Minutes   OT Time In 0830   OT Time Out 1000   OT Total Time (minutes) 90   OT Mode of treatment - Individual (minutes) 90   OT Mode of treatment - Concurrent (minutes) 0   OT Mode of treatment - Group (minutes) 0   OT Mode of treatment - Co-treat (minutes) 0   OT Mode of Teatment - Total time(minutes) 90 minutes   Therapy Time missed   Time missed?  No

## 2019-02-10 PROBLEM — K59.00 CONSTIPATION: Status: ACTIVE | Noted: 2019-02-10

## 2019-02-10 PROCEDURE — 97530 THERAPEUTIC ACTIVITIES: CPT | Performed by: OCCUPATIONAL THERAPY ASSISTANT

## 2019-02-10 PROCEDURE — 97530 THERAPEUTIC ACTIVITIES: CPT

## 2019-02-10 PROCEDURE — 97110 THERAPEUTIC EXERCISES: CPT | Performed by: OCCUPATIONAL THERAPY ASSISTANT

## 2019-02-10 PROCEDURE — 97112 NEUROMUSCULAR REEDUCATION: CPT

## 2019-02-10 PROCEDURE — 97110 THERAPEUTIC EXERCISES: CPT

## 2019-02-10 RX ADMIN — ACETAMINOPHEN 975 MG: 325 TABLET ORAL at 14:34

## 2019-02-10 RX ADMIN — LEVOTHYROXINE SODIUM 25 MCG: 25 TABLET ORAL at 05:15

## 2019-02-10 RX ADMIN — Medication 1000 MG: at 08:05

## 2019-02-10 RX ADMIN — DOCUSATE SODIUM 100 MG: 100 CAPSULE, LIQUID FILLED ORAL at 08:05

## 2019-02-10 RX ADMIN — PANTOPRAZOLE SODIUM 40 MG: 40 TABLET, DELAYED RELEASE ORAL at 05:15

## 2019-02-10 RX ADMIN — STANDARDIZED SENNA CONCENTRATE 17.2 MG: 8.6 TABLET ORAL at 08:05

## 2019-02-10 RX ADMIN — ACETAMINOPHEN 975 MG: 325 TABLET ORAL at 05:15

## 2019-02-10 RX ADMIN — ENOXAPARIN SODIUM 40 MG: 40 INJECTION SUBCUTANEOUS at 08:05

## 2019-02-10 RX ADMIN — ACETAMINOPHEN 975 MG: 325 TABLET ORAL at 21:02

## 2019-02-10 NOTE — PROGRESS NOTES
Internal Medicine Progress Note  Patient: Jo Grider III  Age/sex: 60 y o  male  Medical Record #: [id number]      ASSESSMENT/PLAN:  oJ Grider III is seen and examined and management for following issues:    Medullary mass removal with fixation/fusion of C2-3 T5 1/24/19:  Maintain cervical collar all times; steroid wean per Neurosurgery  Still has numbness of legs to hip level R>L but can feel feet on floor; some RLE weakness he says he gets when fatigued; burning has resolved hands     Post-op urinary retention/hx BPH: resolved with starting Flomax (new for him) = primary service stopped now     Hypothyroidism:  Continue Levothyroxine 25 mcg qd     Elevated fasting blood sugars; HbA1C 1/22 = 6 3:  Continue DM diet but stopped Accuchecks      Asymptomatic bradycardia:  noted postoperatively; still occurs intermitt but during sleep    ABLA:  Stable; w/o sx; cont to monitor    Heartburn:  added Protonix since on steroids and ordered prn TUMS  He gets heartburn at home if eats certain foods and gets 3x/week  Told him to go to PCP as OP when he recovers and have workup with EGD    Leukocytosis: from Decadron and has had no fevers      Subjective:  Patient reports he is doing well  He reports continued improvement and strength and sensation  He denies any current complaints      ROS:   GI: denies abdominal pain, change bowel habits or reflux symptoms  Neuro: No new neurologic changes  Respiratory: No Cough, SOB  Cardiovascular: No CP, palpitations     Scheduled Meds:    Current Facility-Administered Medications:  acetaminophen 975 mg Oral Q8H [address] Santosh Jackson MD   bisacodyl 10 mg Rectal Daily PRN Sammie Ford DO   calcium carbonate 500 mg Oral Daily PRN CLAUDIO Ugalde   senna 2 tablet Oral Daily Santosh Jackson MD   And       docusate sodium 100 mg Oral Daily Santosh Jackson MD   enoxaparin 40 mg Subcutaneous Daily Santosh Jackson MD   fish oil 1,000 mg Oral Daily Santosh Jackson MD   levothyroxine 25 mcg Oral Early Morning Iris Adler MD   methocarbamol 500 mg Oral Q6H PRN Iris Adler MD   oxyCODONE 10 mg Oral Q4H PRN Iris Adler MD   oxyCODONE 5 mg Oral Q4H PRN Iris Adler MD   pantoprazole 40 mg Oral Early Morning CLAUDIO Carranza   sodium phosphate-biphosphate 1 enema Rectal Once PRN Cecilia Kenny MD       Labs:     Results from last 7 days   Lab Units 02/07/19  0448 02/04/19  0456   WBC Thousand/uL 10 42* 12 51*   HEMOGLOBIN g/dL 9 6* 9 4*   HEMATOCRIT % 30 8* 29 6*   PLATELETS Thousands/uL 394* 382     Results from last 7 days   Lab Units 02/07/19  0448 02/04/19  0456   SODIUM mmol/L 137 133*   POTASSIUM mmol/L 4 4 4 0   CHLORIDE mmol/L 104 101   CO2 mmol/L 28 26   BUN mg/dL 22 20   CREATININE mg/dL 0 66 0 60   CALCIUM mg/dL 8 5 8 4                  Results from last 7 days   Lab Units 02/06/19  2127 02/05/19  1108 02/05/19  0644   POC GLUCOSE mg/dl 114 119 127     [unfilled]    Labs reviewed    Physical Examination:  Vitals:   Vitals:    02/09/19 0511 02/09/19 1254 02/09/19 2041 02/10/19 0517   BP: 97/58 101/58 110/57 102/62   BP Location: Left arm Left arm Left arm Left arm   Pulse: (!) 50 89 89 60   Resp: 20 20 20 20   Temp: 98 3 °F (36 8 °C) 98 4 °F (36 9 °C) 98 3 °F (36 8 °C) 97 5 °F (36 4 °C)   TempSrc: Oral Oral Oral Oral   SpO2: 97% 98% 98% 99%   Weight:       Height:         Constitutional:  NAD; pleasant; nontoxic  HEENT:  AT/NC; oropharynx negative for thrush on tongue   Neck: negative for JVD  CV:  +S1, S2;  RRR; no rub/murmur  Pulmonary:  BBS without crackles/wheeze/rhonci; resp are unlabored  Abdominal:  soft, +BS, ND/NT; no mass  Skin:  no rashes  Musculoskeletal:  no edema  :  no martinez  Neurological/Psych:  AAO;  CAIN 5/5 except RLE 4+/5; no depression/anxiety        [ X ] Total time spent: 30 Mins and greater than 50% of this time was spent counseling/coordinating care      ** Please Note: Dragon 360 Dictation voice to text software may have been used in the creation of this document   **

## 2019-02-10 NOTE — PROGRESS NOTES
02/10/19 1530   Pain Assessment   Pain Assessment 0-10   Pain Score No Pain   Equipment Use   NuStep 20 mins BLE only level 2 for strengthening   PT Therapy Minutes   PT Time In 1530   PT Time Out 1600   PT Total Time (minutes) 30   PT Mode of treatment - Individual (minutes) 30   PT Mode of treatment - Concurrent (minutes) 0   PT Mode of treatment - Group (minutes) 0   PT Mode of treatment - Co-treat (minutes) 0   PT Mode of Teatment - Total time(minutes) 30 minutes   Therapy Time missed   Time missed?  No

## 2019-02-10 NOTE — PROGRESS NOTES
Occupational Therapy Treatment Note:       02/10/19 1230   Pain Assessment   Pain Assessment No/denies pain   Pain Score No Pain   Restrictions/Precautions   Precautions Fall Risk;Spinal precautions   Weight Bearing Restrictions No   ROM Restrictions Yes  (Spinal precautions  )   Braces or Orthoses C/S Collar   QI: Sit to Stand   Assistance Needed Adaptive equipment;Set-up / clean-up;Supervision   Assistance Provided by [place] No physical assistance   Comment RW   Sit to Stand CARE Score 4   QI: Chair/Bed-to-Chair Transfer   Assistance Needed Adaptive equipment;Set-up / clean-up;Supervision   Assistance Provided by [place] No physical assistance   Comment CS; RW; pt with noted scissoring of RLE at times and difficulty self correcting due to limited range of vision from cervical collar; VCs to slow pace in order to ensure good safety  Chair/Bed-to-Chair Transfer CARE Score 4   Transfer Bed/Chair/Wheelchair   Limitations Noted In Balance; Coordination; Endurance;Sensation;LE Strength   Adaptive Equipment Roller Walker   Stand Pivot Supervision  (CS)   Sit to Stand Supervision   Stand to Sit Supervision   Findings Pt completed functional mobility using RW with CS; pt with noted scissoring of RLE at times and difficulty self correcting due to limited range of vision from cervical collar; VCs to slow pace in order to ensure good safety  Bed, Chair, Wheelchair Transfer (FIM) 5 - Patient requires supervision/monitoring  (CS)   Functional Standing Tolerance   Time 13 mins   Activity Table top activity of 5# bilat belén exercises in various planes at 3 sets/10 reps  Comments Verbal clarification for achieving hip distance stance (pt with limited vision from cervical collar and decreased RLE sensation) in order to best maintain balance  CS with no noted LOB  Pt with no c/o increased discomfort in neck throughout   In order to improve upon standing balance/tolerance and BLE strength for carryover in completion of transfers and functional tasks  Cognition   Overall Cognitive Status WFL   Arousal/Participation Alert; Cooperative   Attention Within functional limits   Orientation Level Oriented X4   Memory Within functional limits   Following Commands Follows all commands and directions without difficulty   Activity Tolerance   Activity Tolerance Patient tolerated treatment well   Assessment   Treatment Assessment OT tx session focused on transfers, standing balance/tolerance and BLE strengthening (particularly RLE), functional mobility, and overall activity toleranc/endurance  Refer above for details on pt performance  Pt would benefit from continued skilled OT services in order to achieve highest functional abilities  Prognosis Good   Problem List Decreased strength;Decreased range of motion;Decreased endurance; Impaired balance;Decreased mobility; Decreased coordination; Impaired sensation;Orthopedic restrictions   Barriers to Discharge Decreased caregiver support   Plan   Treatment/Interventions Functional transfer training;LE strengthening/ROM; Endurance training;Patient/family training;Equipment eval/education; Compensatory technique education;OT   Progress Progressing toward goals   Recommendation   OT Discharge Recommendation Home with family support   OT Therapy Minutes   OT Time In 1230   OT Time Out 1300   OT Total Time (minutes) 30   OT Mode of treatment - Individual (minutes) 30   OT Mode of treatment - Concurrent (minutes) 0   OT Mode of treatment - Group (minutes) 0   OT Mode of treatment - Co-treat (minutes) 0   OT Mode of Teatment - Total time(minutes) 30 minutes   Therapy Time missed   Time missed?  No   Mike Knight, [address]

## 2019-02-10 NOTE — PROGRESS NOTES
Physical Medicine and Rehabilitation Progress Note  Konstantin Rico III 60 y o  male MRN: [id number]  Unit/Bed#: -01 Encounter: [id number]    HPI: "Saray Mendez is a 60 y o  male who presented to the [address] with h/o sensory deficits and was found to have spinal cord mass therefore underwent tumor resection and C4-T3 laminectomy and C2-T5 fixation/fusion by Dr Kayli Collado on 1/24" - Per primary rehab attending       Chief Complaint:  Constipation     Subjective: Patient reports last BM several days ago and feels somewhat distended but without pain and noting still having some gas  He reports urinating well and denies dysuria  Patient notes pain well controlled without worsening strength, sensation, calf pain, lightheadedness, or other complaints     ROS: A 10-point ROS was performed  Negative except as listed above         Assessment/Plan:      Constipation  Assessment & Plan  Fairly significant with last BM 4-5 days ago; no s/s obstruction > monitor  Additional miralax and dulcolax supp this evening > fleets enema if needed  Colace/senna  May need daily miralaxx    Thrombocytosis ([address] )  Assessment & Plan  · IM monitoring     Hyperlipidemia  Assessment & Plan  · was Omega 3 FA 1000 mg qd PTA   · D/W neurosx and have cleared patient to resume home Omega 3 FA 1000 mg qd    Anemia  Assessment & Plan  · Likely ABLA  · Repeat CBC Monday  · IM monitoring     Leukocytosis  Assessment & Plan  · Monitor intermittently now off steroids    Impaired fasting glucose  Assessment & Plan  · PCP aware  · Diet controlled     Hypothyroidism  Assessment & Plan  · On home synthroid 25 mcg qd     BPH associated with nocturia  Assessment & Plan  · Per patient stopped taking flomax 0 4 mg qpm in the past because it was not helping the urinary retention   · Per patient stopped taking ditropan XL 10 mg qd in the past because  it was not helping the urinary retention   · Per patient stopped taking viagra 100 mg in the past because  it was not helping the urinary retention   · Per patient was no longer on any meds for urinary retention/BPH PTA as he was working with urology as OP and further tests were being planned as patient medications had not been effective however in acute care patient was restarted on flomax and post-operatively patient has been urinating well   · successful trial off of flomax (which patient had stopped taking PTA because it was not effective); PVRs were 57, 38, & 87 (PVRs are from greater than 24 hours after last dose of flomax)   · Follows with Hussain De La Torre PA-C/Dr Baltazar Saleem as OP    * 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[facility])  Assessment & Plan  · S/p resection and C4-T3 laminectomy and C2-T5 fixation/fusion by Dr Alycia Harris and Dr Carey Horne on 1/24  · decardron taper complete on 2/9  · Spine precautions  · c-collar at all times  · Path revealed grade 2 ependymoma; further management per team at [place] [address] [facility]   · OP FU w/neurosx scheduled for 2/6 done as inpt by neurosx team     Scheduled Meds:    Current Facility-Administered Medications:  acetaminophen 975 mg Oral Q8H Mauricio Cole MD   bisacodyl 10 mg Rectal Daily PRN Sushil Ruiz DO   calcium carbonate 500 mg Oral Daily PRN CLAUDIO Cochran   senna 2 tablet Oral Daily Cherrie Matias MD   And       docusate sodium 100 mg Oral Daily Cherrie Matias MD   enoxaparin 40 mg Subcutaneous Daily Cherrie Matias MD   fish oil 1,000 mg Oral Daily Cherrie Matias MD   levothyroxine 25 mcg Oral Early Morning Cherrie Matias MD   methocarbamol 500 mg Oral Q6H PRN Cherrie Matias MD   oxyCODONE 10 mg Oral Q4H PRN Cherrie Matias MD   oxyCODONE 5 mg Oral Q4H PRN Cherrie Matias MD   pantoprazole 40 mg Oral Early Morning CLAUDIO Cochran   sodium phosphate-biphosphate 1 enema Rectal Once PRN Jerardo Kidd MD        Incidental findings:  1) 2-3 mm pulmonary nodule: per radiology report of 1/18/19 recommend repeat CT chest in 3 months however will defer to PCP  2) fatty involution of pancreas: OP FU with PCP with further testing/treatment and/or specialist referral at PCP's discretion  3) L maxillary sinus thickening: asymptomatic, OP FU with PCP with further testing/treatment and/or specialist referral at PCP's discretion      DVT ppx: SCDs, cleared for lovenox 40 mg sc qd by neurosx in acute care (and has been on it since 1/26)  Dispo: reteam       Objective:    Functional Update:  Mobility: min    Transfers: CG  ADLs: min        Physical Exam:      Vitals unremarkable     General: alert, no apparent distress, cooperative and comfortable  HEENT:  +C-collar  CARDIAC:  +S1/2  LUNGS:  respirations unlabored, CTA no wheezes, rales, rhonchi  ABDOMEN:  Soft, distended, slightly decreased bowel sounds  EXTREMITIES:  no significant LE edema  NEURO:   mental status, speech normal, alert and oriented x3  PSYCH:  mood/affect reasonably positive      Laboratory:   Results from last 7 days   Lab Units 02/07/19  0448 02/04/19  0456   HEMOGLOBIN g/dL 9 6* 9 4*   HEMATOCRIT % 30 8* 29 6*   WBC Thousand/uL 10 42* 12 51*     Results from last 7 days   Lab Units 02/07/19  0448 02/04/19  0456   BUN mg/dL 22 20   SODIUM mmol/L 137 133*   POTASSIUM mmol/L 4 4 4 0   CHLORIDE mmol/L 104 101   CREATININE mg/dL 0 66 0 60            Patient Active Problem List   Diagnosis    BPH associated with nocturia    Hypothyroidism    Impaired fasting glucose    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HCC)    Leukocytosis    Anemia    Hyperlipidemia    Th[address] [address]    Constipation           Total visit time:  At least 25 minutes, with more than 50% spent counseling/coordinating care

## 2019-02-10 NOTE — PROGRESS NOTES
02/10/19 0881   Pain Assessment   Pain Assessment 0-10   Pain Score No Pain   Restrictions/Precautions   Precautions Fall Risk;Spinal precautions   Braces or Orthoses C/S Collar   Cognition   Overall Cognitive Status WFL   Arousal/Participation Alert; Cooperative   Attention Within functional limits   Orientation Level Oriented X4   Memory Within functional limits   Following Commands Follows all commands and directions without difficulty   Subjective   Subjective reports he is doing well   QI: Sit to Stand   Assistance Needed Supervision   Sit to Stand CARE Score 4   QI: Chair/Bed-to-Chair Transfer   Assistance Needed Supervision   Chair/Bed-to-Chair Transfer CARE Score 4   Transfer Bed/Chair/Wheelchair   Limitations Noted In Balance; Endurance;LE Strength;UE Strength   Adaptive Equipment Roller Walker   Stand Pivot Supervision   Sit to Stand Supervision   Stand to Sit Supervision   Findings RW   Bed, Chair, Wheelchair Transfer (FIM) 5 - Patient requires supervision/monitoring   QI: Walk 10 Feet   Assistance Needed Incidental touching   Walk 10 Feet CARE Score 4   QI: Walk 50 Feet with Two Turns   Assistance Needed Incidental touching   Walk 50 Feet with Two Turns CARE Score 4   QI: Walk 150 Feet   Assistance Needed Incidental touching   Walk 150 Feet CARE Score 4   Ambulation   Does the patient walk? 2  Yes   Primary Discharge Mode of Locomotion Walk   Walk Assist Level Contact Guard   Gait Pattern Inconsistant Sheryl; Slow Sheryl; Wide LEAH;Step through; Decreased R stance; Improper weight shift;R knee hyperextension   Assist Device   (gait belt)   Distance Walked (feet) 350 ft  (x4)   Limitations Noted In Balance; Endurance; Heel Strike;Speed;Strength;Swing   Findings AFO and gait belt   Walking (FIM) 4 - Patient requires steadying assist or light touching AND distance 150 feet or more, no rest   Wheelchair mobility   QI: Does the patient use a wheelchair? 0   No   QI: 4 Steps   Assistance Needed Incidental touching 4 Steps CARE Score 4   QI: 12 Steps   Assistance Needed Incidental touching   12 Steps CARE Score 4   Stairs   Type Stairs   # of Steps 20   Weight Bearing Precautions Fall Risk   Assist Devices Single Rail   Stairs (FIM) 4 - Patient requires steadying assist or light touching AND patient goes up and down full flight (12- 14 stairs)   Therapeutic Interventions   Flexibility hamstring and gastroc 60"x2   Neuromuscular Re-Education step ups with L foot on stair and RLE from floor to top with 2# CW 15x2 single rail; amb 350"x2 with 2# CW on RLE and then 350'x1 with 2# CW on LLE to increase stance on RLE to improve balance; Other Comments   Comments STM R upper trap 4 min   Assessment   Treatment Assessment weakness in RLE for DF and hip flexion is improving with increased foot clearance; R knee does hyperextend during amb but the degree is not consistent, at times it appears to not hyperextend; righting reactions have improved in speed to prevent LOB; balance has improved to be able to correct minor LOBs with no physical assistance, turns seem to be an area of difficulty due to having to pivot; continue to increase strength in BLE, balance, and endurance for increases in safe and functioanl mobility/activity; continue POC as per PT   Problem List Decreased endurance;Decreased range of motion; Impaired balance;Decreased mobility;Orthopedic restrictions; Impaired sensation;Decreased strength   Barriers to Discharge Decreased caregiver support   PT Barriers   Functional Limitation Car transfers;Stair negotiation;Standing;Transfers; Walking   Plan   Treatment/Interventions ADL retraining;Functional transfer training;LE strengthening/ROM; Elevations; Therapeutic exercise; Endurance training;Patient/family training;Equipment eval/education; Bed mobility;Gait training   Progress Progressing toward goals   Recommendation   Recommendation Outpatient PT; Home with family support   Equipment Recommended [facility] [place]   PT Therapy Minutes PT Time In 0830   PT Time Out 0930   PT Total Time (minutes) 60   PT Mode of treatment - Individual (minutes) 60   PT Mode of treatment - Concurrent (minutes) 0   PT Mode of treatment - Group (minutes) 0   PT Mode of treatment - Co-treat (minutes) 0   PT Mode of Teatment - Total time(minutes) 60 minutes   Therapy Time missed   Time missed?  No

## 2019-02-11 LAB
ANION GAP SERPL CALCULATED.3IONS-SCNC: 4 MMOL/L (ref 4–13)
BASOPHILS # BLD AUTO: 0.03 THOUSANDS/ΜL (ref 0–0.1)
BASOPHILS NFR BLD AUTO: 1 % (ref 0–1)
BUN SERPL-MCNC: 18 MG/DL (ref 5–25)
CALCIUM SERPL-MCNC: 8.8 MG/DL (ref 8.3–10.1)
CHLORIDE SERPL-SCNC: 106 MMOL/L (ref 100–108)
CO2 SERPL-SCNC: 29 MMOL/L (ref 21–32)
CREAT SERPL-MCNC: 0.74 MG/DL (ref 0.6–1.3)
EOSINOPHIL # BLD AUTO: 0.17 THOUSAND/ΜL (ref 0–0.61)
EOSINOPHIL NFR BLD AUTO: 3 % (ref 0–6)
ERYTHROCYTE [DISTWIDTH] IN BLOOD BY AUTOMATED COUNT: 13.8 % (ref 11.6–15.1)
GFR SERPL CREATININE-BSD FRML MDRD: 101 ML/MIN/1.73SQ M
GLUCOSE P FAST SERPL-MCNC: 69 MG/DL (ref 65–99)
GLUCOSE SERPL-MCNC: 69 MG/DL (ref 65–140)
HCT VFR BLD AUTO: 33 % (ref 36.5–49.3)
HGB BLD-MCNC: 10.2 G/DL (ref 12–17)
IMM GRANULOCYTES # BLD AUTO: 0.04 THOUSAND/UL (ref 0–0.2)
IMM GRANULOCYTES NFR BLD AUTO: 1 % (ref 0–2)
LYMPHOCYTES # BLD AUTO: 2.08 THOUSANDS/ΜL (ref 0.6–4.47)
LYMPHOCYTES NFR BLD AUTO: 35 % (ref 14–44)
MCH RBC QN AUTO: 29.6 PG (ref 26.8–34.3)
MCHC RBC AUTO-ENTMCNC: 30.9 G/DL (ref 31.4–37.4)
MCV RBC AUTO: 96 FL (ref 82–98)
MONOCYTES # BLD AUTO: 0.51 THOUSAND/ΜL (ref 0.17–1.22)
MONOCYTES NFR BLD AUTO: 9 % (ref 4–12)
NEUTROPHILS # BLD AUTO: 3.13 THOUSANDS/ΜL (ref 1.85–7.62)
NEUTS SEG NFR BLD AUTO: 51 % (ref 43–75)
NRBC BLD AUTO-RTO: 0 /100 WBCS
PLATELET # BLD AUTO: 355 THOUSANDS/UL (ref 149–390)
PMV BLD AUTO: 8.6 FL (ref 8.9–12.7)
POTASSIUM SERPL-SCNC: 4 MMOL/L (ref 3.5–5.3)
RBC # BLD AUTO: 3.45 MILLION/UL (ref 3.88–5.62)
SODIUM SERPL-SCNC: 139 MMOL/L (ref 136–145)
WBC # BLD AUTO: 5.96 THOUSAND/UL (ref 4.31–10.16)

## 2019-02-11 PROCEDURE — 97535 SELF CARE MNGMENT TRAINING: CPT

## 2019-02-11 PROCEDURE — 97530 THERAPEUTIC ACTIVITIES: CPT

## 2019-02-11 PROCEDURE — 97110 THERAPEUTIC EXERCISES: CPT

## 2019-02-11 PROCEDURE — 80048 BASIC METABOLIC PNL TOTAL CA: CPT | Performed by: NURSE PRACTITIONER

## 2019-02-11 PROCEDURE — 85025 COMPLETE CBC W/AUTO DIFF WBC: CPT | Performed by: NURSE PRACTITIONER

## 2019-02-11 PROCEDURE — 97112 NEUROMUSCULAR REEDUCATION: CPT

## 2019-02-11 PROCEDURE — 99232 SBSQ HOSP IP/OBS MODERATE 35: CPT | Performed by: PHYSICAL MEDICINE & REHABILITATION

## 2019-02-11 RX ADMIN — LEVOTHYROXINE SODIUM 25 MCG: 25 TABLET ORAL at 06:37

## 2019-02-11 RX ADMIN — ACETAMINOPHEN 975 MG: 325 TABLET ORAL at 06:37

## 2019-02-11 RX ADMIN — DOCUSATE SODIUM 100 MG: 100 CAPSULE, LIQUID FILLED ORAL at 09:00

## 2019-02-11 RX ADMIN — ENOXAPARIN SODIUM 40 MG: 40 INJECTION SUBCUTANEOUS at 09:00

## 2019-02-11 RX ADMIN — Medication 1000 MG: at 09:02

## 2019-02-11 RX ADMIN — PANTOPRAZOLE SODIUM 40 MG: 40 TABLET, DELAYED RELEASE ORAL at 06:37

## 2019-02-11 RX ADMIN — STANDARDIZED SENNA CONCENTRATE 17.2 MG: 8.6 TABLET ORAL at 09:00

## 2019-02-11 RX ADMIN — ACETAMINOPHEN 975 MG: 325 TABLET ORAL at 21:27

## 2019-02-11 RX ADMIN — ACETAMINOPHEN 975 MG: 325 TABLET ORAL at 15:35

## 2019-02-11 NOTE — PROGRESS NOTES
Physical Medicine and Rehabilitation Progress Note  Kj Duffy III 60 y o  male MRN: [id number]  Unit/Bed#: -01 Encounter: [id number]    HPI: Tanya Small is a 60 y o  male who presented to the [address] with h/o sensory deficits and was found to have spinal cord mass therefore underwent tumor resection and C4-T3 laminectomy and C2-T5 fixation/fusion by Dr Otto Boone on 1/24       Chief Complaint: spinal mass s/p resection     Subjective: patient denies any events over the weekend     ROS:  negative unless noted above     Assessment/Plan:      Anemia  Assessment & Plan  · Likely ABLA  · Hg currently stable at 10 2  · IM monitoring     Leukocytosis  Assessment & Plan  · Now WNL     Thrombocytosis ([address] )  Assessment & Plan  · Currently WNL     Hyperlipidemia  Assessment & Plan  · was Omega 3 FA 1000 mg qd PTA   · D/W neurosx and have cleared patient to resume home Omega 3 FA 1000 mg qd    Impaired fasting glucose  Assessment & Plan  · PCP aware  · Diet controlled     Hypothyroidism  Assessment & Plan  · On home synthroid 25 mcg qd     BPH associated with nocturia  Assessment & Plan  · Per patient stopped taking flomax 0 4 mg qpm in the past because it was not helping the urinary retention   · Per patient stopped taking ditropan XL 10 mg qd in the past because  it was not helping the urinary retention   · Per patient stopped taking viagra 100 mg in the past because  it was not helping the urinary retention   · Per patient was no longer on any meds for urinary retention/BPH PTA as he was working with urology as OP and further tests were being planned as patient medications had not been effective however in acute care patient was restarted on flomax and post-operatively patient has been urinating well   · successful trial off of flomax (which patient had stopped taking PTA because it was not effective); PVRs were 57, 38, & 87 (PVRs are from greater than 24 hours after last dose of flomax)   · Follows with Esteban Palma PA-C/Dr Mihir Dooley as OP    * 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)  Assessment & Plan  · S/p resection and C4-T3 laminectomy and C2-T5 fixation/fusion by Dr Marium Sheppard and Dr Manny Morel on 1/24  · decardron taper completed  · Spine precautions  · c-collar at all times  · Path revealed grade 2 ependymoma; further management per team at [place] [address] [facility] tumor center   · OP FU w/neurosx scheduled for 2/6 done as inpt by neurosx team     Scheduled Meds:    Current Facility-Administered Medications:  acetaminophen 975 mg Oral Q8H Devaughn Saxena MD   bisacodyl 10 mg Rectal Daily PRN Alirio Godinez DO   calcium carbonate 500 mg Oral Daily PRN CLAUDIO [place]   senna 2 tablet Oral Daily Janell Christiansen MD   And       docusate sodium 100 mg Oral Daily Janell Christiansen MD   enoxaparin 40 mg Subcutaneous Daily Janell Christiansen MD   fish oil 1,000 mg Oral Daily Janell Christiansen MD   levothyroxine 25 mcg Oral Early Morning Janell Christiansen MD   methocarbamol 500 mg Oral Q6H PRN Janell Christiansen MD   oxyCODONE 10 mg Oral Q4H PRN Janell Christiansen MD   oxyCODONE 5 mg Oral Q4H PRN Janell Christiansen MD   pantoprazole 40 mg Oral Early Morning CLAUDIO [place]le   sodium phosphate-biphosphate 1 enema Rectal Once PRN Ovidio Elias MD        Incidental findings:  1) 2-3 mm pulmonary nodule: per radiology report of 1/18/19 recommend repeat CT chest in 3 months however will defer to PCP  2) fatty involution of pancreas: OP FU with PCP with further testing/treatment and/or specialist referral at PCP's discretion  3) L maxillary sinus thickening: asymptomatic, OP FU with PCP with further testing/treatment and/or specialist referral at PCP's discretion      DVT ppx: SCDs, cleared for lovenox 40 mg sc qd by neurosx in acute care (and has been on it since 1/26)  Dispo: reteam       Objective:    Functional Update:  Mobility: min    Transfers: CG  ADLs: min        Physical Exam:    Vitals: 02/11/19 1333   BP: 132/62   Pulse: 60   Resp: 18   Temp: 98 °F (36 7 °C)   SpO2: 99%         General: alert, no apparent distress, cooperative and comfortable  HEENT:  +C-collar  CARDIAC:  +S1/2  LUNGS:  respirations unlabored   ABDOMEN:  soft NT   EXTREMITIES:  no significant LE edema   NEURO:   mental status, speech normal, alert and oriented x3  PSYCH:  mood/affect currently stable       Laboratory:   Results from last 7 days   Lab Units 02/11/19  0506 02/07/19  0448   HEMOGLOBIN g/dL 10 2* 9 6*   HEMATOCRIT % 33 0* 30 8*   WBC Thousand/uL 5 96 10 42*     Results from last 7 days   Lab Units 02/11/19  0506 02/07/19  0448   BUN mg/dL 18 22   SODIUM mmol/L 139 137   POTASSIUM mmol/L 4 0 4 4   CHLORIDE mmol/L 106 104   CREATININE mg/dL 0 74 0 66            Patient Active Problem List   Diagnosis    BPH associated with nocturia    Hypothyroidism    Impaired fasting glucose    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HCC)    Leukocytosis    Anemia    Hyperlipidemia    Thrombocytosis (HCC)           Total visit time:  At least 25 minutes, with more than 50% spent counseling/coordinating care

## 2019-02-11 NOTE — PLAN OF CARE
Problem: Potential for Falls  Goal: Patient will remain free of falls  Description  INTERVENTIONS:  - Assess patient frequently for physical needs  -  Identify cognitive and physical deficits and behaviors that affect risk of falls    -  [place] fall precautions as indicated by assessment   - Educate patient/family on patient safety including physical limitations  - Instruct patient to call for assistance with activity based on assessment  - Modify environment to reduce risk of injury  - Consider OT/PT consult to assist with strengthening/mobility   Outcome: Progressing     Problem: PAIN - ADULT  Goal: Verbalizes/displays adequate comfort level or baseline comfort level  Description  Interventions:  - Encourage patient to monitor pain and request assistance  - Assess pain using appropriate pain scale  - Administer analgesics based on type and severity of pain and evaluate response  - Implement non-pharmacological measures as appropriate and evaluate response  - Consider cultural and social influences on pain and pain management  - Notify physician/advanced practitioner if interventions unsuccessful or patient reports new pain   Outcome: Progressing     Problem: INFECTION - ADULT  Goal: Absence or prevention of progression during hospitalization  Description  INTERVENTIONS:  - Assess and monitor for signs and symptoms of infection  - Monitor lab/diagnostic results  - Monitor all insertion sites, i e  indwelling lines, tubes, and drains  - Monitor endotracheal (as able) and nasal secretions for changes in amount and color  - [place] appropriate cooling/warming therapies per order  - Administer medications as ordered  - Instruct and encourage patient and family to use good hand hygiene technique  - Identify and instruct in appropriate isolation precautions for identified infection/condition   Outcome: Progressing     Problem: SAFETY ADULT  Goal: Patient will remain free of falls  Description  INTERVENTIONS:  - Assess patient frequently for physical needs  -  Identify cognitive and physical deficits and behaviors that affect risk of falls    -  [place] fall precautions as indicated by assessment   - Educate patient/family on patient safety including physical limitations  - Instruct patient to call for assistance with activity based on assessment  - Modify environment to reduce risk of injury  - Consider OT/PT consult to assist with strengthening/mobility   Outcome: Progressing  Goal: Maintain or return to baseline ADL function  Description  INTERVENTIONS:  -  Assess patient's ability to carry out ADLs; assess patient's baseline for ADL function and identify physical deficits which impact ability to perform ADLs (bathing, care of mouth/teeth, toileting, grooming, dressing, etc )  - Assess/evaluate cause of self-care deficits   - Assess range of motion  - Assess patient's mobility; develop plan if impaired  - Assess patient's need for assistive devices and provide as appropriate  - Encourage maximum independence but intervene and supervise when necessary  ¯ Involve family in performance of ADLs  ¯ Assess for home care needs following discharge   ¯ Request OT consult to assist with ADL evaluation and planning for discharge  ¯ Provide patient education as appropriate   Outcome: Progressing  Goal: Maintain or return mobility status to optimal level  Description  INTERVENTIONS:  - Assess patient's baseline mobility status (ambulation, transfers, stairs, etc )    - Identify cognitive and physical deficits and behaviors that affect mobility  - Identify mobility aids required to assist with transfers and/or ambulation (gait belt, sit-to-stand, lift, walker, cane, etc )  - [place] fall precautions as indicated by assessment  - Record patient progress and toleration of activity level on Mobility SBAR; progress patient to next Phase/Stage  - Instruct patient to call for assistance with activity based on assessment  - Request Rehabilitation consult to assist with strengthening/weightbearing, etc    Outcome: Progressing     Problem: DISCHARGE PLANNING  Goal: Discharge to home or other facility with appropriate resources  Description  INTERVENTIONS:  - Identify barriers to discharge w/patient and caregiver  - Arrange for needed discharge resources and transportation as appropriate  - Identify discharge learning needs (meds, wound care, etc )  - Arrange for interpretive services to assist at discharge as needed  - Refer to Case Management Department for coordinating discharge planning if the patient needs post-hospital services based on physician/advanced practitioner order or complex needs related to functional status, cognitive ability, or social support system   Outcome: Progressing     Problem: Prexisting or High Potential for Compromised Skin Integrity  Goal: Skin integrity is maintained or improved  Description  INTERVENTIONS:  - Identify patients at risk for skin breakdown  - Assess and monitor skin integrity  - Assess and monitor nutrition and hydration status  - Monitor labs (i e  albumin)  - Assess for incontinence   - Turn and reposition patient  - Assist with mobility/ambulation  - Relieve pressure over bony prominences  - Avoid friction and shearing  - Provide appropriate hygiene as needed including keeping skin clean and dry  - Evaluate need for skin moisturizer/barrier cream  - Collaborate with interdisciplinary team (i e  Nutrition, Rehabilitation, etc )   - Patient/family teaching   Outcome: Progressing

## 2019-02-11 NOTE — SOCIAL WORK
Clinical update faxed this evening due to potential weather for tomorrow   Clinical faxed to lele at [facility] at [phone], awaiting determination

## 2019-02-11 NOTE — PROGRESS NOTES
02/11/19 0700   Pain Assessment   Pain Assessment 0-10   Pain Score 2   Pain Type Acute pain   Pain Location Leg   Pain Orientation Right   Pain Frequency Intermittent   Hospital Pain Intervention(s) Distraction; Emotional support; Environmental changes   Response to Interventions Pt tolerated treatment well   Restrictions/Precautions   Precautions Fall Risk;Spinal precautions   Weight Bearing Restrictions No   ROM Restrictions Yes   Braces or Orthoses C/S Collar;MAFO   Lifestyle   Autonomy I'm feeling more confident everyday with my leg "   QI: [address] Provided by [place] No physical assistance   Eating CARE Score 6   Eating Assessment   Positioning Upright;Out of Bed   Meal Assessed Breakfast   QI: Swallowing/Nutritional Status Regular food   Eating (FIM) 7 - Patient completely independent   QI: Oral Hygiene   Assistance Needed Supervision   Assistance Provided by [place] No physical assistance   Comment Pt completes hygiene in stance at sink with RW, requires supervision 2* to decreased standing balance  Oral Hygiene CARE Score 4   Grooming   Able To Initiate Tasks; Acquire Items;Comb/Brush Hair;Wash/Dry Face;Brush/Clean Teeth;Wash/Dry Hands   Limitation Noted In Safety;Strength   Findings Pt completes grooming in stance at sink with RW with BUE release to complete tasks  Pt continues to require supervision 2* to decreased standing balance  Grooming (FIM) 5 - Patient requires supervision/monitoring   QI: Shower/Bathe Self   Assistance Needed Supervision   Assistance Provided by [place] No physical assistance   Comment Pt requires supervision while in stance to compete fang and rear hygiene with unilateral UE release from grab bar  Pt utilizes cross leg technique while seated on tub bench to bath lower legs and feet  Pt able to clear BLE over tub ledge without using UEs for support     Shower/Bathe Self CARE Score 4   Bathing   Assessed Bath Style Tub   Anticipated D/C Bath Style Tub   Able to Gather/Transport Yes   Able to [employer] Temperature Yes   Able to Wash/Rinse/Dry (body part) Left Arm;Right Arm;L Upper Leg;R Upper Leg;L Lower Leg/Foot;R Lower Leg/Foot;Chest;Abdomen;Perineal Area; Buttocks   Limitations Noted in Balance; Endurance; Safety;Strength   Positioning Seated;Standing   Adaptive Equipment Tub Bench; Shower Bars;Hand Held Shower   Bathing (FIM) 5 - Patient requires supervision/monitoring but completes 10/10 parts   Tub/Shower Transfer   Limitations Noted In Balance; Endurance;LE Strength   Adaptive Equipment Transfer Bench   Assessed Tub   Findings Pt approaches tub bench with RW and utilizes sit swivel method to clear BLE over tub ledge with CS  Pt demos increased ability to clear BLE into tub the session by not requiring UEs to help boost leg over ledge   Tub Transfer (FIM) 5 - Patient requires supervision/monitoring   Shower Transfer (FIM) 0 - Activity does not occur   QI: Upper Body Dressing   Assistance Needed Physical assistance   Assistance Provided by [place] Less than 25%   Comment Pt require assistance to adjust shirt around collar while seated  Pt reports that wife will help him at home  Pt continues to be Max A with C collar management but reports that his wife will complete at home  Upper Body Dressing CARE Score 3   QI: Lower Body Dressing   Assistance Needed Supervision   Assistance Provided by [place] No physical assistance   Comment Pt continues to require supervision to pull pants over hips while in stance at RW 2* to decreased standing balance  Pt demos increased standing balance with BUE release to adjust pants  Lower Body Dressing CARE Score 4   QI: Putting On/Taking Off Footwear   Assistance Needed Physical assistance   Assistance Provided by [place] Less than 25%   Comment Pt able to don both socks utilizing one handed dressing technique while utilizing cross leg technique   Pt able to don both shoes and tie L shoe but requires assistance to tie the R shoe  Pt reports being educated on elastic shoe laces over the weekend and not liking them  Pt reports that his wife will be able to assist with R shoe at home  Putting On/Taking Off Footwear CARE Score 3   Dressing/Undressing Clothing   Able to  Obtain Clothing;Store removed clothing   Remove UB Clothes Pullover Shirt   Remove LB Clothes Pants; Undergarment;Socks; Shoes   Don UB Clothes Pullover Shirt   Don LB Clothes Pants; Undergarment;Socks; Shoes   Limitations Noted In Balance; Endurance;Strength;ROM   Positioning Supported Sit;Standing;Sit Edge Of Bed   UB Dressing (FIM) 4 - Patient requires steadying assist or light touching   LB Dressing (FIM) 4 - Patient completes 75% of all tasks   QI: Lying to Sitting on Side of Bed   Assistance Needed Supervision   Assistance Provided by [place] No physical assistance   Lying to Sitting on Side of Bed CARE Score 4   QI: Sit to [address] Provided by [place] No physical assistance   Comment RW   Sit to Stand CARE Score 4   QI: Chair/Bed-to-Chair Transfer   Assistance Needed Supervision   Assistance Provided by [place] No physical assistance   Comment Pt continues to require supervision 2* to decreased standing balance and RLE strength  Chair/Bed-to-Chair Transfer CARE Score 4   Transfer Bed/Chair/Wheelchair   Limitations Noted In Balance; Endurance;LE Strength   Adaptive Equipment Roller Walker   Sit to TXU Adri   Stand to Sit Supervision   Supine to W  R  Eve, Chair, Wheelchair Transfer (FIM) 5 - Patient requires supervision/monitoring   QI: [address] Needed Supervision   Assistance Provided by [place] No physical assistance   Comment Pt completes hygiene in stance with RW  Pt requires supervision 2* to decreased standing balance  Toileting Hygiene CARE Score 4   Toileting   Able to [address] down yes, up yes     Manage Hygiene Bladder   Limitations Noted In Balance;ROM;LE Strength Adaptive Equipment Grab Bar   Findings Pt completes toileting in stance with unilateral UE support on grab bar  Toileting (FIM) 5 - Patient requires supervision/monitoring   QI: Toilet Transfer   Assistance Needed Supervision   Assistance Provided by [place] No physical assistance   Comment Pt practiced transfer to standard toilet to simulate home environment  Pt able to complete transfer with supervision  Toilet Transfer CARE Score 4   Toilet Transfer   Surface Assessed Standard Toilet   Transfer Technique Standard   Limitations Noted In Balance; Endurance;LE Strength   Toilet Transfer (FIM) 5 - Patient requires supervision/monitoring   Light Housekeeping   Light Housekeeping Level Walker   Light Housekeeping Level of Assistance Close supervision   Light Housekeeping Pt participates in laundry and dishes tasks to increase independence with I/ADL tasks  Pt able to walk to laundry room while OTAS carries laundry 2* to pt use of RW  Pt able to put laundry into top load washer, set to correct setting, start water and then dispense detergent into washer  Pt then participates in IADL task by doing dishes  Pt able to wash and dry ~10 dishes of various sizes and weights and then put them back into the correct cabinets  Pt demos increased ability to stand with BUE release to increase static standing balance  Pt demos F static standing balance overall with no LOB noted, however, continues to demo decreased dynamic standing balance  Exercise Tools   Exercise Tools Yes   Theraband Pt participated in theraband HEP with use of red theraband to assess carryover and increase independence with I/ADL tasks  Pt engaged in each exercise that did not require UEs to be above shoulder height  Pt completes 1 x 10 for each exercise  Pt demos G carryover of correct form and understanding of program  No OH shoulder exercises! Cognition   Overall Cognitive Status WFL   Arousal/Participation Alert; Cooperative   Attention Within functional limits   Orientation Level Oriented X4   Memory Within functional limits   Following Commands Follows all commands and directions without difficulty   Activity Tolerance   Activity Tolerance Patient tolerated treatment well   Assessment   Treatment Assessment Pt participated in skilled OT services to focus on ADL retraining, light housekeeping tasks, functional transfers, and HEP  See above for details on the above listed activities  Pt continues to demo G compliance with spinal precautions  Pt continues to demo G carryover of LB dressing techniques  Pt reports wanting to try shaving in next session  Pt demos G progress towards LTGs however continues to be limited by decreased dynamic standing balance, decreased endurance, decreased RLE strength and orthopedic restrictions which limits pt's ability to complete I/ADL tasks independently  Pt would benefit from skilled OT services to focus on above mentioned deficits  Prognosis Good   Problem List Decreased range of motion;Decreased endurance; Impaired balance;Decreased mobility;Orthopedic restrictions; Impaired sensation;Decreased coordination;Decreased strength   Plan   Treatment/Interventions Functional transfer training; Therapeutic exercise; Endurance training;Patient/family training; Compensatory technique education; ADL retraining   Progress Progressing toward goals   Recommendation   OT Discharge Recommendation Home with family support   OT Therapy Minutes   OT Time In 0700   OT Time Out 0830   OT Total Time (minutes) 90   OT Mode of treatment - Individual (minutes) 90   OT Mode of treatment - Concurrent (minutes) 0   OT Mode of treatment - Group (minutes) 0   OT Mode of treatment - Co-treat (minutes) 0   OT Mode of Teatment - Total time(minutes) 90 minutes   Therapy Time missed   Time missed?  No

## 2019-02-11 NOTE — ASSESSMENT & PLAN NOTE
Fairly significant with last BM 4-5 days ago; no s/s obstruction > monitor  Additional miralax and dulcolax supp this evening > fleets enema if needed  Colace/senna  May need daily miralaxx

## 2019-02-11 NOTE — PROGRESS NOTES
02/11/19 1230   Pain Assessment   Pain Assessment No/denies pain   Restrictions/Precautions   Precautions Fall Risk   Braces or Orthoses C/S Collar   Cognition   Arousal/Participation Cooperative   Subjective   Subjective pt reported feeling well and he feels he is making progress    QI: Sit to Stand   Assistance Needed Supervision; Adaptive equipment   Sit to Stand CARE Score 4   QI: Chair/Bed-to-Chair Transfer   Assistance Needed Supervision; Adaptive equipment   Chair/Bed-to-Chair Transfer CARE Score 4   Transfer Bed/Chair/Wheelchair   Limitations Noted In Balance; Endurance;UE Strength;LE Strength   Adaptive Equipment None   Stand Pivot Supervision   Sit to Stand Supervision   Stand to W  R  Eve, Chair, Wheelchair Transfer (FIM) 5 - Patient requires supervision/monitoring   QI: Walk 10 Feet   Assistance Needed Incidental touching   Walk 10 Feet CARE Score 4   QI: Walk 50 Feet with Two Turns   Assistance Needed Incidental touching   Walk 50 Feet with Two Turns CARE Score 4   QI: Walk 150 Feet   Assistance Needed Incidental touching   Walk 150 Feet CARE Score 4   QI: Walking 10 Feet on Uneven Surfaces   Assistance Needed Physical assistance   Assistance Provided by [place] Less than 25%   Walking 10 Feet on Uneven Surfaces CARE Score 3   Ambulation   Primary Discharge Mode of Locomotion Walk   Walk Assist Level Contact Guard;Supervision;Close Supervision   Gait Pattern Inconsistant Sheryl; Slow Sheryl; Improper weight shift;Trendelenburg; Step through   Assist Device   (gait belt)   Distance Walked (feet) 150 ft  (x3 300 x3 350 x2)   Limitations Noted In Balance; Endurance; Heel Strike;Strength;Speed;Swing;Sensation   Findings CS-CGA with gait belt   Walking (FIM) 4 - Patient requires steadying assist or light touching AND distance 150 feet or more, no rest   QI: 4 Steps   Assistance Needed Supervision; Adaptive equipment   4 Steps CARE Score 4   QI: 12 Steps   Assistance Needed Supervision; Adaptive equipment   12 Steps CARE Score 4   Stairs   Type Stairs   # of Steps 22   Assist Devices Single Rail   Findings nonreciprocal down and reciprocal up one HR    Stairs (FIM) 5 - Patient requires supervision/monitoring AND goes up and down full flight (12- 14 stairs)   Therapeutic Interventions   Strengthening step through on  steps both ways with 1 HR and then with 3lb ankle weight on RLE lifting and LLE when lifting  Balance walking backwards x2 over 30', walking over weighted dowels  walking faster, slow  gait speed at faster pace was 0 90 m/s  wearing socks, standing on blue foam with feet hip width apart and then feet together, EO, and EC, minor swaying  Equipment Use   NuStep 10 min level 3 BLE only    Assessment   Treatment Assessment Pt cont to make good progress towards LTGs, being able to perform this session without AFO and without any inc in LOB  Pt was able to progress balance interventions and cont to progress activity tolerance  RLE strength and sensation deficits cont to be a barrier to progressing mobility  Cont to recommend use of RW at home  Cont to note slight trendelenburg R side however much better than on eval; note slight inversion in R foot without brace due to dorsiflexor weakness, also note early foot flat RLE into stance phase due to weak ant tib RLE in eccentric mode  Barriers to Discharge Decreased caregiver support   PT Barriers   Physical Impairment Decreased strength;Decreased range of motion;Decreased endurance; Impaired balance;Decreased mobility; Impaired sensation   Functional Limitation Car transfers;Stair negotiation;Standing;Transfers; Walking   Plan   Treatment/Interventions Functional transfer training;LE strengthening/ROM; Elevations; Therapeutic exercise; Endurance training;Patient/family training;Equipment eval/education; Bed mobility;Gait training   Progress Progressing toward goals   Recommendation   Recommendation Outpatient PT; Home with family support Equipment Recommended Walker   PT Therapy Minutes   PT Time In 1230   PT Time Out 1400   PT Total Time (minutes) 90   PT Mode of treatment - Individual (minutes) 90   PT Mode of treatment - Concurrent (minutes) 0   PT Mode of treatment - Group (minutes) 0   PT Mode of treatment - Co-treat (minutes) 0   PT Mode of Teatment - Total time(minutes) 90 minutes   Therapy Time missed   Time missed?  No

## 2019-02-11 NOTE — PLAN OF CARE
Problem: Potential for Falls  Goal: Patient will remain free of falls  Description  INTERVENTIONS:  - Assess patient frequently for physical needs  -  Identify cognitive and physical deficits and behaviors that affect risk of falls    -  [facility] fall precautions as indicated by assessment   - Educate patient/family on patient safety including physical limitations  - Instruct patient to call for assistance with activity based on assessment  - Modify environment to reduce risk of injury  - Consider OT/PT consult to assist with strengthening/mobility   Outcome: Progressing     Problem: PAIN - ADULT  Goal: Verbalizes/displays adequate comfort level or baseline comfort level  Description  Interventions:  - Encourage patient to monitor pain and request assistance  - Assess pain using appropriate pain scale  - Administer analgesics based on type and severity of pain and evaluate response  - Implement non-pharmacological measures as appropriate and evaluate response  - Consider cultural and social influences on pain and pain management  - Notify physician/advanced practitioner if interventions unsuccessful or patient reports new pain   Outcome: Progressing     Problem: INFECTION - ADULT  Goal: Absence or prevention of progression during hospitalization  Description  INTERVENTIONS:  - Assess and monitor for signs and symptoms of infection  - Monitor lab/diagnostic results  - Monitor all insertion sites, i e  indwelling lines, tubes, and drains  - Monitor endotracheal (as able) and nasal secretions for changes in amount and color  - [facility] appropriate cooling/warming therapies per order  - Administer medications as ordered  - Instruct and encourage patient and family to use good hand hygiene technique  - Identify and instruct in appropriate isolation precautions for identified infection/condition   Outcome: Progressing     Problem: SAFETY ADULT  Goal: Patient will remain free of falls  Description  INTERVENTIONS:  - Assess patient frequently for physical needs  -  Identify cognitive and physical deficits and behaviors that affect risk of falls    -  [place] fall precautions as indicated by assessment   - Educate patient/family on patient safety including physical limitations  - Instruct patient to call for assistance with activity based on assessment  - Modify environment to reduce risk of injury  - Consider OT/PT consult to assist with strengthening/mobility   Outcome: Progressing  Goal: Maintain or return to baseline ADL function  Description  INTERVENTIONS:  -  Assess patient's ability to carry out ADLs; assess patient's baseline for ADL function and identify physical deficits which impact ability to perform ADLs (bathing, care of mouth/teeth, toileting, grooming, dressing, etc )  - Assess/evaluate cause of self-care deficits   - Assess range of motion  - Assess patient's mobility; develop plan if impaired  - Assess patient's need for assistive devices and provide as appropriate  - Encourage maximum independence but intervene and supervise when necessary  ¯ Involve family in performance of ADLs  ¯ Assess for home care needs following discharge   ¯ Request OT consult to assist with ADL evaluation and planning for discharge  ¯ Provide patient education as appropriate   Outcome: Progressing  Goal: Maintain or return mobility status to optimal level  Description  INTERVENTIONS:  - Assess patient's baseline mobility status (ambulation, transfers, stairs, etc )    - Identify cognitive and physical deficits and behaviors that affect mobility  - Identify mobility aids required to assist with transfers and/or ambulation (gait belt, sit-to-stand, lift, walker, cane, etc )  - [place] fall precautions as indicated by assessment  - Record patient progress and toleration of activity level on Mobility SBAR; progress patient to next Phase/Stage  - Instruct patient to call for assistance with activity based on assessment  - Request Rehabilitation consult to assist with strengthening/weightbearing, etc    Outcome: Progressing     Problem: DISCHARGE PLANNING  Goal: Discharge to home or other facility with appropriate resources  Description  INTERVENTIONS:  - Identify barriers to discharge w/patient and caregiver  - Arrange for needed discharge resources and transportation as appropriate  - Identify discharge learning needs (meds, wound care, etc )  - Arrange for interpretive services to assist at discharge as needed  - Refer to Case Management Department for coordinating discharge planning if the patient needs post-hospital services based on physician/advanced practitioner order or complex needs related to functional status, cognitive ability, or social support system   Outcome: Progressing     Problem: Prexisting or High Potential for Compromised Skin Integrity  Goal: Skin integrity is maintained or improved  Description  INTERVENTIONS:  - Identify patients at risk for skin breakdown  - Assess and monitor skin integrity  - Assess and monitor nutrition and hydration status  - Monitor labs (i e  albumin)  - Assess for incontinence   - Turn and reposition patient  - Assist with mobility/ambulation  - Relieve pressure over bony prominences  - Avoid friction and shearing  - Provide appropriate hygiene as needed including keeping skin clean and dry  - Evaluate need for skin moisturizer/barrier cream  - Collaborate with interdisciplinary team (i e  Nutrition, Rehabilitation, etc )   - Patient/family teaching   Outcome: Progressing

## 2019-02-12 PROCEDURE — 97112 NEUROMUSCULAR REEDUCATION: CPT

## 2019-02-12 PROCEDURE — 97530 THERAPEUTIC ACTIVITIES: CPT

## 2019-02-12 PROCEDURE — 99232 SBSQ HOSP IP/OBS MODERATE 35: CPT | Performed by: PHYSICAL MEDICINE & REHABILITATION

## 2019-02-12 PROCEDURE — 97110 THERAPEUTIC EXERCISES: CPT

## 2019-02-12 PROCEDURE — 97116 GAIT TRAINING THERAPY: CPT

## 2019-02-12 PROCEDURE — 97535 SELF CARE MNGMENT TRAINING: CPT

## 2019-02-12 RX ADMIN — ENOXAPARIN SODIUM 40 MG: 40 INJECTION SUBCUTANEOUS at 08:06

## 2019-02-12 RX ADMIN — Medication 1000 MG: at 08:06

## 2019-02-12 RX ADMIN — DOCUSATE SODIUM 100 MG: 100 CAPSULE, LIQUID FILLED ORAL at 08:06

## 2019-02-12 RX ADMIN — PANTOPRAZOLE SODIUM 40 MG: 40 TABLET, DELAYED RELEASE ORAL at 05:30

## 2019-02-12 RX ADMIN — STANDARDIZED SENNA CONCENTRATE 17.2 MG: 8.6 TABLET ORAL at 08:06

## 2019-02-12 RX ADMIN — LEVOTHYROXINE SODIUM 25 MCG: 25 TABLET ORAL at 05:30

## 2019-02-12 RX ADMIN — ACETAMINOPHEN 975 MG: 325 TABLET ORAL at 05:30

## 2019-02-12 RX ADMIN — ACETAMINOPHEN 975 MG: 325 TABLET ORAL at 13:07

## 2019-02-12 RX ADMIN — ACETAMINOPHEN 975 MG: 325 TABLET ORAL at 21:07

## 2019-02-12 NOTE — PROGRESS NOTES
02/12/19 0830   Pain Assessment   Pain Assessment 0-10   Pain Score 2   Pain Type Acute pain   Pain Location Leg   Pain Orientation Right   Pain Frequency Intermittent   Pain Onset Ongoing   Hospital Pain Intervention(s) Distraction; Emotional support; Environmental changes   Response to Interventions Pt tolerated treatment well   Restrictions/Precautions   Precautions Fall Risk   Weight Bearing Restrictions No   ROM Restrictions Yes   Braces or Orthoses C/S Collar   QI: Oral Hygiene   Assistance Needed Supervision   Assistance Provided by [place] No physical assistance   Comment Pt completes hygiene in stance while unsupported at sink but requires supervision 2* to decreased standing balance  Oral Hygiene CARE Score 4   Grooming   Able To Shave;Brush/Clean Teeth;Wash/Dry Hands; Initiate Tasks; Acquire Items; Wash/Dry Face   Limitation Noted In Safety;Strength   Findings Pt completes shaving task in stance at sink and then seated in recliner  Pt completes shaving of face above the collar in stance at sink  OTAS then has pt sit in recliner and removes front piece of C collar and supports neck while supervising VILLASENOR/L shaves neck under the collar 2* to c spine precautions  Previously educated pt's wife on proper technique for A with shaving, she is agreeable to complete upon d/c  Pt requires supervision in stance at sink 2* to decreased standing balance  Grooming (FIM) 4 - Patient completed  4/5 tasks   Tub/Shower Transfer   Limitations Noted In Balance; Endurance;LE Strength   Adaptive Equipment Transfer Bench   Assessed Tub   Findings Pt completes DRY tub bench transfer to simulate home environment and increase independence in ADL tasks  Pt approaches tub bench with out RW at [facility] level 2* to decreased standing balance  Pt reports that he will not have grab bars installed at home  Pt asked to  tub with UE support on wall vs  Grab bar   Pt able to push from handle on tub bench which pt will have at home to stand  Pt able to stand without use of grab bar, pt educated on need for supervision in stance in shower at home  Pt receptive and reports that his wife will be home when he showers  Pt also educated on using lateral weight shifts for rear hygiene to increase independence and safety in ADL tasks  Pt receptive and reports that he completes rear hygiene during toileting using lateral weight shifts and would like to try technique during next ADL  QI: Putting On/Taking Off Footwear   Assistance Needed Supervision   Assistance Provided by Lissie No physical assistance   Comment Pt able to don both shoes and tie both shoes at beginning of session  Pt completes hip flexion to prop R foot up onto chair to tie shoes  Pt demos increased independence with donning and tying R shoe without assistance  Pt requires supervision 2* to spinal precautions but overall demos G carryover and compliance  Putting On/Taking Off Footwear CARE Score 4   QI: Sit to Stand   Assistance Needed Supervision   Assistance Provided by Lissie No physical assistance   Comment RW   Sit to Stand CARE Score 4   QI: Chair/Bed-to-Chair Transfer   Assistance Needed Supervision   Assistance Provided by Lissie No physical assistance   Comment Pt continues to require supervision 2* to decreased standing balance and RLE strength  Chair/Bed-to-Chair Transfer CARE Score 4   Transfer Bed/Chair/Wheelchair   Limitations Noted In Balance; Endurance;LE Strength   Adaptive Equipment Roller Walker   Sit to TXU Adri   Stand to W  R  Eve, Chair, Wheelchair Transfer (FIM) 5 - Patient requires supervision/monitoring   Cognition   Overall Cognitive Status WFL   Arousal/Participation Cooperative   Attention Within functional limits   Orientation Level Oriented X4   Memory Within functional limits   Following Commands Follows all commands and directions without difficulty   Additional Activities   Additional Activities Comments Pt engages in Wii bowling game activity, bean bag activity, and scavenger hunt with reacher activity to focus on dynamic standing balance, endurance, comfort level with reacher, and maintain spinal precautions  Pt participated in Wii [employer] game to focus on dynamic standing balance and endurance  Pt in [place] [facility] [place] in right hand  Pt required to hold down button and complete shoulder flexion/ext to simulate throwing bowling ball while maintaining spinal precautions  Pt able to stand for ~15 minutes while completing activity  Pt demos F dynamic standing balance 2* to posterior lean while free standing from RW requiring CGA with in stance  Pt able to correct balance but continues to present with posterior lean throughout activity  Pt able to complete activity with no LOB noted  Pt engaged in bean bag activity to focus on dynamic standing balance and functional reach  Pt asked to reach to right side to retrieve a bean bag and then lean to the left and place it on the tray table while in stance  Pt requires supervision during activity 2* to decreased dynamic standing balance  Pt able to complete activity with no LOB noted  Pt engages in scavenger hunt with reacher to focus on comfort level with reacher, dynamic standing balance, and maintaining spinal precautions  Pt walks around OT gym with RW and reacher to retrieve bean bags and cones from various places in room and heights  Pt demos continued compliance with spinal precautions and G standing balance throughout session  Pt demos F carryover of use of reacher, and requires min VC's to avoid overreaching for items  Pt educated on using reacher to retrieve objects on high shelves in cabinet and lower objects off the ground to remain compliant with spinal precautions  Pt receptive to education      Activity Tolerance   Activity Tolerance Patient tolerated treatment well   Assessment   Treatment Assessment Pt participated in skilled OT services to focus on functional transfers, dynamic standing balance, and use of LHAE  See above for details on above listed activities  Pt demos F dynamic standing balance this session  Pt demos compliance with spinal precautions  Pt demos G progress towards LTGs but continues to be limited by decreased dynamic standing balance, orthopedic restrictions, decreased endurance, and decreased RLE strength which limits pt's ability to complete I/ADL tasks independently  Pt would benefit from skilled OT services to focus on above mentioned deficits and focus on hot meal prep and community reintegration  Prognosis Good   Problem List Decreased range of motion;Decreased endurance; Impaired balance;Decreased mobility;Orthopedic restrictions; Impaired sensation;Decreased coordination;Decreased strength   Plan   Treatment/Interventions ADL retraining;Functional transfer training; Therapeutic exercise; Endurance training;Patient/family training; Compensatory technique education   Progress Progressing toward goals   Recommendation   OT Discharge Recommendation Home with family support   OT Therapy Minutes   OT Time In 0830   OT Time Out 1000   OT Total Time (minutes) 90   OT Mode of treatment - Individual (minutes) 90   OT Mode of treatment - Concurrent (minutes) 0   OT Mode of treatment - Group (minutes) 0   OT Mode of treatment - Co-treat (minutes) 0   OT Mode of Teatment - Total time(minutes) 90 minutes   Therapy Time missed   Time missed?  No

## 2019-02-12 NOTE — PROGRESS NOTES
02/12/19 1330   Pain Assessment   Pain Assessment No/denies pain   Restrictions/Precautions   Precautions Fall Risk;Spinal precautions   General   Change In Medical/Functional Status pt Suzy no AD in room; nursing notified   Cognition   Overall Cognitive Status WFL   Subjective   Subjective no complaints from pt at this time; pt ready for therapy   QI: Roll Left and Right   Assistance Needed Supervision   Roll Left and Right CARE Score 4   QI: Sit to Lying   Assistance Needed Supervision   Sit to Lying CARE Score 4   QI: Lying to Sitting on Side of Bed   Assistance Needed Supervision   Lying to Sitting on Side of Bed CARE Score 4   QI: Sit to Stand   Assistance Needed Supervision   Sit to Stand CARE Score 4   QI: Chair/Bed-to-Chair Transfer   Assistance Needed Supervision   Chair/Bed-to-Chair Transfer CARE Score 4   Transfer Bed/Chair/Wheelchair   Limitations Noted In Balance; Endurance; Coordination;Sensation;LE Strength   Adaptive Equipment None   Stand Pivot Supervision   Sit to Stand Supervision   Stand to Sit Supervision   Supine to Sit Supervision   Sit to Supine Supervision   Findings no AD at this time    Bed, Chair, Wheelchair Transfer (FIM) 5 - Patient requires supervision/monitoring   QI: Walk 10 Feet   Assistance Needed Incidental touching;Supervision   Walk 10 Feet CARE Score 4   QI: Walk 50 Feet with Two Turns   Assistance Needed Incidental touching;Supervision   Walk 50 Feet with Two Turns CARE Score 4   QI: Walk 150 Feet   Assistance Needed Incidental touching;Supervision   Walk 150 Feet CARE Score 4   Ambulation   Does the patient walk? 2  Yes   Primary Discharge Mode of Locomotion Walk   Walk Assist Level Close Supervision;Contact Guard   Gait Pattern Inconsistant Sheryl; Slow Sheryl;Decreased foot clearance; Lateral deviation; Improper weight shift   Assist Device   (no AD)   Distance Walked (feet) 350 ft  (x1; 500x1)   Limitations Noted In Balance; Endurance; Heel Strike;Speed;Strength;Swing Findings CS/CGA no AD; cont to demonstrate unsteady gait with fatigue and decrease R foot control during swing phase and stance phase  with unsteady gait able to self correct on own   Walking (FIM) 4 - Patient requires steadying assist or light touching AND distance 150 feet or more, no rest   Wheelchair mobility   QI: Does the patient use a wheelchair? 0  No   QI: 4 Steps   Assistance Needed Supervision; Adaptive equipment   4 Steps CARE Score 4   QI: 12 Steps   Assistance Needed Supervision; Adaptive equipment   Comment FF LHR   12 Steps CARE Score 4   Stairs   Type Stairs   # of Steps 24   Weight Bearing Precautions Fall Risk   Assist Devices Single Rail   Findings nonreciprocal descending/ reciprocal ascending   Stairs (FIM) 5 - Patient requires supervision/monitoring AND goes up and down full flight (12- 14 stairs)   QI: Toilet Transfer   Assistance Needed Supervision   Toilet Transfer CARE Score 4   Toilet Transfer   Findings pt stands to urinate   Toilet Transfer (FIM) 5 - Patient requires supervision/monitoring   Therapeutic Interventions   Strengthening bridging x15; bridging wtih hip abd with red theraband  sidelying clamshells x20 with RLE   trunk rotation in hooklying wtih red theraband bringing one leg over at a time to inforce hip abd and controlled motion with LEs   Flexibility B gastroc and HS   Neuromuscular Re-Education lateral and fwd steps ups, 3# AW with fwd step ups   Equipment Use   NuStep L3 10mins   Assessment   Treatment Assessment pt cont to show improvements with mobility  pt demosntrates increase lateral deviation with fatigue and occasional LOB but able to self correct  pt cont to demonstrate decrease sensation and proximal hip weakness on the R decreasing overall balance  cont to use no AD during therapy session to improve balance nad rigthing reactions during functional mobility    will cont to work to improve strength, coordination and balance to progress with functional mobility and safety for d/c  Problem List Decreased range of motion;Decreased endurance; Impaired balance;Decreased mobility;Orthopedic restrictions; Impaired sensation;Decreased coordination;Decreased strength   Barriers to Discharge Decreased caregiver support   PT Barriers   Physical Impairment Decreased strength;Decreased range of motion;Decreased endurance; Impaired balance;Decreased mobility; Impaired sensation   Functional Limitation Car transfers;Stair negotiation;Standing;Transfers; Walking   Plan   Treatment/Interventions Functional transfer training;LE strengthening/ROM; Endurance training;Patient/family training;Bed mobility;Gait training   Progress Progressing toward goals   Recommendation   Recommendation Outpatient PT; Home with family support   PT Therapy Minutes   PT Time In 1330   PT Time Out 1500   PT Total Time (minutes) 90   PT Mode of treatment - Individual (minutes) 45   PT Mode of treatment - Concurrent (minutes) 45   PT Mode of treatment - Group (minutes) 0   PT Mode of treatment - Co-treat (minutes) 0   PT Mode of Teatment - Total time(minutes) 90 minutes   Therapy Time missed   Time missed?  No

## 2019-02-12 NOTE — PROGRESS NOTES
Internal Medicine Progress Note  Patient: Moo Cabral III  Age/sex: 60 y o  male  Medical Record #: [id number]      ASSESSMENT/PLAN:  Moo Cabral III is seen and examined and management for following issues:    Medullary mass removal with fixation/fusion of C2-3 T5 1/24/19:  Maintain cervical collar all times; steroid weaned off per Neurosurgery  Still has numbness of legs to hip level R>L but can feel feet on floor now; some RLE weakness he says he gets when fatigued but improved     Post-op urinary retention/hx BPH: had resolved with starting Flomax (new for him) = primary service stopped now and he has been voiding OK     Hypothyroidism:  Continue Levothyroxine 25 mcg qd     Elevated fasting blood sugars; HbA1C 1/22 = 6 3:  Continue DM diet but stopped Accuchecks      Asymptomatic bradycardia:  noted postoperatively; still occurs intermitt but during sleep    ABLA:  Stable; w/o sx; cont to monitor    Heartburn:  added Protonix since on steroids and ordered prn TUMS  He gets heartburn at home if eats certain foods and gets 3x/week  Told him to go to PCP as OP when he recovers and have workup with possible EGD      Subjective: he denies any current complaints      ROS:   GI: denies abdominal pain, change bowel habits or reflux symptoms  Neuro: No new neurologic changes  Respiratory: No Cough, SOB  Cardiovascular: No CP, palpitations     Scheduled Meds:    Current Facility-Administered Medications:  acetaminophen 975 mg Oral Q8H [address] Rosenda Tatum MD   bisacodyl 10 mg Rectal Daily PRN Nalini Glynn DO   calcium carbonate 500 mg Oral Daily PRN CLAUDIO Schilling   senna 2 tablet Oral Daily Rosenda Tatum MD   And       docusate sodium 100 mg Oral Daily Rosenda Tatum MD   enoxaparin 40 mg Subcutaneous Daily Rosenda Tatum MD   fish oil 1,000 mg Oral Daily Rosenda Tatum MD   levothyroxine 25 mcg Oral Early Morning Rosenda Tatum MD   methocarbamol 500 mg Oral Q6H PRN Rosenda Tatum MD   oxyCODONE 10 mg Oral Q4H PRN Leandro Ann MD   oxyCODONE 5 mg Oral Q4H PRN Leandro Ann MD   pantoprazole 40 mg Oral Early Morning CLAUDIO Rodrigues   sodium phosphate-biphosphate 1 enema Rectal Once PRN Nimo Bennett MD       Labs:     Results from last 7 days   Lab Units 02/11/19  0506 02/07/19  0448   WBC Thousand/uL 5 96 10 42*   HEMOGLOBIN g/dL 10 2* 9 6*   HEMATOCRIT % 33 0* 30 8*   PLATELETS Thousands/uL 355 394*     Results from last 7 days   Lab Units 02/11/19  0506 02/07/19  0448   SODIUM mmol/L 139 137   POTASSIUM mmol/L 4 0 4 4   CHLORIDE mmol/L 106 104   CO2 mmol/L 29 28   BUN mg/dL 18 22   CREATININE mg/dL 0 74 0 66   CALCIUM mg/dL 8 8 8 5                  Results from last 7 days   Lab Units 02/06/19  2127 02/05/19  1108   POC GLUCOSE mg/dl 114 119     [unfilled]    Labs reviewed    Physical Examination:  Vitals:   Vitals:    02/11/19 0509 02/11/19 1333 02/11/19 2022 02/12/19 0503   BP: 134/67 132/62 108/62 98/54   BP Location: Right arm Right arm Left arm Left arm   Pulse: 55 60 63 58   Resp: 18 18 19 20   Temp: 98 5 °F (36 9 °C) 98 °F (36 7 °C) 98 5 °F (36 9 °C) 98 3 °F (36 8 °C)   TempSrc: Oral Oral Oral Oral   SpO2: 97% 99% 98% 98%   Weight:       Height:         Constitutional:  NAD; pleasant; nontoxic  HEENT:  AT/NC; oropharynx negative for thrush on tongue   Neck: negative for JVD  CV:  +S1, S2;  RRR; no rub/murmur  Pulmonary:  BBS without crackles/wheeze/rhonci; resp are unlabored  Abdominal:  soft, +BS, ND/NT; no mass  Skin:  no rashes  Musculoskeletal:  no edema  :  no martinez  Neurological/Psych:  AAO;  CAIN 5/5 except RLE 4+/5; no depression/anxiety        [ X ] Total time spent: 30 Mins and greater than 50% of this time was spent counseling/coordinating care  ** Please Note: Dragon 360 Dictation voice to text software may have been used in the creation of this document   **

## 2019-02-12 NOTE — PROGRESS NOTES
Physical Medicine and Rehabilitation Progress Note  Ashely Matamoros III 60 y o  male MRN: [id number]  Unit/Bed#: -01 Encounter: [id number]    HPI: Ashely Matamoros III is a 60 y o  male who presented to the [facility] with h/o sensory deficits and was found to have spinal cord mass therefore underwent tumor resection and C4-T3 laminectomy and C2-T5 fixation/fusion by Dr Opal Rodriguez on 1/24       Chief Complaint: spinal mass s/p resection     Subjective: Patient w/o complaint currently     ROS:  Negative unless noted above     Assessment/Plan:      Anemia  Assessment & Plan  · Likely ABLA  · Hg currently stable at 10 2  · IM monitoring     Hyperlipidemia  Assessment & Plan  · was Omega 3 FA 1000 mg qd PTA   · D/W neurosx and have cleared patient to resume home Omega 3 FA 1000 mg qd    Impaired fasting glucose  Assessment & Plan  · PCP aware  · Diet controlled     Hypothyroidism  Assessment & Plan  · On home synthroid 25 mcg qd     BPH associated with nocturia  Assessment & Plan  · Per patient stopped taking flomax 0 4 mg qpm in the past because it was not helping the urinary retention   · Per patient stopped taking ditropan XL 10 mg qd in the past because  it was not helping the urinary retention   · Per patient stopped taking viagra 100 mg in the past because  it was not helping the urinary retention   · Per patient was no longer on any meds for urinary retention/BPH PTA as he was working with urology as OP and further tests were being planned as patient medications had not been effective however in acute care patient was restarted on flomax and post-operatively patient has been urinating well   · successful trial off of flomax (which patient had stopped taking PTA because it was not effective); PVRs were 57, 38, & 87 (PVRs are from greater than 24 hours after last dose of flomax)   · Follows with Jaimie Jane PA-C/Dr Constance Iglesias as OP    * 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[facility])  Assessment & Plan  · S/p resection and C4-T3 laminectomy and C2-T5 fixation/fusion by Dr Larry Son and Dr Shyam Valle on 1/24  · decardron taper completed  · Spine precautions  · c-collar at all times  · Path revealed grade 2 ependymoma; further management per team at [place] [id number] [facility]   · OP FU w/neurosx scheduled for 2/6 done as inpt by neurosx team     Scheduled Meds:    Current Facility-Administered Medications:  acetaminophen 975 mg Oral Q8H Mauricio Cole MD   bisacodyl 10 mg Rectal Daily PRN Carroll Shane DO   calcium carbonate 500 mg Oral Daily PRN CLAUDIO Pulido   senna 2 tablet Oral Daily Annika Dunn MD   And       docusate sodium 100 mg Oral Daily Annika Dunn MD   enoxaparin 40 mg Subcutaneous Daily Annika Dunn MD   fish oil 1,000 mg Oral Daily Annika Dunn MD   levothyroxine 25 mcg Oral Early Morning Annika Dunn MD   methocarbamol 500 mg Oral Q6H PRN Annika Dunn MD   oxyCODONE 10 mg Oral Q4H PRN Annika Dunn MD   oxyCODONE 5 mg Oral Q4H PRN Annika Dunn MD   pantoprazole 40 mg Oral Early Morning CLAUDIO Pulido   sodium phosphate-biphosphate 1 enema Rectal Once PRN Flora Silva MD        Incidental findings:  1) 2-3 mm pulmonary nodule: per radiology report of 1/18/19 recommend repeat CT chest in 3 months however will defer to PCP  2) fatty involution of pancreas: OP FU with PCP with further testing/treatment and/or specialist referral at PCP's discretion  3) L maxillary sinus thickening: asymptomatic, OP FU with PCP with further testing/treatment and/or specialist referral at PCP's discretion      DVT ppx: SCDs, cleared for lovenox 40 mg sc qd by neurosx in acute care (and has been on it since 1/26)  Dispo: reteam       Objective:    Functional Update:  Mobility: min    Transfers: CG  ADLs: min        Physical Exam:           General: alert, no apparent distress, cooperative and comfortable  HEENT:  +C-collar  CARDIAC:  +S1/2  LUNGS: respirations unlabored  ABDOMEN:  soft NT   EXTREMITIES:  no significant LE edema   NEURO:   mental status, speech normal, alert and oriented x3  PSYCH:  mood/affect currently stable       Laboratory:   Results from last 7 days   Lab Units 02/11/19  0506 02/07/19  0448   HEMOGLOBIN g/dL 10 2* 9 6*   HEMATOCRIT % 33 0* 30 8*   WBC Thousand/uL 5 96 10 42*     Results from last 7 days   Lab Units 02/11/19  0506 02/07/19  0448   BUN mg/dL 18 22   SODIUM mmol/L 139 137   POTASSIUM mmol/L 4 0 4 4   CHLORIDE mmol/L 106 104   CREATININE mg/dL 0 74 0 66            Patient Active Problem List   Diagnosis    BPH associated with nocturia    Hypothyroidism    Impaired fasting glucose    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HCC)    Anemia    Hyperlipidemia           Total visit time:  At least 25 minutes, with more than 50% spent counseling/coordinating care

## 2019-02-13 ENCOUNTER — TRANSCRIBE ORDERS (OUTPATIENT)
Dept: ADMINISTRATIVE | Facility: HOSPITAL | Age: 59
End: 2019-02-13

## 2019-02-13 DIAGNOSIS — C71.9 EPENDYMOMA (HCC): Primary | ICD-10-CM

## 2019-02-13 PROCEDURE — 97112 NEUROMUSCULAR REEDUCATION: CPT

## 2019-02-13 PROCEDURE — 97110 THERAPEUTIC EXERCISES: CPT

## 2019-02-13 PROCEDURE — 97535 SELF CARE MNGMENT TRAINING: CPT

## 2019-02-13 PROCEDURE — 99233 SBSQ HOSP IP/OBS HIGH 50: CPT | Performed by: PHYSICAL MEDICINE & REHABILITATION

## 2019-02-13 PROCEDURE — 97530 THERAPEUTIC ACTIVITIES: CPT

## 2019-02-13 PROCEDURE — 97116 GAIT TRAINING THERAPY: CPT

## 2019-02-13 RX ORDER — POLYETHYLENE GLYCOL 3350 17 G/17G
17 POWDER, FOR SOLUTION ORAL DAILY PRN
Status: DISCONTINUED | OUTPATIENT
Start: 2019-02-13 | End: 2019-02-19

## 2019-02-13 RX ADMIN — ENOXAPARIN SODIUM 40 MG: 40 INJECTION SUBCUTANEOUS at 08:02

## 2019-02-13 RX ADMIN — DOCUSATE SODIUM 100 MG: 100 CAPSULE, LIQUID FILLED ORAL at 08:02

## 2019-02-13 RX ADMIN — STANDARDIZED SENNA CONCENTRATE 17.2 MG: 8.6 TABLET ORAL at 08:02

## 2019-02-13 RX ADMIN — ACETAMINOPHEN 975 MG: 325 TABLET ORAL at 05:58

## 2019-02-13 RX ADMIN — PANTOPRAZOLE SODIUM 40 MG: 40 TABLET, DELAYED RELEASE ORAL at 05:57

## 2019-02-13 RX ADMIN — LEVOTHYROXINE SODIUM 25 MCG: 25 TABLET ORAL at 05:58

## 2019-02-13 RX ADMIN — ACETAMINOPHEN 975 MG: 325 TABLET ORAL at 21:16

## 2019-02-13 RX ADMIN — Medication 1000 MG: at 08:02

## 2019-02-13 NOTE — SOCIAL WORK
Met w/pt and wife and reviewed team update w/dc for 2/20  Pt asked if he could dc on 2/21 instead as his son will be home that day and could come earlier in the day for dc and be home with him  Cm stated pt has insurance that needs to approve his stay and if there isn't justification for him to stay  Pt stated he understood  Cm reviewed recommendation for contd outpt physical therapy and he would like to go to [facility]  Cm to follow up to be sure that location is apporpriate

## 2019-02-13 NOTE — PROGRESS NOTES
02/13/19 0830   Pain Assessment   Pain Assessment 0-10   Pain Score 2   Pain Type Acute pain   Pain Location Shoulder   Pain Orientation Right   Pain Descriptors Discomfort; Sore   Pain Onset   (Pt reports tightness in shoulder 2* to knot during UE therex)   Hospital Pain Intervention(s) Distraction; Emotional support; Environmental changes   Response to Interventions Pt tolerated treatment well   Restrictions/Precautions   Precautions Fall Risk;Spinal precautions   Weight Bearing Restrictions No   ROM Restrictions Yes   Braces or Orthoses C/S Collar   QI: Sit to [address] Provided by [place] No physical assistance   Comment RW   Sit to Stand CARE Score 4   QI: Chair/Bed-to-Chair Transfer   Assistance Needed Supervision   Assistance Provided by [place] No physical assistance   Comment Pt continues to require supervision 2* to decreased dynamic standing balance   Chair/Bed-to-Chair Transfer CARE Score 4   Transfer Bed/Chair/Wheelchair   Limitations Noted In Balance; Endurance;LE Strength   Adaptive Equipment Roller Walker   Sit to TXU Adri   Stand to W  R  Eve, Chair, Wheelchair Transfer (FIM) 5 - Patient requires supervision/monitoring   QI: [address] Needed Supervision   Assistance Provided by [place] No physical assistance   Comment Pt requires supervision 2* to decreased dynamic standing balance  Pt completes bowel and bladder hygiene at different times during session  Toileting Hygiene CARE Score 4   Toileting   Able to [address] down yes, up yes  Manage Hygiene Bowel;Bladder   Limitations Noted In Balance;ROM;LE Strength; Safety   Adaptive Equipment Grab Bar   Findings Pt completes bladder toileting in stance at RW  Pt is able to complete rear hygiene while seated on toilet with lateral weight shifts      Toileting (FIM) 5 - Patient requires supervision/monitoring   QI: Toilet Transfer   Assistance Needed Supervision Assistance Provided by [place] No physical assistance   Comment Pt requires supervision 2* to decreased dynamic standing balance  Toilet Transfer CARE Score 4   Toilet Transfer   Surface Assessed Standard Toilet   Transfer Technique Standard   Limitations Noted In Balance; Endurance;LE Strength   Adaptive Equipment Grab Bar   Toilet Transfer (FIM) 5 - Patient requires supervision/monitoring   Meal Prep   Meal Prep Level Walker   Meal Prep Level of Assistance Close supervision;Minimal verbal cues   Meal Preparation Pt completes meal prep activity of making cookies to increase independence with I/ADL tasks  Pt able to preheat oven to appropriate temperature, spray cookie sheet, and place butter into bowl and place in microwave to soften butter  Pt then able to mix together butter, cookie mix and 1 egg together requiring a rest break while completing this task 2* to noticeable signs of fatigue  Pt then able to roll dough into small round balls and place them on cookie sheet  Pt then places cookies into oven and set timer to correct time  Pt able to check cookies to see if they were cooked throughout and determine that they needed more time  Pt able to retrieve cookies out of oven, turn off oven and put potholder back into drawer  Pt overall demos G sequencing and problem solving throughout meal prep task  Pt demos G standing balance throughout session although does need verbal cues for taking rest break appropriately  Pt educated on ECT techniques to sit while completing mixing task  Kitchen Mobility   Kitchen-Mobility Level Walker   Kitchen Activity Retrieve items;Transport items   Kitchen Mobility Comments Pt completes item retrieval and transportation task of retrieving cookie mix from cabinet and butter and 1 egg from refrigerator  Pt utilizes sliding method to transport items from refrigerator to counter top near stove  Pt reports that he uses the microwave, ceramic stovetop and oven in the kitchen   Pt reports that he will not have to retrieve items from cabinets below counter height and that most items he would need would be located on first shelf in cabinets  Pt educated on using reacher for light weight items on higher shelves  Pt reports that he has a pantry not connected to counter top that holds cereals and pasta that has high shelves that he would be able to use the reacher for  Will educate next session on RW bag for increased independence with item transportation  Pt also reports that he would be able to have help with heavier objects and items on higher shelves from his wife  Pt receptive to education  Pt requires VC's for safe hand placement with retrieving items and educated on recommendation for using counter for support opposed to RW  After provided with initial VC's pt phil G carryover  Pt also requires VC's for safe approach to opening oven door/fridge/cabinets to avoid swinging into walker  Light Housekeeping   Light Housekeeping Level Walker   Light Housekeeping Level of Assistance Close supervision   Light Housekeeping Pt participates in light housekeeping task with focus on washing dishes to increase independence with I/ADL tasks  Pt able to stand unsupported at sink and wash ~ 7 dishes following meal prep activity with overall Close supervision  Pt demos F standing balance during activity with BUE release to increase static standing balance  Exercise Tools   Other Exercise Tool 1 Pt engaged in dowel exercises using 3lb dowel bar while in unsupported stance to focus on endurance, strength, and dynamic standing balance  Pt completes 3 different exercises for 3 x 10 each while standing unsupported  Pt reminded not to go [facility] with exercises 2* to spinal precautions  Pt required supervision throughout activity 2* to decreased standing balance  Pt reports feeling some discomfort and tightness in R shoulder during exercises   Terminated exercise and completed non weight gentle towel glides to stretch shoulder, reduce stiffness, and increase endurance  Pt engaged in 3 different stretches for 1 x 10 each while seated in chair and asked to complete prolonged stretch at end range  Palpated R shoulder near trap with noticeable muscle knot present  Applied pressure to area with reduced c/o pain in that area post intervention  Pt reports shoulder feeling better during and then after stretching  Cognition   Overall Cognitive Status WFL   Arousal/Participation Cooperative   Attention Within functional limits   Orientation Level Oriented X4   Memory Within functional limits   Following Commands Follows all commands and directions without difficulty   Activity Tolerance   Activity Tolerance Patient tolerated treatment well   Assessment   Treatment Assessment Pt participates in skilled OT services to focus on meal prep, light housekeeping, and UE strengthening to increase independence with I/ADL tasks  See above for details on activities listed above  Pt demos increased dynamic standing balance during this session however still requires supervision 2* to RLE strength which affects dynamic standing balance  Pt demvitaly compliance with spinal precautions throughout the session  Pt demvitaly G progress towards LTGs but continues to be limited by decreased dynamic standing balance, decreased endurance, decreased RLE strength and spinal precautions which limits his ability to complete I/ADL tasks independently  Pt requires VC's for initiating rest breaks appropriately and to identify s/s of fatigue  Pt would continue to benefit from skilled OT services to focus on the above listed deficits  Prognosis Good   Problem List Decreased strength;Decreased range of motion;Decreased endurance; Impaired balance;Decreased mobility; Impaired sensation;Orthopedic restrictions;Decreased coordination   Plan   Treatment/Interventions ADL retraining;Functional transfer training; Therapeutic exercise; Endurance training;Patient/family training; Compensatory technique education   Progress Progressing toward goals   Recommendation   OT Discharge Recommendation Home with family support   OT Therapy Minutes   OT Time In 0830   OT Time Out 1000   OT Total Time (minutes) 90   OT Mode of treatment - Individual (minutes) 90   OT Mode of treatment - Concurrent (minutes) 0   OT Mode of treatment - Group (minutes) 0   OT Mode of treatment - Co-treat (minutes) 0   OT Mode of Teatment - Total time(minutes) 90 minutes   Therapy Time missed   Time missed?  No

## 2019-02-13 NOTE — TEAM CONFERENCE
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    BPH associated with nocturia 11/13/2017    Impaired fasting glucose 11/07/2016    Hypothyroidism 03/02/2016       Physical Therapy:    Weight Bearing Status: Full Weight Bearing  Transfers: Supervision  Bed Mobility: Supervision  Amulation Distance (ft): 500 feet  Ambulation: Supervision, Contact Guard  Assistive Device for Ambulation: Hand Hold Assistance  Number of Stairs: 24  Assistive Device for Stairs: [address]: Supervision  Discharge Recommendations: Home with:  [address] with[de-identified] Family Support, Outpatient Physical Therapy    Pt making progress towards d/c goals  Pt functioning at CG/Suzy with RW, with focus of no AD during therapy sessions to improve balance and improve functional gait  Pt with LE weakness and coordination limiting stair training and functional Ind  Pt to cont focus on improving deficits to progress with safety and decrease fall risk for safe d/c home  Pt making good progress towards LTGs  Pt cont to amb with no AD during therapy session, but recommendation for RW for d/c to improve funcitonal ind  Pt no longer using AFO during gait training but cont to demonstrate decrease DF strength and RLE weakness    Pt will benefit from skilled PT to improve strength, balance and righting reactions to decrease fall risk and progress with functional ind  Occupational Therapy:  Eating: Independent  Grooming: Supervision  Bathing: Supervision  Bathing: Supervision  Upper Body Dressing: Minimal Assistance  Lower Body Dressing: Minimal Assistance  Toileting: Supervision  Tub/Shower Transfer: Supervision  Toilet Transfer: Supervision  Cognition: Within Defined Limits  Orientation: Person, Place, Time, Situation  Discharge Recommendations: Home with:  [address] with[de-identified] Family Support       Pt making progress towards LTGs  He continues to be limited by orthopedic restrictions, decreased standing balance, and decreased general strength  Pt making G progress towards Kathy goals, currently overall CS for functional transfers and Suzy for dressing  Pts wife has been present for FT on C collar management and demos G understanding and is agreeable to A pt with C collar upon d/c home  Pt will continue to benefit from OT services following POC with focus on above mentioned deficits to increase safety and independence with I/ADL tasks  Speech Therapy:           No notes on file    Nursing Notes:  Appetite: Good  Diet Type: Diabetic                      Diet Patient/Family Education Complete: Yes    Type of Wound (LDA): (incision)           Incision 01/24/19 Cervical Neck Posterior Other (Comment) (Active)   Incision Description [facility] 2/12/2019  3:45 PM   Isabella-wound Assessment Clean;Dry; Intact 2/12/2019  3:45 PM   Closure KAPIL 2/12/2019  3:45 PM   Drainage Amount None 2/12/2019  1:17 PM   Drainage Description [facility] 1/31/2019 11:30 PM   Treatments Cleansed 2/11/2019  9:53 AM   Dressing ABD 2/12/2019  1:17 PM   Wound packed? No 2/11/2019  9:53 AM   Dressing Changed New dressing applied 2/12/2019  1:17 PM   Patient Tolerance Tolerated well 2/12/2019  1:17 PM   Dressing Status Clean;Dry; Intact 2/13/2019 12:20 AM           Type of Wound Patient/Family Education: Yes  Bladder: 7 - Complete [place] Bladder Patient/Family Education: Yes  Bowel: 6 - Modified [place]     Bowel Patient/Family Education: Yes  Pain Location: Leg  Pain Orientation: Right  Pain Score: 0                       Hospital Pain Intervention(s): Medication (See MAR)  Pain Patient/Family Education: Yes  Medication Management/Safety  Safe Administration: Yes  Medication Patient/Family Education Complete: No    Patient is a 60 y  o  male who [facility] h/o sensory deficits and was found to have spinal cord mass therefore underwent tumor resection and C4-T3 laminectomy and C2-T5 fixation/fusion by Dr Villar Due Dr Ana M Low on 1/24  History of HLD, cervical spine mass, disease of thyroid gland, herniated cervical disc, and impaired fasting glucose  No skin issues noted at this time except for an incision to his posterior neck  Cervical collar to be worn at all times  Impaired fasting glucose manage with diabetic diet  Pain managed with scheduled Tylenol, PRN oxycodone, and PRN robaxin--pt uses tylenol only  Decadron taper complete  Patient is continent for bowel and bladder  This week we will encourage independence with ADL's  We will moniotr lab results and vital signs  We will provide DM diet education  We will monitor for adequate pain control  We will monitor for incision healing and s/s of infection  We will educate pt regarding offloading to prevent skin breakdown and perform routine skin checks  We will monitor for constipation and medicate according to ordered bowel regimen  We will increase safety awareness with transfers and keep pt free from falls        Case Management:     Discharge Planning  Living Arrangements: Spouse/significant other, Children  Support Systems: Spouse/significant other, Children  Assistance Needed: TBD  Type of Current Residence: Private residence  [address]: No  Pt is participating well with therapy and is expected to dc to home  Pt will be alone during the day and goals are for mod I  Clinical update to be completed today  Following to assist w/dc planning needs  Is the patient actively participating in therapies? yes  List any modifications to the treatment plan:     Barriers Interventions   Sensation, proprioception Therapy , fall recovery   Standing balance, activity tolerance with adls Therapy exercises, use of walker, energy conservation education                 Is the patient making expected progress toward goals? yes  List any update or changes to goals:     Medical Goals: Patient will be medically stable for discharge to [facility] upon completion of rehab program and Patient will be able to manage medical conditions and comorbid conditions with medications and follow up upon completion of rehab program    Weekly Team Goals:   Rehab Team Goals  ADL Team Goal: Patient will be independent with ADLs with least restrictive device upon completion of rehab program  Transfer Team Goal: Patient will be independent with transfers with least restrictive device upon completion of rehab program  Locomotion Team Goal: Patient will be independent with locomotion with least restrictive device upon completion of rehab program  Cognitive Team Goal: Patient will be independent for basic and complex tasks upon completion of rehab program    Discussion: pt presents with the above barriers and is making progress towards mod I goals  Supervision for tub transfers and stairs  Family training has occurred and will occur again this weekend for collar mgmt and stair training  Recommendations are for contd outpt physical therapy  Anticipated Discharge Date:  2/20/19  [facility] Team Members Present: The following team members are supervising care for this patient and were present during this Weekly Team Conference      Physician: Dr Fely Mcgill MD  : MARIBELL Alcantara  Registered Nurse: Dom Camacho RN  Physical Therapist: Luis Alberto Garza DPT  Occupational Therapist: Mayela Albrecht MS, OTR/L, CBIS  Speech Therapist:   Other:

## 2019-02-13 NOTE — PROGRESS NOTES
02/13/19 1400   Pain Assessment   Pain Assessment No/denies pain   Restrictions/Precautions   Precautions Fall Risk;Spinal precautions   Braces or Orthoses C/S Collar   Cognition   Overall Cognitive Status WFL   Subjective   Subjective pt states his L leg feels weak today, c/o soreness in R upper trap   QI: Sit to Stand   Assistance Needed Supervision   Sit to Stand CARE Score 4   QI: Chair/Bed-to-Chair Transfer   Assistance Needed Supervision   Assistance Provided by [place] No physical assistance   Comment CS   Chair/Bed-to-Chair Transfer CARE Score 4   Transfer Bed/Chair/Wheelchair   Limitations Noted In Balance; Endurance; Coordination;Sensation;UE Strength;LE Strength   Adaptive Equipment None   Stand Pivot Supervision   Sit to Stand Supervision   Stand to Sit Supervision   Findings CS today; R knee buckle upon standing start of session   Bed, Chair, Wheelchair Transfer (FIM) 5 - Patient requires supervision/monitoring   QI: Walk 10 Feet   Assistance Needed Incidental touching   Walk 10 Feet CARE Score 4   QI: Walk 50 Feet with Two Turns   Assistance Needed Incidental touching   Walk 50 Feet with Two Turns CARE Score 4   QI: Walk 150 Feet   Assistance Needed Incidental touching   Walk 150 Feet CARE Score 4   Ambulation   Does the patient walk? 2  Yes   Primary Discharge Mode of Locomotion Walk   Walk Assist Level Close Supervision;Contact Guard   Gait Pattern Inconsistant Sheryl; Slow Sheryl;Decreased foot clearance; Lateral deviation; Improper weight shift;Decreased L stance;Decreased R stance;Trendelenburg   Assist Device   (no AD)   Distance Walked (feet) 350 ft  (x2)   Limitations Noted In Balance; Coordination;Speed;Strength;Swing   Findings more noticable trendelenburg and circumduction of RLE  slower gait speed, cues to increase speed if able     Walking (FIM) 4 - Patient requires steadying assist or light touching AND distance 150 feet or more, no rest   QI: Wheel 50 Feet with Two Turns   Reason if not Attempted Activity not applicable   Wheel 50 Feet with Two Turns CARE Score 9   QI: Wheel 150 Feet   Reason if not Attempted Activity not applicable   Wheel 470 Feet CARE Score 9   Wheelchair mobility   QI: Does the patient use a wheelchair? 0  No   Therapeutic Interventions   Balance sidestepping with green tband for resistance  fwd/bwd walking with wider steps with green tband (fwd only)     Neuromuscular Re-Education fwd step downs with RLE trying to facilitate heel toe and control eccentric of DF   Other Comments   Comments had pt retrieving bowl and cup from OT kitchen and carrying it 10'  pt carried cup of water 150' and laundrry basket  pt with more unsteady gait with dual tasks  Assessment   Treatment Assessment pt focused on more functional tasks to assess dual tasks and safety during amb  pt with decrease balance today due to fatigue but able to self correct  pt with notable decrease hip flex and swing of RLE during gait training  will cont to work on functional strengthening, balance with functional tasks to decrease fall risk upon d/c  Problem List Decreased strength;Decreased range of motion;Decreased endurance; Impaired balance;Decreased mobility; Impaired sensation;Orthopedic restrictions;Decreased coordination   Barriers to Discharge Decreased caregiver support   PT Barriers   Physical Impairment Decreased strength;Decreased range of motion;Decreased endurance; Impaired balance;Decreased mobility; Impaired sensation   Functional Limitation Car transfers;Stair negotiation;Standing;Transfers; Walking   Plan   Treatment/Interventions Functional transfer training;LE strengthening/ROM; Endurance training;Patient/family training;Gait training   Progress Progressing toward goals   Recommendation   Recommendation Outpatient PT; Home with family support   PT Therapy Minutes   PT Time In 1400   PT Time Out 1530   PT Total Time (minutes) 90   PT Mode of treatment - Individual (minutes) 60   PT Mode of treatment - Concurrent (minutes) 30   PT Mode of treatment - Group (minutes) 0   PT Mode of treatment - Co-treat (minutes) 0   PT Mode of Teatment - Total time(minutes) 90 minutes   Therapy Time missed   Time missed?  No

## 2019-02-13 NOTE — PROGRESS NOTES
Internal Medicine Progress Note  Patient: Tatum Hauser III  Age/sex: 60 y o  male  Medical Record #: [id number]      ASSESSMENT/PLAN:  Tatum Hauser III is seen and examined and management for following issues:    Medullary mass removal with fixation/fusion of C2-3 T5 1/24/19:  Maintain cervical collar all times; steroid weaned off per Neurosurgery       Hypothyroidism:  Continue Levothyroxine 25 mcg qd     Elevated fasting blood sugars; HbA1C 1/22 = 6 3:  Continue DM diet but stopped Accuchecks      Asymptomatic bradycardia:  noted postoperatively; still occurs intermitt but during sleep into 50's  ABLA:  Stable; w/o sx; cont to monitor    Heartburn:  added Protonix since on steroids/prn TUMS  He gets heartburn at home if eats certain foods and gets 3x/week  Told him to go to PCP as OP when he recovers and have workup with possible EGD      Subjective: he denies any current complaints      ROS:   GI: denies abdominal pain, change bowel habits or reflux symptoms  Neuro: No new neurologic changes  Respiratory: No Cough, SOB  Cardiovascular: No CP, palpitations     Scheduled Meds:    Current Facility-Administered Medications:  acetaminophen 975 mg Oral Q8H [facility] Kaci Alston MD   bisacodyl 10 mg Rectal Daily PRN Michael Alvarado DO   calcium carbonate 500 mg Oral Daily PRN CLAUDIO Shook   senna 2 tablet Oral Daily Kaci Alston MD   And       docusate sodium 100 mg Oral Daily Kaci Alston MD   enoxaparin 40 mg Subcutaneous Daily Kaci Alston MD   fish oil 1,000 mg Oral Daily Kaci Alston MD   levothyroxine 25 mcg Oral Early Morning Kaci Alston MD   methocarbamol 500 mg Oral Q6H PRN Kaci Alston MD   oxyCODONE 10 mg Oral Q4H PRN Kaci Alston MD   oxyCODONE 5 mg Oral Q4H PRN Kaci Alston MD   pantoprazole 40 mg Oral Early Morning CLAUDIO Shook   sodium phosphate-biphosphate 1 enema Rectal Once PRN Anyi Wilkins MD       Labs:     Results from last 7 days   Lab Units 02/11/19  7413 02/07/19  0448   WBC Thousand/uL 5 96 10 42*   HEMOGLOBIN g/dL 10 2* 9 6*   HEMATOCRIT % 33 0* 30 8*   PLATELETS Thousands/uL 355 394*     Results from last 7 days   Lab Units 02/11/19  0506 02/07/19  0448   SODIUM mmol/L 139 137   POTASSIUM mmol/L 4 0 4 4   CHLORIDE mmol/L 106 104   CO2 mmol/L 29 28   BUN mg/dL 18 22   CREATININE mg/dL 0 74 0 66   CALCIUM mg/dL 8 8 8 5                  Results from last 7 days   Lab Units 02/06/19  2127   POC GLUCOSE mg/dl 114     [unfilled]    Labs reviewed    Physical Examination:  Vitals:   Vitals:    02/12/19 0503 02/12/19 1254 02/12/19 2052 02/13/19 0516   BP: 98/54 90/56 103/59 111/63   BP Location: Left arm Left arm Left arm Right arm   Pulse: 58 58 56 58   Resp: 20 20 20 19   Temp: 98 3 °F (36 8 °C) 98 3 °F (36 8 °C) 98 °F (36 7 °C) 98 6 °F (37 °C)   TempSrc: Oral Oral Oral Oral   SpO2: 98% 100% 98% 98%   Weight:       Height:         Constitutional:  NAD; pleasant; nontoxic  HEENT:  AT/NC; oropharynx negative for thrush on tongue   Neck: negative for JVD  CV:  +S1, S2;  RRR; no rub/murmur  Pulmonary:  BBS without crackles/wheeze/rhonci; resp are unlabored  Abdominal:  soft, +BS, ND/NT; no mass  Skin:  no rashes  Musculoskeletal:  no edema  :  no martinez  Neurological/Psych:  AAO;  CAIN 5/5 except RLE 4+/5; no depression/anxiety        [ X ] Total time spent: 30 Mins and greater than 50% of this time was spent counseling/coordinating care  ** Please Note: Dragon 360 Dictation voice to text software may have been used in the creation of this document   **

## 2019-02-13 NOTE — PLAN OF CARE
Problem: Potential for Falls  Goal: Patient will remain free of falls  Description  INTERVENTIONS:  - Assess patient frequently for physical needs  -  Identify cognitive and physical deficits and behaviors that affect risk of falls    -  [place] fall precautions as indicated by assessment   - Educate patient/family on patient safety including physical limitations  - Instruct patient to call for assistance with activity based on assessment  - Modify environment to reduce risk of injury  - Consider OT/PT consult to assist with strengthening/mobility   Outcome: Progressing     Problem: PAIN - ADULT  Goal: Verbalizes/displays adequate comfort level or baseline comfort level  Description  Interventions:  - Encourage patient to monitor pain and request assistance  - Assess pain using appropriate pain scale  - Administer analgesics based on type and severity of pain and evaluate response  - Implement non-pharmacological measures as appropriate and evaluate response  - Consider cultural and social influences on pain and pain management  - Notify physician/advanced practitioner if interventions unsuccessful or patient reports new pain   Outcome: Progressing     Problem: INFECTION - ADULT  Goal: Absence or prevention of progression during hospitalization  Description  INTERVENTIONS:  - Assess and monitor for signs and symptoms of infection  - Monitor lab/diagnostic results  - Monitor all insertion sites, i e  indwelling lines, tubes, and drains  - Monitor endotracheal (as able) and nasal secretions for changes in amount and color  - [place] appropriate cooling/warming therapies per order  - Administer medications as ordered  - Instruct and encourage patient and family to use good hand hygiene technique  - Identify and instruct in appropriate isolation precautions for identified infection/condition   Outcome: Progressing     Problem: SAFETY ADULT  Goal: Patient will remain free of falls  Description  INTERVENTIONS:  - Assess patient frequently for physical needs  -  Identify cognitive and physical deficits and behaviors that affect risk of falls    -  [place] fall precautions as indicated by assessment   - Educate patient/family on patient safety including physical limitations  - Instruct patient to call for assistance with activity based on assessment  - Modify environment to reduce risk of injury  - Consider OT/PT consult to assist with strengthening/mobility   Outcome: Progressing  Goal: Maintain or return to baseline ADL function  Description  INTERVENTIONS:  -  Assess patient's ability to carry out ADLs; assess patient's baseline for ADL function and identify physical deficits which impact ability to perform ADLs (bathing, care of mouth/teeth, toileting, grooming, dressing, etc )  - Assess/evaluate cause of self-care deficits   - Assess range of motion  - Assess patient's mobility; develop plan if impaired  - Assess patient's need for assistive devices and provide as appropriate  - Encourage maximum independence but intervene and supervise when necessary  ¯ Involve family in performance of ADLs  ¯ Assess for home care needs following discharge   ¯ Request OT consult to assist with ADL evaluation and planning for discharge  ¯ Provide patient education as appropriate   Outcome: Progressing  Goal: Maintain or return mobility status to optimal level  Description  INTERVENTIONS:  - Assess patient's baseline mobility status (ambulation, transfers, stairs, etc )    - Identify cognitive and physical deficits and behaviors that affect mobility  - Identify mobility aids required to assist with transfers and/or ambulation (gait belt, sit-to-stand, lift, walker, cane, etc )  - [place] fall precautions as indicated by assessment  - Record patient progress and toleration of activity level on Mobility SBAR; progress patient to next Phase/Stage  - Instruct patient to call for assistance with activity based on assessment  - Request Rehabilitation consult to assist with strengthening/weightbearing, etc    Outcome: Progressing     Problem: DISCHARGE PLANNING  Goal: Discharge to home or other facility with appropriate resources  Description  INTERVENTIONS:  - Identify barriers to discharge w/patient and caregiver  - Arrange for needed discharge resources and transportation as appropriate  - Identify discharge learning needs (meds, wound care, etc )  - Arrange for interpretive services to assist at discharge as needed  - Refer to Case Management Department for coordinating discharge planning if the patient needs post-hospital services based on physician/advanced practitioner order or complex needs related to functional status, cognitive ability, or social support system   Outcome: Progressing     Problem: Prexisting or High Potential for Compromised Skin Integrity  Goal: Skin integrity is maintained or improved  Description  INTERVENTIONS:  - Identify patients at risk for skin breakdown  - Assess and monitor skin integrity  - Assess and monitor nutrition and hydration status  - Monitor labs (i e  albumin)  - Assess for incontinence   - Turn and reposition patient  - Assist with mobility/ambulation  - Relieve pressure over bony prominences  - Avoid friction and shearing  - Provide appropriate hygiene as needed including keeping skin clean and dry  - Evaluate need for skin moisturizer/barrier cream  - Collaborate with interdisciplinary team (i e  Nutrition, Rehabilitation, etc )   - Patient/family teaching   Outcome: Progressing

## 2019-02-13 NOTE — PROGRESS NOTES
Physical Medicine and Rehabilitation Progress Note  Tatum Hauser III 60 y o  male MRN: [id number]  Unit/Bed#: -01 Encounter: [id number]    HPI: Tatum Hauser III is a 60 y o  male who presented to the [facility] with h/o sensory deficits and was found to have spinal cord mass therefore underwent tumor resection and C4-T3 laminectomy and C2-T5 fixation/fusion by Dr Adelaide Mason on 1/24       Chief Complaint: spinal mass s/p resection     Subjective: Patient denies any events overnight     ROS:  Negative unless noted above     Assessment/Plan:      Anemia  Assessment & Plan  · Likely ABLA  · Hg currently stable at 10 2  · IM monitoring     Hyperlipidemia  Assessment & Plan  · was Omega 3 FA 1000 mg qd PTA   · D/W neurosx and have cleared patient to resume home Omega 3 FA 1000 mg qd    Impaired fasting glucose  Assessment & Plan  · PCP aware  · Diet controlled     Hypothyroidism  Assessment & Plan  · On home synthroid 25 mcg qd     BPH associated with nocturia  Assessment & Plan  · Per patient stopped taking flomax 0 4 mg qpm in the past because it was not helping the urinary retention   · Per patient stopped taking ditropan XL 10 mg qd in the past because  it was not helping the urinary retention   · Per patient stopped taking viagra 100 mg in the past because  it was not helping the urinary retention   · Per patient was no longer on any meds for urinary retention/BPH PTA as he was working with urology as OP and further tests were being planned as patient medications had not been effective however in acute care patient was restarted on flomax and post-operatively patient has been urinating well   · successful trial off of flomax (which patient had stopped taking PTA because it was not effective); PVRs were 57, 38, & 87 (PVRs are from greater than 24 hours after last dose of flomax)   · Follows with Mikaela Rush PA-C/Dr Fidela Apley as OP    * 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[facility])  Assessment & Plan  · S/p resection and C4-T3 laminectomy and C2-T5 fixation/fusion by Dr Josh Paulino and Dr Joaquim Kehr on 1/24  · decardron taper completed  · Spine precautions  · c-collar at all times  · Path revealed grade 2 ependymoma; further management per team at [place] [address] [facility]   · OP FU w/neurosx scheduled for 2/6 done as inpt by neurosx team     Scheduled Meds:    Current Facility-Administered Medications:  acetaminophen 975 mg Oral Q8H Juvenal Pearson MD   bisacodyl 10 mg Rectal Daily PRN Augustina Shave, DO   calcium carbonate 500 mg Oral Daily PRN Ct Poet, CRNP   senna 2 tablet Oral Daily Giselle Cardoso MD   And       docusate sodium 100 mg Oral Daily Giselle Pry, MD   enoxaparin 40 mg Subcutaneous Daily Giselle Pry, MD   fish oil 1,000 mg Oral Daily Giselle Pry, MD   levothyroxine 25 mcg Oral Early Morning Giselle Pry, MD   methocarbamol 500 mg Oral Q6H PRN Giselle Pry, MD   oxyCODONE 10 mg Oral Q4H PRN Giselle Pry, MD   oxyCODONE 5 mg Oral Q4H PRN Giselle Pry, MD   pantoprazole 40 mg Oral Early Morning Ct Poet, CRNP   sodium phosphate-biphosphate 1 enema Rectal Once PRN Skinny Juarez MD        Incidental findings:  1) 2-3 mm pulmonary nodule: per radiology report of 1/18/19 recommend repeat CT chest in 3 months however will defer to PCP  2) fatty involution of pancreas: OP FU with PCP with further testing/treatment and/or specialist referral at PCP's discretion  3) L maxillary sinus thickening: asymptomatic, OP FU with PCP with further testing/treatment and/or specialist referral at PCP's discretion      DVT ppx: SCDs, cleared for lovenox 40 mg sc qd by neurosx in acute care (and has been on it since 1/26)  Dispo: 2/20      Objective:    Functional Update:  Mobility: sup-CG  Transfers: sup  ADLs: min-sup         Physical Exam:           General: alert, no apparent distress, cooperative and comfortable  HEENT:  +C-collar   CARDIAC:  +S1/2  LUNGS: respirations unlabored   ABDOMEN:  soft NT   EXTREMITIES:  no significant LE edema  NEURO:   mental status, speech normal, alert and oriented x3  PSYCH:  Alert and oriented, appropriate affect  Laboratory:   Results from last 7 days   Lab Units 02/11/19  0506 02/07/19  0448   HEMOGLOBIN g/dL 10 2* 9 6*   HEMATOCRIT % 33 0* 30 8*   WBC Thousand/uL 5 96 10 42*     Results from last 7 days   Lab Units 02/11/19  0506 02/07/19  0448   BUN mg/dL 18 22   SODIUM mmol/L 139 137   POTASSIUM mmol/L 4 0 4 4   CHLORIDE mmol/L 106 104   CREATININE mg/dL 0 74 0 66            Patient Active Problem List   Diagnosis    BPH associated with nocturia    Hypothyroidism    Impaired fasting glucose    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HCC)    Anemia    Hyperlipidemia           Total time spent: At least 35 minutes, with more than 50% spent counseling/coordinating care  In addition, this patient was discussed by the interdisciplinary team in weekly case conference today  The care of the patient was extensively discussed with all care providers and an appropriate rehabilitation plan was formulated unique for this patient  Barriers were identified preventing progression of therapy and appropriate interventions were discussed with each discipline  Please see the team note for input from all disciplines regarding barriers, intervention, and discharge planning

## 2019-02-14 LAB
ANION GAP SERPL CALCULATED.3IONS-SCNC: 6 MMOL/L (ref 4–13)
BASOPHILS # BLD AUTO: 0.03 THOUSANDS/ΜL (ref 0–0.1)
BASOPHILS NFR BLD AUTO: 1 % (ref 0–1)
BUN SERPL-MCNC: 16 MG/DL (ref 5–25)
CALCIUM SERPL-MCNC: 8.5 MG/DL (ref 8.3–10.1)
CHLORIDE SERPL-SCNC: 107 MMOL/L (ref 100–108)
CO2 SERPL-SCNC: 28 MMOL/L (ref 21–32)
CREAT SERPL-MCNC: 0.69 MG/DL (ref 0.6–1.3)
EOSINOPHIL # BLD AUTO: 0.18 THOUSAND/ΜL (ref 0–0.61)
EOSINOPHIL NFR BLD AUTO: 4 % (ref 0–6)
ERYTHROCYTE [DISTWIDTH] IN BLOOD BY AUTOMATED COUNT: 13.7 % (ref 11.6–15.1)
GFR SERPL CREATININE-BSD FRML MDRD: 104 ML/MIN/1.73SQ M
GLUCOSE SERPL-MCNC: 86 MG/DL (ref 65–140)
HCT VFR BLD AUTO: 33.4 % (ref 36.5–49.3)
HGB BLD-MCNC: 10.3 G/DL (ref 12–17)
IMM GRANULOCYTES # BLD AUTO: 0.02 THOUSAND/UL (ref 0–0.2)
IMM GRANULOCYTES NFR BLD AUTO: 0 % (ref 0–2)
LYMPHOCYTES # BLD AUTO: 1.27 THOUSANDS/ΜL (ref 0.6–4.47)
LYMPHOCYTES NFR BLD AUTO: 25 % (ref 14–44)
MCH RBC QN AUTO: 29.7 PG (ref 26.8–34.3)
MCHC RBC AUTO-ENTMCNC: 30.8 G/DL (ref 31.4–37.4)
MCV RBC AUTO: 96 FL (ref 82–98)
MONOCYTES # BLD AUTO: 0.55 THOUSAND/ΜL (ref 0.17–1.22)
MONOCYTES NFR BLD AUTO: 11 % (ref 4–12)
NEUTROPHILS # BLD AUTO: 3.03 THOUSANDS/ΜL (ref 1.85–7.62)
NEUTS SEG NFR BLD AUTO: 59 % (ref 43–75)
NRBC BLD AUTO-RTO: 0 /100 WBCS
PLATELET # BLD AUTO: 283 THOUSANDS/UL (ref 149–390)
PMV BLD AUTO: 8.9 FL (ref 8.9–12.7)
POTASSIUM SERPL-SCNC: 4.1 MMOL/L (ref 3.5–5.3)
RBC # BLD AUTO: 3.47 MILLION/UL (ref 3.88–5.62)
SODIUM SERPL-SCNC: 141 MMOL/L (ref 136–145)
WBC # BLD AUTO: 5.08 THOUSAND/UL (ref 4.31–10.16)

## 2019-02-14 PROCEDURE — 97110 THERAPEUTIC EXERCISES: CPT

## 2019-02-14 PROCEDURE — 97535 SELF CARE MNGMENT TRAINING: CPT

## 2019-02-14 PROCEDURE — 85025 COMPLETE CBC W/AUTO DIFF WBC: CPT | Performed by: NURSE PRACTITIONER

## 2019-02-14 PROCEDURE — 97116 GAIT TRAINING THERAPY: CPT

## 2019-02-14 PROCEDURE — 80048 BASIC METABOLIC PNL TOTAL CA: CPT | Performed by: NURSE PRACTITIONER

## 2019-02-14 PROCEDURE — 97112 NEUROMUSCULAR REEDUCATION: CPT

## 2019-02-14 PROCEDURE — 99232 SBSQ HOSP IP/OBS MODERATE 35: CPT | Performed by: PHYSICAL MEDICINE & REHABILITATION

## 2019-02-14 PROCEDURE — 97530 THERAPEUTIC ACTIVITIES: CPT

## 2019-02-14 RX ORDER — SODIUM PHOSPHATE, DIBASIC AND SODIUM PHOSPHATE, MONOBASIC 7; 19 G/133ML; G/133ML
1 ENEMA RECTAL ONCE AS NEEDED
Status: DISCONTINUED | OUTPATIENT
Start: 2019-02-14 | End: 2019-02-19

## 2019-02-14 RX ADMIN — LEVOTHYROXINE SODIUM 25 MCG: 25 TABLET ORAL at 05:36

## 2019-02-14 RX ADMIN — PANTOPRAZOLE SODIUM 40 MG: 40 TABLET, DELAYED RELEASE ORAL at 05:36

## 2019-02-14 RX ADMIN — ENOXAPARIN SODIUM 40 MG: 40 INJECTION SUBCUTANEOUS at 08:45

## 2019-02-14 RX ADMIN — ACETAMINOPHEN 975 MG: 325 TABLET ORAL at 05:36

## 2019-02-14 RX ADMIN — Medication 1000 MG: at 08:41

## 2019-02-14 RX ADMIN — STANDARDIZED SENNA CONCENTRATE 17.2 MG: 8.6 TABLET ORAL at 08:41

## 2019-02-14 RX ADMIN — ACETAMINOPHEN 975 MG: 325 TABLET ORAL at 13:33

## 2019-02-14 RX ADMIN — DOCUSATE SODIUM 100 MG: 100 CAPSULE, LIQUID FILLED ORAL at 08:41

## 2019-02-14 RX ADMIN — POLYETHYLENE GLYCOL 3350 17 G: 17 POWDER, FOR SOLUTION ORAL at 08:44

## 2019-02-14 RX ADMIN — ACETAMINOPHEN 975 MG: 325 TABLET ORAL at 21:21

## 2019-02-14 NOTE — PROGRESS NOTES
02/14/19 1230   Pain Assessment   Pain Assessment No/denies pain   Restrictions/Precautions   Precautions Fall Risk   Braces or Orthoses C/S Collar   Cognition   Arousal/Participation Cooperative   Subjective   Subjective pt reported feeling pretty well    QI: Sit to Stand   Assistance Needed Supervision; Adaptive equipment   Sit to Stand CARE Score 4   QI: Chair/Bed-to-Chair Transfer   Assistance Needed Supervision; Adaptive equipment   Chair/Bed-to-Chair Transfer CARE Score 4   Transfer Bed/Chair/Wheelchair   Limitations Noted In Balance; Endurance;UE Strength;LE Strength   Adaptive Equipment Roller Walker   Stand Pivot Supervision   Sit to Stand Supervision   Stand to W  R  Eve, Chair, Wheelchair Transfer (FIM) 5 - Patient requires supervision/monitoring   QI: Walk 10 Feet   Assistance Needed Supervision; Adaptive equipment   Walk 10 Feet CARE Score 4   QI: Walk 50 Feet with Two Turns   Assistance Needed Supervision; Adaptive equipment   Walk 50 Feet with Two Turns CARE Score 4   Ambulation   Primary Discharge Mode of Locomotion Walk   Walk Assist Level Supervision   Gait Pattern Inconsistant Sheryl; Slow Sheryl;Decreased R stance;Decreased L stance; Improper weight shift; Step through;Trendelenburg   Assist Device Miguel Angel Duyen Rhodes Walked (feet) 65 ft  (x2)   Limitations Noted In Balance; Endurance;Speed;Strength;Swing   Findings from room <>PT gym   Walking (FIM) 2 - Patient ambulates between 50 - 149 feet regardless of assist/device/set up   Equipment Use   NuStep 20 min level 3 BLE only   Assessment   Treatment Assessment Session focused on use of NuStep for additional therex to improve BLE strength, hip abd stability and activity tolerance  Pt cont to demonstrate improvements with walking balance, which was aided by UE support on RW  To cont to progress BLE strength, activity tolerance, balance, righting reactions and BLE muscle activation      PT Barriers   Physical Impairment Decreased strength;Decreased range of motion;Decreased endurance; Impaired balance;Decreased mobility; Impaired sensation   Functional Limitation Car transfers;Stair negotiation;Standing;Transfers; Walking   Plan   Treatment/Interventions Functional transfer training;LE strengthening/ROM; Elevations; Therapeutic exercise; Endurance training;Patient/family training;Equipment eval/education; Bed mobility;Gait training   Progress Progressing toward goals   Recommendation   Recommendation Outpatient PT   Equipment Recommended Walker   PT Therapy Minutes   PT Time In 1230   PT Time Out 1300   PT Total Time (minutes) 30   PT Mode of treatment - Individual (minutes) 10   PT Mode of treatment - Concurrent (minutes) 20   PT Mode of treatment - Group (minutes) 0   PT Mode of treatment - Co-treat (minutes) 0   PT Mode of Teatment - Total time(minutes) 30 minutes   Therapy Time missed   Time missed?  No

## 2019-02-14 NOTE — PROGRESS NOTES
02/14/19 0700   Pain Assessment   Pain Assessment 0-10   Pain Score 2   Pain Type Acute pain   Pain Location Knee; Shoulder   Pain Orientation Right;Left  (R shoulder, L knee)   Pain Descriptors Discomfort; Sore   Pain Frequency Intermittent   Hospital Pain Intervention(s) Distraction; Emotional support; Environmental changes   Response to Interventions Pt tolerated treatment well   Restrictions/Precautions   Precautions Fall Risk;Spinal precautions   Weight Bearing Restrictions No   ROM Restrictions Yes   Braces or Orthoses C/S Collar   QI: Eating   Assistance Needed Independent   Assistance Provided by [place] No physical assistance   Eating CARE Score 6   Eating Assessment   Positioning Upright;Out of Bed   Meal Assessed Breakfast   QI: Swallowing/Nutritional Status Regular food   Eating (FIM) 7 - Patient completely independent   QI: Oral Hygiene   Assistance Needed Supervision   Assistance Provided by [place] No physical assistance   Comment Pt continues to complete hygiene in stance while unsupported at sink however, pt continues to require supervision 2* to decreased dynamic standing balance   Oral Hygiene CARE Score 4   Grooming   Able To Initiate Tasks; Acquire Items;Comb/Brush Hair;Wash/Dry Face;Brush/Clean Teeth;Wash/Dry Hands   Limitation Noted In Safety;Strength   Findings Pt completes grooming tasks in stance unsupported at sink, pt requires supervision 2* to decreased dynamic standing balance  Grooming (FIM) 5 - Patient requires supervision/monitoring   QI: Shower/Bathe Self   Assistance Needed Supervision   Assistance Provided by [place] No physical assistance   Comment Pt completes rear and fang hygiene using lateral weight shifts while seated on tub bench to increase independence with task at home   Pt reports cleaning thoroughly and will use this method at home to reduce care giver burden and increase safety   Shower/Bathe Self CARE Score 4   Bathing   Assessed Bath Style Tub   Anticipated D/C [facility] Style Tub   Able to Gather/Transport Yes   Able to Adjust Water Temperature Yes   Able to Wash/Rinse/Dry (body part) Left Arm;Right Arm;L Upper Leg;R Upper Leg;L Lower Leg/Foot;R Lower Leg/Foot;Chest;Abdomen;Perineal Area; Buttocks   Limitations Noted in Balance; Endurance;Strength   Positioning Seated   Adaptive Equipment Tub Bench;Hand Held Shower   Bathing (FIM) 5 - Patient requires supervision/monitoring but completes 10/10 parts   Tub/Shower Transfer   Limitations Noted In Balance; Endurance;LE Strength   Adaptive Equipment Transfer Bench   Assessed Tub   Findings Pt completes transfer with RW to tub bench completes sit swivel  Pt phil SO carryover of hand placement on tub bench  Pt requires supervision 2* to decreased dynamic standing balance  Tub Transfer (FIM) 5 - Patient requires supervision/monitoring   QI: Upper Body Dressing   Assistance Needed Physical assistance   Assistance Provided by [place] Less than 25%   Comment Pt requires Min A to doff shirt around back of C collar 2* to spinal restrictions  Upper Body Dressing CARE Score 3   QI: Lower Body Dressing   Assistance Needed Supervision   Assistance Provided by [place] No physical assistance   Comment Pt requires supervision in stance at RW to pull pants up over hips 2* to decreased standing balance  Lower Body Dressing CARE Score 4   QI: Putting On/Taking Off Footwear   Assistance Needed Supervision   Assistance Provided by [place] No physical assistance   Comment Pt able to don and doff shoes and socks  Pt utilizes cross leg technique to don and doff socks  Pt phil SO carryover of hip flexion to prop R and L feet onto chair to tie shoes and reports that he will complete the task this way at home  Pt continues to require supervision 2* to spinal precautions     Putting On/Taking Off Footwear CARE Score 4   Dressing/Undressing Clothing   Able to  Obtain Clothing;Store removed clothing   Remove UB Clothes Pullover Shirt   Remove LB Clothes Pants;Undergarment;Socks; [address] Clothes Pullover Shirt   Don LB Clothes Pants;Socks; Undergarment; Shoes   Limitations Noted In Balance; Endurance;Strength;ROM   Positioning Supported Sit; Sit Edge Of Bed;Standing   UB Dressing (FIM) 4 - Patient completes 75% of all tasks   LB Dressing (FIM) 5 - Patient requires supervision/monitoring   QI: Lying to Sitting on Side of Bed   Assistance Needed Supervision   Assistance Provided by [place] No physical assistance   Comment HOB flat, no use of bed rails   Lying to Sitting on Side of Bed CARE Score 4   QI: Sit to [address] Provided by [place] No physical assistance   Comment RW   Sit to Stand CARE Score 4   QI: Chair/Bed-to-Chair Transfer   Assistance Needed Supervision   Assistance Provided by [place] No physical assistance   Comment Pt requires supervision 2* to decerased standing balance  Chair/Bed-to-Chair Transfer CARE Score 4   Transfer Bed/Chair/Wheelchair   Limitations Noted In Balance; Endurance;LE Strength   Adaptive Equipment Roller Walker   Sit to TXU Adri   Stand to Sit Supervision   Supine to W  RASHEED [place], Chair, Wheelchair Transfer (FIM) 5 - Patient requires supervision/monitoring   Exercise Tools   Exercise Tools Yes   Other Exercise Tool 1 Pt engaged in ball catch activity to focus on dynamic standing balance to increase independence with I/ADL tasks  Pt in stance requiring CS 2* to ensure safety  Pt able to remain standing with no LOB noted  Pt engages in activity with supervising VILLASENOR/L while OTAS guards pt  Pt initially throws and catches medium size ball and then progresses to small size ball being challenged outside of LEAH  Pt demos increased dynamic standing balance this session  Other Exercise Tool 2 Pt engaged in weighted table belén activity to focus on UE and core strengthening to increase independence with I/ADL tasks   Pt engaged in 3 x 10 each for 5 different exercises while seated unsupported in chair at the table using the table belén with a 5lb weight to increase UE and core strength  Pt tolerated exercises well  Pt reports feeling discomfort from muscles knot in R shoulder  OTAS provided pressure to muscle knot to help relieve, with success noted  Cognition   Overall Cognitive Status WFL   Arousal/Participation Cooperative   Attention Within functional limits   Orientation Level Oriented X4   Memory Within functional limits   Following Commands Follows all commands and directions without difficulty   Activity Tolerance   Activity Tolerance Patient tolerated treatment well   Assessment   Treatment Assessment Pt participated in skilled OT services to focus on ADL retraining, UE and core strengthening, and  dynamic standing balance  See above for details on above listed activities  Pt phil increased dynamic standing balance during this session  Pt phil SO carryover of lateral weight shifts for rear bathing  Pt phil SO overall progress towards LTGs however continues to be limited by orthopedic restrictions, RLE strength, decreased endurance and C collar management  Pt would continue to benefit from skilled OT services to focus on the above listed deficits  Prognosis Good   Problem List Decreased strength;Decreased range of motion;Decreased endurance; Impaired balance;Decreased mobility;Orthopedic restrictions; Impaired sensation;Decreased coordination   Plan   Treatment/Interventions ADL retraining;Functional transfer training; Therapeutic exercise; Endurance training;Patient/family training; Compensatory technique education; Bed mobility   Progress Progressing toward goals   Recommendation   OT Discharge Recommendation Home with family support   OT Therapy Minutes   OT Time In 0700   OT Time Out 0830   OT Total Time (minutes) 90   OT Mode of treatment - Individual (minutes) 90   OT Mode of treatment - Concurrent (minutes) 0   OT Mode of treatment - Group (minutes) 0   OT Mode of treatment - Co-treat (minutes) 0   OT Mode of Teatment - Total time(minutes) 90 minutes   Therapy Time missed   Time missed?  No

## 2019-02-14 NOTE — PROGRESS NOTES
Physical Medicine and Rehabilitation Progress Note  Flaca Reardon III 60 y o  male MRN: [id number]  Unit/Bed#: -01 Encounter: [id number]    HPI: Flaca Reardon III is a 60 y o  male [facility] [address] with h/o sensory deficits and was found to have spinal cord mass therefore underwent tumor resection and C4-T3 laminectomy and C2-T5 fixation/fusion by Dr Tatyana Montoya on 1/24       Chief Complaint: spinal mass s/p resection     Subjective: Patient w/o complaint currently     ROS:  Negative unless noted above     Assessment/Plan:      Anemia  Assessment & Plan  · Likely ABLA  · Hg currently stable at 10 3  · IM monitoring     Hyperlipidemia  Assessment & Plan  · was Omega 3 FA 1000 mg qd PTA   · D/W neurosx and have cleared patient to resume home Omega 3 FA 1000 mg qd    Impaired fasting glucose  Assessment & Plan  · PCP aware  · Diet controlled     Hypothyroidism  Assessment & Plan  · On home synthroid 25 mcg qd     BPH associated with nocturia  Assessment & Plan  · Per patient stopped taking flomax 0 4 mg qpm in the past because it was not helping the urinary retention   · Per patient stopped taking ditropan XL 10 mg qd in the past because  it was not helping the urinary retention   · Per patient stopped taking viagra 100 mg in the past because  it was not helping the urinary retention   · Per patient was no longer on any meds for urinary retention/BPH PTA as he was working with urology as OP and further tests were being planned as patient medications had not been effective however in acute care patient was restarted on flomax and post-operatively patient has been urinating well   · successful trial off of flomax (which patient had stopped taking PTA because it was not effective); PVRs were 57, 38, & 87 (PVRs are from greater than 24 hours after last dose of flomax)   · Follows with Mandy Ponce PA-C/Dr Renita Romero as OP    * 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[facility])  Assessment & Plan  · S/p resection and C4-T3 laminectomy and C2-T5 fixation/fusion by Dr Ashley Briggs and Dr Allyssa Schrader on 1/24  · decardron taper completed  · Spine precautions  · c-collar at all times  · Path revealed grade 2 ependymoma; further management per team at [place] [address] [facility]   · OP FU w/neurosx scheduled for 2/6 done as inpt by neurosx team     Scheduled Meds:    Current Facility-Administered Medications:  acetaminophen 975 mg Oral Q8H Mauricio Cole MD   bisacodyl 10 mg Rectal Daily PRN Larry Banda, DO   calcium carbonate 500 mg Oral Daily PRN CLAUDIO Carranza   senna 2 tablet Oral Daily Iris Adlre MD   And       docusate sodium 100 mg Oral Daily Iris Adler MD   enoxaparin 40 mg Subcutaneous Daily Iris Adler MD   fish oil 1,000 mg Oral Daily Iris Adler MD   levothyroxine 25 mcg Oral Early Morning Iris Adler MD   methocarbamol 500 mg Oral Q6H PRN Iris Adler MD   oxyCODONE 10 mg Oral Q4H PRN Iris Adler MD   oxyCODONE 5 mg Oral Q4H PRN Iris Adler MD   pantoprazole 40 mg Oral Early Morning CLAUDIO Carranza   polyethylene glycol 17 g Oral Daily PRN NELLY CarranzaNP   sodium phosphate-biphosphate 1 enema Rectal Once PRN Larry Harder, DO        Incidental findings:  1) 2-3 mm pulmonary nodule: per radiology report of 1/18/19 recommend repeat CT chest in 3 months however will defer to PCP  2) fatty involution of pancreas: OP FU with PCP with further testing/treatment and/or specialist referral at PCP's discretion  3) L maxillary sinus thickening: asymptomatic, OP FU with PCP with further testing/treatment and/or specialist referral at PCP's discretion      DVT ppx: SCDs, cleared for lovenox 40 mg sc qd by neurosx in acute care (and has been on it since 1/26)  Dispo: 2/20      Objective:    Functional Update:  Mobility: sup-CG  Transfers: sup  ADLs: min-sup         Physical Exam:          General: alert, no apparent distress, cooperative and comfortable  HEENT:  +C-collar  CARDIAC:  +S1/2  LUNGS:  respirations unlabored  ABDOMEN:  soft NT  EXTREMITIES:  no significant LE edema  NEURO:   mental status, speech normal, alert and oriented x3  PSYCH:  Alert and oriented, appropriate affect  Laboratory:   Results from last 7 days   Lab Units 02/14/19  0541 02/11/19  0506   HEMOGLOBIN g/dL 10 3* 10 2*   HEMATOCRIT % 33 4* 33 0*   WBC Thousand/uL 5 08 5 96     Results from last 7 days   Lab Units 02/14/19  0541 02/11/19  0506   BUN mg/dL 16 18   SODIUM mmol/L 141 139   POTASSIUM mmol/L 4 1 4 0   CHLORIDE mmol/L 107 106   CREATININE mg/dL 0 69 0 74            Patient Active Problem List   Diagnosis    BPH associated with nocturia    Hypothyroidism    Impaired fasting glucose    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HCC)    Anemia    Hyperlipidemia           Total visit time:  At least 25 minutes, with more than 50% spent counseling/coordinating care

## 2019-02-14 NOTE — PROGRESS NOTES
02/14/19 0905   Pain Assessment   Pain Assessment No/denies pain   Restrictions/Precautions   Precautions Fall Risk;Spinal precautions   Braces or Orthoses C/S Collar   Cognition   Arousal/Participation Cooperative   Subjective   Subjective pt reports feeling well and ready for therapy    QI: Sit to Stand   Assistance Needed Supervision; Adaptive equipment   Sit to Stand CARE Score 4   QI: Chair/Bed-to-Chair Transfer   Assistance Needed Supervision; Adaptive equipment   Chair/Bed-to-Chair Transfer CARE Score 4   Transfer Bed/Chair/Wheelchair   Limitations Noted In Balance; Endurance;UE Strength;LE Strength   Adaptive Equipment None   Stand Pivot Supervision   Sit to Stand Supervision   Stand to W  R  [place], Chair, Wheelchair Transfer (FIM) 5 - Patient requires supervision/monitoring   QI: Walk 10 Feet   Assistance Needed Supervision; Incidental touching   Walk 10 Feet CARE Score 4   QI: Walk 50 Feet with Two Turns   Assistance Needed Incidental touching;Supervision   Walk 50 Feet with Two Turns CARE Score 4   QI: Walk 150 Feet   Assistance Needed Incidental touching;Supervision   Walk 150 Feet CARE Score 4   Ambulation   Primary Discharge Mode of Locomotion Walk   Walk Assist Level Contact Guard;Close Supervision   Gait Pattern Inconsistant Sheryl; Slow Sheryl; Step through; Improper weight shift   Assist Device   (none)   Distance Walked (feet) 350 ft  (x4)   Limitations Noted In Balance; Endurance; Heel Strike;Strength;Speed;Swing   Findings with gait belt for safety  3 trials with 3lb ankle weight RLE   Walking (FIM) 4 - Patient requires steadying assist or light touching AND distance 150 feet or more, no rest   Wheelchair mobility   QI: Does the patient use a wheelchair? 0  No   QI: 4 Steps   Assistance Needed Incidental touching;Supervision; Adaptive equipment   4 Steps CARE Score 4   QI: 12 Steps   Assistance Needed Incidental touching;Supervision; Adaptive equipment   12 Steps CARE Score 4   Stairs   Type Stairs   # of Steps 12  (x2  CGA-CS)   Weight Bearing Precautions Fall Risk   Assist Devices Single Rail   Findings first time in FF, nonreciprocal down and reciprocal up  practiced step downs with RLE on step and LLE moving to floor and back up with BUE support on 4:  steps andt hen 6" steps, first with BHR and then with RUE only  Then did FF again, reciprocal down and up  Pt noted more fatigue with reciprocal pattern down  these skills also used to improve eccentric strength of R dorsiflexors   Stairs (FIM) 4 - Patient requires steadying assist or light touching AND patient goes up and down full flight (12- 14 stairs)   Therapeutic Interventions   Strengthening seated R dorsiflexion agaisnt green theraband up from the floor and slowly down for eccentric control / AROM of dorsiflexion and eversion R foot seated and then with leg up support, to mimic position in bed, instructed pt in these for HEP in his room  reviewd therex/AROM that pt can perform after he has been sitting or getting up in the morning to alleviate stiffness in L knee that also dec balance upon initial stance/initial walking  kneeling on high lo mat, moving between kneeling and high kneel, hands on lap and then arms extended in front of pt holding ball  in high kneel, ball toss to work on dynamic balance  2 sets of this, to fatigue  rest breaks between  mini squats, standing marching and calf raises with BUE support, hip abd standing with UE support  Flexibility stretching bilat calves 2x60 with an extra 2x60 LLE  Assessment   Treatment Assessment Pt cont to present with progress towards LTGs, with adequate foot clearance and consistent step through walking pattern depspite cont BLE weakness, with R weaker than L  Pt reports his sensation in BLE is returning, which is contributing to improvements in functional mobility   Pt cont to present with fall risk factors when not using RW/ BUE Support, and therefore needs cont skilled PT to further progress dynamic balance to improve safety and (I) with self care tasks at home  Barriers to Discharge Decreased caregiver support   PT Barriers   Physical Impairment Decreased strength;Decreased range of motion;Decreased endurance; Impaired balance;Decreased mobility; Impaired sensation   Functional Limitation Car transfers;Stair negotiation;Standing;Transfers; Walking   Plan   Treatment/Interventions Functional transfer training;LE strengthening/ROM; Elevations; Therapeutic exercise; Endurance training;Patient/family training;Equipment eval/education; Bed mobility;Gait training   Progress Progressing toward goals   Recommendation   Recommendation Outpatient PT   Equipment Recommended Walker   PT Therapy Minutes   PT Time In 0905   PT Time Out 1035   PT Total Time (minutes) 90   PT Mode of treatment - Individual (minutes) 50   PT Mode of treatment - Concurrent (minutes) 40   PT Mode of treatment - Group (minutes) 0   PT Mode of treatment - Co-treat (minutes) 0   PT Mode of Teatment - Total time(minutes) 90 minutes   Therapy Time missed   Time missed?  No

## 2019-02-14 NOTE — PROGRESS NOTES
Internal Medicine Progress Note  Patient: Kj Duffy III  Age/sex: 60 y o  male  Medical Record #: [id number]      ASSESSMENT/PLAN:  Kj Duffy III is seen and examined and management for following issues:    Medullary mass removal with fixation/fusion of C2-3 T5 1/24/19:  Maintain cervical collar all times; steroid weaned off per Neurosurgery       Hypothyroidism:  Continue Levothyroxine 25 mcg qd     Elevated fasting blood sugars; HbA1C 1/22 = 6 3:  Continue DM diet but stopped Accuchecks      Asymptomatic bradycardia:  noted postoperatively; still occurs intermitt but during sleep into 50's  ABLA:  Stable; w/o sx; cont to monitor    Heartburn:  added Protonix since on steroids/prn TUMS  He gets heartburn at home 3x/week/uses TUMS  Told him to go to PCP as OP when he recovers and have workup with possible EGD      Subjective: he denies any current complaints      ROS:   GI: denies abdominal pain, change bowel habits or reflux symptoms  Neuro: No new neurologic changes  Respiratory: No Cough, SOB  Cardiovascular: No CP, palpitations     Scheduled Meds:    Current Facility-Administered Medications:  acetaminophen 975 mg Oral Q8H [facility] Ori Melchor MD   bisacodyl 10 mg Rectal Daily PRN Leonie Nina DO   calcium carbonate 500 mg Oral Daily PRN CLAUDIO Woo   senna 2 tablet Oral Daily Ori Melchor MD   And       docusate sodium 100 mg Oral Daily Ori Melchor MD   enoxaparin 40 mg Subcutaneous Daily Ori Melchor MD   fish oil 1,000 mg Oral Daily Ori Melchor MD   levothyroxine 25 mcg Oral Early Morning Ori Melchor MD   methocarbamol 500 mg Oral Q6H PRN Ori Melchor MD   oxyCODONE 10 mg Oral Q4H PRN Ori Melchor MD   oxyCODONE 5 mg Oral Q4H PRN Ori Melchor MD   pantoprazole 40 mg Oral Early Morning CLAUDIO Woo   polyethylene glycol 17 g Oral Daily PRN CLAUDIO Woo   sodium phosphate-biphosphate 1 enema Rectal Once PRN Giorgio Santiago MD       Labs: Results from last 7 days   Lab Units 02/14/19  0541 02/11/19  0506   WBC Thousand/uL 5 08 5 96   HEMOGLOBIN g/dL 10 3* 10 2*   HEMATOCRIT % 33 4* 33 0*   PLATELETS Thousands/uL 283 355     Results from last 7 days   Lab Units 02/14/19  0541 02/11/19  0506   SODIUM mmol/L 141 139   POTASSIUM mmol/L 4 1 4 0   CHLORIDE mmol/L 107 106   CO2 mmol/L 28 29   BUN mg/dL 16 18   CREATININE mg/dL 0 69 0 74   CALCIUM mg/dL 8 5 8 8                      [unfilled]    Labs reviewed    Physical Examination:  Vitals:   Vitals:    02/13/19 0516 02/13/19 1306 02/13/19 2101 02/14/19 0540   BP: 111/63 93/53 106/61 121/64   BP Location: Right arm Left arm Left arm Right arm   Pulse: 58 78 69 67   Resp: 19 18 18 18   Temp: 98 6 °F (37 °C) 98 8 °F (37 1 °C) 98 6 °F (37 °C) 98 3 °F (36 8 °C)   TempSrc: Oral Oral Oral Oral   SpO2: 98% 98% 97% 98%   Weight:       Height:         Constitutional:  NAD; pleasant; nontoxic  HEENT:  AT/NC; oropharynx negative for thrush on tongue   Neck: negative for JVD  CV:  +S1, S2;  RRR; no rub/murmur  Pulmonary:  BBS without crackles/wheeze/rhonci; resp are unlabored  Abdominal:  soft, +BS, ND/NT; no mass  Skin:  no rashes  Musculoskeletal:  no edema  :  no martinez  Neurological/Psych:  AAO;  CAIN 5/5 except RLE 4+/5; no depression/anxiety        [ X ] Total time spent: 30 Mins and greater than 50% of this time was spent counseling/coordinating care  ** Please Note: Dragon 360 Dictation voice to text software may have been used in the creation of this document   **

## 2019-02-14 NOTE — PLAN OF CARE
Problem: Potential for Falls  Goal: Patient will remain free of falls  Description  INTERVENTIONS:  - Assess patient frequently for physical needs  -  Identify cognitive and physical deficits and behaviors that affect risk of falls    -  [place] fall precautions as indicated by assessment   - Educate patient/family on patient safety including physical limitations  - Instruct patient to call for assistance with activity based on assessment  - Modify environment to reduce risk of injury  - Consider OT/PT consult to assist with strengthening/mobility   Outcome: Progressing     Problem: PAIN - ADULT  Goal: Verbalizes/displays adequate comfort level or baseline comfort level  Description  Interventions:  - Encourage patient to monitor pain and request assistance  - Assess pain using appropriate pain scale  - Administer analgesics based on type and severity of pain and evaluate response  - Implement non-pharmacological measures as appropriate and evaluate response  - Consider cultural and social influences on pain and pain management  - Notify physician/advanced practitioner if interventions unsuccessful or patient reports new pain   Outcome: Progressing     Problem: INFECTION - ADULT  Goal: Absence or prevention of progression during hospitalization  Description  INTERVENTIONS:  - Assess and monitor for signs and symptoms of infection  - Monitor lab/diagnostic results  - Monitor all insertion sites, i e  indwelling lines, tubes, and drains  - Monitor endotracheal (as able) and nasal secretions for changes in amount and color  - [place] appropriate cooling/warming therapies per order  - Administer medications as ordered  - Instruct and encourage patient and family to use good hand hygiene technique  - Identify and instruct in appropriate isolation precautions for identified infection/condition   Outcome: Progressing     Problem: SAFETY ADULT  Goal: Patient will remain free of falls  Description  INTERVENTIONS:  - Assess patient frequently for physical needs  -  Identify cognitive and physical deficits and behaviors that affect risk of falls    -  [place] fall precautions as indicated by assessment   - Educate patient/family on patient safety including physical limitations  - Instruct patient to call for assistance with activity based on assessment  - Modify environment to reduce risk of injury  - Consider OT/PT consult to assist with strengthening/mobility   Outcome: Progressing  Goal: Maintain or return to baseline ADL function  Description  INTERVENTIONS:  -  Assess patient's ability to carry out ADLs; assess patient's baseline for ADL function and identify physical deficits which impact ability to perform ADLs (bathing, care of mouth/teeth, toileting, grooming, dressing, etc )  - Assess/evaluate cause of self-care deficits   - Assess range of motion  - Assess patient's mobility; develop plan if impaired  - Assess patient's need for assistive devices and provide as appropriate  - Encourage maximum independence but intervene and supervise when necessary  ¯ Involve family in performance of ADLs  ¯ Assess for home care needs following discharge   ¯ Request OT consult to assist with ADL evaluation and planning for discharge  ¯ Provide patient education as appropriate   Outcome: Progressing  Goal: Maintain or return mobility status to optimal level  Description  INTERVENTIONS:  - Assess patient's baseline mobility status (ambulation, transfers, stairs, etc )    - Identify cognitive and physical deficits and behaviors that affect mobility  - Identify mobility aids required to assist with transfers and/or ambulation (gait belt, sit-to-stand, lift, walker, cane, etc )  - [place] fall precautions as indicated by assessment  - Record patient progress and toleration of activity level on Mobility SBAR; progress patient to next Phase/Stage  - Instruct patient to call for assistance with activity based on assessment  - Request Rehabilitation consult to assist with strengthening/weightbearing, etc    Outcome: Progressing     Problem: DISCHARGE PLANNING  Goal: Discharge to home or other facility with appropriate resources  Description  INTERVENTIONS:  - Identify barriers to discharge w/patient and caregiver  - Arrange for needed discharge resources and transportation as appropriate  - Identify discharge learning needs (meds, wound care, etc )  - Arrange for interpretive services to assist at discharge as needed  - Refer to Case Management Department for coordinating discharge planning if the patient needs post-hospital services based on physician/advanced practitioner order or complex needs related to functional status, cognitive ability, or social support system   Outcome: Progressing     Problem: Prexisting or High Potential for Compromised Skin Integrity  Goal: Skin integrity is maintained or improved  Description  INTERVENTIONS:  - Identify patients at risk for skin breakdown  - Assess and monitor skin integrity  - Assess and monitor nutrition and hydration status  - Monitor labs (i e  albumin)  - Assess for incontinence   - Turn and reposition patient  - Assist with mobility/ambulation  - Relieve pressure over bony prominences  - Avoid friction and shearing  - Provide appropriate hygiene as needed including keeping skin clean and dry  - Evaluate need for skin moisturizer/barrier cream  - Collaborate with interdisciplinary team (i e  Nutrition, Rehabilitation, etc )   - Patient/family teaching   Outcome: Progressing

## 2019-02-15 PROCEDURE — 97112 NEUROMUSCULAR REEDUCATION: CPT

## 2019-02-15 PROCEDURE — 97530 THERAPEUTIC ACTIVITIES: CPT

## 2019-02-15 PROCEDURE — 97116 GAIT TRAINING THERAPY: CPT

## 2019-02-15 PROCEDURE — 97110 THERAPEUTIC EXERCISES: CPT

## 2019-02-15 PROCEDURE — 99232 SBSQ HOSP IP/OBS MODERATE 35: CPT | Performed by: PHYSICAL MEDICINE & REHABILITATION

## 2019-02-15 RX ADMIN — ENOXAPARIN SODIUM 40 MG: 40 INJECTION SUBCUTANEOUS at 08:00

## 2019-02-15 RX ADMIN — ACETAMINOPHEN 975 MG: 325 TABLET ORAL at 21:07

## 2019-02-15 RX ADMIN — ACETAMINOPHEN 975 MG: 325 TABLET ORAL at 06:07

## 2019-02-15 RX ADMIN — PANTOPRAZOLE SODIUM 40 MG: 40 TABLET, DELAYED RELEASE ORAL at 06:07

## 2019-02-15 RX ADMIN — DOCUSATE SODIUM 100 MG: 100 CAPSULE, LIQUID FILLED ORAL at 08:00

## 2019-02-15 RX ADMIN — Medication 1000 MG: at 08:00

## 2019-02-15 RX ADMIN — ACETAMINOPHEN 975 MG: 325 TABLET ORAL at 14:02

## 2019-02-15 RX ADMIN — STANDARDIZED SENNA CONCENTRATE 17.2 MG: 8.6 TABLET ORAL at 08:00

## 2019-02-15 RX ADMIN — LEVOTHYROXINE SODIUM 25 MCG: 25 TABLET ORAL at 06:07

## 2019-02-15 NOTE — PROGRESS NOTES
Physical Medicine and Rehabilitation Progress Note  Dontae Mahan III 60 y o  male MRN: [id number]  Unit/Bed#: -01 Encounter: [id number]    HPI: Dontae Mahan III is a 60 y o  male who presented to the [address] with h/o sensory deficits and was found to have spinal cord mass therefore underwent tumor resection and C4-T3 laminectomy and C2-T5 fixation/fusion by Dr Brigitte Gomez on 1/24       Chief Complaint: spinal mass s/p resection     Subjective: Patient denies any events overnight     ROS:  Negative unless noted above    Assessment/Plan:      Anemia  Assessment & Plan  · Likely ABLA  · Hg currently stable at 10 3  · IM monitoring     Hyperlipidemia  Assessment & Plan  · was Omega 3 FA 1000 mg qd PTA   · D/W neurosx and have cleared patient to resume home Omega 3 FA 1000 mg qd    Impaired fasting glucose  Assessment & Plan  · PCP aware  · Diet controlled     Hypothyroidism  Assessment & Plan  · On home synthroid 25 mcg qd     BPH associated with nocturia  Assessment & Plan  · Per patient stopped taking flomax 0 4 mg qpm in the past because it was not helping the urinary retention   · Per patient stopped taking ditropan XL 10 mg qd in the past because  it was not helping the urinary retention   · Per patient stopped taking viagra 100 mg in the past because  it was not helping the urinary retention   · Per patient was no longer on any meds for urinary retention/BPH PTA as he was working with urology as OP and further tests were being planned as patient medications had not been effective however in acute care patient was restarted on flomax and post-operatively patient has been urinating well   · successful trial off of flomax (which patient had stopped taking PTA because it was not effective); PVRs were 57, 38, & 87 (PVRs are from greater than 24 hours after last dose of flomax)   · Follows with Eduardo Griffith PA-C/Dr Sakshi Galan as OP    * 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[facility])  Assessment & Plan  · S/p resection and C4-T3 laminectomy and C2-T5 fixation/fusion by Dr Adam Li and Dr Ana M Low on 1/24  · decardron taper completed  · Spine precautions  · c-collar at all times  · Path revealed grade 2 ependymoma; further management per team at [facility]   · OP FU w/neurosx scheduled for 2/6 done as inpt by neurosx team     Scheduled Meds:    Current Facility-Administered Medications:  acetaminophen 975 mg Oral Q8H Mauricio Weston 97, MD   bisacodyl 10 mg Rectal Daily PRN Eli Clement, DO   calcium carbonate 500 mg Oral Daily PRN Elizabethann Player, CRNP   senna 2 tablet Oral Daily Lum Bias, MD   And       docusate sodium 100 mg Oral Daily Lum Bias, MD   enoxaparin 40 mg Subcutaneous Daily Lum Bias, MD   fish oil 1,000 mg Oral Daily Lum Bias, MD   levothyroxine 25 mcg Oral Early Morning Lum Bias, MD   methocarbamol 500 mg Oral Q6H PRN Lum Bias, MD   oxyCODONE 10 mg Oral Q4H PRN Lum Bias, MD   oxyCODONE 5 mg Oral Q4H PRN Lum Bias, MD   pantoprazole 40 mg Oral Early Morning Elizabethann Player, CRNP   polyethylene glycol 17 g Oral Daily PRN Elizabethann Player, CRNP   sodium phosphate-biphosphate 1 enema Rectal Once PRN Eli Clement, DO        Incidental findings:  1) 2-3 mm pulmonary nodule: per radiology report of 1/18/19 recommend repeat CT chest in 3 months however will defer to PCP  2) fatty involution of pancreas: OP FU with PCP with further testing/treatment and/or specialist referral at PCP's discretion  3) L maxillary sinus thickening: asymptomatic, OP FU with PCP with further testing/treatment and/or specialist referral at PCP's discretion      DVT ppx: SCDs, cleared for lovenox 40 mg sc qd by neurosx in acute care (and has been on it since 1/26)  Dispo: 2/20      Objective:    Functional Update:  Mobility: sup-CG  Transfers: sup  ADLs: min-sup         Physical Exam:    Vitals:    02/15/19 0609   BP: 107/64   Pulse: 68 Resp: 18   Temp: 98 °F (36 7 °C)   SpO2: 97%         General: alert, no apparent distress, cooperative and comfortable  HEENT:  +C-collar  CARDIAC:  +S1/2  LUNGS:  respirations unlabored  ABDOMEN:  soft NT  EXTREMITIES:  no significant LE edema  NEURO:   mental status, speech normal, alert and oriented x3  PSYCH:  Alert and oriented, appropriate affect  Laboratory:   Results from last 7 days   Lab Units 02/14/19  0541 02/11/19  0506   HEMOGLOBIN g/dL 10 3* 10 2*   HEMATOCRIT % 33 4* 33 0*   WBC Thousand/uL 5 08 5 96     Results from last 7 days   Lab Units 02/14/19  0541 02/11/19  0506   BUN mg/dL 16 18   SODIUM mmol/L 141 139   POTASSIUM mmol/L 4 1 4 0   CHLORIDE mmol/L 107 106   CREATININE mg/dL 0 69 0 74            Patient Active Problem List   Diagnosis    BPH associated with nocturia    Hypothyroidism    Impaired fasting glucose    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HCC)    Anemia    Hyperlipidemia           Total visit time:  At least 25 minutes, with more than 50% spent counseling/coordinating care

## 2019-02-15 NOTE — PROGRESS NOTES
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:  continue cervical collar; steroid weaned off per Neurosurgery       Hypothyroidism:  Continue Levothyroxine 25 mcg qd     Elevated fasting blood sugars; HbA1C 1/22 = 6 3:  Continue DM diet but stopped Accuchecks since stable     Asymptomatic bradycardia:  noted postoperatively; still occurs intermitt but during sleep into 50's at lowest     ABLA:  Stable; w/o sx; cont to monitor    Heartburn:  added Protonix since on steroids/prn TUMS  He gets heartburn at home 3x/week/uses TUMS  Told him to go to PCP as OP when he recovers and have workup with possible EGD      Subjective: he denies any current complaints      ROS:   GI: denies abdominal pain, change bowel habits or reflux symptoms  Neuro: No new neurologic changes  Respiratory: No Cough, SOB  Cardiovascular: No CP, palpitations     Scheduled Meds:    Current Facility-Administered Medications:  acetaminophen 975 mg Oral Q8H [facility] Nicolle Rosales MD   bisacodyl 10 mg Rectal Daily PRN Heather Grajeda DO   calcium carbonate 500 mg Oral Daily PRN CLAUDIO Arce   senna 2 tablet Oral Daily Nicolle Rosales MD   And       docusate sodium 100 mg Oral Daily Nicolle Rosales MD   enoxaparin 40 mg Subcutaneous Daily Nicolle Rosales MD   fish oil 1,000 mg Oral Daily Nicolle Rosales MD   levothyroxine 25 mcg Oral Early Morning Nicolle Rosales MD   methocarbamol 500 mg Oral Q6H PRN Nicolle Rosales MD   oxyCODONE 10 mg Oral Q4H PRN Nicolle Rosales MD   oxyCODONE 5 mg Oral Q4H PRN Nicolle Rosales MD   pantoprazole 40 mg Oral Early Morning CLAUDIO Arce   polyethylene glycol 17 g Oral Daily PRN CLAUDIO Arce   sodium phosphate-biphosphate 1 enema Rectal Once PRN Heather Grajeda DO Labs:     Results from last 7 days   Lab Units 02/14/19  0541 02/11/19  0506   WBC Thousand/uL 5 08 5 96   HEMOGLOBIN g/dL 10 3* 10 2*   HEMATOCRIT % 33 4* 33 0*   PLATELETS Thousands/uL 283 355     Results from last 7 days   Lab Units 02/14/19  0541 02/11/19  0506   SODIUM mmol/L 141 139   POTASSIUM mmol/L 4 1 4 0   CHLORIDE mmol/L 107 106   CO2 mmol/L 28 29   BUN mg/dL 16 18   CREATININE mg/dL 0 69 0 74   CALCIUM mg/dL 8 5 8 8                      [unfilled]    Labs reviewed    Physical Examination:  Vitals:   Vitals:    02/14/19 0540 02/14/19 1300 02/14/19 2026 02/15/19 0609   BP: 121/64 101/55 101/59 107/64   BP Location: Right arm Right arm Left arm Left arm   Pulse: 67 73 76 68   Resp: 18 18 19 18   Temp: 98 3 °F (36 8 °C) 98 5 °F (36 9 °C) 98 7 °F (37 1 °C) 98 °F (36 7 °C)   TempSrc: Oral Oral Oral Oral   SpO2: 98% 99% 98% 97%   Weight:       Height:         Constitutional:  NAD; pleasant; nontoxic  HEENT:  AT/NC; oropharynx negative for thrush on tongue   CV:  +S1, S2;  RRR; no rub/murmur  Pulmonary:  BBS without crackles/wheeze/rhonci; resp are unlabored  Abdominal:  soft, +BS, ND/NT; no mass  Musculoskeletal:  no edema  Neurological/Psych:  AAO;  CAIN 5/5 except RLE 4+/5; no depression/anxiety        [ X ] Total time spent: 30 Mins and greater than 50% of this time was spent counseling/coordinating care  ** Please Note: Dragon 360 Dictation voice to text software may have been used in the creation of this document   **

## 2019-02-15 NOTE — PROGRESS NOTES
02/15/19 1300   Pain Assessment   Pain Assessment No/denies pain   Pain Score No Pain   Restrictions/Precautions   Precautions Fall Risk;Spinal precautions   Braces or Orthoses C/S Collar   Subjective   Subjective pt states ready for PT session    QI: Sit to Stand   Assistance Needed Supervision   Sit to Stand CARE Score 4   QI: Chair/Bed-to-Chair Transfer   Assistance Needed Supervision   Chair/Bed-to-Chair Transfer CARE Score 4   Transfer Bed/Chair/Wheelchair   Limitations Noted In Balance; Endurance;LE Strength;UE Strength   Adaptive Equipment None   Stand Pivot Supervision   Sit to Stand Supervision   Stand to Q1Media Supervision   All Transfer Supervision   Bed, Chair, Wheelchair Transfer (FIM) 5 - Patient requires supervision/monitoring   QI: Car Transfer   Assistance Needed Supervision   Car Transfer CARE Score 4   QI: Walk 150 Feet   Assistance Needed Incidental touching   Walk 150 Feet CARE Score 4   QI: Walking 10 Feet on Uneven Surfaces   Assistance Needed Physical assistance   Assistance Provided by [place] Less than 25%   Walking 10 Feet on Uneven Surfaces CARE Score 3   Ambulation   Does the patient walk? 2  Yes   Primary Discharge Mode of Locomotion Walk   Walk Assist Level Close Supervision;Contact Guard   Gait Pattern Inconsistant Sheryl;Decreased foot clearance; Improper weight shift;Trendelenburg   Assist Device Other  (with gait belt for support )   Distance Walked (feet) 350 ft  (x2)   Limitations Noted In Balance; Coordination; Endurance;Strength   Findings used 3# on RLE to improve strengthening /stability and control  Walking (FIM) 4 - Patient requires steadying assist or light touching AND distance 150 feet or more, no rest   Wheelchair mobility   QI: Does the patient use a wheelchair? 0  No   QI: Indicate the type of wheelchair 1   Manual   Wheelchair (FIM) 0 - Activity does not occur   Therapeutic Interventions   Strengthening LAQ AP X20 STS x10 mini squata x10 Flexibility HS/calf sitting manual stretch    Balance standing balance dynamic with red foam    Equipment Use   NuStep level 3 foe 20 min    Assessment   Treatment Assessment pt perform thex ex's functional activities and agit training w/o RW , pt cont to have right side weakness noted with hip hiking, foot drag and right knee hyperextension   Pt cont to present with dec RLE foot clearance during functional mobility with fatigue  RLE had 3 # to inc strengthening during ambulation w/o RW   Pt perform red foam standing for dynamic activity to improve standing balance awareness  Pt also walking on blue foam side to side FWD/BWD with CgA for steadness, Pt will cont to need PT  to meet D/C goals    Problem List Decreased strength;Decreased endurance; Impaired balance;Decreased mobility; Decreased safety awareness   Barriers to Discharge Decreased caregiver support   Plan   Progress Progressing toward goals   PT Therapy Minutes   PT Time In 1300   PT Time Out 1400   PT Total Time (minutes) 60   PT Mode of treatment - Individual (minutes) 30   PT Mode of treatment - Concurrent (minutes) 30   PT Mode of treatment - Group (minutes) 0   PT Mode of treatment - Co-treat (minutes) 0   PT Mode of Teatment - Total time(minutes) 60 minutes   Therapy Time missed   Time missed?  No

## 2019-02-15 NOTE — PROGRESS NOTES
02/15/19 0830   Pain Assessment   Pain Score No Pain   Restrictions/Precautions   Precautions Fall Risk;Spinal precautions   Braces or Orthoses C/S Collar   Cognition   Arousal/Participation Alert; Cooperative   Attention Within functional limits   Memory Within functional limits   Following Commands Follows all commands and directions without difficulty   Subjective   Subjective pt  has no new complaints  pt  ready to participate in PT    QI: Roll Left and Right   Assistance Needed Set-up / clean-up   Roll Left and Right CARE Score 5   QI: Sit to [address] / clean-up   Sit to Lying CARE Score 5   QI: Lying to Sitting on Side of Bed   Assistance Needed Supervision   Lying to Sitting on Side of Bed CARE Score 4   QI: Sit to Stand   Assistance Needed Supervision   Sit to Stand CARE Score 4   QI: Chair/Bed-to-Chair Transfer   Assistance Needed Supervision   Chair/Bed-to-Chair Transfer CARE Score 4   Transfer Bed/Chair/Wheelchair   Adaptive Equipment None   Stand Pivot Supervision   Sit to Stand Supervision   Stand to Sit Supervision   Supine to Sit Supervision   Sit to Supine Supervision   Bed, Chair, Wheelchair Transfer (FIM) 5 - Patient requires supervision/monitoring   QI: Walk 10 Feet   Assistance Needed Incidental touching   Walk 10 Feet CARE Score 4   QI: Walk 50 Feet with Two [address] Needed Incidental touching   Walk 50 Feet with Two Turns CARE Score 4   QI: Walk 150 Feet   Assistance Needed Incidental touching   Walk 150 Feet CARE Score 4   Ambulation   Does the patient walk? 2  Yes   Primary Discharge Mode of Locomotion Walk   Walk Assist Level Contact Guard;Close Supervision   Gait Pattern Inconsistant Sheryl;Decreased foot clearance;Trendelenburg; Improper weight shift   Assist Device Other  (no AD using gait belt  CGA initially and when pt  gets tired)   Distance Walked (feet) 350 ft  (X 2 )   Limitations Noted In Balance; Coordination; Endurance;Strength   Findings used 3# wts on R LE to improve stability and control   Walking (FIM) 4 - Patient requires steadying assist or light touching AND distance 150 feet or more, no rest   Wheelchair mobility   QI: Does the patient use a wheelchair? 0  No   QI: 4 Steps   Assistance Needed Supervision   4 Steps CARE Score 4   QI: 12 Steps   Assistance Needed Supervision   12 Steps CARE Score 4   Stairs   Type Stairs   # of Steps 24   Weight Bearing Precautions Fall Risk   Assist Devices Single Rail   Findings CS reciprocal pattern both ascending and descending    Stairs (FIM) 5 - Patient requires supervision/monitoring AND goes up and down full flight (12- 14 stairs)   Therapeutic Interventions   Balance side stepping and retrowalking    Neuromuscular Re-Education R ankle DF with green TB emphasizing on eccentirc control, hip abd and ER using green TB, R hip add with green TB, bridging, bridging with ball squeeze and bridging with green TB ressistance by the hips, hip abd with B hip and knee bent in supine using green TB, clamshell in L sidelying using yelllow TB , supine R LE dangling on EOB with passive stretching of hip flexors X 2 mins PNF D2 flex and ext with pt  bringing R LE down towards the floor and provided ressistance on hip when putting leg back into the bed  Equipment Use   NuStep Level 3 LE only X 10 mins    Assessment   Treatment Assessment Pt  tolerated session well with given rest breaks in bet activities  Tx focused on increasing activity tolerance and strengthening of B LE R> L with the above neuro xiang  Strengthening focused on R poximal muscles and hip stabilizers   Pt  noted to have some unsteadines  when he first initial stands and walking a chair but is able to regain his balance with only CGA  Also noted 1 LOB during gait training withotu AD upon turning but pt  able to catch himself  pt  reports that that had been getting better and his balance reactions are also getting better  No other complaints reported   Pt  assisted back to recliner at end of session  Cont with POC as tolerated    Problem List Decreased strength;Decreased endurance; Impaired balance;Decreased mobility; Decreased coordination;Orthopedic restrictions   PT Barriers   Physical Impairment Decreased strength;Decreased endurance; Impaired balance;Decreased mobility; Decreased coordination;Orthopedic restrictions   Functional Limitation Stair negotiation;Standing;Transfers; Walking   Plan   Treatment/Interventions Functional transfer training;LE strengthening/ROM; Elevations; Therapeutic exercise; Endurance training;Patient/family training;Equipment eval/education; Bed mobility;Gait training   Recommendation   Recommendation Outpatient PT; Home with family support   Equipment Recommended Walker   PT Therapy Minutes   PT Time In 0830   PT Time Out 0949   PT Total Time (minutes) 79   PT Mode of treatment - Individual (minutes) 79   PT Mode of treatment - Concurrent (minutes) 0   PT Mode of treatment - Group (minutes) 0   PT Mode of treatment - Co-treat (minutes) 0   PT Mode of Teatment - Total time(minutes) 79 minutes   Therapy Time missed   Time missed?  No

## 2019-02-15 NOTE — PLAN OF CARE
Problem: Potential for Falls  Goal: Patient will remain free of falls  Description  INTERVENTIONS:  - Assess patient frequently for physical needs  -  Identify cognitive and physical deficits and behaviors that affect risk of falls    -  [place] fall precautions as indicated by assessment   - Educate patient/family on patient safety including physical limitations  - Instruct patient to call for assistance with activity based on assessment  - Modify environment to reduce risk of injury  - Consider OT/PT consult to assist with strengthening/mobility   Outcome: Progressing     Problem: INFECTION - ADULT  Goal: Absence or prevention of progression during hospitalization  Description  INTERVENTIONS:  - Assess and monitor for signs and symptoms of infection  - Monitor lab/diagnostic results  - Monitor all insertion sites, i e  indwelling lines, tubes, and drains  - Monitor endotracheal (as able) and nasal secretions for changes in amount and color  - [place] appropriate cooling/warming therapies per order  - Administer medications as ordered  - Instruct and encourage patient and family to use good hand hygiene technique  - Identify and instruct in appropriate isolation precautions for identified infection/condition   Outcome: Progressing

## 2019-02-15 NOTE — PROGRESS NOTES
02/15/19 1030   Pain Assessment   Pain Assessment No/denies pain   Pain Score No Pain   Restrictions/Precautions   Precautions Fall Risk;Spinal precautions   Braces or Orthoses C/S Collar   QI: Sit to [address] Provided by [place] No physical assistance   Comment RW   Sit to Stand CARE Score 4   QI: Chair/Bed-to-Chair Transfer   Assistance Needed Supervision   Assistance Provided by [place] No physical assistance   Chair/Bed-to-Chair Transfer CARE Score 4   Transfer Bed/Chair/Wheelchair   Limitations Noted In Balance; Endurance;Problem Solving;LE Strength   Adaptive Equipment Roller Walker   Findings Pt overall completes functional mobility with use of RW at a CS level  With fatigue pt is noted to have increased R foot drop and more narrow LEAH  With rest pt demos increased stability  Bed, Chair, Wheelchair Transfer (FIM) 5 - Patient requires supervision/monitoring   Functional Standing Tolerance   Time 10 minutes x 3   Activity Pt engaged in static standing activity of assembling bird house with use of tools at the tabletop with focus on WBing through RLE, unsupported standing balance, and endurance  Pt overall tolerates the activity well and demos compliance with c spine precautions throughout duration of activity  He demos increased b/l integration and [facility]  Pt states he would "wrench on his car" frequently prior to injury and this task simulates b/l integration that mimics that task  He states "I'm surprised I could do this "    Exercise Tools   Other Exercise Tool 1 Pt completes UB strengthening with use of 3# dowel bar moving through all planes below shoulder height 3 x 10 each to increase strength and ROM for increased independence with ADL tasks  Other Exercise Tool 2 Tabletop UE ergometer completed with no added resistance while in stance at raised tabletop to promote endurance and increased ROM   Pt tolerates well with no c/o pain for 3 minutes prograde and 3 minutes retrograde  Cognition   Overall Cognitive Status WFL   Arousal/Participation Alert; Cooperative   Attention Within functional limits   Orientation Level Oriented X4   Memory Within functional limits   Following Commands Follows all commands and directions without difficulty   Activity Tolerance   Activity Tolerance Patient tolerated treatment well   Assessment   Treatment Assessment Pt participated in skilled OT services with focus on community reintegration, standing balance, and endurance/strength  Pt continues to present with increased standing tolerance/activity tolerance and requires less rest breaks this session  He does require VC's to identify s/s of fatigue and requires VC's to initiate rest breaks appropriately  Pt noted to present with decreased RLE foot clearance during functional mobility with fatigue  Pt educated on RW bag to increase independence and safety with item retrieval tasks  Pt also educated on safety and compensatory strategies for pet care tasks as pt states his goal is to be able to feed his dogs  Discussed potential use of chair next to their bowls so he could sit to retrieve items or use of LH reacher with scoop  Pt receptive  Pt will continue to benefit from skilled OT services with focus on standing balance, functional mobility, endurance, and strength  Prognosis Good   Problem List Decreased strength;Decreased endurance; Impaired balance;Decreased mobility; Decreased coordination;Orthopedic restrictions   Plan   Treatment/Interventions ADL retraining;Functional transfer training; Therapeutic exercise;Patient/family training;Equipment eval/education; Compensatory technique education; Endurance training   Progress Progressing toward goals   Recommendation   OT Discharge Recommendation Home with family support   OT Therapy Minutes   OT Time In 1030   OT Time Out 1130   OT Total Time (minutes) 60   OT Mode of treatment - Individual (minutes) 60   OT Mode of treatment - Concurrent (minutes) 0   OT Mode of treatment - Group (minutes) 0   OT Mode of treatment - Co-treat (minutes) 0   OT Mode of Teatment - Total time(minutes) 60 minutes   Therapy Time missed   Time missed?  No

## 2019-02-16 PROCEDURE — 97116 GAIT TRAINING THERAPY: CPT

## 2019-02-16 PROCEDURE — 97530 THERAPEUTIC ACTIVITIES: CPT

## 2019-02-16 PROCEDURE — 97112 NEUROMUSCULAR REEDUCATION: CPT

## 2019-02-16 RX ADMIN — DOCUSATE SODIUM 100 MG: 100 CAPSULE, LIQUID FILLED ORAL at 07:54

## 2019-02-16 RX ADMIN — LEVOTHYROXINE SODIUM 25 MCG: 25 TABLET ORAL at 05:27

## 2019-02-16 RX ADMIN — ACETAMINOPHEN 975 MG: 325 TABLET ORAL at 21:07

## 2019-02-16 RX ADMIN — Medication 1000 MG: at 07:53

## 2019-02-16 RX ADMIN — PANTOPRAZOLE SODIUM 40 MG: 40 TABLET, DELAYED RELEASE ORAL at 05:27

## 2019-02-16 RX ADMIN — ACETAMINOPHEN 975 MG: 325 TABLET ORAL at 05:27

## 2019-02-16 RX ADMIN — ENOXAPARIN SODIUM 40 MG: 40 INJECTION SUBCUTANEOUS at 07:53

## 2019-02-16 RX ADMIN — STANDARDIZED SENNA CONCENTRATE 17.2 MG: 8.6 TABLET ORAL at 07:53

## 2019-02-16 RX ADMIN — ACETAMINOPHEN 975 MG: 325 TABLET ORAL at 14:29

## 2019-02-16 NOTE — PROGRESS NOTES
02/16/19 0830   Pain Assessment   Pain Assessment No/denies pain   Pain Score No Pain   Restrictions/Precautions   Precautions Fall Risk;Spinal precautions   Braces or Orthoses C/S Collar   QI: Sit to [address] Provided by [place] No physical assistance   Sit to Stand CARE Score 4   QI: Chair/Bed-to-Chair Transfer   Assistance Needed Supervision   Assistance Provided by [place] No physical assistance   Chair/Bed-to-Chair Transfer CARE Score 4   Transfer Bed/Chair/Wheelchair   Adaptive Equipment Roller [employer], Chair, Wheelchair Transfer (FIM) 5 - Patient requires supervision/monitoring   Assessment   Treatment Assessment Pt participated in 45 min OT session with fair activity tolerance with focus on standing balance and RW management  Pt participated in standing balance activity participating in game of checkers with unilateral support in stance throughout activity with fair standing balance in order to increase standing endurance for safe d/c to home at PLOF  Pt participated in RW management tasks at Sup level practicing opening and closing doors requiring x1 verbal cue with Sup for balance in order to progress pt through [address] for safe d/c  Pt would benefit from continued OT services to progress pt to Mod I level for self-care ADL/IADL tasks for safe d/c to home with spouse support (spouse works 8-5 daily)  Assessment Pt and OTR discussed pt feeling at "knot" in right shoulder region  OTR spoke with nursing in regards to possible use of Aqua-K pads to loosen R shoulder region for improved functional use  Nursing to follow-up at this time      OT Therapy Minutes   OT Time In 0830   OT Time Out 0915   OT Total Time (minutes) 45   OT Mode of treatment - Individual (minutes) 45   OT Mode of treatment - Concurrent (minutes) 0   OT Mode of treatment - Group (minutes) 0   OT Mode of treatment - Co-treat (minutes) 0   OT Mode of Teatment - Total time(minutes) 45 minutes

## 2019-02-16 NOTE — PLAN OF CARE
Problem: Potential for Falls  Goal: Patient will remain free of falls  Description  INTERVENTIONS:  - Assess patient frequently for physical needs  -  Identify cognitive and physical deficits and behaviors that affect risk of falls  -  [place] fall precautions as indicated by assessment   - Educate patient/family on patient safety including physical limitations  - Instruct patient to call for assistance with activity based on assessment  - Modify environment to reduce risk of injury  - Consider OT/PT consult to assist with strengthening/mobility   Outcome: Progressing     Problem: SAFETY ADULT  Goal: Patient will remain free of falls  Description  INTERVENTIONS:  - Assess patient frequently for physical needs  -  Identify cognitive and physical deficits and behaviors that affect risk of falls    -  [place] fall precautions as indicated by assessment   - Educate patient/family on patient safety including physical limitations  - Instruct patient to call for assistance with activity based on assessment  - Modify environment to reduce risk of injury  - Consider OT/PT consult to assist with strengthening/mobility   Outcome: Progressing

## 2019-02-16 NOTE — PROGRESS NOTES
02/16/19 1230   Pain Assessment   Pain Assessment No/denies pain   Pain Score No Pain   Restrictions/Precautions   Precautions Fall Risk;Spinal precautions   Braces or Orthoses C/S Collar   Cognition   Overall Cognitive Status WFL   Subjective   Subjective pt states doing ok, no complaints at this time   QI: Sit to Stand   Assistance Needed Supervision   Sit to Stand CARE Score 4   QI: Chair/Bed-to-Chair Transfer   Assistance Needed Incidental touching;Supervision   Comment CS/CGA with fatigue   Chair/Bed-to-Chair Transfer CARE Score 4   Transfer Bed/Chair/Wheelchair   Limitations Noted In Balance; Coordination;Sensation;LE Strength   Adaptive Equipment None   Stand Pivot Supervision;Contact Guard   Sit to Stand Supervision   Stand to Sit Supervision   Findings CG at times for safety and due to fatigue and unsteady with turns   Bed, Chair, Wheelchair Transfer (FIM) 4 - Patient requires steadying assist or light touching   QI: Walk 10 Feet   Assistance Needed Incidental touching;Supervision   Walk 10 Feet CARE Score 4   QI: Walk 50 Feet with Two Turns   Assistance Needed Incidental touching;Supervision   Walk 50 Feet with Two Turns CARE Score 4   QI: Walk 150 Feet   Assistance Needed Incidental touching;Supervision   Walk 150 Feet CARE Score 4   Ambulation   Does the patient walk? 2  Yes   Primary Discharge Mode of Locomotion Walk   Walk Assist Level Close Supervision;Contact Guard   Gait Pattern Inconsistant Sheryl;Decreased foot clearance;Trendelenburg; Improper weight shift   Assist Device   (no AD)   Distance Walked (feet) 400 ft  (x1, 500x1)   Limitations Noted In Balance; Coordination;Speed;Strength;Swing   Findings cues to increase gait speed   Walking (FIM) 4 - Patient requires steadying assist or light touching AND distance 150 feet or more, no rest   Wheelchair mobility   QI: Does the patient use a wheelchair? 0  No   QI: 4 Steps   Assistance Needed Supervision; Adaptive equipment   4 Steps CARE Score 4 QI: 12 Steps   Assistance Needed Supervision; Adaptive equipment   12 Steps CARE Score 4   Stairs   Type Stairs   # of Steps 24   Weight Bearing Precautions Fall Risk   Assist Devices Single Rail   Findings reciprocal ascending and descending   Stairs (FIM) 5 - Patient requires supervision/monitoring AND goes up and down full flight (12- 14 stairs)   Therapeutic Interventions   Flexibility B gastroc and HS   Balance fwd step ups on 6" steps, B/L x20 with 3# AW  Hip abd on RLE, with manual resistance, to keep R knee neutral when advancing LLE up onto step  LLE toe taps on 8" step stool focusing on R hip stabilizers, Suzy due to LOB at times  Assessment   Treatment Assessment pt cont to demonstrate R proximal hip weakness causing trendelenburg during amb  decrease hip flex during R swing phase to advance R LE forward and not circumduct so much  pt fatigued quickly today with exs and had difficulty with SLS and toe taps with LLE due to proximal weakness on that R side  pt cont to have decrease sensation making it difficulty to identify impairements  will cont to work on functional strengthening, balance and safety to progress with funcitonal mobility and Ind for d/c home  PT Family training done with: family not present during session today   Problem List Decreased strength;Decreased endurance; Impaired balance;Decreased mobility; Decreased safety awareness   Barriers to Discharge Decreased caregiver support   PT Barriers   Physical Impairment Decreased strength;Decreased endurance; Impaired balance;Decreased mobility; Decreased coordination;Orthopedic restrictions   Functional Limitation Stair negotiation;Standing;Transfers; Walking   Plan   Treatment/Interventions Functional transfer training;LE strengthening/ROM; Endurance training;Gait training   Progress Progressing toward goals   Recommendation   Recommendation Outpatient PT; Home with family support   Equipment Recommended Elmer Garcia   PT Therapy Minutes   PT Time In 1230   PT Time Out 1330   PT Total Time (minutes) 60   PT Mode of treatment - Individual (minutes) 60   PT Mode of treatment - Concurrent (minutes) 0   PT Mode of treatment - Group (minutes) 0   PT Mode of treatment - Co-treat (minutes) 0   PT Mode of Teatment - Total time(minutes) 60 minutes   Therapy Time missed   Time missed?  No

## 2019-02-16 NOTE — PROGRESS NOTES
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 1/24/19:  continue cervical collar; steroid weaned off per Neurosurgery       Hypothyroidism:  Continue Levothyroxine 25 mcg qd     Elevated fasting blood sugars; HbA1C 1/22 = 6 3:  Continue DM diet but stopped Accuchecks since stable     Asymptomatic bradycardia:  noted postoperatively; still occurs intermitt but during sleep into 50's at lowest     ABLA:  Stable; w/o sx; cont to monitor    Heartburn:  added Protonix since on steroids/prn TUMS  He gets heartburn at home 3x/week/uses TUMS  Told him to go to PCP as OP when he recovers and have workup with possible EGD      Subjective: c/o knot on posterior right shoulder requesting heating pad he denies any othercomplaints      ROS:   GI: denies abdominal pain, change bowel habits or reflux symptoms  Neuro: No new neurologic changes  Respiratory: No Cough, SOB  Cardiovascular: No CP, palpitations     Scheduled Meds:    Current Facility-Administered Medications:  acetaminophen 975 mg Oral Q8H [address] Danna Solano MD   bisacodyl 10 mg Rectal Daily PRN Pam Bhattil, DO   calcium carbonate 500 mg Oral Daily PRN CLAUDIO Hernandez   senna 2 tablet Oral Daily Danna Solano MD   And       docusate sodium 100 mg Oral Daily Danna Solano MD   enoxaparin 40 mg Subcutaneous Daily Danna Solano MD   fish oil 1,000 mg Oral Daily Danna Solano MD   levothyroxine 25 mcg Oral Early Morning Danna Solano MD   methocarbamol 500 mg Oral Q6H PRN aDnna Solano MD   oxyCODONE 10 mg Oral Q4H PRN Danna Solano MD   oxyCODONE 5 mg Oral Q4H PRN Danna Solano MD   pantoprazole 40 mg Oral Early Morning CLAUDIO Hernandez   polyethylene glycol 17 g Oral Daily PRN CLAUDIO Hernandez   sodium phosphate-biphosphate 1 enema Rectal Once PRN Heather Grajeda,        Labs:     Results from last 7 days   Lab Units 02/14/19  0541 02/11/19  0506   WBC Thousand/uL 5 08 5 96   HEMOGLOBIN g/dL 10 3* 10 2*   HEMATOCRIT % 33 4* 33 0*   PLATELETS Thousands/uL 283 355     Results from last 7 days   Lab Units 02/14/19  0541 02/11/19  0506   SODIUM mmol/L 141 139   POTASSIUM mmol/L 4 1 4 0   CHLORIDE mmol/L 107 106   CO2 mmol/L 28 29   BUN mg/dL 16 18   CREATININE mg/dL 0 69 0 74   CALCIUM mg/dL 8 5 8 8                      [unfilled]    Labs reviewed    Physical Examination:  Vitals:   Vitals:    02/15/19 0609 02/15/19 1330 02/15/19 2019 02/16/19 0530   BP: 107/64 104/56 103/58 109/65   BP Location: Left arm Left arm Left arm Left arm   Pulse: 68 78 66 64   Resp: 18 20 19 18   Temp: 98 °F (36 7 °C) 98 °F (36 7 °C) 98 5 °F (36 9 °C) 97 8 °F (36 6 °C)   TempSrc: Oral Oral Oral Oral   SpO2: 97% 99% 99% 97%   Weight:       Height:         Constitutional:  NAD; pleasant; nontoxic  HEENT:  AT/NC; oropharynx negative for thrush on tongue   CV:  +S1, S2;  RRR; no rub/murmur  Pulmonary:  BBS without crackles/wheeze/rhonci; resp are unlabored  Abdominal:  soft, +BS, ND/NT; no mass  Musculoskeletal:  no edema  Neurological/Psych:  AAO;  CAIN 5/5 except RLE 4+/5; no depression/anxiety        [ X ] Total time spent: 30 Mins and greater than 50% of this time was spent counseling/coordinating care  ** Please Note: Dragon 360 Dictation voice to text software may have been used in the creation of this document   **

## 2019-02-17 PROCEDURE — 97530 THERAPEUTIC ACTIVITIES: CPT

## 2019-02-17 PROCEDURE — 97116 GAIT TRAINING THERAPY: CPT

## 2019-02-17 PROCEDURE — 97110 THERAPEUTIC EXERCISES: CPT

## 2019-02-17 RX ADMIN — STANDARDIZED SENNA CONCENTRATE 17.2 MG: 8.6 TABLET ORAL at 07:49

## 2019-02-17 RX ADMIN — ACETAMINOPHEN 975 MG: 325 TABLET ORAL at 13:42

## 2019-02-17 RX ADMIN — ACETAMINOPHEN 975 MG: 325 TABLET ORAL at 20:47

## 2019-02-17 RX ADMIN — ACETAMINOPHEN 975 MG: 325 TABLET ORAL at 05:20

## 2019-02-17 RX ADMIN — DOCUSATE SODIUM 100 MG: 100 CAPSULE, LIQUID FILLED ORAL at 07:49

## 2019-02-17 RX ADMIN — ENOXAPARIN SODIUM 40 MG: 40 INJECTION SUBCUTANEOUS at 07:49

## 2019-02-17 RX ADMIN — PANTOPRAZOLE SODIUM 40 MG: 40 TABLET, DELAYED RELEASE ORAL at 05:20

## 2019-02-17 RX ADMIN — Medication 1000 MG: at 07:48

## 2019-02-17 RX ADMIN — LEVOTHYROXINE SODIUM 25 MCG: 25 TABLET ORAL at 05:20

## 2019-02-17 NOTE — PROGRESS NOTES
02/17/19 0830   Pain Assessment   Pain Assessment No/denies pain   Pain Score No Pain   Restrictions/Precautions   Precautions Fall Risk;Spinal precautions   Braces or Orthoses C/S Collar   Subjective   Subjective pt states feeling okay ready for PT session    QI: Sit to [place] / clean-up   Sit to Stand CARE Score 5   QI: Chair/Bed-to-Chair Transfer   Assistance Needed Supervision   Chair/Bed-to-Chair Transfer CARE Score 4   Transfer Bed/Chair/Wheelchair   Limitations Noted In Balance; Coordination   Adaptive Equipment Roller Walker;None   Car Transfer Supervision   All Transfer Supervision;Contact Guard   Findings porgressing with CS level    Bed, Chair, Wheelchair Transfer (FIM) 4 - Patient requires steadying assist or light touching   QI: Car Transfer   Assistance Needed Supervision   Car Transfer CARE Score 4   QI: Walk 150 Feet   Assistance Needed Supervision   Comment RW    Walk 150 Feet CARE Score 4   Ambulation   Does the patient walk? 2  Yes   Primary Discharge Mode of Locomotion Walk   Walk Assist Level Contact Guard;Close Supervision   Gait Pattern Inconsistant [place];Decreased foot clearance;Trendelenburg; Improper weight shift   Assist Device Roller Walker  (no AD )   Distance Walked (feet) 400 ft  (x2)   Limitations Noted In Balance; Coordination;Speed;Strength;Swing   Findings vc's for RW management    Walking (FIM) 4 - Patient requires steadying assist or light touching AND distance 150 feet or more, no rest   Wheelchair mobility   QI: Does the patient use a wheelchair? 0  No   QI: Indicate the type of wheelchair 1   Manual   Wheelchair (FIM) 0 - Activity does not occur   QI: 1 Step (Curb)   Assistance Needed Supervision   1 Step (Curb) CARE Score 4   QI: 4 Steps   Assistance Needed Supervision   4 Steps CARE Score 4   QI: 12 Steps   Assistance Needed Supervision   12 Steps CARE Score 4   Stairs   Type Stairs;Curb;Ramp   # of Steps 24   Weight Bearing Precautions Fall Risk Assist Devices Single Rail   Findings reciprocial pattern with one HR at [place] , pt seem to improve today   Stairs (FIM) 5 - Patient requires supervision/monitoring AND goes up and down full flight (12- 14 stairs)   Therapeutic Interventions   Strengthening right hip laterial step ups with 3 # to inc right hip strengthening    Flexibility BL HS / calf manual stretch in sitting    Balance standing balance tapping box weight shifting  side to side    Other unsupport STS x10 cueing to lean FWD    Equipment Use   NuStep level 3 for 10 min    Assessment   Treatment Assessment pt perform RW ambulation inc for home D/C , pt ambulated around object in his room going to bathroom and then out into hallway , pt ambulating with RW need VC at times with management keeping RW close during turns and backing up to sit  Pt perform ascending and descending FF stairs must better today as noted by pt about 50 % better he states   Pt improving with overall conditioning and strengthneing of RLE   Pt will cont to perform skilled PT to meet goals   Family/Caregiver Present     Barriers to Discharge Decreased caregiver support   Plan   Treatment/Interventions Therapeutic exercise;LE strengthening/ROM; Functional transfer training; Endurance training;Patient/family training;Gait training   Progress Progressing toward goals   PT Therapy Minutes   PT Time In 0830   PT Time Out 0930   PT Total Time (minutes) 60   PT Mode of treatment - Individual (minutes) 60   PT Mode of treatment - Concurrent (minutes) 0   PT Mode of treatment - Group (minutes) 0   PT Mode of treatment - Co-treat (minutes) 0   PT Mode of Teatment - Total time(minutes) 60 minutes   Therapy Time missed   Time missed?  No

## 2019-02-17 NOTE — PROGRESS NOTES
Internal Medicine Progress Note  Patient: Moo Cabral III  Age/sex: 60 y o  male  Medical Record #: [id number]      ASSESSMENT/PLAN:  Moo Cabral III is seen and examined and management for following issues:    Medullary mass removal with fixation/fusion of C2-3 T5 1/24/19:  continue cervical collar; steroid weaned off per Neurosurgery       Hypothyroidism:  Continue Levothyroxine 25 mcg qd     Elevated fasting blood sugars; HbA1C 1/22 = 6 3:  Continue DM diet but stopped Accuchecks since stable     Asymptomatic bradycardia:  noted postoperatively; still occurs intermitt but during sleep into 50's at lowest     ABLA:  Stable; w/o sx; cont to monitor    Heartburn:  added Protonix since on steroids/prn TUMS  He gets heartburn at home 3x/week/uses TUMS    Told him to go to PCP as OP when he recovers and have workup with possible EGD      Subjective:  Patient seen and examined offers no complaints    ROS:   GI: denies abdominal pain, change bowel habits or reflux symptoms  Neuro: No new neurologic changes  Respiratory: No Cough, SOB  Cardiovascular: No CP, palpitations     Scheduled Meds:    Current Facility-Administered Medications:  acetaminophen 975 mg Oral Q8H [facility] Rosenda Tatum MD   bisacodyl 10 mg Rectal Daily PRN Nalini Glynn DO   calcium carbonate 500 mg Oral Daily PRN CLAUDIO Schilling   senna 2 tablet Oral Daily Rosenda Tatum MD   And       docusate sodium 100 mg Oral Daily Rosenda CanMD barbara   enoxaparin 40 mg Subcutaneous Daily Rosenda Tatum MD   fish oil 1,000 mg Oral Daily Rosenda Tatum MD   levothyroxine 25 mcg Oral Early Morning Rosenda Cancel, MD   methocarbamol 500 mg Oral Q6H PRN Rosenda Cancel, MD   oxyCODONE 10 mg Oral Q4H PRN Rosenda Cancel, MD   oxyCODONE 5 mg Oral Q4H PRN Rosenda Tatum, MD   pantoprazole 40 mg Oral Early Morning CLAUDIO Schilling   polyethylene glycol 17 g Oral Daily PRN Real Herring, CRNP   sodium phosphate-biphosphate 1 enema Rectal Once PRN Nalini Renard, DO       Labs:     Results from last 7 days   Lab Units 02/14/19  0541 02/11/19  0506   WBC Thousand/uL 5 08 5 96   HEMOGLOBIN g/dL 10 3* 10 2*   HEMATOCRIT % 33 4* 33 0*   PLATELETS Thousands/uL 283 355     Results from last 7 days   Lab Units 02/14/19  0541 02/11/19  0506   SODIUM mmol/L 141 139   POTASSIUM mmol/L 4 1 4 0   CHLORIDE mmol/L 107 106   CO2 mmol/L 28 29   BUN mg/dL 16 18   CREATININE mg/dL 0 69 0 74   CALCIUM mg/dL 8 5 8 8                      [unfilled]    Labs reviewed    Physical Examination:  Vitals:   Vitals:    02/16/19 0530 02/16/19 1333 02/16/19 2027 02/17/19 0524   BP: 109/65 110/58 105/59 111/61   BP Location: Left arm Right arm Left arm Left arm   Pulse: 64 79 65 59   Resp: 18 18 20 18   Temp: 97 8 °F (36 6 °C) 98 1 °F (36 7 °C) 99 °F (37 2 °C) 98 5 °F (36 9 °C)   TempSrc: Oral Oral Oral Oral   SpO2: 97% 98% 99% 97%   Weight:       Height:         Constitutional:  NAD; pleasant; nontoxic  HEENT:  AT/NC; oropharynx negative for thrush on tongue   CV:  +S1, S2;  RRR; no rub/murmur  Pulmonary:  BBS without crackles/wheeze/rhonci; resp are unlabored  Abdominal:  soft, +BS, ND/NT; no mass  Musculoskeletal:  no edema  Neurological/Psych:  AAO;  CAIN 5/5 except RLE 4+/5; no depression/anxiety        [ X ] Total time spent: 30 Mins and greater than 50% of this time was spent counseling/coordinating care  ** Please Note: Dragon 360 Dictation voice to text software may have been used in the creation of this document   **

## 2019-02-17 NOTE — PROGRESS NOTES
02/17/19 1100   Pain Assessment   Pain Assessment No/denies pain   Pain Score No Pain   Restrictions/Precautions   Precautions Fall Risk;Spinal precautions   Weight Bearing Restrictions No   ROM Restrictions Yes   Braces or Orthoses C/S Collar   QI: [address] Provided by [place] No physical assistance   Eating CARE Score 6   Eating Assessment   Eating (FIM) 7 - Patient completely independent   QI: Sit to [address] Provided by [place] No physical assistance   Sit to Stand CARE Score 4   QI: Chair/Bed-to-Chair Transfer   Assistance Needed Incidental touching   Assistance Provided by [place] Less than 25%   Comment CGA w no AD   Chair/Bed-to-Chair Transfer CARE Score 3   Transfer Bed/Chair/Wheelchair   Limitations Noted In Balance; Endurance   Findings Pt req CS for xfers w/ RW  CGA and inc time to complete w/ no AD  Bed, Chair, Wheelchair Transfer (FIM) 4 - Patient requires steadying assist or light touching   Functional Standing Tolerance   Time 6 minutes   Activity dynamic standing balance   Comments Pt weaved in between 10 cones placed on floor x2 trials to focus on dynamic standing balance, self monitoring strategies, and navigating environmental barriers at ground level  Pt completed without AD req CGA due to impaired balance, tolerating well w/ 1 standing rest break between trials  Pt demo G safety awareness strategies and energy conservation techniques to manuever barriers  Exercise Tools   Other Exercise Tool 1 Table Belén: Pt performed 4# table belén 3x10 in forward and horizontal planes, while seated, to provide gentle stretch to b/l shoulders to relieve muscle stiffness due to use of RW  Pt tolerated well w/ rest breaks prn     Assessment   Treatment Assessment Pt seen for skilled OT session focusing on dynamic standing balance, BUE strengthening/gentle stretch to b/l shoulders, and safety strategies to better manage sliding door in home context  Pt reports s/u of sliding door to allow dogs in/out of house  Pt able to manage simulated sliding door at S and stated, "That's a little heavier than my door is at home, I definitely won't have any trouble at home " Spoke w/ pt regarding self monitoring strategies and adjusting to home context upon d/c, pt agreeable to same  Pt will cont to benefit from balance and endurance work to maximize fxnl indep prior to d/c later this week  Pt was left resting in recliner w/ meal tray s/u and call bell within reach  Prognosis Good   Problem List Decreased strength;Decreased endurance; Impaired balance;Decreased mobility; Decreased safety awareness   Plan   Treatment/Interventions Endurance training;Gait training; Therapeutic exercise   Progress Progressing toward goals   Recommendation   OT Discharge Recommendation Home with family support   OT Therapy Minutes   OT Time In 1100   OT Time Out 1130   OT Total Time (minutes) 30   OT Mode of treatment - Individual (minutes) 30   OT Mode of treatment - Concurrent (minutes) 0   OT Mode of treatment - Group (minutes) 0   OT Mode of treatment - Co-treat (minutes) 0   OT Mode of Teatment - Total time(minutes) 30 minutes   Therapy Time missed   Time missed?  No

## 2019-02-17 NOTE — PLAN OF CARE
Problem: PAIN - ADULT  Goal: Verbalizes/displays adequate comfort level or baseline comfort level  Description  Interventions:  - Encourage patient to monitor pain and request assistance  - Assess pain using appropriate pain scale  - Administer analgesics based on type and severity of pain and evaluate response  - Implement non-pharmacological measures as appropriate and evaluate response  - Consider cultural and social influences on pain and pain management  - Notify physician/advanced practitioner if interventions unsuccessful or patient reports new pain   Outcome: Progressing     Problem: SAFETY ADULT  Goal: Patient will remain free of falls  Description  INTERVENTIONS:  - Assess patient frequently for physical needs  -  Identify cognitive and physical deficits and behaviors that affect risk of falls    -  [place] fall precautions as indicated by assessment   - Educate patient/family on patient safety including physical limitations  - Instruct patient to call for assistance with activity based on assessment  - Modify environment to reduce risk of injury  - Consider OT/PT consult to assist with strengthening/mobility   Outcome: Progressing

## 2019-02-18 ENCOUNTER — DOCUMENTATION (OUTPATIENT)
Dept: NEUROSURGERY | Facility: CLINIC | Age: 59
End: 2019-02-18

## 2019-02-18 PROCEDURE — 97535 SELF CARE MNGMENT TRAINING: CPT

## 2019-02-18 PROCEDURE — 97530 THERAPEUTIC ACTIVITIES: CPT

## 2019-02-18 PROCEDURE — 97110 THERAPEUTIC EXERCISES: CPT

## 2019-02-18 PROCEDURE — 97116 GAIT TRAINING THERAPY: CPT

## 2019-02-18 PROCEDURE — 99232 SBSQ HOSP IP/OBS MODERATE 35: CPT | Performed by: PHYSICAL MEDICINE & REHABILITATION

## 2019-02-18 RX ADMIN — ACETAMINOPHEN 975 MG: 325 TABLET ORAL at 05:02

## 2019-02-18 RX ADMIN — DOCUSATE SODIUM 100 MG: 100 CAPSULE, LIQUID FILLED ORAL at 09:49

## 2019-02-18 RX ADMIN — ENOXAPARIN SODIUM 40 MG: 40 INJECTION SUBCUTANEOUS at 09:49

## 2019-02-18 RX ADMIN — ACETAMINOPHEN 975 MG: 325 TABLET ORAL at 13:34

## 2019-02-18 RX ADMIN — PANTOPRAZOLE SODIUM 40 MG: 40 TABLET, DELAYED RELEASE ORAL at 05:02

## 2019-02-18 RX ADMIN — STANDARDIZED SENNA CONCENTRATE 17.2 MG: 8.6 TABLET ORAL at 09:49

## 2019-02-18 RX ADMIN — ACETAMINOPHEN 975 MG: 325 TABLET ORAL at 21:03

## 2019-02-18 RX ADMIN — LEVOTHYROXINE SODIUM 25 MCG: 25 TABLET ORAL at 05:02

## 2019-02-18 RX ADMIN — Medication 1000 MG: at 09:49

## 2019-02-18 NOTE — PROGRESS NOTES
Physical Medicine and Rehabilitation Progress Note  Aruna Sanchez III 60 y o  male MRN: [id number]  Unit/Bed#: -01 Encounter: [id number]    HPI: Aruna Sanchez III is a 60 y o  male who presented to the [facility] with h/o sensory deficits and was found to have spinal cord mass therefore underwent tumor resection and C4-T3 laminectomy and C2-T5 fixation/fusion by Dr Muir Reasons Dr Deonna Tate on 1/24       Chief Complaint: spinal mass s/p resection     Subjective: Patient denies any events over the weekend     ROS:  Negative unless noted above     Assessment/Plan:      Anemia  Assessment & Plan  · Likely ABLA  · Hg currently stable at 10 3  · IM monitoring     Hyperlipidemia  Assessment & Plan  · was Omega 3 FA 1000 mg qd PTA   · D/W neurosx and have cleared patient to resume home Omega 3 FA 1000 mg qd    Impaired fasting glucose  Assessment & Plan  · PCP aware  · Diet controlled     Hypothyroidism  Assessment & Plan  · On home synthroid 25 mcg qd     BPH associated with nocturia  Assessment & Plan  · Per patient stopped taking flomax 0 4 mg qpm in the past because it was not helping the urinary retention   · Per patient stopped taking ditropan XL 10 mg qd in the past because  it was not helping the urinary retention   · Per patient stopped taking viagra 100 mg in the past because  it was not helping the urinary retention   · Per patient was no longer on any meds for urinary retention/BPH PTA as he was working with urology as OP and further tests were being planned as patient medications had not been effective however in acute care patient was restarted on flomax and post-operatively patient has been urinating well   · successful trial off of flomax (which patient had stopped taking PTA because it was not effective); PVRs were 57, 38, & 87 (PVRs are from greater than 24 hours after last dose of flomax)   · Follows with Suman Guajardo PA-C/Dr Adela Suarez as OP    * 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[facility])  Assessment & Plan  · S/p resection and C4-T3 laminectomy and C2-T5 fixation/fusion by Dr Noelle Anderson and Dr Stone Bradshaw on 1/24  · decardron taper completed  · Spine precautions  · c-collar at all times  · Path revealed grade 2 ependymoma; further management per team at [place] [address] [facility]   · OP FU w/neurosx scheduled for 2/6 done as inpt by neurosx team     Scheduled Meds:    Current Facility-Administered Medications:  acetaminophen 975 mg Oral Q8H Mauricio Cole MD   bisacodyl 10 mg Rectal Daily PRN Malia Bray DO   calcium carbonate 500 mg Oral Daily PRN Ольга Anabaptism, CRNP   senna 2 tablet Oral Daily Dante Kaiser MD   And       docusate sodium 100 mg Oral Daily Dante Kaiser MD   enoxaparin 40 mg Subcutaneous Daily Dante Kaiser MD   fish oil 1,000 mg Oral Daily Dante Kaiser MD   levothyroxine 25 mcg Oral Early Morning Dante Kaiser MD   methocarbamol 500 mg Oral Q6H PRN Dante Kaiser MD   oxyCODONE 10 mg Oral Q4H PRN Dante Kaiser MD   oxyCODONE 5 mg Oral Q4H PRN Dante Kaiser MD   pantoprazole 40 mg Oral Early Morning Ольга Anabaptism, CRNP   polyethylene glycol 17 g Oral Daily PRN Ольга Anabaptism, CRNP   sodium phosphate-biphosphate 1 enema Rectal Once PRN Malia Bray DO        Incidental findings:  1) 2-3 mm pulmonary nodule: per radiology report of 1/18/19 recommend repeat CT chest in 3 months however will defer to PCP  2) fatty involution of pancreas: OP FU with PCP with further testing/treatment and/or specialist referral at PCP's discretion  3) L maxillary sinus thickening: asymptomatic, OP FU with PCP with further testing/treatment and/or specialist referral at PCP's discretion      DVT ppx: SCDs, cleared for lovenox 40 mg sc qd by neurosx in acute care (and has been on it since 1/26)  Dispo: 2/20      Objective:    Functional Update:  Mobility: sup-CG  Transfers: sup  ADLs: min-sup         Physical Exam:            General: alert, no apparent distress, cooperative and comfortable  HEENT:  +C-collar  CARDIAC:  +S1/2  LUNGS:  respirations unlabored   ABDOMEN:  soft NT  EXTREMITIES:  no significant LE edema  NEURO:   mental status, speech normal, alert and oriented x3  PSYCH:  Alert and oriented, appropriate affect  Laboratory:   Results from last 7 days   Lab Units 02/14/19  0541   HEMOGLOBIN g/dL 10 3*   HEMATOCRIT % 33 4*   WBC Thousand/uL 5 08     Results from last 7 days   Lab Units 02/14/19  0541   BUN mg/dL 16   SODIUM mmol/L 141   POTASSIUM mmol/L 4 1   CHLORIDE mmol/L 107   CREATININE mg/dL 0 69            Patient Active Problem List   Diagnosis    BPH associated with nocturia    Hypothyroidism    Impaired fasting glucose    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HCC)    Anemia    Hyperlipidemia           Total visit time:  At least 25 minutes, with more than 50% spent counseling/coordinating care

## 2019-02-18 NOTE — PLAN OF CARE
Problem: Potential for Falls  Goal: Patient will remain free of falls  Description  INTERVENTIONS:  - Assess patient frequently for physical needs  -  Identify cognitive and physical deficits and behaviors that affect risk of falls    -  [place] fall precautions as indicated by assessment   - Educate patient/family on patient safety including physical limitations  - Instruct patient to call for assistance with activity based on assessment  - Modify environment to reduce risk of injury  - Consider OT/PT consult to assist with strengthening/mobility   Outcome: Progressing     Problem: PAIN - ADULT  Goal: Verbalizes/displays adequate comfort level or baseline comfort level  Description  Interventions:  - Encourage patient to monitor pain and request assistance  - Assess pain using appropriate pain scale  - Administer analgesics based on type and severity of pain and evaluate response  - Implement non-pharmacological measures as appropriate and evaluate response  - Consider cultural and social influences on pain and pain management  - Notify physician/advanced practitioner if interventions unsuccessful or patient reports new pain   Outcome: Progressing     Problem: INFECTION - ADULT  Goal: Absence or prevention of progression during hospitalization  Description  INTERVENTIONS:  - Assess and monitor for signs and symptoms of infection  - Monitor lab/diagnostic results  - Monitor all insertion sites, i e  indwelling lines, tubes, and drains  - Monitor endotracheal (as able) and nasal secretions for changes in amount and color  - [place] appropriate cooling/warming therapies per order  - Administer medications as ordered  - Instruct and encourage patient and family to use good hand hygiene technique  - Identify and instruct in appropriate isolation precautions for identified infection/condition   Outcome: Progressing     Problem: SAFETY ADULT  Goal: Patient will remain free of falls  Description  INTERVENTIONS:  - Assess patient frequently for physical needs  -  Identify cognitive and physical deficits and behaviors that affect risk of falls    -  [place] fall precautions as indicated by assessment   - Educate patient/family on patient safety including physical limitations  - Instruct patient to call for assistance with activity based on assessment  - Modify environment to reduce risk of injury  - Consider OT/PT consult to assist with strengthening/mobility   Outcome: Progressing  Goal: Maintain or return to baseline ADL function  Description  INTERVENTIONS:  -  Assess patient's ability to carry out ADLs; assess patient's baseline for ADL function and identify physical deficits which impact ability to perform ADLs (bathing, care of mouth/teeth, toileting, grooming, dressing, etc )  - Assess/evaluate cause of self-care deficits   - Assess range of motion  - Assess patient's mobility; develop plan if impaired  - Assess patient's need for assistive devices and provide as appropriate  - Encourage maximum independence but intervene and supervise when necessary  ¯ Involve family in performance of ADLs  ¯ Assess for home care needs following discharge   ¯ Request OT consult to assist with ADL evaluation and planning for discharge  ¯ Provide patient education as appropriate   Outcome: Progressing  Goal: Maintain or return mobility status to optimal level  Description  INTERVENTIONS:  - Assess patient's baseline mobility status (ambulation, transfers, stairs, etc )    - Identify cognitive and physical deficits and behaviors that affect mobility  - Identify mobility aids required to assist with transfers and/or ambulation (gait belt, sit-to-stand, lift, walker, cane, etc )  - [place] fall precautions as indicated by assessment  - Record patient progress and toleration of activity level on Mobility SBAR; progress patient to next Phase/Stage  - Instruct patient to call for assistance with activity based on assessment  - Request Rehabilitation consult to assist with strengthening/weightbearing, etc    Outcome: Progressing     Problem: DISCHARGE PLANNING  Goal: Discharge to home or other facility with appropriate resources  Description  INTERVENTIONS:  - Identify barriers to discharge w/patient and caregiver  - Arrange for needed discharge resources and transportation as appropriate  - Identify discharge learning needs (meds, wound care, etc )  - Arrange for interpretive services to assist at discharge as needed  - Refer to Case Management Department for coordinating discharge planning if the patient needs post-hospital services based on physician/advanced practitioner order or complex needs related to functional status, cognitive ability, or social support system   Outcome: Progressing     Problem: Prexisting or High Potential for Compromised Skin Integrity  Goal: Skin integrity is maintained or improved  Description  INTERVENTIONS:  - Identify patients at risk for skin breakdown  - Assess and monitor skin integrity  - Assess and monitor nutrition and hydration status  - Monitor labs (i e  albumin)  - Assess for incontinence   - Turn and reposition patient  - Assist with mobility/ambulation  - Relieve pressure over bony prominences  - Avoid friction and shearing  - Provide appropriate hygiene as needed including keeping skin clean and dry  - Evaluate need for skin moisturizer/barrier cream  - Collaborate with interdisciplinary team (i e  Nutrition, Rehabilitation, etc )   - Patient/family teaching   Outcome: Progressing

## 2019-02-18 NOTE — PROGRESS NOTES
02/18/19 1001   Pain Assessment   Pain Score No Pain   Restrictions/Precautions   Precautions Fall Risk;Spinal precautions   Braces or Orthoses C/S Collar   General   Change In Medical/Functional Status Pt  progressed to mod I in room with RW with OT in agreement and nurse made aware    Cognition   Overall Cognitive Status WFL   Subjective   Subjective No complaints reported  Ready to participate in therapy    QI: Sit to [address]; Adaptive equipment   Comment with RW, S with no AD    Sit to Stand CARE Score 6   QI: Chair/Bed-to-Chair Transfer   Assistance Needed Independent; Adaptive equipment   Comment with RW, S with no AD    Chair/Bed-to-Chair Transfer CARE Score 6   Transfer Bed/Chair/Wheelchair   Adaptive Equipment Roller Walker   Stand Pivot Modified Independent   Sit to Stand Modified Independent   Stand to Sit Modified Independent   Findings S without AD   Bed, Chair, Wheelchair Transfer (FIM) 6 - Patient requires assistive device/extra time/safety concerns but completes independently   QI: Car Transfer   Assistance Needed Supervision   Car Transfer CARE Score 4   QI: [address]; Adaptive equipment   Comment with RW   Walk 10 Feet CARE Score 6   QI: Walk 50 Feet with Two [address]; Adaptive equipment   Comment with RW   Walk 50 Feet with Two Turns CARE Score 6   QI: Walk 150 Feet   Assistance Needed Supervision; Incidental touching   Walk 150 Feet CARE Score 4   QI: Walking 10 Feet on Uneven Surfaces   Assistance Needed Incidental touching   Comment up and dwon a ramp with RW    Walking 10 Feet on Uneven Surfaces CARE Score 4   Ambulation   Does the patient walk? 2  Yes   Primary Discharge Mode of Locomotion Walk   Walk Assist Level Modified Independent   Gait Pattern Inconsistant Sheryl;Decreased foot clearance;Trendelenburg; Improper weight shift   Assist Device Roller Duyen Rhodes Walked (feet) 50 ft  (350 without AD )   Limitations Noted In Balance; Coordination;Strength   Findings CGA/ CS with no AD using 3# wts on R LE    Walking (FIM) 5 - HOUSEHOLD EXCEPTION: Ambulates 50 feet or more, with or w/o a device, but completely independent   Wheelchair mobility   QI: Does the patient use a wheelchair? 0  No   QI: 1 Step (Curb)   Assistance Needed Incidental touching   1 Step (Curb) CARE Score 4   QI: 4 Steps   Assistance Needed Incidental touching   4 Steps CARE Score 4   QI: 12 Steps   Assistance Needed Incidental touching   12 Steps CARE Score 4   Stairs   Type Stairs;Curb;Ramp   # of Steps 24   Weight Bearing Precautions Fall Risk   Assist Devices Single Rail   Findings CGA reciprocal pattern on FF, used RW on curb and ramp    Stairs (FIM) 4 - Patient requires steadying assist or light touching AND patient goes up and down full flight (12- 14 stairs)   Therapeutic Interventions   Strengthening functional sit<> stand with no UE support X 10 reps; 2 sets, hip abd and ER using green TB in sitting  step up unto 8 inch stool of R LE with 3# wts on R LE   Flexibility B hamstring and gastroc stretching    Balance side stepping to the L    Equipment Use   NuStep Level 1 X 10 mins LE only   Assessment   Treatment Assessment Trialed pt  mod I level using RW  walking 50 feet from PT gym with set up chair out in the hallway and was able to demonstrate safety with no LOB noted   OT reported that they trialed pt  in room earlier and no issues also noted and with pt  able to manage furniture, and call bell in the room  Pt  now progressed to mod I in room using RW  pt  able to demonstrate understanding that he has to use RW if he should be moving on his own and to use call bell for assistance if needed  Pt  tolerated session well  with Tx cont to focus on B LE strengthening and neuro re-ed through functional activities   Pt  cont to dmeonstrate R LE fatigue using 3# wts with ambulation with needing occasional CGA for balance towards the end of walking  Pt  had 1 LOB dueing session but was able to regain balance without physical assist  Pt  demonstrates inc B LE strength with functional sit to stand without using B UE  No complaints reported by pt  COnt with POC as tolerated  Problem List Decreased strength;Decreased endurance; Impaired balance;Decreased mobility; Decreased coordination;Orthopedic restrictions   PT Barriers   Physical Impairment Decreased strength;Decreased endurance; Impaired balance;Decreased mobility; Decreased coordination;Orthopedic restrictions   Plan   Treatment/Interventions Functional transfer training;LE strengthening/ROM; Elevations; Therapeutic exercise; Endurance training;Patient/family training;Equipment eval/education; Bed mobility;Gait training   Recommendation   Recommendation Outpatient PT; Home with family support   Equipment Recommended Walker   PT Therapy Minutes   PT Time In 1000   PT Time Out 1100   PT Total Time (minutes) 60   PT Mode of treatment - Individual (minutes) 60   PT Mode of treatment - Concurrent (minutes) 0   PT Mode of treatment - Group (minutes) 0   PT Mode of treatment - Co-treat (minutes) 0   PT Mode of Teatment - Total time(minutes) 60 minutes   Therapy Time missed   Time missed?  No

## 2019-02-18 NOTE — PROGRESS NOTES
02/18/19 1001   Pain Assessment   Pain Score No Pain   Restrictions/Precautions   Precautions Fall Risk;Spinal precautions   Braces or Orthoses C/S Collar   General   Change In Medical/Functional Status Pt  progressed to mod I in room with RW with OT in agreement and nurse made aware    Cognition   Overall Cognitive Status WFL   Subjective   Subjective No complaints reported  Ready to participate in therapy    QI: Sit to [address]; Adaptive equipment   Comment with RW, S with no AD    Sit to Stand CARE Score 6   QI: Chair/Bed-to-Chair Transfer   Assistance Needed Independent; Adaptive equipment   Comment with RW, S with no AD    Chair/Bed-to-Chair Transfer CARE Score 6   Transfer Bed/Chair/Wheelchair   Adaptive Equipment Roller Walker   Stand Pivot Modified Independent   Sit to Stand Modified Independent   Stand to Sit Modified Independent   Findings S without AD   Bed, Chair, Wheelchair Transfer (FIM) 6 - Patient requires assistive device/extra time/safety concerns but completes independently   QI: Car Transfer   Assistance Needed Supervision   Car Transfer CARE Score 4   QI: [address]; Adaptive equipment   Comment with RW   Walk 10 Feet CARE Score 6   QI: Walk 50 Feet with Two [address]; Adaptive equipment   Comment with RW   Walk 50 Feet with Two Turns CARE Score 6   QI: Walk 150 Feet   Assistance Needed Supervision; Incidental touching   Walk 150 Feet CARE Score 4   QI: Walking 10 Feet on Uneven Surfaces   Assistance Needed Incidental touching   Comment up and dwon a ramp with RW    Walking 10 Feet on Uneven Surfaces CARE Score 4   Ambulation   Does the patient walk? 2  Yes   Primary Discharge Mode of Locomotion Walk   Walk Assist Level Modified Independent   Gait Pattern Inconsistant Sheryl;Decreased foot clearance;Trendelenburg; Improper weight shift   Assist Device Roller Duyen Rhodes Walked (feet) 50 ft  (350 without AD )   Limitations Noted In Balance; Coordination;Strength   Findings CGA/ CS with no AD using 3# wts on R LE    Walking (FIM) 5 - HOUSEHOLD EXCEPTION: Ambulates 50 feet or more, with or w/o a device, but completely independent   Wheelchair mobility   QI: Does the patient use a wheelchair? 0  No   QI: 1 Step (Curb)   Assistance Needed Incidental touching   1 Step (Curb) CARE Score 4   QI: 4 Steps   Assistance Needed Incidental touching   4 Steps CARE Score 4   QI: 12 Steps   Assistance Needed Incidental touching   12 Steps CARE Score 4   Stairs   Type Stairs;Curb;Ramp   # of Steps 24   Weight Bearing Precautions Fall Risk   Assist Devices Single Rail   Findings CGA reciprocal pattern on FF, used RW on curb and ramp    Therapeutic Interventions   Strengthening functional sit<> stand with no UE support X 10 reps; 2 sets, hip abd and ER using green TB in sitting  step up unto 8 inch stool of R LE with 3# wts on R LE   Flexibility B hamstring and gastroc stretching    Balance side stepping to the L    Equipment Use   NuStep Level 1 X 10 mins LE only   Assessment   Treatment Assessment Trialed pt  mod I level using RW  walking 50 feet from PT gym with set up chair out in the hallway and was able to demonstrate safety with no LOB noted   OT reported that they trialed pt  in room earlier and no issues also noted and with pt  able to manage furniture, and call bell in the room  Pt  now progressed to mod I in room using RW  pt  able to demonstrate understanding that he has to use RW if he should be moving on his own and to use call bell for assistance if needed  Pt  tolerated session well  with Tx cont to focus on B LE strengthening and neuro re-ed through functional activities  Pt  cont to dmeonstrate R LE fatigue using 3# wts with ambulation with needing occasional CGA for balance towards the end of walking   Pt  had 1 LOB dueing session but was able to regain balance without physical assist  Pt  demonstrates inc B LE strength with functional sit to stand without using B UE  No complaints reported by pt  COnt with POC as tolerated  Problem List Decreased strength;Decreased endurance; Impaired balance;Decreased mobility; Decreased coordination;Orthopedic restrictions   PT Barriers   Physical Impairment Decreased strength;Decreased endurance; Impaired balance;Decreased mobility; Decreased coordination;Orthopedic restrictions   Plan   Treatment/Interventions Functional transfer training;LE strengthening/ROM; Elevations; Therapeutic exercise; Endurance training;Patient/family training;Equipment eval/education; Bed mobility;Gait training   Recommendation   Recommendation Outpatient PT; Home with family support   Equipment Recommended Walker   PT Therapy Minutes   PT Time In 1000   PT Time Out 1100   PT Total Time (minutes) 60   PT Mode of treatment - Individual (minutes) 60   PT Mode of treatment - Concurrent (minutes) 0   PT Mode of treatment - Group (minutes) 0   PT Mode of treatment - Co-treat (minutes) 0   PT Mode of Teatment - Total time(minutes) 60 minutes   Therapy Time missed   Time missed?  No

## 2019-02-18 NOTE — SOCIAL WORK
In preparation for dc cm received order from therapy for a walker and commode  Order placed with [facility] to be delivered to pts room prior to dc  Cm phoned [facility] and spoke with erica, scheduled pt's outpt pt eval for 2/21 at 3 30pm  Pt is to arrive 15 min early   Info placed on dc instructions

## 2019-02-18 NOTE — PROGRESS NOTES
Occupational Therapy Treatment Note       02/18/19 1399   Pain Assessment   Pain Assessment No/denies pain   Restrictions/Precautions   Precautions Fall Risk;Spinal precautions   Lifestyle   Autonomy "my wife will be helping me get washed at night"    QI: [address] Provided by [place] No physical assistance   Oral Hygiene CARE Score 6   Grooming   Able To Brush/Clean Teeth;Wash/Dry Hands   Limitation Noted In Timeliness; Safety;Strength   Findings in stance at sink  OTR/L educated pt on moving RW to side and using UE/pelvic support on sink as needed for steadying, pt demonstrates good carryover  Grooming (FIM) 6 - Patient requires assistive device/extra time/safety concerns but completes independently   QI: Shower/Bathe Self   Assistance Needed Independent; Adaptive equipment   Assistance Provided by [place] No physical assistance   Shower/Bathe Self CARE Score 6   Bathing   Assessed Bath Style Tub   Anticipated D/C Bath Style Tub   Able to Gather/Transport Yes   Able to Adjust Water Temperature Yes   Able to Wash/Rinse/Dry (body part) Left Arm;Right Arm;L Upper Leg;R Upper Leg;L Lower Leg/Foot;R Lower Leg/Foot;Chest;Abdomen;Perineal Area; Buttocks   Limitations Noted in Balance; Endurance;Strength;Timeliness; Safety   Positioning Seated   Adaptive Equipment Tub Bench;Hand Held Shower   Findings  pt completes bathing at mod I level, sitting and weight shifting for bathing of buttock 2* impaired standing balance and no grab bars at home  Pt reports he and his wife may install suction grab bars if needed at home when his balance is better  OTR/L educated pt to thoroughly review suction grab bars and to be used only for light toughing, not to assist with pulling/pushing, pt verbalizes understanding  Bathing (FIM) 6 - Patient requires assistive device/extra time/safety concerns but completes independently   Tub/Shower Transfer   Limitations Noted In Balance; Endurance; Safety Adaptive Equipment Transfer Bench   Assessed Tub/shower combo   Tub Transfer (FIM) 5 - Patient requires supervision/monitoring   QI: Upper Body Dressing   Assistance Needed Supervision   Assistance Provided by [place] No physical assistance   Comment mod I for UB dressing, but requires assistance for c/s collar management  Pt reports he and his wife are comfortable and require no further training on c/s collar management  Upper Body Dressing CARE Score 4   QI: Lower Body Dressing   Assistance Needed Independent   Assistance Provided by [place] No physical assistance   Comment threads LEs into pants and stands at mod I level to manage clothing over hips    Lower Body Dressing CARE Score 6   QI: Putting On/Taking Off [address] Provided by [place] No physical assistance   Comment seated, pt brings LEs towards self and uses cross leg technique as able    Putting On/Taking Off Footwear CARE Score 6   Dressing/Undressing Clothing   Able to  Obtain Clothing;Store removed clothing   Remove UB Clothes Pullover Shirt   Remove LB Clothes Pants;Socks; Shoes; [address] [facility] LB Clothes Pants; Undergarment;Socks; Shoes   Positioning Supported Sit;Standing   UB Dressing (FIM) 5 - Patient requires supervision/monitoring   LB Dressing (FIM) 6 - Patient requires assistive device/extra time/safety concerns but completes independently   QI: Roll Left and Right   Assistance Needed Independent   Assistance Provided by [place] No physical assistance   Roll Left and Right CARE Score 6   QI: Sit to [address] Provided by [place] No physical assistance   Comment flat bed no rails    Sit to Lying CARE Score 6   QI: Lying to Sitting on Side of Bed   Assistance Needed Independent   Assistance Provided by [place] No physical assistance   Comment flat bed, no rails    Lying to Sitting on Side of Bed CARE Score 6   QI: Sit to Stand Assistance Needed Independent; Adaptive equipment   Assistance Provided by [place] No physical assistance   Sit to Stand CARE Score 6   QI: Chair/Bed-to-Chair Transfer   Assistance Needed Independent; Adaptive equipment   Assistance Provided by [place] No physical assistance   Comment RW   Chair/Bed-to-Chair Transfer CARE Score 6   Transfer Bed/Chair/Wheelchair   Limitations Noted In Balance;LE Strength; Endurance;Sensation   Adaptive Equipment Roller Walker   Sit to Stand Other  (mod I)   Stand to Sit Other  (mod I)   Supine to Sit Other  (mod I increased time )   Sit to Supine Other  (mod I increased time )   Findings pt completes sit <> stand transfer and functional mobility/transfers within room at mod I level using RW  OTR/L educated pt, and pt verbalized and recalled understanding, that pt is only to use RW when alone and plan for RW at home  Bed, Chair, Wheelchair Transfer (FIM) 6 - Patient requires assistive device/extra time/safety concerns but completes independently   QI: [address] equipment; Independent   Assistance Provided by [place] No physical assistance   Toileting Hygiene CARE Score 6   Toileting   Able to [address] down yes, up yes  Limitations Noted In Balance; Safety   Adaptive Equipment Grab Bar   Toileting (FIM) 6 - Patient requires assistive device/extra time/safety concerns but completes independently   QI: Toilet Transfer   Assistance Needed Independent; Adaptive equipment   Assistance Provided by [place] No physical assistance   Toilet Transfer CARE Score 6   Toilet Transfer   Surface Assessed Standard Toilet   Transfer Technique Standard   Limitations Noted In Balance; Endurance; Safety;LE Strength   Adaptive Equipment Grab Bar   Findings pt uses grab bar, has BSC ordered for down stairs toilet at home and plans to use sink for steadying assist on 2nd floor bathroom    Toilet Transfer (FIM) 6 - Patient requires assistive device/extra time/safety concerns but completes independently   Cognition   Overall Cognitive Status WFL   Activity Tolerance   Activity Tolerance Patient tolerated treatment well   Medical Staff Made Aware PT Latonia Espinoza, RN Brittny Bonilla    Assessment   Treatment Assessment Pt participated in skilled OT tx session focused on ADL routine and functional mobility/transfers using RW  See above for further details on functional performance  Pt has demonstrated good progress, able to complete toileting, grooming, LB dressing, and UB dressing (except c/s collar management) at mod I level  Pt additionally able to complete functional mobility to/from bathroom at mod I level using RW  OTR/L educated pt on safety and calling for assistance if items are out of pt's reach, as well as appropriately managing the call button to not have lines in pt's way to/from bathroom, pt verbalizes and demonstrates understanding of this education  Pt's wife will provide him with supervision for shower transfer at home and assist with c/s collar management  Plan for pt to D/C home Wednesday using RW  Continue OT plan of care with focus on simple meal preparation, kitchen mobility, item retrieval from floor using reacher  Plan   Treatment/Interventions ADL retraining;Functional transfer training; Therapeutic exercise; Endurance training;Patient/family training;Equipment eval/education; Compensatory technique education   Progress Progressing toward goals   Recommendation   OT Discharge Recommendation Home with family support   OT Therapy Minutes   OT Time In 0830   OT Time Out 0930   OT Total Time (minutes) 60   OT Mode of treatment - Individual (minutes) 60   OT Mode of treatment - Concurrent (minutes) 0   OT Mode of treatment - Group (minutes) 0   OT Mode of treatment - Co-treat (minutes) 0   OT Mode of Teatment - Total time(minutes) 60 minutes   Therapy Time missed   Time missed?  No       Alexia Nguyễn MS, OTR/L , CBIS

## 2019-02-18 NOTE — PROGRESS NOTES
Saw the patient at [facility] today on 2/18/19  Coordinated next steps for plan of care  Scheduled lumbar puncture, MRIs of brain, cervical, thoracic, lumbar spine  Scripts for x-rays were given and patient is aware the x-rays do not require an appointment  Expressed the importance of these tests and imaging  Provided appointment at [facility] on 3/6/19 at 2 pm to discuss RT after all tests and imaging are completed  Patient was appreciative

## 2019-02-18 NOTE — PROGRESS NOTES
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:  continue cervical collar; steroid weaned off per Neurosurgery       Hypothyroidism:  Continue Levothyroxine 25 mcg qd     Elevated fasting blood sugars; HbA1C 1/22 = 6 3:  Continue DM diet but stopped Accuchecks since stable     Asymptomatic bradycardia:  noted postoperatively; still occurs intermitt but during sleep into 50's at lowest     ABLA:  Stable; w/o sx; cont to monitor    Heartburn:  added Protonix since on steroids/prn TUMS  He gets heartburn at home 3x/week/uses TUMS    Told him to go to PCP as OP when he recovers and have workup with possible EGD      Subjective:   offers no complaints; he is pleased with his progress    ROS:   GI: denies abdominal pain, change bowel habits or reflux symptoms  Neuro: No new neurologic changes  Respiratory: No Cough, SOB  Cardiovascular: No CP, palpitations     Scheduled Meds:    Current Facility-Administered Medications:  acetaminophen 975 mg Oral Q8H [address] Juan C Garcia MD   bisacodyl 10 mg Rectal Daily PRN Molly Nolen DO   calcium carbonate 500 mg Oral Daily PRN CLAUDIO Watson   senna 2 tablet Oral Daily Juan C Garcia MD   And       docusate sodium 100 mg Oral Daily Juan C Garcia MD   enoxaparin 40 mg Subcutaneous Daily Juan C Garcia MD   fish oil 1,000 mg Oral Daily Juan C Garcia MD   levothyroxine 25 mcg Oral Early Morning Juan C Garcia MD   methocarbamol 500 mg Oral Q6H PRN Juan C Garcia MD   oxyCODONE 10 mg Oral Q4H PRN Juan C Garcia MD   oxyCODONE 5 mg Oral Q4H PRN Juan C Garcia MD   pantoprazole 40 mg Oral Early Morning CLAUDIO Watson   polyethylene glycol 17 g Oral Daily PRN CLAUDIO Watson   sodium phosphate-biphosphate 1 enema Rectal Once PRN Serjio Dennis,        Labs:     Results from last 7 days   Lab Units 02/14/19  0541   WBC Thousand/uL 5 08   HEMOGLOBIN g/dL 10 3*   HEMATOCRIT % 33 4*   PLATELETS Thousands/uL 283     Results from last 7 days   Lab Units 02/14/19  0541   SODIUM mmol/L 141   POTASSIUM mmol/L 4 1   CHLORIDE mmol/L 107   CO2 mmol/L 28   BUN mg/dL 16   CREATININE mg/dL 0 69   CALCIUM mg/dL 8 5                      [unfilled]    Labs reviewed    Physical Examination:  Vitals:   Vitals:    02/17/19 0524 02/17/19 1342 02/17/19 2034 02/18/19 0454   BP: 111/61 101/58 108/56 107/69   BP Location: Left arm Right arm Left arm Left arm   Pulse: 59 68 67 71   Resp: 18 18 20 20   Temp: 98 5 °F (36 9 °C) 98 8 °F (37 1 °C) 98 5 °F (36 9 °C) 98 2 °F (36 8 °C)   TempSrc: Oral Oral Oral Oral   SpO2: 97% 99% 98% 98%   Weight:       Height:         Constitutional:  NAD; pleasant; nontoxic  HEENT:  AT/NC; oropharynx negative for thrush on tongue   CV:  +S1, S2;  RRR; no rub/murmur  Pulmonary:  BBS without crackles/wheeze/rhonci; resp are unlabored  Abdominal:  soft, +BS, ND/NT; no mass  Musculoskeletal:  no edema  Neurological/Psych:  AAO;  CAIN 5/5 except RLE 4+/5; still with occ burning left 4-5th fingers/left side of hand, occ right elbow burning and numbness of legs (lessened in feet); no depression/anxiety        [ X ] Total time spent: 30 Mins and greater than 50% of this time was spent counseling/coordinating care  ** Please Note: Dragon 360 Dictation voice to text software may have been used in the creation of this document   **

## 2019-02-18 NOTE — PROGRESS NOTES
Occupational Therapy Treatment Note       02/18/19 1230   Pain Assessment   Pain Assessment No/denies pain   Restrictions/Precautions   Precautions Fall Risk;Spinal precautions   Braces or Orthoses C/S Collar   Lifestyle   Autonomy "I think we have covered most things"    QI: [address]; Adaptive equipment   Assistance Provided by Check No physical assistance   Comment use of reacher, pt has purchased for home    Picking Up Object CARE Score 6   Dressing/Undressing Clothing   Findings while seated at mirror OTR/L educated pt on adjusting B/L c/s collar straps to make tighter, as throughout day pt requries readjustment and will be at home alone  OTR/L educated pt to sit to perform re-adjustment of straps, if able to access mirror while sitting that is ideal  If unable to access mirror, OTR/L educated pt on feeling for sides of Velcro, to assess that strap is appropriately placed on Velcro  OTR/L also educated pt on stabilizing front of collar with one UE while utilizing alternate UE to perform adjustment  Pt able to tighten B/L velcro straps of C/S collar while seated supervision after this education, while using mirror  Pt required 1 verbal cue to utilize unilateral UE support on anterior aspect of collar while adjusting  QI: Sit to Stand   Assistance Needed Independent; Adaptive equipment   Assistance Provided by Check No physical assistance   Comment RW   Sit to Stand CARE Score 6   QI: Chair/Bed-to-Chair Transfer   Assistance Needed Independent; Adaptive equipment   Assistance Provided by Check No physical assistance   Comment RW   Chair/Bed-to-Chair Transfer CARE Score 6   Transfer Bed/Chair/Wheelchair   Limitations Noted In Balance;LE Strength   Adaptive Equipment Roller Walker   Sit to Stand Other  (MOD I )   Stand to Sit Other  (MOD I )   Bed, Chair, Wheelchair Transfer (FIM) 6 - Patient requires assistive device/extra time/safety concerns but completes independently Meal Prep   Meal Prep Level Walker   Meal Prep Level of Assistance Modified independent   Meal Preparation pt engaged in simple meal preparation - making sandwich- at mod I level using RW  Pt reports he will complete simple meal preparation, such as sandwiches or heating up left overs in microwave, while he is at home alone  Kitchen Mobility   Kitchen-Mobility Level Walker   Kitchen Activity Retrieve items;Transport items   Kitchen Mobility Comments Pt completes kitchen mobility at [employer] I level using RW  Pt able to retrieve and transport items to/from refrigerator, over head cabinet, hip level drawer - at mod I level  Pt plans to use walker bag at home  OTR/L reviewed with pt using sealed bottles and baggies/containers, to transport items in bag at home to/from kitchen table  Pt recalls education and verbalizes understanding  Commmunity Re-entry   Community Re-entry Level Walker   Community Re-entry Level of Assistance Close supervision   Community Re-entry pt engaged in community reintegration activity - shopping - at supervision level with use of RW and reacher  Pt completes functional mobility within book store at supervision level using RW with increased time and side stepping to navigate narrow aisles  Pt able to retrieve item at hip level height by squatting, for below hip level height pt uses reacher to retrieve small items at supervision level  OTR/L recommended to pt initially engaging in shopping at smaller, less croweded store, that has availability for sitting (such as a pharmacy) and having supervision  Pt verbalizes understanding and reports his wife can be present with him for shopping  Additionally pt engaged in community reintegration, accessing elevator, at supervision level  Pt able to minimally  RW from ground to place over open space at elevator door      Cognition   Overall Cognitive Status WFL   Arousal/Participation Alert   Attention Within functional limits   Memory Within functional limits   Following Commands Follows one step commands without difficulty   Assessment   Treatment Assessment Pt participated in skilled OT tx session focused on pt education, kitchen mobility/simple meal preparation, community reintegration  See above for further details on functional performance  Pt able to complete simple meal preparation and kitchen mobility with RW at mod I level  OT recommending pt have supervision for shopping 2* navigating through tight spaces, having to reach items on shelves  In regards to pet care pt reported his wife will feed pets and he will sit in chair and pour water bottles into their bowls throughout the day to avoid bending forward  Continue OT plan of care with focus on standing balance/tolerance during ADL/IADL routine, carryover of pt adjusting c/s collar independently using strategies pt was educated on above  Plan for pt to D/C Wednesday with family support  Pt was previously ordered for [facility]  Prognosis Good   Problem List Decreased strength;Decreased endurance; Impaired balance;Decreased mobility   Plan   Treatment/Interventions ADL retraining;Functional transfer training; Therapeutic exercise; Endurance training;Patient/family training;Equipment eval/education; Compensatory technique education   Recommendation   OT Discharge Recommendation Home with family support   OT Therapy Minutes   OT Time In 1230   OT Time Out 1330   OT Total Time (minutes) 60   OT Mode of treatment - Individual (minutes) 60   OT Mode of treatment - Concurrent (minutes) 0   OT Mode of treatment - Group (minutes) 0   OT Mode of treatment - Co-treat (minutes) 0   OT Mode of Teatment - Total time(minutes) 60 minutes   Therapy Time missed   Time missed?  No       Alexia Nguyễn MS, OTR/L , CBIS

## 2019-02-19 ENCOUNTER — TRANSITIONAL CARE MANAGEMENT (OUTPATIENT)
Dept: FAMILY MEDICINE CLINIC | Facility: CLINIC | Age: 59
End: 2019-02-19

## 2019-02-19 ENCOUNTER — TELEPHONE (OUTPATIENT)
Dept: NEUROLOGY | Facility: CLINIC | Age: 59
End: 2019-02-19

## 2019-02-19 VITALS
HEIGHT: 67 IN | TEMPERATURE: 98.3 F | RESPIRATION RATE: 19 BRPM | OXYGEN SATURATION: 98 % | DIASTOLIC BLOOD PRESSURE: 61 MMHG | HEART RATE: 68 BPM | BODY MASS INDEX: 27 KG/M2 | WEIGHT: 172 LBS | SYSTOLIC BLOOD PRESSURE: 118 MMHG

## 2019-02-19 PROCEDURE — 97530 THERAPEUTIC ACTIVITIES: CPT

## 2019-02-19 PROCEDURE — 97535 SELF CARE MNGMENT TRAINING: CPT

## 2019-02-19 PROCEDURE — 99239 HOSP IP/OBS DSCHRG MGMT >30: CPT | Performed by: PHYSICAL MEDICINE & REHABILITATION

## 2019-02-19 PROCEDURE — 97110 THERAPEUTIC EXERCISES: CPT

## 2019-02-19 PROCEDURE — 97112 NEUROMUSCULAR REEDUCATION: CPT

## 2019-02-19 RX ORDER — LEVOTHYROXINE SODIUM 0.03 MG/1
25 TABLET ORAL DAILY
COMMUNITY
End: 2019-06-05 | Stop reason: SDUPTHER

## 2019-02-19 RX ORDER — CHLORAL HYDRATE 500 MG
1000 CAPSULE ORAL DAILY
Status: ON HOLD | COMMUNITY
End: 2019-02-19 | Stop reason: CLARIF

## 2019-02-19 RX ORDER — CHLORAL HYDRATE 500 MG
1000 CAPSULE ORAL DAILY
COMMUNITY
End: 2019-12-18 | Stop reason: SDUPTHER

## 2019-02-19 RX ADMIN — Medication 1000 MG: at 11:55

## 2019-02-19 RX ADMIN — ACETAMINOPHEN 975 MG: 325 TABLET ORAL at 05:57

## 2019-02-19 RX ADMIN — PANTOPRAZOLE SODIUM 40 MG: 40 TABLET, DELAYED RELEASE ORAL at 05:57

## 2019-02-19 RX ADMIN — LEVOTHYROXINE SODIUM 25 MCG: 25 TABLET ORAL at 05:57

## 2019-02-19 NOTE — SOCIAL WORK
Pt has a conflict with the outpt therapy appmt, cm changed it to 2/25 at 2pm  Arrival time is 1 45   Pt and wife made aware

## 2019-02-19 NOTE — TELEPHONE ENCOUNTER
Suyapa Joseph with PT called asking for Pt referral pt not outpt neurology pt  I gave caller [facility] number

## 2019-02-19 NOTE — PROGRESS NOTES
02/19/19 1000   Pain Assessment   Pain Score 4   Pain Location Shoulder   Pain Orientation Right   Hospital Pain Intervention(s)   (manual therapy)   Response to Interventions 2/10   Restrictions/Precautions   Precautions Fall Risk;Spinal precautions   Braces or Orthoses C/S Collar   Cognition   Arousal/Participation Cooperative   Subjective   Subjective pt reported feeling tired becuase he didnt' sleep last night due to his roommate being up and loud all night  Otherwise pt reported feeling okay and ready for therapy  QI: Roll Left and Right   Assistance Needed Independent   Roll Left and Right CARE Score 6   QI: Sit to Lying   Assistance Needed Independent   Sit to Lying CARE Score 6   QI: Lying to Sitting on Side of Bed   Assistance Needed Independent   Lying to Sitting on Side of Bed CARE Score 6   QI: Sit to Stand   Assistance Needed Independent   Sit to Stand CARE Score 6   QI: Chair/Bed-to-Chair Transfer   Assistance Needed Independent   Chair/Bed-to-Chair Transfer CARE Score 6   Transfer Bed/Chair/Wheelchair   Limitations Noted In Balance; Endurance;UE Strength;LE Strength   Adaptive Equipment Roller Walker   Stand Pivot Modified Independent   Sit to Stand Modified Independent   Stand to Sit Modified Independent   Supine to Sit Independent   Sit to Supine Independent   Car Transfer Modified Independent   Bed, Chair, Wheelchair Transfer (FIM) 6 - Patient requires assistive device/extra time/safety concerns but completes independently   QI: Car Transfer   Assistance Needed Independent; Adaptive equipment   Car Transfer CARE Score 6   QI: Walk 10 Feet   Assistance Needed Independent; Adaptive equipment   Walk 10 Feet CARE Score 6   QI: Walk 50 Feet with Two [address]; Adaptive equipment   Walk 50 Feet with Two Turns CARE Score 6   QI: Walk 150 Feet   Assistance Needed Independent; Adaptive equipment   Walk 150 Feet CARE Score 6   QI: Walking 10 Feet on Uneven Surfaces   Assistance Needed Supervision   Walking 10 Feet on Uneven Surfaces CARE Score 4   Ambulation   Does the patient walk? 2  Yes   Primary Discharge Mode of Locomotion Walk   Walk Assist Level Modified Independent   Gait Pattern Inconsistant Sheryl; Improper weight shift;Trendelenburg; Step through   Assist Device Roller Walker  (none)   Distance Walked (feet) 350 ft  (150 x5)   Limitations Noted In Balance; Endurance; Heel Strike;Speed;Strength;Swing   Findings S no device, Johnny with RW   Walking (FIM) 6 - Patient requires assistive device/extra time/safety concerns but completes independently AND distance 150 feet or more, no rest   Wheelchair mobility   QI: Does the patient use a wheelchair? 0  No   QI: 1 Step (Curb)   Assistance Needed Supervision; Adaptive equipment   1 Step (Curb) CARE Score 4   QI: 4 Steps   Assistance Needed Independent; Adaptive equipment   4 Steps CARE Score 6   QI: 12 Steps   Assistance Needed Independent; Adaptive equipment   12 Steps CARE Score 6   Stairs   Type Stairs;Curb;Ramp   # of Steps 22   Assist Devices Single Rail;Roller Walker   Findings single rail, reciprocal pattern, FF at Johnny level with HR  S for curb step and ramp wtih RW  Stairs (FIM) 6 - Patient requires assistive device/extra time/safety concerns but completes independently AND goes up and down full flight (12- 14 stairs)   QI: Toilet Transfer   Assistance Needed Independent   Toilet Transfer CARE Score 6   Toilet Transfer   Toilet Transfer (FIM) 6 - Patient requires assistive device/extra time/safety concerns but completes independently   Therapeutic Interventions   Strengthening step through on 6"  steps, 1 HR, against green theraband for lifting leg, did both sides  step throughs no resistance but no UE support on HR, guarding x2 and needed Min-modA at times to correct LOB when lowering back down to floor  dina walks forward/back and lateral walking both directions against green theraband, 25' each   resisted rows and shoulder ER against red theraband,  Flexibility bilat calves, hamstrings 2x60 sec each    Other STM/trigger point to R upper trap/levator scap    Equipment Use   NuStep 10 min level 4 start of session BLE only    Other Comments   Comments 5xSTS: 15 92 seconds  TUG 11 51 seconds, cTUG 10 92sec, mTUG 11 94 sec  no RW and no LOB, S level  Gait speed ranged from 1 0 m/s - 1 2 m/s  Assessment   Treatment Assessment Pt cont to present with deficits in RLE strength, nitin R hip weakness that results in mild trendelenburg and occasional varied foot placement when walking, nitin around turns  Pt also presents with weak R ankle everters, also contribuing to altered foot placement and dec stability in stance with higher level balance exercises  Cont to note pt relies on vision for balance, as pt has noticeable sway/LOB when standing on foam with eyes closed  Pt will cont to benefit from skilled PT to further progress dynamic balance, activity tolerance to progress towards PLOF  Cont to recommend RW for Johnny level for fall prevention  Pt has HEP, demonstrates understanding, and reported no questions at end of session  PT Barriers   Physical Impairment Decreased strength;Decreased range of motion;Decreased endurance; Impaired balance;Decreased mobility; Impaired sensation   Plan   Treatment/Interventions Functional transfer training;LE strengthening/ROM; Elevations; Therapeutic exercise; Endurance training;Patient/family training;Equipment eval/education; Bed mobility;Gait training   Progress Progressing toward goals   Recommendation   Recommendation Outpatient PT; Home with family support   Equipment Recommended Walker   PT Therapy Minutes   PT Time In 1000   PT Time Out 1130   PT Total Time (minutes) 90   PT Mode of treatment - Individual (minutes) 90   PT Mode of treatment - Concurrent (minutes) 0   PT Mode of treatment - Group (minutes) 0   PT Mode of treatment - Co-treat (minutes) 0   PT Mode of Teatment - Total time(minutes) 90 minutes   Therapy Time missed   Time missed?  No

## 2019-02-19 NOTE — PROGRESS NOTES
02/19/19 0700   Pain Assessment   Pain Assessment 0-10   Pain Score 2   Restrictions/Precautions   Precautions Fall Risk;Spinal precautions   Weight Bearing Restrictions No   Braces or Orthoses C/S Collar   QI: [address] Provided by [place] No physical assistance   Eating CARE Score 6   Eating Assessment   Positioning Upright;Out of Bed   Meal Assessed Breakfast   QI: Swallowing/Nutritional Status Regular food   Eating (FIM) 7 - Patient completely independent   QI: [address] Provided by [place] No physical assistance   Oral Hygiene CARE Score 6   Grooming   Able To Initiate Tasks; Acquire Items; Wash/Dry Hands;Brush/Clean Teeth;Wash/Dry Face;Comb/Brush Hair   Findings In stance at sink  Grooming (FIM) 6 - Patient requires assistive device/extra time/safety concerns but completes independently   QI: Shower/Bathe [address] Provided by [place] No physical assistance   Shower/Bathe Self CARE Score 6   Bathing   Assessed Bath Style Tub   Anticipated D/C Bath Style Tub   Able to Gather/Transport Yes   Able to Adjust Water Temperature Yes   Able to Wash/Rinse/Dry (body part) Left Arm;Right Arm;L Upper Leg;R Upper Leg;L Lower Leg/Foot;R Lower Leg/Foot;Chest;Abdomen;Perineal Area; Buttocks   Positioning Seated   Adaptive Equipment Tub Bench;Hand Held Shower   Findings  Pt demos ability to complete all bathing while seated on tub bench at an overall Kathy level      Bathing (FIM) 6 - Patient requires assistive device/extra time/safety concerns but completes independently   Tub/Shower Transfer   Limitations Noted In Balance;LE Strength   Adaptive Equipment Transfer Bench   Assessed Tub/shower combo   Findings Pt's wife able and willing to provide set-up/supervision upon initial d/c home, however, pt has demonstrated ability to complete all tub transfer tasks at an overall Kathy level with use of tub transfer bench  Tub Transfer (FIM) 6 - Patient requires assistive device/extra time/safety concerns but completes independently   QI: Upper Body Dressing   Assistance Needed Set-up / Jonathan Poag Provided by [place] No physical assistance   Upper Body Dressing CARE Score 5   QI: Lower Body [address] Provided by [place] No physical assistance   Lower Body Dressing CARE Score 6   QI: Putting On/Taking Off [address] Provided by [place] No physical assistance   Putting On/Taking Off Footwear CARE Score 6   Dressing/Undressing Clothing   Able to  Obtain Clothing;Store removed clothing   Remove UB Clothes Pullover Shirt   Remove LB Clothes Pants;Socks; Shoes; [address] [facility] LB Clothes Pants; Undergarment;Socks; Shoes   Limitations Noted In Balance;ROM;Strength   Positioning Supported Sit;Standing   UB Dressing (FIM) 5 - Patient requires supervision/monitoring   LB Dressing (FIM) 6 - Patient requires assistive device/extra time/safety concerns but completes independently   QI: Sit to [address] Provided by [place] No physical assistance   Sit to Stand CARE Score 6   QI: Chair/Bed-to-Chair Transfer   Assistance Needed Independent; Adaptive equipment   Assistance Provided by [place] No physical assistance   Chair/Bed-to-Chair Transfer CARE Score 6   Transfer Bed/Chair/Wheelchair   Adaptive Equipment Roller [employer], Chair, Wheelchair Transfer (FIM) 6 - Patient requires assistive device/extra time/safety concerns but completes independently   QI: [address] [place] Provided by [place] No physical assistance   Toileting Hygiene CARE Score 6   Toileting   Able to [address] down yes, up yes     Able to [facility] Bladder   Toileting (FIM) 6 - Patient requires assistive device/extra time/safety concerns but completes independently   QI: [address] Provided by [place] No physical assistance   Toilet Transfer CARE Score 6   Toilet Transfer   Surface Assessed Standard Toilet   Transfer Technique Standard   Toilet Transfer (FIM) 6 - Patient requires assistive device/extra time/safety concerns but completes independently   Exercise Tools   UE Ergometer Pt completes UE ergometer at tabletop with no added resistance while in stance at tabletop to promote standing balance/tolerance and endurance with pt able to complete 5 minutes prograde  Other Exercise Tool 1 Pt re educated on towel glide stretching HEP to promote UE ROM and reduce UE stiffness  Pt overall demos G carryover of technique completing 3 x 10 each exercise  Pt educated on proper technique to reduce compensation and to stop at point of discomfort and demos G carryover  Cognition   Overall Cognitive Status WFL   Arousal/Participation Alert   Attention Within functional limits   Orientation Level Oriented X4   Memory Within functional limits   Following Commands Follows one step commands without difficulty   Activity Tolerance   Activity Tolerance Patient tolerated treatment well   Assessment   Treatment Assessment Pt participated in skilled OT services with focus on d/c ADL, UE ROM, and endurance  Pt completes ADL at an overall Kathy level requiring increased time for task completion but overall demonstrating G carryover of previous education and safety  Pt continues to complete donning/doffing of shirt at a Kathy level, however, continues to require A for C collar change which his wife has been present to complete  Both pt and his wife report confidence in task  Pt does demo carryover of simple adjusting of straps on C collar to make necessary minor adjustments throughout the day   Pt reports feeling confident in d/c plan, he reports all DME needs have been addressed, and that his wife feels confident in education she's been provided to A pt PRN  Pt awaiting delivery of BSC and RW in preparation for d/c  He has received hip kit and his wife purchased tub transfer bench independently  Recommending pt be dc'd home with family support having met Kathy/supervision goals  Prognosis Good   Problem List Decreased strength;Decreased endurance; Impaired balance;Decreased mobility   Plan   Treatment/Interventions ADL retraining;Functional transfer training; Therapeutic exercise; Endurance training;Cognitive reorientation;Patient/family training;Equipment eval/education; Compensatory technique education   Progress Progressing toward goals   Recommendation   OT Discharge Recommendation Home with family support   OT Therapy Minutes   OT Time In 0700   OT Time Out 0830   OT Total Time (minutes) 90   OT Mode of treatment - Individual (minutes) 90   OT Mode of treatment - Concurrent (minutes) 0   OT Mode of treatment - Group (minutes) 0   OT Mode of treatment - Co-treat (minutes) 0   OT Mode of Teatment - Total time(minutes) 90 minutes   Therapy Time missed   Time missed?  No

## 2019-02-19 NOTE — PROGRESS NOTES
Internal Medicine Progress Note  Patient: Jose Hanley III  Age/sex: 60 y o  male  Medical Record #: [id number]      ASSESSMENT/PLAN:  Jose Hanley III is seen and examined and management for following issues:    Medullary mass removal with fixation/fusion of C2-3 T5 1/24/19:  continue cervical collar; steroid weaned off per Neurosurgery       Hypothyroidism:  Continue Levothyroxine 25 mcg qd     Asymptomatic bradycardia:  noted postoperatively; still occurs intermitt but during sleep into 50's at lowest     ABLA:  Stable; w/o sx; cont to monitor    Heartburn:  added Protonix since on steroids/prn TUMS  He gets heartburn at home 3x/week/uses TUMS    Told him to go to PCP as OP when he recovers and have workup with possible EGD      Subjective:   offers no complaints; pain controlled    ROS:   GI: denies abdominal pain, change bowel habits or reflux symptoms  Neuro: No new neurologic changes  Respiratory: No Cough, SOB  Cardiovascular: No CP, palpitations     Scheduled Meds:    Current Facility-Administered Medications:  acetaminophen 975 mg Oral Q8H [address] Sylvie Skelton MD   bisacodyl 10 mg Rectal Daily PRN Caryl Cornejo DO   calcium carbonate 500 mg Oral Daily PRN CLAUDIO Hoskins   senna 2 tablet Oral Daily Sylvie Skelton MD   And       docusate sodium 100 mg Oral Daily Sylvie Skelton MD   enoxaparin 40 mg Subcutaneous Daily Sylvie Skelton MD   fish oil 1,000 mg Oral Daily Sylvie Skelton MD   levothyroxine 25 mcg Oral Early Morning Sylvie Skelton MD   methocarbamol 500 mg Oral Q6H PRN Sylvie Skelton MD   oxyCODONE 10 mg Oral Q4H PRN Sylvie Skelton MD   oxyCODONE 5 mg Oral Q4H PRN Sylvie Skelton MD   pantoprazole 40 mg Oral Early Morning CLAUDIO Hoskins   polyethylene glycol 17 g Oral Daily PRN CLAUDIO Hoskins   sodium phosphate-biphosphate 1 enema Rectal Once PRN Carly Cornejo DO       Labs:     Results from last 7 days   Lab Units 02/14/19  0541   WBC Thousand/uL 5 08   HEMOGLOBIN g/dL 10 3*   HEMATOCRIT % 33 4*   PLATELETS Thousands/uL 283     Results from last 7 days   Lab Units 02/14/19  0541   SODIUM mmol/L 141   POTASSIUM mmol/L 4 1   CHLORIDE mmol/L 107   CO2 mmol/L 28   BUN mg/dL 16   CREATININE mg/dL 0 69   CALCIUM mg/dL 8 5                      [unfilled]    Labs reviewed    Physical Examination:  Vitals:   Vitals:    02/18/19 0454 02/18/19 1404 02/18/19 2046 02/19/19 0501   BP: 107/69 92/55 109/53 118/61   BP Location: Left arm Right arm Left arm Left arm   Pulse: 71 78 69 68   Resp: 20 18 18 19   Temp: 98 2 °F (36 8 °C) 98 8 °F (37 1 °C) 98 9 °F (37 2 °C) 98 3 °F (36 8 °C)   TempSrc: Oral Oral Oral Oral   SpO2: 98% 99% 95% 98%   Weight:       Height:         Constitutional:  NAD; pleasant; nontoxic  HEENT:  AT/NC; oropharynx negative for thrush on tongue   CV:  +S1, S2;  RRR; no rub/murmur  Pulmonary:  BBS without crackles/wheeze/rhonci; resp are unlabored  Abdominal:  soft, +BS, ND/NT; no mass  Musculoskeletal:  no edema  Neurological/Psych:  AAO;  CAIN 5/5 except RLE 4+/5; still with occ burning left 4-5th fingers/left side of hand, occ right elbow burning and numbness of legs (lessened in feet); no depression/anxiety        [ X ] Total time spent: 30 Mins and greater than 50% of this time was spent counseling/coordinating care  ** Please Note: Dragon 360 Dictation voice to text software may have been used in the creation of this document   **

## 2019-02-19 NOTE — DISCHARGE INSTRUCTIONS
Should you develop fevers, chills, sweats, rigors, or any drainage from your surgical site please contact your family doctor or surgeon immediately or go to the ER immediately as these are indicators of possible infection     Please have your blood work drawn the results will be sent to your family doctor      Please note you are restricted from driving/operating a motorized vehicle/operating heavy machinery/etc until you are cleared by Dr Alejandro Galindo    Please see your doctors listed in the follow up providers section of your discharge paperwork, and take the discharge paperwork with you to your appointments    Please note changes may have been made to your medications please refer to your discharge paperwork for your current medications and take this list with you to all your doctors appointments for your doctors to review    Please do not resume a home medication unless the medication reconciliation sheet indicates to do so, please do not assume that a medication that you were given a prescription for is the same as a medication you have at home based on both medications having the same name as dosages and frequency may have changed      Please do not combine pain medications (including but not limited to tramadol, vicodin, norco, percocet, oxycodone, oxycontin, morphine, hydropmorphone, oxymorphone, fentanyl, hydrocodone, etc) with muscle relaxants (including but not limited to zanaflex, flexaril, soma, robaxin, etc) or sedatives/sleep aids/anti-anxiety medications (including but not limited to [place], trazodone, valium, xanax, klonipin, clonazepam, ativan, lorazepam, restoril, temazepam etc) that you may have been prescribed prior to admission (unless listed to do so on your medication reconciliation in your discharge paperwork)  Please do not drive or operate heavy machinery while using these medications  Do not drink alcohol while using these medications        It is advisable to limit the use of pain medications (including but not limited to tramadol, vicodin, norco, percocet, oxycodone, oxycontin, morphine, hydropmorphone, oxymorphone, fentanyl, hydrocodone, etc) and avoid sedatives/sleep aids/anti-anxiety medications (including but not limited to [place]letta Knights, trazodone, valium, xanax, klonipin, clonazepam, ativan, lorazepam, restoril, temazepam etc), muscle relaxants (including but not limited to zanaflex, flexaril, soma, robaxin, etc); unless listed to do so on your medication reconciliation in your discharge paperwork, as they may become habit forming  Please do not drive or operate heavy machinery while using these medications   Do not drink alcohol while using these medications       Please note the following incidental findings were found during your recent hospitalization please discuss them with your doctors so that they may arrange any tests/referrals as they deem necessary:     1) 2-3 mm pulmonary nodule: per radiology report of 1/18/19 recommendation was made for repeat CAT scan of your chest in 3 months; please follow up with your family doctor to arrange this CAT scan    2) fatty involution of pancreas: OP FU with PCP with further testing/treatment and/or specialist referral at PCP's discretion     3) Left maxillary sinus thickening: OP FU with PCP with further testing/treatment and/or specialist referral at PCP's discretion       Please avoid NSAID (including but not limited to advil, aleve, motrin, naproxen, ibuprofen, mobic, meloxicam, diclofenac etc) medications as NSAID medications may delay bone healing      Please avoid NSAID (including but not limited to advil, aleve, diclofenac, motrin, naproxen, ibuprofen, mobic, meloxicam, diclofenac etc) medications, anti platelet medications (including but not limited to plavix, aspirin and aspirin containing products), and any prescription blood thinners as these medications can increase your risk of bleeding after recent surgery; you may be cleared to use these medications in the future but do not do so unless advised to do so by your doctors      Please wear your cervical collar at all times until cleared by Dr Danyelle Sandoval    Please follow your spine precautions as instructed until cleared by Dr Danyelle Sandoval      Please note a summary of your hospital stay with relevant information for your doctors has been sent to them, please confirm with your doctors at your follow up visits that they have received this summary and have them contact [address] [place] [address] if they have not received them along with any other medical records they may require

## 2019-02-19 NOTE — DISCHARGE SUMMARY
Discharge Summary - PMR       Admission Date:    1/31/19  Discharge Date:   2/19/19    Diagnosis:   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Comorbidities:   See below for medical details     Hospital Course: The patient had an unremarkable rehab course with significant functional gains made, please see below for medical details:    Osei Kimble a 60 y  o  male who presented to the [address] [place] [address] h/o sensory deficits and was found to have spinal cord mass therefore underwent tumor resection and C4-T3 laminectomy and C2-T5 fixation/fusion by Dr Kayli Collado on 1/24      BPH associated with nocturia  · Per patient stopped taking flomax 0 4 mg qpm in the past because it was not helping the urinary retention   · Per patient stopped taking ditropan XL 10 mg qd in the past because  it was not helping the urinary retention   · Per patient stopped taking viagra 100 mg in the past because  it was not helping the urinary retention   · Per patient was no longer on any meds for urinary retention/BPH PTA as he was working with urology as OP and further tests were being planned as patient medications had not been effective however in acute care patient was restarted on flomax and post-operatively patient has been urinating well   · successful trial off of flomax (which patient had stopped taking PTA because it was not effective); PVRs were 57, 38, & 87 (PVRs are from greater than 24 hours after last dose of flomax)   · Follows with Buffy Kinney PA-C/Dr Milo Grewal as OP    Hypothyroidism  · On home synthroid 25 mcg qd   · TSH of 1/29/19 WNL     Impaired fasting glucose  · PCP aware  · Diet controlled        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[facility])  · S/p resection and C4-T3 laminectomy and C2-T5 fixation/fusion by Dr Tod Villa and Dr Benita Collado on 1/24  · decardron taper completed  · Spine precautions  · c-collar at all times  · Path revealed grade 2 ependymoma; further management per team at [facility] spinal [facility] (appt scheduled for 2 pm on 3/6)   · All required FU imaging (MRI brain & spine, XR spine, LP) already ordered by Neurosx team electronically in EMR  · OP FU w/neurosx scheduled for 2/6 done as inpt by neurosx team    Anemia  · Likely ABLA  · Hg currently stable at 10 3  · Scrip upon dc for CBC with results to PCP    Hyperlipidemia  · was Omega 3 FA 1000 mg qd PTA   · D/W neurosx and have cleared patient to resume home Omega 3 FA 1000 mg qd      Incidental findings:  1) 2-3 mm pulmonary nodule: per radiology report of 1/18/19 recommend repeat CT chest in 3 months however will defer to PCP  2) fatty involution of pancreas: OP FU with PCP with further testing/treatment and/or specialist referral at PCP's discretion  3) L maxillary sinus thickening: asymptomatic, OP FU with PCP with further testing/treatment and/or specialist referral at PCP's discretion      DVT ppx: SCDs, cleared for lovenox 40 mg sc qd by neurosx in acute care (and has been on it since 1/26)  Dispo: 2/19             Functional Status Upon Discharge: Mod I amb/tx/ADLs     Condition at Discharge: stable    Discharge instructions/Information to patient and family:   See after visit summary for information provided to patient and family  Provisions for Follow-Up Care:  See after visit summary for information related to follow-up care and any pertinent home health orders  Disposition: home     Planned Readmission: no        Discharge Medications:  See after visit summary for reconciled discharge medications provided to patient and family  * 33 min was spent in preparation for patient discharge including discussion with patient, patient's nurse, therapy staff, and case management

## 2019-02-19 NOTE — INCIDENTAL FINDINGS
1) 2-3 mm pulmonary nodule: per radiology report of 1/18/19 recommendation was made for repeat CAT scan of your chest in 3 months; please follow up with your family doctor to arrange this CAT scan    2) fatty involution of pancreas: OP FU with PCP with further testing/treatment and/or specialist referral at PCP's discretion     3) Left maxillary sinus thickening: OP FU with PCP with further testing/treatment and/or specialist referral at PCP's discretion

## 2019-02-19 NOTE — PLAN OF CARE
Problem: Potential for Falls  Goal: Patient will remain free of falls  Description  INTERVENTIONS:  - Assess patient frequently for physical needs  -  Identify cognitive and physical deficits and behaviors that affect risk of falls    -  [place] fall precautions as indicated by assessment   - Educate patient/family on patient safety including physical limitations  - Instruct patient to call for assistance with activity based on assessment  - Modify environment to reduce risk of injury  - Consider OT/PT consult to assist with strengthening/mobility   Outcome: Progressing     Problem: PAIN - ADULT  Goal: Verbalizes/displays adequate comfort level or baseline comfort level  Description  Interventions:  - Encourage patient to monitor pain and request assistance  - Assess pain using appropriate pain scale  - Administer analgesics based on type and severity of pain and evaluate response  - Implement non-pharmacological measures as appropriate and evaluate response  - Consider cultural and social influences on pain and pain management  - Notify physician/advanced practitioner if interventions unsuccessful or patient reports new pain   Outcome: Progressing     Problem: INFECTION - ADULT  Goal: Absence or prevention of progression during hospitalization  Description  INTERVENTIONS:  - Assess and monitor for signs and symptoms of infection  - Monitor lab/diagnostic results  - Monitor all insertion sites, i e  indwelling lines, tubes, and drains  - Monitor endotracheal (as able) and nasal secretions for changes in amount and color  - [place] appropriate cooling/warming therapies per order  - Administer medications as ordered  - Instruct and encourage patient and family to use good hand hygiene technique  - Identify and instruct in appropriate isolation precautions for identified infection/condition   Outcome: Progressing     Problem: SAFETY ADULT  Goal: Patient will remain free of falls  Description  INTERVENTIONS:  - Assess patient frequently for physical needs  -  Identify cognitive and physical deficits and behaviors that affect risk of falls    -  [place] fall precautions as indicated by assessment   - Educate patient/family on patient safety including physical limitations  - Instruct patient to call for assistance with activity based on assessment  - Modify environment to reduce risk of injury  - Consider OT/PT consult to assist with strengthening/mobility   Outcome: Progressing  Goal: Maintain or return to baseline ADL function  Description  INTERVENTIONS:  -  Assess patient's ability to carry out ADLs; assess patient's baseline for ADL function and identify physical deficits which impact ability to perform ADLs (bathing, care of mouth/teeth, toileting, grooming, dressing, etc )  - Assess/evaluate cause of self-care deficits   - Assess range of motion  - Assess patient's mobility; develop plan if impaired  - Assess patient's need for assistive devices and provide as appropriate  - Encourage maximum independence but intervene and supervise when necessary  ¯ Involve family in performance of ADLs  ¯ Assess for home care needs following discharge   ¯ Request OT consult to assist with ADL evaluation and planning for discharge  ¯ Provide patient education as appropriate   Outcome: Progressing  Goal: Maintain or return mobility status to optimal level  Description  INTERVENTIONS:  - Assess patient's baseline mobility status (ambulation, transfers, stairs, etc )    - Identify cognitive and physical deficits and behaviors that affect mobility  - Identify mobility aids required to assist with transfers and/or ambulation (gait belt, sit-to-stand, lift, walker, cane, etc )  - [place] fall precautions as indicated by assessment  - Record patient progress and toleration of activity level on Mobility SBAR; progress patient to next Phase/Stage  - Instruct patient to call for assistance with activity based on assessment  - Request Rehabilitation consult to assist with strengthening/weightbearing, etc    Outcome: Progressing     Problem: DISCHARGE PLANNING  Goal: Discharge to home or other facility with appropriate resources  Description  INTERVENTIONS:  - Identify barriers to discharge w/patient and caregiver  - Arrange for needed discharge resources and transportation as appropriate  - Identify discharge learning needs (meds, wound care, etc )  - Arrange for interpretive services to assist at discharge as needed  - Refer to Case Management Department for coordinating discharge planning if the patient needs post-hospital services based on physician/advanced practitioner order or complex needs related to functional status, cognitive ability, or social support system   Outcome: Progressing     Problem: Prexisting or High Potential for Compromised Skin Integrity  Goal: Skin integrity is maintained or improved  Description  INTERVENTIONS:  - Identify patients at risk for skin breakdown  - Assess and monitor skin integrity  - Assess and monitor nutrition and hydration status  - Monitor labs (i e  albumin)  - Assess for incontinence   - Turn and reposition patient  - Assist with mobility/ambulation  - Relieve pressure over bony prominences  - Avoid friction and shearing  - Provide appropriate hygiene as needed including keeping skin clean and dry  - Evaluate need for skin moisturizer/barrier cream  - Collaborate with interdisciplinary team (i e  Nutrition, Rehabilitation, etc )   - Patient/family teaching   Outcome: Progressing

## 2019-02-20 ENCOUNTER — TELEPHONE (OUTPATIENT)
Dept: NEUROSURGERY | Facility: CLINIC | Age: 59
End: 2019-02-20

## 2019-02-20 NOTE — TELEPHONE ENCOUNTER
[facility] to follow up on patient after his recent d/c from the hospital      Patient reports that he is doing very well overall and denies any incisional issues or fevers  Patient denies any bleeding, dizziness, fever, redness, significant pain and swelling  Verified date/time/location of his upcoming POV and advised him to call the office with any further questions or concerns, or if any incisional issues or fevers would arise  Pain medication is currently controlling pain  Patient has moved his bowels since the surgery and is aware that they should take a stool softener if taking narcotic pain medication to help prevent constipation  Patient also educated on the importance of moving frequently and drinking plenty of liquids  Patient received and does understand the discharge instructions  Reviewed incision care with Patient and he has no further questions at this time  Patient was appreciative for the call

## 2019-02-20 NOTE — CASE MANAGEMENT
Team dc summary - pt made good progress and returned home w/spouse and contd outpt physical therapy at [facility]  appmts scheduled for pt and placed on dc instructions  Pt received a roller walker and commode through [facility]  Spouse present for dc instructions and aware of pts functional ability

## 2019-02-20 NOTE — PHYSICAL THERAPY NOTE
PT D/C SUMMARY    Pt progressed well to Johnny level with use of RW, and was able to perform walking and transfers at S level without RW  Pt demonstrated improvements in BLE strength, proprioception, sensation, balance and overall activity tolerance  Pt presents with good safety awareness  Pt was (I) with HEP and was performing this here  Pt able to dc home with family support and cont outpt PT  Pt will cont to benefit from skilled PT to further progress standing dynamic balance, righting reactions, BLE strength to progress gait pattern and (I) with IADLs

## 2019-02-21 ENCOUNTER — PREP FOR PROCEDURE (OUTPATIENT)
Dept: NEUROSURGERY | Facility: CLINIC | Age: 59
End: 2019-02-21

## 2019-02-21 ENCOUNTER — TELEPHONE (OUTPATIENT)
Dept: NEUROSURGERY | Facility: CLINIC | Age: 59
End: 2019-02-21

## 2019-02-21 ENCOUNTER — HOSPITAL ENCOUNTER (OUTPATIENT)
Dept: RADIOLOGY | Facility: HOSPITAL | Age: 59
Discharge: HOME | End: 2019-02-21
Attending: NEUROLOGICAL SURGERY | Admitting: RADIOLOGY
Payer: COMMERCIAL

## 2019-02-21 VITALS
HEART RATE: 68 BPM | OXYGEN SATURATION: 98 % | RESPIRATION RATE: 16 BRPM | HEIGHT: 67 IN | TEMPERATURE: 99.8 F | DIASTOLIC BLOOD PRESSURE: 68 MMHG | SYSTOLIC BLOOD PRESSURE: 120 MMHG | WEIGHT: 174 LBS | BODY MASS INDEX: 27.31 KG/M2

## 2019-02-21 DIAGNOSIS — C71.9 EPENDYMOMA (HCC): ICD-10-CM

## 2019-02-21 LAB
APPEARANCE CSF: CLEAR
APTT PPP: 29 SECONDS (ref 26–38)
GLUCOSE CSF-MCNC: 58 MG/DL (ref 50–80)
GRAM STN SPEC: NORMAL
GRAM STN SPEC: NORMAL
INR PPP: 0.99 (ref 0.86–1.17)
LDH CSF L TO P-CCNC: 25 U/L (ref 0–20)
LYMPHOCYTES NFR CSF MANUAL: 84 %
MONOS+MACROS CSF MANUAL: 16 %
PLATELET # BLD AUTO: 315 THOUSANDS/UL (ref 149–390)
PMV BLD AUTO: 8.8 FL (ref 8.9–12.7)
PROT CSF-MCNC: 53 MG/DL (ref 15–45)
PROTHROMBIN TIME: 13.2 SECONDS (ref 11.8–14.2)
RBC # CSF MANUAL: 2 UL (ref 0–10)
TOTAL CELLS COUNTED BLD: NO
TOTAL CELLS COUNTED SPEC: 100
TUBE # CSF: 4
WBC # CSF AUTO: 60 /UL (ref 0–5)

## 2019-02-21 PROCEDURE — 89051 BODY FLUID CELL COUNT: CPT | Performed by: NEUROLOGICAL SURGERY

## 2019-02-21 PROCEDURE — 87449 NOS EACH ORGANISM AG IA: CPT | Performed by: NEUROLOGICAL SURGERY

## 2019-02-21 PROCEDURE — 85730 THROMBOPLASTIN TIME PARTIAL: CPT | Performed by: PHYSICIAN ASSISTANT

## 2019-02-21 PROCEDURE — 85049 AUTOMATED PLATELET COUNT: CPT | Performed by: PHYSICIAN ASSISTANT

## 2019-02-21 PROCEDURE — 62270 DX LMBR SPI PNXR: CPT

## 2019-02-21 PROCEDURE — 88108 CYTOPATH CONCENTRATE TECH: CPT | Performed by: PATHOLOGY

## 2019-02-21 PROCEDURE — 84157 ASSAY OF PROTEIN OTHER: CPT | Performed by: NEUROLOGICAL SURGERY

## 2019-02-21 PROCEDURE — 82945 GLUCOSE OTHER FLUID: CPT | Performed by: NEUROLOGICAL SURGERY

## 2019-02-21 PROCEDURE — 83615 LACTATE (LD) (LDH) ENZYME: CPT | Performed by: NEUROLOGICAL SURGERY

## 2019-02-21 PROCEDURE — 87070 CULTURE OTHR SPECIMN AEROBIC: CPT | Performed by: NEUROLOGICAL SURGERY

## 2019-02-21 PROCEDURE — 89050 BODY FLUID CELL COUNT: CPT | Performed by: NEUROLOGICAL SURGERY

## 2019-02-21 PROCEDURE — 85610 PROTHROMBIN TIME: CPT | Performed by: PHYSICIAN ASSISTANT

## 2019-02-21 RX ORDER — IBUPROFEN 200 MG
600 TABLET ORAL EVERY 6 HOURS PRN
COMMUNITY
End: 2019-07-11 | Stop reason: HOSPADM

## 2019-02-21 RX ORDER — LIDOCAINE HYDROCHLORIDE 10 MG/ML
5 INJECTION, SOLUTION INFILTRATION; PERINEURAL
Status: COMPLETED | OUTPATIENT
Start: 2019-02-21 | End: 2019-02-21

## 2019-02-21 RX ADMIN — LIDOCAINE HYDROCHLORIDE 5 ML: 10 INJECTION, SOLUTION INFILTRATION; PERINEURAL at 14:45

## 2019-02-21 NOTE — OCCUPATIONAL THERAPY NOTE
Occupational Therapy Discharge Summary:     Pt made good progress throughout rehab stay, from OT standpoint  Pt was D/C home with family support  At time of D/C pt was completing ADLS and functional transfers at overall mod I level with RW  Pt did require assistance for c/s collar management and wife was present for family training  Pt was ordered for [facility] and RW for D/C home, purchased hip kit, and ordered tub bench independently

## 2019-02-21 NOTE — TELEPHONE ENCOUNTER
Received a call from [facility]  Ed said he received a call regarding the patient's elevated WBC from the lumbar puncture today on 2/21/19  Attached Dr Karol Frias to this message

## 2019-02-21 NOTE — DISCHARGE INSTRUCTIONS
Lumbar Puncture   WHAT YOU NEED TO KNOW:   Lumbar puncture (LP) is a procedure in which a needle is inserted in your back and into your spinal canal  This is usually done to collect cerebrospinal fluid (CSF) to check for an infection, inflammation, bleeding, or other conditions that affect the brain  CSF is a clear, protective fluid that flows around the brain and inside the spinal canal  LP may also be done to remove CSF to reduce pressure in the brain  DISCHARGE INSTRUCTIONS:   Medicines:   · Acetaminophen: This medicine decreases pain and lowers a fever  It is available without a doctor's order  Ask how much to take and how often to take it  Follow directions  Acetaminophen can cause liver damage  · NSAIDs:  These medicines decrease swelling, pain, and fever  NSAIDs are available without a doctor's order  Ask your healthcare provider which medicine is right for you and how much to take  Take as directed  NSAIDs can cause stomach bleeding or kidney problems if not taken correctly  · Pain medicine: You may be given a prescription medicine to decrease severe pain  Do not wait until the pain is severe before you take more pain medicine  · Take your medicine as directed  Contact your healthcare provider if you think your medicine is not helping or if you have side effects  Tell him or her if you are allergic to any medicine  Keep a list of the medicines, vitamins, and herbs you take  Include the amounts, and when and why you take them  Bring the list or the pill bottles to follow-up visits  Carry your medicine list with you in case of an emergency  Follow up with your healthcare provider as directed:  Write down your questions so you remember to ask them during your visits  Post-lumbar puncture headache: You may develop a headache during the first few hours after your LP that may last for several days  The headache may be mild to severe and may get worse when you sit or stand   The following may help ease a post-lumbar puncture headache:  · Drink plenty of liquids: You should drink more liquid than usual after your LP  Ask how much liquid is right for you  Caffeine may be used to treat a headache  Drinks, such as coffee, tea, or some sodas, have caffeine  Caffeine is also available over the counter in tablet form  Ask about using caffeine to treat your headache  Do not drink alcohol  · Lie down: If you have a headache after your lumbar puncture, it may be helpful to lie down and rest   Contact your healthcare provider if:   · You have questions or concerns about your condition or care  Seek care immediately or call 911 if:   · You have a severe headache that does not get better after you lie down  · You have a fever  · You have a stiff neck or have trouble thinking clearly  · Your legs, feet, or other parts below the waist feel numb, tingly, or weak  · You have bleeding or a discharge coming from the area where the needle was put into your back  · You have severe pain in your back or neck  © 2017 [facility] Information is for End User's use only and may not be sold, redistributed or otherwise used for commercial purposes  All illustrations and images included in CareNotes® are the copyrighted property of A D A M , Inc  or [place]  The above information is an  only  It is not intended as medical advice for individual conditions or treatments  Talk to your doctor, nurse or pharmacist before following any medical regimen to see if it is safe and effective for you

## 2019-02-22 LAB — CRYPTOC AG CSF QL IA: NEGATIVE

## 2019-02-24 LAB — BACTERIA CSF CULT: NO GROWTH

## 2019-02-25 ENCOUNTER — EVALUATION (OUTPATIENT)
Dept: PHYSICAL THERAPY | Age: 59
End: 2019-02-25
Payer: COMMERCIAL

## 2019-02-25 DIAGNOSIS — G06.1 SPINAL CORD ABSCESS: Primary | ICD-10-CM

## 2019-02-25 DIAGNOSIS — M79.604 RIGHT LEG PAIN: ICD-10-CM

## 2019-02-25 DIAGNOSIS — R53.1 GENERAL WEAKNESS: ICD-10-CM

## 2019-02-25 PROCEDURE — 97110 THERAPEUTIC EXERCISES: CPT | Performed by: PHYSICAL THERAPIST

## 2019-02-25 PROCEDURE — 97162 PT EVAL MOD COMPLEX 30 MIN: CPT | Performed by: PHYSICAL THERAPIST

## 2019-02-25 NOTE — PROGRESS NOTES
PT Evaluation     Today's date: 2019  Patient name: Brooke Becker  : 1960  MRN: [id number]  Referring provider: Jazmine Snider MD  Dx:   Encounter Diagnosis     ICD-10-CM    1  Spinal cord abscess G06 1    2  Right leg pain M79 604    3  General weakness R53 1        Start Time: 1800  Stop Time: 1900  Total time in clinic (min): 60 minutes    Assessment  Assessment details: Brooke Becker is a 60 y o  male who presents with signs and symptoms consistent of RLE weakness due to spinal compression caused by cancerous tumor  Patient presents with decreased strength, decreased ROM and decreased sensation  Due to these impairments, Patient has difficulty performing a/iadls, recreational activities and work-related activities  Pt stills shows signs of CNS impairments such as B clonus but is able to perform ADL's and community ambulation without issues  Pt is appropriate for high level activities with proper guarding and monitoring of vitals  Patient would benefit from skilled physical therapy to address the impairments, improve their level of function, and to improve their overall quality of life  Impairments: abnormal or restricted ROM, abnormal movement, activity intolerance, impaired physical strength and weight-bearing intolerance    Goals  Short Term Goals: to be achieved by 4 weeks   1) Patient to be independent with basic HEP  2) Patient will demonstrate improved Timed up and Go test by 5 seconds  3) Increase LE strength by 1/2 MMT grade in all deficient planes  Long Term Goals: to be achieved by discharge  1) FOTO equal to or greater than 59   2) Patient to be independent with comprehensive HEP  3) Increase LE strength to 5/5 MMT grade in all planes to improve a/iadls  4) Patient will demonstrate increase ambulation tolerance to 30 min    5) Improve sit to stand transfers to maximal level of function  6) Improve stair negotiation to maximal level of function    Plan  Patient would benefit from: skilled physical therapy  Planned modality interventions: TENS and cryotherapy  Planned therapy interventions: abdominal trunk stabilization, neuromuscular re-education, patient education, therapeutic activities, therapeutic exercise, stretching, strengthening, therapeutic training, transfer training, graded exercise, graded activity, home exercise program, functional ROM exercises and balance  Frequency: Twice a week for eight weeks  Treatment plan discussed with: patient        Subjective Evaluation    History of Present Illness  Mechanism of injury: Pt suffered multiple falls and BUE numbness in January was 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  Patient had a Posterior C4-T3 laminectomy and resection of intramedullary mass and C2-T5 fixation/fusion  Patient is now medically stable and is being seen at PT following 1 month stay in sub acute rehab  Pt reports most problems with RLE weakness  Pt reports to the doctor on the  to determine if he needs to receive cancer treatment and when he is able to remove cervical collar  Going up and down steps is difficulty for patient, navigating home due to pets, and balance is an issue  Pt had no significant PMH prior to this incidence      Pain  Current pain ratin  At best pain ratin  At worst pain ratin    Patient Goals  Patient goals for therapy: increased strength, decreased pain and independence with ADLs/IADLs  Patient goal: Work, golf, bike        Objective     Concurrent Complaints  Negative for night pain, disturbed sleep, bladder dysfunction, bowel dysfunction and saddle (S4) numbness    Neurological Testing     Sensation     Lumbar   Left   Intact: light touch    Right   Diminished: light touch    Reflexes   Left   Achilles (S1): nonsustained clonus (4+)  Clonus sign: positive    Right   Achilles (S1): nonsustained clonus (4+)  Clonus sign: positive    Active Range of Motion     Lumbar   Flexion:  Restriction level: moderate  Extension:  Restriction level: maximal  Left lateral flexion:  Restriction level: moderate  Right lateral flexion:  Restriction level: moderate  Left rotation:  Restriction level: minimal  Right rotation:  Restriction level: minimal    Passive Range of Motion     Lumbar   Left lateral flexion:  Restriction level: moderate  Right lateral flexion:  Restriction level: moderate  Left rotation:  Restriction level: minimal  Right rotation:  Restriction level: minimal    Strength/Myotome Testing     Lumbar   Left   Heel walk: normal  Toe walk: normal    Right   Heel walk: normal  Toe walk: normal    Left Hip   Planes of Motion   Flexion: 4+  Extension: 4+  Abduction: 4+  Adduction: 4+  External rotation: 4+  Internal rotation: 4+    Right Hip   Planes of Motion   Flexion: 4-  Extension: 4-  Abduction: 4-  Adduction: 4-  External rotation: 4-  Internal rotation: 4-    Left Knee   Flexion: 4+  Extension: 4+    Right Knee   Flexion: 4-  Extension: 4-    Functional Assessment        Comments  Pt has difficulty performing functional squat due to weakness     Stair navigation is completed with reciprocal pattern and circumduction with RLE      Flowsheet Rows      Most Recent Value   PT/OT G-Codes   Current Score  41   Projected Score  59          Precautions: Cervical tumor    Daily Treatment Diary     Manual                                                                                   Exercise Diary  2/25            RB nv            Clamshells HEP            Quad sets HEP            Seated marches HEP            LAQ HEP            Ankle pumps HEP            Mini squats nv            Standing hip abduction nv            Tandem stance nv            Tandem walking nv            STS nv            Step-ups nv                                                                                                                        Modalities

## 2019-02-28 ENCOUNTER — OFFICE VISIT (OUTPATIENT)
Dept: PHYSICAL THERAPY | Age: 59
End: 2019-02-28
Payer: COMMERCIAL

## 2019-02-28 DIAGNOSIS — R53.1 GENERAL WEAKNESS: ICD-10-CM

## 2019-02-28 DIAGNOSIS — M79.604 RIGHT LEG PAIN: ICD-10-CM

## 2019-02-28 DIAGNOSIS — G06.1 SPINAL CORD ABSCESS: Primary | ICD-10-CM

## 2019-02-28 PROCEDURE — 97110 THERAPEUTIC EXERCISES: CPT | Performed by: PHYSICAL THERAPIST

## 2019-02-28 PROCEDURE — 97112 NEUROMUSCULAR REEDUCATION: CPT | Performed by: PHYSICAL THERAPIST

## 2019-02-28 NOTE — PROGRESS NOTES
Daily Note     Today's date: 2019  Patient name: Rmairo Mckeon  : 1960  MRN: [id number]  Referring provider: Marisol Alford MD  Dx:   Encounter Diagnosis     ICD-10-CM    1  Spinal cord abscess G06 1    2  Right leg pain M79 604    3  General weakness R53 1        Start Time: 1800  Stop Time: 1850  Total time in clinic (min): 50 minutes    Subjective: Pt reports no new symptoms  Objective: See treatment diary below      Assessment: Tolerated treatment well  Pt's POC was advanced to include more functional interventions to increase tolerance to community ambulation  Pt demonstrates RLE hip circumduction when fatigued  Pr requires verbal cues to demonstrate proper gait  Patient demonstrated fatigue post treatment and would benefit from continued PT      Plan: Continue per plan of care       Precautions: Cervical tumor    Daily Treatment Diary     Manual                                                                                   Exercise Diary             RB nv 7 min           Clamshells HEP 2*10 R SL           Quad sets HEP 30*5"            Seated marches HEP 20ea           LAQ HEP 20ea           Ankle pumps HEP 30*2"           Mini squats nv 2*10           Standing hip abduction nv 2*10 ea           Tandem stance nv 3*30 UE support foam           Tandem walking nv nv           STS nv nv           Step-ups nv 2*10 L 3*5 R           Bridges  2*10           Biodex  LOS 2x L1 1xL2                                                                                             Modalities

## 2019-03-01 ENCOUNTER — HOSPITAL ENCOUNTER (OUTPATIENT)
Dept: RADIOLOGY | Facility: HOSPITAL | Age: 59
Discharge: HOME | End: 2019-03-01
Attending: NEUROLOGICAL SURGERY
Payer: COMMERCIAL

## 2019-03-01 DIAGNOSIS — C71.9 EPENDYMOMA (HCC): ICD-10-CM

## 2019-03-01 PROCEDURE — 72040 X-RAY EXAM NECK SPINE 2-3 VW: CPT

## 2019-03-01 PROCEDURE — 72156 MRI NECK SPINE W/O & W/DYE: CPT

## 2019-03-01 PROCEDURE — A9585 GADOBUTROL INJECTION: HCPCS | Performed by: NEUROLOGICAL SURGERY

## 2019-03-01 PROCEDURE — 70553 MRI BRAIN STEM W/O & W/DYE: CPT

## 2019-03-01 PROCEDURE — 72072 X-RAY EXAM THORAC SPINE 3VWS: CPT

## 2019-03-01 RX ADMIN — GADOBUTROL 8 ML: 604.72 INJECTION INTRAVENOUS at 15:02

## 2019-03-04 ENCOUNTER — APPOINTMENT (OUTPATIENT)
Dept: PHYSICAL THERAPY | Age: 59
End: 2019-03-04
Payer: COMMERCIAL

## 2019-03-05 ENCOUNTER — HOSPITAL ENCOUNTER (OUTPATIENT)
Dept: RADIOLOGY | Facility: HOSPITAL | Age: 59
Discharge: HOME | End: 2019-03-05
Attending: NEUROLOGICAL SURGERY
Payer: COMMERCIAL

## 2019-03-05 DIAGNOSIS — C71.9 EPENDYMOMA (HCC): ICD-10-CM

## 2019-03-05 PROCEDURE — 72157 MRI CHEST SPINE W/O & W/DYE: CPT

## 2019-03-05 PROCEDURE — 72158 MRI LUMBAR SPINE W/O & W/DYE: CPT

## 2019-03-05 PROCEDURE — A9585 GADOBUTROL INJECTION: HCPCS | Performed by: NEUROLOGICAL SURGERY

## 2019-03-05 RX ADMIN — GADOBUTROL 8 ML: 604.72 INJECTION INTRAVENOUS at 12:36

## 2019-03-06 ENCOUNTER — OFFICE VISIT (OUTPATIENT)
Dept: NEUROSURGERY | Facility: CLINIC | Age: 59
End: 2019-03-06

## 2019-03-06 ENCOUNTER — RADIATION ONCOLOGY CONSULT (OUTPATIENT)
Dept: RADIATION ONCOLOGY | Facility: HOSPITAL | Age: 59
End: 2019-03-06
Attending: RADIOLOGY

## 2019-03-06 VITALS
SYSTOLIC BLOOD PRESSURE: 86 MMHG | WEIGHT: 173.4 LBS | BODY MASS INDEX: 27.21 KG/M2 | DIASTOLIC BLOOD PRESSURE: 58 MMHG | HEART RATE: 69 BPM | OXYGEN SATURATION: 98 % | HEIGHT: 67 IN | RESPIRATION RATE: 16 BRPM | TEMPERATURE: 97.4 F

## 2019-03-06 VITALS
TEMPERATURE: 97.4 F | HEART RATE: 69 BPM | DIASTOLIC BLOOD PRESSURE: 66 MMHG | RESPIRATION RATE: 18 BRPM | BODY MASS INDEX: 27.21 KG/M2 | HEIGHT: 67 IN | SYSTOLIC BLOOD PRESSURE: 110 MMHG | WEIGHT: 173.4 LBS

## 2019-03-06 DIAGNOSIS — C72.0 SPINAL CORD EPENDYMOMA (HCC): Primary | ICD-10-CM

## 2019-03-06 DIAGNOSIS — Z98.1 H/O SPINAL FUSION: ICD-10-CM

## 2019-03-06 PROCEDURE — 99024 POSTOP FOLLOW-UP VISIT: CPT | Performed by: NEUROLOGICAL SURGERY

## 2019-03-06 NOTE — PROGRESS NOTES
Sonia Srivastava III  1960  Mr Rinku Ritter is a 60 y o  male    Chief Complaint   Patient presents with    Consult   Presented to [facility] h/o sensory deficits and was found to have spinal cord mass  1/24/19 underwent tumor resection and C4-T3 laminectomy and C2-T5 fixation/fusion by Dr Swati Saini  2/18/19 Patient seen in [facility]  Coordinated next steps for plan of care  Scheduled lumbar puncture, MRIs of brain, cervical, thoracic, lumbar spine  Scripts for x-rays were given  Provided appointment at [facility] on 3/6/19 at 2 pm to discuss RT after all tests and imaging are completed  2/21/19   Final Diagnosis   A  Lumbar Puncture, :  Negative for malignancy  Rare lymphocytes, histiocytes, red blood cells  3/1/19 MRI cervical spine:  IMPRESSION:   Interval decompressive laminectomy and resection of cervical thoracic ependymoma  Marked cord atrophy without indication of tumor  Expected postoperative appearance  Cancer Staging  No matching staging information was found for the patient  Oncology History    Presented to [address] [facility] h/o sensory deficits and was found to have spinal cord mass  1/24/19 underwent tumor resection and C4-T3 laminectomy and C2-T5 fixation/fusion by Dr Swati Saini  2/18/19 Patient seen in [facility]  Coordinated next steps for plan of care  Scheduled lumbar puncture, MRIs of brain, cervical, thoracic, lumbar spine  Scripts for x-rays were given  Provided appointment at [facility] on 3/6/19 at 2 pm to discuss RT after all tests and imaging are completed  2/21/19   Final Diagnosis   A  Lumbar Puncture, :  Negative for malignancy  Rare lymphocytes, histiocytes, red blood cells  3/1/19 MRI cervical spine:  IMPRESSION:   Interval decompressive laminectomy and resection of cervical thoracic ependymoma  Marked cord atrophy without indication of tumor    Expected postoperative appearance  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[facility] [place] )    1/24/2019 Initial Diagnosis     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[facility] )         1/24/2019 Biopsy     Final Diagnosis   Amendment Note: Report amended to add missing intraoperative diagnosis  No change in final diagnosis      A, B  Intramedullary mass, 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    Additional evaluation for depression, suicide risk assesment, referral to PCP or psychiatry, medication started:  no    Advanced Care Planning for Patients >65 years  Advanced Care Planning Discussed:  n/a  Patient named surrogate decision maker or care plan in chart: n/a      Health Maintenance   Topic Date Due    Hepatitis C Screening  1960    CRC Screening: Colonoscopy  1960    Pneumococcal PPSV23 Highest Risk Adult (1 of 3 - PCV13) 02/20/1978    PT PLAN OF CARE  03/27/2019    BMI: Followup Plan  11/20/2019    Depression Screening PHQ  02/25/2020    BMI: Adult  03/05/2020    DTaP,Tdap,and Td Vaccines (2 - Td) 02/26/2026    INFLUENZA VACCINE  Completed    HEPATITIS B VACCINES  Aged Out       Patient Active Problem List   Diagnosis    BPH associated with nocturia    Hypothyroidism    Impaired fasting glucose    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[facility] )    Anemia    Hyperlipidemia     Past Medical History:   Diagnosis Date    BPH associated with nocturia     Cervical spinal mass ([facility] ) 01/07/2019    cervicothoracic    Disease of thyroid gland     Herniated disc, cervical     Hyperlipidemia     Impaired fasting glucose      Past Surgical History:   Procedure Laterality Date    APPENDECTOMY      CERVICAL FUSION      COLONOSCOPY  2012    NORMAL; RECHECK IN 5 YEARS    FL LUMBAR PUNCTURE  2019    MN BX/EXCIS SPIN PATEL,INDUR,INMED,CERV N/A 2019    Procedure: Posterior C4-T3 laminectomy; resection of intramedullary mass C5-T3, including fenestration of syrinx C7-T2; C3-T4 fixation/fusion; additional levels as needed;  Surgeon: Calvin Day MD;  [facility]: [facility];  Service: Neurosurgery    WISDOM TOOTH EXTRACTION       Family History   Problem Relation Age of Onset    Diabetes Mother     Hypertension Father     Cerebral palsy Sister      Social History     Socioeconomic History    Marital status: /Civil Union     Spouse name: Andre Kim Number of children: 3    Years of education: 15    Highest education level: Not on file   Occupational History    Occupation:    Social Needs    Financial resource strain: Not on file    Food insecurity:     Worry: Not on file     Inability: Not on file   [employer] needs:     Medical: Not on file     Non-medical: Not on file   Tobacco Use    Smoking status: Former Smoker     Last attempt to quit: 1993     Years since quittin 8    Smokeless tobacco: Never Used   Substance and Sexual Activity    Alcohol use: Yes     Comment: SOCIAL     Drug use: No    Sexual activity: Not on file   Lifestyle    Physical activity:     Days per week: Not on file     Minutes per session: Not on file    Stress: Not on file   Relationships    Social connections:     Talks on phone: Not on file     Gets together: Not on file     Attends Restorationism service: Not on file     Active member of club or organization: Not on file     Attends meetings of clubs or organizations: Not on file     Relationship status: Not on file    Intimate partner violence:     Fear of current or ex partner: Not on file     Emotionally abused: Not on file     Physically abused: Not on file     Forced sexual activity: Not on file   Other Topics Concern    Not on file   Social History Narrative    Lives at home with spouse and 3 children    Has 3 biological children and 3 of spouses children total of 6 children       Current Outpatient Medications:     ibuprofen (MOTRIN) 200 mg tablet, Take 600 mg by mouth every 6 (six) hours as needed for mild pain, Disp: , Rfl:     levothyroxine 25 mcg tablet, Take 25 mcg by mouth daily Resume on 2/20/19, Disp: , Rfl:     Omega-3 Fatty Acids (FISH OIL) 1,000 mg, Take 1,000 mg by mouth daily Resume on 2/20/19, Disp: , Rfl:     Allergies   Allergen Reactions    Bee Venom Hives, Itching and Edema     Category: Allergy;     Penicillins Hives and Itching     Category: Allergy; Review of Systems:  Review of Systems   Constitutional: Negative  HENT: Negative  Eyes: Negative  Respiratory: Negative  Cardiovascular: Positive for leg swelling (Lower right leg )  Gastrointestinal: Negative  Endocrine: Negative  Genitourinary: Negative  Musculoskeletal: Positive for back pain, gait problem (Right foot drop is improving), neck pain (From collar) and neck stiffness  Skin: Negative  Allergic/Immunologic: Negative  Neurological: Positive for tremors (Slight in left hand  Back to baseline ), weakness (Right leg is improving) and numbness (Tingling on right elbow; numbness left hand fingers up left arm  )  Negative for headaches  Balance is slightly off  Hematological: Negative  Psychiatric/Behavioral: Negative          Vitals:    03/06/19 1359   BP: (!) 86/58   BP Location: Left arm   Patient Position: Sitting   Cuff Size: Large   Pulse: 69   Resp: 16   Temp: (!) 97 4 °F (36 3 °C)   TempSrc: Tympanic   SpO2: 98%   Weight: 78 7 kg (173 lb 6 4 oz)   Height: 5' 7" (1 702 m)      Imaging:Xr Spine Cervical 2 Or 3 Vw Injury    Result Date: 3/6/2019  Narrative: CERVICAL SPINE INDICATION: 79-year-old male, follow-up spinal fusion COMPARISON: 1/25/2019 x-rays VIEWS:  XR SPINE CERVICAL 2 OR 3 VW INJURY Images: 2 FINDINGS: Posterior spinal fusion from C2 through T5, unchanged  Intact hardware  Alignment remains satisfactory  No evidence of fracture or subluxation  Mild degenerative disc disease C5-6 The prevertebral soft tissues are within normal limits  The lung apices are intact  Impression: Stable appearance C2-T5 posterior spinal fusion Workstation performed: [id number]     Xr Spine Thoracic 3 Vw    Result Date: 3/6/2019  Narrative: THORACIC SPINE INDICATION: 50-year-old male, follow-up spinal fusion COMPARISON:  1/25/2019 x-rays VIEWS:  XR SPINE THORACIC 3 VW Images: 2 FINDINGS: Stable appearance posterior spinal cervicothoracic fusion extending from C2 through T5, unchanged  Hardware intact  Transverse bar approximately T3  There is no fracture or pathologic bone lesion  Thoracic vertebral alignment is within normal limits  No significant degenerative changes  There is no displacement of the paraspinal line  The pedicles appear intact  Impression: Stable appearance cervicothoracic posterior fusion Workstation performed: [id number]     Mri Brain With And Without Contrast    Result Date: 3/4/2019  Narrative: MRI BRAIN WITH AND WITHOUT CONTRAST INDICATION: C71 9: Malignant neoplasm of brain, unspecified  COMPARISON:  None  TECHNIQUE: Sagittal T1, axial T2, axial FLAIR, axial T1, axial T2*, axial diffusion  Sagittal, axial T1 postcontrast   Axial bravo postcontrast with coronal reconstructions  IV Contrast:  8 mL of gadobutrol injection (MULTI-DOSE)  IMAGE QUALITY:   Diagnostic  FINDINGS: BRAIN PARENCHYMA:  No acute disease  There is no acute ischemia, intracranial mass or mass effect  No pathologic hemosiderin deposition or, pathologic enhancement  Several tiny foci of high signal in the supratentorial, nonspecific  These likely represent sequela of chronic small vessel disease    Postcontrast imaging of the brain demonstrates no abnormal enhancement  VENTRICLES:  Normal  SELLA AND PITUITARY GLAND:  Normal  ORBITS:  Normal  PARANASAL SINUSES:  Trace sinus mucosal disease  Left maxillary retention cyst or polyp  Minimal bilateral mastoid fluid  VASCULATURE:  Evaluation of the major intracranial vasculature demonstrates appropriate flow voids  CALVARIUM AND SKULL BASE:  Normal  EXTRACRANIAL SOFT TISSUES:  Normal      Impression: No acute disease  Parenchymal changes consistent with very mild degree of chronic small vessel disease  Workstation performed: [id number]     Mri Cervical Spine With And Without Contrast    Result Date: 3/4/2019  Narrative: MRI CERVICAL SPINE WITH AND WITHOUT CONTRAST INDICATION: C71 9: Malignant neoplasm of brain, unspecified  COMPARISON:  MR 1/7/2019 TECHNIQUE:  Sagittal T1, sagittal T2, sagittal inversion recovery, axial 2D merge and axial T2  Sagittal T1 and axial T1 postcontrast   IV Contrast:  8 mL of gadobutrol injection (MULTI-DOSE) IMAGE QUALITY:  Diagnostic  FINDINGS: ALIGNMENT:  Minimal straightening of normal cervical lordosis  Interval decompression from the C3-3-4 to the T1-T2 level  Posterior hardware extends from the C2 to the T4 level inclusive  MARROW SIGNAL:  Normal marrow signal is identified within the visualized bony structures  No discrete marrow lesion  CERVICAL AND VISUALIZED UPPER THORACIC CORD:  Ependymoma has been resected from the cord at the cervical thoracic junction  No residual enhancement is noted  The cord is strikingly atrophic at this level, most notably between the C7-T1 disc space at the caudal T2 level  Cord tapers abnormally above and below this short distance as well  Cord is ribbon like through this region with myelotomy defect posteriorly to the right, extending caudally a short distance from the C7-T1 disc space  PREVERTEBRAL AND PARASPINAL SOFT TISSUES:  Paraspinal fluid collections are present above and below the construct at the C2-3, and to the L2-3 levels  Volume and appearance of these collections is not unexpected given recent surgical changes  VISUALIZED POSTERIOR FOSSA:  The visualized posterior fossa demonstrates no abnormal signal  CERVICAL DISC SPACES: C2-C3:  Normal  C3-C4:  Minor disc bulge C4-C5:  Normal  C5-C6:  Minor C6-C7:  Normal  C7-T1:  Normal  UPPER THORACIC DISC SPACES:  Normal  POSTCONTRAST IMAGING:  No convincing evidence for pathologic enhancement  Images are degraded by ferromagnetic fixation hardware  Impression: Interval decompressive laminectomy and resection of cervical thoracic ependymoma  Marked cord atrophy without indication of tumor  Expected postoperative appearance  Workstation performed: [id number]     Mri Thoracic Spine With And Without Contrast    Result Date: 3/6/2019  Narrative: MRI THORACIC SPINE WITH AND WITHOUT CONTRAST INDICATION: C71 9: Malignant neoplasm of brain, unspecified  COMPARISON:  1/7/2019 TECHNIQUE:  Sagittal T1, sagittal T2, sagittal inversion recovery, axial T2,  axial 2D MERGE  Sagittal and axial T1 postcontrast  IV Contrast:  8 mL of gadobutrol injection (MULTI-DOSE) IMAGE QUALITY:  Diagnostic  FINDINGS: ALIGNMENT:  The patient is status post extensive posterior decompression of the lower cervical and upper thoracic spine with articulating facet and pedicle screws from C2 through T5  Artifact from hardware limits the immediately adjacent soft tissues  Stable alignment of the cervical and thoracic spine  Slight anterior subluxation of C7 upon T1  No compression fracture  No scoliosis  MARROW SIGNAL:  Normal marrow signal is identified within the visualized bony structures  No discrete marrow lesion  THORACIC CORD:  There is heterogeneous signal identified within the lower cervical cord and upper thoracic cord  The previously seen cystic component of the tumor has been decrease crest   Tumors superior extent is to the C5 superior endplate    The inferior extent of the tumor is to the T3 inferior endplate  There is very little peripheral cord tissue identified at the T1 and T2 levels  Postcontrast imaging limited by artifact from internal hardware  Minimal peripheral enhancement noted in the upper thoracic spine  PREVERTEBRAL AND PARASPINAL SOFT TISSUES:   Expected postoperative change at the cervicothoracic decompression posteriorly without mass effect upon the thecal sac  There is fluid posterior to the musculature within the subcutaneous fat suggesting postop  seroma  THORACIC DEGENERATIVE CHANGE:  Below the T5 level there is no disc herniation, canal stenosis or foraminal narrowing  Impression: Extensive cervical thoracic fusion and posterior decompression  The previously identified cystic cavity has been decompressed within the upper thoracic spine  The cord tumor extends superiorly to the superior endplate of C5 and inferiorly to the inferior endplate of T3  Small postoperative seroma within the subcutaneous fat posteriorly at the operative levels  Workstation performed: [id number]     Mri Lumbar Spine With And Without Contrast    Result Date: 3/6/2019  Narrative: MRI LUMBAR SPINE WITH AND WITHOUT CONTRAST INDICATION: Cervicothoracic ependymoma    COMPARISON:  11/30/2018  TECHNIQUE:  Sagittal T1, sagittal T2, sagittal inversion recovery, axial T1 and axial T2, coronal T2  Sagittal and axial T1 postcontrast  IV Contrast:  8 mL of gadobutrol injection (MULTI-DOSE) IMAGE QUALITY:  Diagnostic FINDINGS: VERTEBRAL BODIES:  Grade 1 anterior spondylolisthesis of L5 upon S1 with no spondylolysis  No compression fracture  No scoliosis  Mild endplate marrow degenerative change at the L3-L4 levels  SACRUM:  Normal signal within the sacrum  No evidence of insufficiency or stress fracture  DISTAL CORD AND CONUS:  Normal size and signal within the distal cord and conus  PARASPINAL SOFT TISSUES:  Simple renal cysts are noted bilaterally   LOWER THORACIC DISC SPACES:  Normal disc height and signal   No disc herniation, canal stenosis or foraminal narrowing  LUMBAR DISC SPACES: L1-L2:  Normal  L2-L3:  Normal  L3-L4:  Disc desiccation and loss of disc height with mild annular bulging  There is a small broad-based left foraminal disc protrusion  No canal stenosis  There is stable left foraminal narrowing  L4-L5:  Normal disc height and signal   Mild facet arthropathy  No canal stenosis or foraminal narrowing  L5-S1:  Grade 1 anterior spondylolisthesis of L5 upon S1 on the basis of facet hypertrophic degenerative change  Bilateral facet joint effusions  Slight canal stenosis without impingement of the thecal sac  Moderate left and mild right foraminal narrowing is stable  POSTCONTRAST IMAGING:  No abnormal enhancement within the central canal or thecal sac to suggest drop metastasis  Impression: Stable lumbar degenerative disc disease  Small left foraminal disc protrusion at L3-4 with associated foraminal narrowing  Spondylolisthesis of L5 upon S1 with stable foraminal narrowing  Workstation performed: [id number]     Fl Lumbar Puncture    Result Date: 2/21/2019  Narrative: FLUOROSCOPICALLY GUIDED LUMBAR PUNCTURE  INDICATION:  Ependymoma  Patient is status post resection of intramedullary mass C5-T3, posterior laminectomy C4-T3, fenestration of syrinx C7-T2, and fixation/fusion C3-T4  Evaluate cerebral spinal fluid for cytology  FLUOROSCOPY TIME:  0 17 minutes IMAGES:  4    TECHNIQUE:   Consent was obtained after fully explaining the procedure to the patient  Risks and benefits of procedure were described and understood  Precautions to avoid spinal headache were reviewed  The patient was positioned prone on the fluoroscopy table with cervical collar remaining in place at all times  A wedge was placed under the patient's chest to accommodate the collar and maintain neutral cervical spine position  1% lidocaine was infiltrated at the puncture site    Utilizing right paravertebral approach, a 20 gauge 3 1/2 inch spinal needle was advanced under fluoroscopic guidance into the subarachnoid space at the L2 - L3 level, utilizing sterile technique  Once in position, approximately 17 5 cc of clear, colorless CSF were removed and placed into 4 tubes which subsequently were transported to the lab for requested analysis  The needle was removed  The patient tolerated the procedure well  The patient was discharged from the department with appropriate instructions  Impression: Successful fluoroscopically guided lumbar puncture  The above findings and procedure were reviewed with Dr Sadia Downey  Procedure was performed by Kamran Kaur PA-C under the direct supervision of Dr Sadia Downey  Workstation performed: [id number]       Teaching [facility] packet

## 2019-03-06 NOTE — PROGRESS NOTES
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 status post debulking  He presents today accompanied by his wife to discuss adjuvant radiation therapy  Workup to date as described below:    Presented to  [facility] h/o sensory deficits and was found to have spinal cord mass  19 MRI Spine: IMPRESSION:     Cervicothoracic junction neoplasm with potential extension from the C4-C5 disc space to the T3-T4 disc space, findings consistent with primary glial tumor  19 underwent tumor resection and C4-T3 laminectomy and C2-T5 fixation/fusion by Dr João Bradshaw  The bulk of the tumor located at T1/2 was resected but the tumor above and below this level was left in place as his neurological signals declined precipitously intraoperatively  Pathology: Grade II Ependymoma  19 Patient seen in [facility]  Coordinated next steps for plan of care  Scheduled lumbar puncture, MRIs of brain, cervical, thoracic, lumbar spine  Scripts for x-rays were given  Provided appointment at [facility] on 3/6/19 at 2 pm to discuss RT after all tests and imaging are completed  19   Final Diagnosis   A  Lumbar Puncture, :  Negative for malignancy  Rare lymphocytes, histiocytes, red blood cells  3/1/19 MRI cervical spine:  IMPRESSION:   Interval decompressive laminectomy and resection of cervical thoracic ependymoma  Marked cord atrophy without indication of tumor  Expected postoperative appearance      MRI Thoracic Spine  Extensive cervical thoracic fusion and posterior decompression  The previously identified cystic cavity has been decompressed within the upper thoracic spine  The cord tumor extends superiorly to the superior endplate of C5 and inferiorly to the   inferior endplate of T3      Small postoperative seroma within the subcutaneous fat posteriorly at the operative levels  MRI Lumbar Spine:  IMPRESSION:     Stable lumbar degenerative disc disease  Small left foraminal disc protrusion at L3-4 with associated foraminal narrowing      Spondylolisthesis of L5 upon S1 with stable foraminal narrowing  Historical Information      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[facility] )    1/24/2019 Initial Diagnosis     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[facility] )         1/24/2019 Biopsy     Final Diagnosis   Amendment Note: Report amended to add missing intraoperative diagnosis  No change in final diagnosis      A, B  Intramedullary mass, biopsy:  - Ependymoma (WHO grade II)                       Past Medical History:   Diagnosis Date    BPH associated with nocturia     Cervical spinal mass ([facility] ) 01/07/2019    cervicothoracic    Disease of thyroid gland     Herniated disc, cervical     Hyperlipidemia     Impaired fasting glucose      Past Surgical History:   Procedure Laterality Date    APPENDECTOMY      CERVICAL FUSION      COLONOSCOPY  03/13/2012    NORMAL; RECHECK IN 5 YEARS    FL LUMBAR PUNCTURE  2/21/2019    IN BX/EXCIS SPIN PATEL,INDUR,INMED,CERV N/A 1/24/2019    Procedure: Posterior C4-T3 laminectomy; resection of intramedullary mass C5-T3, including fenestration of syrinx C7-T2; C3-T4 fixation/fusion; additional levels as needed;  Surgeon: Lesley Linda MD;  [facility]: [facility];  Service: Neurosurgery    WISDOM TOOTH EXTRACTION         Family History   Problem Relation Age of Onset    Diabetes Mother     Hypertension Father     Cerebral palsy Sister        Social History   Social History     Substance and Sexual Activity   Alcohol Use Yes    Comment: SOCIAL Social History     Substance and Sexual Activity   Drug Use No     Social History     Tobacco Use   Smoking Status Former Smoker    Last attempt to quit: 1993    Years since quittin 8   Smokeless Tobacco Never Used         Meds/Allergies     Current Outpatient Medications:     ibuprofen (MOTRIN) 200 mg tablet, Take 600 mg by mouth every 6 (six) hours as needed for mild pain, Disp: , Rfl:     levothyroxine 25 mcg tablet, Take 25 mcg by mouth daily Resume on 19, Disp: , Rfl:     Omega-3 Fatty Acids (FISH OIL) 1,000 mg, Take 1,000 mg by mouth daily Resume on 19, Disp: , Rfl:   Allergies   Allergen Reactions    Bee Venom Hives, Itching and Edema     Category: Allergy;     Penicillins Hives and Itching     Category: Allergy; Review of Systems   Review of Systems   Constitutional: Negative  HENT: Negative  Eyes: Negative  Respiratory: Negative  Cardiovascular: Positive for leg swelling (Lower right leg )  Gastrointestinal: Negative  Endocrine: Negative  Genitourinary: Negative  Musculoskeletal: Positive for back pain, gait problem (Right foot drop is improving), neck pain (From collar) and neck stiffness  Skin: Negative  Allergic/Immunologic: Negative  Neurological: Positive for tremors (Slight in left hand  Back to baseline ), weakness (Right leg is improving) and numbness (Tingling on right elbow; numbness left hand fingers up left arm  )  Negative for headaches  Balance is slightly off  Hematological: Negative  Psychiatric/Behavioral: Negative  Screening  Tobacco  Current tobacco user: no  If yes, brief counseling provided: No    Hypertension  Hypertension screening performed: yes  Normotensive:  yes  If no, referred to PCP: no    Depression Screening  Screened for depression using PHQ-2: yes    Screened for depression using PHQ-9:  no  Screening positive or negative:  negative  If score >4, was any of the following actions taken? Additional evaluation for depression, suicide risk assesment, referral to PCP or psychiatry, medication started:  no    Advanced Care Planning for Patients >65 years  Advanced Care Planning Discussed:  n/a  Patient named surrogate decision maker or care plan in chart: n/a        OBJECTIVE:   BP (!) 86/58 (BP Location: Left arm, Patient Position: Sitting, Cuff Size: Large)   Pulse 69   Temp (!) 97 4 °F (36 3 °C) (Tympanic)   Resp 16   Ht 5' 7" (1 702 m)   Wt 78 7 kg (173 lb 6 4 oz)   SpO2 98%   BMI 27 16 kg/m²   Pain Assessment:  0  Performance Status: Karnofsky: 70 - Cares for self; unable to carry on normal activity or do normal work     Physical Exam   The patient presents today no apparent distress  Sclera anicteric  Normal respiratory effort  Normal speech  Normal affect  Responds appropriately to all questions  Strength in the upper extremities grossly intact  5/5 strength in the left lower extremity  4/5 in the right lower extremity  Slow but steady gait with mild right foot drop  RESULTS  Lab Results    Chemistry        Component Value Date/Time    K 4 1 02/14/2019 0541     02/14/2019 0541    CO2 28 02/14/2019 0541    BUN 16 02/14/2019 0541    CREATININE 0 69 02/14/2019 0541        Component Value Date/Time    CALCIUM 8 5 02/14/2019 0541    ALKPHOS 76 01/22/2019 1523    AST 15 01/22/2019 1523    ALT 30 01/22/2019 1523            Lab Results   Component Value Date    WBC 5 08 02/14/2019    HGB 10 3 (L) 02/14/2019    HCT 33 4 (L) 02/14/2019    MCV 96 02/14/2019     02/21/2019         Imaging Studies  Xr Spine Cervical 2 Or 3 Vw Injury    Result Date: 3/6/2019  Narrative: CERVICAL SPINE INDICATION: 42-year-old male, follow-up spinal fusion COMPARISON:  1/25/2019 x-rays VIEWS:  XR SPINE CERVICAL 2 OR 3 VW INJURY Images: 2 FINDINGS: Posterior spinal fusion from C2 through T5, unchanged  Intact hardware  Alignment remains satisfactory  No evidence of fracture or subluxation  Mild degenerative disc disease C5-6 The prevertebral soft tissues are within normal limits  The lung apices are intact  Impression: Stable appearance C2-T5 posterior spinal fusion Workstation performed: [id number]     Xr Spine Thoracic 3 Vw    Result Date: 3/6/2019  Narrative: THORACIC SPINE INDICATION: 57-year-old male, follow-up spinal fusion COMPARISON:  1/25/2019 x-rays VIEWS:  XR SPINE THORACIC 3 VW Images: 2 FINDINGS: Stable appearance posterior spinal cervicothoracic fusion extending from C2 through T5, unchanged  Hardware intact  Transverse bar approximately T3  There is no fracture or pathologic bone lesion  Thoracic vertebral alignment is within normal limits  No significant degenerative changes  There is no displacement of the paraspinal line  The pedicles appear intact  Impression: Stable appearance cervicothoracic posterior fusion Workstation performed: [id number]     Mri Brain With And Without Contrast    Result Date: 3/4/2019  Narrative: MRI BRAIN WITH AND WITHOUT CONTRAST INDICATION: C71 9: Malignant neoplasm of brain, unspecified  COMPARISON:  None  TECHNIQUE: Sagittal T1, axial T2, axial FLAIR, axial T1, axial T2*, axial diffusion  Sagittal, axial T1 postcontrast   Axial bravo postcontrast with coronal reconstructions  IV Contrast:  8 mL of gadobutrol injection (MULTI-DOSE)  IMAGE QUALITY:   Diagnostic  FINDINGS: BRAIN PARENCHYMA:  No acute disease  There is no acute ischemia, intracranial mass or mass effect  No pathologic hemosiderin deposition or, pathologic enhancement  Several tiny foci of high signal in the supratentorial, nonspecific  These likely represent sequela of chronic small vessel disease  Postcontrast imaging of the brain demonstrates no abnormal enhancement  VENTRICLES:  Normal  SELLA AND PITUITARY GLAND:  Normal  ORBITS:  Normal  PARANASAL SINUSES:  Trace sinus mucosal disease  Left maxillary retention cyst or polyp  Minimal bilateral mastoid fluid   VASCULATURE: Evaluation of the major intracranial vasculature demonstrates appropriate flow voids  CALVARIUM AND SKULL BASE:  Normal  EXTRACRANIAL SOFT TISSUES:  Normal      Impression: No acute disease  Parenchymal changes consistent with very mild degree of chronic small vessel disease  Workstation performed: [id number]     Mri Cervical Spine With And Without Contrast    Result Date: 3/4/2019  Narrative: MRI CERVICAL SPINE WITH AND WITHOUT CONTRAST INDICATION: C71 9: Malignant neoplasm of brain, unspecified  COMPARISON:  MR 1/7/2019 TECHNIQUE:  Sagittal T1, sagittal T2, sagittal inversion recovery, axial 2D merge and axial T2  Sagittal T1 and axial T1 postcontrast   IV Contrast:  8 mL of gadobutrol injection (MULTI-DOSE) IMAGE QUALITY:  Diagnostic  FINDINGS: ALIGNMENT:  Minimal straightening of normal cervical lordosis  Interval decompression from the C3-3-4 to the T1-T2 level  Posterior hardware extends from the C2 to the T4 level inclusive  MARROW SIGNAL:  Normal marrow signal is identified within the visualized bony structures  No discrete marrow lesion  CERVICAL AND VISUALIZED UPPER THORACIC CORD:  Ependymoma has been resected from the cord at the cervical thoracic junction  No residual enhancement is noted  The cord is strikingly atrophic at this level, most notably between the C7-T1 disc space at the caudal T2 level  Cord tapers abnormally above and below this short distance as well  Cord is ribbon like through this region with myelotomy defect posteriorly to the right, extending caudally a short distance from the C7-T1 disc space  PREVERTEBRAL AND PARASPINAL SOFT TISSUES:  Paraspinal fluid collections are present above and below the construct at the C2-3, and to the L2-3 levels  Volume and appearance of these collections is not unexpected given recent surgical changes   VISUALIZED POSTERIOR FOSSA:  The visualized posterior fossa demonstrates no abnormal signal  CERVICAL DISC SPACES: C2-C3:  Normal  C3-C4: Minor disc bulge C4-C5:  Normal  C5-C6:  Minor C6-C7:  Normal  C7-T1:  Normal  UPPER THORACIC DISC SPACES:  Normal  POSTCONTRAST IMAGING:  No convincing evidence for pathologic enhancement  Images are degraded by ferromagnetic fixation hardware  Impression: Interval decompressive laminectomy and resection of cervical thoracic ependymoma  Marked cord atrophy without indication of tumor  Expected postoperative appearance  Workstation performed: [id number]     Mri Thoracic Spine With And Without Contrast    Result Date: 3/6/2019  Narrative: MRI THORACIC SPINE WITH AND WITHOUT CONTRAST INDICATION: C71 9: Malignant neoplasm of brain, unspecified  COMPARISON:  1/7/2019 TECHNIQUE:  Sagittal T1, sagittal T2, sagittal inversion recovery, axial T2,  axial 2D MERGE  Sagittal and axial T1 postcontrast  IV Contrast:  8 mL of gadobutrol injection (MULTI-DOSE) IMAGE QUALITY:  Diagnostic  FINDINGS: ALIGNMENT:  The patient is status post extensive posterior decompression of the lower cervical and upper thoracic spine with articulating facet and pedicle screws from C2 through T5  Artifact from hardware limits the immediately adjacent soft tissues  Stable alignment of the cervical and thoracic spine  Slight anterior subluxation of C7 upon T1  No compression fracture  No scoliosis  MARROW SIGNAL:  Normal marrow signal is identified within the visualized bony structures  No discrete marrow lesion  THORACIC CORD:  There is heterogeneous signal identified within the lower cervical cord and upper thoracic cord  The previously seen cystic component of the tumor has been decrease crest   Tumors superior extent is to the C5 superior endplate  The inferior extent of the tumor is to the T3 inferior endplate  There is very little peripheral cord tissue identified at the T1 and T2 levels  Postcontrast imaging limited by artifact from internal hardware  Minimal peripheral enhancement noted in the upper thoracic spine  PREVERTEBRAL AND PARASPINAL SOFT TISSUES:   Expected postoperative change at the cervicothoracic decompression posteriorly without mass effect upon the thecal sac  There is fluid posterior to the musculature within the subcutaneous fat suggesting postop  seroma  THORACIC DEGENERATIVE CHANGE:  Below the T5 level there is no disc herniation, canal stenosis or foraminal narrowing  Impression: Extensive cervical thoracic fusion and posterior decompression  The previously identified cystic cavity has been decompressed within the upper thoracic spine  The cord tumor extends superiorly to the superior endplate of C5 and inferiorly to the inferior endplate of T3  Small postoperative seroma within the subcutaneous fat posteriorly at the operative levels  Workstation performed: [id number]     Mri Lumbar Spine With And Without Contrast    Result Date: 3/6/2019  Narrative: MRI LUMBAR SPINE WITH AND WITHOUT CONTRAST INDICATION: Cervicothoracic ependymoma    COMPARISON:  11/30/2018  TECHNIQUE:  Sagittal T1, sagittal T2, sagittal inversion recovery, axial T1 and axial T2, coronal T2  Sagittal and axial T1 postcontrast  IV Contrast:  8 mL of gadobutrol injection (MULTI-DOSE) IMAGE QUALITY:  Diagnostic FINDINGS: VERTEBRAL BODIES:  Grade 1 anterior spondylolisthesis of L5 upon S1 with no spondylolysis  No compression fracture  No scoliosis  Mild endplate marrow degenerative change at the L3-L4 levels  SACRUM:  Normal signal within the sacrum  No evidence of insufficiency or stress fracture  DISTAL CORD AND CONUS:  Normal size and signal within the distal cord and conus  PARASPINAL SOFT TISSUES:  Simple renal cysts are noted bilaterally  LOWER THORACIC DISC SPACES:  Normal disc height and signal   No disc herniation, canal stenosis or foraminal narrowing  LUMBAR DISC SPACES: L1-L2:  Normal  L2-L3:  Normal  L3-L4:  Disc desiccation and loss of disc height with mild annular bulging    There is a small broad-based left foraminal disc protrusion  No canal stenosis  There is stable left foraminal narrowing  L4-L5:  Normal disc height and signal   Mild facet arthropathy  No canal stenosis or foraminal narrowing  L5-S1:  Grade 1 anterior spondylolisthesis of L5 upon S1 on the basis of facet hypertrophic degenerative change  Bilateral facet joint effusions  Slight canal stenosis without impingement of the thecal sac  Moderate left and mild right foraminal narrowing is stable  POSTCONTRAST IMAGING:  No abnormal enhancement within the central canal or thecal sac to suggest drop metastasis  Impression: Stable lumbar degenerative disc disease  Small left foraminal disc protrusion at L3-4 with associated foraminal narrowing  Spondylolisthesis of L5 upon S1 with stable foraminal narrowing  Workstation performed: [id number]     Fl Lumbar Puncture    Result Date: 2/21/2019  Narrative: FLUOROSCOPICALLY GUIDED LUMBAR PUNCTURE  INDICATION:  Ependymoma  Patient is status post resection of intramedullary mass C5-T3, posterior laminectomy C4-T3, fenestration of syrinx C7-T2, and fixation/fusion C3-T4  Evaluate cerebral spinal fluid for cytology  FLUOROSCOPY TIME:  0 17 minutes IMAGES:  4    TECHNIQUE:   Consent was obtained after fully explaining the procedure to the patient  Risks and benefits of procedure were described and understood  Precautions to avoid spinal headache were reviewed  The patient was positioned prone on the fluoroscopy table with cervical collar remaining in place at all times  A wedge was placed under the patient's chest to accommodate the collar and maintain neutral cervical spine position  1% lidocaine was infiltrated at the puncture site  Utilizing right paravertebral approach, a 20 gauge 3 1/2 inch spinal needle was advanced under fluoroscopic guidance into the subarachnoid space at the L2 - L3 level, utilizing sterile technique   Once in position, approximately 17 5 cc of clear, colorless CSF were removed and placed into 4 tubes which subsequently were transported to the lab for requested analysis  The needle was removed  The patient tolerated the procedure well  The patient was discharged from the department with appropriate instructions  Impression: Successful fluoroscopically guided lumbar puncture  The above findings and procedure were reviewed with Dr Gilberto Tran  Procedure was performed by Rigoberto Kaur PA-C under the direct supervision of Dr Gilberto Tran  Workstation performed: [id number]         ASSESSMENT  1  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[facility])  Radiation Simulation Treatment     Cancer Staging  No matching staging information was found for the patient  PLAN/DISCUSSION  Orders Placed This Encounter   Procedures    Radiation Simulation Treatment          Brian Ruiz III is a 60y o  year old male with recently diagnosed grade 2 spinal ependymoma extending from C5 through T3, status post debulking of disease at the T1/T2 level with known gross residual disease superiorly and inferiorly  Further resection was not possible due to loss of neurologic signals intraoperatively  He has been recovering quite well since the time of his surgery  His proprioception is gradually improving as is the strength in the right lower extremity  He continues to have some numbness of the lower extremities as well as paresthesias of the upper extremities  He was seen today in conjunction with Dr Adam Li as part of the Neuro-Oncology Clinic  We have both recommended a course of adjuvant radiation therapy encompassing the operative bed and known areas of residual gross disease  We anticipate delivering a dose of 45-50 4 Gy over the course of 5-5 and half weeks  In order to allow further healing of the cord and continued improvement in his clinical status, we will delay the initiation of radiation for approximately 1 month  He would then return to our department for CT planning at that time    The associated risks and toxicities of spinal cord radiation were discussed with the patient and his wife in detail, including, but not limited to, fatigue, erythema, wound dehiscence, esophagitis, and possible spinal cord injury, resulting in worsening of his baseline deficits potentially to the point of paralysis  Nazia Cuellar MD  3/6/2019,5:08 PM      Portions of the record may have been created with voice recognition software   Occasional wrong word or "sound a like" substitutions may have occurred due to the inherent limitations of voice recognition software   Read the chart carefully and recognize, using context, where substitutions have occurred

## 2019-03-06 NOTE — PROGRESS NOTES
Neurosurgery Office Note  Gallo Gallardo III 60 y o  male MRN: [id number]      Assessment/Plan      Diagnoses and all orders for this visit:    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[facility])          Discussion:    60year old male now 6 weeks s/p resection, decompression, fusion for his intramedullary spinal cord ependymoma, WHO grade 2  The mass spanned from the C4-5 disc space down to the T3-4 disc space  We were able to resect this aggressively at its largest part, I e  T1-2, but did not proceed further as his neurological signals declined intraoperatively  He did well postoperatively, and was discharged to [facility]  On follow-up today, he continues to improve  He is using no ambulatory aids  His right DF is at least 4/5, but apractic, however this is also improving  He states he even has some proprioception on the right, which is improved vs  Prior to surgery  He has some mild tingling in his forearms and hands, but no pain or weakness  His incision is healing well, with some minor scabbing and erythema on the incision line, but no induration, suggestion of infection, etc  Bowel and bladder function he states are back to normal  No radicular pains  His postop Xrays show stability of his construct  I discussed weaning from the collar starting next week  I have instructed the patient to wean from there cervical collar  They should do this by not wearing the collar 1-2 hours per day for the next week, then 2-4 hours per day the week following, then 4-8 hours per day the subsequent week, such that within 3-4 weeks and they will of wean out of the collar entirely  They do not need to wear the collar while sleeping    They should continue to wear the collar when doing strenuous activity such as therapy etc     Postop MRI shows residual tumor the superior and inferior caps, specifically from the C4-5 disc space down to the resection cavity, and then again resuming at the inferior edge of the cavity down to the T3-4 disc space   MRI scan of brain, L-spine, and T-spine show no drop metastases  CSF testing negative  Patient seen in conjunction with Dr Genia Kapadia  We discussed the NCCN guideline recommended adjuvant radiation therapy to the residual tumor  We discussed the logistics of that, that it would be 5 weeks of radiation, the attendant risks such as esophagitis, skin breakdown, and neurologic decline  As he continues to make good progress, we recommended and he agrees to delay slightly the beginning of radiation for few weeks  This should allow his recovery to solidify without leaving too much time until initiating radiation therapy  The risk of significant recurrence/growth in that time is very very low  The patient will be seen in follow-up with Radiation Oncology for simulation visit, and then initiate treatments  He will have his radiation and Wandy Hutching  Per NCCN guidelines he should have an MRI scan 3-4 months after his radiation is completed  Our nurse navigator is aware and will track his progress and help coordinate this follow-up  We will also plan for follow-up x-rays in future to assess construct stability  We discussed return to work  I do not feel he is ready to return to work as a   We will continue to assess this moving forward  Metrics:  EQ5D5L 1 0000 VA S 90, KPS 90, ECOG 0-1  CHIEF COMPLAINT    Chief Complaint   Patient presents with    Post-op     6 week POV with MRIs, XRs and LP labs       HISTORY    History of Present Illness     60y o  year old male     HPI    See Discussion    REVIEW OF SYSTEMS    Review of Systems   Constitutional: Negative  HENT: Negative  Eyes: Negative  Respiratory: Negative  Cardiovascular: Positive for leg swelling (mild right leg)  Gastrointestinal: Negative  Endocrine: Positive for cold intolerance (right foot)  Genitourinary: Negative      Musculoskeletal: Positive for back pain, gait problem, neck pain (discomfort from collar only) and neck stiffness  Skin: Negative  Allergic/Immunologic: Positive for environmental allergies  Neurological: Positive for tremors (hands), weakness (right leg improved) and numbness (left lower arm, right elbow occasional numbness/tingling)  Hematological: Negative  Psychiatric/Behavioral: Negative  Meds/Allergies     Current Outpatient Medications   Medication Sig Dispense Refill    ibuprofen (MOTRIN) 200 mg tablet Take 600 mg by mouth every 6 (six) hours as needed for mild pain      levothyroxine 25 mcg tablet Take 25 mcg by mouth daily Resume on 19      Omega-3 Fatty Acids (FISH OIL) 1,000 mg Take 1,000 mg by mouth daily Resume on 19       No current facility-administered medications for this visit  Allergies   Allergen Reactions    Bee Venom Hives, Itching and Edema     Category: Allergy;     Penicillins Hives and Itching     Category:  Allergy;        PAST HISTORY    Past Medical History:   Diagnosis Date    BPH associated with nocturia     Cervical spinal [facility] ([facility] [address] ) 2019    cervicothoracic    Disease of thyroid gland     Herniated disc, cervical     Hyperlipidemia     Impaired fasting glucose        Past Surgical History:   Procedure Laterality Date    APPENDECTOMY      CERVICAL FUSION      COLONOSCOPY  2012    NORMAL; RECHECK IN 5 YEARS    FL LUMBAR PUNCTURE  2019    MI BX/EXCIS SPIN PATEL,INDUR,INMED,CERV N/A 2019    Procedure: Posterior C4-T3 laminectomy; resection of intramedullary mass C5-T3, including fenestration of syrinx C7-T2; C3-T4 fixation/fusion; additional levels as needed;  Surgeon: Kleber Boyd MD;  Location: [facility];  Service: Neurosurgery    WISDOM TOOTH EXTRACTION         Social History     Tobacco Use    Smoking status: Former Smoker     Last attempt to quit: 1993     Years since quittin 8    Smokeless tobacco: Never Used   Substance Use Topics    Alcohol use: Yes     Comment: SOCIAL     Drug use: No       Family History   Problem Relation Age of Onset    Diabetes Mother     Hypertension Father     Cerebral palsy Sister          Above history personally reviewed  EXAM    Vitals: There were no vitals taken for this visit  ,There is no height or weight on file to calculate BMI  Physical Exam    Neurologic Exam      MEDICAL DECISION MAKING    Imaging Studies:     Xr Spine Cervical 2 Or 3 Vw Injury    Result Date: 3/6/2019  Narrative: CERVICAL SPINE INDICATION: 42-year-old male, follow-up spinal fusion COMPARISON:  1/25/2019 x-rays VIEWS:  XR SPINE CERVICAL 2 OR 3 VW INJURY Images: 2 FINDINGS: Posterior spinal fusion from C2 through T5, unchanged  Intact hardware  Alignment remains satisfactory  No evidence of fracture or subluxation  Mild degenerative disc disease C5-6 The prevertebral soft tissues are within normal limits  The lung apices are intact  Impression: Stable appearance C2-T5 posterior spinal fusion Workstation performed: [id number]     Xr Spine Thoracic 3 Vw    Result Date: 3/6/2019  Narrative: THORACIC SPINE INDICATION: 42-year-old male, follow-up spinal fusion COMPARISON:  1/25/2019 x-rays VIEWS:  XR SPINE THORACIC 3 VW Images: 2 FINDINGS: Stable appearance posterior spinal cervicothoracic fusion extending from C2 through T5, unchanged  Hardware intact  Transverse bar approximately T3  There is no fracture or pathologic bone lesion  Thoracic vertebral alignment is within normal limits  No significant degenerative changes  There is no displacement of the paraspinal line  The pedicles appear intact  Impression: Stable appearance cervicothoracic posterior fusion Workstation performed: [id number]     Mri Brain With And Without Contrast    Result Date: 3/4/2019  Narrative: MRI BRAIN WITH AND WITHOUT CONTRAST INDICATION: C71 9: Malignant neoplasm of brain, unspecified  COMPARISON:  None   TECHNIQUE: Sagittal T1, axial T2, axial FLAIR, axial T1, axial T2*, axial diffusion  Sagittal, axial T1 postcontrast   Axial bravo postcontrast with coronal reconstructions  IV Contrast:  8 mL of gadobutrol injection (MULTI-DOSE)  IMAGE QUALITY:   Diagnostic  FINDINGS: BRAIN PARENCHYMA:  No acute disease  There is no acute ischemia, intracranial mass or mass effect  No pathologic hemosiderin deposition or, pathologic enhancement  Several tiny foci of high signal in the supratentorial, nonspecific  These likely represent sequela of chronic small vessel disease  Postcontrast imaging of the brain demonstrates no abnormal enhancement  VENTRICLES:  Normal  SELLA AND PITUITARY GLAND:  Normal  ORBITS:  Normal  PARANASAL SINUSES:  Trace sinus mucosal disease  Left maxillary retention cyst or polyp  Minimal bilateral mastoid fluid  VASCULATURE:  Evaluation of the major intracranial vasculature demonstrates appropriate flow voids  CALVARIUM AND SKULL BASE:  Normal  EXTRACRANIAL SOFT TISSUES:  Normal      Impression: No acute disease  Parenchymal changes consistent with very mild degree of chronic small vessel disease  Workstation performed: [id number]     Mri Cervical Spine With And Without Contrast    Result Date: 3/4/2019  Narrative: MRI CERVICAL SPINE WITH AND WITHOUT CONTRAST INDICATION: C71 9: Malignant neoplasm of brain, unspecified  COMPARISON:  MR 1/7/2019 TECHNIQUE:  Sagittal T1, sagittal T2, sagittal inversion recovery, axial 2D merge and axial T2  Sagittal T1 and axial T1 postcontrast   IV Contrast:  8 mL of gadobutrol injection (MULTI-DOSE) IMAGE QUALITY:  Diagnostic  FINDINGS: ALIGNMENT:  Minimal straightening of normal cervical lordosis  Interval decompression from the C3-3-4 to the T1-T2 level  Posterior hardware extends from the C2 to the T4 level inclusive  MARROW SIGNAL:  Normal marrow signal is identified within the visualized bony structures  No discrete marrow lesion   CERVICAL AND VISUALIZED UPPER THORACIC CORD:  Ependymoma has been resected from the cord at the cervical thoracic junction  No residual enhancement is noted  The cord is strikingly atrophic at this level, most notably between the C7-T1 disc space at the caudal T2 level  Cord tapers abnormally above and below this short distance as well  Cord is ribbon like through this region with myelotomy defect posteriorly to the right, extending caudally a short distance from the C7-T1 disc space  PREVERTEBRAL AND PARASPINAL SOFT TISSUES:  Paraspinal fluid collections are present above and below the construct at the C2-3, and to the L2-3 levels  Volume and appearance of these collections is not unexpected given recent surgical changes  VISUALIZED POSTERIOR FOSSA:  The visualized posterior fossa demonstrates no abnormal signal  CERVICAL DISC SPACES: C2-C3:  Normal  C3-C4:  Minor disc bulge C4-C5:  Normal  C5-C6:  Minor C6-C7:  Normal  C7-T1:  Normal  UPPER THORACIC DISC SPACES:  Normal  POSTCONTRAST IMAGING:  No convincing evidence for pathologic enhancement  Images are degraded by ferromagnetic fixation hardware  Impression: Interval decompressive laminectomy and resection of cervical thoracic ependymoma  Marked cord atrophy without indication of tumor  Expected postoperative appearance  Workstation performed: [id number]     Mri Thoracic Spine With And Without Contrast    Result Date: 3/6/2019  Narrative: MRI THORACIC SPINE WITH AND WITHOUT CONTRAST INDICATION: C71 9: Malignant neoplasm of brain, unspecified  COMPARISON:  1/7/2019 TECHNIQUE:  Sagittal T1, sagittal T2, sagittal inversion recovery, axial T2,  axial 2D MERGE  Sagittal and axial T1 postcontrast  IV Contrast:  8 mL of gadobutrol injection (MULTI-DOSE) IMAGE QUALITY:  Diagnostic  FINDINGS: ALIGNMENT:  The patient is status post extensive posterior decompression of the lower cervical and upper thoracic spine with articulating facet and pedicle screws from C2 through T5  Artifact from hardware limits the immediately adjacent soft tissues   Stable alignment of the cervical and thoracic spine  Slight anterior subluxation of C7 upon T1  No compression fracture  No scoliosis  MARROW SIGNAL:  Normal marrow signal is identified within the visualized bony structures  No discrete marrow lesion  THORACIC CORD:  There is heterogeneous signal identified within the lower cervical cord and upper thoracic cord  The previously seen cystic component of the tumor has been decrease crest   Tumors superior extent is to the C5 superior endplate  The inferior extent of the tumor is to the T3 inferior endplate  There is very little peripheral cord tissue identified at the T1 and T2 levels  Postcontrast imaging limited by artifact from internal hardware  Minimal peripheral enhancement noted in the upper thoracic spine  PREVERTEBRAL AND PARASPINAL SOFT TISSUES:   Expected postoperative change at the cervicothoracic decompression posteriorly without mass effect upon the thecal sac  There is fluid posterior to the musculature within the subcutaneous fat suggesting postop  seroma  THORACIC DEGENERATIVE CHANGE:  Below the T5 level there is no disc herniation, canal stenosis or foraminal narrowing  Impression: Extensive cervical thoracic fusion and posterior decompression  The previously identified cystic cavity has been decompressed within the upper thoracic spine  The cord tumor extends superiorly to the superior endplate of C5 and inferiorly to the inferior endplate of T3  Small postoperative seroma within the subcutaneous fat posteriorly at the operative levels  Workstation performed: [id number]     Mri Lumbar Spine With And Without Contrast    Result Date: 3/6/2019  Narrative: MRI LUMBAR SPINE WITH AND WITHOUT CONTRAST INDICATION: Cervicothoracic ependymoma    COMPARISON:  11/30/2018  TECHNIQUE:  Sagittal T1, sagittal T2, sagittal inversion recovery, axial T1 and axial T2, coronal T2    Sagittal and axial T1 postcontrast  IV Contrast:  8 mL of gadobutrol injection (MULTI-DOSE) IMAGE QUALITY:  Diagnostic FINDINGS: VERTEBRAL BODIES:  Grade 1 anterior spondylolisthesis of L5 upon S1 with no spondylolysis  No compression fracture  No scoliosis  Mild endplate marrow degenerative change at the L3-L4 levels  SACRUM:  Normal signal within the sacrum  No evidence of insufficiency or stress fracture  DISTAL CORD AND CONUS:  Normal size and signal within the distal cord and conus  PARASPINAL SOFT TISSUES:  Simple renal cysts are noted bilaterally  LOWER THORACIC DISC SPACES:  Normal disc height and signal   No disc herniation, canal stenosis or foraminal narrowing  LUMBAR DISC SPACES: L1-L2:  Normal  L2-L3:  Normal  L3-L4:  Disc desiccation and loss of disc height with mild annular bulging  There is a small broad-based left foraminal disc protrusion  No canal stenosis  There is stable left foraminal narrowing  L4-L5:  Normal disc height and signal   Mild facet arthropathy  No canal stenosis or foraminal narrowing  L5-S1:  Grade 1 anterior spondylolisthesis of L5 upon S1 on the basis of facet hypertrophic degenerative change  Bilateral facet joint effusions  Slight canal stenosis without impingement of the thecal sac  Moderate left and mild right foraminal narrowing is stable  POSTCONTRAST IMAGING:  No abnormal enhancement within the central canal or thecal sac to suggest drop metastasis  Impression: Stable lumbar degenerative disc disease  Small left foraminal disc protrusion at L3-4 with associated foraminal narrowing  Spondylolisthesis of L5 upon S1 with stable foraminal narrowing  Workstation performed: [id number]     Fl Lumbar Puncture    Result Date: 2/21/2019  Narrative: FLUOROSCOPICALLY GUIDED LUMBAR PUNCTURE  INDICATION:  Ependymoma  Patient is status post resection of intramedullary mass C5-T3, posterior laminectomy C4-T3, fenestration of syrinx C7-T2, and fixation/fusion C3-T4  Evaluate cerebral spinal fluid for cytology    FLUOROSCOPY TIME:  0 17 minutes IMAGES:  4    TECHNIQUE:   Consent was obtained after fully explaining the procedure to the patient  Risks and benefits of procedure were described and understood  Precautions to avoid spinal headache were reviewed  The patient was positioned prone on the fluoroscopy table with cervical collar remaining in place at all times  A wedge was placed under the patient's chest to accommodate the collar and maintain neutral cervical spine position  1% lidocaine was infiltrated at the puncture site  Utilizing right paravertebral approach, a 20 gauge 3 1/2 inch spinal needle was advanced under fluoroscopic guidance into the subarachnoid space at the L2 - L3 level, utilizing sterile technique  Once in position, approximately 17 5 cc of clear, colorless CSF were removed and placed into 4 tubes which subsequently were transported to the lab for requested analysis  The needle was removed  The patient tolerated the procedure well  The patient was discharged from the department with appropriate instructions  Impression: Successful fluoroscopically guided lumbar puncture  The above findings and procedure were reviewed with Dr Esther Krueger  Procedure was performed by João Kaur PA-C under the direct supervision of Dr Esther Krueger  Workstation performed: [id number]       I have personally reviewed pertinent reports     and I have personally reviewed pertinent films in PACS with a Radiologist

## 2019-03-07 ENCOUNTER — OFFICE VISIT (OUTPATIENT)
Dept: PHYSICAL THERAPY | Age: 59
End: 2019-03-07
Payer: COMMERCIAL

## 2019-03-07 DIAGNOSIS — M79.604 RIGHT LEG PAIN: ICD-10-CM

## 2019-03-07 DIAGNOSIS — R53.1 GENERAL WEAKNESS: ICD-10-CM

## 2019-03-07 DIAGNOSIS — G06.1 SPINAL CORD ABSCESS: Primary | ICD-10-CM

## 2019-03-07 PROCEDURE — 97112 NEUROMUSCULAR REEDUCATION: CPT | Performed by: PHYSICAL THERAPIST

## 2019-03-07 PROCEDURE — 97110 THERAPEUTIC EXERCISES: CPT | Performed by: PHYSICAL THERAPIST

## 2019-03-07 NOTE — PROGRESS NOTES
Daily Note     Today's date: 3/7/2019  Patient name: Mel Gonzalez  : 1960  MRN: [id number]  Referring provider: Sergio Marcus MD  Dx:   Encounter Diagnosis     ICD-10-CM    1  Spinal cord abscess G06 1    2  Right leg pain M79 604    3  General weakness R53 1        Start Time: 1530  Stop Time: 1615  Total time in clinic (min): 45 minutes    Subjective: Pt reports he will begin radiation on        Objective: See treatment diary below      Assessment: Tolerated treatment well  Pt's POC was advanced to include more reps of proximal strengthening exercises to increase tolerance to community ambulation  Patient demonstrated fatigue post treatment and would benefit from continued PT      Plan: Continue per plan of care         Precautions: Cervical tumor    Daily Treatment Diary     Manual                                                                                   Exercise Diary  2/25 2/28 3/7          RB nv 7 min 5 mins          Clamshells HEP 2*10 R SL 2*10 R SL          Quad sets HEP 30*5"  30*5"          Seated marches HEP 20ea 30 ea          LAQ HEP 20ea 30 ea          Ankle pumps HEP 30*2" 30*2"          Mini squats nv 2*10 3*10          Standing hip abduction nv 2*10 ea 2*10          Tandem stance nv 3*30 UE support foam 3*30 UE support foam          Tandem walking nv nv           STS nv nv           Step-ups nv 2*10 L 3*5 R           Bridges  2*10 3*10          Biodex  LOS 2x L1 1xL2 LOS 2x L1 1xL2          R SLR   2*10                                                                               Modalities

## 2019-03-08 ENCOUNTER — OFFICE VISIT (OUTPATIENT)
Dept: PHYSICAL THERAPY | Age: 59
End: 2019-03-08
Payer: COMMERCIAL

## 2019-03-08 DIAGNOSIS — R53.1 GENERAL WEAKNESS: ICD-10-CM

## 2019-03-08 DIAGNOSIS — G06.1 SPINAL CORD ABSCESS: Primary | ICD-10-CM

## 2019-03-08 DIAGNOSIS — M79.604 RIGHT LEG PAIN: ICD-10-CM

## 2019-03-08 PROCEDURE — 97112 NEUROMUSCULAR REEDUCATION: CPT

## 2019-03-08 PROCEDURE — 97110 THERAPEUTIC EXERCISES: CPT

## 2019-03-08 NOTE — PROGRESS NOTES
Daily Note     Today's date: 3/8/2019  Patient name: Heavenly Saab  : 1960  MRN: [id number]  Referring provider: Mandy Da Silva MD  Dx:   Encounter Diagnosis     ICD-10-CM    1  Spinal cord abscess G06 1    2  Right leg pain M79 604    3  General weakness R53 1                   Subjective: Pt reports feeling good today upon arrival  Pt felt good at the end of session today  Objective: See treatment diary below  Pt educated on importance of recovery periods  STS added  Assessment: Tolerated treatment well  Pt shows high motivation  Plan: Progress treatment as tolerated        Precautions: Cervical tumor    Daily Treatment Diary     Manual                                                                                   Exercise Diary  2/25 2/28 3/7 3/8/19         RB nv 7 min 5 mins 7 min         Clamshells HEP 2*10 R SL 2*10 R SL 2x10 RSL         Quad sets HEP 30*5"  30*5" 2x10 ea 3 sec hold         Seated marches HEP 20ea 30 ea 2x30         LAQ HEP 20ea 30 ea 30 ea         Ankle pumps HEP 30*2" 30*2" 30x2         Mini squats nv 2*10 3*10 3x10         Standing hip abduction nv 2*10 ea 2*10 2x10         Tandem stance nv 3*30 UE support foam 3*30 UE support foam          Tandem walking nv nv           STS nv nv           Step-ups nv 2*10 L 3*5 R           Bridges  2*10 3*10 3x10         Biodex  LOS 2x L1 1xL2 LOS 2x L1 1xL2          R SLR   2*10 3x10                                                                              Modalities                                                         Patient treated by SHIRA Lara under my direct supervision

## 2019-03-11 ENCOUNTER — OFFICE VISIT (OUTPATIENT)
Dept: PHYSICAL THERAPY | Age: 59
End: 2019-03-11
Payer: COMMERCIAL

## 2019-03-11 DIAGNOSIS — G06.1 SPINAL CORD ABSCESS: Primary | ICD-10-CM

## 2019-03-11 DIAGNOSIS — R53.1 GENERAL WEAKNESS: ICD-10-CM

## 2019-03-11 DIAGNOSIS — M79.604 RIGHT LEG PAIN: ICD-10-CM

## 2019-03-11 PROCEDURE — 97110 THERAPEUTIC EXERCISES: CPT

## 2019-03-11 PROCEDURE — 97112 NEUROMUSCULAR REEDUCATION: CPT

## 2019-03-11 NOTE — PROGRESS NOTES
Daily Note     Today's date: 3/11/2019  Patient name: Tanya Small  : 1960  MRN: [id number]  Referring provider: Luan Weaver MD  Dx:   Encounter Diagnosis     ICD-10-CM    1  Spinal cord abscess G06 1    2  Right leg pain M79 604    3  General weakness R53 1                   Subjective: pt reports fatigue after todays session, pt reports he's been weaning out of neck brace without any problems and working on cervical AROM as directed by MD    Objective: See treatment diary below  There ex OSMAN LE's    Assessment: Tolerated treatment well  Patient demonstrated fatigue post treatment, focused on ex technique      Plan: Continue per plan of care  Progress treatment as tolerated        Precautions: Cervical tumor    Daily Treatment Diary     Manual                                                                                   Exercise Diary  2/25 2/28 3/7 3/8/19 3/11/19        RB nv 7 min 5 mins 7 min 5 min        Clamshells HEP 2*10 R SL 2*10 R SL 2x10 R SL R leg        Quad sets HEP 30*5"  30*5" 2x10 ea 3 sec hold 2x10x5" ea        Seated marches HEP 20ea 30 ea 2x30 3x10x3"ea        LAQ HEP 20ea 30 ea 30 ea 3x10x5" ea        Ankle pumps HEP 30*2" 30*2" 30x2 2x30        Mini squats nv 2*10 3*10 3x10 np        Standing hip abduction nv 2*10 ea 2*10 2x10 2x10        Tandem stance nv 3*30 UE support foam 3*30 UE support foam          Tandem walking nv nv           STS nv nv   10x        Step-ups nv 2*10 L 3*5 R           Bridges  2*10 3*10 3x10 3x10x5"        Biodex  LOS 2x L1 1xL2 LOS 2x L1 1xL2          R SLR   2*10 3x10 2x10        hooklying hip abd with TB     ytb 2x10x3"                                                                Modalities

## 2019-03-14 ENCOUNTER — OFFICE VISIT (OUTPATIENT)
Dept: PHYSICAL THERAPY | Age: 59
End: 2019-03-14
Payer: COMMERCIAL

## 2019-03-14 DIAGNOSIS — G06.1 SPINAL CORD ABSCESS: Primary | ICD-10-CM

## 2019-03-14 DIAGNOSIS — R53.1 GENERAL WEAKNESS: ICD-10-CM

## 2019-03-14 DIAGNOSIS — M79.604 RIGHT LEG PAIN: ICD-10-CM

## 2019-03-14 PROCEDURE — 97530 THERAPEUTIC ACTIVITIES: CPT | Performed by: PHYSICAL THERAPIST

## 2019-03-14 PROCEDURE — 97112 NEUROMUSCULAR REEDUCATION: CPT | Performed by: PHYSICAL THERAPIST

## 2019-03-14 PROCEDURE — 97110 THERAPEUTIC EXERCISES: CPT | Performed by: PHYSICAL THERAPIST

## 2019-03-14 NOTE — PROGRESS NOTES
Daily Note     Today's date: 3/14/2019  Patient name: Edilson Zavaleta  : 1960  MRN: [id number]  Referring provider: Bridgett Serra MD  Dx:   Encounter Diagnosis     ICD-10-CM    1  Spinal cord abscess G06 1    2  Right leg pain M79 604    3  General weakness R53 1        Start Time: 0930  Stop Time: 1015  Total time in clinic (min): 45 minutes    Subjective: Pt reports slight frustration with plateau in progress  Objective: See treatment diary below      Assessment: Tolerated treatment well  Pt's POC was progressed to include more ankle strengthening exercises to improve gait pattern  Pt was educated on returning to gym and using TM to improve gait pattern and focusing on "heel-toe" pattern  Patient demonstrated fatigue post treatment and would benefit from continued PT      Plan: Continue per plan of care        Precautions: Cervical tumor    Daily Treatment Diary     Manual                                                                                   Exercise Diary  2/25 2/28 3/7 3/8/19 3/11/19 3/14       RB nv 7 min 5 mins 7 min 5 min 5 min       Clamshells HEP 2*10 R SL 2*10 R SL 2x10 R SL R leg        Quad sets HEP 30*5"  30*5" 2x10 ea 3 sec hold 2x10x5" ea 2*10*5"       Seated marches HEP 20ea 30 ea 2x30 3x10x3"ea 3x10x3"ea       LAQ HEP 20ea 30 ea 30 ea 3x10x5" ea 3x10x5" ea       Ankle pumps HEP 30*2" 30*2" 30x2 2x30 3*30       Mini squats nv 2*10 3*10 3x10 np 3*10       Standing hip abduction nv 2*10 ea 2*10 2x10 2x10        Tandem stance nv 3*30 UE support foam 3*30 UE support foam          Tandem walking nv nv           STS nv nv   10x        Step-ups nv 2*10 L 3*5 R    2*10 R       Bridges  2*10 3*10 3x10 3x10x5" 3*10*5"       Biodex  LOS 2x L1 1xL2 LOS 2x L1 1xL2          R SLR   2*10 3x10 2x10 2*10       hooklying hip abd with TB     ytb 2x10x3" ytb 2x10x3"       TM walking      7 mins       Seated SLR DF R      2*10       Standing Toe raises      3*10 Modalities

## 2019-03-18 ENCOUNTER — OFFICE VISIT (OUTPATIENT)
Dept: PHYSICAL THERAPY | Age: 59
End: 2019-03-18
Payer: COMMERCIAL

## 2019-03-18 DIAGNOSIS — G06.1 SPINAL CORD ABSCESS: Primary | ICD-10-CM

## 2019-03-18 DIAGNOSIS — R53.1 GENERAL WEAKNESS: ICD-10-CM

## 2019-03-18 DIAGNOSIS — M79.604 RIGHT LEG PAIN: ICD-10-CM

## 2019-03-18 PROCEDURE — 97112 NEUROMUSCULAR REEDUCATION: CPT

## 2019-03-18 PROCEDURE — 97530 THERAPEUTIC ACTIVITIES: CPT

## 2019-03-18 PROCEDURE — 97110 THERAPEUTIC EXERCISES: CPT

## 2019-03-18 NOTE — PROGRESS NOTES
Daily Note     Today's date: 3/18/2019  Patient name: Naomi Sanz  : 1960  MRN: [id number]  Referring provider: Solomon Hameed MD  Dx:   Encounter Diagnosis     ICD-10-CM    1  Spinal cord abscess G06 1    2  Right leg pain M79 604    3  General weakness R53 1                   Subjective:  Pt reports he feels better than last week       Objective: See treatment diary below      Assessment:  Pt required frequent,brief recovery periods due to fatigue,     Plan: Continue per plan of care  Progress treatment as tolerated        Precautions: Cervical tumor    Daily Treatment Diary     Manual                                                                                   Exercise Diary  2/25 2/28 3/7 3/8/19 3/11/19 3/14 3/18/19      RB nv 7 min 5 mins 7 min 5 min 5 min 6 min      Clamshells HEP 2*10 R SL 2*10 R SL 2x10 R SL R leg        Quad sets HEP 30*5"  30*5" 2x10 ea 3 sec hold 2x10x5" ea 2*10*5" 3x10x5"      Seated marches HEP 20ea 30 ea 2x30 3x10x3"ea 3x10x3"ea 3x10x3" ea      LAQ HEP 20ea 30 ea 30 ea 3x10x5" ea 3x10x5" ea 3x10x5" ea      Ankle pumps HEP 30*2" 30*2" 30x2 2x30 3*30 30x3"      Mini squats nv 2*10 3*10 3x10 np 3*10 3x10      Standing hip abduction nv 2*10 ea 2*10 2x10 2x10  2x10       Tandem stance nv 3*30 UE support foam 3*30 UE support foam          Tandem walking nv nv           STS nv nv   10x        Step-ups nv 2*10 L 3*5 R    2*10 R       Bridges  2*10 3*10 3x10 3x10x5" 3*10*5" 3x10x5"      Biodex  LOS 2x L1 1xL2 LOS 2x L1 1xL2    LOS static level 1 x3      R SLR   2*10 3x10 2x10 2*10       hooklying hip abd with TB     ytb 2x10x3" ytb 2x10x3"       TM walking      7 mins 5 min      Seated SLR DF R      2*10       Standing Toe raises      3*10 3x10                       Modalities

## 2019-03-21 ENCOUNTER — OFFICE VISIT (OUTPATIENT)
Dept: PHYSICAL THERAPY | Age: 59
End: 2019-03-21
Payer: COMMERCIAL

## 2019-03-21 DIAGNOSIS — R53.1 GENERAL WEAKNESS: ICD-10-CM

## 2019-03-21 DIAGNOSIS — G06.1 SPINAL CORD ABSCESS: Primary | ICD-10-CM

## 2019-03-21 DIAGNOSIS — M79.604 RIGHT LEG PAIN: ICD-10-CM

## 2019-03-21 PROCEDURE — 97112 NEUROMUSCULAR REEDUCATION: CPT | Performed by: PHYSICAL THERAPIST

## 2019-03-21 PROCEDURE — 97110 THERAPEUTIC EXERCISES: CPT | Performed by: PHYSICAL THERAPIST

## 2019-03-21 PROCEDURE — 97530 THERAPEUTIC ACTIVITIES: CPT | Performed by: PHYSICAL THERAPIST

## 2019-03-21 NOTE — PROGRESS NOTES
Daily Note     Today's date: 3/21/2019  Patient name: Miguel Polanco  : 1960  MRN: [id number]  Referring provider: Amber Pelayo MD  Dx:   Encounter Diagnosis     ICD-10-CM    1  Spinal cord abscess G06 1    2  Right leg pain M79 604    3  General weakness R53 1        Start Time: 0930  Stop Time: 1030  Total time in clinic (min): 60 minutes    Subjective: Pt reports improvements in symptoms      Objective: See treatment diary below      Assessment: Tolerated treatment well  Pt demonstrates increased strength and POC was advanced to include more neuro control exercises to improve gait pattern  Patient demonstrated fatigue post treatment and would benefit from continued PT      Plan: Continue per plan of care  Include more functional gait training exercises       Precautions: Cervical tumor    Daily Treatment Diary     Manual                                                                                   Exercise Diary  2/25 2/28 3/7 3/8/19 3/11/19 3/14 3/18/19 3/21     RB nv 7 min 5 mins 7 min 5 min 5 min 6 min 5 mins     Clamshells HEP 2*10 R SL 2*10 R SL 2x10 R SL R leg        Quad sets HEP 30*5"  30*5" 2x10 ea 3 sec hold 2x10x5" ea 2*10*5" 3x10x5" 3*10*5"     Seated marches HEP 20ea 30 ea 2x30 3x10x3"ea 3x10x3"ea 3x10x3" ea 3*10*3"     LAQ HEP 20ea 30 ea 30 ea 3x10x5" ea 3x10x5" ea 3x10x5" ea 3x10x5" ea     Ankle pumps HEP 30*2" 30*2" 30x2 2x30 3*30 30x3"      Mini squats nv 2*10 3*10 3x10 np 3*10 3x10 3x10     Standing hip abduction nv 2*10 ea 2*10 2x10 2x10  2x10  2x10      Tandem stance nv 3*30 UE support foam 3*30 UE support foam          Tandem walking nv nv           STS nv nv   10x        Step-ups nv 2*10 L 3*5 R    2*10 R       Bridges  2*10 3*10 3x10 3x10x5" 3*10*5" 3x10x5" 3*10*5"     Biodex  LOS 2x L1 1xL2 LOS 2x L1 1xL2    LOS static level 1 x3 LOS static level 1 x3     R SLR   2*10 3x10 2x10 2*10  2*10     hooklying hip abd with TB     ytb 2x10x3" ytb 2x10x3"  ytb 2x10x3"     TM walking      7 mins 5 min 5 mins     Seated SLR DF R      2*10       Standing Toe raises      3*10 3x10 3*10     Step-over cones        5 laps         Modalities

## 2019-03-25 ENCOUNTER — APPOINTMENT (OUTPATIENT)
Dept: PHYSICAL THERAPY | Age: 59
End: 2019-03-25
Payer: COMMERCIAL

## 2019-03-26 ENCOUNTER — OFFICE VISIT (OUTPATIENT)
Dept: PHYSICAL THERAPY | Age: 59
End: 2019-03-26
Payer: COMMERCIAL

## 2019-03-26 DIAGNOSIS — R53.1 GENERAL WEAKNESS: ICD-10-CM

## 2019-03-26 DIAGNOSIS — G06.1 SPINAL CORD ABSCESS: Primary | ICD-10-CM

## 2019-03-26 DIAGNOSIS — M79.604 RIGHT LEG PAIN: ICD-10-CM

## 2019-03-26 PROCEDURE — 97110 THERAPEUTIC EXERCISES: CPT

## 2019-03-26 PROCEDURE — 97112 NEUROMUSCULAR REEDUCATION: CPT

## 2019-03-26 NOTE — PROGRESS NOTES
Daily Note     Today's date: 3/26/2019  Patient name: Aris Perez  : 1960  MRN: [id number]  Referring provider: Sulma Azevedo MD  Dx:   Encounter Diagnosis     ICD-10-CM    1  Spinal cord abscess G06 1    2  Right leg pain M79 604    3  General weakness R53 1                   Subjective:  Pt reports he's fearful of future radiation treatments, pt reports fatigue after today's treatment      Objective: See treatment diary below      Assessment: Tolerated treatment well  Patient demonstrated fatigue post treatment and would benefit from continued PT, added functional/balance ex as per PT      Plan: Continue per plan of care  Progress treatment as tolerated          Precautions: Cervical tumor    Daily Treatment Diary     Manual                                                                                   Exercise Diary  2/25 2/28 3/7 3/8/19 3/11/19 3/14 3/18/19 3/21 3/26/19    RB nv 7 min 5 mins 7 min 5 min 5 min 6 min 5 mins 6 min    Clamshells HEP 2*10 R SL 2*10 R SL 2x10 R SL R leg        Quad sets HEP 30*5"  30*5" 2x10 ea 3 sec hold 2x10x5" ea 2*10*5" 3x10x5" 3*10*5"     Seated marches HEP 20ea 30 ea 2x30 3x10x3"ea 3x10x3"ea 3x10x3" ea 3*10*3"     LAQ HEP 20ea 30 ea 30 ea 3x10x5" ea 3x10x5" ea 3x10x5" ea 3x10x5" ea     Ankle pumps HEP 30*2" 30*2" 30x2 2x30 3*30 30x3"      Mini squats nv 2*10 3*10 3x10 np 3*10 3x10 3x10 3x10    Standing hip abduction nv 2*10 ea 2*10 2x10 2x10  2x10  2x10      Tandem stance nv 3*30 UE support foam 3*30 UE support foam      On foam 2x30" ea    Tandem walking nv nv           STS nv nv   10x        Step-ups nv 2*10 L 3*5 R    2*10 R       Bridges  2*10 3*10 3x10 3x10x5" 3*10*5" 3x10x5" 3*10*5"     Biodex  LOS 2x L1 1xL2 LOS 2x L1 1xL2    LOS static level 1 x3 LOS static level 1 x3     R SLR   2*10 3x10 2x10 2*10  2*10     hooklying hip abd with TB     ytb 2x10x3" ytb 2x10x3"  ytb 2x10x3"     TM walking      7 mins 5 min 5 mins     Seated SLR DF R      2*10 Standing Toe raises      3*10 3x10 3*10     Step-over cones        5 laps sideways 4x15ft/ frwd 4x15ft    Standing on foam NBOS         2x1min    Ball toss rebounder         grn ball 2x30    CC walk outs         frwd/bkwd 10# x5 ea        Modalities

## 2019-03-28 ENCOUNTER — OFFICE VISIT (OUTPATIENT)
Dept: PHYSICAL THERAPY | Age: 59
End: 2019-03-28
Payer: COMMERCIAL

## 2019-03-28 DIAGNOSIS — M79.604 RIGHT LEG PAIN: ICD-10-CM

## 2019-03-28 DIAGNOSIS — R53.1 GENERAL WEAKNESS: ICD-10-CM

## 2019-03-28 DIAGNOSIS — G06.1 SPINAL CORD ABSCESS: Primary | ICD-10-CM

## 2019-03-28 PROCEDURE — 97112 NEUROMUSCULAR REEDUCATION: CPT

## 2019-03-28 PROCEDURE — 97110 THERAPEUTIC EXERCISES: CPT

## 2019-03-28 NOTE — PROGRESS NOTES
Daily Note     Today's date: 3/28/2019  Patient name: Heavenly Saab  : 1960  MRN: [id number]  Referring provider: Mandy Da Silva MD  Dx:   Encounter Diagnosis     ICD-10-CM    1  Spinal cord abscess G06 1    2  Right leg pain M79 604    3  General weakness R53 1                   Subjective:  Pt reports he has a hx of back pain that's related to his job/lifting for years, "Bothers me today"      Objective: See treatment diary below      Assessment:  Progressing fx /balance ex as dez, mod-max fatigue noted with ex progressions      Plan: Continue per plan of care  Progress treatment as tolerated        Precautions: Cervical tumor    Daily Treatment Diary     Manual                                                                                   Exercise Diary  2/25 2/28 3/7 3/8/19 3/11/19 3/14 3/18/19 3/21 3/26/19 3/28/19   RB nv 7 min 5 mins 7 min 5 min 5 min 6 min 5 mins 6 min    Clamshells HEP 2*10 R SL 2*10 R SL 2x10 R SL R leg        Quad sets HEP 30*5"  30*5" 2x10 ea 3 sec hold 2x10x5" ea 2*10*5" 3x10x5" 3*10*5"     Seated marches HEP 20ea 30 ea 2x30 3x10x3"ea 3x10x3"ea 3x10x3" ea 3*10*3"     LAQ HEP 20ea 30 ea 30 ea 3x10x5" ea 3x10x5" ea 3x10x5" ea 3x10x5" ea     Ankle pumps HEP 30*2" 30*2" 30x2 2x30 3*30 30x3"      Mini squats nv 2*10 3*10 3x10 np 3*10 3x10 3x10 3x10    Standing hip abduction nv 2*10 ea 2*10 2x10 2x10  2x10  2x10      Tandem stance nv 3*30 UE support foam 3*30 UE support foam      On foam 2x30" ea    Tandem walking nv nv           STS nv nv   10x     15x   Step-ups nv 2*10 L 3*5 R    2*10 R       Bridges  2*10 3*10 3x10 3x10x5" 3*10*5" 3x10x5" 3*10*5"  3x10x5" with pertubations   Biodex  LOS 2x L1 1xL2 LOS 2x L1 1xL2    LOS static level 1 x3 LOS static level 1 x3  LOS static level 3x4   R SLR   2*10 3x10 2x10 2*10  2*10     hooklying hip abd with TB     ytb 2x10x3" ytb 2x10x3"  ytb 2x10x3"     TM walking      7 mins 5 min 5 mins     Seated SLR DF R      2*10       Standing Toe raises      3*10 3x10 3*10  3x10 ea   Step-over cones        5 laps sideways 4x15ft/ frwd 4x15ft    Standing on foam NBOS         2x1min 2x1 min with pertubation   Ball toss rebounder         grn ball 2x30 grn ball  2x30   CC walk outs         frwd/bkwd 10# x5 ea frwd/bkwd/sideways 5x ea        Modalities

## 2019-04-01 ENCOUNTER — OFFICE VISIT (OUTPATIENT)
Dept: PHYSICAL THERAPY | Age: 59
End: 2019-04-01
Payer: COMMERCIAL

## 2019-04-01 DIAGNOSIS — M79.604 RIGHT LEG PAIN: ICD-10-CM

## 2019-04-01 DIAGNOSIS — G06.1 SPINAL CORD ABSCESS: Primary | ICD-10-CM

## 2019-04-01 DIAGNOSIS — R53.1 GENERAL WEAKNESS: ICD-10-CM

## 2019-04-01 PROCEDURE — 97530 THERAPEUTIC ACTIVITIES: CPT | Performed by: PHYSICAL THERAPIST

## 2019-04-01 PROCEDURE — 97112 NEUROMUSCULAR REEDUCATION: CPT | Performed by: PHYSICAL THERAPIST

## 2019-04-01 PROCEDURE — 97110 THERAPEUTIC EXERCISES: CPT | Performed by: PHYSICAL THERAPIST

## 2019-04-04 ENCOUNTER — EVALUATION (OUTPATIENT)
Dept: PHYSICAL THERAPY | Age: 59
End: 2019-04-04
Payer: COMMERCIAL

## 2019-04-04 DIAGNOSIS — G06.1 SPINAL CORD ABSCESS: Primary | ICD-10-CM

## 2019-04-04 DIAGNOSIS — M79.604 RIGHT LEG PAIN: ICD-10-CM

## 2019-04-04 DIAGNOSIS — R53.1 GENERAL WEAKNESS: ICD-10-CM

## 2019-04-04 DIAGNOSIS — E03.9 HYPOTHYROIDISM, UNSPECIFIED TYPE: ICD-10-CM

## 2019-04-04 PROCEDURE — 97110 THERAPEUTIC EXERCISES: CPT | Performed by: PHYSICAL THERAPIST

## 2019-04-04 PROCEDURE — 97112 NEUROMUSCULAR REEDUCATION: CPT | Performed by: PHYSICAL THERAPIST

## 2019-04-04 PROCEDURE — 97530 THERAPEUTIC ACTIVITIES: CPT | Performed by: PHYSICAL THERAPIST

## 2019-04-04 RX ORDER — LEVOTHYROXINE SODIUM 0.03 MG/1
25 TABLET ORAL DAILY
Qty: 30 TABLET | Refills: 5 | Status: SHIPPED | OUTPATIENT
Start: 2019-04-04 | End: 2019-09-13 | Stop reason: SDUPTHER

## 2019-04-05 ENCOUNTER — APPOINTMENT (OUTPATIENT)
Dept: RADIATION ONCOLOGY | Facility: HOSPITAL | Age: 59
End: 2019-04-05
Attending: RADIOLOGY
Payer: COMMERCIAL

## 2019-04-05 PROCEDURE — 77334 RADIATION TREATMENT AID(S): CPT | Performed by: RADIOLOGY

## 2019-04-08 ENCOUNTER — DOCUMENTATION (OUTPATIENT)
Dept: RADIATION ONCOLOGY | Facility: HOSPITAL | Age: 59
End: 2019-04-08

## 2019-04-08 ENCOUNTER — OFFICE VISIT (OUTPATIENT)
Dept: PHYSICAL THERAPY | Age: 59
End: 2019-04-08
Payer: COMMERCIAL

## 2019-04-08 DIAGNOSIS — G06.1 SPINAL CORD ABSCESS: Primary | ICD-10-CM

## 2019-04-08 DIAGNOSIS — R53.1 GENERAL WEAKNESS: ICD-10-CM

## 2019-04-08 DIAGNOSIS — M79.604 RIGHT LEG PAIN: ICD-10-CM

## 2019-04-08 PROCEDURE — 97110 THERAPEUTIC EXERCISES: CPT | Performed by: PHYSICAL THERAPIST

## 2019-04-08 PROCEDURE — 97112 NEUROMUSCULAR REEDUCATION: CPT | Performed by: PHYSICAL THERAPIST

## 2019-04-08 PROCEDURE — 97530 THERAPEUTIC ACTIVITIES: CPT | Performed by: PHYSICAL THERAPIST

## 2019-04-11 ENCOUNTER — TELEPHONE (OUTPATIENT)
Dept: NEUROSURGERY | Facility: CLINIC | Age: 59
End: 2019-04-11

## 2019-04-11 ENCOUNTER — OFFICE VISIT (OUTPATIENT)
Dept: PHYSICAL THERAPY | Age: 59
End: 2019-04-11
Payer: COMMERCIAL

## 2019-04-11 DIAGNOSIS — R53.1 GENERAL WEAKNESS: ICD-10-CM

## 2019-04-11 DIAGNOSIS — M79.604 RIGHT LEG PAIN: ICD-10-CM

## 2019-04-11 DIAGNOSIS — G06.1 SPINAL CORD ABSCESS: Primary | ICD-10-CM

## 2019-04-11 PROCEDURE — 97530 THERAPEUTIC ACTIVITIES: CPT | Performed by: PHYSICAL THERAPIST

## 2019-04-11 PROCEDURE — 97110 THERAPEUTIC EXERCISES: CPT | Performed by: PHYSICAL THERAPIST

## 2019-04-11 PROCEDURE — 97112 NEUROMUSCULAR REEDUCATION: CPT | Performed by: PHYSICAL THERAPIST

## 2019-04-12 PROCEDURE — 77301 RADIOTHERAPY DOSE PLAN IMRT: CPT | Performed by: RADIOLOGY

## 2019-04-12 PROCEDURE — 77338 DESIGN MLC DEVICE FOR IMRT: CPT | Performed by: RADIOLOGY

## 2019-04-12 PROCEDURE — 77300 RADIATION THERAPY DOSE PLAN: CPT | Performed by: RADIOLOGY

## 2019-04-15 ENCOUNTER — OFFICE VISIT (OUTPATIENT)
Dept: PHYSICAL THERAPY | Age: 59
End: 2019-04-15
Payer: COMMERCIAL

## 2019-04-15 DIAGNOSIS — M79.604 RIGHT LEG PAIN: ICD-10-CM

## 2019-04-15 DIAGNOSIS — R53.1 GENERAL WEAKNESS: ICD-10-CM

## 2019-04-15 DIAGNOSIS — G06.1 SPINAL CORD ABSCESS: Primary | ICD-10-CM

## 2019-04-15 PROCEDURE — 97110 THERAPEUTIC EXERCISES: CPT | Performed by: PHYSICAL THERAPIST

## 2019-04-15 PROCEDURE — 97112 NEUROMUSCULAR REEDUCATION: CPT | Performed by: PHYSICAL THERAPIST

## 2019-04-15 PROCEDURE — 97530 THERAPEUTIC ACTIVITIES: CPT | Performed by: PHYSICAL THERAPIST

## 2019-04-16 ENCOUNTER — APPOINTMENT (OUTPATIENT)
Dept: RADIATION ONCOLOGY | Facility: HOSPITAL | Age: 59
End: 2019-04-16
Attending: RADIOLOGY
Payer: COMMERCIAL

## 2019-04-17 ENCOUNTER — APPOINTMENT (OUTPATIENT)
Dept: RADIATION ONCOLOGY | Facility: HOSPITAL | Age: 59
End: 2019-04-17
Payer: COMMERCIAL

## 2019-04-17 PROCEDURE — 77386 HB NTSTY MODUL RAD TX DLVR CPLX: CPT | Performed by: RADIOLOGY

## 2019-04-18 ENCOUNTER — APPOINTMENT (OUTPATIENT)
Dept: RADIATION ONCOLOGY | Facility: HOSPITAL | Age: 59
End: 2019-04-18
Attending: RADIOLOGY
Payer: COMMERCIAL

## 2019-04-18 ENCOUNTER — OFFICE VISIT (OUTPATIENT)
Dept: PHYSICAL THERAPY | Age: 59
End: 2019-04-18
Payer: COMMERCIAL

## 2019-04-18 DIAGNOSIS — M79.604 RIGHT LEG PAIN: ICD-10-CM

## 2019-04-18 DIAGNOSIS — G06.1 SPINAL CORD ABSCESS: ICD-10-CM

## 2019-04-18 DIAGNOSIS — R53.1 GENERAL WEAKNESS: Primary | ICD-10-CM

## 2019-04-18 PROCEDURE — 97110 THERAPEUTIC EXERCISES: CPT | Performed by: PHYSICAL THERAPIST

## 2019-04-18 PROCEDURE — 97530 THERAPEUTIC ACTIVITIES: CPT | Performed by: PHYSICAL THERAPIST

## 2019-04-18 PROCEDURE — 77386 HB NTSTY MODUL RAD TX DLVR CPLX: CPT | Performed by: RADIOLOGY

## 2019-04-18 PROCEDURE — 97112 NEUROMUSCULAR REEDUCATION: CPT | Performed by: PHYSICAL THERAPIST

## 2019-04-19 ENCOUNTER — APPOINTMENT (OUTPATIENT)
Dept: RADIATION ONCOLOGY | Facility: HOSPITAL | Age: 59
End: 2019-04-19
Attending: RADIOLOGY
Payer: COMMERCIAL

## 2019-04-19 PROCEDURE — 77386 HB NTSTY MODUL RAD TX DLVR CPLX: CPT | Performed by: RADIOLOGY

## 2019-04-22 ENCOUNTER — APPOINTMENT (OUTPATIENT)
Dept: RADIATION ONCOLOGY | Facility: HOSPITAL | Age: 59
End: 2019-04-22
Attending: RADIOLOGY
Payer: COMMERCIAL

## 2019-04-22 ENCOUNTER — OFFICE VISIT (OUTPATIENT)
Dept: PHYSICAL THERAPY | Age: 59
End: 2019-04-22
Payer: COMMERCIAL

## 2019-04-22 DIAGNOSIS — R53.1 GENERAL WEAKNESS: Primary | ICD-10-CM

## 2019-04-22 DIAGNOSIS — G06.1 SPINAL CORD ABSCESS: ICD-10-CM

## 2019-04-22 DIAGNOSIS — M79.604 RIGHT LEG PAIN: ICD-10-CM

## 2019-04-22 PROCEDURE — 97110 THERAPEUTIC EXERCISES: CPT | Performed by: PHYSICAL THERAPIST

## 2019-04-22 PROCEDURE — 97112 NEUROMUSCULAR REEDUCATION: CPT | Performed by: PHYSICAL THERAPIST

## 2019-04-22 PROCEDURE — 97530 THERAPEUTIC ACTIVITIES: CPT | Performed by: PHYSICAL THERAPIST

## 2019-04-22 PROCEDURE — 77386 HB NTSTY MODUL RAD TX DLVR CPLX: CPT | Performed by: RADIOLOGY

## 2019-04-23 ENCOUNTER — APPOINTMENT (OUTPATIENT)
Dept: RADIATION ONCOLOGY | Facility: HOSPITAL | Age: 59
End: 2019-04-23
Attending: RADIOLOGY
Payer: COMMERCIAL

## 2019-04-23 PROCEDURE — 77386 HB NTSTY MODUL RAD TX DLVR CPLX: CPT | Performed by: RADIOLOGY

## 2019-04-23 PROCEDURE — 77336 RADIATION PHYSICS CONSULT: CPT | Performed by: RADIOLOGY

## 2019-04-24 ENCOUNTER — APPOINTMENT (OUTPATIENT)
Dept: RADIATION ONCOLOGY | Facility: HOSPITAL | Age: 59
End: 2019-04-24
Attending: RADIOLOGY
Payer: COMMERCIAL

## 2019-04-24 PROCEDURE — 77386 HB NTSTY MODUL RAD TX DLVR CPLX: CPT | Performed by: RADIOLOGY

## 2019-04-25 ENCOUNTER — APPOINTMENT (OUTPATIENT)
Dept: RADIATION ONCOLOGY | Facility: HOSPITAL | Age: 59
End: 2019-04-25
Attending: RADIOLOGY
Payer: COMMERCIAL

## 2019-04-25 ENCOUNTER — OFFICE VISIT (OUTPATIENT)
Dept: PHYSICAL THERAPY | Age: 59
End: 2019-04-25
Payer: COMMERCIAL

## 2019-04-25 DIAGNOSIS — G06.1 SPINAL CORD ABSCESS: ICD-10-CM

## 2019-04-25 DIAGNOSIS — R53.1 GENERAL WEAKNESS: Primary | ICD-10-CM

## 2019-04-25 DIAGNOSIS — M79.604 RIGHT LEG PAIN: ICD-10-CM

## 2019-04-25 PROCEDURE — 97112 NEUROMUSCULAR REEDUCATION: CPT | Performed by: PHYSICAL THERAPIST

## 2019-04-25 PROCEDURE — 97530 THERAPEUTIC ACTIVITIES: CPT | Performed by: PHYSICAL THERAPIST

## 2019-04-25 PROCEDURE — 77386 HB NTSTY MODUL RAD TX DLVR CPLX: CPT | Performed by: RADIOLOGY

## 2019-04-25 PROCEDURE — 97110 THERAPEUTIC EXERCISES: CPT | Performed by: PHYSICAL THERAPIST

## 2019-04-26 ENCOUNTER — APPOINTMENT (OUTPATIENT)
Dept: RADIATION ONCOLOGY | Facility: HOSPITAL | Age: 59
End: 2019-04-26
Attending: RADIOLOGY
Payer: COMMERCIAL

## 2019-04-26 PROCEDURE — 77386 HB NTSTY MODUL RAD TX DLVR CPLX: CPT | Performed by: RADIOLOGY

## 2019-04-29 ENCOUNTER — APPOINTMENT (OUTPATIENT)
Dept: RADIATION ONCOLOGY | Facility: HOSPITAL | Age: 59
End: 2019-04-29
Attending: RADIOLOGY
Payer: COMMERCIAL

## 2019-04-29 ENCOUNTER — OFFICE VISIT (OUTPATIENT)
Dept: PHYSICAL THERAPY | Age: 59
End: 2019-04-29
Payer: COMMERCIAL

## 2019-04-29 DIAGNOSIS — R53.1 GENERAL WEAKNESS: Primary | ICD-10-CM

## 2019-04-29 DIAGNOSIS — M79.604 RIGHT LEG PAIN: ICD-10-CM

## 2019-04-29 DIAGNOSIS — G06.1 SPINAL CORD ABSCESS: ICD-10-CM

## 2019-04-29 PROCEDURE — 97112 NEUROMUSCULAR REEDUCATION: CPT

## 2019-04-29 PROCEDURE — 97110 THERAPEUTIC EXERCISES: CPT

## 2019-04-30 ENCOUNTER — APPOINTMENT (OUTPATIENT)
Dept: RADIATION ONCOLOGY | Facility: HOSPITAL | Age: 59
End: 2019-04-30
Attending: RADIOLOGY
Payer: COMMERCIAL

## 2019-04-30 PROCEDURE — 77386 HB NTSTY MODUL RAD TX DLVR CPLX: CPT | Performed by: RADIOLOGY

## 2019-05-01 ENCOUNTER — APPOINTMENT (OUTPATIENT)
Dept: RADIATION ONCOLOGY | Facility: HOSPITAL | Age: 59
End: 2019-05-01
Attending: RADIOLOGY
Payer: COMMERCIAL

## 2019-05-01 PROCEDURE — 77386 HB NTSTY MODUL RAD TX DLVR CPLX: CPT | Performed by: RADIOLOGY

## 2019-05-01 PROCEDURE — 77336 RADIATION PHYSICS CONSULT: CPT | Performed by: RADIOLOGY

## 2019-05-02 ENCOUNTER — OFFICE VISIT (OUTPATIENT)
Dept: PHYSICAL THERAPY | Age: 59
End: 2019-05-02
Payer: COMMERCIAL

## 2019-05-02 ENCOUNTER — APPOINTMENT (OUTPATIENT)
Dept: RADIATION ONCOLOGY | Facility: HOSPITAL | Age: 59
End: 2019-05-02
Attending: RADIOLOGY
Payer: COMMERCIAL

## 2019-05-02 DIAGNOSIS — R53.1 GENERAL WEAKNESS: Primary | ICD-10-CM

## 2019-05-02 DIAGNOSIS — M79.604 RIGHT LEG PAIN: ICD-10-CM

## 2019-05-02 DIAGNOSIS — G06.1 SPINAL CORD ABSCESS: ICD-10-CM

## 2019-05-02 PROCEDURE — 77386 HB NTSTY MODUL RAD TX DLVR CPLX: CPT | Performed by: RADIOLOGY

## 2019-05-02 PROCEDURE — 97530 THERAPEUTIC ACTIVITIES: CPT | Performed by: PHYSICAL THERAPIST

## 2019-05-02 PROCEDURE — 97112 NEUROMUSCULAR REEDUCATION: CPT | Performed by: PHYSICAL THERAPIST

## 2019-05-02 PROCEDURE — 97110 THERAPEUTIC EXERCISES: CPT | Performed by: PHYSICAL THERAPIST

## 2019-05-03 ENCOUNTER — APPOINTMENT (OUTPATIENT)
Dept: RADIATION ONCOLOGY | Facility: HOSPITAL | Age: 59
End: 2019-05-03
Attending: RADIOLOGY
Payer: COMMERCIAL

## 2019-05-03 PROCEDURE — 77386 HB NTSTY MODUL RAD TX DLVR CPLX: CPT | Performed by: RADIOLOGY

## 2019-05-06 ENCOUNTER — OFFICE VISIT (OUTPATIENT)
Dept: PHYSICAL THERAPY | Age: 59
End: 2019-05-06
Payer: COMMERCIAL

## 2019-05-06 ENCOUNTER — APPOINTMENT (OUTPATIENT)
Dept: RADIATION ONCOLOGY | Facility: HOSPITAL | Age: 59
End: 2019-05-06
Attending: RADIOLOGY
Payer: COMMERCIAL

## 2019-05-06 DIAGNOSIS — G06.1 SPINAL CORD ABSCESS: ICD-10-CM

## 2019-05-06 DIAGNOSIS — M79.604 RIGHT LEG PAIN: ICD-10-CM

## 2019-05-06 DIAGNOSIS — R53.1 GENERAL WEAKNESS: Primary | ICD-10-CM

## 2019-05-06 PROCEDURE — 97110 THERAPEUTIC EXERCISES: CPT

## 2019-05-06 PROCEDURE — 97112 NEUROMUSCULAR REEDUCATION: CPT

## 2019-05-06 PROCEDURE — 77386 HB NTSTY MODUL RAD TX DLVR CPLX: CPT | Performed by: RADIOLOGY

## 2019-05-07 ENCOUNTER — APPOINTMENT (OUTPATIENT)
Dept: RADIATION ONCOLOGY | Facility: HOSPITAL | Age: 59
End: 2019-05-07
Attending: RADIOLOGY
Payer: COMMERCIAL

## 2019-05-07 PROCEDURE — 77386 HB NTSTY MODUL RAD TX DLVR CPLX: CPT | Performed by: RADIOLOGY

## 2019-05-08 ENCOUNTER — APPOINTMENT (OUTPATIENT)
Dept: RADIATION ONCOLOGY | Facility: HOSPITAL | Age: 59
End: 2019-05-08
Attending: RADIOLOGY
Payer: COMMERCIAL

## 2019-05-08 PROCEDURE — 77336 RADIATION PHYSICS CONSULT: CPT | Performed by: RADIOLOGY

## 2019-05-08 PROCEDURE — 77386 HB NTSTY MODUL RAD TX DLVR CPLX: CPT | Performed by: RADIOLOGY

## 2019-05-09 ENCOUNTER — APPOINTMENT (OUTPATIENT)
Dept: LAB | Age: 59
End: 2019-05-09
Payer: COMMERCIAL

## 2019-05-09 ENCOUNTER — APPOINTMENT (OUTPATIENT)
Dept: RADIATION ONCOLOGY | Facility: HOSPITAL | Age: 59
End: 2019-05-09
Attending: RADIOLOGY
Payer: COMMERCIAL

## 2019-05-09 ENCOUNTER — OFFICE VISIT (OUTPATIENT)
Dept: PHYSICAL THERAPY | Age: 59
End: 2019-05-09
Payer: COMMERCIAL

## 2019-05-09 DIAGNOSIS — R53.1 GENERAL WEAKNESS: Primary | ICD-10-CM

## 2019-05-09 DIAGNOSIS — E03.9 ACQUIRED HYPOTHYROIDISM: ICD-10-CM

## 2019-05-09 DIAGNOSIS — M79.604 RIGHT LEG PAIN: ICD-10-CM

## 2019-05-09 DIAGNOSIS — E78.2 MIXED HYPERLIPIDEMIA: ICD-10-CM

## 2019-05-09 DIAGNOSIS — G06.1 SPINAL CORD ABSCESS: ICD-10-CM

## 2019-05-09 DIAGNOSIS — R73.01 IMPAIRED FASTING GLUCOSE: ICD-10-CM

## 2019-05-09 LAB
ALBUMIN SERPL BCP-MCNC: 3.5 G/DL (ref 3.5–5)
ALP SERPL-CCNC: 71 U/L (ref 46–116)
ALT SERPL W P-5'-P-CCNC: 26 U/L (ref 12–78)
ANION GAP SERPL CALCULATED.3IONS-SCNC: 3 MMOL/L (ref 4–13)
AST SERPL W P-5'-P-CCNC: 11 U/L (ref 5–45)
BILIRUB SERPL-MCNC: 0.87 MG/DL (ref 0.2–1)
BUN SERPL-MCNC: 13 MG/DL (ref 5–25)
CALCIUM SERPL-MCNC: 8.6 MG/DL (ref 8.3–10.1)
CHLORIDE SERPL-SCNC: 107 MMOL/L (ref 100–108)
CHOLEST SERPL-MCNC: 181 MG/DL (ref 50–200)
CO2 SERPL-SCNC: 30 MMOL/L (ref 21–32)
CREAT SERPL-MCNC: 0.85 MG/DL (ref 0.6–1.3)
EST. AVERAGE GLUCOSE BLD GHB EST-MCNC: 134 MG/DL
GFR SERPL CREATININE-BSD FRML MDRD: 95 ML/MIN/1.73SQ M
GLUCOSE P FAST SERPL-MCNC: 87 MG/DL (ref 65–99)
HBA1C MFR BLD: 6.3 % (ref 4.2–6.3)
HDLC SERPL-MCNC: 30 MG/DL (ref 40–60)
LDLC SERPL CALC-MCNC: 115 MG/DL (ref 0–100)
NONHDLC SERPL-MCNC: 151 MG/DL
POTASSIUM SERPL-SCNC: 4.7 MMOL/L (ref 3.5–5.3)
PROT SERPL-MCNC: 7.4 G/DL (ref 6.4–8.2)
SODIUM SERPL-SCNC: 140 MMOL/L (ref 136–145)
T4 FREE SERPL-MCNC: 0.85 NG/DL (ref 0.76–1.46)
TRIGL SERPL-MCNC: 181 MG/DL
TSH SERPL DL<=0.05 MIU/L-ACNC: 3.85 UIU/ML (ref 0.36–3.74)

## 2019-05-09 PROCEDURE — 97110 THERAPEUTIC EXERCISES: CPT | Performed by: PHYSICAL THERAPIST

## 2019-05-09 PROCEDURE — 97112 NEUROMUSCULAR REEDUCATION: CPT | Performed by: PHYSICAL THERAPIST

## 2019-05-09 PROCEDURE — 80061 LIPID PANEL: CPT

## 2019-05-09 PROCEDURE — 36415 COLL VENOUS BLD VENIPUNCTURE: CPT

## 2019-05-09 PROCEDURE — 84439 ASSAY OF FREE THYROXINE: CPT

## 2019-05-09 PROCEDURE — 84443 ASSAY THYROID STIM HORMONE: CPT

## 2019-05-09 PROCEDURE — 83036 HEMOGLOBIN GLYCOSYLATED A1C: CPT

## 2019-05-09 PROCEDURE — 80053 COMPREHEN METABOLIC PANEL: CPT

## 2019-05-09 PROCEDURE — 77386 HB NTSTY MODUL RAD TX DLVR CPLX: CPT | Performed by: RADIOLOGY

## 2019-05-09 PROCEDURE — 97530 THERAPEUTIC ACTIVITIES: CPT | Performed by: PHYSICAL THERAPIST

## 2019-05-10 ENCOUNTER — APPOINTMENT (OUTPATIENT)
Dept: RADIATION ONCOLOGY | Facility: HOSPITAL | Age: 59
End: 2019-05-10
Attending: RADIOLOGY
Payer: COMMERCIAL

## 2019-05-10 PROCEDURE — 77386 HB NTSTY MODUL RAD TX DLVR CPLX: CPT | Performed by: RADIOLOGY

## 2019-05-13 ENCOUNTER — APPOINTMENT (OUTPATIENT)
Dept: RADIATION ONCOLOGY | Facility: HOSPITAL | Age: 59
End: 2019-05-13
Attending: RADIOLOGY
Payer: COMMERCIAL

## 2019-05-13 ENCOUNTER — OFFICE VISIT (OUTPATIENT)
Dept: PHYSICAL THERAPY | Age: 59
End: 2019-05-13
Payer: COMMERCIAL

## 2019-05-13 DIAGNOSIS — R53.1 GENERAL WEAKNESS: Primary | ICD-10-CM

## 2019-05-13 DIAGNOSIS — G06.1 SPINAL CORD ABSCESS: ICD-10-CM

## 2019-05-13 DIAGNOSIS — M79.604 RIGHT LEG PAIN: ICD-10-CM

## 2019-05-13 PROCEDURE — 97112 NEUROMUSCULAR REEDUCATION: CPT | Performed by: PHYSICAL THERAPIST

## 2019-05-13 PROCEDURE — 97110 THERAPEUTIC EXERCISES: CPT | Performed by: PHYSICAL THERAPIST

## 2019-05-13 PROCEDURE — 77386 HB NTSTY MODUL RAD TX DLVR CPLX: CPT | Performed by: RADIOLOGY

## 2019-05-13 PROCEDURE — 97530 THERAPEUTIC ACTIVITIES: CPT | Performed by: PHYSICAL THERAPIST

## 2019-05-14 ENCOUNTER — APPOINTMENT (OUTPATIENT)
Dept: RADIATION ONCOLOGY | Facility: HOSPITAL | Age: 59
End: 2019-05-14
Attending: RADIOLOGY
Payer: COMMERCIAL

## 2019-05-14 PROCEDURE — 77386 HB NTSTY MODUL RAD TX DLVR CPLX: CPT | Performed by: RADIOLOGY

## 2019-05-15 ENCOUNTER — APPOINTMENT (OUTPATIENT)
Dept: RADIATION ONCOLOGY | Facility: HOSPITAL | Age: 59
End: 2019-05-15
Attending: RADIOLOGY
Payer: COMMERCIAL

## 2019-05-15 PROCEDURE — 77336 RADIATION PHYSICS CONSULT: CPT | Performed by: RADIOLOGY

## 2019-05-15 PROCEDURE — 77386 HB NTSTY MODUL RAD TX DLVR CPLX: CPT | Performed by: RADIOLOGY

## 2019-05-16 ENCOUNTER — APPOINTMENT (OUTPATIENT)
Dept: PHYSICAL THERAPY | Age: 59
End: 2019-05-16
Payer: COMMERCIAL

## 2019-05-16 ENCOUNTER — APPOINTMENT (OUTPATIENT)
Dept: RADIATION ONCOLOGY | Facility: HOSPITAL | Age: 59
End: 2019-05-16
Attending: RADIOLOGY
Payer: COMMERCIAL

## 2019-05-16 PROCEDURE — 77386 HB NTSTY MODUL RAD TX DLVR CPLX: CPT | Performed by: RADIOLOGY

## 2019-05-17 ENCOUNTER — APPOINTMENT (OUTPATIENT)
Dept: RADIATION ONCOLOGY | Facility: HOSPITAL | Age: 59
End: 2019-05-17
Attending: RADIOLOGY
Payer: COMMERCIAL

## 2019-05-17 PROCEDURE — 77386 HB NTSTY MODUL RAD TX DLVR CPLX: CPT | Performed by: RADIOLOGY

## 2019-05-20 ENCOUNTER — OFFICE VISIT (OUTPATIENT)
Dept: PHYSICAL THERAPY | Age: 59
End: 2019-05-20
Payer: COMMERCIAL

## 2019-05-20 ENCOUNTER — APPOINTMENT (OUTPATIENT)
Dept: RADIATION ONCOLOGY | Facility: HOSPITAL | Age: 59
End: 2019-05-20
Attending: RADIOLOGY
Payer: COMMERCIAL

## 2019-05-20 DIAGNOSIS — R53.1 GENERAL WEAKNESS: Primary | ICD-10-CM

## 2019-05-20 DIAGNOSIS — G06.1 SPINAL CORD ABSCESS: ICD-10-CM

## 2019-05-20 DIAGNOSIS — M79.604 RIGHT LEG PAIN: ICD-10-CM

## 2019-05-20 PROCEDURE — 97112 NEUROMUSCULAR REEDUCATION: CPT | Performed by: PHYSICAL THERAPIST

## 2019-05-20 PROCEDURE — 97110 THERAPEUTIC EXERCISES: CPT | Performed by: PHYSICAL THERAPIST

## 2019-05-20 PROCEDURE — 77386 HB NTSTY MODUL RAD TX DLVR CPLX: CPT | Performed by: RADIOLOGY

## 2019-05-21 ENCOUNTER — APPOINTMENT (OUTPATIENT)
Dept: RADIATION ONCOLOGY | Facility: HOSPITAL | Age: 59
End: 2019-05-21
Attending: RADIOLOGY
Payer: COMMERCIAL

## 2019-05-21 PROCEDURE — 77386 HB NTSTY MODUL RAD TX DLVR CPLX: CPT | Performed by: RADIOLOGY

## 2019-05-22 ENCOUNTER — TELEPHONE (OUTPATIENT)
Dept: NEUROSURGERY | Facility: CLINIC | Age: 59
End: 2019-05-22

## 2019-05-22 ENCOUNTER — APPOINTMENT (OUTPATIENT)
Dept: RADIATION ONCOLOGY | Facility: HOSPITAL | Age: 59
End: 2019-05-22
Attending: RADIOLOGY
Payer: COMMERCIAL

## 2019-05-22 DIAGNOSIS — C72.0 SPINAL CORD EPENDYMOMA (HCC): ICD-10-CM

## 2019-05-22 DIAGNOSIS — Z98.1 H/O SPINAL FUSION: Primary | ICD-10-CM

## 2019-05-22 PROCEDURE — 77336 RADIATION PHYSICS CONSULT: CPT | Performed by: RADIOLOGY

## 2019-05-22 PROCEDURE — 77386 HB NTSTY MODUL RAD TX DLVR CPLX: CPT | Performed by: RADIOLOGY

## 2019-05-23 ENCOUNTER — OFFICE VISIT (OUTPATIENT)
Dept: PHYSICAL THERAPY | Age: 59
End: 2019-05-23
Payer: COMMERCIAL

## 2019-05-23 DIAGNOSIS — M79.604 RIGHT LEG PAIN: ICD-10-CM

## 2019-05-23 DIAGNOSIS — G06.1 SPINAL CORD ABSCESS: ICD-10-CM

## 2019-05-23 DIAGNOSIS — R53.1 GENERAL WEAKNESS: Primary | ICD-10-CM

## 2019-05-23 PROCEDURE — 97110 THERAPEUTIC EXERCISES: CPT | Performed by: PHYSICAL THERAPIST

## 2019-05-23 PROCEDURE — 97530 THERAPEUTIC ACTIVITIES: CPT | Performed by: PHYSICAL THERAPIST

## 2019-05-23 PROCEDURE — 97112 NEUROMUSCULAR REEDUCATION: CPT | Performed by: PHYSICAL THERAPIST

## 2019-05-24 ENCOUNTER — TELEPHONE (OUTPATIENT)
Dept: FAMILY MEDICINE CLINIC | Facility: CLINIC | Age: 59
End: 2019-05-24

## 2019-05-28 ENCOUNTER — OFFICE VISIT (OUTPATIENT)
Dept: PHYSICAL THERAPY | Age: 59
End: 2019-05-28
Payer: COMMERCIAL

## 2019-05-28 DIAGNOSIS — G06.1 SPINAL CORD ABSCESS: ICD-10-CM

## 2019-05-28 DIAGNOSIS — M79.604 RIGHT LEG PAIN: ICD-10-CM

## 2019-05-28 DIAGNOSIS — R53.1 GENERAL WEAKNESS: Primary | ICD-10-CM

## 2019-05-28 PROCEDURE — 97112 NEUROMUSCULAR REEDUCATION: CPT | Performed by: PHYSICAL THERAPIST

## 2019-05-28 PROCEDURE — 97110 THERAPEUTIC EXERCISES: CPT | Performed by: PHYSICAL THERAPIST

## 2019-05-28 PROCEDURE — 97530 THERAPEUTIC ACTIVITIES: CPT | Performed by: PHYSICAL THERAPIST

## 2019-05-30 ENCOUNTER — OFFICE VISIT (OUTPATIENT)
Dept: PHYSICAL THERAPY | Age: 59
End: 2019-05-30
Payer: COMMERCIAL

## 2019-05-30 DIAGNOSIS — M79.604 RIGHT LEG PAIN: ICD-10-CM

## 2019-05-30 DIAGNOSIS — G06.1 SPINAL CORD ABSCESS: ICD-10-CM

## 2019-05-30 DIAGNOSIS — R53.1 GENERAL WEAKNESS: Primary | ICD-10-CM

## 2019-05-30 PROCEDURE — 97110 THERAPEUTIC EXERCISES: CPT | Performed by: PHYSICAL THERAPIST

## 2019-05-30 PROCEDURE — 97112 NEUROMUSCULAR REEDUCATION: CPT | Performed by: PHYSICAL THERAPIST

## 2019-05-30 PROCEDURE — 97530 THERAPEUTIC ACTIVITIES: CPT | Performed by: PHYSICAL THERAPIST

## 2019-06-03 ENCOUNTER — OFFICE VISIT (OUTPATIENT)
Dept: PHYSICAL THERAPY | Age: 59
End: 2019-06-03
Payer: COMMERCIAL

## 2019-06-03 DIAGNOSIS — M79.604 RIGHT LEG PAIN: ICD-10-CM

## 2019-06-03 DIAGNOSIS — R53.1 GENERAL WEAKNESS: Primary | ICD-10-CM

## 2019-06-03 DIAGNOSIS — G06.1 SPINAL CORD ABSCESS: ICD-10-CM

## 2019-06-03 PROCEDURE — 97110 THERAPEUTIC EXERCISES: CPT

## 2019-06-03 PROCEDURE — 97112 NEUROMUSCULAR REEDUCATION: CPT

## 2019-06-03 PROCEDURE — 97530 THERAPEUTIC ACTIVITIES: CPT

## 2019-06-05 ENCOUNTER — OFFICE VISIT (OUTPATIENT)
Dept: FAMILY MEDICINE CLINIC | Facility: CLINIC | Age: 59
End: 2019-06-05
Payer: COMMERCIAL

## 2019-06-05 VITALS
SYSTOLIC BLOOD PRESSURE: 110 MMHG | HEIGHT: 67 IN | WEIGHT: 186 LBS | DIASTOLIC BLOOD PRESSURE: 62 MMHG | OXYGEN SATURATION: 97 % | HEART RATE: 66 BPM | RESPIRATION RATE: 16 BRPM | BODY MASS INDEX: 29.19 KG/M2 | TEMPERATURE: 98.6 F

## 2019-06-05 DIAGNOSIS — R35.1 BPH ASSOCIATED WITH NOCTURIA: ICD-10-CM

## 2019-06-05 DIAGNOSIS — M51.36 DDD (DEGENERATIVE DISC DISEASE), LUMBAR: ICD-10-CM

## 2019-06-05 DIAGNOSIS — E03.9 ACQUIRED HYPOTHYROIDISM: Primary | ICD-10-CM

## 2019-06-05 DIAGNOSIS — Z11.59 ENCOUNTER FOR HEPATITIS C VIRUS SCREENING TEST FOR HIGH RISK PATIENT: ICD-10-CM

## 2019-06-05 DIAGNOSIS — R73.01 IMPAIRED FASTING GLUCOSE: ICD-10-CM

## 2019-06-05 DIAGNOSIS — E78.5 DYSLIPIDEMIA: ICD-10-CM

## 2019-06-05 DIAGNOSIS — N40.1 BPH ASSOCIATED WITH NOCTURIA: ICD-10-CM

## 2019-06-05 DIAGNOSIS — Z91.89 ENCOUNTER FOR HEPATITIS C VIRUS SCREENING TEST FOR HIGH RISK PATIENT: ICD-10-CM

## 2019-06-05 DIAGNOSIS — C72.0 SPINAL CORD EPENDYMOMA (HCC): ICD-10-CM

## 2019-06-05 PROBLEM — M51.369 DDD (DEGENERATIVE DISC DISEASE), LUMBAR: Status: ACTIVE | Noted: 2019-06-05

## 2019-06-05 PROCEDURE — 3008F BODY MASS INDEX DOCD: CPT | Performed by: FAMILY MEDICINE

## 2019-06-05 PROCEDURE — 99214 OFFICE O/P EST MOD 30 MIN: CPT | Performed by: FAMILY MEDICINE

## 2019-06-06 ENCOUNTER — OFFICE VISIT (OUTPATIENT)
Dept: PHYSICAL THERAPY | Age: 59
End: 2019-06-06
Payer: COMMERCIAL

## 2019-06-06 DIAGNOSIS — R53.1 GENERAL WEAKNESS: Primary | ICD-10-CM

## 2019-06-06 DIAGNOSIS — G06.1 SPINAL CORD ABSCESS: ICD-10-CM

## 2019-06-06 DIAGNOSIS — M79.604 RIGHT LEG PAIN: ICD-10-CM

## 2019-06-06 PROCEDURE — 97112 NEUROMUSCULAR REEDUCATION: CPT | Performed by: PHYSICAL THERAPIST

## 2019-06-06 PROCEDURE — 97110 THERAPEUTIC EXERCISES: CPT | Performed by: PHYSICAL THERAPIST

## 2019-06-10 ENCOUNTER — OFFICE VISIT (OUTPATIENT)
Dept: PHYSICAL THERAPY | Age: 59
End: 2019-06-10
Payer: COMMERCIAL

## 2019-06-10 DIAGNOSIS — R53.1 GENERAL WEAKNESS: Primary | ICD-10-CM

## 2019-06-10 DIAGNOSIS — G06.1 SPINAL CORD ABSCESS: ICD-10-CM

## 2019-06-10 DIAGNOSIS — M79.604 RIGHT LEG PAIN: ICD-10-CM

## 2019-06-10 PROCEDURE — 97110 THERAPEUTIC EXERCISES: CPT | Performed by: PHYSICAL THERAPIST

## 2019-06-13 ENCOUNTER — OFFICE VISIT (OUTPATIENT)
Dept: PHYSICAL THERAPY | Age: 59
End: 2019-06-13
Payer: COMMERCIAL

## 2019-06-13 DIAGNOSIS — M79.604 RIGHT LEG PAIN: ICD-10-CM

## 2019-06-13 DIAGNOSIS — R53.1 GENERAL WEAKNESS: Primary | ICD-10-CM

## 2019-06-13 DIAGNOSIS — G06.1 SPINAL CORD ABSCESS: ICD-10-CM

## 2019-06-13 PROCEDURE — 97110 THERAPEUTIC EXERCISES: CPT | Performed by: PHYSICAL THERAPIST

## 2019-06-13 PROCEDURE — 97112 NEUROMUSCULAR REEDUCATION: CPT | Performed by: PHYSICAL THERAPIST

## 2019-06-16 DIAGNOSIS — E78.2 MIXED HYPERLIPIDEMIA: ICD-10-CM

## 2019-06-17 ENCOUNTER — HOSPITAL ENCOUNTER (OUTPATIENT)
Dept: RADIOLOGY | Facility: HOSPITAL | Age: 59
Discharge: HOME | End: 2019-06-17
Attending: NEUROLOGICAL SURGERY
Payer: COMMERCIAL

## 2019-06-17 ENCOUNTER — TRANSCRIBE ORDERS (OUTPATIENT)
Dept: RADIOLOGY | Facility: HOSPITAL | Age: 59
End: 2019-06-17

## 2019-06-17 DIAGNOSIS — Z98.1 H/O SPINAL FUSION: ICD-10-CM

## 2019-06-17 DIAGNOSIS — C72.0 SPINAL CORD EPENDYMOMA (HCC): ICD-10-CM

## 2019-06-17 PROCEDURE — 72040 X-RAY EXAM NECK SPINE 2-3 VW: CPT

## 2019-06-17 PROCEDURE — 72072 X-RAY EXAM THORAC SPINE 3VWS: CPT

## 2019-06-17 PROCEDURE — A9585 GADOBUTROL INJECTION: HCPCS | Performed by: NEUROLOGICAL SURGERY

## 2019-06-17 PROCEDURE — 72156 MRI NECK SPINE W/O & W/DYE: CPT

## 2019-06-17 RX ORDER — OMEGA-3-ACID ETHYL ESTERS 1 G/1
CAPSULE, LIQUID FILLED ORAL
Qty: 60 CAPSULE | Refills: 5 | Status: SHIPPED | OUTPATIENT
Start: 2019-06-17 | End: 2019-06-19 | Stop reason: ALTCHOICE

## 2019-06-17 RX ADMIN — GADOBUTROL 8 ML: 604.72 INJECTION INTRAVENOUS at 10:34

## 2019-06-18 ENCOUNTER — OFFICE VISIT (OUTPATIENT)
Dept: PHYSICAL THERAPY | Age: 59
End: 2019-06-18
Payer: COMMERCIAL

## 2019-06-18 DIAGNOSIS — M79.604 RIGHT LEG PAIN: ICD-10-CM

## 2019-06-18 DIAGNOSIS — G06.1 SPINAL CORD ABSCESS: ICD-10-CM

## 2019-06-18 DIAGNOSIS — R53.1 GENERAL WEAKNESS: Primary | ICD-10-CM

## 2019-06-18 PROCEDURE — 97110 THERAPEUTIC EXERCISES: CPT | Performed by: PHYSICAL THERAPIST

## 2019-06-18 PROCEDURE — 97116 GAIT TRAINING THERAPY: CPT | Performed by: PHYSICAL THERAPIST

## 2019-06-18 PROCEDURE — 97112 NEUROMUSCULAR REEDUCATION: CPT | Performed by: PHYSICAL THERAPIST

## 2019-06-19 ENCOUNTER — OFFICE VISIT (OUTPATIENT)
Dept: NEUROSURGERY | Facility: CLINIC | Age: 59
End: 2019-06-19
Payer: COMMERCIAL

## 2019-06-19 ENCOUNTER — RADIATION ONCOLOGY FOLLOW-UP (OUTPATIENT)
Dept: RADIATION ONCOLOGY | Facility: HOSPITAL | Age: 59
End: 2019-06-19
Attending: RADIOLOGY
Payer: COMMERCIAL

## 2019-06-19 VITALS
WEIGHT: 190.6 LBS | RESPIRATION RATE: 16 BRPM | HEART RATE: 68 BPM | DIASTOLIC BLOOD PRESSURE: 60 MMHG | BODY MASS INDEX: 29.91 KG/M2 | HEIGHT: 67 IN | TEMPERATURE: 98.5 F | OXYGEN SATURATION: 98 % | SYSTOLIC BLOOD PRESSURE: 122 MMHG

## 2019-06-19 VITALS
SYSTOLIC BLOOD PRESSURE: 122 MMHG | WEIGHT: 190.6 LBS | RESPIRATION RATE: 16 BRPM | TEMPERATURE: 98.5 F | HEART RATE: 68 BPM | BODY MASS INDEX: 29.91 KG/M2 | HEIGHT: 67 IN | DIASTOLIC BLOOD PRESSURE: 60 MMHG

## 2019-06-19 DIAGNOSIS — C72.0 SPINAL CORD EPENDYMOMA (HCC): Primary | ICD-10-CM

## 2019-06-19 DIAGNOSIS — G95.89 RADIATION-INDUCED MYELOPATHY (HCC): ICD-10-CM

## 2019-06-19 DIAGNOSIS — Z98.1 H/O SPINAL FUSION: ICD-10-CM

## 2019-06-19 PROCEDURE — 99215 OFFICE O/P EST HI 40 MIN: CPT | Performed by: NEUROLOGICAL SURGERY

## 2019-06-19 PROCEDURE — 99211 OFF/OP EST MAY X REQ PHY/QHP: CPT | Performed by: RADIOLOGY

## 2019-06-19 RX ORDER — GABAPENTIN 300 MG/1
300 CAPSULE ORAL
Qty: 30 CAPSULE | Refills: 2 | Status: SHIPPED | OUTPATIENT
Start: 2019-06-19 | End: 2019-07-11 | Stop reason: SDUPTHER

## 2019-06-19 RX ORDER — DEXAMETHASONE 2 MG/1
TABLET ORAL
Qty: 90 TABLET | Refills: 0 | Status: SHIPPED | OUTPATIENT
Start: 2019-06-19 | End: 2019-08-08 | Stop reason: ALTCHOICE

## 2019-06-20 ENCOUNTER — OFFICE VISIT (OUTPATIENT)
Dept: PHYSICAL THERAPY | Age: 59
End: 2019-06-20
Payer: COMMERCIAL

## 2019-06-20 ENCOUNTER — TELEPHONE (OUTPATIENT)
Dept: NEUROSURGERY | Facility: CLINIC | Age: 59
End: 2019-06-20

## 2019-06-20 DIAGNOSIS — M79.604 RIGHT LEG PAIN: ICD-10-CM

## 2019-06-20 DIAGNOSIS — G06.1 SPINAL CORD ABSCESS: ICD-10-CM

## 2019-06-20 DIAGNOSIS — R53.1 GENERAL WEAKNESS: Primary | ICD-10-CM

## 2019-06-20 PROCEDURE — 97116 GAIT TRAINING THERAPY: CPT | Performed by: PHYSICAL THERAPIST

## 2019-06-20 PROCEDURE — 97112 NEUROMUSCULAR REEDUCATION: CPT | Performed by: PHYSICAL THERAPIST

## 2019-06-20 PROCEDURE — 97110 THERAPEUTIC EXERCISES: CPT | Performed by: PHYSICAL THERAPIST

## 2019-06-24 ENCOUNTER — OFFICE VISIT (OUTPATIENT)
Dept: PHYSICAL THERAPY | Age: 59
End: 2019-06-24
Payer: COMMERCIAL

## 2019-06-24 DIAGNOSIS — R53.1 GENERAL WEAKNESS: Primary | ICD-10-CM

## 2019-06-24 DIAGNOSIS — G06.1 SPINAL CORD ABSCESS: ICD-10-CM

## 2019-06-24 DIAGNOSIS — M79.604 RIGHT LEG PAIN: ICD-10-CM

## 2019-06-24 PROCEDURE — 97116 GAIT TRAINING THERAPY: CPT | Performed by: PHYSICAL THERAPIST

## 2019-06-24 PROCEDURE — 97110 THERAPEUTIC EXERCISES: CPT | Performed by: PHYSICAL THERAPIST

## 2019-06-24 PROCEDURE — 97112 NEUROMUSCULAR REEDUCATION: CPT | Performed by: PHYSICAL THERAPIST

## 2019-06-25 ENCOUNTER — OFFICE VISIT (OUTPATIENT)
Dept: PHYSICAL THERAPY | Age: 59
End: 2019-06-25
Payer: COMMERCIAL

## 2019-06-25 DIAGNOSIS — M79.604 RIGHT LEG PAIN: ICD-10-CM

## 2019-06-25 DIAGNOSIS — R53.1 GENERAL WEAKNESS: Primary | ICD-10-CM

## 2019-06-25 DIAGNOSIS — G06.1 SPINAL CORD ABSCESS: ICD-10-CM

## 2019-06-25 PROCEDURE — 97112 NEUROMUSCULAR REEDUCATION: CPT | Performed by: PHYSICAL THERAPIST

## 2019-06-25 PROCEDURE — 97110 THERAPEUTIC EXERCISES: CPT | Performed by: PHYSICAL THERAPIST

## 2019-06-25 PROCEDURE — 97116 GAIT TRAINING THERAPY: CPT | Performed by: PHYSICAL THERAPIST

## 2019-06-26 ENCOUNTER — TELEPHONE (OUTPATIENT)
Dept: NEUROSURGERY | Facility: CLINIC | Age: 59
End: 2019-06-26

## 2019-06-27 ENCOUNTER — OFFICE VISIT (OUTPATIENT)
Dept: PHYSICAL THERAPY | Age: 59
End: 2019-06-27
Payer: COMMERCIAL

## 2019-06-27 DIAGNOSIS — G06.1 SPINAL CORD ABSCESS: ICD-10-CM

## 2019-06-27 DIAGNOSIS — R53.1 GENERAL WEAKNESS: Primary | ICD-10-CM

## 2019-06-27 DIAGNOSIS — M79.604 RIGHT LEG PAIN: ICD-10-CM

## 2019-06-27 PROCEDURE — 97110 THERAPEUTIC EXERCISES: CPT | Performed by: PHYSICAL THERAPIST

## 2019-06-27 PROCEDURE — 97116 GAIT TRAINING THERAPY: CPT | Performed by: PHYSICAL THERAPIST

## 2019-06-27 PROCEDURE — 97112 NEUROMUSCULAR REEDUCATION: CPT | Performed by: PHYSICAL THERAPIST

## 2019-06-28 ENCOUNTER — TELEPHONE (OUTPATIENT)
Dept: NEUROSURGERY | Facility: CLINIC | Age: 59
End: 2019-06-28

## 2019-07-01 ENCOUNTER — OFFICE VISIT (OUTPATIENT)
Dept: PHYSICAL THERAPY | Age: 59
End: 2019-07-01
Payer: COMMERCIAL

## 2019-07-01 DIAGNOSIS — R53.1 GENERAL WEAKNESS: Primary | ICD-10-CM

## 2019-07-01 DIAGNOSIS — G06.1 SPINAL CORD ABSCESS: ICD-10-CM

## 2019-07-01 DIAGNOSIS — M79.604 RIGHT LEG PAIN: ICD-10-CM

## 2019-07-01 PROCEDURE — 97110 THERAPEUTIC EXERCISES: CPT | Performed by: PHYSICAL THERAPIST

## 2019-07-01 PROCEDURE — 97112 NEUROMUSCULAR REEDUCATION: CPT | Performed by: PHYSICAL THERAPIST

## 2019-07-01 PROCEDURE — 97116 GAIT TRAINING THERAPY: CPT | Performed by: PHYSICAL THERAPIST

## 2019-07-01 NOTE — PROGRESS NOTES
Daily Note     Today's date: 2019  Patient name: Peggy Land  : 1960  MRN: [id number]  Referring provider: Dash Langford MD  Dx:   Encounter Diagnosis     ICD-10-CM    1  General weakness R53 1    2  Spinal cord abscess G06 1    3  Right leg pain M79 604        Start Time: 0930  Stop Time: 1015  Total time in clinic (min): 45 minutes    Subjective: Pt reports improvements with tolerance to exercise when performing routine in morning      Objective: See treatment diary below      Assessment: Tolerated treatment well  Patient demonstrated fatigue post treatment and would benefit from continued PT      Plan: Continue per plan of care        Precautions: Cervical tumor  Daily Treatment Diary     Manual                                                                                   Exercise Diary  6/13 6/24 6/27 7/1  6/3/19   RB 5' 5' 5' 5'  5'   Clamshells         Quad sets         Seated marches         LAQ         Ankle pumps         Mini squats  2*10 2*10 2*10     Standing hip abduction  2*10 2*10 2*10     Press-ups         CLARITA         STS 2*10        Step-ups 8" 2x10 b/l 8" 2x10 b/l 8" 2x10 b/l 8" 2x10 b/l  8" 2x10 b/l   Biodex LOS/maze static level 3 x3 ea Lv 12 LOS/maze static level 3 x3 ea Lv 12 LOS/maze static level 3 x3 ea Lv 12 LOS/maze static level 3 x3 ea Lv 12              R SLR         hooklying hip abd with TB         TM walking         Seated SLR DF R   2*10      Standing Toe raises 3*10*5"        Step-over cones ( replicates walking on uneven terrain at work) 4 laps ea frwd/sideways 4 laps ea frwd/sideways 4 laps ea frwd/sideways 4 laps ea frwd/sideways  4 laps ea frwd/sideways   Gait training   4*15 steps 4*20 steps 4*20 steps  3x b/l   SPC training  5 mins       BOSU ball squats    2*10*5"  3x10   Tandem walk frwd/bkwd 3 laps each  frwd/bkwd 3 laps each   frwd/bkwd 3 laps each       Modalities

## 2019-07-02 ENCOUNTER — APPOINTMENT (OUTPATIENT)
Dept: PHYSICAL THERAPY | Age: 59
End: 2019-07-02
Payer: COMMERCIAL

## 2019-07-02 ENCOUNTER — TELEPHONE (OUTPATIENT)
Dept: NEUROSURGERY | Facility: CLINIC | Age: 59
End: 2019-07-02

## 2019-07-02 DIAGNOSIS — G95.89 RADIATION-INDUCED MYELOPATHY (HCC): ICD-10-CM

## 2019-07-02 DIAGNOSIS — C72.0 SPINAL CORD EPENDYMOMA (HCC): Primary | ICD-10-CM

## 2019-07-02 NOTE — TELEPHONE ENCOUNTER
Discussed patient's long term disability paperwork with Dr Geovanny Robbins recommended for patient to be referred to Dr Henri Callejas office for the disability paper work to be completed and to assess if patient can return to work  Referral placed  Called patient to notify him of this  He did not answer, left a message with my direct line

## 2019-07-03 ENCOUNTER — OFFICE VISIT (OUTPATIENT)
Dept: PHYSICAL THERAPY | Age: 59
End: 2019-07-03
Payer: COMMERCIAL

## 2019-07-03 DIAGNOSIS — G06.1 SPINAL CORD ABSCESS: ICD-10-CM

## 2019-07-03 DIAGNOSIS — R53.1 GENERAL WEAKNESS: Primary | ICD-10-CM

## 2019-07-03 DIAGNOSIS — M79.604 RIGHT LEG PAIN: ICD-10-CM

## 2019-07-03 PROCEDURE — 97110 THERAPEUTIC EXERCISES: CPT

## 2019-07-03 PROCEDURE — 97112 NEUROMUSCULAR REEDUCATION: CPT

## 2019-07-03 PROCEDURE — 97116 GAIT TRAINING THERAPY: CPT

## 2019-07-03 NOTE — TELEPHONE ENCOUNTER
Called patient again and notified him of Dr Janine Apodaca recommendation  Patient understood  Faxed op note, office visit notes, imaging, referral, and face sheet to Dr Concepción Benavides office at [phone]

## 2019-07-03 NOTE — PROGRESS NOTES
Daily Note     Today's date: 7/3/2019  Patient name: Dean Coombs  : 1960  MRN: [id number]  Referring provider: Breanna Kim MD  Dx:   Encounter Diagnosis     ICD-10-CM    1  General weakness R53 1    2  Spinal cord abscess G06 1    3  Right leg pain M79 604                   Subjective:  Pt reports he's not able to walk a lot(shopping) or perform daily functional activities due to his weakness/decreased endurance  Pt reports he cont to take steroids as rx by Dr Sadia Yanez reports haven't increased but unchanged  Pt reports difficulty advancing R LE due to weakness/numbness, and unsafe at times on level and steps depending on fatigue levels      Objective: See treatment diary below  Gait training with R ankle ace wrapped into DF to assist with safer toe clearance and LE advancement during am    Assessment:  Improved gait on level and stairs with ace wrapped R LE, pt noted energy conservation with this also when amb  Plan: Continue per plan of care  Progress treatment as tolerated         Precautions: Cervical tumor  Daily Treatment Diary     Manual                                                                                   Exercise Diary  6/13 6/24 6/27 7/1 7/3/19   RB 5' 5' 5' 5' 7'   Clamshells        Quad sets        Seated march        LAQ        Ankle pumps        Mini squats  2*10 2*10 2*10 3x10   Standing hip abduction  2*10 2*10 2*10 3x10   Press-ups        CLARITA        STS 2*10       Step-ups 8" 2x10 b/l 8" 2x10 b/l 8" 2x10 b/l 8" 2x10 b/l 8" 2x10 b/l   Biodex LOS/maze static level 3 x3 ea Lv 12 LOS/maze static level 3 x3 ea Lv 12 LOS/maze static level 3 x3 ea Lv 12 LOS/maze static level 3 x3 ea Lv 12 Maze level 3 dynamic level 12           R SLR        hooklying hip abd with TB        TM walking        Seated SLR DF R   2*10     Standing Toe raises 3*10*5"       Step-over cones ( replicates walking on uneven terrain at work) 4 laps ea frwd/sideways 4 laps ea frwd/sideways 4 laps ea frwd/sideways 4 laps ea frwd/sideways    Gait training   4*15 steps 4*20 steps 4*20 steps    SPC training  5 mins      BOSU ball squats    2*10*5"    Tandem walk frwd/bkwd 3 laps each  frwd/bkwd 3 laps each         Modalities

## 2019-07-05 NOTE — TELEPHONE ENCOUNTER
Called the patient to provide him an update  Instructed patient to call the office if he has any issues with scheduling an appointment with Dr Reece Cabral  He was very appreciative

## 2019-07-05 NOTE — TELEPHONE ENCOUNTER
Called Dr Mary Kay Valencia office to make sure they received the faxed medical records  The staff member said they did receive the records  She said they will call the patient shortly to schedule an appointment within the next week  Faxed the patient's disability paperwork to Dr Mary Kay Valencia office at [phone] for Dr Kendall Carrion to complete

## 2019-07-07 NOTE — PROGRESS NOTES
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[address] )    3  Generalized weakness    4  H/O spinal fusion    5  Depression, unspecified depression type    6  Neuropathy      The focus of today's visit was to introduce palliative care to Suha Villalta and to initiate an assessment of his chronic conditions, symptoms, functional status, QOL, capacity for self management and goals of care  He was seen in conjunction today with Damion Granados  Time spent in supportive listening as patient relayed details of his medical history and treatment related complications  He does endorse significant feelings of depression and some occasional anxiety  He is working to achieve an acceptable "new normal"  He will benefit from continued support from palliative care team      Medications ordered today to help manage symptoms of neuropathy, depression as detailed below  He will follow up in one month  Requested Prescriptions     Signed Prescriptions Disp Refills    gabapentin (NEURONTIN) 300 mg capsule 90 capsule 0     Sig: Take 1 capsule (300 mg total) by mouth 3 (three) times a day    DULoxetine (CYMBALTA) 30 mg delayed release capsule 30 capsule 0     Sig: Take 1 capsule (30 mg total) by mouth daily    Will titrate up as tolerated to dose of 60 mg daily     omeprazole (PriLOSEC) 20 mg delayed release capsule 30 capsule 2     Sig: Take 1 capsule (20 mg total) by mouth daily     Medications Discontinued During This Encounter   Medication Reason    ibuprofen (MOTRIN) 200 mg tablet Educated pt that taking this in conjunction with steroid could increase risk for gastritis/GI Bleed   Recommended Extra-strength tylenol if needed     gabapentin (NEURONTIN) 300 mg capsule Adjusted from BID to TID       Representatives have queried the patient's controlled substance dispensing history in the Prescription Drug Monitoring Program in compliance with regulations before I have prescribed any controlled substances  The prescription history is consistent with prescribed therapy and our practice policies  60 minutes were spent face to face with Velvet Monroe III with greater than 50% of the time spent in counseling or coordination of care including discussions of instructions for disease self management, treatment instructions, follow up requirements and patient and family counseling/involvement in care   All of the patient's questions were answered during this discussion  Follow up one month     Subjective:   Chief Complaint  New consultation for:  symptom management, emotional support in the setting of serious illness  TERRY     Estiven Holt is a 60 y o  male with a 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 s/p resection and debulking at the T1/T2 level, fusion by Dr Kaur Vazquez in 1/2019  Further resection was not possible d/t loss of neurologic signals intraoperatively  Postoperatively he recovered and completed rehab at [facility], he received adjuvant radiation therapy to the C3 through T4 spinal cord, this finished on 5/22/19  However, he developed worsening right lower extremity weakness and right foot drop approximately one week after completion of  RT  His symptoms have progressed and he has developed neuropathic pains in his hands bilaterally that extend up towards his elbows as well as his feet  He describes the pain in his hands as intermittent burning sensation with occasional "electric jolts"  His feet feel numb and the numbness is affecting  his balance   He is using a cane to support his gait  His most recent MRI was negative for any obvious disease progression  Per review of Dr Kaur Vazquez and Dr Silvino Ibanez notes, 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 have post-radiation myelopathy   He was placed on dexamethasone by Dr Abdirahman Berrios on 6/19 at a dose of 4 mg BID with a plan taper over a month and started on low dose gabapentin  He does not feel that his symptoms have significantly  improved with use of steroids  However, his symptoms have not gotten any worse  He denies any bowel or bladder dysfunction  He is currently undergoing physical therapy 3 times a week  He was referred to palliative care to help with symptom management and provide support as he is quite frustrated and upset regarding the worsening of his neurologic symptoms  This is affecting his QOL and preventing him from returning to work  He is in the process of completing disability paperwork  He endorses feeling depressed and states "my wife has been worried about me"  He shares that he used to be very active and would go golfing and bowling regularly  He is no longer able to enjoy these activities  He is concerned that he may never regain full function or strength in his lower extremities and is working on coming to terms with this  He is trying to stay active with other activities such as gardening, cooking, enjoying his grand-children as able  The following portions of the medical history were reviewed: past medical history, problem list, medication list, and social history      Current Outpatient Medications:     dexamethasone (DECADRON) 2 mg tablet, 2 tabs PO BID and taper as directed, Disp: 90 tablet, Rfl: 0    DULoxetine (CYMBALTA) 30 mg delayed release capsule, Take 1 capsule (30 mg total) by mouth daily, Disp: 30 capsule, Rfl: 0    gabapentin (NEURONTIN) 300 mg capsule, Take 1 capsule (300 mg total) by mouth 3 (three) times a day, Disp: 90 capsule, Rfl: 0    levothyroxine 25 mcg tablet, TAKE 1 TABLET (25 MCG TOTAL) BY MOUTH DAILY, Disp: 30 tablet, Rfl: 5    Omega-3 Fatty Acids (FISH OIL) 1,000 mg, Take 1,000 mg by mouth daily Resume on 2/20/19, Disp: , Rfl:     pantoprazole (PROTONIX) 20 mg tablet, Take 1 tablet (20 mg total) by mouth daily, Disp: 30 tablet, Rfl: 1  Review of Systems   Constitutional: Positive for activity change and fatigue  Musculoskeletal: Positive for arthralgias, gait problem (right foot drop), myalgias and neck stiffness  Neurological: Positive for weakness and numbness (feet; affects balance)  Psychiatric/Behavioral: Positive for dysphoric mood (Depression)  All other systems negative    Objective:  Vital Signs  /60 (BP Location: Left arm, Patient Position: Sitting, Cuff Size: Standard)   Pulse 76   Temp 98 6 °F (37 °C) (Oral)   Resp 16   Wt 83 9 kg (185 lb)   BMI 28 98 kg/m²    Physical Exam    Constitutional: Appears well-developed and well-nourished  In no acute physical or emotional distress  Head: Normocephalic and atraumatic  Eyes: EOM are normal  No ocular discharge  No scleral icterus  Neck: No visible adenopathy or masses  Respiratory: Effort normal  No stridor  No respiratory distress  Gastrointestinal: No abdominal distension  Musculoskeletal: trace edema right lower extremity  Decreased strength in RLE, right foot drop  Ambulates with cane  Neurological: Alert, oriented and appropriately conversant  Skin: No diaphoresis, no rashes seen on exposed areas of skin  Psychiatric: Displays a depressed mood and affect   Behavior, judgement and thought content appear normal

## 2019-07-08 ENCOUNTER — OFFICE VISIT (OUTPATIENT)
Dept: PHYSICAL THERAPY | Age: 59
End: 2019-07-08
Payer: COMMERCIAL

## 2019-07-08 DIAGNOSIS — G06.1 SPINAL CORD ABSCESS: ICD-10-CM

## 2019-07-08 DIAGNOSIS — M79.604 RIGHT LEG PAIN: ICD-10-CM

## 2019-07-08 DIAGNOSIS — R53.1 GENERAL WEAKNESS: Primary | ICD-10-CM

## 2019-07-08 PROCEDURE — 97110 THERAPEUTIC EXERCISES: CPT | Performed by: PHYSICAL THERAPIST

## 2019-07-08 PROCEDURE — 97112 NEUROMUSCULAR REEDUCATION: CPT | Performed by: PHYSICAL THERAPIST

## 2019-07-08 PROCEDURE — 97116 GAIT TRAINING THERAPY: CPT | Performed by: PHYSICAL THERAPIST

## 2019-07-08 NOTE — PROGRESS NOTES
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 1    3  Right leg pain M79 604        Start Time: 1015  Stop Time: 1100  Total time in clinic (min): 45 minutes    Subjective: Pt reports no new symptoms  Objective: See treatment diary below      Assessment: Tolerated treatment well  Pt's POC was advanced to include dorsiflexion wrap to increase tolerance to community ambulation  Patient demonstrated fatigue post treatment and would benefit from continued PT      Plan: Continue per plan of care        Precautions: Cervical tumor  Daily Treatment Diary     Manual                                                                                   Exercise Diary  7/8    7/3/19   RB 5'    7'   Clamshells        Quad sets        Seated marches 2*10       LAQ        Ankle pumps        Mini squats 3*10    3x10   Standing hip abduction 3*10    3x10   Press-ups        CLARITA        STS 2*10       Step-ups 8" 2x10 b/l    8" 2x10 b/l   Biodex Maze level 3 dynamic level 12    Maze level 3 dynamic level 12           R SLR 3*10       hooklying hip abd with TB        TM walking        Seated SLR DF R        Standing Toe raises        Step-over cones ( replicates walking on uneven terrain at work) 3 laps each way       Gait training  performed       [facility] training        BOSU ball squats        Tandem walk            Modalities

## 2019-07-10 ENCOUNTER — OFFICE VISIT (OUTPATIENT)
Dept: PHYSICAL THERAPY | Age: 59
End: 2019-07-10
Payer: COMMERCIAL

## 2019-07-10 DIAGNOSIS — M79.604 RIGHT LEG PAIN: ICD-10-CM

## 2019-07-10 DIAGNOSIS — R53.1 GENERAL WEAKNESS: Primary | ICD-10-CM

## 2019-07-10 DIAGNOSIS — G06.1 SPINAL CORD ABSCESS: ICD-10-CM

## 2019-07-10 PROCEDURE — 97112 NEUROMUSCULAR REEDUCATION: CPT | Performed by: PHYSICAL THERAPIST

## 2019-07-10 PROCEDURE — 97116 GAIT TRAINING THERAPY: CPT | Performed by: PHYSICAL THERAPIST

## 2019-07-10 PROCEDURE — 97110 THERAPEUTIC EXERCISES: CPT | Performed by: PHYSICAL THERAPIST

## 2019-07-10 NOTE — PROGRESS NOTES
Daily Note     Today's date: 7/10/2019  Patient name: Forest Dyer  : 1960  MRN: [id number]  Referring provider: Majel Mohs, MD  Dx:   Encounter Diagnosis     ICD-10-CM    1  General weakness R53 1    2  Spinal cord abscess G06 1    3  Right leg pain M79 604                   Subjective: Pt reports symptoms that wax and wean  Objective: See treatment diary below      Assessment: Tolerated treatment well  Pt's POC was progressed to include more proximal strengthening to increase tolerance to functional mobility  Pt demonstrates declines in FOTO score and will be seeing pain management and his PCP will be contacted regarding a brace fitting for foot drop  Patient demonstrated fatigue post treatment and would benefit from continued PT      Plan: Continue per plan of care        Precautions: Cervical tumor  Daily Treatment Diary     Manual                                                                                   Exercise Diary  7/8 7/10   7/3/19   RB 5' 5'   7'   Clamshells        Quad sets        Seated marches 2*10 2*10      LAQ        Ankle pumps        Mini squats 3*10 3*10   3x10   Standing hip abduction 3*10 3*10   3x10   Press-ups        CLARITA        STS 2*10       Step-ups 8" 2x10 b/l 8" 2*10 b/l   8" 2x10 b/l   Biodex Maze level 3 dynamic level 12 Maze level 3 dynamic level 12   Maze level 3 dynamic level 12           R SLR 3*10 3*10      hooklying hip abd with TB        TM walking  performed      Seated SLR DF R        Standing Toe raises        Step-over cones ( replicates walking on uneven terrain at work) 3 laps each way 3 laps each way      Gait training  performed performed      [facility] training        BOSU ball squats        Tandem walk            Modalities

## 2019-07-11 ENCOUNTER — OFFICE VISIT (OUTPATIENT)
Dept: PALLIATIVE MEDICINE | Facility: CLINIC | Age: 59
End: 2019-07-11
Payer: COMMERCIAL

## 2019-07-11 ENCOUNTER — SOCIAL WORK (OUTPATIENT)
Dept: PALLIATIVE MEDICINE | Facility: CLINIC | Age: 59
End: 2019-07-11
Payer: COMMERCIAL

## 2019-07-11 ENCOUNTER — TELEPHONE (OUTPATIENT)
Dept: PALLIATIVE MEDICINE | Facility: CLINIC | Age: 59
End: 2019-07-11

## 2019-07-11 VITALS
TEMPERATURE: 98.6 F | HEART RATE: 76 BPM | SYSTOLIC BLOOD PRESSURE: 122 MMHG | WEIGHT: 185 LBS | RESPIRATION RATE: 16 BRPM | DIASTOLIC BLOOD PRESSURE: 60 MMHG | BODY MASS INDEX: 28.98 KG/M2

## 2019-07-11 DIAGNOSIS — Z71.89 COUNSELING AND COORDINATION OF CARE: Primary | ICD-10-CM

## 2019-07-11 DIAGNOSIS — F32.A DEPRESSION, UNSPECIFIED DEPRESSION TYPE: ICD-10-CM

## 2019-07-11 DIAGNOSIS — G95.89 RADIATION-INDUCED MYELOPATHY (HCC): Primary | ICD-10-CM

## 2019-07-11 DIAGNOSIS — C72.0 SPINAL CORD EPENDYMOMA (HCC): ICD-10-CM

## 2019-07-11 DIAGNOSIS — Z98.1 H/O SPINAL FUSION: ICD-10-CM

## 2019-07-11 DIAGNOSIS — G62.9 NEUROPATHY: ICD-10-CM

## 2019-07-11 DIAGNOSIS — R53.1 GENERALIZED WEAKNESS: ICD-10-CM

## 2019-07-11 PROCEDURE — 99205 OFFICE O/P NEW HI 60 MIN: CPT | Performed by: NURSE PRACTITIONER

## 2019-07-11 PROCEDURE — NC001 PR NO CHARGE

## 2019-07-11 RX ORDER — DULOXETIN HYDROCHLORIDE 30 MG/1
30 CAPSULE, DELAYED RELEASE ORAL DAILY
Qty: 30 CAPSULE | Refills: 0 | Status: SHIPPED | OUTPATIENT
Start: 2019-07-11 | End: 2019-08-03 | Stop reason: SDUPTHER

## 2019-07-11 RX ORDER — OMEPRAZOLE 20 MG/1
20 CAPSULE, DELAYED RELEASE ORAL DAILY
Qty: 30 CAPSULE | Refills: 2 | Status: SHIPPED | OUTPATIENT
Start: 2019-07-11 | End: 2019-07-11 | Stop reason: ALTCHOICE

## 2019-07-11 RX ORDER — PANTOPRAZOLE SODIUM 20 MG/1
20 TABLET, DELAYED RELEASE ORAL DAILY
Qty: 30 TABLET | Refills: 1 | Status: SHIPPED | OUTPATIENT
Start: 2019-07-11 | End: 2019-08-03 | Stop reason: SDUPTHER

## 2019-07-11 RX ORDER — GABAPENTIN 300 MG/1
300 CAPSULE ORAL 3 TIMES DAILY
Qty: 90 CAPSULE | Refills: 0 | Status: SHIPPED | OUTPATIENT
Start: 2019-07-11 | End: 2019-08-08 | Stop reason: SDUPTHER

## 2019-07-11 NOTE — TELEPHONE ENCOUNTER
[facility] pharmacy called  Pt insurance will not cover omeprazole but will cover   Pantoprazole will be covered

## 2019-07-11 NOTE — PROGRESS NOTES
Mr Pyaton Haro presents today for initial outpatient 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  He endorses continued neuropathic pains in his extremities, weakness and depression  He stated "my life has been ruined by this "  He previously was very active golfing, bowling and working in [employer] and a  for 40 years  My Song is now applying for SSD and has an appointment next week  He will be switching from STD to LTD through his work by the end of the month  My Song is  to his wife of 10 years (2nd marriage) and have six children between them  He has three grand-children and one on the way  He confirmed his family is very supportive  Medications reviewed and regime discussed per Anastacia  He will return in 1 month for follow up  Provided contact information for additional supportive counseling as needed  Pt  Appreciative of assistance

## 2019-07-12 ENCOUNTER — OFFICE VISIT (OUTPATIENT)
Dept: PHYSICAL THERAPY | Age: 59
End: 2019-07-12
Payer: COMMERCIAL

## 2019-07-12 DIAGNOSIS — M79.604 RIGHT LEG PAIN: ICD-10-CM

## 2019-07-12 DIAGNOSIS — G06.1 SPINAL CORD ABSCESS: ICD-10-CM

## 2019-07-12 DIAGNOSIS — R53.1 GENERAL WEAKNESS: Primary | ICD-10-CM

## 2019-07-12 PROCEDURE — 97110 THERAPEUTIC EXERCISES: CPT

## 2019-07-12 PROCEDURE — 97112 NEUROMUSCULAR REEDUCATION: CPT

## 2019-07-12 PROCEDURE — 97116 GAIT TRAINING THERAPY: CPT

## 2019-07-12 NOTE — PROGRESS NOTES
Daily Note     Today's date: 2019  Patient name: Ora Sanders  : 1960  MRN: [id number]  Referring provider: Luis Gray MD  Dx:   Encounter Diagnosis     ICD-10-CM    1  General weakness R53 1    2  Spinal cord abscess G06 1    3  Right leg pain M79 604                   Subjective:  Pt reports he met with palliative care and , started taking Cymbalta and increased neurontin, pt reports he is frustrated with his present situation but feels better since meeting with palliative care staff  Objective: See treatment diary below      Assessment: focused on trunk control and RLE control today, pt tends to sub with trunk for R leg mobility during gait and ex, pt has difficulty with clamshell ex due to weakness, modified with hip abd isometric, encouragement and support given to pt and pt seemed to be pleased with his performance today in PT despite his fatigue levels  Plan: Continue per plan of care  Progress treatment as tolerated         Precautions: Cervical tumor  Daily Treatment Diary     Manual  19            PNF R LE VK                                                                    Exercise Diary  7/8 7/10 7/12/19  7/3/19   RB 5' 5' 5'  7'   Clamshells        Quad sets        Seated marches 2*10 2*10      LAQ        Ankle pumps        Mini squats 3*10 3*10   3x10   Standing hip abduction 3*10 3*10   3x10   Press-ups        CLARITA        STS 2*10       Step-ups 8" 2x10 b/l 8" 2*10 b/l   8" 2x10 b/l   Biodex Maze level 3 dynamic level 12 Maze level 3 dynamic level 12   Maze level 3 dynamic level 12   Bridges with arms crossed   10x10"     R SLR 3*10 3*10      hooklying hip abd with TB   isometric hip abd with belt 10x10"     TM walking  performed      Seated SLR DF R        Standing Toe raises        Step-over cones ( replicates walking on uneven terrain at work) 3 laps each way 3 laps each way      Gait training  performed performed performed     [facility] training STS with wt shift over R LE   nv      Gait ex    15 min  ( rocking, wt shift, sidestep)         Modalities

## 2019-07-15 ENCOUNTER — OFFICE VISIT (OUTPATIENT)
Dept: PHYSICAL THERAPY | Age: 59
End: 2019-07-15
Payer: COMMERCIAL

## 2019-07-15 DIAGNOSIS — M79.604 RIGHT LEG PAIN: ICD-10-CM

## 2019-07-15 DIAGNOSIS — R53.1 GENERAL WEAKNESS: Primary | ICD-10-CM

## 2019-07-15 PROCEDURE — 97110 THERAPEUTIC EXERCISES: CPT | Performed by: PHYSICAL THERAPIST

## 2019-07-15 PROCEDURE — 97112 NEUROMUSCULAR REEDUCATION: CPT | Performed by: PHYSICAL THERAPIST

## 2019-07-15 PROCEDURE — 97116 GAIT TRAINING THERAPY: CPT | Performed by: PHYSICAL THERAPIST

## 2019-07-15 NOTE — PROGRESS NOTES
Daily Note     Today's date: 7/15/2019  Patient name: Danna Hodges  : 1960  MRN: [id number]  Referring provider: Geovanna Buckner MD  Dx:   Encounter Diagnosis     ICD-10-CM    1  General weakness R53 1    2  Right leg pain M79 604        Start Time: 1730  Stop Time: 1815  Total time in clinic (min): 45 minutes    Subjective: Pt reports no new symptoms  Objective: See treatment diary below      Assessment: Tolerated treatment well  Pt's POC was progressed to include more gait training activites to decrease risk of falls and improve gait pattern  Patient demonstrated fatigue post treatment and would benefit from continued PT      Plan: Continue per plan of care        Precautions: Cervical tumor  Daily Treatment Diary     Manual  7/12/19 7/15           PNF R LE VK JF                                                                   Exercise Diary  7/8 7/10 7/12/19 7/15 7/3/19   RB 5' 5' 5' 5' 7'   Clamshells        Quad sets        Seated marches 2*10 2*10      LAQ        Ankle pumps        Mini squats 3*10 3*10  3*10 3x10   Standing hip abduction 3*10 3*10  3*10 3x10   Press-ups        CLARITA        STS 2*10       Step-ups 8" 2x10 b/l 8" 2*10 b/l  8" 2x10 b/l 8" 2x10 b/l   Biodex Maze level 3 dynamic level 12 Maze level 3 dynamic level 12   Maze level 3 dynamic level 12   Bridges with arms crossed   10x10" 10*10"    R SLR 3*10 3*10      hooklying hip abd with TB   isometric hip abd with belt 10x10" isometric hip abd with belt 10x10"    TM walking  performed      Seated SLR DF R        Standing Toe raises        Step-over cones ( replicates walking on uneven terrain at work) 3 laps each way 3 laps each way      Gait training  performed performed performed     [facility] training        STS with wt shift over R LE   nv      Gait ex    15 min  ( rocking, wt shift, sidestep) 20 min  ( rocking, wt shift, sidestep)        Modalities

## 2019-07-17 ENCOUNTER — OFFICE VISIT (OUTPATIENT)
Dept: PHYSICAL THERAPY | Age: 59
End: 2019-07-17
Payer: COMMERCIAL

## 2019-07-17 DIAGNOSIS — R53.1 GENERAL WEAKNESS: Primary | ICD-10-CM

## 2019-07-17 DIAGNOSIS — M79.604 RIGHT LEG PAIN: ICD-10-CM

## 2019-07-17 DIAGNOSIS — G06.1 SPINAL CORD ABSCESS: ICD-10-CM

## 2019-07-17 PROCEDURE — 97112 NEUROMUSCULAR REEDUCATION: CPT

## 2019-07-17 PROCEDURE — 97116 GAIT TRAINING THERAPY: CPT

## 2019-07-17 NOTE — PROGRESS NOTES
Daily Note     Today's date: 2019  Patient name: Manjula Lucas  : 1960  MRN: [id number]  Referring provider: Ruddy Resendez MD  Dx:   Encounter Diagnosis     ICD-10-CM    1  General weakness R53 1    2  Right leg pain M79 604    3  Spinal cord abscess G06 1                   Subjective:  Pt reports pain at times R LE at times, also reports min changes in sx but still taking steroids until end of month      Objective: See treatment diary below      Assessment:  Max fatigue noted after treatment , pt avoids wt shift/WB onto R leg with txfers/gait,  Pt required mod-mas assist to stabilize R LE during txfer/WBing ex, pt able to perform dynamic ex sitting on ball with mod fatigue and min-mod assist for safety/ dynamic sitting balance      Plan: Continue per plan of care    pt to call Matt Perez to set up appt for [facility] fitting(rx rec'd)     Precautions: Cervical tumor  Daily Treatment Diary     Manual  7/12/19 7/15 7/17/19          PNF R LE VK JF VK                                                                  Exercise Diary  7/8 7/10 7/12/19 7/15 7/3/19 7/17/19   RB 5' 5' 5' 5' 7' 6'   Clamshells         Quad sets         Seated marches 2*10 2*10    On ball   LAQ         Ankle pumps         Mini squats 3*10 3*10  3*10 3x10    Standing hip abduction 3*10 3*10  3*10 3x10    Press-ups         CLARITA         STS 2*10        Step-ups 8" 2x10 b/l 8" 2*10 b/l  8" 2x10 b/l 8" 2x10 b/l    Biodex Maze level 3 dynamic level 12 Maze level 3 dynamic level 12   Maze level 3 dynamic level 12    Bridges with arms crossed   10x10" 10*10"     R SLR 3*10 3*10       hooklying hip abd with TB   isometric hip abd with belt 10x10" isometric hip abd with belt 10x10"     TM walking  performed       Seated SLR DF R         Standing Toe raises      Bobath txfers  10 min   Step-over cones ( replicates walking on uneven terrain at work) 3 laps each way 3 laps each way       Gait training  performed performed performed   performed on level and stairs with R foot wrapped into DF   SPC training      Sitting on PB alt UE/LE/wt shift/reach for cones 15 min   STS with wt shift over R LE   nv       Gait ex    15 min  ( rocking, wt shift, sidestep) 20 min  ( rocking, wt shift, sidestep)         Modalities

## 2019-07-19 ENCOUNTER — OFFICE VISIT (OUTPATIENT)
Dept: PHYSICAL THERAPY | Age: 59
End: 2019-07-19
Payer: COMMERCIAL

## 2019-07-19 DIAGNOSIS — M79.604 RIGHT LEG PAIN: ICD-10-CM

## 2019-07-19 DIAGNOSIS — R53.1 GENERAL WEAKNESS: Primary | ICD-10-CM

## 2019-07-19 DIAGNOSIS — G06.1 SPINAL CORD ABSCESS: ICD-10-CM

## 2019-07-19 PROCEDURE — 97140 MANUAL THERAPY 1/> REGIONS: CPT

## 2019-07-19 PROCEDURE — 97110 THERAPEUTIC EXERCISES: CPT

## 2019-07-19 PROCEDURE — 97112 NEUROMUSCULAR REEDUCATION: CPT

## 2019-07-19 NOTE — PROGRESS NOTES
Daily Note     Today's date: 2019  Patient name: Johana Lee  : 1960  MRN: [id number]  Referring provider: Sera Pierce MD  Dx:   Encounter Diagnosis     ICD-10-CM    1  General weakness R53 1    2  Right leg pain M79 604    3  Spinal cord abscess G06 1                   Subjective:  Pt reports pain in hands and R elg is less frequent but same intesity/duration, pt reports min-mod fatigue      Objective: See treatment diary below      Assessment: Tolerated treatment well  Patient demonstrated fatigue post treatment and would benefit from continued PT  Added single knee to chest with R leg on ball working on neuromuscular control/activ ation  of R leg and trunk  Plan: Continue per plan of care  Progress treatment as tolerated         Precautions: Cervical tumor    Daily Treatment Diary     Manual  7/12/19 7/15 7/17/19 7/19/19         PNF R LE VK JF VK VK         PNF R pelvis in L SL    VK                                                    Exercise Diary  7/8 7/10 7/12/19 7/15 7/3/19 7/17/19 7/19/19   RB 5' 5' 5' 5' 7' 6' 5'   Clamshells          Quad sets          Seated marches 2*10 2*10    On ball    LAQ          Ankle pumps          Mini squats 3*10 3*10  3*10 3x10     Standing hip abduction 3*10 3*10  3*10 3x10     Press-ups          CLARITA          STS 2*10         Step-ups 8" 2x10 b/l 8" 2*10 b/l  8" 2x10 b/l 8" 2x10 b/l     Biodex Maze level 3 dynamic level 12 Maze level 3 dynamic level 12   Maze level 3 dynamic level 12     Bridges with arms crossed   10x10" 10*10"   With isomet hip abd strap 3x10x5"   R SLR 3*10 3*10        hooklying hip abd with TB   isometric hip abd with belt 10x10" isometric hip abd with belt 10x10"      TM walking  performed     R KTC foot on orange ball 2x10(PNF pattern)   Seated SLR DF R          Standing Toe raises      Bobath txfers  10 min    Step-over cones ( replicates walking on uneven terrain at work) 3 laps each way 3 laps each way        Gait training performed performed performed   performed on level and stairs with R foot wrapped into DF R foot ace wrapped into DF  Gait with SPCon level and steps     SPC training      Sitting on PB alt UE/LE/wt shift/reach for cones 15 min sitiing on PB alt ue/LE/wt shift/reach for cones 15 min   STS with wt shift over R LE   nv        Gait ex    15 min  ( rocking, wt shift, sidestep) 20 min  ( rocking, wt shift, sidestep)          Modalities

## 2019-07-22 ENCOUNTER — EVALUATION (OUTPATIENT)
Dept: PHYSICAL THERAPY | Age: 59
End: 2019-07-22
Payer: COMMERCIAL

## 2019-07-22 ENCOUNTER — TELEPHONE (OUTPATIENT)
Dept: NEUROSURGERY | Facility: CLINIC | Age: 59
End: 2019-07-22

## 2019-07-22 DIAGNOSIS — M79.604 RIGHT LEG PAIN: ICD-10-CM

## 2019-07-22 DIAGNOSIS — G06.1 SPINAL CORD ABSCESS: ICD-10-CM

## 2019-07-22 DIAGNOSIS — R53.1 GENERAL WEAKNESS: Primary | ICD-10-CM

## 2019-07-22 PROCEDURE — 97110 THERAPEUTIC EXERCISES: CPT | Performed by: PHYSICAL THERAPIST

## 2019-07-22 PROCEDURE — 97140 MANUAL THERAPY 1/> REGIONS: CPT | Performed by: PHYSICAL THERAPIST

## 2019-07-22 PROCEDURE — 97112 NEUROMUSCULAR REEDUCATION: CPT | Performed by: PHYSICAL THERAPIST

## 2019-07-22 NOTE — TELEPHONE ENCOUNTER
Received a call from Larry Samano RN with [facility] inquiring if patient needs a refill of the dexamethasone since [facility] sent a refill request to Dr Trey Helton  Called patient to see if he has enough dexamethasone to get him through the taper  Patient reports he is currently on 1 mg daily of dexamethasone and how he has three days left of the taper  Patient reports he does not need a taper since he has enough dexamethasone to get him through the taper  He reports he feels fine overall, no changes since last time we spoke  He is not worsening  He was appreciative for the call  Notified KIMBERLY Coker of the above  No refill required

## 2019-07-22 NOTE — PROGRESS NOTES
PT Re-Evaluation     Today's date: 2019  Patient name: Jr Arroyo  : 1960  MRN: [id number]  Referring provider: David Taylor MD  Dx:   Encounter Diagnosis     ICD-10-CM    1  General weakness R53 1    2  Spinal cord abscess G06 1    3  Right leg pain M79 604        Start Time: 930  Stop Time: 1015  Total time in clinic (min): 45 minutes    Assessment  Assessment details: Jr Arroyo is a 60 y o  male who presents with signs and symptoms consistent of RLE weakness due to spinal compression caused by cancerous tumor  Pt has regressed since evaluation on 19 and demonstrates foot drop and difficulty with foot drop on RLE  Pt's functional mobility has not regressed demonstrated by TUG and 5 STS by balance is compromises demonstrated by MCSTIB  Pt was referred to have orthotics fitting to improve gait pattern  Patient would benefit from skilled physical therapy to address the impairments, improve their level of function, and to improve their overall quality of life  Impairments: abnormal or restricted ROM, abnormal movement, activity intolerance, impaired physical strength and weight-bearing intolerance    Goals  Short Term Goals: to be achieved by 4 weeks   1) Patient to be independent with basic HEP  MET   2) Patient will demonstrate improved Timed up and Go test by 5 seconds met  3) Increase LE strength by 1/2 MMT grade in all deficient planes  MET    Long Term Goals: to be achieved by discharge  1) FOTO equal to or greater than 59  Partially met   2) Patient to be independent with comprehensive HEP  Partially met   3) Increase LE strength to 5/5 MMT grade in all planes to improve a/iadls   Partially met   4) Patient will demonstrate increase ambulation tolerance to 30 min  met   5) Improve sit to stand transfers to maximal level of function partially met met   6) Improve stair negotiation to maximal level of function partially met     Plan  Patient would benefit from: skilled physical therapy  Planned modality interventions: TENS and cryotherapy  Planned therapy interventions: abdominal trunk stabilization, neuromuscular re-education, patient education, therapeutic activities, therapeutic exercise, stretching, strengthening, therapeutic training, transfer training, graded exercise, graded activity, home exercise program, functional ROM exercises and balance  Frequency: Twice a week for tweleve weeks  Treatment plan discussed with: patient        Subjective Evaluation    History of Present Illness  Mechanism of injury: Pt suffered multiple falls and BUE numbness in January was 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  Patient had a Posterior C4-T3 laminectomy and resection of intramedullary mass and C2-T5 fixation/fusion  Patient is now medically stable and is being seen at PT following 1 month stay in sub acute rehab  Pt reports most problems with RLE weakness  Pt reports to the doctor on the 6th to determine if he needs to receive cancer treatment and when he is able to remove cervical collar  Going up and down steps is difficulty for patient, navigating home due to pets, and balance is an issue  Pt had no significant PMH prior to this incidence  5/23/2019: Pt reports 85% improvements since starting PT  Pt has improvements in symptoms and reports no numbness or tingling in foot  Pt reports improved ability to navigate stairs but still has some loss of balances when ambulating extended community distances  Pt reports that June 17th he will receive cervical spine x rays, review results with oncologist on the 19th, and 1st week of July he is set to return to work    7/22/2019: Pt has seen regressions since 5/23/2019  Pt ended radiation early June and strength and functional re-assessment was perfumed on June 10th  Since then patient has had increased difficulty performing ADL's and functional transfers   Pt reports frustration and depression towards his situation which he is seeing pain management for     Pain  Current pain ratin  At best pain ratin  At worst pain ratin    Patient Goals  Patient goals for therapy: increased strength, decreased pain and independence with ADLs/IADLs  Patient goal: Work, golf, bike        Objective     Concurrent Complaints  Negative for night pain, disturbed sleep, bladder dysfunction, bowel dysfunction and saddle (S4) numbness    Neurological Testing     Sensation     Lumbar   Left   Intact: light touch    Right   Intact: decreased sensation in L4,L5, S1 dermatome pattern     Reflexes   Left   Achilles (S1): nonsustained clonus (4+)  Clonus sign: positive    Right   Achilles (S1): nonsustained clonus (4+)  Clonus sign: positive    Active Range of Motion     Lumbar   Flexion:  Restriction level: moderate  Extension:  Restriction level: maximal  Left lateral flexion:  Restriction level: minimal  Right lateral flexion:  Restriction level: minimal  Left rotation:  Restriction level: minimal  Right rotation:  Restriction level: minimal    Strength/Myotome Testing     Lumbar   Left   Heel walk: normal  Toe walk: normal    Right   Heel walk: normal  Toe walk: unable    Left Hip   Planes of Motion   Flexion: 5  Extension: 5  Abduction: 5  Adduction: 5  External rotation: 5  Internal rotation: 5    Right Hip   Planes of Motion   Flexion: 3+  Extension: 4-  Abduction: 3+  Adduction: 4-  External rotation: 4-  Internal rotation: 4-    Left Knee   Flexion: 5  Extension: 5    Right Knee   Flexion: 3+  Extension: 4-    Right Ankle:  Dorsiflexion: 2+  Plantar flexion: 3+      Functional Assessment        Comments  Pt has little difficulty performing functional squat   Stair navigation is completed with reciprocal pattern and circumduction with RLE    5 STS: 13 seconds  TU seconds    MCTSIB:  EO firm:100% impairment   EC firm: 100 impairment  EO foam: 100% impairment  EC foam: 100% impairment       Precautions: Cervical tumor  Daily Treatment Diary Daily Treatment Diary     Manual  7/12/19 7/15 7/17/19 7/19/19         PNF R LE VK JF VK VK         PNF R pelvis in L SL    VK                                                    Exercise Diary  7/8 7/10 7/12/19 7/15 7/3/19 7/17/19 7/22/19   RB 5' 5' 5' 5' 7' 6' 5'   Clamshells          Quad sets          Seated marches 2*10 2*10    On ball    LAQ          Ankle pumps          Mini squats 3*10 3*10  3*10 3x10     Standing hip abduction 3*10 3*10  3*10 3x10     Press-ups          CLARITA          STS 2*10         Step-ups 8" 2x10 b/l 8" 2*10 b/l  8" 2x10 b/l 8" 2x10 b/l     Biodex Maze level 3 dynamic level 12 Maze level 3 dynamic level 12   Maze level 3 dynamic level 12     Bridges with arms crossed   10x10" 10*10"   With isomet hip abd strap 3x10x5"   R SLR 3*10 3*10        hooklying hip abd with TB   isometric hip abd with belt 10x10" isometric hip abd with belt 10x10"      TM walking  performed     R KTC foot on orange ball 2x10(PNF pattern)   Seated SLR DF R          Standing Toe raises      Bobath txfers  10 min    Step-over cones ( replicates walking on uneven terrain at work) 3 laps each way 3 laps each way        Gait training  performed performed performed   performed on level and stairs with R foot wrapped into DF R foot ace wrapped into DF  Gait with SPCon level and steps     SPC training      Sitting on PB alt UE/LE/wt shift/reach for cones 15 min sitiing on PB alt ue/LE/wt shift/reach for cones 15 min   STS with wt shift over R LE   nv        Gait ex    15 min  ( rocking, wt shift, sidestep) 20 min  ( rocking, wt shift, sidestep)          Modalities

## 2019-07-24 ENCOUNTER — OFFICE VISIT (OUTPATIENT)
Dept: PHYSICAL THERAPY | Age: 59
End: 2019-07-24
Payer: COMMERCIAL

## 2019-07-24 DIAGNOSIS — M79.604 RIGHT LEG PAIN: ICD-10-CM

## 2019-07-24 DIAGNOSIS — R53.1 GENERAL WEAKNESS: Primary | ICD-10-CM

## 2019-07-24 DIAGNOSIS — G06.1 SPINAL CORD ABSCESS: ICD-10-CM

## 2019-07-24 PROCEDURE — 97140 MANUAL THERAPY 1/> REGIONS: CPT

## 2019-07-24 PROCEDURE — 97112 NEUROMUSCULAR REEDUCATION: CPT

## 2019-07-24 PROCEDURE — 97110 THERAPEUTIC EXERCISES: CPT

## 2019-07-24 NOTE — PROGRESS NOTES
Daily Note     Today's date: 2019  Patient name: Nicky Anderson  : 1960  MRN: [id number]  Referring provider: João Eid MD  Dx:   Encounter Diagnosis     ICD-10-CM    1  General weakness R53 1    2  Right leg pain M79 604    3  Spinal cord abscess G06 1                   Subjective:  Pt reports he was fitted with MAFO yesterday with good fit, he reports his R leg feels like it wants to "go out to the side" but is much better to walk with MAFO on      Objective: See treatment diary below  Pt performed mat ex without AFO on and standing/txfer with AFO on R LE      Assessment: Mod-max fatigue noted after today's treatment, gait with AFO on R improved R knee control (recurvatum) but yane cont with WB stance    Plan: Continue per plan of care  Progress treatment as tolerated         Precautions: Cervical tumor    Daily Treatment Diary     Manual  7/12/19 7/15 7/17/19 7/19/19 7/24/19        PNF R LE VK JF VK VK VK        PNF R pelvis in L SL    VK VK                                                   Exercise Diary  7/8 7/10 7/12/19 7/15 7/3/19 7/17/19 7/19/19 7/24/19   RB 5' 5' 5' 5' 7' 6' 5' 5'   Clamshells        n/a   Quad sets           Seated marches 2*10 2*10    On ball     LAQ           Ankle pumps           Mini squats 3*10 3*10  3*10 3x10      Standing hip abduction 3*10 3*10  3*10 3x10   3x10   Press-ups           CLARITA           STS 2*10          Step-ups 8" 2x10 b/l 8" 2*10 b/l  8" 2x10 b/l 8" 2x10 b/l      Biodex Maze level 3 dynamic level 12 Maze level 3 dynamic level 12   Maze level 3 dynamic level 12      Bridges with arms crossed   10x10" 10*10"   With isomet hip abd strap 3x10x5" 3x10x5"   R SLR 3*10 3*10         hooklying hip abd with TB   isometric hip abd with belt 10x10" isometric hip abd with belt 10x10"       TM walking  performed     R KTC foot on orange ball 2x10(PNF pattern)    Seated SLR DF R           Standing Toe raises      Bobath txfers  10 min  2x5 bobath txfers Step-over cones ( replicates walking on uneven terrain at work) 3 laps each way 3 laps each way      sidestepping 4x20ft   Gait training  performed performed performed   performed on level and stairs with R foot wrapped into DF R foot ace wrapped into DF  Gait with SPCon level and steps   R foot AFO on level 10 min   SPC training      Sitting on PB alt UE/LE/wt shift/reach for cones 15 min sitiing on PB alt ue/LE/wt shift/reach for cones 15 min Sitting on PB wt shift frwd/bkwd/side 10 min   STS with wt shift over R LE   nv         Gait ex    15 min  ( rocking, wt shift, sidestep) 20 min  ( rocking, wt shift, sidestep)       Inez's ex R LE        10 min with assist       Modalities

## 2019-07-26 ENCOUNTER — OFFICE VISIT (OUTPATIENT)
Dept: PHYSICAL THERAPY | Age: 59
End: 2019-07-26
Payer: COMMERCIAL

## 2019-07-26 DIAGNOSIS — R53.1 GENERAL WEAKNESS: Primary | ICD-10-CM

## 2019-07-26 DIAGNOSIS — M79.604 RIGHT LEG PAIN: ICD-10-CM

## 2019-07-26 DIAGNOSIS — G06.1 SPINAL CORD ABSCESS: ICD-10-CM

## 2019-07-26 PROCEDURE — 97140 MANUAL THERAPY 1/> REGIONS: CPT | Performed by: PHYSICAL THERAPIST

## 2019-07-26 PROCEDURE — 97112 NEUROMUSCULAR REEDUCATION: CPT | Performed by: PHYSICAL THERAPIST

## 2019-07-26 NOTE — PROGRESS NOTES
Daily Note     Today's date: 2019  Patient name: Abel Orr  : 1960  MRN: [id number]  Referring provider: Kiarra Cali MD  Dx: No diagnosis found  Subjective: Pt reports improvements with community ambulation since getting PLS brace  Objective: See treatment diary below      Assessment: Tolerated treatment well  Pt's POC was progressed to include more balance training to improve neuromuscular firing patterns and decrease risk of falls  Patient demonstrated fatigue post treatment and would benefit from continued PT      Plan: Continue per plan of care        Precautions: Cervical tumor    Daily Treatment Diary     Manual  7/12/19 7/15 7/17/19 7/19/19 7/24/19 7/26       PNF R LE VK JF VK VK VK JF       PNF R pelvis in L SL    VK VK JF                                                  Exercise Diary  19   RB 5'       5'   Mini squats           Standing hip abduction 3*10       3x10   STS           Step-ups           Searcheeze           Bridges with arms crossed 3x10x5"       3x10x5"   R SLR           hooklying hip abd with TB           TM walking           Seated SLR DF R           Standing Toe raises 2x5 bobath txfers       2x5 bobath txfers   Step-over cones ( replicates walking on uneven terrain at work) sidestepping 4x20ft       sidestepping 4x20ft   Gait training  R foot AFO on level 10 min       R foot AFO on level 10 min   SPC training Sitting on PB wt shift frwd/bkwd/side 10 min       Sitting on PB wt shift frwd/bkwd/side 10 min   STS with wt shift over R LE 3*10          Inez's ex R LE 10 min with assist       10 min with assist       Modalities

## 2019-07-29 ENCOUNTER — OFFICE VISIT (OUTPATIENT)
Dept: PHYSICAL THERAPY | Age: 59
End: 2019-07-29
Payer: COMMERCIAL

## 2019-07-29 DIAGNOSIS — G06.1 SPINAL CORD ABSCESS: ICD-10-CM

## 2019-07-29 DIAGNOSIS — R53.1 GENERAL WEAKNESS: Primary | ICD-10-CM

## 2019-07-29 DIAGNOSIS — M79.604 RIGHT LEG PAIN: ICD-10-CM

## 2019-07-29 PROCEDURE — 97112 NEUROMUSCULAR REEDUCATION: CPT | Performed by: PHYSICAL THERAPIST

## 2019-07-29 PROCEDURE — 97140 MANUAL THERAPY 1/> REGIONS: CPT | Performed by: PHYSICAL THERAPIST

## 2019-07-29 NOTE — PROGRESS NOTES
Daily Note     Today's date: 2019  Patient name: Bryce Harris  : 1960  MRN: [id number]  Referring provider: Adan Mcnamara MD  Dx:   Encounter Diagnosis     ICD-10-CM    1  General weakness R53 1    2  Right leg pain M79 604    3  Spinal cord abscess G06 1        Start Time: 0930  Stop Time: 1015  Total time in clinic (min): 45 minutes    Subjective: Pt reports improvements with ankle ROM  Objective: See treatment diary below      Assessment: Tolerated treatment well  Pt's HEP/POC was updated to include more neuro re-education to improve neural firing patterns  Patient demonstrated fatigue post treatment and would benefit from continued PT      Plan: Continue per plan of care        Precautions: Cervical tumor    Daily Treatment Diary     Manual  7/12/19 7/15 7/17/19 7/19/19 7/24/19 7/26 7/29      PNF R LE VK JF VK VK VK JF JF      PNF R pelvis in L SL    VK VK JF JF      PNF Dorsiflexors       JF                                    Exercise Diary  19   RB 5' 5'      5'   Mini squats           Standing hip abduction 3*10       3x10   STS           Step-ups           Biodex           Bridges with arms crossed 3x10x5" 2*10*5"      3x10x5"   R SLR  2*10         hooklying hip abd with TB           TM walking           Seated SLR DF R           Standing Toe raises 2x5 bobath txfers       2x5 bobath txfers   Step-over cones ( replicates walking on uneven terrain at work) sidestepping 4x20ft sidestepping 4x20ft      sidestepping 4x20ft   Gait training  R foot AFO on level 10 min R foot AFO on level 10 min      R foot AFO on level 10 min   SLS RLE  3*40"      Sitting on PB wt shift frwd/bkwd/side 10 min   STS with wt shift over R LE 3*10 3*10         Inez's ex R LE 10 min with assist 10 mins with assist      10 min with assist       Modalities

## 2019-07-31 ENCOUNTER — APPOINTMENT (OUTPATIENT)
Dept: PHYSICAL THERAPY | Age: 59
End: 2019-07-31
Payer: COMMERCIAL

## 2019-08-02 ENCOUNTER — OFFICE VISIT (OUTPATIENT)
Dept: PHYSICAL THERAPY | Age: 59
End: 2019-08-02
Payer: COMMERCIAL

## 2019-08-02 DIAGNOSIS — G06.1 SPINAL CORD ABSCESS: ICD-10-CM

## 2019-08-02 DIAGNOSIS — M79.604 RIGHT LEG PAIN: ICD-10-CM

## 2019-08-02 DIAGNOSIS — R53.1 GENERAL WEAKNESS: Primary | ICD-10-CM

## 2019-08-02 PROCEDURE — 97112 NEUROMUSCULAR REEDUCATION: CPT

## 2019-08-02 PROCEDURE — 97140 MANUAL THERAPY 1/> REGIONS: CPT

## 2019-08-02 NOTE — PROGRESS NOTES
Daily Note     Today's date: 2019  Patient name: Colonel Crook  : 1960  MRN: [id number]  Referring provider: Franco Vu MD  Dx:   Encounter Diagnosis     ICD-10-CM    1  General weakness R53 1    2  Right leg pain M79 604    3  Spinal cord abscess G06 1                   Subjective: pt reports he feels like it's more difficult to lift R leg while walking with AFO and during fx activities , pt reports he finished taking steroids 3 days ago, also has been having headaches, which MD is aware of and recommended pt take ibuprofen, pt reports he has relief with this, pt reports he cs last visit due to "anxiety attack" "I literally can't do anything I could do before I had the sx "      Objective: See treatment diary below  Spoke with pt at length re: counseling re: adjustment reaction to his present condition and decreased fx activities    Assessment: pt tearful at times re: his present situation, support and encouragement offered, encouraged pt seek counseling to help with his adjustment reaction, overall less swelling R ankle,     Plan: Continue per plan of care  Progress treatment as tolerated         Precautions: Cervical tumor    Daily Treatment Diary     Manual  7/12/19 7/15 7/17/19 7/19/19 7/24/19 7/26 7/29 8/2/19     PNF R LE VK JF VK VK VK [facility] VK     PNF R pelvis in L SL    VK [facility]      PNF Dorsiflexors       [facility]                                   Exercise Diary  19   RB 5' 5'      5'   Mini squats           Standing hip abduction 3*10  2x10     3x10   STS           Step-ups           Biodex           Bridges with arms crossed 3x10x5" 2*10*5" 2x10x5"     3x10x5"   R SLR  2*10 2x10        hooklying hip abd with TB   2x10        TM walking           Seated SLR DF R           Standing Toe raises 2x5 bobath txfers  2x10     2x5 bobath txfers   Step-over cones ( replicates walking on uneven terrain at work) sidestepping 4x20ft sidestepping 4x20ft      sidestepping 4x20ft   Gait training  R foot AFO on level 10 min R foot AFO on level 10 min R step ups (avoiding hip hike)     R foot AFO on level 10 min   SLS RLE  3*40"      Sitting on PB wt shift frwd/bkwd/side 10 min   STS with wt shift over R LE 3*10 3*10         Inez's ex R LE 10 min with assist 10 mins with assist 10 min with assist     10 min with assist   SL livia CUETO/roxane 2x10            Modalities

## 2019-08-03 DIAGNOSIS — C72.0 SPINAL CORD EPENDYMOMA (HCC): ICD-10-CM

## 2019-08-03 DIAGNOSIS — F32.A DEPRESSION, UNSPECIFIED DEPRESSION TYPE: ICD-10-CM

## 2019-08-03 DIAGNOSIS — G62.9 NEUROPATHY: ICD-10-CM

## 2019-08-05 ENCOUNTER — OFFICE VISIT (OUTPATIENT)
Dept: PHYSICAL THERAPY | Age: 59
End: 2019-08-05
Payer: COMMERCIAL

## 2019-08-05 DIAGNOSIS — M79.604 RIGHT LEG PAIN: ICD-10-CM

## 2019-08-05 DIAGNOSIS — G06.1 SPINAL CORD ABSCESS: ICD-10-CM

## 2019-08-05 DIAGNOSIS — R53.1 GENERAL WEAKNESS: Primary | ICD-10-CM

## 2019-08-05 PROCEDURE — 97140 MANUAL THERAPY 1/> REGIONS: CPT

## 2019-08-05 PROCEDURE — 97112 NEUROMUSCULAR REEDUCATION: CPT

## 2019-08-05 PROCEDURE — 97110 THERAPEUTIC EXERCISES: CPT

## 2019-08-05 RX ORDER — DULOXETIN HYDROCHLORIDE 30 MG/1
CAPSULE, DELAYED RELEASE ORAL
Qty: 30 CAPSULE | Refills: 0 | Status: SHIPPED | OUTPATIENT
Start: 2019-08-05 | End: 2019-08-08 | Stop reason: SDUPTHER

## 2019-08-05 RX ORDER — PANTOPRAZOLE SODIUM 20 MG/1
TABLET, DELAYED RELEASE ORAL
Qty: 30 TABLET | Refills: 1 | Status: SHIPPED | OUTPATIENT
Start: 2019-08-05 | End: 2019-09-01 | Stop reason: SDUPTHER

## 2019-08-05 NOTE — PROGRESS NOTES
Daily Note     Today's date: 2019  Patient name: Nicky Anderson  : 1960  MRN: [id number]  Referring provider: João Eid MD  Dx:   Encounter Diagnosis     ICD-10-CM    1  General weakness R53 1    2  Right leg pain M79 604    3  Spinal cord abscess G06 1                   Subjective: pt reports having random sharp pain in R foot that lasted few seconds then stopped, pt reports not doing any specific activity to cause the pain      Objective: See treatment diary below      Assessment:  Pt frustrated at times re: his progress and limited fx with chores(ie:gardening,mowing,steps), added quadruped and tall kneeling PNF to promote trunk activation, improved activation of R tib ant muscles      Plan: Continue per plan of care  Progress treatment as tolerated         Precautions: Cervical tumor    Daily Treatment Diary     Manual  7/12/19 7/15 7/17/19 7/19/19 7/24/19 7/26 7/29 8/2/19 8/5/19    PNF R LE VK JF VK VK VK JF [facility] VK    PNF R pelvis in L SL    VK VK [facility]      PNF Dorsiflexors       [facility] VK                                  Exercise Diary  19   RB 5' 5'  5'    5'   Mini squats           Standing hip abduction 3*10  2x10 3x10    3x10   STS           Step-ups           Biodex           Bridges with arms crossed 3x10x5" 2*10*5" 2x10x5" With iso abd 2x10x5"    3x10x5"   R SLR  2*10 2x10        hooklying hip abd with TB   2x10 ytb 3x10x5"+       TM walking           Quadruped / tall kneeling PNF at hips/shoulders    10 min       Standing Toe raises 2x5 bobath txfers  2x10 2x10    2x5 bobath txfers   Step-over cones ( replicates walking on uneven terrain at work) sidestepping 4x20ft sidestepping 4x20ft      sidestepping 4x20ft   Gait training  R foot AFO on level 10 min R foot AFO on level 10 min R step ups (avoiding hip hike)     R foot AFO on level 10 min   SLS RLE  3*40"      Sitting on PB wt shift frwd/bkwd/side 10 min   STS with wt shift over R LE 3*10 3*10 Inez's ex R LE 10 min with assist 10 mins with assist 10 min with assist 10 min with assist    10 min with assist   LUDIVINA CUETO/roxane 2x10            Modalities

## 2019-08-07 ENCOUNTER — OFFICE VISIT (OUTPATIENT)
Dept: PHYSICAL THERAPY | Age: 59
End: 2019-08-07
Payer: COMMERCIAL

## 2019-08-07 DIAGNOSIS — G06.1 SPINAL CORD ABSCESS: ICD-10-CM

## 2019-08-07 DIAGNOSIS — M79.604 RIGHT LEG PAIN: ICD-10-CM

## 2019-08-07 DIAGNOSIS — R53.1 GENERAL WEAKNESS: Primary | ICD-10-CM

## 2019-08-07 PROCEDURE — 97110 THERAPEUTIC EXERCISES: CPT | Performed by: PHYSICAL THERAPIST

## 2019-08-07 PROCEDURE — 97112 NEUROMUSCULAR REEDUCATION: CPT | Performed by: PHYSICAL THERAPIST

## 2019-08-07 PROCEDURE — 97140 MANUAL THERAPY 1/> REGIONS: CPT | Performed by: PHYSICAL THERAPIST

## 2019-08-07 NOTE — PROGRESS NOTES
Palliative and [facility] 60 y o  male [phone]    Assessment/Plan:  1  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[facility] [address] )    2  Radiation-induced myelopathy ([facility] [address] )    3  Depression, unspecified depression type    4  Neuropathy    5  H/O spinal fusion        Requested Prescriptions     Signed Prescriptions Disp Refills    DULoxetine (CYMBALTA) 60 mg delayed release capsule 30 capsule 0     Sig: Take 1 capsule (60 mg total) by mouth daily    gabapentin (NEURONTIN) 300 mg capsule 90 capsule 0     Sig: Take 1 capsule (300 mg total) by mouth 3 (three) times a day     Medications Discontinued During This Encounter   Medication Reason    DULoxetine (CYMBALTA) 30 mg delayed release capsule Pt  Has been tolerating  Will increase dose today to 60 mg     gabapentin (NEURONTIN) 300 mg capsule Reorder  Will re-assess his symptoms at next visit and will consider dose increase at that time    dexamethasone (DECADRON) 2 mg tablet Therapy completed     Time spent providing support and encouragement  He is slowly gaining improvements in his overall function and strength  Representatives have queried the patient's controlled substance dispensing history in the Prescription Drug Monitoring Program in compliance with regulations before I have prescribed any controlled substances  The prescription history is consistent with prescribed therapy and our practice policies  30 minutes were spent face to face with his son with greater than 50% of the time spent in counseling or coordination of care including discussions of instructions for disease self management, treatment instructions, follow up requirements and patient and family counseling/involvement in care   All of the patient's questions were answered during this discussion  Return in about 4 weeks (around 9/5/2019) for symptom assessment and management, Medication monitoring and refills      Subjective:   Chief Complaint  Follow up visit for:  symptom management  HPI     Neo Hawthorne is a 60 y o  male with a 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 s/p resection and debulking at the T1/T2 level, fusion by Dr Thais Vazquez in 1/2019  Further resection was not possible d/t loss of neurologic signals intraoperatively  Postoperatively he recovered and completed rehab at [facility], he received adjuvant radiation therapy to the C3 through T4 spinal cord, this finished on 5/22/19  However, he developed worsening right lower extremity weakness and right foot drop approximately one week after completion of  RT  His most recent MRI was negative for any obvious disease progression; he is suspected to have post-radiation myelopathy  He presents today for a follow up visit for symptom management  Since last visit, he has completed his dexamethasone taper  He has been working diligently with physical therapy and has been fitted with a supportive brace for his left leg and foot  He is ambulating better with use of the brace, continues to use his cane as assistive device  He reports some modest improvement in some of his neuropathic pain with use of gabapentin  He states his mood is also noticeably improved since starting Cymbalta  He feels less depressed, still has some occasional anxiety and reports having had one panic attack "for no reason" about a week ago  He still has some frustration at times as he would like to see more rapid improvement in his functional limitations  He tends to fatigue easily  Otherwise, he denies any other troubling symptoms  The following portions of the medical history were reviewed: past medical history, problem list, medication list, and social history      Current Outpatient Medications:     DULoxetine (CYMBALTA) 60 mg delayed release capsule, Take 1 capsule (60 mg total) by mouth daily, Disp: 30 capsule, Rfl: 0    gabapentin (NEURONTIN) 300 mg capsule, Take 1 capsule (300 mg total) by mouth 3 (three) times a day, Disp: 90 capsule, Rfl: 0    levothyroxine 25 mcg tablet, TAKE 1 TABLET (25 MCG TOTAL) BY MOUTH DAILY, Disp: 30 tablet, Rfl: 5    Omega-3 Fatty Acids (FISH OIL) 1,000 mg, Take 1,000 mg by mouth daily Resume on 2/20/19, Disp: , Rfl:     pantoprazole (PROTONIX) 20 mg tablet, TAKE 1 TABLET BY MOUTH EVERY DAY, Disp: 30 tablet, Rfl: 1  Review of Systems   Constitutional: Positive for fatigue  Musculoskeletal: Positive for arthralgias, gait problem and myalgias  Neurological: Positive for weakness and numbness  Psychiatric/Behavioral: The patient is nervous/anxious  All other systems negative    Objective:  Vital Signs  /60 (BP Location: Left arm, Patient Position: Sitting, Cuff Size: Standard)   Pulse 96   Temp 98 2 °F (36 8 °C) (Oral)   Resp 20   Wt 84 6 kg (186 lb 9 6 oz)   SpO2 97%   BMI 29 23 kg/m²    Physical Exam    Constitutional: Appears well-developed and well-nourished  In no acute physical or emotional distress  Head: Normocephalic and atraumatic  Eyes: EOM are normal  No ocular discharge  No scleral icterus  Neck: No visible adenopathy or masses  Respiratory: Effort normal  No stridor  No respiratory distress  Gastrointestinal: No abdominal distension  Musculoskeletal: Wearing supportive brace on RLE  Trace right ankle edema  Using a cane to support ambulation  Neurological: Alert, oriented and appropriately conversant  Skin: No diaphoresis, no rashes seen on exposed areas of skin  Psychiatric: Displays a normal mood and affect, appears more upbeat and positive today   Behavior, judgement and thought content appear normal

## 2019-08-07 NOTE — PROGRESS NOTES
Daily Note     Today's date: 2019  Patient name: Irma Arenas  : 1960  MRN: [id number]  Referring provider: Raine Moore MD  Dx:   Encounter Diagnosis     ICD-10-CM    1  General weakness R53 1    2  Right leg pain M79 604    3  Spinal cord abscess G06 1        Start Time: 0930  Stop Time: 1015  Total time in clinic (min): 45 minutes    Subjective: Pt reports no new symptoms  Objective: See treatment diary below      Assessment: Tolerated treatment well  Pt's POC was progressed to include increase PNF pattern to improve neuromuscular recruitment strategies  Patient demonstrated fatigue post treatment and would benefit from continued PT      Plan: Continue per plan of care        Precautions: Cervical tumor    Daily Treatment Diary     Manual  7/12/19 7/15 7/17/19 7/19/19 7/24/19 7/26 7/29 8/2/19 8/5/19 8/7   PNF R LE VK JF VK VK VK JF JF VK VK JF   PNF R pelvis in L SL    VK VK JF JF      PNF Dorsiflexors       JF VK VK JF                                 Exercise Diary  19 819   RB 5' 5'  5' 5'   5'   Mini squats           Standing hip abduction 3*10  2x10 3x10    3x10   STS           Step-ups           Biodex           Bridges with arms crossed 3x10x5" 2*10*5" 2x10x5" With iso abd 2x10x5" With iso abd 2x10x5"   3x10x5"   R SLR  2*10 2x10        hooklying hip abd with TB   2x10 ytb 3x10x5"+ ytb 3x10x5"+      TM walking           Quadruped / tall kneeling PNF at hips/shoulders    10 min 10 mins      Standing Toe raises 2x5 bobath txfers  2x10 2x10 2*10   2x5 bobath txfers   Step-over cones ( replicates walking on uneven terrain at work) sidestepping 4x20ft sidestepping 4x20ft      sidestepping 4x20ft   Gait training  R foot AFO on level 10 min R foot AFO on level 10 min R step ups (avoiding hip hike)  R step ups (avoiding hip hike) 12 min   R foot AFO on level 10 min   SLS RLE  3*40"      Sitting on PB wt shift frwd/bkwd/side 10 min   STS with wt shift over R LE 3*10 3*10         Inez's ex R LE 10 min with assist 10 mins with assist 10 min with assist 10 min with assist 10 min with assist   10 min with assist   LUDIVINA bhakta   A/maria gom 2x10  A/aarom 2x10          Modalities

## 2019-08-08 ENCOUNTER — OFFICE VISIT (OUTPATIENT)
Dept: PALLIATIVE MEDICINE | Facility: CLINIC | Age: 59
End: 2019-08-08
Payer: COMMERCIAL

## 2019-08-08 VITALS
SYSTOLIC BLOOD PRESSURE: 130 MMHG | WEIGHT: 186.6 LBS | BODY MASS INDEX: 29.23 KG/M2 | HEART RATE: 96 BPM | RESPIRATION RATE: 20 BRPM | TEMPERATURE: 98.2 F | DIASTOLIC BLOOD PRESSURE: 60 MMHG | OXYGEN SATURATION: 97 %

## 2019-08-08 DIAGNOSIS — G95.89 RADIATION-INDUCED MYELOPATHY (HCC): ICD-10-CM

## 2019-08-08 DIAGNOSIS — C72.0 SPINAL CORD EPENDYMOMA (HCC): Primary | ICD-10-CM

## 2019-08-08 DIAGNOSIS — F32.A DEPRESSION, UNSPECIFIED DEPRESSION TYPE: ICD-10-CM

## 2019-08-08 DIAGNOSIS — G62.9 NEUROPATHY: ICD-10-CM

## 2019-08-08 DIAGNOSIS — Z98.1 H/O SPINAL FUSION: ICD-10-CM

## 2019-08-08 PROCEDURE — 99214 OFFICE O/P EST MOD 30 MIN: CPT | Performed by: NURSE PRACTITIONER

## 2019-08-08 RX ORDER — GABAPENTIN 300 MG/1
300 CAPSULE ORAL 3 TIMES DAILY
Qty: 90 CAPSULE | Refills: 0 | Status: SHIPPED | OUTPATIENT
Start: 2019-08-08 | End: 2019-09-12 | Stop reason: SDUPTHER

## 2019-08-08 RX ORDER — DULOXETIN HYDROCHLORIDE 60 MG/1
60 CAPSULE, DELAYED RELEASE ORAL DAILY
Qty: 30 CAPSULE | Refills: 0 | Status: SHIPPED | OUTPATIENT
Start: 2019-08-08 | End: 2019-09-01 | Stop reason: SDUPTHER

## 2019-08-09 ENCOUNTER — OFFICE VISIT (OUTPATIENT)
Dept: PHYSICAL THERAPY | Age: 59
End: 2019-08-09
Payer: COMMERCIAL

## 2019-08-09 DIAGNOSIS — M79.604 RIGHT LEG PAIN: ICD-10-CM

## 2019-08-09 DIAGNOSIS — G06.1 SPINAL CORD ABSCESS: ICD-10-CM

## 2019-08-09 DIAGNOSIS — R53.1 GENERAL WEAKNESS: Primary | ICD-10-CM

## 2019-08-09 PROCEDURE — 97140 MANUAL THERAPY 1/> REGIONS: CPT

## 2019-08-09 PROCEDURE — 97110 THERAPEUTIC EXERCISES: CPT

## 2019-08-09 PROCEDURE — 97112 NEUROMUSCULAR REEDUCATION: CPT

## 2019-08-09 NOTE — PROGRESS NOTES
Daily Note     Today's date: 2019  Patient name: Irma Arenas  : 1960  MRN: [id number]  Referring provider: Raine Moore MD  Dx:   Encounter Diagnosis     ICD-10-CM    1  General weakness R53 1    2  Right leg pain M79 604    3  Spinal cord abscess G06 1                   Subjective: pt reports he was approved for disability,"I feel better, like a weight has been lifted somewhat "      Objective: See treatment diary below      Assessment: Tolerated treatment well  Patient demonstrated fatigue post treatment and would benefit from continued PT, progression of proprio ex, challenging for pt , but able to do with CG/Min A of 1      Plan: Continue per plan of care  Progress treatment as tolerated         Precautions: Cervical tumor    Daily Treatment Diary     Manual  7/12/19 7/15 7/17/19 7/19/19 7/24/19 7/26 7/29 8/2/19 8/5/19 8/7 8/9/19   PNF R LE VK JF VK VK VK JF JF VK VK JF VK   PNF R pelvis in L SL    VK VK JF        PNF Dorsiflexors       [facility] VK JF VK                                   Exercise Diary  19   RB 5' 5'  5' 5'   5'   Mini squats           Standing hip abduction 3*10  2x10 3x10    3x10   STS           Step-ups           Biodex           Bridges with arms crossed 3x10x5" 2*10*5" 2x10x5" With iso abd 2x10x5" With iso abd 2x10x5"   3x10x5"   R SLR  2*10 2x10        hooklying hip abd with TB   2x10 ytb 3x10x5"+ ytb 3x10x5"+ ytb 3x10x5"     TM walking           Quadruped / tall kneeling PNF at hips/shoulders    10 min 10 mins 8 min     Standing Toe raises 2x5 bobath txfers  2x10 2x10 2*10   2x5 bobath txfers   Step-over cones ( replicates walking on uneven terrain at work) sidestepping 4x20ft sidestepping 4x20ft      sidestepping 4x20ft   Gait training  R foot AFO on level 10 min R foot AFO on level 10 min R step ups (avoiding hip hike)  R step ups (avoiding hip hike) 12 min   R foot AFO on level 10 min   SLS RLE  3*40"      Sitting on PB wt shift frwd/bkwd/side 10 min   STS with wt shift over R LE 3*10 3*10    Stand on foam/EC 3x30"     Inez's ex R LE 10 min with assist 10 mins with assist 10 min with assist 10 min with assist 10 min with assist 10 min w/ w/out assist  10 min with assist   LUDIVINA bhakta   A/aarom 2x10  A/aarom 2x10 A/aarom 3x10         Modalities

## 2019-08-12 ENCOUNTER — OFFICE VISIT (OUTPATIENT)
Dept: PHYSICAL THERAPY | Age: 59
End: 2019-08-12
Payer: COMMERCIAL

## 2019-08-12 DIAGNOSIS — M79.604 RIGHT LEG PAIN: ICD-10-CM

## 2019-08-12 DIAGNOSIS — G06.1 SPINAL CORD ABSCESS: ICD-10-CM

## 2019-08-12 DIAGNOSIS — R53.1 GENERAL WEAKNESS: Primary | ICD-10-CM

## 2019-08-12 PROCEDURE — 97112 NEUROMUSCULAR REEDUCATION: CPT

## 2019-08-12 PROCEDURE — 97110 THERAPEUTIC EXERCISES: CPT

## 2019-08-12 PROCEDURE — 97140 MANUAL THERAPY 1/> REGIONS: CPT

## 2019-08-12 NOTE — PROGRESS NOTES
Daily Note     Today's date: 2019  Patient name: Manjula Lucas  : 1960  MRN: [id number]  Referring provider: Ruddy Resendez MD  Dx:   Encounter Diagnosis     ICD-10-CM    1  General weakness R53 1    2  Right leg pain M79 604    3  Spinal cord abscess G06 1                   Subjective: Patient reports mild soreness today  Objective: See treatment diary below      Assessment: Tolerated treatment well  Demonstrated good form with exercises needing minimal cues for correction  Patient demonstrated challenge with STS on foam, requring two attempts in order to stand  He did improve with last two reps, standing at first attempt  Demonstrated instability with SLS needing HHA  Presented with limited ROM into right DF, provided stretches for home to improve ROM   Patient demonstrated fatigue post treatment and would benefit from continued PT      Plan: Continue per plan of care  Progress treatment as tolerated         Precautions: Cervical tumor    Daily Treatment Diary     Manual  8/12 7/15 7/17/19 7/19/19 7/24/19 7/26 7/29 8/2/19 8/5/19 8/7 8/9/19   PNF R LE  JF VK VK VK JF JF VK VK  VK   PNF R pelvis in L SL    VK VK JF JF       PNF Dorsiflexors       [facility] VK JF VK                                   Exercise Diary  19   RB 5' 5'  5' 5'  5' 5'   Mini squats           Standing hip abduction 3*10  2x10 3x10   3x10 3x10   STS           Step-ups           Biodex           Bridges with arms crossed 3x10x5" 2*10*5" 2x10x5" With iso abd 2x10x5" With iso abd 2x10x5"  With  iso abd  2x10   5" 3x10x5"   R SLR  2*10 2x10        hooklying hip abd with TB   2x10 ytb 3x10x5"+ ytb 3x10x5"+ ytb 3x10x5" RTB  3x10 5"      TM walking           Quadruped / tall kneeling PNF at hips/shoulders    10 min 10 mins 8 min 8 min    Standing Toe raises 2x5 bobath txfers  2x10 2x10 2*10  TR/HR  2x10 2x5 bobath txfers   Step-over cones ( replicates walking on uneven terrain at work) sidestepping 4x20ft sidestepping 4x20ft      sidestepping 4x20ft   Gait training  R foot AFO on level 10 min R foot AFO on level 10 min R step ups (avoiding hip hike)  R step ups (avoiding hip hike) 12 min   R foot AFO on level 10 min   SLS RLE  3*40"     Foam  30"x3  EC Sitting on PB wt shift frwd/bkwd/side 10 min   STS with wt shift over R LE 3*10 3*10    Stand on foam/EC 3x30" 5x  With foam    Inez's ex R LE 10 min with assist 10 mins with assist 10 min with assist 10 min with assist 10 min with assist 10 min w/ w/out assist 8 min  W/out assit   10 min with assist   SL livia   A/aarom 2x10  A/aarom 2x10 A/aarom 3x10 A/aarom  3x10        Modalities

## 2019-08-14 ENCOUNTER — OFFICE VISIT (OUTPATIENT)
Dept: PHYSICAL THERAPY | Age: 59
End: 2019-08-14
Payer: COMMERCIAL

## 2019-08-14 DIAGNOSIS — M79.604 RIGHT LEG PAIN: ICD-10-CM

## 2019-08-14 DIAGNOSIS — R53.1 GENERAL WEAKNESS: Primary | ICD-10-CM

## 2019-08-14 DIAGNOSIS — G06.1 SPINAL CORD ABSCESS: ICD-10-CM

## 2019-08-14 PROCEDURE — 97140 MANUAL THERAPY 1/> REGIONS: CPT | Performed by: PHYSICAL THERAPIST

## 2019-08-14 PROCEDURE — 97116 GAIT TRAINING THERAPY: CPT | Performed by: PHYSICAL THERAPIST

## 2019-08-14 PROCEDURE — 97112 NEUROMUSCULAR REEDUCATION: CPT | Performed by: PHYSICAL THERAPIST

## 2019-08-14 NOTE — PROGRESS NOTES
Daily Note     Today's date: 2019  Patient name: Chele Bangura  : 1960  MRN: [id number]  Referring provider: Dyan Mcnally MD  Dx:   Encounter Diagnosis     ICD-10-CM    1  General weakness R53 1    2  Right leg pain M79 604    3  Spinal cord abscess G06 1        Start Time: 0930  Stop Time: 1015  Total time in clinic (min): 45 minutes    Subjective: Pt reports no new symptoms  Objective: See treatment diary below      Assessment: Tolerated treatment well  Pt's POC was progressed to include more gait training to decrease risk of falls and improve neural recruitment patterns  Patient demonstrated fatigue post treatment and would benefit from continued PT      Plan: Continue per plan of care        Precautions: Cervical tumor    Daily Treatment Diary     Manual  8/14 7/15 7/17/19 7/19/19 7/24/19 7/26 7/29 8/2/19 8/5/19 8/7 8/9/19   PNF R LE JF JF VK VK VK JF JF VK VK JF VK   PNF R pelvis in L SL    VK VK JF JF       PNF Dorsiflexors [facility] VK VK JF VK                                   Exercise Diary  19   RB 5' 5'  5' 5'  5' 5'   Mini squats           Standing hip abduction 3*10  2x10 3x10   3x10 3*10   STS           Step-ups           Biodex           Bridges with arms crossed 3x10x5" 2*10*5" 2x10x5" With iso abd 2x10x5" With iso abd 2x10x5"  With  iso abd  2x10   5" With  iso abd  2x10   5"   R SLR  2*10 2x10        hooklying hip abd with TB   2x10 ytb 3x10x5"+ ytb 3x10x5"+ ytb 3x10x5" RTB  3x10 5"   RTB  3x10 5"   TM walking           Quadruped / tall kneeling PNF at hips/shoulders    10 min 10 mins 8 min 8 min 10 mins   Standing Toe raises 2x5 bobath txfers  2x10 2x10 2*10  TR/HR  2x10    Step-over cones ( replicates walking on uneven terrain at work) sidestepping 4x20ft sidestepping 4x20ft      5 laps   Gait training  R foot AFO on level 10 min R foot AFO on level 10 min R step ups (avoiding hip hike)  R step ups (avoiding hip hike) 12 min SLS RLE  3*40"     Foam  30"x3  EC    STS with wt shift over R LE 3*10 3*10    Stand on foam/EC 3x30" 5x  With foam    Inez's ex R LE 10 min with assist 10 mins with assist 10 min with assist 10 min with assist 10 min with assist 10 min w/ w/out assist 8 min  W/out assit   8 min  W/out assit   SL livia   A/aarom 2x10  A/aarom 2x10 A/aarom 3x10 A/aarom  3x10 A/aarom  3x10       Modalities

## 2019-08-16 ENCOUNTER — OFFICE VISIT (OUTPATIENT)
Dept: PHYSICAL THERAPY | Age: 59
End: 2019-08-16
Payer: COMMERCIAL

## 2019-08-16 DIAGNOSIS — R53.1 GENERAL WEAKNESS: Primary | ICD-10-CM

## 2019-08-16 DIAGNOSIS — M79.604 RIGHT LEG PAIN: ICD-10-CM

## 2019-08-16 DIAGNOSIS — G06.1 SPINAL CORD ABSCESS: ICD-10-CM

## 2019-08-16 PROCEDURE — 97112 NEUROMUSCULAR REEDUCATION: CPT

## 2019-08-16 PROCEDURE — 97110 THERAPEUTIC EXERCISES: CPT

## 2019-08-16 PROCEDURE — 97140 MANUAL THERAPY 1/> REGIONS: CPT

## 2019-08-16 NOTE — PROGRESS NOTES
Daily Note     Today's date: 2019  Patient name: Greg Benitez  : 1960  MRN: [id number]  Referring provider: Clair Landry MD  Dx:   Encounter Diagnosis     ICD-10-CM    1  General weakness R53 1    2  Right leg pain M79 604    3  Spinal cord abscess G06 1                   Subjective: pt reports still difficulty doing steps, "I have to take one step at a time"    Objective: See treatment diary below      Assessment: Tolerated treatment well  Patient demonstrated fatigue post treatment and would benefit from continued PT, pt has difficulty advancing R leg going up steps      Plan: Continue per plan of care  Progress treatment as tolerated         Precautions: Cervical tumor    Daily Treatment Diary     Manual  19   PNF R LE VK VK JF JF VK VK JF VK VK   PNF R pelvis in L SL VK VK JF JF        PNF Dorsiflexors    JF VK VK JF VK VK                               Exercise Diary  19   RB 5' 5'  5' 5'  5' 5' 5'   Mini squats            Standing hip abduction 3*10  2x10 3x10   3x10 3*10 3x10   STS            Step-ups         Toe taps 2x10   Biodex            Bridges with arms crossed 3x10x5" 2*10*5" 2x10x5" With iso abd 2x10x5" With iso abd 2x10x5"  With  iso abd  2x10   5" With  iso abd  2x10   5" rtb 3x10x5"   R SLR  2*10 2x10         hooklying hip abd with TB   2x10 ytb 3x10x5"+ ytb 3x10x5"+ ytb 3x10x5" RTB  3x10 5"   RTB  3x10 5" rtb 3x10   TM walking            Quadruped / tall kneeling PNF at hips/shoulders    10 min 10 mins 8 min 8 min 10 mins 10'   Standing Toe raises 2x5 bobath txfers  2x10 2x10 2*10  TR/HR  2x10     Step-over cones ( replicates walking on uneven terrain at work) sidestepping 4x20ft sidestepping 4x20ft      5 laps    Gait training  R foot AFO on level 10 min R foot AFO on level 10 min R step ups (avoiding hip hike)  R step ups (avoiding hip hike) 12 min    On level and steps SLS RLE  3*40"     Foam  30"x3  EC     STS with wt shift over R LE 3*10 3*10    Stand on foam/EC 3x30" 5x  With foam  2x10   Inez's ex R LE 10 min with assist 10 mins with assist 10 min with assist 10 min with assist 10 min with assist 10 min w/ w/out assist 8 min  W/out assit   8 min  W/out assit 8'   SL livia   A/aarom 2x10  A/aarom 2x10 A/aarom 3x10 A/aarom  3x10 A/aarom  3x10 3x10 a/aarom       Modalities

## 2019-08-17 DIAGNOSIS — G95.89 RADIATION-INDUCED MYELOPATHY (HCC): ICD-10-CM

## 2019-08-17 DIAGNOSIS — C72.0 SPINAL CORD EPENDYMOMA (HCC): ICD-10-CM

## 2019-08-17 DIAGNOSIS — Z98.1 H/O SPINAL FUSION: ICD-10-CM

## 2019-08-19 ENCOUNTER — OFFICE VISIT (OUTPATIENT)
Dept: PHYSICAL THERAPY | Age: 59
End: 2019-08-19
Payer: COMMERCIAL

## 2019-08-19 ENCOUNTER — TELEPHONE (OUTPATIENT)
Dept: FAMILY MEDICINE CLINIC | Facility: CLINIC | Age: 59
End: 2019-08-19

## 2019-08-19 DIAGNOSIS — G06.1 SPINAL CORD ABSCESS: ICD-10-CM

## 2019-08-19 DIAGNOSIS — M79.604 RIGHT LEG PAIN: ICD-10-CM

## 2019-08-19 DIAGNOSIS — R53.1 GENERAL WEAKNESS: Primary | ICD-10-CM

## 2019-08-19 PROCEDURE — 97140 MANUAL THERAPY 1/> REGIONS: CPT | Performed by: PHYSICAL THERAPIST

## 2019-08-19 PROCEDURE — 97116 GAIT TRAINING THERAPY: CPT | Performed by: PHYSICAL THERAPIST

## 2019-08-19 PROCEDURE — 97112 NEUROMUSCULAR REEDUCATION: CPT | Performed by: PHYSICAL THERAPIST

## 2019-08-19 NOTE — TELEPHONE ENCOUNTER
She rec'd the signed form for his ankle brace but not the office note   Please fax to  Fax [phone]

## 2019-08-19 NOTE — PROGRESS NOTES
Daily Note     Today's date: 2019  Patient name: Forest Dyer  : 1960  MRN: [id number]  Referring provider: Majel Mohs, MD  Dx:   Encounter Diagnosis     ICD-10-CM    1  General weakness R53 1    2  Right leg pain M79 604    3  Spinal cord abscess G06 1        Start Time: 0930  Stop Time: 1015  Total time in clinic (min): 45 minutes    Subjective: Pt reports no new symptoms  Objective: See treatment diary below      Assessment: Tolerated treatment well  Pt's POC was progressed to include more closed chain strenthening to increase tolerance to community ambulation  Patient demonstrated fatigue post treatment and would benefit from continued PT      Plan: Continue per plan of care        Precautions: Cervical tumor    Daily Treatment Diary     Manual  19   PNF R LE VK VK JF JF VK VK JF VK VK   PNF R pelvis in L SL VK VK JF JF        PNF Dorsiflexors     VK VK  VK VK                               Exercise Diary  19   RB 5'        5'   Mini squats            Standing hip abduction 3*10        3x10   STS            Step-ups Toe taps 2x10        Toe taps 2x10   Biodex            Bridges with arms crossed rtb 3x10x5"        rtb 3x10x5"   R SLR            hooklying hip abd with TB rtb 3x10        rtb 3x10   TM walking            Quadruped / tall kneeling PNF at hips/shoulders 10'        10'   Standing Toe raises            Step-over cones ( replicates walking on uneven terrain at work)            Gait training          On level and steps    SLS RLE            STS with wt shift over R LE 2x10        2x10   Inez's ex8' R LE         8'   SL clamshells 3x10 a/aarom        3x10 a/aarom       Modalities

## 2019-08-20 RX ORDER — GABAPENTIN 300 MG/1
CAPSULE ORAL
Qty: 90 CAPSULE | Refills: 0 | OUTPATIENT
Start: 2019-08-20

## 2019-08-21 ENCOUNTER — OFFICE VISIT (OUTPATIENT)
Dept: PHYSICAL THERAPY | Age: 59
End: 2019-08-21
Payer: COMMERCIAL

## 2019-08-21 DIAGNOSIS — M79.604 RIGHT LEG PAIN: ICD-10-CM

## 2019-08-21 DIAGNOSIS — G06.1 SPINAL CORD ABSCESS: ICD-10-CM

## 2019-08-21 DIAGNOSIS — R53.1 GENERAL WEAKNESS: Primary | ICD-10-CM

## 2019-08-21 PROCEDURE — 97110 THERAPEUTIC EXERCISES: CPT | Performed by: PHYSICAL THERAPIST

## 2019-08-21 PROCEDURE — 97112 NEUROMUSCULAR REEDUCATION: CPT | Performed by: PHYSICAL THERAPIST

## 2019-08-21 NOTE — PROGRESS NOTES
Daily Note     Today's date: 2019  Patient name: Manjula Lucas  : 1960  MRN: [id number]  Referring provider: Ruddy Resendez MD  Dx:   Encounter Diagnosis     ICD-10-CM    1  General weakness R53 1    2  Right leg pain M79 604    3  Spinal cord abscess G06 1        Start Time: 1015  Stop Time: 1105  Total time in clinic (min): 50 minutes    Subjective: Pt reports trouble with balance when working outside  Objective: See treatment diary below      Assessment: Tolerated treatment well  Pt's POC was progressed to include more difficult dynamic balance interventions to decrease risk of falls  Patient demonstrated fatigue post treatment and would benefit from continued PT      Plan: Continue per plan of care        Precautions: Cervical tumor    Daily Treatment Diary     Manual  19   PNF R LE VK VK JF JF VK VK JF VK VK   PNF R pelvis in L SL VK VK JF JF        PNF Dorsiflexors     VK VK  VK VK                               Exercise Diary  19   RB 5' 5'       5'   Mini squats            Standing hip abduction 3*10        3x10   STS            Step-ups Toe taps 2x10        Toe taps 2x10   Biodex            Bridges with arms crossed rtb 3x10x5"        rtb 3x10x5"   R SLR            hooklying hip abd with TB rtb 3x10        rtb 3x10   TM walking            Quadruped / tall kneeling PNF at hips/shoulders 10' 10'       10'   Standing Toe raises            Step-over cones ( replicates walking on uneven terrain at work)            Gait training   5'       On level and steps    SLS RLE            Obstacle course  20'       2x10   Inez's ex8' R LE  5'       8'   Weight shifts  2*10       3x10 a/aarom       Modalities

## 2019-08-23 ENCOUNTER — OFFICE VISIT (OUTPATIENT)
Dept: PHYSICAL THERAPY | Age: 59
End: 2019-08-23
Payer: COMMERCIAL

## 2019-08-23 DIAGNOSIS — M79.604 RIGHT LEG PAIN: ICD-10-CM

## 2019-08-23 DIAGNOSIS — G06.1 SPINAL CORD ABSCESS: ICD-10-CM

## 2019-08-23 DIAGNOSIS — R53.1 GENERAL WEAKNESS: Primary | ICD-10-CM

## 2019-08-23 PROCEDURE — 97112 NEUROMUSCULAR REEDUCATION: CPT | Performed by: PHYSICAL THERAPIST

## 2019-08-23 NOTE — PROGRESS NOTES
Daily Note     Today's date: 2019  Patient name: Casey Wiggins  : 1960  MRN: [id number]  Referring provider: Claudean Likens, MD  Dx:   Encounter Diagnosis     ICD-10-CM    1  General weakness R53 1    2  Right leg pain M79 604    3  Spinal cord abscess G06 1        Start Time: 0930  Stop Time: 1010  Total time in clinic (min): 40 minutes    Subjective: Pt reports trouble with balance when working outside  Objective: See treatment diary below      Assessment: Tolerated treatment well  Pt's POC was progressed to include more difficult dynamic balance interventions to decrease risk of falls  Patient demonstrated fatigue post treatment and would benefit from continued PT      Plan: Continue per plan of care        Precautions: Cervical tumor    Daily Treatment Diary     Manual  19   PNF R LE VK VK JF JF VK VK JF VK VK   PNF R pelvis in L SL VK VK JF JF        PNF Dorsiflexors     VK VK  VK VK                               Exercise Diary  19   RB 5' 5'       5'   Mini squats            Standing hip abduction 3*10        3x10   STS            Step-ups Toe taps 2x10        Toe taps 2x10   Biodex            Bridges with arms crossed rtb 3x10x5"        rtb 3x10x5"   R SLR            hooklying hip abd with TB rtb 3x10        rtb 3x10   TM walking            Quadruped / tall kneeling PNF at hips/shoulders 10' 10'       10'   Standing Toe raises            Step-over cones ( replicates walking on uneven terrain at work)            Gait training   5'       On level and steps    SLS RLE            Obstacle course  20'       2x10   Inez's ex8' R LE  5'       8'   Weight shifts  2*10       3x10 a/aarom       Modalities

## 2019-08-26 ENCOUNTER — OFFICE VISIT (OUTPATIENT)
Dept: PHYSICAL THERAPY | Age: 59
End: 2019-08-26
Payer: COMMERCIAL

## 2019-08-26 DIAGNOSIS — R53.1 GENERAL WEAKNESS: Primary | ICD-10-CM

## 2019-08-26 DIAGNOSIS — M79.604 RIGHT LEG PAIN: ICD-10-CM

## 2019-08-26 DIAGNOSIS — G06.1 SPINAL CORD ABSCESS: ICD-10-CM

## 2019-08-26 PROCEDURE — 97112 NEUROMUSCULAR REEDUCATION: CPT | Performed by: PHYSICAL THERAPIST

## 2019-08-26 NOTE — PROGRESS NOTES
Daily Note     Today's date: 2019  Patient name: Nick Gomez  : 1960  MRN: [id number]  Referring provider: Andrea Yee MD  Dx:   Encounter Diagnosis     ICD-10-CM    1  General weakness R53 1    2  Right leg pain M79 604    3  Spinal cord abscess G06 1        Start Time: 0930  Stop Time: 1020  Total time in clinic (min): 50 minutes    Subjective: Pt reports no new symptoms  Objective: See treatment diary below      Assessment: Tolerated treatment well  More balance interventions were added to decrease risk of falls  Patient demonstrated fatigue post treatment and would benefit from continued PT      Plan: Continue per plan of care        Precautions: Cervical tumor    Daily Treatment Diary     Manual             PNF R LE            PNF R pelvis in L SL            PNF Dorsiflexors                                        Exercise Diary  19   RB 5' 5' 5'      5'               Biodex 3*10  LOS 3x + maze 2x      3x10   STS            Step-ups Toe taps 2x10  2*10 b/l      Toe taps 2x10   Biodex            Bridges with arms crossed rtb 3x10x5"        rtb 3x10x5"   R SLR            hooklying hip abd with TB rtb 3x10        rtb 3x10   TM walking            Quadruped / tall kneeling PNF at hips/shoulders 10' 10' 10'      10'   Standing Toe raises            Step-over cones ( replicates walking on uneven terrain at work)            Gait training   5' 5'      On level and steps    SLS RLE            Obstacle course  20' 15'      2x10   Inez's ex8' R LE  5' 5'      8'   Weight shifts  2*10 3*10      3x10 a/aarom       Modalities

## 2019-08-28 ENCOUNTER — OFFICE VISIT (OUTPATIENT)
Dept: PHYSICAL THERAPY | Age: 59
End: 2019-08-28
Payer: COMMERCIAL

## 2019-08-28 DIAGNOSIS — R53.1 GENERAL WEAKNESS: Primary | ICD-10-CM

## 2019-08-28 DIAGNOSIS — M79.604 RIGHT LEG PAIN: ICD-10-CM

## 2019-08-28 DIAGNOSIS — G06.1 SPINAL CORD ABSCESS: ICD-10-CM

## 2019-08-28 PROCEDURE — 97112 NEUROMUSCULAR REEDUCATION: CPT

## 2019-08-28 NOTE — PROGRESS NOTES
Daily Note     Today's date: 2019  Patient name: Abel Orr  : 1960  MRN: [id number]  Referring provider: Kiarra Cali MD  Dx:   Encounter Diagnosis     ICD-10-CM    1  General weakness R53 1    2  Right leg pain M79 604    3  Spinal cord abscess G06 1                   Subjective:  Pt reports AFO cont to be helpful with his gait"I can't lift the foot up enough to clear the floor while walking without the brace"    Objective: See treatment diary below      Assessment:  Improved neuromuscular control noted with step over cones ex, but require continuous cueing      Plan: Continue per plan of care  Progress treatment as tolerated         Precautions: Cervical tumor    Daily Treatment Diary     Manual             PNF R LE            PNF R pelvis in L SL            PNF Dorsiflexors                                        Exercise Diary  19   RB 5' 5' 5' 5'     5'               Biodex 3*10  LOS 3x + maze 2x LOS (foam) lvl //3 x1ea     3x10   STS            Step-ups Toe taps 2x10  2*10 b/l 2x10 b/l     Toe taps 2x10   Biodex            Bridges with arms crossed rtb 3x10x5"   rtb 3x10x5"     rtb 3x10x5"   R SLR            hooklying hip abd with TB rtb 3x10   rtb 3x10x5"     rtb 3x10   TM walking            Quadruped / tall kneeling PNF at hips/shoulders 10' 10' 10'      10'   Standing Toe raises            Step-over cones ( replicates walking on uneven terrain at work)    5'        Gait training   5' 5' 6'     On level and steps    SLS RLE            Obstacle course  20' 15'      2x10   Inez's ex8' R LE  5' 5'      8'   Weight shifts  2*10 3*10 3x10 (anterolateral)     3x10 a/aarom       Modalities

## 2019-08-30 ENCOUNTER — OFFICE VISIT (OUTPATIENT)
Dept: PHYSICAL THERAPY | Age: 59
End: 2019-08-30
Payer: COMMERCIAL

## 2019-08-30 DIAGNOSIS — R53.1 GENERAL WEAKNESS: Primary | ICD-10-CM

## 2019-08-30 DIAGNOSIS — G06.1 SPINAL CORD ABSCESS: ICD-10-CM

## 2019-08-30 DIAGNOSIS — M79.604 RIGHT LEG PAIN: ICD-10-CM

## 2019-08-30 PROCEDURE — 97112 NEUROMUSCULAR REEDUCATION: CPT | Performed by: PHYSICAL THERAPIST

## 2019-08-30 PROCEDURE — 97116 GAIT TRAINING THERAPY: CPT | Performed by: PHYSICAL THERAPIST

## 2019-08-30 NOTE — PROGRESS NOTES
Daily Note     Today's date: 2019  Patient name: Ashley Phipps  : 1960  MRN: [id number]  Referring provider: Nemesio Radford MD  Dx:   Encounter Diagnosis     ICD-10-CM    1  General weakness R53 1    2  Right leg pain M79 604    3  Spinal cord abscess G06 1        Start Time: 09  Stop Time: 1015  Total time in clinic (min): 45 minutes    Subjective: Pt reports tremors in his leg  Objective: See treatment diary below      Assessment: Tolerated treatment well  Pt demonstrated functional improvements during gait training interventions but did demonstrate increased clonus compared to past visits  Neuro screen will be performed if symptoms persist   Patient demonstrated fatigue post treatment and would benefit from continued PT      Plan: Continue per plan of care        Precautions: Cervical tumor    Daily Treatment Diary     Manual             PNF R LE            PNF R pelvis in L SL            PNF Dorsiflexors                                        Exercise Diary  19   RB 5' 5' 5' 5' 5'    5'               Biodex 3*10  LOS 3x + maze 2x LOS (foam) lvl 1/2/3 x1ea LOS (foam) lvl 1/2/3 x1ea    3x10   STS            Step-ups Toe taps 2x10  2*10 b/l 2x10 b/l 2x10 b/l    Toe taps 2x10   Biodex            Bridges with arms crossed rtb 3x10x5"   rtb 3x10x5"     rtb 3x10x5"   R SLR            hooklying hip abd with TB rtb 3x10   rtb 3x10x5"     rtb 3x10   TM walking            Quadruped / tall kneeling PNF at hips/shoulders 10' 10' 10'  10'    10'   Standing Toe raises            Step-over cones ( replicates walking on uneven terrain at work)    5'        Gait training   5' 5' 6' 6'    On level and steps    SLS RLE            Obstacle course  20' 15'  12'    2x10   Inez's ex8' R LE  5' 5'  5'    8'   Weight shifts  2*10 3*10 3x10 (anterolateral) 3x10 (anterolateral)    3x10 a/aarom       Modalities

## 2019-09-01 DIAGNOSIS — F32.A DEPRESSION, UNSPECIFIED DEPRESSION TYPE: ICD-10-CM

## 2019-09-01 DIAGNOSIS — G62.9 NEUROPATHY: ICD-10-CM

## 2019-09-01 DIAGNOSIS — C72.0 SPINAL CORD EPENDYMOMA (HCC): ICD-10-CM

## 2019-09-02 RX ORDER — DULOXETIN HYDROCHLORIDE 60 MG/1
CAPSULE, DELAYED RELEASE ORAL
Qty: 30 CAPSULE | Refills: 0 | Status: SHIPPED | OUTPATIENT
Start: 2019-09-02 | End: 2019-09-12 | Stop reason: SDUPTHER

## 2019-09-02 RX ORDER — PANTOPRAZOLE SODIUM 20 MG/1
TABLET, DELAYED RELEASE ORAL
Qty: 30 TABLET | Refills: 1 | Status: SHIPPED | OUTPATIENT
Start: 2019-09-02 | End: 2019-10-17 | Stop reason: CLARIF

## 2019-09-03 ENCOUNTER — OFFICE VISIT (OUTPATIENT)
Dept: PHYSICAL THERAPY | Age: 59
End: 2019-09-03
Payer: COMMERCIAL

## 2019-09-03 DIAGNOSIS — R53.1 GENERAL WEAKNESS: Primary | ICD-10-CM

## 2019-09-03 DIAGNOSIS — G06.1 SPINAL CORD ABSCESS: ICD-10-CM

## 2019-09-03 DIAGNOSIS — M79.604 RIGHT LEG PAIN: ICD-10-CM

## 2019-09-03 PROCEDURE — 97116 GAIT TRAINING THERAPY: CPT | Performed by: PHYSICAL THERAPIST

## 2019-09-03 PROCEDURE — 97112 NEUROMUSCULAR REEDUCATION: CPT | Performed by: PHYSICAL THERAPIST

## 2019-09-03 NOTE — PROGRESS NOTES
Daily Note     Today's date: 9/3/2019  Patient name: Nick Gomez  : 1960  MRN: [id number]  Referring provider: Andrea Yee MD  Dx:   Encounter Diagnosis     ICD-10-CM    1  General weakness R53 1    2  Right leg pain M79 604    3  Spinal cord abscess G06 1        Start Time: 0930  Stop Time: 1015  Total time in clinic (min): 45 minutes    Subjective: Pt reports no new symptoms  Objective: See treatment diary below      Assessment: Tolerated treatment well  Pt demonstrates improvements with leg clearance during functional interventions  Neurological evaluation was performed and patient showed no differences from last evaluation  Pt's clonus was decrease from last visit  Patient demonstrated fatigue post treatment and would benefit from continued PT      Plan: Continue per plan of care        Precautions: Cervical tumor    Daily Treatment Diary     Manual             PNF R LE            PNF R pelvis in L SL            PNF Dorsiflexors                                        Exercise Diary  8/19 8/23 8/26 8/28/19 8/30 9/3   8/16/19   RB 5' 5' 5' 5' 5' 5'   5'               Biodex 3*10  LOS 3x + maze 2x LOS (foam) lvl 1/2/3 x1ea LOS (foam) lvl 1/2/3 x1ea LOS (foam) lvl 1/2/3 x1ea   3x10   STS            Step-ups Toe taps 2x10  2*10 b/l 2x10 b/l 2x10 b/l 2x10 b/l   Toe taps 2x10   Biodex            Bridges with arms crossed rtb 3x10x5"   rtb 3x10x5"  rtb 3x10x5"   rtb 3x10x5"   R SLR            hooklying hip abd with TB rtb 3x10   rtb 3x10x5"     rtb 3x10   TM walking            Quadruped / tall kneeling PNF at hips/shoulders 10' 10' 10'  10' 10'   10'   Standing Toe raises            Step-over cones ( replicates walking on uneven terrain at work)    5'  5'      Gait training   5' 5' 6' 6' 6'   On level and steps    SLS RLE            Obstacle course  21' 15'  12' 12'   2x10   Inez's ex8' R LE  5' 5'  5' 5'   8'   Weight shifts  2*10 3*10 3x10 (anterolateral) 3x10 (anterolateral) 3x10 (anterolateral)   3x10 a/aarom       Modalities

## 2019-09-04 ENCOUNTER — OFFICE VISIT (OUTPATIENT)
Dept: PHYSICAL THERAPY | Age: 59
End: 2019-09-04
Payer: COMMERCIAL

## 2019-09-04 DIAGNOSIS — R53.1 GENERAL WEAKNESS: Primary | ICD-10-CM

## 2019-09-04 DIAGNOSIS — G06.1 SPINAL CORD ABSCESS: ICD-10-CM

## 2019-09-04 DIAGNOSIS — M79.604 RIGHT LEG PAIN: ICD-10-CM

## 2019-09-04 PROCEDURE — 97112 NEUROMUSCULAR REEDUCATION: CPT

## 2019-09-04 PROCEDURE — 97116 GAIT TRAINING THERAPY: CPT

## 2019-09-04 NOTE — PROGRESS NOTES
Daily Note     Today's date: 2019  Patient name: Clarissa Faria  : 1960  MRN: [id number]  Referring provider: Tania Cheney MD  Dx:   Encounter Diagnosis     ICD-10-CM    1  General weakness R53 1    2  Right leg pain M79 604    3  Spinal cord abscess G06 1                   Subjective: pt reports he sees a difference when he doesn't amb with AFO on R LE"I'm all over the place when I don't have the brace on " pt reports using SPC for longer community distances, pt c/o neck pain"Not severe,  Just stiff like I slept wrong"      Objective: See treatment diary below      Assessment:  Pt cont to amb with AFO on R LE with circumduction gait with intermittent R knee snapping which pt can control at times with cueing, pt has some decreased balance at times which depends on fatigue levels, pt seemed to perform R clamshells better but still difficult       Plan: Continue per plan of care  Progress treatment as tolerated         Precautions: Cervical tumor    Daily Treatment Diary     Manual  19           PNF R LE VK           PNF R pelvis in L SL VK           PNF Dorsiflexors VK                                       Exercise Diary  8/19 8/23 8/26 8/28/19 8/30 9/3 9/4/19    RB 5' 5' 5' 5' 5' 5' 5'    SL clamshells       2x10    Biodex 3*10  LOS 3x + maze 2x LOS (foam) lvl 1/2/3 x1ea LOS (foam) lvl 1/2/3 x1ea LOS (foam) lvl 1/2/3 x1ea LOS (foam) static lvl 2/3 x3    STS           Step-ups Toe taps 2x10  2*10 b/l 2x10 b/l 2x10 b/l 2x10 b/l     Biodex           Bridges with arms crossed rtb 3x10x5"   rtb 3x10x5"  rtb 3x10x5"     R SLR           hooklying hip abd with TB rtb 3x10   rtb 3x10x5"   rtb 3x10x5"    TM walking           Quadruped / tall kneeling PNF at hips/shoulders 10' 10' 10'  10' 10'     Standing Toe raises           Step-over cones ( replicates walking on uneven terrain at work)    5'  5'     Gait training   5' 5' 6' 6' 6'     SLS RLE           Obstacle course  20' 15'  12' 12'     Inez's ex8' R LE  5' 5'  5' 5'     Weight shifts  2*10 3*10 3x10 (anterolateral) 3x10 (anterolateral) 3x10 (anterolateral) 3x10 (anterolateral)    Lateral/frwd/bkwd step overs on foam       2x10 ea    R KTC on PB (diagonal)       2x10    Bridge on PB(arms at side)       2x10        Modalities

## 2019-09-06 ENCOUNTER — EVALUATION (OUTPATIENT)
Dept: PHYSICAL THERAPY | Age: 59
End: 2019-09-06
Payer: COMMERCIAL

## 2019-09-06 DIAGNOSIS — R53.1 GENERAL WEAKNESS: Primary | ICD-10-CM

## 2019-09-06 DIAGNOSIS — M79.604 RIGHT LEG PAIN: ICD-10-CM

## 2019-09-06 DIAGNOSIS — G06.1 SPINAL CORD ABSCESS: ICD-10-CM

## 2019-09-06 PROCEDURE — 97112 NEUROMUSCULAR REEDUCATION: CPT | Performed by: PHYSICAL THERAPIST

## 2019-09-06 PROCEDURE — 97110 THERAPEUTIC EXERCISES: CPT | Performed by: PHYSICAL THERAPIST

## 2019-09-06 NOTE — PROGRESS NOTES
PT Re-Evaluation     Today's date: 2019  Patient name: Dianna Kawasaki  : 1960  MRN: [id number]  Referring provider: Nicole Bowden MD  Dx:   Encounter Diagnosis     ICD-10-CM    1  General weakness R53 1    2  Spinal cord abscess G06 1    3  Right leg pain M79 604        Start Time: 1245  Stop Time: 1330  Total time in clinic (min): 45 minutes    Assessment  Assessment details: Dianna Kawasaki is a 60 y o  male who presents with signs and symptoms consistent of RLE weakness due to spinal compression caused by cancerous tumor  Patient presents with improvements in strength,  ROM and sensation  Pt demonstrates improvements with functional mobility demonstrated by TUG and 5 STS  Pt's balance improvements are represented in MCTSIB on biodex  Pt still is limited with stair navigation and his ability to participate in work related activities is severely hampered due to inefficient movements patterns due to neurological deficits in RLE  Pt has clonus and increase tone that requires a posterior leaf spring brace to help him with community distances  Patient would benefit from skilled physical therapy to address the impairments, improve their level of function, and to improve their overall quality of life  Impairments: abnormal or restricted ROM, abnormal movement, activity intolerance, impaired physical strength and weight-bearing intolerance    Goals  Short Term Goals: to be achieved by 4 weeks   1) Patient to be independent with basic HEP  MET   2) Patient will demonstrate improved Timed up and Go test by 5 seconds met  3) Increase LE strength by 1/2 MMT grade in all deficient planes  MET    Long Term Goals: to be achieved by discharge  1) FOTO equal to or greater than 59  Partially met   2) Patient to be independent with comprehensive HEP  Partially met   3) Increase LE strength to 5/5 MMT grade in all planes to improve a/iadls   Partially met   4) Patient will demonstrate increase ambulation tolerance to 30 min  met   5) Improve sit to stand transfers to maximal level of function met   6) Improve stair negotiation to maximal level of function partially met     Plan  Patient would benefit from: skilled physical therapy  Planned modality interventions: TENS and cryotherapy  Planned therapy interventions: abdominal trunk stabilization, neuromuscular re-education, patient education, therapeutic activities, therapeutic exercise, stretching, strengthening, therapeutic training, transfer training, graded exercise, graded activity, home exercise program, functional ROM exercises and balance  Frequency: Twice a week for 12 weeks  Treatment plan discussed with: patient        Subjective Evaluation    History of Present Illness  Mechanism of injury: Pt suffered multiple falls and BUE numbness in January was 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  Patient had a Posterior C4-T3 laminectomy and resection of intramedullary mass and C2-T5 fixation/fusion  Patient is now medically stable and is being seen at PT following 1 month stay in sub acute rehab  Pt reports most problems with RLE weakness  Pt reports to the doctor on the  to determine if he needs to receive cancer treatment and when he is able to remove cervical collar  Going up and down steps is difficulty for patient, navigating home due to pets, and balance is an issue  Pt had no significant PMH prior to this incidence            Pain  Current pain ratin  At best pain ratin  At worst pain ratin    Patient Goals  Patient goals for therapy: increased strength, decreased pain and independence with ADLs/IADLs  Patient goal: Work, golf, bike        Objective     Concurrent Complaints  Negative for night pain, disturbed sleep, bladder dysfunction, bowel dysfunction and saddle (S4) numbness    Neurological Testing     Sensation     Lumbar   Left   Intact: light touch    Right   Intact: light touch    Reflexes   Left   Achilles (S1): nonsustained clonus (4+)  Clonus sign: positive    Right   Achilles (S1): nonsustained clonus (4+)  Clonus sign: positive    Active Range of Motion     Lumbar   Flexion:  Restriction level: moderate  Extension:  Restriction level: maximal  Left lateral flexion:  Restriction level: minimal  Right lateral flexion:  Restriction level: minimal  Left rotation:  Restriction level: minimal  Right rotation:  Restriction level: minimal    Strength/Myotome Testing     Lumbar   Left   Heel walk: normal  Toe walk: normal    Right   Heel walk: normal  Toe walk: normal    Left Hip   Planes of Motion   Flexion: 5  Extension: 5  Abduction: 5  Adduction: 5  External rotation: 5  Internal rotation: 5    Right Hip   Planes of Motion   Flexion: 4  Extension: 4  Abduction: 4  Adduction: 4  External rotation: 4  Internal rotation: 4    Left Knee   Flexion: 5  Extension: 5    Right Knee   Flexion: 4  Extension: 4    Functional Assessment        Comments  Pt has little difficulty performing functional squat   Stair navigation is completed with reciprocal pattern and circumduction with RLE    5 STS: 11 seconds  TU seconds    MCTSIB:  EO firm:83% impairment   EC firm: 90% impairment  EO foam: 85% impairment  EC foam: 82% impairment    Stair Navigation:  Reciprocal descending stairs with unilateral hand rail and step to pattern ascending stairs  Pt can ascend stairs with reciprocal pattern but muscle activation pattern breaks down and must revert to step to pattern        Precautions: Cervical tumor  aily Treatment Diary     Manual                                                                                 Daily Treatment Diary     Manual             PNF R LE            PNF R pelvis in L SL            PNF Dorsiflexors                                        Exercise Diary  8/19 8/23 8/26 8/28/19 8/30 9/3 919   RB 5' 5' 5' 5' 5' 5' 5'  5'               Biodex 3*10  LOS 3x + maze 2x LOS (foam) lvl 1/2/3 x1ea LOS (foam) lvl 1/2/3 x1ea LOS (foam) lvl 1/2/3 x1ea LOS (foam) lvl 1/2/3 x1ea  3x10   STS            Step-ups Toe taps 2x10  2*10 b/l 2x10 b/l 2x10 b/l 2x10 b/l 2x10 b/l  Toe taps 2x10   Biodex            Bridges with arms crossed rtb 3x10x5"   rtb 3x10x5"  rtb 3x10x5" rtb 3x10x5"  rtb 3x10x5"   R SLR            hooklying hip abd with TB rtb 3x10   rtb 3x10x5"     rtb 3x10   TM walking            Quadruped / tall kneeling PNF at hips/shoulders 10' 10' 10'  10' 10'   10'   Standing Toe raises            Step-over cones ( replicates walking on uneven terrain at work)    5'  5'      Gait training   5' 5' 6' 6' 6'   On level and steps    SLS RLE            Obstacle course  21' 15'  12' 12'   2x10   Inez's ex8' R LE  5' 5'  5' 5'   8'   Weight shifts  2*10 3*10 3x10 (anterolateral) 3x10 (anterolateral) 3x10 (anterolateral)   3x10 a/aarom       Modalities

## 2019-09-09 ENCOUNTER — OFFICE VISIT (OUTPATIENT)
Dept: PHYSICAL THERAPY | Age: 59
End: 2019-09-09
Payer: COMMERCIAL

## 2019-09-09 DIAGNOSIS — R53.1 GENERAL WEAKNESS: Primary | ICD-10-CM

## 2019-09-09 DIAGNOSIS — G06.1 SPINAL CORD ABSCESS: ICD-10-CM

## 2019-09-09 DIAGNOSIS — M79.604 RIGHT LEG PAIN: ICD-10-CM

## 2019-09-09 PROCEDURE — 97112 NEUROMUSCULAR REEDUCATION: CPT | Performed by: PHYSICAL THERAPIST

## 2019-09-09 PROCEDURE — 97116 GAIT TRAINING THERAPY: CPT | Performed by: PHYSICAL THERAPIST

## 2019-09-09 NOTE — PROGRESS NOTES
Daily Note     Today's date: 2019  Patient name: Chele Bangura  : 1960  MRN: [id number]  Referring provider: Dyan Mcnally MD  Dx:   Encounter Diagnosis     ICD-10-CM    1  General weakness R53 1    2  Right leg pain M79 604    3  Spinal cord abscess G06 1        Start Time: 0930  Stop Time: 1015  Total time in clinic (min): 45 minutes    Subjective: Pt reports no new symptoms  Objective: See treatment diary below      Assessment: Tolerated treatment well  Pt's POC was progressed to include more reps of functional gait traingi to increase tolerance to community ambulation  Patient demonstrated fatigue post treatment and would benefit from continued PT      Plan: Continue per plan of care        Precautions: Cervical tumor    Daily Treatment Diary     Manual  19          PNF R LE VK           PNF R pelvis in L SL VK           PNF Dorsiflexors VK                                       Exercise Diary  8/19 8/23 8/26 8/28/19 8/30 9/3 9/9/19    RB 5' 5' 5' 5' 5' 5' 5'    SL clamshells       2x10    Biodex 3*10  LOS 3x + maze 2x LOS (foam) lvl 1/2/3 x1ea LOS (foam) lvl 1/2/3 x1ea LOS (foam) lvl 1/2/3 x1ea LOS (foam) static lvl 2/3 x3    STS           Step-ups Toe taps 2x10  2*10 b/l 2x10 b/l 2x10 b/l 2x10 b/l     Biodex           Bridges with arms crossed rtb 3x10x5"   rtb 3x10x5"  rtb 3x10x5"     R SLR           hooklying hip abd with TB rtb 3x10   rtb 3x10x5"   rtb 3x10x5"    TM walking           Quadruped / tall kneeling PNF at hips/shoulders 10' 10' 10'  10' 10'     Standing Toe raises           Step-over cones ( replicates walking on uneven terrain at work)    5'  5' 10'    Gait training   5' 5' 6' 6' 6' 6'    SLS RLE           Obstacle course  20' 15'  12' 12' 8'    Inez's ex8' R LE  5' 5'  5' 5' 5'    Weight shifts  2*10 3*10 3x10 (anterolateral) 3x10 (anterolateral) 3x10 (anterolateral) 3x10 (anterolateral)    Lateral/frwd/bkwd step overs on foam       2x10 ea    R KTC on PB (diagonal)       2x10    Bridge on PB(arms at side)       2x10        Modalities

## 2019-09-10 ENCOUNTER — OFFICE VISIT (OUTPATIENT)
Dept: PHYSICAL THERAPY | Age: 59
End: 2019-09-10
Payer: COMMERCIAL

## 2019-09-10 DIAGNOSIS — M79.604 RIGHT LEG PAIN: ICD-10-CM

## 2019-09-10 DIAGNOSIS — R53.1 GENERAL WEAKNESS: Primary | ICD-10-CM

## 2019-09-10 DIAGNOSIS — G06.1 SPINAL CORD ABSCESS: ICD-10-CM

## 2019-09-10 PROCEDURE — 97112 NEUROMUSCULAR REEDUCATION: CPT | Performed by: PHYSICAL THERAPIST

## 2019-09-10 PROCEDURE — 97116 GAIT TRAINING THERAPY: CPT | Performed by: PHYSICAL THERAPIST

## 2019-09-10 NOTE — PROGRESS NOTES
Daily Note     Today's date: 9/10/2019  Patient name: Vikash Barrera  : 1960  MRN: [id number]  Referring provider: Kristie Clancy MD  Dx:   Encounter Diagnosis     ICD-10-CM    1  General weakness R53 1    2  Right leg pain M79 604    3  Spinal cord abscess G06 1        Start Time: 0800  Stop Time: 0845  Total time in clinic (min): 45 minutes    Subjective: Pt reports no new symptoms  Objective: See treatment diary below      Assessment: Tolerated treatment well  Pt's POC was progressed to include perturbations with gait training to decrease risk of falls  Patient demonstrated fatigue post treatment and would benefit from continued PT      Plan: Continue per plan of care        Precautions: Cervical tumor    Daily Treatment Diary     Manual  9/4/19 9/10          PNF R LE VK JF          PNF R pelvis in L SL VK           PNF Dorsiflexors VK JF                                      Exercise Diary  8/19 8/23 8/26 8/28/19 8/30 9/3 9/9/19 9/10   RB 5' 5' 5' 5' 5' 5' 5' 5'   SL clamshells       2x10 3*10   Biodex 3*10  LOS 3x + maze 2x LOS (foam) lvl 1/2/3 x1ea LOS (foam) lvl 1/2/3 x1ea LOS (foam) lvl 1/2/3 x1ea LOS (foam) static lvl 2/3 x3 LOS (foam) static lvl 2/3 x3   STS           Step-ups Toe taps 2x10  2*10 b/l 2x10 b/l 2x10 b/l 2x10 b/l     Biodex           Bridges with arms crossed rtb 3x10x5"   rtb 3x10x5"  rtb 3x10x5"     R SLR           hooklying hip abd with TB rtb 3x10   rtb 3x10x5"   rtb 3x10x5" rtb 3x10x5"   TM walking           Quadruped / tall kneeling PNF at hips/shoulders 10' 10' 10'  10' 10'     Standing Toe raises           Step-over cones ( replicates walking on uneven terrain at work)    5'  5' 10' 10'   Gait training   5' 5' 6' 6' 6' 6' 6'   SLS RLE           Obstacle course  20' 15'  12' 12' 8' 8'   Inez's ex8' R LE  5' 5'  5' 5' 5'    Weight shifts  2*10 3*10 3x10 (anterolateral) 3x10 (anterolateral) 3x10 (anterolateral) 3x10 (anterolateral)    Lateral/frwd/bkwd step overs on foam       2x10 ea 2x10 ea perturbations   R KTC on PB (diagonal)       2x10 2*10   Bridge on PB(arms at side)       2x10 2*10       Modalities

## 2019-09-11 ENCOUNTER — HOSPITAL ENCOUNTER (OUTPATIENT)
Dept: RADIOLOGY | Facility: HOSPITAL | Age: 59
Discharge: HOME | End: 2019-09-11
Attending: NEUROLOGICAL SURGERY
Payer: COMMERCIAL

## 2019-09-11 DIAGNOSIS — G95.89 RADIATION-INDUCED MYELOPATHY (HCC): ICD-10-CM

## 2019-09-11 DIAGNOSIS — Z98.1 H/O SPINAL FUSION: ICD-10-CM

## 2019-09-11 DIAGNOSIS — C72.0 SPINAL CORD EPENDYMOMA (HCC): ICD-10-CM

## 2019-09-11 PROCEDURE — 72156 MRI NECK SPINE W/O & W/DYE: CPT

## 2019-09-11 PROCEDURE — A9585 GADOBUTROL INJECTION: HCPCS | Performed by: NEUROLOGICAL SURGERY

## 2019-09-11 RX ADMIN — GADOBUTROL 10 ML: 604.72 INJECTION INTRAVENOUS at 09:55

## 2019-09-11 NOTE — PROGRESS NOTES
Palliative and [facility] 60 y o  male [phone]    Assessment/Plan:  1  Radiation-induced myelopathy ([facility] )    2  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[facility] [address] )    3  H/O spinal fusion    4  Generalized weakness    5  Depression, unspecified depression type    6  Neuropathy        Requested Prescriptions     Signed Prescriptions Disp Refills    gabapentin (NEURONTIN) 300 mg capsule 90 capsule 1     Sig: Take 1 capsule (300 mg total) by mouth 3 (three) times a day    DULoxetine (CYMBALTA) 60 mg delayed release capsule 30 capsule 1     Sig: Take 1 capsule (60 mg total) by mouth daily     Medications Discontinued During This Encounter   Medication Reason    gabapentin (NEURONTIN) 300 mg capsule Reorder    DULoxetine (CYMBALTA) 60 mg delayed release capsule Reorder     Time spent providing support and encouragement  Lima Garcia continues to slowly gain improvement in his overall strength and function  We did discuss that his gabapentin dose can be increased if his neuropathic pain becomes more bothersome  He declined any dose changes today  I have recommended use of Bernerd Burn when outside of the home to help provide additional stability and hopefully prevent any further falls  Representatives have queried the patient's controlled substance dispensing history in the Prescription Drug Monitoring Program in compliance with regulations before I have prescribed any controlled substances  The prescription history is consistent with prescribed therapy and our practice policies  30 minutes were spent face to face with Shamar Joshi III with greater than 50% of the time spent in counseling or coordination of care including discussions of instructions for disease self management, treatment instructions, follow up requirements and patient and family counseling/involvement in care   All of the patient's questions were answered during this discussion      Return in about 2 months (around 11/12/2019) for symptom assessment and management  Subjective:   Chief Complaint  Follow up visit for:  symptom management  HPI     Sampson Potter III is a 60 y o  male with a 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 s/p resection and debulking at the T1/T2 level, fusion by Dr Thais Vazquez in 1/2019  Further resection was not possible d/t loss of neurologic signals intraoperatively  Postoperatively he recovered and completed rehab at [facility], he received adjuvant radiation therapy to the C3 through T4 spinal cord, this finished on 5/22/19  However, he developed worsening right lower extremity weakness and right foot drop approximately one week after completion of  RT  His most recent MRI was negative for any obvious disease progression; he is suspected to have post-radiation myelopathy  He presents today for a follow up visit for symptom management  Since last visit, he has had a few falls where he states he just got tripped up over his feet  He denies sustaining any serious injury  Last fall was 2 weeks ago  He uses his cane at all times as an assistive device  He continues to work closely with PT  He is wearing a specialty brace to support his right foot drop, he still has significant numbness in his right foot but reports overall great improvement in his neuropathy  He is no longer experiencing  intense "electric jolts" in his hands  He is currently on disability and seems to be accepting that his lower extremity weakness maybe a "new normal" but remains hopeful that with time and continued therapy he will regain most of his strength back  His mood and outlook has improved greatly on Cymbalta  His appetite is good, he has gained a few pounds  Denies any bowel or bladder dysfunction  He has follow up scheduled with Dr Thais Vazquez next week         The following portions of the medical history were reviewed: past medical history, problem list, medication list, and social history  Current Outpatient Medications:     DULoxetine (CYMBALTA) 60 mg delayed release capsule, Take 1 capsule (60 mg total) by mouth daily, Disp: 30 capsule, Rfl: 1    gabapentin (NEURONTIN) 300 mg capsule, Take 1 capsule (300 mg total) by mouth 3 (three) times a day, Disp: 90 capsule, Rfl: 1    levothyroxine 25 mcg tablet, TAKE 1 TABLET (25 MCG TOTAL) BY MOUTH DAILY, Disp: 30 tablet, Rfl: 5    Omega-3 Fatty Acids (FISH OIL) 1,000 mg, Take 1,000 mg by mouth daily Resume on 2/20/19, Disp: , Rfl:     pantoprazole (PROTONIX) 20 mg tablet, TAKE 1 TABLET BY MOUTH EVERY DAY, Disp: 30 tablet, Rfl: 1  Review of Systems   Constitutional: Positive for activity change and fatigue  Musculoskeletal: Positive for arthralgias and gait problem  Neurological: Positive for weakness and numbness  Right foot drop; wearing supportive brace       All other systems negative    Objective:  Vital Signs  /70 (BP Location: Left arm, Patient Position: Sitting, Cuff Size: Standard)   Pulse 75   Temp 98 3 °F (36 8 °C) (Oral)   Resp 16   Wt 85 8 kg (189 lb 3 2 oz)   SpO2 98%   BMI 29 63 kg/m²    Physical Exam    Constitutional: Appears well-developed and well-nourished  In no acute physical or emotional distress  Head: Normocephalic and atraumatic  Eyes: EOM are normal  No ocular discharge  No scleral icterus  Neck: No visible adenopathy or masses  Respiratory: Effort normal  No stridor  No respiratory distress  Gastrointestinal: No abdominal distension  Musculoskeletal: No edema  Wearing supportive brace on RLE  Using a cane to support gait  Neurological: Alert, oriented and appropriately conversant  Skin: No diaphoresis, no rashes seen on exposed areas of skin  Psychiatric: Displays a normal mood and affect  He appears upbeat and positive today   Behavior, judgement and thought content appear normal

## 2019-09-12 ENCOUNTER — OFFICE VISIT (OUTPATIENT)
Dept: PALLIATIVE MEDICINE | Facility: CLINIC | Age: 59
End: 2019-09-12
Payer: COMMERCIAL

## 2019-09-12 VITALS
TEMPERATURE: 98.3 F | DIASTOLIC BLOOD PRESSURE: 70 MMHG | OXYGEN SATURATION: 98 % | SYSTOLIC BLOOD PRESSURE: 120 MMHG | HEART RATE: 75 BPM | WEIGHT: 189.2 LBS | BODY MASS INDEX: 29.63 KG/M2 | RESPIRATION RATE: 16 BRPM

## 2019-09-12 DIAGNOSIS — G95.89 RADIATION-INDUCED MYELOPATHY (HCC): Primary | ICD-10-CM

## 2019-09-12 DIAGNOSIS — G62.9 NEUROPATHY: ICD-10-CM

## 2019-09-12 DIAGNOSIS — C72.0 SPINAL CORD EPENDYMOMA (HCC): ICD-10-CM

## 2019-09-12 DIAGNOSIS — F32.A DEPRESSION, UNSPECIFIED DEPRESSION TYPE: ICD-10-CM

## 2019-09-12 DIAGNOSIS — Z98.1 H/O SPINAL FUSION: ICD-10-CM

## 2019-09-12 DIAGNOSIS — R53.1 GENERALIZED WEAKNESS: ICD-10-CM

## 2019-09-12 PROCEDURE — 99214 OFFICE O/P EST MOD 30 MIN: CPT | Performed by: NURSE PRACTITIONER

## 2019-09-12 RX ORDER — GABAPENTIN 300 MG/1
300 CAPSULE ORAL 3 TIMES DAILY
Qty: 90 CAPSULE | Refills: 1 | Status: SHIPPED | OUTPATIENT
Start: 2019-09-12 | End: 2019-10-13 | Stop reason: SDUPTHER

## 2019-09-12 RX ORDER — DULOXETIN HYDROCHLORIDE 60 MG/1
60 CAPSULE, DELAYED RELEASE ORAL DAILY
Qty: 30 CAPSULE | Refills: 1 | Status: SHIPPED | OUTPATIENT
Start: 2019-09-12 | End: 2019-10-27 | Stop reason: SDUPTHER

## 2019-09-13 ENCOUNTER — OFFICE VISIT (OUTPATIENT)
Dept: PHYSICAL THERAPY | Age: 59
End: 2019-09-13
Payer: COMMERCIAL

## 2019-09-13 DIAGNOSIS — R53.1 GENERAL WEAKNESS: Primary | ICD-10-CM

## 2019-09-13 DIAGNOSIS — E03.9 HYPOTHYROIDISM, UNSPECIFIED TYPE: ICD-10-CM

## 2019-09-13 DIAGNOSIS — M79.604 RIGHT LEG PAIN: ICD-10-CM

## 2019-09-13 DIAGNOSIS — G06.1 SPINAL CORD ABSCESS: ICD-10-CM

## 2019-09-13 PROCEDURE — 97112 NEUROMUSCULAR REEDUCATION: CPT

## 2019-09-13 PROCEDURE — 97116 GAIT TRAINING THERAPY: CPT

## 2019-09-13 RX ORDER — LEVOTHYROXINE SODIUM 0.03 MG/1
TABLET ORAL
Qty: 90 TABLET | Refills: 1 | Status: ON HOLD | OUTPATIENT
Start: 2019-09-13 | End: 2020-01-04 | Stop reason: SDUPTHER

## 2019-09-13 NOTE — PROGRESS NOTES
Daily Note     Today's date: 2019  Patient name: Crystal Matson  : 1960  MRN: [id number]  Referring provider: Elham Resendez MD  Dx:   Encounter Diagnosis     ICD-10-CM    1  General weakness R53 1    2  Right leg pain M79 604    3  Spinal cord abscess G06 1                   Subjective: Pt reports no new symptoms  Objective: See treatment diary below      Assessment: Pt tolerated session well with moderate fatiuge near end of session  Gait and balance was the main focuse today  Pt required min assistance for safety during more challenging gait patterns and cone zig zag patterns  Pt required rest break post rocker board as he was challenged with these  Plan: Continue per plan of care        Precautions: Cervical tumor    Daily Treatment Diary     Manual  9/4/19 9/10          PNF R LE VK JF          PNF R pelvis in L SL VK           PNF Dorsiflexors VK JF                                      Exercise Diary  8/26 8/28/19 8/30 9/3 9/9/19 9/10 9/13   RB 5' 5' 5' 5' 5' 5' 7 min   SL clamshells     2x10 3*10    Biodex LOS 3x + maze 2x LOS (foam) lvl 1/2/3 x1ea LOS (foam) lvl 1/2/3 x1ea LOS (foam) lvl 1/2/3 x1ea LOS (foam) static lvl 2/3 x3 LOS (foam) static lvl 2/3 x3 LOS (foam) static lvl 2/3 x5   STS          Step-ups 2*10 b/l 2x10 b/l 2x10 b/l 2x10 b/l      Biodex          Bridges with arms crossed  rtb 3x10x5"  rtb 3x10x5"      R SLR          hooklying hip abd with TB  rtb 3x10x5"   rtb 3x10x5" rtb 3x10x5"    TM walking          Quadruped / tall kneeling PNF at hips/shoulders 10'  10' 10'      Standing Toe raises          Step-over cones ( replicates walking on uneven terrain at work)  5'  5' 10' 10' 10' + zig zag pattern   Gait training  5' 6' 6' 6' 6' 6' marching and marching alter UE   SLS RLE          Obstacle course 13'  12' 12' 8' 8'    Inez's ex8' R LE 5'  5' 5' 5'     Weight shifts 3*10 3x10 (anterolateral) 3x10 (anterolateral) 3x10 (anterolateral) 3x10 (anterolateral)  Timmy board 5 mins fwd/bad/ side   Lateral/frwd/bkwd step overs on foam     2x10 ea 2x10 ea perturbations    R KTC on PB (diagonal)     2x10 2*10    Bridge on PB(arms at side)     2x10 2*10        Modalities

## 2019-09-16 ENCOUNTER — APPOINTMENT (OUTPATIENT)
Dept: PHYSICAL THERAPY | Age: 59
End: 2019-09-16
Payer: COMMERCIAL

## 2019-09-18 ENCOUNTER — OFFICE VISIT (OUTPATIENT)
Dept: NEUROSURGERY | Facility: CLINIC | Age: 59
End: 2019-09-18
Payer: COMMERCIAL

## 2019-09-18 ENCOUNTER — OFFICE VISIT (OUTPATIENT)
Dept: PHYSICAL THERAPY | Age: 59
End: 2019-09-18
Payer: COMMERCIAL

## 2019-09-18 VITALS
RESPIRATION RATE: 18 BRPM | WEIGHT: 189.4 LBS | HEART RATE: 73 BPM | DIASTOLIC BLOOD PRESSURE: 70 MMHG | SYSTOLIC BLOOD PRESSURE: 129 MMHG | BODY MASS INDEX: 29.73 KG/M2 | TEMPERATURE: 98.1 F | HEIGHT: 67 IN

## 2019-09-18 DIAGNOSIS — M79.604 RIGHT LEG PAIN: ICD-10-CM

## 2019-09-18 DIAGNOSIS — C72.0 MALIGNANT NEOPLASM OF SPINAL CORD (HCC): ICD-10-CM

## 2019-09-18 DIAGNOSIS — R53.1 GENERAL WEAKNESS: Primary | ICD-10-CM

## 2019-09-18 DIAGNOSIS — G95.89 RADIATION-INDUCED MYELOPATHY (HCC): ICD-10-CM

## 2019-09-18 DIAGNOSIS — Z98.1 H/O SPINAL FUSION: Primary | ICD-10-CM

## 2019-09-18 DIAGNOSIS — G06.1 SPINAL CORD ABSCESS: ICD-10-CM

## 2019-09-18 DIAGNOSIS — C72.0 SPINAL CORD EPENDYMOMA (HCC): ICD-10-CM

## 2019-09-18 PROCEDURE — 97116 GAIT TRAINING THERAPY: CPT | Performed by: PHYSICAL THERAPIST

## 2019-09-18 PROCEDURE — 97112 NEUROMUSCULAR REEDUCATION: CPT | Performed by: PHYSICAL THERAPIST

## 2019-09-18 PROCEDURE — 99214 OFFICE O/P EST MOD 30 MIN: CPT | Performed by: NEUROLOGICAL SURGERY

## 2019-09-18 NOTE — PROGRESS NOTES
Daily Note     Today's date: 2019  Patient name: Aimee Licea  : 1960  MRN: [id number]  Referring provider: Damaris Nuñez MD  Dx:   Encounter Diagnosis     ICD-10-CM    1  General weakness R53 1    2  Right leg pain M79 604    3  Spinal cord abscess G06 1        Start Time: 0845  Stop Time: 09  Total time in clinic (min): 45 minutes    Subjective: Pt reports no new symptoms  Objective: See treatment diary below      Assessment: Tolerated treatment well  Pt's POC was progressed to include more closed chain exercises to increase tolerance to functional transfers  Patient demonstrated fatigue post treatment and would benefit from continued PT      Plan: Continue per plan of care        Precautions: Cervical tumor    Daily Treatment Diary     Manual  9/4/19 9/10 9/18         PNF R LE VK JF JF         PNF R pelvis in L SL VK           PNF Dorsiflexors VK JF JF                                     Exercise Diary           RB       7 min   SL clamshells          Biodex       LOS (foam) static lvl 2/3 x5   STS          Step-ups          Biodex          Bridges with arms crossed          R SLR          hooklying hip abd with TB          TM walking          Quadruped / tall kneeling PNF at hips/shoulders          Standing Toe raises          Step-over cones ( replicates walking on uneven terrain at work)       10' + zig zag pattern   Gait training        marching and marching alter UE   SLS RLE          Obstacle course          Inez's ex8' R LE          Weight shifts       Rocker board 5 mins fwd/bad/ side   Lateral/frwd/bkwd step overs on foam          R KTC on PB (diagonal)          Bridge on PB(arms at side)       4*10       Modalities

## 2019-09-18 NOTE — LETTER
September 18, 2019     Ariane Virk MD  [address] [address]    Patient: Velvet Monroe III   YOB: 1960   Date of Visit: 9/18/2019       Kwabena Bark:    Juancarlos Howell       Sincerely,        Kedar Harman MD        CC: No Recipients  Kedar Harman MD  9/18/2019  3:00 PM  Sign at close encounter  Neurosurgery Office Note  Velvet Monroe III 60 y o  male MRN: [id number]      Assessment/Plan      Diagnoses and all orders for this visit:    H/O spinal fusion  -     MRI cervical spine with and without contrast; Future  -     XR spine cervical 2 or 3 vw injury; Future  -     XR spine thoracic 3 vw; Future    Malignant neoplasm of spinal cord (HCC)  -     MRI cervical spine with and without contrast; Future  -     XR spine cervical 2 or 3 vw injury; Future  -     XR spine thoracic 3 vw; Future    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HCC)    Radiation-induced myelopathy [facility])          Discussion:    60year old male now 8 months s/p resection, decompression, fusion for his intramedullary spinal cord ependymoma, WHO grade 2  (1/24/19)  The mass spanned from the C4-5 disc space down to the T3-4 disc space  We were able to resect this aggressively at its largest part, I e  T1-2, but did not proceed further as his neurological signals declined intraoperatively  He did well postoperatively, and was discharged to [facility]  At f/u in early 3/2019, was continuing to improve  He was using no ambulatory aids  His right DF was at least 4/5, but apractic, however this was also improving  He stated he even had some proprioception on the right, which was improved vs  Prior to surgery  He had some mild tingling in his forearms and hands, but no pain or weakness  His incision was healing well  Bowel and bladder function were back to normal  No radicular pains  Xrays were stable, and we planned for adjuvant IMRT      He completed this 5/22/19, to 45 Gy    Starting a few days after his RT, his neurological symptoms began to re-emerge  His right foot drop became more pronounced, and he was unsteady on his feet had a few falls  His balance was unsteady  His occasional radicular pain into the right leg 2-3/10, as well as left arm and right hand pains, pins and needles, 5/10  He wasnot taking gabapentin or dexamethasone  Seen with Dr Jessenia Nagy and we felt this some RT induced myeopathy, and recommended continued PT as well as restarting gabapentin  Today he has improved  His right LE pain is just in there foot, 3/10, and no longer in the leg  He also has no hand symptoms anymore  His right foot drop and RLE apraxia has improved mildly  He has a RLE brace and a standard cane  He has some neck soreness when sleeping on his side at night, and cannot sleep on his back 2/2 snoring  Not hardware failure/mechanical pain  We agreed no better neck/head support when on his side and/or CPAP  MRI scan CSpine stable: residual tumor the superior and inferior caps, specifically from the C4-5 disc space down to the resection cavity, and then again resuming at the inferior edge of the cavity down to the T3-4 disc space  The large  Cavity present preop as not reformed, there are no obvious sign of recurrence etc     MRI scan of brain, L-spine, and T-spine   Done after surgery showed no drop metastases  CSF testing was negative  We discussed the NCCN guideline recommended   Follow-up  Recommendation is for follow-up MRI scan cervical spine in 3-4 months, which I have ordered  I will see him after that is completed  We will also plan for follow-up x-rays  Metrics:  [id number] 62717 or 0 729, VAS 60; KPS 80, ECOG 1-2, KPS 70-80  In 6/2019 was 59082 or 0 370, VAS 50, ECOG 2, KPS 60-70  In 3/2019: 1 0000 VA S 90, KPS 90, ECOG 0-1      CHIEF COMPLAINT    Chief Complaint   Patient presents with    Follow-up     3 month follow up with new MRI C-spine       HISTORY    History of Present Illness     60y o  year old male     HPI    See Discussion    REVIEW OF SYSTEMS    Review of Systems   Constitutional: Positive for fatigue (chronic, 7/10)  HENT: Negative  Eyes: Negative  Respiratory: Positive for shortness of breath (with exertion)  Cardiovascular: Positive for leg swelling (right leg)  Gastrointestinal: Negative  Endocrine: Negative  Genitourinary: Negative  Musculoskeletal: Positive for back pain (occasional), gait problem (right foot drag/drop, unsteady balance, using standard cane, last fall in July, f/w PT) and neck pain (positional)  Skin: Negative  Allergic/Immunologic: Positive for environmental allergies  Neurological: Positive for tremors (hands), weakness (right leg) and numbness (left arm, right elbow and hand occasional numbness/tingling, numbness in B/L fingers and right foot )  Negative for headaches  Wife reports he states he is tired and his head hurts 1-2 x weekly   Hematological: Negative  Psychiatric/Behavioral: Positive for sleep disturbance (occasional)  The patient is nervous/anxious (1 anxiety attack in August, none since)  Had difficulty with confusion during RT, has since resolved         Meds/Allergies     Current Outpatient Medications   Medication Sig Dispense Refill    DULoxetine (CYMBALTA) 60 mg delayed release capsule Take 1 capsule (60 mg total) by mouth daily 30 capsule 1    gabapentin (NEURONTIN) 300 mg capsule Take 1 capsule (300 mg total) by mouth 3 (three) times a day 90 capsule 1    levothyroxine 25 mcg tablet TAKE 1 TABLET BY MOUTH EVERY DAY 90 tablet 1    Omega-3 Fatty Acids (FISH OIL) 1,000 mg Take 1,000 mg by mouth daily Resume on 2/20/19      pantoprazole (PROTONIX) 20 mg tablet TAKE 1 TABLET BY MOUTH EVERY DAY 30 tablet 1     No current facility-administered medications for this visit  Allergies   Allergen Reactions    Bee Venom Hives, Itching and Edema     Category: Allergy;     Penicillins Hives and Itching     Category:  Allergy; PAST HISTORY    Past Medical History:   Diagnosis Date    BPH associated with nocturia     Cervical spinal mass ([address] ) 2019    cervicothoracic    Disease of thyroid gland     Herniated disc, cervical     History of radiation therapy     Hyperlipidemia     Impaired fasting glucose        Past Surgical History:   Procedure Laterality Date    APPENDECTOMY      CERVICAL FUSION      COLONOSCOPY  2012    NORMAL; RECHECK IN 5 YEARS    FL LUMBAR PUNCTURE  2019    IN BX/EXCIS SPIN PATEL,INDUR,INMED,CERV N/A 2019    Procedure: Posterior C4-T3 laminectomy; resection of intramedullary mass C5-T3, including fenestration of syrinx C7-T2; C3-T4 fixation/fusion; additional levels as needed;  Surgeon: Alexandra Carter MD;  Location: [facility];  Service: Neurosurgery    WISDOM TOOTH EXTRACTION         Social History     Tobacco Use    Smoking status: Former Smoker     Last attempt to quit: 1993     Years since quittin 3    Smokeless tobacco: Never Used   Substance Use Topics    Alcohol use: Yes     Comment: SOCIAL     Drug use: No       Family History   Problem Relation Age of Onset    Diabetes Mother     Hypertension Father     Cerebral palsy Sister          Above history personally reviewed  EXAM    Vitals:Blood pressure 129/70, pulse 73, temperature 98 1 °F (36 7 °C), temperature source Tympanic, resp  rate 18, height 5' 7" (1 702 m), weight 85 9 kg (189 lb 6 4 oz)  ,Body mass index is 29 66 kg/m²  Physical Exam   Constitutional: He is oriented to person, place, and time  He appears well-developed  HENT:   Head: Normocephalic and atraumatic  Eyes: No scleral icterus  Neck:       Cardiovascular: Normal rate  Pulmonary/Chest: Effort normal    Abdominal: Normal appearance  Neurological: He is alert and oriented to person, place, and time  No sensory deficit  Skin: Skin is warm and dry  Psychiatric: He has a normal mood and affect   His speech is normal and behavior is normal    Vitals reviewed  Neurologic Exam     Mental Status   Oriented to person, place, and time  Attention: normal    Speech: speech is normal   Level of consciousness: alert    Cranial Nerves     CN VII   Facial expression full, symmetric  Motor Exam   Muscle bulk: normal  Overall muscle tone: normal   Right foot drop - braced  Able to stand from chair with some difficulty  Gait, Coordination, and Reflexes     Tremor   Resting tremor: absent  Intention tremor: absent  Action tremor: absent  Standard cane  MEDICAL DECISION MAKING    Imaging Studies:     Mri Cervical Spine With And Without Contrast    Result Date: 9/12/2019  Narrative: MRI CERVICAL SPINE WITH AND WITHOUT CONTRAST INDICATION: C72 0: Malignant neoplasm of spinal cord Z98 1: Arthrodesis status G95 89: Other specified diseases of spinal cord  COMPARISON:  6/17/2019 TECHNIQUE:  Sagittal T1, sagittal T2, sagittal inversion recovery, axial 2D merge and axial T2  Sagittal T1 and axial T1 postcontrast   IV Contrast:  10 mL of gadobutrol injection (MULTI-DOSE) IMAGE QUALITY:  Diagnostic  FINDINGS: ALIGNMENT:  Slight anterior subluxation of C7 upon T1  The patient has undergone extensive posterior decompression and fusion  The fusion extends from C2 into the upper thoracic spine  Decompression extends from C4 to T4  MARROW SIGNAL:  Normal marrow signal is identified within the visualized bony structures  No discrete marrow lesion  CERVICAL AND VISUALIZED UPPER THORACIC CORD:  There is volume loss of the lower cervical and upper thoracic CORD with extensive myelomalacia beginning at the C7 level and extending into the upper thoracic spine, beyond the scope of this examination    This appears stable compared to the prior exam  PREVERTEBRAL AND PARASPINAL SOFT TISSUES:  Normal  VISUALIZED POSTERIOR FOSSA:  The visualized posterior fossa demonstrates no abnormal signal  CERVICAL DISC SPACES:  Normal disc height with the exception of C5-6 which demonstrates slight loss of disc height  Mild endplate and uncinate joint hypertrophic change noted at C3-4, C5-6  There is no canal stenosis with wide posterior decompression  No significant foraminal nerve impingement identified  UPPER THORACIC DISC SPACES:  No upper thoracic disc herniation or canal stenosis  No foraminal narrowing  Adequate posterior decompression  POSTCONTRAST IMAGING:  No evidence of residual or recurrent tumor  Impression: Extensive myelomalacia within the lower cervical and upper thoracic CORD status post tumor resection  Stable alignment status post fusion and decompression  Workstation performed: [id number]       I have personally reviewed pertinent reports     and I have personally reviewed pertinent films in PACS

## 2019-09-18 NOTE — PROGRESS NOTES
Neurosurgery Office Note  Nathan Lee III 60 y o  male MRN: [id number]      Assessment/Plan      Diagnoses and all orders for this visit:    H/O spinal fusion  -     MRI cervical spine with and without contrast; Future  -     XR spine cervical 2 or 3 vw injury; Future  -     XR spine thoracic 3 vw; Future    Malignant neoplasm of spinal cord (HCC)  -     MRI cervical spine with and without contrast; Future  -     XR spine cervical 2 or 3 vw injury; Future  -     XR spine thoracic 3 vw; Future    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HCC)    Radiation-induced myelopathy [facility])          Discussion:    60year old male now 8 months s/p resection, decompression, fusion for his intramedullary spinal cord ependymoma, WHO grade 2  (1/24/19)  The mass spanned from the C4-5 disc space down to the T3-4 disc space  We were able to resect this aggressively at its largest part, I e  T1-2, but did not proceed further as his neurological signals declined intraoperatively  He did well postoperatively, and was discharged to [facility]  At f/u in early 3/2019, was continuing to improve  He was using no ambulatory aids  His right DF was at least 4/5, but apractic, however this was also improving  He stated he even had some proprioception on the right, which was improved vs  Prior to surgery  He had some mild tingling in his forearms and hands, but no pain or weakness  His incision was healing well  Bowel and bladder function were back to normal  No radicular pains  Xrays were stable, and we planned for adjuvant IMRT  He completed this 5/22/19, to 45 Gy    Starting a few days after his RT, his neurological symptoms began to re-emerge  His right foot drop became more pronounced, and he was unsteady on his feet had a few falls  His balance was unsteady  His occasional radicular pain into the right leg 2-3/10, as well as left arm and right hand pains, pins and needles, 5/10  He wasnot taking gabapentin or dexamethasone   Seen with Dr Agustín Vazquez and we felt this some RT induced myeopathy, and recommended continued PT as well as restarting gabapentin  Today he has improved  His right LE pain is just in there foot, 3/10, and no longer in the leg  He also has no hand symptoms anymore  His right foot drop and RLE apraxia has improved mildly  He has a RLE brace and a standard cane  He has some neck soreness when sleeping on his side at night, and cannot sleep on his back 2/2 snoring  Not hardware failure/mechanical pain  We agreed no better neck/head support when on his side and/or CPAP  MRI scan CSpine stable: residual tumor the superior and inferior caps, specifically from the C4-5 disc space down to the resection cavity, and then again resuming at the inferior edge of the cavity down to the T3-4 disc space  The large  Cavity present preop as not reformed, there are no obvious sign of recurrence etc     MRI scan of brain, L-spine, and T-spine   Done after surgery showed no drop metastases  CSF testing was negative  We discussed the NCCN guideline recommended   Follow-up  Recommendation is for follow-up MRI scan cervical spine in 3-4 months, which I have ordered  I will see him after that is completed  We will also plan for follow-up x-rays  Metrics:  EQ5D5L 90024 or 0 729, VAS 60; KPS 80, ECOG 1-2, KPS 70-80  In 6/2019 was 71962 or 0 370, VAS 50, ECOG 2, KPS 60-70  In 3/2019: 1 0000 VA S 90, KPS 90, ECOG 0-1  CHIEF COMPLAINT    Chief Complaint   Patient presents with    Follow-up     3 month follow up with new MRI C-spine       HISTORY    History of Present Illness     60y o  year old male     HPI    See Discussion    REVIEW OF SYSTEMS    Review of Systems   Constitutional: Positive for fatigue (chronic, 7/10)  HENT: Negative  Eyes: Negative  Respiratory: Positive for shortness of breath (with exertion)  Cardiovascular: Positive for leg swelling (right leg)  Gastrointestinal: Negative  Endocrine: Negative  Genitourinary: Negative  Musculoskeletal: Positive for back pain (occasional), gait problem (right foot drag/drop, unsteady balance, using standard cane, last fall in July, f/w PT) and neck pain (positional)  Skin: Negative  Allergic/Immunologic: Positive for environmental allergies  Neurological: Positive for tremors (hands), weakness (right leg) and numbness (left arm, right elbow and hand occasional numbness/tingling, numbness in B/L fingers and right foot )  Negative for headaches  Wife reports he states he is tired and his head hurts 1-2 x weekly   Hematological: Negative  Psychiatric/Behavioral: Positive for sleep disturbance (occasional)  The patient is nervous/anxious (1 anxiety attack in August, none since)  Had difficulty with confusion during RT, has since resolved         Meds/Allergies     Current Outpatient Medications   Medication Sig Dispense Refill    DULoxetine (CYMBALTA) 60 mg delayed release capsule Take 1 capsule (60 mg total) by mouth daily 30 capsule 1    gabapentin (NEURONTIN) 300 mg capsule Take 1 capsule (300 mg total) by mouth 3 (three) times a day 90 capsule 1    levothyroxine 25 mcg tablet TAKE 1 TABLET BY MOUTH EVERY DAY 90 tablet 1    Omega-3 Fatty Acids (FISH OIL) 1,000 mg Take 1,000 mg by mouth daily Resume on 2/20/19      pantoprazole (PROTONIX) 20 mg tablet TAKE 1 TABLET BY MOUTH EVERY DAY 30 tablet 1     No current facility-administered medications for this visit  Allergies   Allergen Reactions    Bee Venom Hives, Itching and Edema     Category: Allergy;     Penicillins Hives and Itching     Category:  Allergy;        PAST HISTORY    Past Medical History:   Diagnosis Date    BPH associated with nocturia     Cervical spinal mass ([facility] [address] ) 01/07/2019    cervicothoracic    Disease of thyroid gland     Herniated disc, cervical     History of radiation therapy     Hyperlipidemia     Impaired fasting glucose        Past Surgical History: Procedure Laterality Date    APPENDECTOMY      CERVICAL FUSION      COLONOSCOPY  2012    NORMAL; RECHECK IN 5 YEARS    FL LUMBAR PUNCTURE  2019    HI BX/EXCIS SPIN PATEL,INDUR,INMED,CERV N/A 2019    Procedure: Posterior C4-T3 laminectomy; resection of intramedullary mass C5-T3, including fenestration of syrinx C7-T2; C3-T4 fixation/fusion; additional levels as needed;  Surgeon: Zeny Colin MD;  Location:  MAIN OR;  Service: Neurosurgery    WISDOM TOOTH EXTRACTION         Social History     Tobacco Use    Smoking status: Former Smoker     Last attempt to quit: 1993     Years since quittin 3    Smokeless tobacco: Never Used   Substance Use Topics    Alcohol use: Yes     Comment: SOCIAL     Drug use: No       Family History   Problem Relation Age of Onset    Diabetes Mother     Hypertension Father     Cerebral palsy Sister          Above history personally reviewed  EXAM    Vitals:Blood pressure 129/70, pulse 73, temperature 98 1 °F (36 7 °C), temperature source Tympanic, resp  rate 18, height 5' 7" (1 702 m), weight 85 9 kg (189 lb 6 4 oz)  ,Body mass index is 29 66 kg/m²  Physical Exam   Constitutional: He is oriented to person, place, and time  He appears well-developed  HENT:   Head: Normocephalic and atraumatic  Eyes: No scleral icterus  Neck:       Cardiovascular: Normal rate  Pulmonary/Chest: Effort normal    Abdominal: Normal appearance  Neurological: He is alert and oriented to person, place, and time  No sensory deficit  Skin: Skin is warm and dry  Psychiatric: He has a normal mood and affect  His speech is normal and behavior is normal    Vitals reviewed  Neurologic Exam     Mental Status   Oriented to person, place, and time  Attention: normal    Speech: speech is normal   Level of consciousness: alert    Cranial Nerves     CN VII   Facial expression full, symmetric       Motor Exam   Muscle bulk: normal  Overall muscle tone: normal Right foot drop - braced  Able to stand from chair with some difficulty  Gait, Coordination, and Reflexes     Tremor   Resting tremor: absent  Intention tremor: absent  Action tremor: absent  Standard cane  MEDICAL DECISION MAKING    Imaging Studies:     Mri Cervical Spine With And Without Contrast    Result Date: 9/12/2019  Narrative: MRI CERVICAL SPINE WITH AND WITHOUT CONTRAST INDICATION: C72 0: Malignant neoplasm of spinal cord Z98 1: Arthrodesis status G95 89: Other specified diseases of spinal cord  COMPARISON:  6/17/2019 TECHNIQUE:  Sagittal T1, sagittal T2, sagittal inversion recovery, axial 2D merge and axial T2  Sagittal T1 and axial T1 postcontrast   IV Contrast:  10 mL of gadobutrol injection (MULTI-DOSE) IMAGE QUALITY:  Diagnostic  FINDINGS: ALIGNMENT:  Slight anterior subluxation of C7 upon T1  The patient has undergone extensive posterior decompression and fusion  The fusion extends from C2 into the upper thoracic spine  Decompression extends from C4 to T4  MARROW SIGNAL:  Normal marrow signal is identified within the visualized bony structures  No discrete marrow lesion  CERVICAL AND VISUALIZED UPPER THORACIC CORD:  There is volume loss of the lower cervical and upper thoracic CORD with extensive myelomalacia beginning at the C7 level and extending into the upper thoracic spine, beyond the scope of this examination  This appears stable compared to the prior exam  PREVERTEBRAL AND PARASPINAL SOFT TISSUES:  Normal  VISUALIZED POSTERIOR FOSSA:  The visualized posterior fossa demonstrates no abnormal signal  CERVICAL DISC SPACES:  Normal disc height with the exception of C5-6 which demonstrates slight loss of disc height  Mild endplate and uncinate joint hypertrophic change noted at C3-4, C5-6  There is no canal stenosis with wide posterior decompression  No significant foraminal nerve impingement identified   UPPER THORACIC DISC SPACES:  No upper thoracic disc herniation or canal stenosis  No foraminal narrowing  Adequate posterior decompression  POSTCONTRAST IMAGING:  No evidence of residual or recurrent tumor  Impression: Extensive myelomalacia within the lower cervical and upper thoracic CORD status post tumor resection  Stable alignment status post fusion and decompression  Workstation performed: [id number]       I have personally reviewed pertinent reports     and I have personally reviewed pertinent films in PACS

## 2019-09-20 ENCOUNTER — OFFICE VISIT (OUTPATIENT)
Dept: PHYSICAL THERAPY | Age: 59
End: 2019-09-20
Payer: COMMERCIAL

## 2019-09-20 DIAGNOSIS — R53.1 GENERAL WEAKNESS: Primary | ICD-10-CM

## 2019-09-20 DIAGNOSIS — M79.604 RIGHT LEG PAIN: ICD-10-CM

## 2019-09-20 DIAGNOSIS — G06.1 SPINAL CORD ABSCESS: ICD-10-CM

## 2019-09-20 PROCEDURE — 97116 GAIT TRAINING THERAPY: CPT

## 2019-09-20 PROCEDURE — 97112 NEUROMUSCULAR REEDUCATION: CPT

## 2019-09-20 NOTE — PROGRESS NOTES
Daily Note     Today's date: 2019  Patient name: Nick Gomez  : 1960  MRN: [id number]  Referring provider: Andrea Yee MD  Dx:   Encounter Diagnosis     ICD-10-CM    1  General weakness R53 1    2  Right leg pain M79 604    3  Spinal cord abscess G06 1                   Subjective: pt reports Dr is pleased with his progress and MRI was good  Pt reports his gait endurance is still limited but improves with grocery cart  Objective: See treatment diary below      Assessment: Tolerated treatment well  Patient demonstrated fatigue post treatment and would benefit from continued PT, pt required CG of 1 at times with gait on uneven surfaces, mod fatigue noted      Plan: Continue per plan of care  Progress treatment as tolerated         Precautions: Cervical tumor    Daily Treatment Diary     Manual  9/4/19 9/10 9/18 9/20/19        PNF R LE VK JF JF VK        PNF R pelvis in L SL VK           PNF Dorsiflexors VK JF JF VK                                    Exercise Diary  19   RB 5'      7 min   SL clamshells 2x10         Biodex       LOS (foam) static lvl 2/3 x5   STS 2x10         Step-ups          Biodex          Bridges with arms crossed 3x10         R SLR          hooklying hip abd with TB Red 2x10         TM walking          Quadruped / tall kneeling PNF at hips/shoulders          Standing Toe raises          Step-over cones ( replicates walking on uneven terrain at work)       10' + zig zag pattern   Gait training        marching and marching alter UE   SLS RLE 5x15"         Obstacle course 10 min         Inez's ex8' R LE          Weight shifts       Rocker board 5 mins fwd/bad/ side   Lateral/frwd/bkwd step overs on foam 20x ea         R KTC on PB (diagonal)          Bridge on PB(arms at side)       4*10   Gait on uneven surfaces(grass/ramp/sidewalk/macadam/curb) 10 min             Modalities

## 2019-09-23 ENCOUNTER — OFFICE VISIT (OUTPATIENT)
Dept: PHYSICAL THERAPY | Age: 59
End: 2019-09-23
Payer: COMMERCIAL

## 2019-09-23 DIAGNOSIS — R53.1 GENERAL WEAKNESS: Primary | ICD-10-CM

## 2019-09-23 DIAGNOSIS — G06.1 SPINAL CORD ABSCESS: ICD-10-CM

## 2019-09-23 DIAGNOSIS — M79.604 RIGHT LEG PAIN: ICD-10-CM

## 2019-09-23 PROCEDURE — 97112 NEUROMUSCULAR REEDUCATION: CPT | Performed by: PHYSICAL THERAPIST

## 2019-09-23 PROCEDURE — 97116 GAIT TRAINING THERAPY: CPT | Performed by: PHYSICAL THERAPIST

## 2019-09-23 NOTE — PROGRESS NOTES
Daily Note     Today's date: 2019  Patient name: Jr Arroyo  : 1960  MRN: [id number]  Referring provider: David Taylor MD  Dx:   Encounter Diagnosis     ICD-10-CM    1  General weakness R53 1    2  Right leg pain M79 604    3  Spinal cord abscess G06 1        Start Time: 0845  Stop Time: 09  Total time in clinic (min): 45 minutes    Subjective: pt reports he has been feeling good lately and his leg feels stable  He reports he has not had any issues at home with balance  Objective: See treatment diary below      Assessment: Tolerated treatment well  He got fatigued with cone drills and walking on uneven surfaces and needed adequate rest breaks especially during the end of the session  Patient required contact gaurding and moderate assistance during balance exercises  Verbal cues were used during exercise to keep good form and upright posture  Plan: Continue per plan of care  Progress treatment as tolerated         Precautions: Cervical tumor    Daily Treatment Diary     Manual  9/4/19 9/10 9/18 9/20/19        PNF R LE VK [facility] VK        PNF R pelvis in L SL VK           PNF Dorsiflexors VK [facility] VK                                    Exercise Diary  19   RB 5' 5'     7 min   SL clamshells 2x10 3x10        Biodex       LOS (foam) static lvl 2/3 x5   STS 2x10 2x10        Step-ups          Biodex          Bridges with arms crossed 3x10 3x10        R SLR          hooklying hip abd with TB Red 2x10 2x10 RTB        TM walking          Quadruped / tall kneeling PNF at hips/shoulders          Standing Toe raises          Step-over cones ( replicates walking on uneven terrain at work)       10' + zig zag pattern   Gait training        marching and marching alter UE   SLS RLE 5x15" 5x15"        Obstacle course 10 min 10min        Inez's ex8' R LE          Weight shifts       Rocker board 5 mins fwd/bad/ side   Lateral/frwd/bkwd step overs on foam 20x ea         R KTC on PB (diagonal)          Bridge on PB(arms at side)       4*10   Gait on uneven surfaces(grass/ramp/sidewalk/macadam/curb) 10 min         Side step on foam  2x10             Modalities

## 2019-09-25 ENCOUNTER — OFFICE VISIT (OUTPATIENT)
Dept: PHYSICAL THERAPY | Age: 59
End: 2019-09-25
Payer: COMMERCIAL

## 2019-09-25 DIAGNOSIS — M79.604 RIGHT LEG PAIN: ICD-10-CM

## 2019-09-25 DIAGNOSIS — R53.1 GENERAL WEAKNESS: Primary | ICD-10-CM

## 2019-09-25 PROCEDURE — 97112 NEUROMUSCULAR REEDUCATION: CPT | Performed by: PHYSICAL THERAPIST

## 2019-09-25 PROCEDURE — 97116 GAIT TRAINING THERAPY: CPT | Performed by: PHYSICAL THERAPIST

## 2019-09-25 NOTE — PROGRESS NOTES
Daily Note     Today's date: 2019  Patient name: Casey Wiggins  : 1960  MRN: [id number]  Referring provider: Claudean Likens, MD  Dx:   Encounter Diagnosis     ICD-10-CM    1  General weakness R53 1    2  Right leg pain M79 604        Start Time: 930  Stop Time: 1015  Total time in clinic (min): 45 minutes    Subjective: pt reports no change since last session but overall since starting therapy he has been better  Objective: See treatment diary below      Assessment: Tolerated treatment well and had good motivation for therapy  Patient required moderate assistance and contact guard with obstacle course  He was feeling fatigued at the end of the session and decreased balance with this fatigue  Patient would benefit from continued therapy to improve balance and strength  Plan: Continue per plan of care  Progress treatment as tolerated         Precautions: Cervical tumor    Daily Treatment Diary     Manual  9/4/19 9/10 9/18 9/20/19        PNF R LE VK JF JF VK        PNF R pelvis in L SL VK           PNF Dorsiflexors VK JF JF VK                                    Exercise Diary  19   RB 5' 5' 5'    7 min   SL clamshells 2x10 3x10 3x10       Biodex       LOS (foam) static lvl 2/3 x5   STS 2x10 2x10 2x10       Step-ups          Biodex          Bridges with arms crossed 3x10 3x10 3x10       R SLR          hooklying hip abd with TB Red 2x10 2x10 RTB 2x10 RTB       TM walking          Quadruped / tall kneeling PNF at hips/shoulders          Standing Toe raises          Step-over cones ( replicates walking on uneven terrain at work)       10' + zig zag pattern   Gait training        marching and marching alter UE   SLS RLE 5x15" 5x15" 5x15"       Obstacle course 10 min 10min 5 min       Inez's ex8' R LE          Weight shifts       Rocker board 5 mins fwd/bad/ side   Lateral/frwd/bkwd step overs on foam 20x ea         R KTC on PB (diagonal)          Bridge on PB(arms at side)       4*10   Gait on uneven surfaces(grass/ramp/sidewalk/macadam/curb) 10 min         Side step on foam  2x10  2x10       Cone drills   5 min           Modalities

## 2019-09-27 ENCOUNTER — OFFICE VISIT (OUTPATIENT)
Dept: PHYSICAL THERAPY | Age: 59
End: 2019-09-27
Payer: COMMERCIAL

## 2019-09-27 DIAGNOSIS — M79.604 RIGHT LEG PAIN: ICD-10-CM

## 2019-09-27 DIAGNOSIS — R53.1 GENERAL WEAKNESS: Primary | ICD-10-CM

## 2019-09-27 DIAGNOSIS — G06.1 SPINAL CORD ABSCESS: ICD-10-CM

## 2019-09-27 PROCEDURE — 97112 NEUROMUSCULAR REEDUCATION: CPT

## 2019-09-27 PROCEDURE — 97110 THERAPEUTIC EXERCISES: CPT

## 2019-09-27 NOTE — PROGRESS NOTES
Daily Note     Today's date: 2019  Patient name: Ora Sanders  : 1960  MRN: [id number]  Referring provider: Luis Gray MD  Dx:   Encounter Diagnosis     ICD-10-CM    1  General weakness R53 1    2  Right leg pain M79 604    3  Spinal cord abscess G06 1                   Subjective:  Pt reports he feels he's slowly getting stronger, which he notes during amb and mobility      Objective: See treatment diary below      Assessment: Tolerated treatment well  Patient demonstrated fatigue post treatment and would benefit from continued PT, pt able to initiate hip abd and DF on R better       Plan: Continue per plan of care  Progress treatment as tolerated         Precautions: Cervical tumor    Daily Treatment Diary     Manual  9/4/19 9/10 9/18 9/20/19 9/27/19       PNF R LE VK JF JF VK VK       PNF R pelvis in L SL VK           PNF Dorsiflexors VK [facility] VK VK                                   Exercise Diary  19   RB 5' 5' 5' 5'   7 min   SL clamshells 2x10 3x10 3x10 3x10      Biodex    Static lvl 3 on foam x5   LOS (foam) static lvl 2/3 x5   STS 2x10 2x10 2x10 3x10      Step-ups          Biodex          Bridges with arms crossed 3x10 3x10 3x10 3x10      R SLR          hooklying hip abd with TB Red 2x10 2x10 RTB 2x10 RTB rtb 2x10      TM walking          Quadruped / tall kneeling PNF at hips/shoulders          Standing Toe raises          Step-over cones ( replicates walking on uneven terrain at work)       10' + zig zag pattern   Gait training        marching and marching alter UE   SLS RLE 5x15" 5x15" 5x15" 5x15"      Obstacle course 10 min 10min 5 min       Inez's ex8' R LE          Weight shifts       Rocker board 5 mins fwd/bad/ side   Lateral/frwd/bkwd step overs on foam 20x ea         R KTC on PB (diagonal)    3x10      Bridge on PB(arms at side)       4*10   Gait on uneven surfaces(grass/ramp/sidewalk/macadam/curb) 10 min         Side step on foam  2x10 2x10       Cone drills   5 min           Modalities

## 2019-09-29 DIAGNOSIS — G62.9 NEUROPATHY: ICD-10-CM

## 2019-09-29 DIAGNOSIS — F32.A DEPRESSION, UNSPECIFIED DEPRESSION TYPE: ICD-10-CM

## 2019-09-29 DIAGNOSIS — C72.0 SPINAL CORD EPENDYMOMA (HCC): ICD-10-CM

## 2019-09-30 RX ORDER — DULOXETIN HYDROCHLORIDE 60 MG/1
CAPSULE, DELAYED RELEASE ORAL
Qty: 30 CAPSULE | Refills: 0 | OUTPATIENT
Start: 2019-09-30

## 2019-09-30 RX ORDER — PANTOPRAZOLE SODIUM 20 MG/1
TABLET, DELAYED RELEASE ORAL
Qty: 30 TABLET | Refills: 1 | OUTPATIENT
Start: 2019-09-30

## 2019-10-01 ENCOUNTER — OFFICE VISIT (OUTPATIENT)
Dept: PHYSICAL THERAPY | Age: 59
End: 2019-10-01
Payer: COMMERCIAL

## 2019-10-01 DIAGNOSIS — G06.1 SPINAL CORD ABSCESS: ICD-10-CM

## 2019-10-01 DIAGNOSIS — M79.604 RIGHT LEG PAIN: ICD-10-CM

## 2019-10-01 DIAGNOSIS — R53.1 GENERAL WEAKNESS: Primary | ICD-10-CM

## 2019-10-01 PROCEDURE — 97110 THERAPEUTIC EXERCISES: CPT

## 2019-10-01 PROCEDURE — 97112 NEUROMUSCULAR REEDUCATION: CPT

## 2019-10-01 NOTE — PROGRESS NOTES
Daily Note     Today's date: 10/1/2019  Patient name: Clara Vega  : 1960  MRN: [id number]  Referring provider: eDbbie Montanez MD  Dx:   Encounter Diagnosis     ICD-10-CM    1  General weakness R53 1    2  Right leg pain M79 604    3  Spinal cord abscess G06 1                   Subjective: pt reports fatigue this p m  Objective: See treatment diary below      Assessment: Tolerated treatment well  Patient demonstrated fatigue post treatment and would benefit from continued PT, decreased neuromuscular control noted with increased fatigue  Plan: Continue per plan of care  Progress treatment as tolerated         Precautions: Cervical tumor    Daily Treatment Diary     Manual  9/4/19 9/10 9/18 9/20/19 9/27/19 10/1/19      PNF R LE VK JF JF VK VK VK      PNF R pelvis in L SL VK           PNF Dorsiflexors VK JF JF VK VK VK                                  Exercise Diary  9/20/19 9/23/19 9/25/19 9/27/19 10/1/19  9/18   RB 5' 5' 5' 5' 5'  7 min   SL clamshells 2x10 3x10 3x10 3x10 3x10     Biodex    Static lvl 3 on foam x5 Static lvl 3 foam x5  LOS (foam) static lvl 2/3 x5   STS 2x10 2x10 2x10 3x10 3x10     Step-ups          Biodex          Bridges with arms crossed 3x10 3x10 3x10 3x10 3x10x5"     R SLR          hooklying hip abd with TB Red 2x10 2x10 RTB 2x10 RTB rtb 2x10 rtb 2x10     TM walking          Quadruped / tall kneeling PNF at hips/shoulders          Standing Toe raises          Step-over cones ( replicates walking on uneven terrain at work)       10' + zig zag pattern   Gait training        marching and marching alter UE   SLS RLE 5x15" 5x15" 5x15" 5x15" 5x15"     Obstacle course 10 min 10min 5 min  8 min     Inez's ex8' R LE          Weight shifts       Rocker board 5 mins fwd/bad/ side   Lateral/frwd/bkwd step overs on foam 20x ea    2x10 ea     R KTC on PB (diagonal)    3x10 3x10     Bridge on PB(arms at side)       4*10   Gait on uneven surfaces(grass/ramp/sidewalk/macadam/curb) 10 min         Side step on foam  2x10  2x10       Cone drills   5 min           Modalities

## 2019-10-03 ENCOUNTER — OFFICE VISIT (OUTPATIENT)
Dept: PHYSICAL THERAPY | Age: 59
End: 2019-10-03
Payer: COMMERCIAL

## 2019-10-03 DIAGNOSIS — R53.1 GENERAL WEAKNESS: Primary | ICD-10-CM

## 2019-10-03 DIAGNOSIS — M79.604 RIGHT LEG PAIN: ICD-10-CM

## 2019-10-03 DIAGNOSIS — G06.1 SPINAL CORD ABSCESS: ICD-10-CM

## 2019-10-03 PROCEDURE — 97110 THERAPEUTIC EXERCISES: CPT | Performed by: PHYSICAL THERAPIST

## 2019-10-03 PROCEDURE — 97112 NEUROMUSCULAR REEDUCATION: CPT | Performed by: PHYSICAL THERAPIST

## 2019-10-03 NOTE — PROGRESS NOTES
Daily Note     Today's date: 10/3/2019  Patient name: Lili Del Castillo  : 1960  MRN: [id number]  Referring provider: Ashu Mayorga MD  Dx:   Encounter Diagnosis     ICD-10-CM    1  General weakness R53 1    2  Right leg pain M79 604    3  Spinal cord abscess G06 1        Start Time: 0930  Stop Time: 1015  Total time in clinic (min): 45 minutes    Subjective: Pt reports no new symptoms  Objective: See treatment diary below      Assessment: Tolerated treatment well  Pt demonstrated improved dynamic balance when performing balance interventions  Patient demonstrated fatigue post treatment and would benefit from continued PT      Plan: Continue per plan of care        Precautions: Cervical tumor    Daily Treatment Diary     Manual  9/4/19 9/10 9/18 9/20/19 9/27/19 10/1/19 10/3     PNF R LE VK JF JF VK VK VK JF     PNF R pelvis in L SL VK           PNF Dorsiflexors VK JF JF VK VK VK JF                                 Exercise Diary  9/20/19 9/23/19 9/25/19 9/27/19 10/3/19     RB 5' 5' 5' 5' 5'     SL clamshells 2x10 3x10 3x10 3x10 3x10     Biodex    Static lvl 3 on foam x5 Static lvl 3 foam x5     STS 2x10 2x10 2x10 3x10 3x10     Step-ups          Biodex          Bridges with arms crossed 3x10 3x10 3x10 3x10 3x10x5"     R SLR          hooklying hip abd with TB Red 2x10 2x10 RTB 2x10 RTB rtb 2x10 rtb 2x10     TM walking          Quadruped / tall kneeling PNF at hips/shoulders          Standing Toe raises          Step-over cones ( replicates walking on uneven terrain at work)          Gait training           SLS RLE 5x15" 5x15" 5x15" 5x15" 5x15"     Obstacle course 10 min 10min 5 min  8 min     Inez's ex8' R LE          Weight shifts          Lateral/frwd/bkwd step overs on foam 20x ea    2x10 ea     R KTC on PB (diagonal)    3x10 3x10     Bridge on PB(arms at side)          Gait on uneven surfaces(grass/ramp/sidewalk/macadam/curb) 10 min         Side step on foam  2x10  2x10       Cone drills   5 min 5 mins         Modalities

## 2019-10-07 ENCOUNTER — TELEPHONE (OUTPATIENT)
Dept: PALLIATIVE MEDICINE | Facility: CLINIC | Age: 59
End: 2019-10-07

## 2019-10-08 ENCOUNTER — OFFICE VISIT (OUTPATIENT)
Dept: PHYSICAL THERAPY | Age: 59
End: 2019-10-08
Payer: COMMERCIAL

## 2019-10-08 DIAGNOSIS — R53.1 GENERAL WEAKNESS: Primary | ICD-10-CM

## 2019-10-08 DIAGNOSIS — G06.1 SPINAL CORD ABSCESS: ICD-10-CM

## 2019-10-08 DIAGNOSIS — M79.604 RIGHT LEG PAIN: ICD-10-CM

## 2019-10-08 PROCEDURE — 97112 NEUROMUSCULAR REEDUCATION: CPT | Performed by: PHYSICAL THERAPIST

## 2019-10-08 PROCEDURE — 97110 THERAPEUTIC EXERCISES: CPT | Performed by: PHYSICAL THERAPIST

## 2019-10-08 NOTE — PROGRESS NOTES
Daily Note     Today's date: 10/8/2019  Patient name: Nyasia Lazo  : 1960  MRN: [id number]  Referring provider: Alondra Biggs MD  Dx:   Encounter Diagnosis     ICD-10-CM    1  General weakness R53 1    2  Right leg pain M79 604    3  Spinal cord abscess G06 1        Start Time: 0930  Stop Time: 1015  Total time in clinic (min): 45 minutes    Subjective: Pt reports improvements in stair climbing and community ambulation  Objective: See treatment diary below      Assessment: Tolerated treatment well  Pt improvements are demonstrated in FOTO score  Patient demonstrated fatigue post treatment and would benefit from continued PT      Plan: Continue per plan of care        Precautions: Cervical tumor    Daily Treatment Diary     Manual  9/4/19 9/10 9/18 9/20/19 9/27/19 10/1/19 103     PNF R LE VK JF JF VK VK VK JF     PNF R pelvis in L SL VK           PNF Dorsiflexors VK JF JF VK VK VK JF                                 Exercise Diary  19 10/819     RB 5' 5' 5' 5' 5'     SL clamshells 2x10 3x10 3x10 3x10 3x10     Biodex    Static lvl 3 on foam x5 Static lvl 3 foam x5     STS 2x10 2x10 2x10 3x10 3x10     Step-ups          Biodex          Bridges with arms crossed 3x10 3x10 3x10 3x10 3x10x5"     R SLR          hooklying hip abd with TB Red 2x10 2x10 RTB 2x10 RTB rtb 2x10 rtb 2x10     TM walking          Quadruped / tall kneeling PNF at hips/shoulders          Standing Toe raises          Step-over cones ( replicates walking on uneven terrain at work)          Gait training           SLS RLE 5x15" 5x15" 5x15" 5x15" 5x15"     Obstacle course 10 min 10min 5 min  8 min     Inez's ex8' R LE          Weight shifts          Lateral/frwd/bkwd step overs on foam 20x ea    2x10 ea     R KTC on PB (diagonal)    3x10 3x10     Bridge on PB(arms at side)          Gait on uneven surfaces(grass/ramp/sidewalk/macadam/curb) 10 min         Side step on foam  2x10  2x10       Cone drills 5 min  5 mins         Modalities

## 2019-10-09 NOTE — TELEPHONE ENCOUNTER
Late entry  10/07/19 Call placed to Anthony at [phone]  Re Pantoprazole PA  Spoke directly to [employer]  States pt is allowed 90 day supply of medication if no covered diagnosis provided for the medication  Pt has refilled x 3 for 30 days each fill  No ICD10 code to support need for this medication  Pt is not on steroids   Will speak to Justin Pyle for additional clinical information  10/09/19  Per provider MAURA Ceballos, pt is no longer on oral steroids  Medication ordered to protect GI from ulcerations related to steroids  Pt no longer will require taking this medication  Message left for pt re same with instructions to [facility] if any questions  Call to pharmacy to inform no further action in pursuing PA and to cancel order for pantoprazole

## 2019-10-10 ENCOUNTER — OFFICE VISIT (OUTPATIENT)
Dept: PHYSICAL THERAPY | Age: 59
End: 2019-10-10
Payer: COMMERCIAL

## 2019-10-10 DIAGNOSIS — M79.604 RIGHT LEG PAIN: ICD-10-CM

## 2019-10-10 DIAGNOSIS — G06.1 SPINAL CORD ABSCESS: ICD-10-CM

## 2019-10-10 DIAGNOSIS — R53.1 GENERAL WEAKNESS: Primary | ICD-10-CM

## 2019-10-10 PROCEDURE — 97110 THERAPEUTIC EXERCISES: CPT | Performed by: PHYSICAL THERAPIST

## 2019-10-10 PROCEDURE — 97112 NEUROMUSCULAR REEDUCATION: CPT | Performed by: PHYSICAL THERAPIST

## 2019-10-10 NOTE — PROGRESS NOTES
Daily Note     Today's date: 10/10/2019  Patient name: Corrine Nair  : 1960  MRN: [id number]  Referring provider: Rk Desai MD  Dx:   Encounter Diagnosis     ICD-10-CM    1  General weakness R53 1    2  Right leg pain M79 604    3  Spinal cord abscess G06 1        Start Time: 0930  Stop Time: 1015  Total time in clinic (min): 45 minutes    Subjective: Pt reports improvements in stair navigation  Objective: See treatment diary below      Assessment: Tolerated treatment well  Pt;s POC was progressed to include more dynamic balance exercises to decrease risk of falls  Patient demonstrated fatigue post treatment and would benefit from continued PT      Plan: Continue per plan of care        Precautions: Cervical tumor    Daily Treatment Diary     Manual  9/4/19 9/10 9/18 9/20/19 9/27/19 10/1/19 10/3 10/10    PNF R LE VK JF JF VK VK VK JF JF    PNF R pelvis in L SL VK           PNF Dorsiflexors VK JF  VK VK VK JF JF                                Exercise Diary  19 10/819 10/10    RB 5' 5' 5' 5' 5' 5'    SL clamshells 2x10 3x10 3x10 3x10 3x10 3*10    Biodex    Static lvl 3 on foam x5 Static lvl 3 foam x5     STS 2x10 2x10 2x10 3x10 3x10 3*10    Step-ups          Biodex          Bridges with arms crossed 3x10 3x10 3x10 3x10 3x10x5" 3*10*5"    R SLR          hooklying hip abd with TB Red 2x10 2x10 RTB 2x10 RTB rtb 2x10 rtb 2x10     TM walking          Quadruped / tall kneeling PNF at hips/shoulders          Standing Toe raises          Step-over cones ( replicates walking on uneven terrain at work)          Gait training           SLS RLE 5x15" 5x15" 5x15" 5x15" 5x15" 5*15"    Obstacle course 10 min 10min 5 min  8 min 10 mins    Inez's ex8' R LE          Weight shifts          Lateral/frwd/bkwd step overs on foam 20x ea    2x10 ea 2*10 ea    R KTC on PB (diagonal)    3x10 3x10 3*10    Bridge on PB(arms at side)          Gait on uneven surfaces(grass/ramp/sidewalk/macadam/curb) 10 min         Side step on foam  2x10  2x10       Cone drills   5 min  5 mins 5 mins        Modalities

## 2019-10-13 DIAGNOSIS — G95.89 RADIATION-INDUCED MYELOPATHY (HCC): ICD-10-CM

## 2019-10-13 DIAGNOSIS — C72.0 SPINAL CORD EPENDYMOMA (HCC): ICD-10-CM

## 2019-10-13 DIAGNOSIS — Z98.1 H/O SPINAL FUSION: ICD-10-CM

## 2019-10-13 RX ORDER — GABAPENTIN 300 MG/1
CAPSULE ORAL
Qty: 90 CAPSULE | Refills: 1 | Status: SHIPPED | OUTPATIENT
Start: 2019-10-13 | End: 2019-11-06 | Stop reason: SDUPTHER

## 2019-10-15 ENCOUNTER — OFFICE VISIT (OUTPATIENT)
Dept: PHYSICAL THERAPY | Age: 59
End: 2019-10-15
Payer: COMMERCIAL

## 2019-10-15 DIAGNOSIS — G06.1 SPINAL CORD ABSCESS: ICD-10-CM

## 2019-10-15 DIAGNOSIS — M79.604 RIGHT LEG PAIN: ICD-10-CM

## 2019-10-15 DIAGNOSIS — R53.1 GENERAL WEAKNESS: Primary | ICD-10-CM

## 2019-10-15 PROCEDURE — 97112 NEUROMUSCULAR REEDUCATION: CPT | Performed by: PHYSICAL THERAPIST

## 2019-10-15 PROCEDURE — 97110 THERAPEUTIC EXERCISES: CPT | Performed by: PHYSICAL THERAPIST

## 2019-10-15 NOTE — PROGRESS NOTES
Daily Note     Today's date: 10/15/2019  Patient name: Carmenza Carolina  : 1960  MRN: [id number]  Referring provider: Dorie Dyer MD  Dx:   Encounter Diagnosis     ICD-10-CM    1  General weakness R53 1    2  Right leg pain M79 604    3  Spinal cord abscess G06 1        Start Time: 0930  Stop Time: 1015  Total time in clinic (min): 45 minutes    Subjective: Pt reports improvements with stair navigation and stair climbing  Objective: See treatment diary below      Assessment: Tolerated treatment well  Pt demonstrates improvements in neuromuscular recruitment patterns demonstrated in improvements in functional transfers  Patient demonstrated fatigue post treatment and would benefit from continued PT      Plan: Continue per plan of care        Precautions: Cervical tumor    Daily Treatment Diary     Manual  9/4/19 9/10 9/18 9/20/19 9/27/19 10/1/19 10/3 10/10 10/15   PNF R LE VK JF  VK VK VK JF JF    PNF R pelvis in L SL VK           PNF Dorsiflexors VK [facility] VK VK [facility]                                Exercise Diary  19 10/819 10/10 10/15   RB 5' 5' 5' 5' 5' 5'    SL clamshells 2x10 3x10 3x10 3x10 3x10 3*10    Biodex    Static lvl 3 on foam x5 Static lvl 3 foam x5     STS 2x10 2x10 2x10 3x10 3x10 3*10    Step-ups          Biodex          Bridges with arms crossed 3x10 3x10 3x10 3x10 3x10x5" 3*10*5"    R SLR          hooklying hip abd with TB Red 2x10 2x10 RTB 2x10 RTB rtb 2x10 rtb 2x10     TM walking          Quadruped / tall kneeling PNF at hips/shoulders          Standing Toe raises          Step-over cones ( replicates walking on uneven terrain at work)          Gait training           SLS RLE 5x15" 5x15" 5x15" 5x15" 5x15" 5*15"    Obstacle course 10 min 10min 5 min  8 min 10 mins    Inez's ex8' R LE          Weight shifts          Lateral/frwd/bkwd step overs on foam 20x ea    2x10 ea 2*10 ea    R KTC on PB (diagonal)    3x10 3x10 3*10    Bridge on PB(arms at side)          Gait on uneven surfaces(grass/ramp/sidewalk/macadam/curb) 10 min         Side step on foam  2x10  2x10       Cone drills   5 min  5 mins 5 mins        Modalities

## 2019-10-17 ENCOUNTER — OFFICE VISIT (OUTPATIENT)
Dept: FAMILY MEDICINE CLINIC | Facility: CLINIC | Age: 59
End: 2019-10-17
Payer: COMMERCIAL

## 2019-10-17 ENCOUNTER — OFFICE VISIT (OUTPATIENT)
Dept: PHYSICAL THERAPY | Age: 59
End: 2019-10-17
Payer: COMMERCIAL

## 2019-10-17 VITALS
HEART RATE: 64 BPM | WEIGHT: 193.4 LBS | HEIGHT: 67 IN | SYSTOLIC BLOOD PRESSURE: 120 MMHG | BODY MASS INDEX: 30.35 KG/M2 | DIASTOLIC BLOOD PRESSURE: 74 MMHG

## 2019-10-17 DIAGNOSIS — M51.36 DDD (DEGENERATIVE DISC DISEASE), LUMBAR: ICD-10-CM

## 2019-10-17 DIAGNOSIS — E03.9 HYPOTHYROIDISM, UNSPECIFIED TYPE: Primary | ICD-10-CM

## 2019-10-17 DIAGNOSIS — R73.01 IMPAIRED FASTING GLUCOSE: ICD-10-CM

## 2019-10-17 DIAGNOSIS — Z12.11 COLON CANCER SCREENING: ICD-10-CM

## 2019-10-17 DIAGNOSIS — G06.1 SPINAL CORD ABSCESS: ICD-10-CM

## 2019-10-17 DIAGNOSIS — Z91.89 ENCOUNTER FOR HEPATITIS C VIRUS SCREENING TEST FOR HIGH RISK PATIENT: ICD-10-CM

## 2019-10-17 DIAGNOSIS — D64.9 ANEMIA, UNSPECIFIED TYPE: ICD-10-CM

## 2019-10-17 DIAGNOSIS — E78.2 MIXED HYPERLIPIDEMIA: ICD-10-CM

## 2019-10-17 DIAGNOSIS — R53.1 GENERAL WEAKNESS: Primary | ICD-10-CM

## 2019-10-17 DIAGNOSIS — M79.604 RIGHT LEG PAIN: ICD-10-CM

## 2019-10-17 DIAGNOSIS — Z11.59 ENCOUNTER FOR HEPATITIS C VIRUS SCREENING TEST FOR HIGH RISK PATIENT: ICD-10-CM

## 2019-10-17 PROBLEM — K21.9 GASTROESOPHAGEAL REFLUX DISEASE WITHOUT ESOPHAGITIS: Status: ACTIVE | Noted: 2019-10-17

## 2019-10-17 PROCEDURE — 99213 OFFICE O/P EST LOW 20 MIN: CPT | Performed by: FAMILY MEDICINE

## 2019-10-17 PROCEDURE — 97110 THERAPEUTIC EXERCISES: CPT | Performed by: PHYSICAL THERAPIST

## 2019-10-17 PROCEDURE — 97164 PT RE-EVAL EST PLAN CARE: CPT | Performed by: PHYSICAL THERAPIST

## 2019-10-17 NOTE — PROGRESS NOTES
PT Re-Evaluation     Today's date: 10/17/2019  Patient name: Mark Dumont  : 1960  MRN: [id number]  Referring provider: Candance Fells, MD  Dx:   Encounter Diagnosis     ICD-10-CM    1  General weakness R53 1    2  Spinal cord abscess G06 1    3  Right leg pain M79 604        Start Time: 930  Stop Time: 1015  Total time in clinic (min): 45 minutes    Assessment  Assessment details: Mark Dumont is a 60 y o  male who presents with signs and symptoms consistent of RLE weakness due to spinal compression caused by cancerous tumor  Patient has been having lumbar spine related to DDD and compensatory movements due to comorbidities  SI joint mob was performed and patient had relief of symptoms  Pt would benefit from skilled PT to lumbar spine in conjuction with functional training for weakness due to spinal cords abscess  Impairments: abnormal or restricted ROM, abnormal movement, activity intolerance, impaired physical strength and weight-bearing intolerance      Lumbar goals    Short Term Goals: to be achieved by 4 weeks  1) Patient to be independent with basic HEP  2) Decrease pain to 1/10 at its worst   3) Increase lumbar spine ROM by minimal defieceny  in all deficient planes  4) Increase LE strength by 1/2 MMT grade in all deficient planes  Long Term Goals: to be achieved by discharge  1) FOTO equal to or greater than 66   2) Patient to be independent with comprehensive HEP  3) Lumbar spine ROM WNL all planes to improve a/iadls  4) Increase LE strength to 5/5 MMT grade in all planes to improve a/iadls  5) Patient to report no sleep interruption secondary to pain  6) Increase ambulatory to 50 min      Plan  Patient would benefit from: skilled physical therapy  Planned modality interventions: TENS and cryotherapy  Planned therapy interventions: abdominal trunk stabilization, neuromuscular re-education, patient education, therapeutic activities, therapeutic exercise, stretching, strengthening, therapeutic training, transfer training, graded exercise, graded activity, home exercise program, functional ROM exercises and balance  Frequency: Twice a week for 12 weeks  Treatment plan discussed with: patient        Subjective Evaluation    History of Present Illness  Mechanism of injury: Pt suffered multiple falls and BUE numbness in January was 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  Patient had a Posterior C4-T3 laminectomy and resection of intramedullary mass and C2-T5 fixation/fusion  Patient is now medically stable and is being seen at PT following 1 month stay in sub acute rehab  Pt reports most problems with RLE weakness  Pt reports to the doctor on the  to determine if he needs to receive cancer treatment and when he is able to remove cervical collar  Going up and down steps is difficulty for patient, navigating home due to pets, and balance is an issue  Pt had no significant PMH prior to this incidence  Pain  Current pain ratin  At best pain ratin  At worst pain ratin    Patient Goals  Patient goals for therapy: increased strength, decreased pain and independence with ADLs/IADLs  Patient goal: Work, golf, bike        Objective     Lumbar:    Mod limitations with AROM in all directions  Pt has pain with left lateral flexion and end range flexion  Pt also demonstrates posterior pelvic tilt on left side with long sit test and prone knee bend  Concurrent Complaints  Negative for night pain, disturbed sleep, bladder dysfunction, bowel dysfunction and saddle (S4) numbness    NStair Navigation:  Reciprocal descending stairs with unilateral hand rail and step to pattern ascending stairs  Pt can ascend stairs with reciprocal pattern but muscle activation pattern breaks down and must revert to step to pattern        Precautions: Cervical tumor      Daily Treatment Diary     Manual  10/17           PNF R LE            PNF R pelvis in L SL            PNF Dorsiflexors            SI mob V JF           Lumbar gapping  JF                 Exercise Diary  9/20/19 9/23/19 9/25/19 9/27/19 10/819 10/10 10/17   RB 5' 5' 5' 5' 5' 5'    SL clamshells 2x10 3x10 3x10 3x10 3x10 3*10    Biodex    Static lvl 3 on foam x5 Static lvl 3 foam x5     STS 2x10 2x10 2x10 3x10 3x10 3*10 nv   Step-ups          Biodex       nv   Bridges with arms crossed 3x10 3x10 3x10 3x10 3x10x5" 3*10*5" 3*10*5"   R SLR       2*10*5" b/l   PPT       2*10*5"                                                                                                                                                             Modalities

## 2019-10-17 NOTE — PROGRESS NOTES
Assessment and Plan:    Problem List Items Addressed This Visit        Endocrine    Hypothyroidism - Primary     Await lab         Relevant Orders    TSH, 3rd generation    Impaired fasting glucose    Relevant Orders    Comprehensive metabolic panel    Hemoglobin A1C       Musculoskeletal and Integument    DDD (degenerative disc disease), lumbar     rec PT eval         Relevant Orders    Ambulatory referral to Physical Therapy       Other    Anemia    Relevant Orders    CBC and differential      Other Visit Diagnoses     Encounter for hepatitis C virus screening test for high risk patient        Relevant Orders    Hepatitis C antibody    Mixed hyperlipidemia        Relevant Orders    Lipid panel    Colon cancer screening        Relevant Orders    Ambulatory referral to Gastroenterology                 Diagnoses and all orders for this visit:    Hypothyroidism, unspecified type  -     TSH, 3rd generation; Future    Impaired fasting glucose  -     Comprehensive metabolic panel; Future  -     Hemoglobin A1C; Future    Encounter for hepatitis C virus screening test for high risk patient  -     Hepatitis C antibody; Future    Mixed hyperlipidemia  -     Lipid panel; Future    Anemia, unspecified type  -     CBC and differential; Future    DDD (degenerative disc disease), lumbar  -     Ambulatory referral to Physical Therapy; Future    Colon cancer screening  -     Ambulatory referral to Gastroenterology; Future              Subjective:      Patient ID: Lissy Rodriguez is a 60 y o  male  CC:    Chief Complaint   Patient presents with   [address] patient to the office here to become established  kw    Other     Pantoprazole is not covered by insurance and pt see's Palliative care who is taking care of it unless you have any suggestions  kw    Back Pain     Pt would like PT for this  He already goes for his legs   kw       HPI:    F/u gerd, thyroid, back pain-would like to go for PT for herniated disc  Due for lab      The following portions of the patient's history were reviewed and updated as appropriate: allergies, current medications, past family history, past medical history, past social history, past surgical history and problem list       Review of Systems   Constitutional: Negative for activity change, appetite change and unexpected weight change  Respiratory: Negative for shortness of breath  Cardiovascular: Negative for chest pain  Musculoskeletal: Positive for back pain  Neurological: Positive for numbness  Negative for dizziness and headaches  Data to review:       Objective:    Vitals:    10/17/19 0749   BP: 120/74   BP Location: Left arm   Patient Position: Sitting   Pulse: 64   Weight: 87 7 kg (193 lb 6 4 oz)   Height: 5' 7" (1 702 m)        Physical Exam   Constitutional: He appears well-developed and well-nourished  Neck: No thyromegaly present  Cardiovascular: Normal rate, regular rhythm and normal heart sounds  Pulmonary/Chest: Effort normal and breath sounds normal    Musculoskeletal: He exhibits no edema  Lymphadenopathy:     He has no cervical adenopathy  Vitals reviewed

## 2019-10-22 ENCOUNTER — OFFICE VISIT (OUTPATIENT)
Dept: PHYSICAL THERAPY | Age: 59
End: 2019-10-22
Payer: COMMERCIAL

## 2019-10-22 DIAGNOSIS — G06.1 SPINAL CORD ABSCESS: ICD-10-CM

## 2019-10-22 DIAGNOSIS — R53.1 GENERAL WEAKNESS: Primary | ICD-10-CM

## 2019-10-22 DIAGNOSIS — M51.36 DDD (DEGENERATIVE DISC DISEASE), LUMBAR: ICD-10-CM

## 2019-10-22 DIAGNOSIS — M79.604 RIGHT LEG PAIN: ICD-10-CM

## 2019-10-22 PROCEDURE — 97110 THERAPEUTIC EXERCISES: CPT | Performed by: PHYSICAL THERAPIST

## 2019-10-22 PROCEDURE — 97112 NEUROMUSCULAR REEDUCATION: CPT | Performed by: PHYSICAL THERAPIST

## 2019-10-22 NOTE — PROGRESS NOTES
Daily Note     Today's date: 10/22/2019  Patient name: Lacey Pollack  : 1960  MRN: [id number]  Referring provider: Aleshia Gann MD  Dx:   Encounter Diagnosis     ICD-10-CM    1  General weakness R53 1    2  Right leg pain M79 604    3  Spinal cord abscess G06 1    4  DDD (degenerative disc disease), lumbar M51 36        Start Time: 0930  Stop Time: 1015  Total time in clinic (min): 45 minutes    Subjective: Pt reports improvements with symptoms  Objective: See treatment diary below      Assessment: Tolerated treatment well  Pt demonstrates significant improvements with foot clearance and gait demonstrated by ability to perform functional transfers in clinic and being able to perform 1 and 1/2 hours of yard work without taking a break  Pt was educated on graded activity to further increase tolerance to ALD's  Patient demonstrated fatigue post treatment and would benefit from continued PT      Plan: Continue per plan of care        Precautions: Cervical tumor    Daily Treatment Diary     Manual  10/22           PNF R LE            PNF R pelvis in L SL            PNF Dorsiflexors            PPU OP JF           Lumbar gapping  JF                 Exercise Diary  19 10/819 10/10 10/22   RB 5' 5' 5' 5' 5' 5' 5'   SL clamshells 2x10 3x10 3x10 3x10 3x10 3*10    Biodex    Static lvl 3 on foam x5 Static lvl 3 foam x5     STS 2x10 2x10 2x10 3x10 3x10 3*10 3*10   Step-ups       3*10 b/l   Biodex       nv   Bridges with arms crossed 3x10 3x10 3x10 3x10 3x10x5" 3*10*5" 3*10*5"   R SLR       2*10*5" b/l   PPT       2*10*5"   Standing abd       3*10 b/l   CLARITA       10 mins   PPU       4*10                                                                                                                               Modalities

## 2019-10-24 ENCOUNTER — APPOINTMENT (OUTPATIENT)
Dept: LAB | Age: 59
End: 2019-10-24
Payer: COMMERCIAL

## 2019-10-24 ENCOUNTER — OFFICE VISIT (OUTPATIENT)
Dept: PHYSICAL THERAPY | Age: 59
End: 2019-10-24
Payer: COMMERCIAL

## 2019-10-24 DIAGNOSIS — M51.36 DDD (DEGENERATIVE DISC DISEASE), LUMBAR: ICD-10-CM

## 2019-10-24 DIAGNOSIS — E03.9 HYPOTHYROIDISM, UNSPECIFIED TYPE: ICD-10-CM

## 2019-10-24 DIAGNOSIS — R53.1 GENERAL WEAKNESS: Primary | ICD-10-CM

## 2019-10-24 DIAGNOSIS — R73.01 IMPAIRED FASTING GLUCOSE: ICD-10-CM

## 2019-10-24 DIAGNOSIS — Z91.89 ENCOUNTER FOR HEPATITIS C VIRUS SCREENING TEST FOR HIGH RISK PATIENT: ICD-10-CM

## 2019-10-24 DIAGNOSIS — M79.604 RIGHT LEG PAIN: ICD-10-CM

## 2019-10-24 DIAGNOSIS — Z11.59 ENCOUNTER FOR HEPATITIS C VIRUS SCREENING TEST FOR HIGH RISK PATIENT: ICD-10-CM

## 2019-10-24 DIAGNOSIS — G06.1 SPINAL CORD ABSCESS: ICD-10-CM

## 2019-10-24 DIAGNOSIS — D64.9 ANEMIA, UNSPECIFIED TYPE: ICD-10-CM

## 2019-10-24 DIAGNOSIS — E78.2 MIXED HYPERLIPIDEMIA: ICD-10-CM

## 2019-10-24 LAB
ALBUMIN SERPL BCP-MCNC: 3.5 G/DL (ref 3.5–5)
ALP SERPL-CCNC: 82 U/L (ref 46–116)
ALT SERPL W P-5'-P-CCNC: 39 U/L (ref 12–78)
ANION GAP SERPL CALCULATED.3IONS-SCNC: 4 MMOL/L (ref 4–13)
AST SERPL W P-5'-P-CCNC: 15 U/L (ref 5–45)
BASOPHILS # BLD AUTO: 0.03 THOUSANDS/ΜL (ref 0–0.1)
BASOPHILS NFR BLD AUTO: 1 % (ref 0–1)
BILIRUB SERPL-MCNC: 0.58 MG/DL (ref 0.2–1)
BUN SERPL-MCNC: 13 MG/DL (ref 5–25)
CALCIUM SERPL-MCNC: 9.2 MG/DL (ref 8.3–10.1)
CHLORIDE SERPL-SCNC: 109 MMOL/L (ref 100–108)
CHOLEST SERPL-MCNC: 199 MG/DL (ref 50–200)
CO2 SERPL-SCNC: 28 MMOL/L (ref 21–32)
CREAT SERPL-MCNC: 0.84 MG/DL (ref 0.6–1.3)
EOSINOPHIL # BLD AUTO: 0.08 THOUSAND/ΜL (ref 0–0.61)
EOSINOPHIL NFR BLD AUTO: 2 % (ref 0–6)
ERYTHROCYTE [DISTWIDTH] IN BLOOD BY AUTOMATED COUNT: 12.7 % (ref 11.6–15.1)
EST. AVERAGE GLUCOSE BLD GHB EST-MCNC: 137 MG/DL
GFR SERPL CREATININE-BSD FRML MDRD: 95 ML/MIN/1.73SQ M
GLUCOSE P FAST SERPL-MCNC: 125 MG/DL (ref 65–99)
HBA1C MFR BLD: 6.4 % (ref 4.2–6.3)
HCT VFR BLD AUTO: 44.2 % (ref 36.5–49.3)
HCV AB SER QL: NORMAL
HDLC SERPL-MCNC: 31 MG/DL
HGB BLD-MCNC: 13.5 G/DL (ref 12–17)
IMM GRANULOCYTES # BLD AUTO: 0.03 THOUSAND/UL (ref 0–0.2)
IMM GRANULOCYTES NFR BLD AUTO: 1 % (ref 0–2)
LDLC SERPL CALC-MCNC: 130 MG/DL (ref 0–100)
LYMPHOCYTES # BLD AUTO: 0.83 THOUSANDS/ΜL (ref 0.6–4.47)
LYMPHOCYTES NFR BLD AUTO: 18 % (ref 14–44)
MCH RBC QN AUTO: 29.2 PG (ref 26.8–34.3)
MCHC RBC AUTO-ENTMCNC: 30.5 G/DL (ref 31.4–37.4)
MCV RBC AUTO: 96 FL (ref 82–98)
MONOCYTES # BLD AUTO: 0.54 THOUSAND/ΜL (ref 0.17–1.22)
MONOCYTES NFR BLD AUTO: 12 % (ref 4–12)
NEUTROPHILS # BLD AUTO: 3.2 THOUSANDS/ΜL (ref 1.85–7.62)
NEUTS SEG NFR BLD AUTO: 66 % (ref 43–75)
NONHDLC SERPL-MCNC: 168 MG/DL
NRBC BLD AUTO-RTO: 0 /100 WBCS
PLATELET # BLD AUTO: 285 THOUSANDS/UL (ref 149–390)
PMV BLD AUTO: 9.1 FL (ref 8.9–12.7)
POTASSIUM SERPL-SCNC: 4.9 MMOL/L (ref 3.5–5.3)
PROT SERPL-MCNC: 7.3 G/DL (ref 6.4–8.2)
RBC # BLD AUTO: 4.63 MILLION/UL (ref 3.88–5.62)
SODIUM SERPL-SCNC: 141 MMOL/L (ref 136–145)
TRIGL SERPL-MCNC: 189 MG/DL
TSH SERPL DL<=0.05 MIU/L-ACNC: 4.49 UIU/ML (ref 0.36–3.74)
WBC # BLD AUTO: 4.71 THOUSAND/UL (ref 4.31–10.16)

## 2019-10-24 PROCEDURE — 97116 GAIT TRAINING THERAPY: CPT | Performed by: PHYSICAL THERAPIST

## 2019-10-24 PROCEDURE — 86803 HEPATITIS C AB TEST: CPT

## 2019-10-24 PROCEDURE — 85025 COMPLETE CBC W/AUTO DIFF WBC: CPT

## 2019-10-24 PROCEDURE — 84443 ASSAY THYROID STIM HORMONE: CPT

## 2019-10-24 PROCEDURE — 36415 COLL VENOUS BLD VENIPUNCTURE: CPT

## 2019-10-24 PROCEDURE — 80061 LIPID PANEL: CPT

## 2019-10-24 PROCEDURE — 83036 HEMOGLOBIN GLYCOSYLATED A1C: CPT

## 2019-10-24 PROCEDURE — 80053 COMPREHEN METABOLIC PANEL: CPT

## 2019-10-24 PROCEDURE — 97110 THERAPEUTIC EXERCISES: CPT | Performed by: PHYSICAL THERAPIST

## 2019-10-24 PROCEDURE — 97112 NEUROMUSCULAR REEDUCATION: CPT | Performed by: PHYSICAL THERAPIST

## 2019-10-24 NOTE — PROGRESS NOTES
Daily Note     Today's date: 10/24/2019  Patient name: Hanh Dwyer  : 1960  MRN: [id number]  Referring provider: Deepak Cochran MD  Dx:   Encounter Diagnosis     ICD-10-CM    1  General weakness R53 1    2  Right leg pain M79 604    3  Spinal cord abscess G06 1    4  DDD (degenerative disc disease), lumbar M51 36        Start Time: 0930  Stop Time: 1015  Total time in clinic (min): 45 minutes    Subjective: Pt reports no improvements in symptoms  Objective: See treatment diary below      Assessment: Tolerated treatment well  Pt demonstrates improvements with gait and mobility training  Patient demonstrated fatigue post treatment and would benefit from continued PT      Plan: Continue per plan of care        Precautions: Cervical tumor    Daily Treatment Diary     Manual  10/22 10/24          PNF R LE            PNF R pelvis in L SL            PNF Dorsiflexors            PPU OP JF JF          Lumbar gapping  JF                 Exercise Diary  10/24      10/22   RB 5'      5'   SL clamshells 3*10         Biodex          STS 3*10      3*10   Step-ups 3*10b/l      3*10 b/l   Biodex       nv   Bridges with arms crossed 3*10*5"      3*10*5"   R SLR       2*10*5" b/l   PPT       2*10*5"   Standing abd       3*10 b/l   CLARITA 5 mins      10 mins   PPU 3*10      4*10   Obstacle course + cones 20 mins                                                                                                                           Modalities

## 2019-10-27 DIAGNOSIS — G62.9 NEUROPATHY: ICD-10-CM

## 2019-10-27 DIAGNOSIS — F32.A DEPRESSION, UNSPECIFIED DEPRESSION TYPE: ICD-10-CM

## 2019-10-28 RX ORDER — DULOXETIN HYDROCHLORIDE 60 MG/1
CAPSULE, DELAYED RELEASE ORAL
Qty: 30 CAPSULE | Refills: 1 | Status: SHIPPED | OUTPATIENT
Start: 2019-10-28 | End: 2019-11-06 | Stop reason: SDUPTHER

## 2019-10-29 ENCOUNTER — OFFICE VISIT (OUTPATIENT)
Dept: PHYSICAL THERAPY | Age: 59
End: 2019-10-29
Payer: COMMERCIAL

## 2019-10-29 DIAGNOSIS — R53.1 GENERAL WEAKNESS: Primary | ICD-10-CM

## 2019-10-29 DIAGNOSIS — G06.1 SPINAL CORD ABSCESS: ICD-10-CM

## 2019-10-29 DIAGNOSIS — M79.604 RIGHT LEG PAIN: ICD-10-CM

## 2019-10-29 DIAGNOSIS — M51.36 DDD (DEGENERATIVE DISC DISEASE), LUMBAR: ICD-10-CM

## 2019-10-29 PROCEDURE — 97112 NEUROMUSCULAR REEDUCATION: CPT | Performed by: PHYSICAL THERAPIST

## 2019-10-29 PROCEDURE — 97110 THERAPEUTIC EXERCISES: CPT | Performed by: PHYSICAL THERAPIST

## 2019-10-29 PROCEDURE — 97140 MANUAL THERAPY 1/> REGIONS: CPT | Performed by: PHYSICAL THERAPIST

## 2019-10-29 NOTE — PROGRESS NOTES
Daily Note     Today's date: 10/29/2019  Patient name: Juan Patient  : 1960  MRN: [id number]  Referring provider: Charline Camarena MD  Dx:   Encounter Diagnosis     ICD-10-CM    1  General weakness R53 1    2  Right leg pain M79 604    3  Spinal cord abscess G06 1    4  DDD (degenerative disc disease), lumbar M51 36        Start Time: 0930  Stop Time: 1015  Total time in clinic (min): 45 minutes    Subjective: Pt reports improvements in LBP  Objective: See treatment diary below      Assessment: Tolerated treatment well  Pt's POC was progressed to include back strengthening interventions to increase tolerance to ADL'  s Patient demonstrated fatigue post treatment and would benefit from continued PT      Plan: Continue per plan of care        Precautions: Cervical tumor    Daily Treatment Diary     Manual  10/22 10/24 10/28         PNF R LE            PNF R pelvis in L SL            PNF Dorsiflexors            PPU OP JF JF JF         Lumbar gapping  JF  JF               Exercise Diary  10/24 10/28     10/22   RB 5' 5'     5'   SL clamshells 3*10 3*10        Biodex          STS 3*10 3*10     3*10   Step-ups 3*10b/l 3*10     3*10 b/l   Biodex       nv   Bridges with arms crossed 3*10*5" 3*10*5"     3*10*5"   R SLR  3*10 b/l     2*10*5" b/l   PPT  3*10     2*10*5"   Standing abd  3*10     3*10 b/l   CLARITA 5 mins      10 mins   PPU 3*10      4*10   Obstacle course + cones 20 mins         Low row TB  3*10 Blk        Mini squats  3*10        Sit to stands  2*10                                                                                            Modalities

## 2019-10-31 ENCOUNTER — TRANSCRIBE ORDERS (OUTPATIENT)
Dept: FAMILY MEDICINE CLINIC | Facility: CLINIC | Age: 59
End: 2019-10-31

## 2019-10-31 ENCOUNTER — OFFICE VISIT (OUTPATIENT)
Dept: PHYSICAL THERAPY | Age: 59
End: 2019-10-31
Payer: COMMERCIAL

## 2019-10-31 DIAGNOSIS — G06.1 SPINAL CORD ABSCESS: ICD-10-CM

## 2019-10-31 DIAGNOSIS — R53.1 GENERAL WEAKNESS: Primary | ICD-10-CM

## 2019-10-31 DIAGNOSIS — Z98.1 ARTHRODESIS STATUS: ICD-10-CM

## 2019-10-31 DIAGNOSIS — M51.36 DDD (DEGENERATIVE DISC DISEASE), LUMBAR: ICD-10-CM

## 2019-10-31 DIAGNOSIS — G95.89 OTHER SPECIFIED DISEASES OF SPINAL CORD (HCC): ICD-10-CM

## 2019-10-31 DIAGNOSIS — M79.604 RIGHT LEG PAIN: ICD-10-CM

## 2019-10-31 DIAGNOSIS — C72.0 MALIGNANT NEOPLASM OF SPINAL CORD (HCC): Primary | ICD-10-CM

## 2019-10-31 PROCEDURE — 97112 NEUROMUSCULAR REEDUCATION: CPT | Performed by: PHYSICAL THERAPIST

## 2019-10-31 PROCEDURE — 97140 MANUAL THERAPY 1/> REGIONS: CPT | Performed by: PHYSICAL THERAPIST

## 2019-10-31 NOTE — PROGRESS NOTES
Daily Note     Today's date: 10/31/2019  Patient name: Abundio Bernard  : 1960  MRN: [id number]  Referring provider: Michelet Enrique MD  Dx:   Encounter Diagnosis     ICD-10-CM    1  General weakness R53 1    2  Right leg pain M79 604    3  Spinal cord abscess G06 1    4  DDD (degenerative disc disease), lumbar M51 36        Start Time: 0930  Stop Time: 1015  Total time in clinic (min): 45 minutes    Subjective: Pt reports improvements in LBP  Objective: See treatment diary below      Assessment: Tolerated treatment well  Pt's POC was progressed to include more lateral dynamic balance interventions to decrease risk of falls  Patient demonstrated fatigue post treatment and would benefit from continued PT      Plan: Continue per plan of care        Precautions: Cervical tumor    Daily Treatment Diary     Manual  10/22 10/24 10/28 10/31        PNF R LE            PNF R pelvis in L SL            PNF Dorsiflexors            PPU OP JF JF JF JF        Lumbar gapping  JF  JF JF              Exercise Diary  10/24 10/28 10/31    10/22   RB 5' 5' 5'    5'   SL clamshells 3*10 3*10        Biodex          STS 3*10 3*10 3*10    3*10   Step-ups 3*10b/l 3*10 3*10    3*10 b/l   Biodex       nv   Bridges with arms crossed 3*10*5" 3*10*5" 3*10*5"    3*10*5"   R SLR  3*10 b/l     2*10*5" b/l   PPT  3*10     2*10*5"   Standing abd  3*10     3*10 b/l   CLARITA 5 mins  10 mins    10 mins   PPU 3*10      4*10   Obstacle course + cones 20 mins  20 mins       Low row TB  3*10 Blk        Mini squats  3*10 3*10       Sit to stands  2*10                                                                                            Modalities

## 2019-11-04 NOTE — PROGRESS NOTES
Palliative and [facility] 60 y o  male [phone]    Assessment/Plan:  1  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[facility] [address] )    2  Radiation-induced myelopathy ([facility] [address] )    3  Generalized weakness    4  H/O spinal fusion    5  Depression, unspecified depression type    6  Neuropathy        Requested Prescriptions     Signed Prescriptions Disp Refills    gabapentin (NEURONTIN) 300 mg capsule 90 capsule 2     Sig: Take 1 capsule (300 mg total) by mouth 3 (three) times a day    DULoxetine (CYMBALTA) 60 mg delayed release capsule 30 capsule 2     Sig: Take 1 capsule (60 mg total) by mouth daily     Medications Discontinued During This Encounter   Medication Reason    gabapentin (NEURONTIN) 300 mg capsule Reorder    DULoxetine (CYMBALTA) 60 mg delayed release capsule Reorder     Kiya Cheung was seen in the clinic today for f/u of chronic conditions  Overall improvement in function and strength, offering no new complaints   Will continue with same medication regimen at this time, and see him back in 3 months following his appointment  with neurosurgery  Three month refills sent for gabapentin and Cymbalta  Representatives have queried the patient's controlled substance dispensing history in the Prescription Drug Monitoring Program in compliance with regulations before I have prescribed any controlled substances  The prescription history is consistent with prescribed therapy and our practice policies  30 minutes were spent face to face with Kiya Cheung with greater than 50% of the time spent in counseling or coordination of care including discussions of instructions for disease self management, treatment instructions and patient and family counseling/involvement in care   All of the patient's questions were answered during this discussion  No follow-ups on file      Subjective:   Chief Complaint  Follow up visit for:  symptom management  TERRY     Dylon Peck is a 60 y o  male with a 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, he received adjuvant radiation therapy to the C3 through T4 spinal cord, this finished on 5/22/19  However, he developed worsening right lower extremity weakness and right foot drop approximately one week after completion of  RT  He was diagnosed with post-radiation myelopathy  Since his last visit, he has been seen in follow up by Dr Tyson Block and completed surveillance imaging  Per Dr Neo Mabry note "MRI scan CSpine stable: residual tumor the superior and inferior caps, specifically from the C4-5 disc space down to the resection cavity, and then again resuming at the inferior edge of the cavity down to the T3-4 disc space  The large Cavity present preop as not reformed, there are no obvious sign of recurrence etc   MRI scan of brain, L-spine, and T-spine   Done after surgery showed no drop metastases  CSF testing was negative"    Today patient reports improvement in mood  Reports Cymbalta has helped "signifigantly" with his depression  He denies feeling sad, down, and depressed  Denies si/hi  Reports significant  improvment in RLE motor function  Urinary incontinence has resolved  Experiencing less numbness in the region of the right hip  Continues with PT 2x per week  Reports pain at this visit of a 2/10 in right leg, with ongoing numbness in right lower leg  Reports compliance with gabapentin regimen  Denies recent falls, reports consistent use of cane  Reports he will follow plan to repeat MRI and Xray in January and f/u appointment with Dr Tyson Block following those images       The following portions of the medical history were reviewed: past medical history, problem list, medication list, and social history  Current Outpatient Medications:     DULoxetine (CYMBALTA) 60 mg delayed release capsule, Take 1 capsule (60 mg total) by mouth daily, Disp: 30 capsule, Rfl: 2    gabapentin (NEURONTIN) 300 mg capsule, Take 1 capsule (300 mg total) by mouth 3 (three) times a day, Disp: 90 capsule, Rfl: 2    levothyroxine 25 mcg tablet, TAKE 1 TABLET BY MOUTH EVERY DAY, Disp: 90 tablet, Rfl: 1    Omega-3 Fatty Acids (FISH OIL) 1,000 mg, Take 1,000 mg by mouth daily Resume on 2/20/19, Disp: , Rfl:   Review of Systems   Constitutional: Positive for fatigue  Negative for activity change, appetite change, chills and fever  HENT: Negative for trouble swallowing  Respiratory: Negative for chest tightness and shortness of breath  Cardiovascular: Negative for chest pain, palpitations and leg swelling  Gastrointestinal: Negative for abdominal pain, constipation, diarrhea and nausea  Genitourinary: Negative for difficulty urinating  Musculoskeletal: Positive for gait problem  Skin: Negative for rash  Neurological: Negative for dizziness and headaches  Psychiatric/Behavioral: Negative for agitation  The patient is not nervous/anxious  All other systems negative    Objective:  Vital Signs  /70 (BP Location: Right arm, Patient Position: Sitting, Cuff Size: Standard)   Pulse 78   Temp 98 5 °F (36 9 °C) (Oral)   Resp 16   Ht 5' 7" (1 702 m)   Wt 88 3 kg (194 lb 9 6 oz)   SpO2 99%   BMI 30 48 kg/m²    Physical Exam    Constitutional: Appears well-developed and well-nourished  In no acute physical or emotional distress  Head: Normocephalic and atraumatic  Eyes: EOM are normal  No ocular discharge  No scleral icterus  Neck: No visible adenopathy or masses  Respiratory: Effort normal  No stridor  No respiratory distress  Gastrointestinal: No abdominal distension  Musculoskeletal: No edema  Gait altered,  Limp present with ambulation continues with use of cane     Neurological: Alert, oriented and appropriately conversant  RLE weakness present, with decreased sensation  Skin: No diaphoresis, no rashes seen on exposed areas of skin  Psychiatric: Displays a normal mood and affect   Behavior, judgement and thought content appear normal

## 2019-11-05 ENCOUNTER — OFFICE VISIT (OUTPATIENT)
Dept: PHYSICAL THERAPY | Age: 59
End: 2019-11-05
Payer: COMMERCIAL

## 2019-11-05 DIAGNOSIS — M51.36 DDD (DEGENERATIVE DISC DISEASE), LUMBAR: ICD-10-CM

## 2019-11-05 DIAGNOSIS — R53.1 GENERAL WEAKNESS: Primary | ICD-10-CM

## 2019-11-05 DIAGNOSIS — G06.1 SPINAL CORD ABSCESS: ICD-10-CM

## 2019-11-05 DIAGNOSIS — M79.604 RIGHT LEG PAIN: ICD-10-CM

## 2019-11-05 PROCEDURE — 97140 MANUAL THERAPY 1/> REGIONS: CPT | Performed by: PHYSICAL THERAPIST

## 2019-11-05 PROCEDURE — 97112 NEUROMUSCULAR REEDUCATION: CPT | Performed by: PHYSICAL THERAPIST

## 2019-11-05 NOTE — PROGRESS NOTES
Daily Note     Today's date: 2019  Patient name: Clara Vega  : 1960  MRN: [id number]  Referring provider: Debbie Montanez MD  Dx:   Encounter Diagnosis     ICD-10-CM    1  General weakness R53 1    2  Right leg pain M79 604    3  Spinal cord abscess G06 1    4  DDD (degenerative disc disease), lumbar M51 36        Start Time: 0930  Stop Time: 1015  Total time in clinic (min): 45 minutes    Subjective: Pt reports improvements in sensation in RLE  Objective: See treatment diary below      Assessment: Tolerated treatment well  Pt's POC was progressed to include more neural dynamic interventions to increase tolerance to community ambulation  Patient demonstrated fatigue post treatment and would benefit from continued PT      Plan: Continue per plan of care        Precautions: Cervical tumor    Daily Treatment Diary     Manual  10/22 10/24 10/28 10/31 11/5       PNF R LE            PNF R pelvis in L SL            CPA     JF       PPU OP JF JF JF JF JF       Lumbar gapping  JF  JF JF JF             Exercise Diary  10/24 10/28 10/31 11/5   10/22   RB 5' 5' 5' 5'   5'   SL clamshells 3*10 3*10  3*10      Biodex          STS 3*10 3*10 3*10    3*10   Step-ups 3*10b/l 3*10 3*10    3*10 b/l   Biodex       nv   Bridges with arms crossed 3*10*5" 3*10*5" 3*10*5" 3*10*5"   3*10*5"   R SLR  3*10 b/l  3*10   2*10*5" b/l   PPT  3*10  3*10   2*10*5"   Standing abd  3*10     3*10 b/l   CLARITA 5 mins  10 mins 10 mins   10 mins   PPU 3*10      4*10   Obstacle course + cones 20 mins  20 mins       Low row TB  3*10 Blk        Mini squats  3*10 3*10       Sit to stands  2*10        Sciatic nerve glides    3*10                                                                                Modalities

## 2019-11-06 ENCOUNTER — TELEPHONE (OUTPATIENT)
Dept: FAMILY MEDICINE CLINIC | Facility: CLINIC | Age: 59
End: 2019-11-06

## 2019-11-06 ENCOUNTER — OFFICE VISIT (OUTPATIENT)
Dept: PALLIATIVE MEDICINE | Facility: CLINIC | Age: 59
End: 2019-11-06
Payer: COMMERCIAL

## 2019-11-06 ENCOUNTER — TRANSCRIBE ORDERS (OUTPATIENT)
Dept: FAMILY MEDICINE CLINIC | Facility: CLINIC | Age: 59
End: 2019-11-06

## 2019-11-06 VITALS
WEIGHT: 194.6 LBS | BODY MASS INDEX: 30.54 KG/M2 | HEART RATE: 78 BPM | RESPIRATION RATE: 16 BRPM | SYSTOLIC BLOOD PRESSURE: 130 MMHG | DIASTOLIC BLOOD PRESSURE: 70 MMHG | OXYGEN SATURATION: 99 % | TEMPERATURE: 98.5 F | HEIGHT: 67 IN

## 2019-11-06 DIAGNOSIS — R53.1 WEAKNESS: Primary | ICD-10-CM

## 2019-11-06 DIAGNOSIS — G95.89 RADIATION-INDUCED MYELOPATHY (HCC): ICD-10-CM

## 2019-11-06 DIAGNOSIS — M51.36 OTHER INTERVERTEBRAL DISC DEGENERATION, LUMBAR REGION: ICD-10-CM

## 2019-11-06 DIAGNOSIS — G62.9 NEUROPATHY: ICD-10-CM

## 2019-11-06 DIAGNOSIS — Z98.1 H/O SPINAL FUSION: ICD-10-CM

## 2019-11-06 DIAGNOSIS — F32.A DEPRESSION, UNSPECIFIED DEPRESSION TYPE: ICD-10-CM

## 2019-11-06 DIAGNOSIS — R53.1 GENERALIZED WEAKNESS: ICD-10-CM

## 2019-11-06 DIAGNOSIS — C72.0 SPINAL CORD EPENDYMOMA (HCC): Primary | ICD-10-CM

## 2019-11-06 DIAGNOSIS — M79.604 PAIN IN RIGHT LEG: ICD-10-CM

## 2019-11-06 PROCEDURE — 99214 OFFICE O/P EST MOD 30 MIN: CPT | Performed by: NURSE PRACTITIONER

## 2019-11-06 RX ORDER — GABAPENTIN 300 MG/1
300 CAPSULE ORAL 3 TIMES DAILY
Qty: 90 CAPSULE | Refills: 2 | Status: SHIPPED | OUTPATIENT
Start: 2019-11-06 | End: 2020-01-29 | Stop reason: SDUPTHER

## 2019-11-06 RX ORDER — DULOXETIN HYDROCHLORIDE 60 MG/1
60 CAPSULE, DELAYED RELEASE ORAL DAILY
Qty: 30 CAPSULE | Refills: 2 | Status: SHIPPED | OUTPATIENT
Start: 2019-11-06 | End: 2020-01-29 | Stop reason: SDUPTHER

## 2019-11-06 NOTE — TELEPHONE ENCOUNTER
FAXED 2 [facility], REFERRAL# [id number] - VALID 10- / 10- - 20 VISITS - DX: C72 0 AND REFERRAL# [id number] - VALID 11-6-2019 / 11-6-2020 - 20 VISITS - DX: R53 1 /V44 173 / M51 36  BOTH HAVE BEEN DOCUMENTED IN REFERRALS AND SCANNED IN MEDIA FOR REVIEW

## 2019-11-07 ENCOUNTER — OFFICE VISIT (OUTPATIENT)
Dept: PHYSICAL THERAPY | Age: 59
End: 2019-11-07
Payer: COMMERCIAL

## 2019-11-07 DIAGNOSIS — M79.604 RIGHT LEG PAIN: ICD-10-CM

## 2019-11-07 DIAGNOSIS — M51.36 DDD (DEGENERATIVE DISC DISEASE), LUMBAR: ICD-10-CM

## 2019-11-07 DIAGNOSIS — R53.1 GENERAL WEAKNESS: Primary | ICD-10-CM

## 2019-11-07 DIAGNOSIS — G06.1 SPINAL CORD ABSCESS: ICD-10-CM

## 2019-11-07 PROCEDURE — 97110 THERAPEUTIC EXERCISES: CPT | Performed by: PHYSICAL THERAPIST

## 2019-11-07 PROCEDURE — 97112 NEUROMUSCULAR REEDUCATION: CPT | Performed by: PHYSICAL THERAPIST

## 2019-11-07 PROCEDURE — 97140 MANUAL THERAPY 1/> REGIONS: CPT | Performed by: PHYSICAL THERAPIST

## 2019-11-07 NOTE — PROGRESS NOTES
PT Re-Evaluation     Today's date: 10/17/2019  Patient name: Jeannette Barahona  : 1960  MRN: [id number]  Referring provider: Venkat Reynoso MD  Dx:   Encounter Diagnosis     ICD-10-CM    1  General weakness R53 1    2  Spinal cord abscess G06 1    3  Right leg pain M79 604        Start Time: 0800  Stop Time: 0845  Total time in clinic (min): 45 minutes    Assessment  Assessment details: Jeannette Barahona is a 60 y o  male who presents with signs and symptoms consistent of RLE weakness due to spinal compression caused by cancerous tumor  Patient has been demonstrating improvements with sensation and functional capacity but is still having difficulty with community ambulation and stair navigation  DGA was taken to measure gait assessment more closely  Pt's lumbar spine has demonstrated improvements in ROM but still has difficulty with functional ADL's  Pt would benefit from skilled PT to lumbar spine in conjuction with functional training for weakness due to spinal cords abscess  Impairments: abnormal or restricted ROM, abnormal movement, activity intolerance, impaired physical strength and weight-bearing intolerance      Lumbar goals    Short Term Goals: to be achieved by 4 weeks Partially met   1) Patient to be independent with basic HEP  2) Decrease pain to 1/10 at its worst   3) Increase lumbar spine ROM by minimal defieceny  in all deficient planes  4) Increase LE strength by 1/2 MMT grade in all deficient planes  Long Term Goals: to be achieved by discharge Partially met   1) FOTO equal to or greater than 66   2) Patient to be independent with comprehensive HEP  3) Lumbar spine ROM WNL all planes to improve a/iadls  4) Increase LE strength to 5/5 MMT grade in all planes to improve a/iadls  5) Patient to report no sleep interruption secondary to pain  6) Increase ambulatory to 50 min      Plan  Patient would benefit from: skilled physical therapy  Planned modality interventions: TENS and cryotherapy  Planned therapy interventions: abdominal trunk stabilization, neuromuscular re-education, patient education, therapeutic activities, therapeutic exercise, stretching, strengthening, therapeutic training, transfer training, graded exercise, graded activity, home exercise program, functional ROM exercises and balance  Frequency: Twice a week for 10 weeks  Treatment plan discussed with: patient        Subjective Evaluation    History of Present Illness  Mechanism of injury: Pt suffered multiple falls and BUE numbness in January was 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  Patient had a Posterior C4-T3 laminectomy and resection of intramedullary mass and C2-T5 fixation/fusion  Patient is now medically stable and is being seen at PT following 1 month stay in sub acute rehab  Pt reports most problems with RLE weakness  Pt reports to the doctor on the  to determine if he needs to receive cancer treatment and when he is able to remove cervical collar  Going up and down steps is difficulty for patient, navigating home due to pets, and balance is an issue  Pt had no significant PMH prior to this incidence  Pain  Current pain ratin  At best pain ratin  At worst pain rating: 3    Patient Goals  Patient goals for therapy: increased strength, decreased pain and independence with ADLs/IADLs  Patient goal: Work, golf, bike        Objective     Lumbar:    Mod limitations with AROM with extension and rotation to both sides  Min limitations with flexion  Pt has pain with left lateral flexion and end range flexion  Pt also demonstrates posterior pelvic tilt on left side with long sit test and prone knee bend  Concurrent Complaints  Negative for night pain, disturbed sleep, bladder dysfunction, bowel dysfunction and saddle (S4) numbness    Stair Navigation:  Reciprocal descending + ascending stairs with unilateral hand rails   Pt can navigate stairs for longer periods of time before gait pattern breaks down  trength/Myotome Testing     Lumbar   Left   Heel walk: normal  Toe walk: normal    Right   Heel walk: normal  Toe walk: normal    Left Hip   Planes of Motion   Flexion: 5  Extension: 5  Abduction: 5  Adduction: 5  External rotation: 5  Internal rotation: 5    Right Hip   Planes of Motion   Flexion: 4  Extension: 4+  Abduction: 4  Adduction: 4  External rotation: 4+  Internal rotation: 4+    Left Knee   Flexion: 5  Extension: 5    Right Knee   Flexion: 4  Extension: 4    Functional Assessment        Comments  Pt has little difficulty performing functional squat   Stair navigation is completed with reciprocal pattern and circumduction with RLE    5 STS: 10 seconds  TU seconds    MCTSIB:  EO firm:80% impairment   EC firm: 85% impairment  EO foam: 80% impairment  EC foam: 77% impairment     FGA:     Stair Navigation:  Reciprocal descending stairs with unilateral hand rail and step to pattern ascending stairs  Pt can ascend stairs with reciprocal pattern but muscle activation pattern breaks down and must revert to step to pattern        Precautions: Cervical tumor      Daily Treatment Diary       Manual  10/22 10/24 10/28 10/31 11/7       PNF R LE            PNF R pelvis in L SL            CPA     JF       PPU OP JF JF JF JF JF       Lumbar gapping  [facility]              Exercise Diary  10/24 10/28 10/31 11/7   10/22   RB 5' 5' 5' 5'   5'   SL clamshells 3*10 3*10  3*10      Biodex          STS 3*10 3*10 3*10    3*10   Step-ups 3*10b/l 3*10 3*10    3*10 b/l   Biodex       nv   Bridges with arms crossed 3*10*5" 3*10*5" 3*10*5" 3*10*5"   3*10*5"   R SLR  3*10 b/l  3*10   2*10*5" b/l   PPT  3*10  3*10   2*10*5"   Standing abd  3*10     3*10 b/l   CLARITA 5 mins  10 mins 10 mins   10 mins   PPU 3*10      4*10   Obstacle course + cones 20 mins  20 mins       Low row TB  3*10 Blk        Mini squats  3*10 3*10       Sit to stands  2*10        Sciatic nerve glides    3*10 Modalities

## 2019-11-11 ENCOUNTER — TELEPHONE (OUTPATIENT)
Dept: NEUROSURGERY | Facility: CLINIC | Age: 59
End: 2019-11-11

## 2019-11-11 ENCOUNTER — OFFICE VISIT (OUTPATIENT)
Dept: PHYSICAL THERAPY | Age: 59
End: 2019-11-11
Payer: COMMERCIAL

## 2019-11-11 DIAGNOSIS — G06.1 SPINAL CORD ABSCESS: ICD-10-CM

## 2019-11-11 DIAGNOSIS — R53.1 GENERAL WEAKNESS: Primary | ICD-10-CM

## 2019-11-11 DIAGNOSIS — M79.604 RIGHT LEG PAIN: ICD-10-CM

## 2019-11-11 DIAGNOSIS — M51.36 DDD (DEGENERATIVE DISC DISEASE), LUMBAR: ICD-10-CM

## 2019-11-11 PROCEDURE — 97110 THERAPEUTIC EXERCISES: CPT | Performed by: PHYSICAL THERAPIST

## 2019-11-11 PROCEDURE — 97140 MANUAL THERAPY 1/> REGIONS: CPT | Performed by: PHYSICAL THERAPIST

## 2019-11-11 PROCEDURE — 97112 NEUROMUSCULAR REEDUCATION: CPT | Performed by: PHYSICAL THERAPIST

## 2019-11-11 NOTE — PROGRESS NOTES
Daily Note     Today's date: 2019  Patient name: Aleta Yuan  : 1960  MRN: [id number]  Referring provider: Kika Leung MD  Dx:   Encounter Diagnosis     ICD-10-CM    1  General weakness R53 1    2  Right leg pain M79 604    3  Spinal cord abscess G06 1    4  DDD (degenerative disc disease), lumbar M51 36        Start Time: 1145  Stop Time: 1230  Total time in clinic (min): 45 minutes    Subjective: Pt reports falling over the weekend after being upset about daughter's cancer diagnosis  Objective: See treatment diary below      Assessment: Tolerated treatment well  Pt's POC was progressed to include increased reps of closed chain strengthening to increase tolerance to community ambulation  Patient demonstrated fatigue post treatment and would benefit from continued PT      Plan: Continue per plan of care  Include more balance interventions to decrease risk of falls        Precautions: Cervical tumor    Daily Treatment Diary     Manual  10/22 10/24 10/28 10/31 11/5 11/11      PNF R LE            PNF R pelvis in L SL            CPA      JF      PPU OP [facility] JF      Lumbar gapping  [facility] JF            Exercise Diary  10/24 10/28 10/31 11/5 11/11  10/22   RB 5' 5' 5' 5' 5'  5'   SL clamshells 3*10 3*10  3*10 3*10     Biodex          STS 3*10 3*10 3*10  3*10  3*10   Step-ups 3*10b/l 3*10 3*10  3*10  3*10 b/l   Biodex       nv   Bridges with arms crossed 3*10*5" 3*10*5" 3*10*5" 3*10*5" 3*10*5"  3*10*5"   R SLR  3*10 b/l  3*10 3*10 b/l  2*10*5" b/l   PPT  3*10  3*10   2*10*5"   Standing abd  3*10     3*10 b/l   CLARITA 5 mins  10 mins 10 mins 10 mins  10 mins   PPU 3*10      4*10   Obstacle course + cones 20 mins  20 mins       Low row TB  3*10 Blk        Mini squats  3*10 3*10  3*10     Sit to stands  2*10        Sciatic nerve glides    3*10 3*10                                                                               Modalities

## 2019-11-12 ENCOUNTER — APPOINTMENT (OUTPATIENT)
Dept: PHYSICAL THERAPY | Age: 59
End: 2019-11-12
Payer: COMMERCIAL

## 2019-11-14 ENCOUNTER — OFFICE VISIT (OUTPATIENT)
Dept: PHYSICAL THERAPY | Age: 59
End: 2019-11-14
Payer: COMMERCIAL

## 2019-11-14 DIAGNOSIS — M51.36 DDD (DEGENERATIVE DISC DISEASE), LUMBAR: ICD-10-CM

## 2019-11-14 DIAGNOSIS — R53.1 GENERAL WEAKNESS: Primary | ICD-10-CM

## 2019-11-14 DIAGNOSIS — G06.1 SPINAL CORD ABSCESS: ICD-10-CM

## 2019-11-14 DIAGNOSIS — M79.604 RIGHT LEG PAIN: ICD-10-CM

## 2019-11-14 PROCEDURE — 97140 MANUAL THERAPY 1/> REGIONS: CPT | Performed by: PHYSICAL THERAPIST

## 2019-11-14 PROCEDURE — 97110 THERAPEUTIC EXERCISES: CPT | Performed by: PHYSICAL THERAPIST

## 2019-11-14 PROCEDURE — 97112 NEUROMUSCULAR REEDUCATION: CPT | Performed by: PHYSICAL THERAPIST

## 2019-11-14 NOTE — PROGRESS NOTES
Daily Note     Today's date: 2019  Patient name: Piper Hua  : 1960  MRN: [id number]  Referring provider: Maddie Beyer MD  Dx:   Encounter Diagnosis     ICD-10-CM    1  General weakness R53 1    2  Right leg pain M79 604    3  Spinal cord abscess G06 1    4  DDD (degenerative disc disease), lumbar M51 36        Start Time: 0845  Stop Time: 0930  Total time in clinic (min): 45 minutes    Subjective: Pt reports no new symptoms  Objective: See treatment diary below      Assessment: Tolerated treatment well  Pt's POC was progressed to include more dynamic balance interventions to decrease risk of falls  Patient demonstrated fatigue post treatment and would benefit from continued PT      Plan: Continue per plan of care        Precautions: Cervical tumor    Daily Treatment Diary     Manual  10/22 10/24 10/28 10/31 11/5 11/11 11/14     PNF R LE            PNF R pelvis in L SL            CPA      JF JF     PPU OP JF JF JF JF JF JF JF     Lumbar gapping  [facility] JF JF            Exercise Diary  10/24 10/28 10/31 11/5 11/11 11/14    RB 5' 5' 5' 5' 5' 5'    SL clamshells 3*10 3*10  3*10 3*10 3*10    Biodex      LOS 5x    STS 3*10 3*10 3*10  3*10 3*10    Step-ups 3*10b/l 3*10 3*10  3*10 3*10    Tandem walk      6 laps     Bridges with arms crossed 3*10*5" 3*10*5" 3*10*5" 3*10*5" 3*10*5" 3*10*5"    R SLR  3*10 b/l  3*10 3*10 b/l 3*10 b/l    PPT  3*10  3*10      Standing abd  3*10        CLARITA 5 mins  10 mins 10 mins 10 mins 10 mins    PPU 3*10         Obstacle course + cones 20 mins  20 mins   15 mins    Low row TB  3*10 Blk        Mini squats  3*10 3*10  3*10 3*10    Sit to stands  2*10        Sciatic nerve glides    3*10 3*10                                                                               Modalities

## 2019-11-19 ENCOUNTER — TRANSCRIBE ORDERS (OUTPATIENT)
Dept: FAMILY MEDICINE CLINIC | Facility: CLINIC | Age: 59
End: 2019-11-19

## 2019-11-19 ENCOUNTER — OFFICE VISIT (OUTPATIENT)
Dept: PHYSICAL THERAPY | Age: 59
End: 2019-11-19
Payer: COMMERCIAL

## 2019-11-19 DIAGNOSIS — M79.604 RIGHT LEG PAIN: ICD-10-CM

## 2019-11-19 DIAGNOSIS — E78.2 MIXED HYPERLIPIDEMIA: ICD-10-CM

## 2019-11-19 DIAGNOSIS — R53.1 GENERAL WEAKNESS: Primary | ICD-10-CM

## 2019-11-19 DIAGNOSIS — G06.1 SPINAL CORD ABSCESS: ICD-10-CM

## 2019-11-19 DIAGNOSIS — C72.0 MALIGNANT NEOPLASM OF SPINAL CORD (HCC): Primary | ICD-10-CM

## 2019-11-19 DIAGNOSIS — M51.36 DDD (DEGENERATIVE DISC DISEASE), LUMBAR: ICD-10-CM

## 2019-11-19 DIAGNOSIS — R73.01 IMPAIRED FASTING GLUCOSE: ICD-10-CM

## 2019-11-19 DIAGNOSIS — E03.9 HYPOTHYROIDISM, UNSPECIFIED: ICD-10-CM

## 2019-11-19 PROCEDURE — 97112 NEUROMUSCULAR REEDUCATION: CPT | Performed by: PHYSICAL THERAPIST

## 2019-11-19 PROCEDURE — 97110 THERAPEUTIC EXERCISES: CPT | Performed by: PHYSICAL THERAPIST

## 2019-11-19 PROCEDURE — 97140 MANUAL THERAPY 1/> REGIONS: CPT | Performed by: PHYSICAL THERAPIST

## 2019-11-19 NOTE — PROGRESS NOTES
Daily Note     Today's date: 2019  Patient name: Ariane Pruitt  : 1960  MRN: [id number]  Referring provider: Reyna Arango MD  Dx:   Encounter Diagnosis     ICD-10-CM    1  General weakness R53 1    2  Right leg pain M79 604    3  Spinal cord abscess G06 1    4  DDD (degenerative disc disease), lumbar M51 36        Start Time: 0930  Stop Time: 1015  Total time in clinic (min): 45 minutes    Subjective: Pt reports improvements in symptoms  Objective: See treatment diary below      Assessment: Tolerated treatment well  Pt's POC was progressed to include more proximal strengthening to increase tolerance to functional transfers  Patient demonstrated fatigue post treatment and would benefit from continued PT      Plan: Continue per plan of care        Precautions: Cervical tumor    Daily Treatment Diary     Manual  10/22 10/24 10/28 10/31 11/5 11/11 11/14 11/19    PNF R LE            PNF R pelvis in L SL            CPA     [facility] JF    PPU OP [facility] JF    Lumbar gapping  [facility] JF            Exercise Diary  10/24 10/28 10/31 11/5 11/11 11/19    RB 5' 5' 5' 5' 5' 5'    SL clamshells 3*10 3*10  3*10 3*10 3*10    Biodex      LOS 5x    STS 3*10 3*10 3*10  3*10 3*10    Step-ups 3*10b/l 3*10 3*10  3*10 3*10    Tandem walk      6 laps     Bridges with arms crossed 3*10*5" 3*10*5" 3*10*5" 3*10*5" 3*10*5" 3*10*5"    R SLR  3*10 b/l  3*10 3*10 b/l 3*10 b/l    PPT  3*10  3*10      Standing abd  3*10        CLARITA 5 mins  10 mins 10 mins 10 mins 10 mins    PPU 3*10         Obstacle course + cones 20 mins  20 mins   15 mins    Low row TB  3*10 Blk        Mini squats  3*10 3*10  3*10 3*10    Sit to stands  2*10        Sciatic nerve glides    3*10 3*10                                                                               Modalities

## 2019-11-21 ENCOUNTER — OFFICE VISIT (OUTPATIENT)
Dept: PHYSICAL THERAPY | Age: 59
End: 2019-11-21
Payer: COMMERCIAL

## 2019-11-21 DIAGNOSIS — M79.604 RIGHT LEG PAIN: ICD-10-CM

## 2019-11-21 DIAGNOSIS — M51.36 DDD (DEGENERATIVE DISC DISEASE), LUMBAR: ICD-10-CM

## 2019-11-21 DIAGNOSIS — R53.1 GENERAL WEAKNESS: Primary | ICD-10-CM

## 2019-11-21 DIAGNOSIS — G06.1 SPINAL CORD ABSCESS: ICD-10-CM

## 2019-11-21 PROCEDURE — 97110 THERAPEUTIC EXERCISES: CPT

## 2019-11-21 PROCEDURE — 97112 NEUROMUSCULAR REEDUCATION: CPT

## 2019-11-21 NOTE — PROGRESS NOTES
Daily Note     Today's date: 2019  Patient name: Jeannette Barahona  : 1960  MRN: [id number]  Referring provider: Venkat Reynoso MD  Dx:   Encounter Diagnosis     ICD-10-CM    1  General weakness R53 1    2  Right leg pain M79 604    3  Spinal cord abscess G06 1    4  DDD (degenerative disc disease), lumbar M51 36                   Subjective: pt reports pain he had with bending over is less and cont to feel "stiff"      Objective: See treatment diary below      Assessment: Tolerated treatment well  Patient demonstrated fatigue post treatment and would benefit from continued PT, cueing throughout for ex technique, pt tends to perform ex quickly not effectively at times, higher level proprio ex challenging for pt      Plan: Continue per plan of care  Progress treatment as tolerated         Precautions: Cervical tumor    Daily Treatment Diary     Manual  10/22 10/24 10/28 10/31 11/5 11/11 11/14 11/19    PNF R LE            PNF R pelvis in L SL            CPA     [facility]    PPU OP [facility]    Lumbar gapping  [facility]            Exercise Diary  10/24 10/28 10/31 11/5 11/11 11/19 11/21/19   RB 5' 5' 5' 5' 5' 5' 5'   SL clamshells 3*10 3*10  3*10 3*10 3*10 3x10   Biodex      LOS 5x    STS 3*10 3*10 3*10  3*10 3*10 2x10   Step-ups 3*10b/l 3*10 3*10  3*10 3*10 6"   Backward amb       3 laps CG-Min A of 1   Tandem walk      6 laps  3 laps CG-Min A of 1   Bridges with arms crossed 3*10*5" 3*10*5" 3*10*5" 3*10*5" 3*10*5" 3*10*5" 3x10x5"   R SLR  3*10 b/l  3*10 3*10 b/l 3*10 b/l 3x10 BL   PPT  3*10  3*10   2x10   Standing abd  3*10        CLARITA 5 mins  10 mins 10 mins 10 mins 10 mins 8 min   PPU 3*10         Obstacle course + cones 20 mins  20 mins   15 mins 10 min   Low row TB  3*10 Blk        Mini squats  3*10 3*10  3*10 3*10 3x10x3"   Sit to stands  2*10        Sciatic nerve glides    3*10 3*10         Modalities

## 2019-11-25 ENCOUNTER — TELEPHONE (OUTPATIENT)
Dept: NEUROSURGERY | Facility: CLINIC | Age: 59
End: 2019-11-25

## 2019-11-25 NOTE — TELEPHONE ENCOUNTER
Patient called requesting for the last office visit note to be faxed to Brannon Nicole at [phone]  Faxed note  Patient was appreciative

## 2019-11-26 ENCOUNTER — OFFICE VISIT (OUTPATIENT)
Dept: PHYSICAL THERAPY | Age: 59
End: 2019-11-26
Payer: COMMERCIAL

## 2019-11-26 DIAGNOSIS — G06.1 SPINAL CORD ABSCESS: ICD-10-CM

## 2019-11-26 DIAGNOSIS — M79.604 RIGHT LEG PAIN: Primary | ICD-10-CM

## 2019-11-26 DIAGNOSIS — M51.36 DDD (DEGENERATIVE DISC DISEASE), LUMBAR: ICD-10-CM

## 2019-11-26 DIAGNOSIS — R53.1 GENERAL WEAKNESS: ICD-10-CM

## 2019-11-26 PROCEDURE — 97110 THERAPEUTIC EXERCISES: CPT | Performed by: PHYSICAL THERAPIST

## 2019-11-26 PROCEDURE — 97112 NEUROMUSCULAR REEDUCATION: CPT | Performed by: PHYSICAL THERAPIST

## 2019-11-26 PROCEDURE — 97140 MANUAL THERAPY 1/> REGIONS: CPT | Performed by: PHYSICAL THERAPIST

## 2019-11-26 NOTE — PROGRESS NOTES
Daily Note     Today's date: 2019  Patient name: Oumar De La Torre  : 1960  MRN: [id number]  Referring provider: Gerald Luu MD  Dx:   Encounter Diagnosis     ICD-10-CM    1  Right leg pain M79 604    2  General weakness R53 1    3  Spinal cord abscess G06 1    4  DDD (degenerative disc disease), lumbar M51 36        Start Time: 0845  Stop Time: 0930  Total time in clinic (min): 45 minutes    Subjective: Pt reports improvements in ROM  Objective: See treatment diary below      Assessment: Tolerated treatment well  Pt's POC was progressed to include more advanced closed chain resistance interventions to increase tolerance to community ambulation  Patient demonstrated fatigue post treatment and would benefit from continued PT      Plan: Continue per plan of care        Precautions: Cervical tumor    Daily Treatment Diary     Manual  10/22 10/24 10/28 10/31 11/5 11/11 11/14 11/19 11/26   PNF R LE            PNF R pelvis in L SL            CPA     [facility] JF   PPU OP [facility]   Lumbar gapping  [facility]            Exercise Diary  10/24 10/28 10/31 11/5 11/11 11/19 11/26/19   RB 5' 5' 5' 5' 5' 5' 5'   SL clamshells 3*10 3*10  3*10 3*10 3*10 3x10   Biodex      LOS 5x    STS 3*10 3*10 3*10  3*10 3*10 2x10   Step-ups 3*10b/l 3*10 3*10  3*10 3*10 6"   Backward amb       3 laps CG-Min A of 1   Tandem walk      6 laps  3 laps CG-Min A of 1   Bridges with arms crossed 3*10*5" 3*10*5" 3*10*5" 3*10*5" 3*10*5" 3*10*5" 3x10x5"   R SLR  3*10 b/l  3*10 3*10 b/l 3*10 b/l 3x10 BL   PPT  3*10  3*10   2x10   Standing abd  3*10        CLARITA 5 mins  10 mins 10 mins 10 mins 10 mins 8 min   PPU 3*10         Obstacle course + cones 20 mins  20 mins   15 mins 10 min   Low row TB  3*10 Blk        Mini squats  3*10 3*10  3*10 3*10 3x10x3"   Sit to stands  2*10        Sciatic nerve glides    3*10 3*10         Modalities

## 2019-11-29 ENCOUNTER — OFFICE VISIT (OUTPATIENT)
Dept: PHYSICAL THERAPY | Age: 59
End: 2019-11-29
Payer: COMMERCIAL

## 2019-11-29 DIAGNOSIS — M51.36 DDD (DEGENERATIVE DISC DISEASE), LUMBAR: ICD-10-CM

## 2019-11-29 DIAGNOSIS — G06.1 SPINAL CORD ABSCESS: ICD-10-CM

## 2019-11-29 DIAGNOSIS — R53.1 GENERAL WEAKNESS: ICD-10-CM

## 2019-11-29 DIAGNOSIS — M79.604 RIGHT LEG PAIN: Primary | ICD-10-CM

## 2019-11-29 PROCEDURE — 97110 THERAPEUTIC EXERCISES: CPT | Performed by: PHYSICAL THERAPIST

## 2019-11-29 PROCEDURE — 97112 NEUROMUSCULAR REEDUCATION: CPT | Performed by: PHYSICAL THERAPIST

## 2019-11-29 PROCEDURE — 97140 MANUAL THERAPY 1/> REGIONS: CPT | Performed by: PHYSICAL THERAPIST

## 2019-11-29 NOTE — PROGRESS NOTES
Daily Note     Today's date: 2019  Patient name: Judy Roberto  : 1960  MRN: [id number]  Referring provider: Daija Cruz MD  Dx:   Encounter Diagnosis     ICD-10-CM    1  Right leg pain M79 604    2  General weakness R53 1    3  Spinal cord abscess G06 1    4  DDD (degenerative disc disease), lumbar M51 36        Start Time: 0915  Stop Time: 1015  Total time in clinic (min): 60 minutes    Subjective: Pt reports increase sensation in RLE (foot)  9: See treatment diary below      Assessment: Tolerated treatment well  Pt's POC was progressed to include AROM in foot with NMES to improve muscular recruitment patterns  Patient demonstrated fatigue post treatment and would benefit from continued PT      Plan: Continue per plan of care        Precautions: Cervical tumor    Daily Treatment Diary     Manual  10/22 10/24 10/28 10/31 11/5 11/11 11/14 11/19 11/26   PNF R LE            PNF R pelvis in L SL            CPA     [facility] JF   PPU OP [facility] JF   Lumbar gapping  [facility] JF JF            Exercise Diary  19   RB 5'      5'   SL clamshells 3*10      3x10   Biodex          STS 2*10 6"      2x10   Step-ups       6"   Backward amb 3 laps CG-Min A of 1      3 laps CG-Min A of 1   Tandem walk       3 laps CG-Min A of 1   Bridges with arms crossed 3x10x5"      3x10x5"   R SLR 3x10 BL      3x10 BL   PPT       2x10   Standing abd          CLARITA 5 min      8 min   PPU          Obstacle course + cones 10 min      10 min   Low row TB          Mini squats 3*10*3"      3x10x3"   Sit to stands          Sciatic nerve glides              Modalities              NMES with DF JF

## 2019-12-03 ENCOUNTER — OFFICE VISIT (OUTPATIENT)
Dept: PHYSICAL THERAPY | Age: 59
End: 2019-12-03
Payer: COMMERCIAL

## 2019-12-03 DIAGNOSIS — R53.1 GENERAL WEAKNESS: ICD-10-CM

## 2019-12-03 DIAGNOSIS — M79.604 RIGHT LEG PAIN: Primary | ICD-10-CM

## 2019-12-03 DIAGNOSIS — M51.36 DDD (DEGENERATIVE DISC DISEASE), LUMBAR: ICD-10-CM

## 2019-12-03 DIAGNOSIS — G06.1 SPINAL CORD ABSCESS: ICD-10-CM

## 2019-12-03 PROCEDURE — 97110 THERAPEUTIC EXERCISES: CPT | Performed by: PHYSICAL THERAPIST

## 2019-12-03 PROCEDURE — 97140 MANUAL THERAPY 1/> REGIONS: CPT | Performed by: PHYSICAL THERAPIST

## 2019-12-03 PROCEDURE — 97112 NEUROMUSCULAR REEDUCATION: CPT | Performed by: PHYSICAL THERAPIST

## 2019-12-03 NOTE — PROGRESS NOTES
Daily Note     Today's date: 12/3/2019  Patient name: Chandler Horan  : 1960  MRN: [id number]  Referring provider: Rolando Zavaleta MD  Dx:   Encounter Diagnosis     ICD-10-CM    1  Right leg pain M79 604    2  General weakness R53 1    3  Spinal cord abscess G06 1    4  DDD (degenerative disc disease), lumbar M51 36        Start Time: 0800  Stop Time: 0845  Total time in clinic (min): 45 minutes    Subjective: Pt reports no new symptoms  Objective: See treatment diary below      Assessment: Tolerated treatment well  Pt's POC was progressed to include performing dynamic balance interventions without PLSF to improve tolerance to community ambulation  Patient demonstrated fatigue post treatment and would benefit from continued PT      Plan: Continue per plan of care        Precautions: Cervical tumor    Daily Treatment Diary     Manual  12/3        11/26   PNF R LE            PNF R pelvis in L SL            CPA JF        JF   PPU OP JF        JF   Lumbar gapping                   Exercise Diary  11/29 12/3     11/26/19   RB 5' 5'     5'   SL clamshells 3*10 3*10     3x10   Biodex          STS 2*10 6" 2*10     2x10   Step-ups       6"   Backward amb 3 laps CG-Min A of 1 3 laps CG-Min A of 1     3 laps CG-Min A of 1   Tandem walk       3 laps CG-Min A of 1   Bridges with arms crossed 3x10x5" 3x10x5"     3x10x5"   R SLR 3x10 BL 3*10 b/l     3x10 BL   PPT       2x10   Standing abd          CLARITA 5 min 5 min     8 min   PPU          Obstacle course + cones 10 min      10 min   Low row TB          Mini squats 3*10*3"      3x10x3"   Sit to stands          Sciatic nerve glides              Modalities              NMES with DF JF

## 2019-12-05 ENCOUNTER — OFFICE VISIT (OUTPATIENT)
Dept: PHYSICAL THERAPY | Age: 59
End: 2019-12-05
Payer: COMMERCIAL

## 2019-12-05 DIAGNOSIS — R53.1 GENERAL WEAKNESS: ICD-10-CM

## 2019-12-05 DIAGNOSIS — M51.36 DDD (DEGENERATIVE DISC DISEASE), LUMBAR: ICD-10-CM

## 2019-12-05 DIAGNOSIS — M79.604 RIGHT LEG PAIN: Primary | ICD-10-CM

## 2019-12-05 DIAGNOSIS — G06.1 SPINAL CORD ABSCESS: ICD-10-CM

## 2019-12-05 PROCEDURE — 97110 THERAPEUTIC EXERCISES: CPT

## 2019-12-05 PROCEDURE — 97112 NEUROMUSCULAR REEDUCATION: CPT

## 2019-12-05 PROCEDURE — 97140 MANUAL THERAPY 1/> REGIONS: CPT | Performed by: PHYSICAL THERAPIST

## 2019-12-05 NOTE — PROGRESS NOTES
Daily Note     Today's date: 2019  Patient name: Reji Chua  : 1960  MRN: [id number]  Referring provider: Isac Burger MD  Dx:   Encounter Diagnosis     ICD-10-CM    1  Right leg pain M79 604    2  General weakness R53 1    3  Spinal cord abscess G06 1    4  DDD (degenerative disc disease), lumbar M51 36                   Subjective:  Pt reports unsteadiness increases with fatigue       Objective: See treatment diary below      Assessment:  Progressing ex without AFO on, pt needed occasional CG of 1 for LOB during higher level balance ex      Plan: Continue per plan of care  Progress treatment as tolerated         Precautions: Cervical tumor    Daily Treatment Diary     Manual  12/3 12/5/19       11/26   PNF R LE            PNF R pelvis in L SL            CPA JF JF       JF   PPU OP JF JF       JF   Lumbar gapping                   Exercise Diary  11/29 12/3 12/5/19    11/26/19   RB 5' 5' 5'    5'   SL clamshells 3*10 3*10 3x10    3x10   Biodex          STS 2*10 6" 2*10 2x10    2x10   Step-ups       6"   Backward amb 3 laps CG-Min A of 1 3 laps CG-Min A of 1     3 laps CG-Min A of 1   Tandem walk       3 laps CG-Min A of 1   Bridges with arms crossed 3x10x5" 3x10x5" 3x10x5"    3x10x5"   R SLR 3x10 BL 3*10 b/l 3x10 BL    3x10 BL   PPT       2x10   Standing abd          CLARITA 5 min 5 min 5'    8 min   PPU          Obstacle course + cones 10 min      10 min   Low row TB          Mini squats 3*10*3"  3x10x3"    3x10x3"   Sit to stands          Sciatic nerve glides              Modalities              NMES with DF JF

## 2019-12-10 ENCOUNTER — OFFICE VISIT (OUTPATIENT)
Dept: PHYSICAL THERAPY | Age: 59
End: 2019-12-10
Payer: COMMERCIAL

## 2019-12-10 DIAGNOSIS — R53.1 GENERAL WEAKNESS: ICD-10-CM

## 2019-12-10 DIAGNOSIS — M79.604 RIGHT LEG PAIN: Primary | ICD-10-CM

## 2019-12-10 DIAGNOSIS — G06.1 SPINAL CORD ABSCESS: ICD-10-CM

## 2019-12-10 DIAGNOSIS — M51.36 DDD (DEGENERATIVE DISC DISEASE), LUMBAR: ICD-10-CM

## 2019-12-10 PROCEDURE — 97012 MECHANICAL TRACTION THERAPY: CPT | Performed by: PHYSICAL THERAPIST

## 2019-12-10 PROCEDURE — 97110 THERAPEUTIC EXERCISES: CPT | Performed by: PHYSICAL THERAPIST

## 2019-12-10 PROCEDURE — 97112 NEUROMUSCULAR REEDUCATION: CPT | Performed by: PHYSICAL THERAPIST

## 2019-12-10 NOTE — PROGRESS NOTES
Daily Note     Today's date: 12/10/2019  Patient name: Nahomy July  : 1960  MRN: [id number]  Referring provider: Alejandra Strickland MD  Dx:   Encounter Diagnosis     ICD-10-CM    1  Right leg pain M79 604    2  General weakness R53 1    3  Spinal cord abscess G06 1    4  DDD (degenerative disc disease), lumbar M51 36        Start Time: 0845  Stop Time: 0930  Total time in clinic (min): 45 minutes    Subjective: Pt reports improvements with symptoms  Objective: See treatment diary below      Assessment: Tolerated treatment well  PT's POC was progressed to include mechanical traction to relieve LBP and increase tolerance to interventions  Patient demonstrated fatigue post treatment and would benefit from continued PT      Plan: Continue per plan of care        Precautions: Cervical tumor    Daily Treatment Diary     Manual  12/3 12/5/19 12/10      11/26   PNF R LE            PNF R pelvis in L SL            CPA JF JF JF      JF   PPU OP JF JF       JF   Lumbar gapping                   Exercise Diary  11/29 12/3 12/5/19 12/10   11/26/19   RB 5' 5' 5' 5'   5'   SL clamshells 3*10 3*10 3x10    3x10   Biodex          STS 2*10 6" 2*10 2x10    2x10   Step-ups       6"   Backward amb 3 laps CG-Min A of 1 3 laps CG-Min A of 1     3 laps CG-Min A of 1   Tandem walk    Stance foam 3x30 b/l   3 laps CG-Min A of 1   Bridges with arms crossed 3x10x5" 3x10x5" 3x10x5"    3x10x5"   R SLR 3x10 BL 3*10 b/l 3x10 BL    3x10 BL   PPT       2x10   Standing abd          CLARITA 5 min 5 min 5' 5'   8 min   PPU          Obstacle course + cones 10 min   15 mins   10 min   Low row TB          Mini squats 3*10*3"  3x10x3" 3*10   3x10x3"   Sit to stands    3*10      Sciatic nerve glides              Modalities  11/29 12/10           NMES with DF JF            Mechanical tractoin  JF

## 2019-12-12 ENCOUNTER — OFFICE VISIT (OUTPATIENT)
Dept: PHYSICAL THERAPY | Age: 59
End: 2019-12-12
Payer: COMMERCIAL

## 2019-12-12 DIAGNOSIS — M51.36 DDD (DEGENERATIVE DISC DISEASE), LUMBAR: ICD-10-CM

## 2019-12-12 DIAGNOSIS — G06.1 SPINAL CORD ABSCESS: ICD-10-CM

## 2019-12-12 DIAGNOSIS — R53.1 GENERAL WEAKNESS: ICD-10-CM

## 2019-12-12 DIAGNOSIS — M79.604 RIGHT LEG PAIN: Primary | ICD-10-CM

## 2019-12-12 PROCEDURE — 97012 MECHANICAL TRACTION THERAPY: CPT | Performed by: PHYSICAL THERAPIST

## 2019-12-12 PROCEDURE — 97110 THERAPEUTIC EXERCISES: CPT | Performed by: PHYSICAL THERAPIST

## 2019-12-12 PROCEDURE — 97112 NEUROMUSCULAR REEDUCATION: CPT | Performed by: PHYSICAL THERAPIST

## 2019-12-12 NOTE — PROGRESS NOTES
Daily Note     Today's date: 2019  Patient name: Jeannette Barahona  : 1960  MRN: [id number]  Referring provider: Venkat Reynoso MD  Dx:   Encounter Diagnosis     ICD-10-CM    1  Right leg pain M79 604    2  General weakness R53 1    3  Spinal cord abscess G06 1    4  DDD (degenerative disc disease), lumbar M51 36        Start Time: 0845  Stop Time: 0930   Total time in clinic (min): 45 minutes    Subjective: Pt reports improvements in symptoms  Objective: See treatment diary below      Assessment: Tolerated treatment well  Pt's POC was progressed to include more difficult dynamic balance activities to increase tolerance to community ambulation  Patient demonstrated fatigue post treatment and would benefit from continued PT      Plan: Continue per plan of care        Precautions: Cervical tumor    Daily Treatment Diary     Manual  12/3 12/5/19 12/10      11/26   PNF R LE            PNF R pelvis in L SL            CPA JF JF JF      JF   PPU OP JF JF       JF   Lumbar gapping                   Exercise Diary  11/29 12/3 12/5/19 12/10 12/12  11/26/19   RB 5' 5' 5' 5'   5'   SL clamshells 3*10 3*10 3x10    3x10   Biodex     10 mins LOS      STS 2*10 6" 2*10 2x10    2x10   Step-ups       6"   Backward amb 3 laps CG-Min A of 1 3 laps CG-Min A of 1     3 laps CG-Min A of 1   Tandem walk    Stance foam 3x30 b/l   3 laps CG-Min A of 1   Bridges with arms crossed 3x10x5" 3x10x5" 3x10x5"    3x10x5"   R SLR 3x10 BL 3*10 b/l 3x10 BL    3x10 BL   PPT       2x10   Standing abd     3*10     CLARITA 5 min 5 min 5' 5'   8 min   PPU          Obstacle course + cones 10 min   15 mins 15 mins  10 min   Low row TB          Mini squats 3*10*3"  3x10x3" 3*10 3*10  3x10x3"   Sit to stands    3*10 3*10     Sciatic nerve glides              Modalities  11/29 12/10 12/12          NMES with DF JF            Mechanical tractoin  JF JF

## 2019-12-16 DIAGNOSIS — E78.2 MIXED HYPERLIPIDEMIA: ICD-10-CM

## 2019-12-17 ENCOUNTER — OFFICE VISIT (OUTPATIENT)
Dept: PHYSICAL THERAPY | Age: 59
End: 2019-12-17
Payer: COMMERCIAL

## 2019-12-17 DIAGNOSIS — G06.1 SPINAL CORD ABSCESS: ICD-10-CM

## 2019-12-17 DIAGNOSIS — M51.36 DDD (DEGENERATIVE DISC DISEASE), LUMBAR: ICD-10-CM

## 2019-12-17 DIAGNOSIS — M79.604 RIGHT LEG PAIN: Primary | ICD-10-CM

## 2019-12-17 DIAGNOSIS — R53.1 GENERAL WEAKNESS: ICD-10-CM

## 2019-12-17 PROCEDURE — 97110 THERAPEUTIC EXERCISES: CPT | Performed by: PHYSICAL THERAPIST

## 2019-12-17 PROCEDURE — 97112 NEUROMUSCULAR REEDUCATION: CPT | Performed by: PHYSICAL THERAPIST

## 2019-12-17 PROCEDURE — 97012 MECHANICAL TRACTION THERAPY: CPT | Performed by: PHYSICAL THERAPIST

## 2019-12-17 NOTE — PROGRESS NOTES
Daily Note     Today's date: 2019  Patient name: Juan Patient  : 1960  MRN: [id number]  Referring provider: Charline Camarena MD  Dx:   Encounter Diagnosis     ICD-10-CM    1  Right leg pain M79 604    2  General weakness R53 1    3  Spinal cord abscess G06 1    4  DDD (degenerative disc disease), lumbar M51 36        Start Time: 0845  Stop Time: 0945  Total time in clinic (min): 60 minutes    Subjective: Pt reports improvements in symptoms  Objective: See treatment diary below      Assessment: Tolerated treatment well  Pt's POC was progressed to include to more difficult dynamic balance interventions to decrease risk of falling  Patient demonstrated fatigue post treatment and would benefit from continued PT      Plan: Continue per plan of care        Precautions: Cervical tumor    Daily Treatment Diary     Manual  12/3 12/5/19 12/10 12/17     11/26   PNF R LE            PNF R pelvis in L SL            CPA JF JF JF      JF   PPU OP JF JF       JF   Lumbar gapping                   Exercise Diary  11/29 12/3 12/5/19 12/10 12/12 12/17    RB 5' 5' 5' 5'      SL clamshells 3*10 3*10 3x10   3*10    Biodex     10 mins LOS  10 mins LOS    STS 2*10 6" 2*10 2x10   2*10    Step-ups          Backward amb 3 laps CG-Min A of 1 3 laps CG-Min A of 1        Tandem walk    Stance foam 3x30 b/l  Stance foam 3x30 b/l    Bridges with arms crossed 3x10x5" 3x10x5" 3x10x5"   3*10*5"    R SLR 3x10 BL 3*10 b/l 3x10 BL   3*10    PPT          Standing abd     3*10 3*10    CLARITA 5 min 5 min 5' 5'      PPU          Obstacle course + cones 10 min   15 mins 15 mins 15 mins    Low row TB          Mini squats 3*10*3"  3x10x3" 3*10 3*10 3*10    Sit to stands    3*10 3*10 3*10    Sciatic nerve glides              Modalities  11/29 12/10 12/12          NMES with DF JF            Mechanical tractoin  JF JF

## 2019-12-18 ENCOUNTER — TELEPHONE (OUTPATIENT)
Dept: FAMILY MEDICINE CLINIC | Facility: CLINIC | Age: 59
End: 2019-12-18

## 2019-12-18 DIAGNOSIS — E78.2 MIXED HYPERLIPIDEMIA: Primary | ICD-10-CM

## 2019-12-18 RX ORDER — OMEGA-3-ACID ETHYL ESTERS 1 G/1
2 CAPSULE, LIQUID FILLED ORAL DAILY
Qty: 180 CAPSULE | Refills: 1 | OUTPATIENT
Start: 2019-12-18

## 2019-12-18 RX ORDER — OMEGA-3-ACID ETHYL ESTERS 1 G/1
CAPSULE, LIQUID FILLED ORAL
Qty: 180 CAPSULE | Refills: 1 | OUTPATIENT
Start: 2019-12-18

## 2019-12-18 RX ORDER — CHLORAL HYDRATE 500 MG
1000 CAPSULE ORAL DAILY
Qty: 90 CAPSULE | Refills: 1 | Status: SHIPPED | OUTPATIENT
Start: 2019-12-18 | End: 2020-06-09 | Stop reason: SDUPTHER

## 2019-12-18 RX ORDER — OMEGA-3-ACID ETHYL ESTERS 1 G/1
2 CAPSULE, LIQUID FILLED ORAL DAILY
COMMUNITY
End: 2019-12-19 | Stop reason: SDUPTHER

## 2019-12-18 NOTE — TELEPHONE ENCOUNTER
Call from [facility] stating pt requesting a refill of Lovaza and fish oil was sent instead, please clarify there has been a change to pt meds

## 2019-12-19 ENCOUNTER — APPOINTMENT (OUTPATIENT)
Dept: PHYSICAL THERAPY | Age: 59
End: 2019-12-19
Payer: COMMERCIAL

## 2019-12-19 DIAGNOSIS — E78.5 DYSLIPIDEMIA: Primary | ICD-10-CM

## 2019-12-19 RX ORDER — OMEGA-3-ACID ETHYL ESTERS 1 G/1
2 CAPSULE, LIQUID FILLED ORAL DAILY
Qty: 90 CAPSULE | Refills: 1 | Status: ON HOLD | OUTPATIENT
Start: 2019-12-19 | End: 2020-01-04 | Stop reason: CLARIF

## 2019-12-20 ENCOUNTER — OFFICE VISIT (OUTPATIENT)
Dept: PHYSICAL THERAPY | Age: 59
End: 2019-12-20
Payer: COMMERCIAL

## 2019-12-20 DIAGNOSIS — G06.1 SPINAL CORD ABSCESS: ICD-10-CM

## 2019-12-20 DIAGNOSIS — M79.604 RIGHT LEG PAIN: Primary | ICD-10-CM

## 2019-12-20 DIAGNOSIS — R53.1 GENERAL WEAKNESS: ICD-10-CM

## 2019-12-20 DIAGNOSIS — M51.36 DDD (DEGENERATIVE DISC DISEASE), LUMBAR: ICD-10-CM

## 2019-12-20 PROCEDURE — 97112 NEUROMUSCULAR REEDUCATION: CPT

## 2019-12-20 PROCEDURE — 97110 THERAPEUTIC EXERCISES: CPT

## 2019-12-20 PROCEDURE — 97012 MECHANICAL TRACTION THERAPY: CPT

## 2019-12-20 NOTE — PROGRESS NOTES
Daily Note     Today's date: 2019  Patient name: Mukesh Palma  : 1960  MRN: [id number]  Referring provider: Praful Hayes MD  Dx:   Encounter Diagnosis     ICD-10-CM    1  Right leg pain M79 604    2  General weakness R53 1    3  Spinal cord abscess G06 1    4  DDD (degenerative disc disease), lumbar M51 36                   Subjective: pt reports"the traction machine is amazing, it enables me to bend forward more with my chores/activities "      Objective: See treatment diary below      Assessment:  Improved balnce noted during higher level balance ex without AFO on      Plan: Continue per plan of care  Progress treatment as tolerated         Precautions: Cervical tumor    Daily Treatment Diary     Manual  12/3 12/5/19 12/10 12/17     11/26   PNF R LE            PNF R pelvis in L SL            CPA JF JF JF      JF   PPU OP JF JF       JF   Lumbar gapping                   Exercise Diary  11/29 12/3 12/5/19 12/10 12/12 12/17 12/20/19   RB 5' 5' 5' 5'      SL clamshells 3*10 3*10 3x10   3*10 3x10   Biodex     10 mins LOS  10 mins LOS    STS 2*10 6" 2*10 2x10   2*10 2x10   Step-ups          Backward amb 3 laps CG-Min A of 1 3 laps CG-Min A of 1        Tandem walk    Stance foam 3x30 b/l  Stance foam 3x30 b/l    Bridges with arms crossed 3x10x5" 3x10x5" 3x10x5"   3*10*5"    R SLR 3x10 BL 3*10 b/l 3x10 BL   3*10 3x10   PPT          Standing abd     3*10 3*10 3x10   CLARITA 5 min 5 min 5' 5'      PPU          Obstacle course + cones 10 min   15 mins 15 mins 15 mins 15 min   Low row TB          Mini squats 3*10*3"  3x10x3" 3*10 3*10 3*10 3x10   Sit to stands    3*10 3*10 3*10    Sciatic nerve glides              Modalities  11/29 12/10 12/12 12/20/19         NMES with DF JF            Mechanical tractoin  JF JF VK

## 2019-12-23 ENCOUNTER — OFFICE VISIT (OUTPATIENT)
Dept: PHYSICAL THERAPY | Age: 59
End: 2019-12-23
Payer: COMMERCIAL

## 2019-12-23 DIAGNOSIS — G06.1 SPINAL CORD ABSCESS: ICD-10-CM

## 2019-12-23 DIAGNOSIS — M79.604 RIGHT LEG PAIN: Primary | ICD-10-CM

## 2019-12-23 DIAGNOSIS — M51.36 DDD (DEGENERATIVE DISC DISEASE), LUMBAR: ICD-10-CM

## 2019-12-23 DIAGNOSIS — R53.1 GENERAL WEAKNESS: ICD-10-CM

## 2019-12-23 PROCEDURE — 97112 NEUROMUSCULAR REEDUCATION: CPT | Performed by: PHYSICAL THERAPIST

## 2019-12-23 PROCEDURE — 97110 THERAPEUTIC EXERCISES: CPT | Performed by: PHYSICAL THERAPIST

## 2019-12-23 NOTE — PROGRESS NOTES
Daily Note     Today's date: 2019  Patient name: Lanny Uriostegui  : 1960  MRN: [id number]  Referring provider: Celia Vallecillo MD  Dx:   Encounter Diagnosis     ICD-10-CM    1  Right leg pain M79 604    2  General weakness R53 1    3  Spinal cord abscess G06 1    4  DDD (degenerative disc disease), lumbar M51 36        Start Time: 1400  Stop Time: 3305  Total time in clinic (min): 45 minutes    Subjective: Pt reports general malaise  Objective: See treatment diary below      Assessment: Tolerated treatment well  Pt reports increase fatigue today with respiratory symptoms  Pt was instructed to call PCP to report symptoms  Pt continues to progress with functional mobility  Patient demonstrated fatigue post treatment and would benefit from continued PT      Plan: Continue per plan of care        Precautions: Cervical tumor    Daily Treatment Diary     Manual  12/3 12/5/19 12/10 12/17     11/26   PNF R LE            PNF R pelvis in L SL            CPA JF JF JF      JF   PPU OP JF JF       JF   Lumbar gapping                   Exercise Diary  19   RB          SL clamshells 3*10      3x10   Biodex          STS 3*10      2x10   Step-ups          Backward amb          Tandem walk          Bridges with arms crossed          R SLR 3*10      3x10   PPT          Standing abd 3*10      3x10   CLARITA          PPU          Obstacle course + cones 15 min      15 min   Low row TB          Mini squats 3*10      3x10   Sit to stands          Sciatic nerve glides              Modalities  11/29 12/10 12/12 12/20/19         NMES with DF JF            Mechanical tractoin  JF JF VK

## 2019-12-26 ENCOUNTER — APPOINTMENT (OUTPATIENT)
Dept: PHYSICAL THERAPY | Age: 59
End: 2019-12-26
Payer: COMMERCIAL

## 2019-12-27 ENCOUNTER — OFFICE VISIT (OUTPATIENT)
Dept: PHYSICAL THERAPY | Age: 59
End: 2019-12-27
Payer: COMMERCIAL

## 2019-12-27 DIAGNOSIS — G06.1 SPINAL CORD ABSCESS: ICD-10-CM

## 2019-12-27 DIAGNOSIS — M51.36 DDD (DEGENERATIVE DISC DISEASE), LUMBAR: ICD-10-CM

## 2019-12-27 DIAGNOSIS — M79.604 RIGHT LEG PAIN: Primary | ICD-10-CM

## 2019-12-27 DIAGNOSIS — R53.1 GENERAL WEAKNESS: ICD-10-CM

## 2019-12-27 PROCEDURE — 97112 NEUROMUSCULAR REEDUCATION: CPT

## 2019-12-27 PROCEDURE — 97110 THERAPEUTIC EXERCISES: CPT

## 2019-12-27 PROCEDURE — 97012 MECHANICAL TRACTION THERAPY: CPT

## 2019-12-27 NOTE — PROGRESS NOTES
Daily Note     Today's date: 2019  Patient name: Ariane Pruitt  : 1960  MRN: [id number]  Referring provider: Reyna Arango MD  Dx:   Encounter Diagnosis     ICD-10-CM    1  Right leg pain M79 604    2  General weakness R53 1    3  Spinal cord abscess G06 1    4  DDD (degenerative disc disease), lumbar M51 36                   Subjective:  Pt reports he feels his increased sugar intake has effected his "nerve and muscle  Issues"       Objective: See treatment diary below  Pt performs WB ex without brace on      Assessment:  Increased fatigue noted today      Plan: Continue per plan of care  Progress treatment as tolerated         Precautions: Cervical tumor    Daily Treatment Diary     Manual  12/3 12/5/19 12/10 12/17     11/26   PNF R LE            PNF R pelvis in L SL            CPA JF JF JF      JF   PPU OP JF JF       JF   Lumbar gapping                   Exercise Diary  19   RB  9' lvl2        SL clamshells 3*10 3x10     3x10   Biodex          STS 3*10 3x10     2x10   Step-ups          Backward amb          Tandem walk          Bridges with arms crossed          R SLR 3*10 3x10     3x10   PPT          Standing abd 3*10 3x10     3x10   CLARITA          PPU          Obstacle course + cones 15 min 10 min     15 min   Low row TB          Mini squats 3*10 3x10     3x10   Sit to stands  3x10        Sciatic nerve glides              Modalities  11/29 12/10 12/12 12/20/19 12/27/19        NMES with DF JF            Mechanical tractoin  JF JF VK VK

## 2019-12-31 ENCOUNTER — OFFICE VISIT (OUTPATIENT)
Dept: PHYSICAL THERAPY | Age: 59
End: 2019-12-31
Payer: COMMERCIAL

## 2019-12-31 ENCOUNTER — TELEPHONE (OUTPATIENT)
Dept: FAMILY MEDICINE CLINIC | Facility: CLINIC | Age: 59
End: 2019-12-31

## 2019-12-31 DIAGNOSIS — R53.1 GENERAL WEAKNESS: ICD-10-CM

## 2019-12-31 DIAGNOSIS — M79.604 RIGHT LEG PAIN: Primary | ICD-10-CM

## 2019-12-31 DIAGNOSIS — G06.1 SPINAL CORD ABSCESS: ICD-10-CM

## 2019-12-31 DIAGNOSIS — R73.01 IMPAIRED FASTING GLUCOSE: Primary | ICD-10-CM

## 2019-12-31 DIAGNOSIS — M51.36 DDD (DEGENERATIVE DISC DISEASE), LUMBAR: ICD-10-CM

## 2019-12-31 PROCEDURE — 97112 NEUROMUSCULAR REEDUCATION: CPT | Performed by: PHYSICAL THERAPIST

## 2019-12-31 PROCEDURE — 97110 THERAPEUTIC EXERCISES: CPT | Performed by: PHYSICAL THERAPIST

## 2019-12-31 PROCEDURE — 97012 MECHANICAL TRACTION THERAPY: CPT | Performed by: PHYSICAL THERAPIST

## 2019-12-31 NOTE — PROGRESS NOTES
Daily Note     Today's date: 2019  Patient name: Hanh Dwyer  : 1960  MRN: [id number]  Referring provider: Deepak Cochran MD  Dx:   Encounter Diagnosis     ICD-10-CM    1  Right leg pain M79 604    2  General weakness R53 1    3  Spinal cord abscess G06 1    4  DDD (degenerative disc disease), lumbar M51 36        Start Time: 1015  Stop Time: 1100  Total time in clinic (min): 45 minutes    Subjective: Pt reports improvements in symptoms  Objective: See treatment diary below      Assessment: Tolerated treatment well  Patient demonstrated fatigue post treatment and would benefit from continued PT      Plan: Continue per plan of care        Precautions: Cervical tumor    Daily Treatment Diary     Manual  12/3 12/5/19 12/10 12/17     11/26   PNF R LE            PNF R pelvis in L SL            CPA JF JF JF      JF   PPU OP JF JF       JF   Lumbar gapping                   Exercise Diary  19   RB  9' lvl2        SL clamshells 3*10 3x10     3x10   Biodex          STS 3*10 3x10     2x10   Step-ups          Backward amb          Tandem walk          Bridges with arms crossed          R SLR 3*10 3x10     3x10   PPT          Standing abd 3*10 3x10     3x10   CLARITA          PPU          Obstacle course + cones 15 min 10 min     15 min   Low row TB          Mini squats 3*10 3x10     3x10   Sit to stands  3x10        Sciatic nerve glides              Modalities  11/29 12/10 12/12 12/20/19 12/27/19        NMES with DF JF            Mechanical tractoin  JF JF VK VK

## 2019-12-31 NOTE — TELEPHONE ENCOUNTER
PATIENT STATES THAT HE WANTED TO HAVE LABS DONE AGAIN BEFORE HIS VISIT IN February AND THOUGHT HE COULD HAVE HAD THEM DONE TODAY, BUT THERE ARE NO ORDERS IN THE CHART  PATIENT HAD LABS DONE 10/24/2019  NOT SURE IF YOU WANTED TO ORDER LABS BEFORE HIS VISIT IN February?

## 2019-12-31 NOTE — PROGRESS NOTES
Daily Note     Today's date: 2019  Patient name: Corrine Nair  : 1960  MRN: [id number]  Referring provider: Rk Desai MD  Dx: No diagnosis found  Subjective: ***      Objective: See treatment diary below      Assessment: Tolerated treatment {Tolerated treatment :[id number]}   Patient {assessment:[id number]}      Plan: {PLAN:}     Precautions: Cervical tumor    Daily Treatment Diary     Manual  12/3 12/5/19 12/10 12/17     11/26   PNF R LE            PNF R pelvis in L SL            CPA JF JF JF      JF   PPU OP JF JF       JF   Lumbar gapping                   Exercise Diary  19   RB  9' lvl2        SL clamshells 3*10 3x10     3x10   Biodex          STS 3*10 3x10     2x10   Step-ups          Backward amb          Tandem walk          Bridges with arms crossed          R SLR 3*10 3x10     3x10   PPT          Standing abd 3*10 3x10     3x10   CLARITA          PPU          Obstacle course + cones 15 min 10 min     15 min   Low row TB          Mini squats 3*10 3x10     3x10   Sit to stands  3x10        Sciatic nerve glides              Modalities  11/29 12/10 12/12 12/20/19 12/27/19        NMES with DF JF            Mechanical tractoin  JF JF VK VK

## 2020-01-02 ENCOUNTER — OFFICE VISIT (OUTPATIENT)
Dept: PHYSICAL THERAPY | Age: 60
End: 2020-01-02
Payer: COMMERCIAL

## 2020-01-02 ENCOUNTER — HOSPITAL ENCOUNTER (OUTPATIENT)
Facility: HOSPITAL | Age: 60
Setting detail: OBSERVATION
Discharge: HOME | End: 2020-01-04
Attending: EMERGENCY MEDICINE | Admitting: INTERNAL MEDICINE
Payer: COMMERCIAL

## 2020-01-02 DIAGNOSIS — M51.36 DDD (DEGENERATIVE DISC DISEASE), LUMBAR: ICD-10-CM

## 2020-01-02 DIAGNOSIS — E11.65 TYPE 2 DIABETES MELLITUS WITH HYPERGLYCEMIA, WITHOUT LONG-TERM CURRENT USE OF INSULIN (HCC): ICD-10-CM

## 2020-01-02 DIAGNOSIS — G06.1 SPINAL CORD ABSCESS: ICD-10-CM

## 2020-01-02 DIAGNOSIS — R73.9 HYPERGLYCEMIA: Primary | ICD-10-CM

## 2020-01-02 DIAGNOSIS — R73.9 HYPERGLYCEMIA: ICD-10-CM

## 2020-01-02 DIAGNOSIS — N17.9 AKI (ACUTE KIDNEY INJURY) (HCC): ICD-10-CM

## 2020-01-02 DIAGNOSIS — E78.5 HLD (HYPERLIPIDEMIA): ICD-10-CM

## 2020-01-02 DIAGNOSIS — E78.5 HYPERLIPIDEMIA: ICD-10-CM

## 2020-01-02 DIAGNOSIS — M79.604 RIGHT LEG PAIN: Primary | ICD-10-CM

## 2020-01-02 DIAGNOSIS — E03.9 HYPOTHYROIDISM, UNSPECIFIED TYPE: ICD-10-CM

## 2020-01-02 DIAGNOSIS — R53.1 GENERAL WEAKNESS: ICD-10-CM

## 2020-01-02 PROBLEM — E87.1 HYPONATREMIA: Status: ACTIVE | Noted: 2020-01-02

## 2020-01-02 LAB
ALBUMIN SERPL BCP-MCNC: 3.4 G/DL (ref 3.5–5)
ALP SERPL-CCNC: 143 U/L (ref 46–116)
ALT SERPL W P-5'-P-CCNC: 38 U/L (ref 12–78)
ANION GAP SERPL CALCULATED.3IONS-SCNC: 6 MMOL/L (ref 4–13)
ANION GAP SERPL CALCULATED.3IONS-SCNC: 7 MMOL/L (ref 4–13)
ANION GAP SERPL CALCULATED.3IONS-SCNC: 8 MMOL/L (ref 4–13)
AST SERPL W P-5'-P-CCNC: 7 U/L (ref 5–45)
BASOPHILS # BLD AUTO: 0.04 THOUSANDS/ΜL (ref 0–0.1)
BASOPHILS NFR BLD AUTO: 1 % (ref 0–1)
BILIRUB SERPL-MCNC: 0.61 MG/DL (ref 0.2–1)
BILIRUB UR QL STRIP: NEGATIVE
BUN SERPL-MCNC: 17 MG/DL (ref 5–25)
BUN SERPL-MCNC: 18 MG/DL (ref 5–25)
BUN SERPL-MCNC: 21 MG/DL (ref 5–25)
CALCIUM SERPL-MCNC: 8.5 MG/DL (ref 8.3–10.1)
CALCIUM SERPL-MCNC: 8.7 MG/DL (ref 8.3–10.1)
CALCIUM SERPL-MCNC: 9.2 MG/DL (ref 8.3–10.1)
CHLORIDE SERPL-SCNC: 104 MMOL/L (ref 100–108)
CHLORIDE SERPL-SCNC: 94 MMOL/L (ref 100–108)
CHLORIDE SERPL-SCNC: 99 MMOL/L (ref 100–108)
CLARITY UR: CLEAR
CLARITY, POC: CLEAR
CO2 SERPL-SCNC: 28 MMOL/L (ref 21–32)
CO2 SERPL-SCNC: 28 MMOL/L (ref 21–32)
CO2 SERPL-SCNC: 29 MMOL/L (ref 21–32)
COLOR UR: YELLOW
CREAT SERPL-MCNC: 0.99 MG/DL (ref 0.6–1.3)
CREAT SERPL-MCNC: 1.1 MG/DL (ref 0.6–1.3)
CREAT SERPL-MCNC: 1.4 MG/DL (ref 0.6–1.3)
EOSINOPHIL # BLD AUTO: 0.04 THOUSAND/ΜL (ref 0–0.61)
EOSINOPHIL NFR BLD AUTO: 1 % (ref 0–6)
ERYTHROCYTE [DISTWIDTH] IN BLOOD BY AUTOMATED COUNT: 12.1 % (ref 11.6–15.1)
EST. AVERAGE GLUCOSE BLD GHB EST-MCNC: 283 MG/DL
GFR SERPL CREATININE-BSD FRML MDRD: 54 ML/MIN/1.73SQ M
GFR SERPL CREATININE-BSD FRML MDRD: 73 ML/MIN/1.73SQ M
GFR SERPL CREATININE-BSD FRML MDRD: 82 ML/MIN/1.73SQ M
GLUCOSE SERPL-MCNC: 216 MG/DL (ref 65–140)
GLUCOSE SERPL-MCNC: 246 MG/DL (ref 65–140)
GLUCOSE SERPL-MCNC: 337 MG/DL (ref 65–140)
GLUCOSE SERPL-MCNC: 460 MG/DL (ref 65–140)
GLUCOSE SERPL-MCNC: 484 MG/DL (ref 65–140)
GLUCOSE SERPL-MCNC: 776 MG/DL (ref 65–140)
GLUCOSE SERPL-MCNC: >500 MG/DL (ref 65–140)
GLUCOSE UR STRIP-MCNC: ABNORMAL MG/DL
HBA1C MFR BLD: 11.5 % (ref 4.2–6.3)
HCT VFR BLD AUTO: 40.3 % (ref 36.5–49.3)
HGB BLD-MCNC: 13.1 G/DL (ref 12–17)
HGB UR QL STRIP.AUTO: NEGATIVE
IMM GRANULOCYTES # BLD AUTO: 0.03 THOUSAND/UL (ref 0–0.2)
IMM GRANULOCYTES NFR BLD AUTO: 1 % (ref 0–2)
KETONES UR STRIP-MCNC: ABNORMAL MG/DL
LEUKOCYTE ESTERASE UR QL STRIP: NEGATIVE
LYMPHOCYTES # BLD AUTO: 1.08 THOUSANDS/ΜL (ref 0.6–4.47)
LYMPHOCYTES NFR BLD AUTO: 16 % (ref 14–44)
MCH RBC QN AUTO: 29 PG (ref 26.8–34.3)
MCHC RBC AUTO-ENTMCNC: 32.5 G/DL (ref 31.4–37.4)
MCV RBC AUTO: 89 FL (ref 82–98)
MONOCYTES # BLD AUTO: 0.57 THOUSAND/ΜL (ref 0.17–1.22)
MONOCYTES NFR BLD AUTO: 9 % (ref 4–12)
NEUTROPHILS # BLD AUTO: 4.87 THOUSANDS/ΜL (ref 1.85–7.62)
NEUTS SEG NFR BLD AUTO: 72 % (ref 43–75)
NITRITE UR QL STRIP: NEGATIVE
NRBC BLD AUTO-RTO: 0 /100 WBCS
PH UR STRIP.AUTO: 6 [PH] (ref 4.5–8)
PLATELET # BLD AUTO: 264 THOUSANDS/UL (ref 149–390)
PMV BLD AUTO: 9.3 FL (ref 8.9–12.7)
POTASSIUM SERPL-SCNC: 4 MMOL/L (ref 3.5–5.3)
POTASSIUM SERPL-SCNC: 4.4 MMOL/L (ref 3.5–5.3)
POTASSIUM SERPL-SCNC: 4.6 MMOL/L (ref 3.5–5.3)
PROT SERPL-MCNC: 7.5 G/DL (ref 6.4–8.2)
PROT UR STRIP-MCNC: NEGATIVE MG/DL
RBC # BLD AUTO: 4.52 MILLION/UL (ref 3.88–5.62)
SODIUM SERPL-SCNC: 129 MMOL/L (ref 136–145)
SODIUM SERPL-SCNC: 136 MMOL/L (ref 136–145)
SODIUM SERPL-SCNC: 138 MMOL/L (ref 136–145)
SP GR UR STRIP.AUTO: <=1.005 (ref 1–1.03)
UROBILINOGEN UR QL STRIP.AUTO: 0.2 E.U./DL
WBC # BLD AUTO: 6.63 THOUSAND/UL (ref 4.31–10.16)

## 2020-01-02 PROCEDURE — 80053 COMPREHEN METABOLIC PANEL: CPT | Performed by: EMERGENCY MEDICINE

## 2020-01-02 PROCEDURE — 36415 COLL VENOUS BLD VENIPUNCTURE: CPT

## 2020-01-02 PROCEDURE — 97112 NEUROMUSCULAR REEDUCATION: CPT

## 2020-01-02 PROCEDURE — 99220 PR INITIAL OBSERVATION CARE/DAY 70 MINUTES: CPT | Performed by: INTERNAL MEDICINE

## 2020-01-02 PROCEDURE — 80048 BASIC METABOLIC PNL TOTAL CA: CPT | Performed by: INTERNAL MEDICINE

## 2020-01-02 PROCEDURE — 83036 HEMOGLOBIN GLYCOSYLATED A1C: CPT | Performed by: EMERGENCY MEDICINE

## 2020-01-02 PROCEDURE — 97012 MECHANICAL TRACTION THERAPY: CPT

## 2020-01-02 PROCEDURE — 96365 THER/PROPH/DIAG IV INF INIT: CPT

## 2020-01-02 PROCEDURE — 82948 REAGENT STRIP/BLOOD GLUCOSE: CPT

## 2020-01-02 PROCEDURE — 85025 COMPLETE CBC W/AUTO DIFF WBC: CPT | Performed by: EMERGENCY MEDICINE

## 2020-01-02 PROCEDURE — 3066F NEPHROPATHY DOC TX: CPT | Performed by: NEUROLOGICAL SURGERY

## 2020-01-02 PROCEDURE — 81003 URINALYSIS AUTO W/O SCOPE: CPT

## 2020-01-02 PROCEDURE — 99285 EMERGENCY DEPT VISIT HI MDM: CPT | Performed by: EMERGENCY MEDICINE

## 2020-01-02 PROCEDURE — 99285 EMERGENCY DEPT VISIT HI MDM: CPT

## 2020-01-02 PROCEDURE — 97110 THERAPEUTIC EXERCISES: CPT

## 2020-01-02 RX ORDER — DULOXETIN HYDROCHLORIDE 60 MG/1
60 CAPSULE, DELAYED RELEASE ORAL DAILY
Status: DISCONTINUED | OUTPATIENT
Start: 2020-01-03 | End: 2020-01-04 | Stop reason: HOSPADM

## 2020-01-02 RX ORDER — LEVOTHYROXINE SODIUM 0.03 MG/1
25 TABLET ORAL DAILY
Status: DISCONTINUED | OUTPATIENT
Start: 2020-01-03 | End: 2020-01-03

## 2020-01-02 RX ORDER — HEPARIN SODIUM 5000 [USP'U]/ML
5000 INJECTION, SOLUTION INTRAVENOUS; SUBCUTANEOUS EVERY 8 HOURS SCHEDULED
Status: DISCONTINUED | OUTPATIENT
Start: 2020-01-02 | End: 2020-01-04 | Stop reason: HOSPADM

## 2020-01-02 RX ORDER — ACETAMINOPHEN 325 MG/1
650 TABLET ORAL EVERY 6 HOURS PRN
Status: DISCONTINUED | OUTPATIENT
Start: 2020-01-02 | End: 2020-01-04 | Stop reason: HOSPADM

## 2020-01-02 RX ORDER — SODIUM CHLORIDE, SODIUM GLUCONATE, SODIUM ACETATE, POTASSIUM CHLORIDE, MAGNESIUM CHLORIDE, SODIUM PHOSPHATE, DIBASIC, AND POTASSIUM PHOSPHATE .53; .5; .37; .037; .03; .012; .00082 G/100ML; G/100ML; G/100ML; G/100ML; G/100ML; G/100ML; G/100ML
1000 INJECTION, SOLUTION INTRAVENOUS ONCE
Status: COMPLETED | OUTPATIENT
Start: 2020-01-02 | End: 2020-01-02

## 2020-01-02 RX ORDER — GABAPENTIN 300 MG/1
300 CAPSULE ORAL 3 TIMES DAILY
Status: DISCONTINUED | OUTPATIENT
Start: 2020-01-02 | End: 2020-01-04 | Stop reason: HOSPADM

## 2020-01-02 RX ORDER — CHLORAL HYDRATE 500 MG
1000 CAPSULE ORAL DAILY
Status: DISCONTINUED | OUTPATIENT
Start: 2020-01-03 | End: 2020-01-04 | Stop reason: HOSPADM

## 2020-01-02 RX ORDER — SODIUM CHLORIDE, SODIUM GLUCONATE, SODIUM ACETATE, POTASSIUM CHLORIDE, MAGNESIUM CHLORIDE, SODIUM PHOSPHATE, DIBASIC, AND POTASSIUM PHOSPHATE .53; .5; .37; .037; .03; .012; .00082 G/100ML; G/100ML; G/100ML; G/100ML; G/100ML; G/100ML; G/100ML
100 INJECTION, SOLUTION INTRAVENOUS CONTINUOUS
Status: DISCONTINUED | OUTPATIENT
Start: 2020-01-02 | End: 2020-01-03

## 2020-01-02 RX ADMIN — SODIUM CHLORIDE, SODIUM GLUCONATE, SODIUM ACETATE, POTASSIUM CHLORIDE AND MAGNESIUM CHLORIDE 100 ML/HR: 526; 502; 368; 37; 30 INJECTION, SOLUTION INTRAVENOUS at 18:38

## 2020-01-02 RX ADMIN — HEPARIN SODIUM 5000 UNITS: 5000 INJECTION INTRAVENOUS; SUBCUTANEOUS at 22:20

## 2020-01-02 RX ADMIN — GABAPENTIN 300 MG: 300 CAPSULE ORAL at 20:13

## 2020-01-02 RX ADMIN — SODIUM CHLORIDE, SODIUM GLUCONATE, SODIUM ACETATE, POTASSIUM CHLORIDE, MAGNESIUM CHLORIDE, SODIUM PHOSPHATE, DIBASIC, AND POTASSIUM PHOSPHATE 1000 ML: .53; .5; .37; .037; .03; .012; .00082 INJECTION, SOLUTION INTRAVENOUS at 17:19

## 2020-01-02 RX ADMIN — SODIUM CHLORIDE 10 UNITS/HR: 9 INJECTION, SOLUTION INTRAVENOUS at 17:36

## 2020-01-02 RX ADMIN — SODIUM CHLORIDE, SODIUM GLUCONATE, SODIUM ACETATE, POTASSIUM CHLORIDE AND MAGNESIUM CHLORIDE 100 ML/HR: 526; 502; 368; 37; 30 INJECTION, SOLUTION INTRAVENOUS at 18:14

## 2020-01-02 RX ADMIN — SODIUM CHLORIDE, SODIUM GLUCONATE, SODIUM ACETATE, POTASSIUM CHLORIDE, MAGNESIUM CHLORIDE, SODIUM PHOSPHATE, DIBASIC, AND POTASSIUM PHOSPHATE 1000 ML: .53; .5; .37; .037; .03; .012; .00082 INJECTION, SOLUTION INTRAVENOUS at 15:27

## 2020-01-02 NOTE — ED ATTENDING ATTESTATION
Fortino Zhou DO, saw and evaluated the patient  I have discussed the patient with the resident/non-physician practitioner and agree with the resident's/non-physician practitioner's findings, Plan of Care, and MDM as documented in the resident's/non-physician practitioner's note, except where noted  All available labs and Radiology studies were reviewed  At this point I agree with the current assessment done in the Emergency Department  I have conducted an independent evaluation of this patient including a focused history and a physical exam     ED Note - Parker Junior III 60 y o  male MRN: [id number]  Unit/Bed#: ED 13 Encounter: [id number]    History of Present Illness   HPI  Parker Junior III is a 60 y o  male who presents for evaluation of polyuria and polydipsia  Patient with history of insulin requirement while hospitalized recently  Also note hemoglobin A1c 6 4 in review of lab work  Patient with no specific complaints  Family history of diabetes in his mother  According to the patient's wife, he has been symptomatic for approximately 2 weeks  Patient states that he was instructed by his primary care physician to monitor his diet and eat healthy and patient states that he has not been compliant with this instruction  REVIEW OF SYSTEMS  See HPI for further details  12 systems reviewed and otherwise negative except as noted     Historical Information     PAST MEDICAL HISTORY  Past Medical History:   Diagnosis Date    BPH associated with nocturia     Cervical spinal mass ([facility] [address] ) 01/07/2019    cervicothoracic    Disease of thyroid gland     Herniated disc, cervical     History of radiation therapy     Hyperlipidemia     Impaired fasting glucose        FAMILY HISTORY  Family History   Problem Relation Age of Onset    Diabetes Mother     Hypertension Father     Cerebral palsy Sister     Breast cancer Daughter        SOCIAL HISTORY  Social History     Socioeconomic History    Marital status: /Civil Union     Spouse name: Lauretha Fleischer Number of children: 3    Years of education: 15    Highest education level: None   Occupational History    Occupation: Disability   Social Needs    Financial resource strain: None    Food insecurity:     Worry: None     Inability: None    Transportation needs:     Medical: None     Non-medical: None   Tobacco Use    Smoking status: Former Smoker     Last attempt to quit: 1993     Years since quittin 6    Smokeless tobacco: Never Used   Substance and Sexual Activity    Alcohol use: Yes     Comment: SOCIAL     Drug use: No    Sexual activity: None   Lifestyle    Physical activity:     Days per week: None     Minutes per session: None    Stress: None   Relationships    Social connections:     Talks on phone: None     Gets together: None     Attends Anabaptist service: None     Active member of club or organization: None     Attends meetings of clubs or organizations: None     Relationship status: None    Intimate partner violence:     Fear of current or ex partner: None     Emotionally abused: None     Physically abused: None     Forced sexual activity: None   Other Topics Concern    None   Social History Narrative    Lives at home with spouse and 3 children    Has 3 biological children and 3 of spouses children total of 6 children    2 grandchildren,3 stepgrandchildren       SURGICAL HISTORY  Past Surgical History:   Procedure Laterality Date    APPENDECTOMY      CERVICAL FUSION      COLONOSCOPY  2012    NORMAL; RECHECK IN 5 YEARS    FL LUMBAR PUNCTURE DIAGNOSTIC  2019    MI BX/EXCIS SPIN PATEL,INDUR,INMED,CERV N/A 2019    Procedure: Posterior C4-T3 laminectomy; resection of intramedullary mass C5-T3, including fenestration of syrinx C7-T2; C3-T4 fixation/fusion; additional levels as needed;  Surgeon: Felecia Awan MD;  [facility];  Service: Neurosurgery    WISDOM TOOTH EXTRACTION       Meds/Allergies CURRENT MEDICATIONS  No current facility-administered medications for this encounter  Current Outpatient Medications:     DULoxetine (CYMBALTA) 60 mg delayed release capsule, Take 1 capsule (60 mg total) by mouth daily, Disp: 30 capsule, Rfl: 2    gabapentin (NEURONTIN) 300 mg capsule, Take 1 capsule (300 mg total) by mouth 3 (three) times a day, Disp: 90 capsule, Rfl: 2    levothyroxine 25 mcg tablet, TAKE 1 TABLET BY MOUTH EVERY DAY, Disp: 90 tablet, Rfl: 1    Omega-3 Fatty Acids (FISH OIL) 1,000 mg, Take 1 capsule (1,000 mg total) by mouth daily Resume on 2/20/19, Disp: 90 capsule, Rfl: 1    omega-3-acid ethyl esters (LOVAZA) 1 g capsule, Take 2 capsules (2 g total) by mouth daily (Patient not taking: Reported on 1/2/2020), Disp: 90 capsule, Rfl: 1    (Not in a hospital admission)    ALLERGIES  Allergies   Allergen Reactions    Bee Venom Hives, Itching and Edema     Category: Allergy;     Penicillins Hives and Itching     Category: Allergy;      Objective     PHYSICAL EXAM    VITAL SIGNS: Blood pressure 122/73, pulse 74, temperature 98 4 °F (36 9 °C), resp  rate 18, weight 83 9 kg (185 lb), SpO2 96 %      Constitutional:  Appears well developed and well nourished, no acute distress, non-toxic appearance   Eyes:  PERRL, EOMI, conjunctivae pink, sclerae non-icteric    HENT:  Normocephalic/Atraumatic, no rhinorrhea, mucous membranes moist   Neck: normal range of motion, no tenderness, supple   Respiratory:  No respiratory distress, normal breath sounds   Cardiovascular:  Normal rate, normal rhythm    GI:  Soft, non-tender, non-distended  :  No CVAT, no flank ecchymosis  Musculoskeletal:  No swelling or edema, no tenderness, no deformities  Integument:  Pink, warm, dry, Well hydrated, no rash, no erythema, no bullae   Lymphatic:  No cervical/ tonsillar/ submandibular lymphadenopathy noted   Neurologic:  Awake, Alert & oriented x 3, CN 2-12 intact, no focal neurological deficits, motor function intact  Psychiatric:  Speech and behavior appropriate       ED COURSE and MDM:    Assessment/Plan   Assessment:  Patrick Duff III is a 60 y o  male presents for evaluation of polyuria and polydipsia  Plan:  Labs, IV fluids, symptom management, disposition as appropriate  Endocrine consult  CRITICAL CARE TIME: 0 minutes      Portions of the record may have been created with voice recognition software  Occasional wrong word or "sound a like" substitutions may have occurred due to the inherent limitations of voice recognition software       ED Provider  Electronically Signed by

## 2020-01-02 NOTE — ASSESSMENT & PLAN NOTE
Status post surgery and radiation  Follows up with Neurosurgery outpatient  Continue home dose gabapentin

## 2020-01-02 NOTE — ED NOTES
Dr Adrian Boas at bedside at this time     Luly Zapata, [facility]  01/02/20 6881

## 2020-01-02 NOTE — ASSESSMENT & PLAN NOTE
and HDL 30 to in October 2019  Not on treatment  Recheck lipid profile and start statin if indicated

## 2020-01-02 NOTE — PROGRESS NOTES
Daily Note     Today's date: 2020  Patient name: Deon Boston  : 1960  MRN: [id number]  Referring provider: Geovanna Martinez MD  Dx:   Encounter Diagnosis     ICD-10-CM    1  Right leg pain M79 604    2  General weakness R53 1    3  Spinal cord abscess G06 1    4  DDD (degenerative disc disease), lumbar M51 36                    Subjective: pt reports relief with traction noted, pt reports increased tremors noted at times L UE      Objective: See treatment diary below      Assessment: pt improving with higher level balance ex without AFO on, but pt cont to require increased assist with increased fatigue      Plan: Continue per plan of care  Progress treatment as tolerated         Precautions: Cervical tumor    Daily Treatment Diary     Manual  12/3 12/5/19 12/10 12/17     11/26   PNF R LE            PNF R pelvis in L SL            CPA JF JF JF      JF   PPU OP JF JF       JF   Lumbar gapping                   Exercise Diary  19   RB  9' lvl2 7'lvl5       SL clamshells 3*10 3x10 3x10    3x10   Biodex          STS 3*10 3x10 3x10    2x10   Step-ups          Backward amb          Tandem walk          Bridges with arms crossed          R SLR 3*10 3x10 3x10    3x10   PPT          Standing abd 3*10 3x10 3x10    3x10   CLARITA          PPU          Obstacle course + cones 15 min 10 min 10 min    15 min   Low row TB          Mini squats 3*10 3x10 3x10    3x10   Sit to stands  3x10 2x10       Sciatic nerve glides              Modalities  11/29 12/10 12/12 12/20/19 12/27/19 1/2/20       NMES with DF JF            Mechanical tractoin  JF JF VK VK VK

## 2020-01-02 NOTE — ED PROVIDER NOTES
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, who presents to the ED today with SOB on exertion, urinary frequency, blurry vision, tremors in b/l UE for 1-2 weeks  Patient's last A1c was 6 4 in October patient says that he has never been diagnosed with diabetes  He does state that his mother had type 2 diabetes  No bleeding family history of type 1 diabetes  Patient did not have any preceding illnesses before his symptoms started approximately 2 weeks ago  Over the last 2 weeks his above symptoms have been gradually progressing  This morning he went to see his eye doctor, who found that his eye is looked well but did a point of care blood sugar check as she felt that his symptoms were not explained by a problem specific to his eyes  She sent him to the emergency department for further evaluation of his blood sugar was greater than 500  He denies being in any pain  His shortness of breath on exertion has been happening over the last couple of days  He denies associated chest pain, nausea, vomiting  The patient denies fevers, chills, headaches, syncope, vertigo, nausea, vomiting, hearing changes, chest pain, shortness of breath, cough, abdominal pain, changes in usual bowel movements, back pain, numbness or tingling, rashes, pain anywhere else in body  The patient has no sick contacts, recent travel history, new or changing medications, or immunocompromise  Patient denies use of drugs or alcohol        History provided by:  Patient   used: No    Hyperglycemia - Symptomatic   Blood sugar level PTA:  >500  Severity: Severe  Onset quality:  Gradual  Duration:  2 weeks  Timing:  Constant  Progression:  Worsening  Chronicity:  New  Diabetes status:  Unable to specify  Relieved by:  Nothing  Ineffective treatments:  None tried  Associated symptoms: blurred vision, dehydration, dizziness, fatigue, increased thirst, polyuria, shortness of breath (on exertion) and weakness    Associated symptoms: no abdominal pain, no chest pain, no confusion, no dysuria, no fever, no nausea and no vomiting        Prior to Admission Medications   Prescriptions Last Dose Informant Patient Reported? Taking?    DULoxetine (CYMBALTA) 60 mg delayed release capsule   No Yes   Sig: Take 1 capsule (60 mg total) by mouth daily   Omega-3 Fatty Acids (FISH OIL) 1,000 mg   No Yes   Sig: Take 1 capsule (1,000 mg total) by mouth daily Resume on 2/20/19   gabapentin (NEURONTIN) 300 mg capsule   No Yes   Sig: Take 1 capsule (300 mg total) by mouth 3 (three) times a day   levothyroxine 25 mcg tablet  Self No Yes   Sig: TAKE 1 TABLET BY MOUTH EVERY DAY   omega-3-acid ethyl esters (LOVAZA) 1 g capsule Not Taking at Unknown time  No No   Sig: Take 2 capsules (2 g total) by mouth daily   Patient not taking: Reported on 1/2/2020      Facility-Administered Medications: None       Past Medical History:   Diagnosis Date    BPH associated with nocturia     Cervical spinal mass ([facility] [address] ) 01/07/2019    cervicothoracic    Disease of thyroid gland     Herniated disc, cervical     History of radiation therapy     Hyperlipidemia     Impaired fasting glucose        Past Surgical History:   Procedure Laterality Date    APPENDECTOMY      CERVICAL FUSION      COLONOSCOPY  03/13/2012    NORMAL; RECHECK IN 5 YEARS    FL LUMBAR PUNCTURE DIAGNOSTIC  2/21/2019    MI BX/EXCIS SPIN PATEL,INDUR,INMED,CERV N/A 1/24/2019    Procedure: Posterior C4-T3 laminectomy; resection of intramedullary mass C5-T3, including fenestration of syrinx C7-T2; C3-T4 fixation/fusion; additional levels as needed;  Surgeon: Karli Castle MD;  [facility];  Service: Neurosurgery    WISDOM TOOTH EXTRACTION          Family History   Problem Relation Age of Onset    Diabetes Mother     Hypertension Father     Cerebral palsy Sister     Breast cancer Daughter      I have reviewed and agree with the history as documented  Social History     Tobacco Use    Smoking status: Former Smoker     Last attempt to quit: 1993     Years since quittin 6    Smokeless tobacco: Never Used   Substance Use Topics    Alcohol use: Yes     Comment: SOCIAL     Drug use: No        Review of Systems   Constitutional: Positive for fatigue  Negative for activity change, appetite change, chills and fever  HENT: Negative for rhinorrhea and sore throat  Eyes: Positive for blurred vision and visual disturbance  Respiratory: Positive for shortness of breath (on exertion)  Negative for cough  Cardiovascular: Negative for chest pain and palpitations  Gastrointestinal: Negative for abdominal pain, diarrhea, nausea and vomiting  Endocrine: Positive for polydipsia and polyuria  Genitourinary: Positive for frequency  Negative for difficulty urinating, dysuria and flank pain  Musculoskeletal: Negative for arthralgias, back pain, myalgias and neck pain  Skin: Negative for rash  Neurological: Positive for dizziness, weakness and light-headedness  Negative for numbness and headaches  Psychiatric/Behavioral: Negative for confusion  All other systems reviewed and are negative        Physical Exam  ED Triage Vitals   Temperature Pulse Respirations Blood Pressure SpO2   20 1356 20 1356 20 1356 20 1356 20 1356   98 4 °F (36 9 °C) 86 18 133/84 98 %      Temp src Heart Rate Source Patient Position - Orthostatic VS BP Location FiO2 (%)   -- 20 1615 20 1515 20 1515 --    Monitor Sitting Right arm       Pain Score       20 1356       No Pain             Orthostatic Vital Signs  Vitals:    01/02/20 1515 01/02/20 1614 01/02/20 1615 01/02/20 1711   BP: 137/60 122/73 122/73 121/69   Pulse: 86 74 74 77   Patient Position - Orthostatic VS: Sitting Sitting Sitting Sitting       Physical Exam   Constitutional: He appears well-developed and well-nourished  No distress  HENT:   Head: Normocephalic and atraumatic  Dry mucous membranes   Eyes: Pupils are equal, round, and reactive to light  Conjunctivae and EOM are normal  No scleral icterus  Neck: Neck supple  No JVD present  No tracheal deviation present  Cardiovascular: Normal rate and regular rhythm  Pulmonary/Chest: Effort normal and breath sounds normal  No respiratory distress  Abdominal: Soft  There is no tenderness  There is no guarding  Musculoskeletal: He exhibits no edema or deformity  Neurological: He is alert  Strength 5/5 and sensation intact to all extremities  Cranial nerves 2-12 grossly intact  Skin: Skin is warm and dry  No rash noted  He is not diaphoretic  Psychiatric: He has a normal mood and affect  His behavior is normal    Nursing note and vitals reviewed        ED Medications  Medications   insulin regular (HumuLIN R,NovoLIN R) 1 Units/mL in sodium chloride 0 9 % 100 mL infusion (has no administration in time range)   multi-electrolyte (PLASMALYTE-A/ISOLYTE-S PH 7 4) IV solution (has no administration in time range)   multi-electrolyte (ISOLYTE-S PH 7 4) bolus 1,000 mL (has no administration in time range)   multi-electrolyte (ISOLYTE-S PH 7 4) bolus 1,000 mL (0 mL Intravenous Stopped 1/2/20 1627)       Diagnostic Studies  Results Reviewed     Procedure Component Value Units Date/Time    Basic metabolic panel [[id number]] Collected:  01/02/20 1717    Lab Status:  No result Specimen:  Blood from Arm, Right     Hemoglobin A1C [[id number]]     Lab Status:  No result Specimen:  Blood     Comprehensive metabolic panel [[id number]]  (Abnormal) Collected:  01/02/20 1408    Lab Status:  Final result Specimen:  Blood from Arm, Right Updated:  01/02/20 1456     Sodium 129 mmol/L      Potassium 4 6 mmol/L      Chloride 94 mmol/L      CO2 28 mmol/L      ANION GAP 7 mmol/L      BUN 21 mg/dL      Creatinine 1 40 mg/dL      Glucose 776 mg/dL      Calcium 9 2 mg/dL      AST 7 U/L      ALT 38 U/L      Alkaline Phosphatase 143 U/L      Total Protein 7 5 g/dL      Albumin 3 4 g/dL      Total Bilirubin 0 61 mg/dL      eGFR 54 ml/min/1 73sq m     Narrative:       [place] guidelines for Chronic Kidney Disease (CKD):     Stage 1 with normal or high GFR (GFR > 90 mL/min/1 73 square meters)    Stage 2 Mild CKD (GFR = 60-89 mL/min/1 73 square meters)    Stage 3A Moderate CKD (GFR = 45-59 mL/min/1 73 square meters)    Stage 3B Moderate CKD (GFR = 30-44 mL/min/1 73 square meters)    Stage 4 Severe CKD (GFR = 15-29 mL/min/1 73 square meters)    Stage 5 End Stage CKD (GFR <15 mL/min/1 73 square meters)  Note: GFR calculation is accurate only with a steady state creatinine    POCT urinalysis dipstick [[id number]]  (Normal) Resulted:  01/02/20 1429    Lab Status:  Final result Updated:  01/02/20 1430     Clarity, UA clear    Urine Macroscopic, POC [[id number]]  (Abnormal) Collected:  01/02/20 1431    Lab Status:  Final result Specimen:  Urine Updated:  01/02/20 1429     Color, UA Yellow     Clarity, UA Clear     pH, UA 6 0     Leukocytes, UA Negative     Nitrite, UA Negative     Protein, UA Negative mg/dl      Glucose,  (1/2%) mg/dl      Ketones, UA Trace mg/dl      Urobilinogen, UA 0 2 E U /dl      Bilirubin, UA Negative     Blood, UA Negative     Specific Gravity, UA <=1 005    Narrative:       CLINITEK RESULT    CBC and differential [[id number]] Collected:  01/02/20 1408    Lab Status:  Final result Specimen:  Blood from Arm, Right Updated:  01/02/20 1418     WBC 6 63 Thousand/uL      RBC 4 52 Million/uL      Hemoglobin 13 1 g/dL      Hematocrit 40 3 %      MCV 89 fL      MCH 29 0 pg      MCHC 32 5 g/dL      RDW 12 1 %      MPV 9 3 fL      Platelets 068 Thousands/uL      nRBC 0 /100 WBCs      Neutrophils Relative 72 %      Immat GRANS % 1 %      Lymphocytes Relative 16 %      Monocytes Relative 9 %      Eosinophils Relative 1 %      Basophils Relative 1 %      Neutrophils Absolute 4 87 Thousands/µL      Immature Grans Absolute 0 03 Thousand/uL      Lymphocytes Absolute 1 08 Thousands/µL      Monocytes Absolute 0 57 Thousand/µL      Eosinophils Absolute 0 04 Thousand/µL      Basophils Absolute 0 04 Thousands/µL     Fingerstick Glucose (POCT) [[id number]]  (Abnormal) Collected:  01/02/20 1354    Lab Status:  Final result Updated:  01/02/20 1358     POC Glucose >500 mg/dl                  No orders to display         Procedures  Procedures      ED Course  ED Course as of Jan 02 1718   Thu Jan 02, 2020   1633 Spoke with endocrinologist on call, who can see patient tomorrow  Will admit for obs for hydration, blood sugar checks, and endocrinology consult                                  MDM  Number of Diagnoses or Management Options  Diagnosis management comments: 9year-old male with last A1c several months ago 6 4, presents today with symptomatic hyperglycemia  He is not on medications for diabetes  He has not had any recent associated illnesses  Will rehydrate the patient with isolated boluses and re-evaluate  If patient is able to tolerate p o  And blood sugar decreases with improvement in his JUAN CARLOS, will discharge with close follow-up with his primary care provider, referral to endocrinology, and return precautions         Amount and/or Complexity of Data Reviewed  Clinical lab tests: ordered and reviewed  Tests in the medicine section of CPT®: reviewed and ordered  Review and summarize past medical records: yes  Discuss the patient with other providers: yes    Risk of Complications, Morbidity, and/or Mortality  Presenting problems: moderate  Diagnostic procedures: low  Management options: low    Patient Progress  Patient progress: stable        Disposition  Final diagnoses:   Hyperglycemia   JUAN CARLOS (acute kidney injury) ([facility] [address] )     Time reflects when diagnosis was documented in both MDM as applicable and the Disposition within this note     Time User Action Codes Description Comment    1/2/2020  5:01 PM Kaye Rizzo Add [R73 9] Hyperglycemia     1/2/2020  5:01 PM Kaye Rizzo Add [N17 9] JUAN CARLOS (acute kidney injury) [facility])       ED Disposition     ED Disposition Condition Date/Time Comment    Admit Stable Thu Jan 2, 2020  5:01 PM Patient discussed with Dr Vic Smith of [facility], the patient is status agreed to be observation status to the service of Dr Vic Smith  Follow-up Information    None         Patient's Medications   Discharge Prescriptions    No medications on file     No discharge procedures on file  ED Provider  Attending physically available and evaluated [place] COLEMAN managed the patient along with the ED Attending      Electronically Signed by         Malcolm Mabry DO  01/02/20 1823

## 2020-01-02 NOTE — ASSESSMENT & PLAN NOTE
Suspect related to dehydration and controlled hyperglycemia  UA shows trace ketones and glycosuria  Received 1 L IV fluids in ER, continue IV hydration with isolyte  BMP every 6 hours for now

## 2020-01-02 NOTE — H&P
H&P- Jesus Caballero III 1960, 60 y o  male MRN: [id number]    Unit/Bed#: ED 13 Encounter: [id number]    Primary Care Provider: Gloria Nichole MD   Date and time admitted to hospital: 1/2/2020  1:55 PM        * uncotnrolled hyperglycemia  Assessment & Plan  · Last HbA1C in October 2019 was 6 4  · Now with BG on 500-776 with symptomatic hyperglycemia  · Known family history of diabetes  · Anion gap normal, bicarb normal  · Potassium 4 6  · UA shows trace ketone  · Sodium lower due to hyperglycemia  · Received 1 L isolye in ER    PLAN:  · Will start on insulin infusion non DKA protocol, Accu-Chek every 2 hours and titrate accordingly  · Check HbA1c  · Continue IV hydration  · Trend BMP every 6 hours to evaluate for electrolytes including sodium and potassium  If K trend down below 3 5, hold insulin infusion and replete potassium first   · Hypoglycemia protocol  · Endocrine evaluation in a m  Acute kidney injury [facility])  Assessment & Plan  Suspect related to dehydration and controlled hyperglycemia  UA shows trace ketones and glycosuria  Received 1 L IV fluids in ER, continue IV hydration with isolyte  BMP every 6 hours for now  Hyponatremia  Assessment & Plan  Secondary to hyperglycemia  Corrected sodium for hyperglycemia is 140  Trend BMP    Dyslipidemia  Assessment & Plan   and HDL 30 to in October 2019  Not on treatment  Recheck lipid profile and start statin if indicated    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[facility])  Assessment & Plan  Status post surgery and radiation  Follows up with Neurosurgery outpatient  Continue home dose gabapentin    Hypothyroidism  Assessment & Plan  Continue home dose levothyroxine  Check TSH          VTE Prophylaxis: Heparin  / sequential compression device   Code Status: full      Anticipated Length of Stay:  Patient will be admitted on an Observation basis with an anticipated length of stay of  Less than 2 midnights     Justification for Hospital Stay: ongoing evaluation    Total Time for Visit, including Counseling / Coordination of Care: 45 minutes  Greater than 50% of this total time spent on direct patient counseling and coordination of care  Chief Complaint:   Blurry vision, polyuria, polydipsia    History of Present Illness:    Felix Teague III is a 60 y o  male with medical history as mentioned below who was sent in from Optometrist' s office due to uncontrolled hyperglycemia  Patient reports that for last 2 weeks, he has been having worsening blurry vision, cloudiness, polyuria and polydipsia  He went to get his eyes checked and given his symptoms, his BG was checked which was more than 500 and he was sent to ER  In ER, patient is slightly anxious but denies any acute pain or discomfort  Last HbA1C in October 2019 was 6 4    Review of Systems:    Review of Systems   Constitutional: Positive for fatigue  Negative for chills and fever  HENT: Negative  Respiratory: Negative  Cardiovascular: Negative  Gastrointestinal: Negative  Endocrine: Positive for polydipsia and polyuria  Genitourinary: Positive for dysuria and frequency  Musculoskeletal: Negative  Neurological: Positive for light-headedness  Negative for syncope, weakness, numbness and headaches         Past Medical and Surgical History:     Past Medical History:   Diagnosis Date    BPH associated with nocturia     Cervical spinal mass ([facility] [address] ) 01/07/2019    cervicothoracic    Disease of thyroid gland     Herniated disc, cervical     History of radiation therapy     Hyperlipidemia     Impaired fasting glucose        Past Surgical History:   Procedure Laterality Date    APPENDECTOMY      CERVICAL FUSION      COLONOSCOPY  03/13/2012    NORMAL; RECHECK IN 5 YEARS    FL LUMBAR PUNCTURE DIAGNOSTIC  2/21/2019    [place] BX/EXCIS SPIN PATEL,INDUR,INMED,CERV N/A 1/24/2019    Procedure: Posterior C4-T3 laminectomy; resection of intramedullary mass C5-T3, including fenestration of syrinx C7-T2; C3-T4 fixation/fusion; additional levels as needed;  Surgeon: Apple August MD;  Location: [facility];  Service: Neurosurgery    WISDOM TOOTH EXTRACTION         Meds/Allergies:    Prior to Admission medications    Medication Sig Start Date End Date Taking? Authorizing Provider   DULoxetine (CYMBALTA) 60 mg delayed release capsule Take 1 capsule (60 mg total) by mouth daily 19  Yes CLAUDIO Mcgraw   gabapentin (NEURONTIN) 300 mg capsule Take 1 capsule (300 mg total) by mouth 3 (three) times a day 19  Yes CLAUDIO Mcgraw   levothyroxine 25 mcg tablet TAKE 1 TABLET BY MOUTH EVERY DAY 19  Yes Harsh Gant MD   Omega-3 Fatty Acids (FISH OIL) 1,000 mg Take 1 capsule (1,000 mg total) by mouth daily Resume on 19  Yes Isaias Jiménez MD   xbemf-3-ecpa ethyl esters (LOVAZA) 1 g capsule Take 2 capsules (2 g total) by mouth daily  Patient not taking: Reported on 19   Isaias Jiménez MD         Allergies: Allergies   Allergen Reactions    Bee Venom Hives, Itching and Edema     Category: Allergy;     Penicillins Hives and Itching     Category: Allergy;        Social History:     Marital Status: /Civil Union     Substance Use History:   Social History     Substance and Sexual Activity   Alcohol Use Yes    Comment: SOCIAL      Social History     Tobacco Use   Smoking Status Former Smoker    Last attempt to quit: 1993    Years since quittin 6   Smokeless Tobacco Never Used     Social History     Substance and Sexual Activity   Drug Use No       Family History:    non-contributory    Physical Exam:     Vitals:   Blood Pressure: 121/69 (20 1711)  Pulse: 77 (20 171)  Temperature: 98 4 °F (36 9 °C) (20 135)  Respirations: 18 (20 171)  Weight - Scale: 83 9 kg (185 lb) (20 135)  SpO2: 97 % (20)    Physical Exam   Constitutional: No distress  Eyes: Pupils are equal, round, and reactive to light  Cardiovascular: Normal rate, regular rhythm and normal heart sounds  No murmur heard  Pulmonary/Chest: Effort normal and breath sounds normal  No respiratory distress  He has no wheezes  Abdominal: Soft  Bowel sounds are normal  He exhibits no distension  There is no tenderness  Musculoskeletal: He exhibits no edema  Neurological: He is alert  Awake and communicative  No focal deficits   Skin: Skin is warm  Additional Data:     Lab Results: I have personally reviewed pertinent reports  Results from last 7 days   Lab Units 01/02/20  1408   WBC Thousand/uL 6 63   HEMOGLOBIN g/dL 13 1   HEMATOCRIT % 40 3   PLATELETS Thousands/uL 264   NEUTROS PCT % 72   LYMPHS PCT % 16   MONOS PCT % 9   EOS PCT % 1     Results from last 7 days   Lab Units 01/02/20  1408   SODIUM mmol/L 129*   POTASSIUM mmol/L 4 6   CHLORIDE mmol/L 94*   CO2 mmol/L 28   BUN mg/dL 21   CREATININE mg/dL 1 40*   ANION GAP mmol/L 7   CALCIUM mg/dL 9 2   ALBUMIN g/dL 3 4*   TOTAL BILIRUBIN mg/dL 0 61   ALK PHOS U/L 143*   ALT U/L 38   AST U/L 7   GLUCOSE RANDOM mg/dL 776*         Results from last 7 days   Lab Units 01/02/20  1354   POC GLUCOSE mg/dl >500*               Imaging: I have personally reviewed pertinent reports  No orders to display         [facility] / [place] Records Reviewed: Yes     ** Please Note: This note has been constructed using a voice recognition system   **

## 2020-01-02 NOTE — ASSESSMENT & PLAN NOTE
· Last HbA1C in October 2019 was 6 4  · Now with BG on 500-776 with symptomatic hyperglycemia  · Known family history of diabetes  · Anion gap normal, bicarb normal  · Potassium 4 6  · UA shows trace ketone  · Sodium lower due to hyperglycemia  · Received 1 L isolye in ER    PLAN:  · Will start on insulin infusion non DKA protocol, Accu-Chek every 2 hours and titrate accordingly  · Check HbA1c  · Continue IV hydration  · Trend BMP every 6 hours to evaluate for electrolytes including sodium and potassium  If K trend down below 3 5, hold insulin infusion and replete potassium first   · Hypoglycemia protocol  · Endocrine evaluation in a m

## 2020-01-02 NOTE — ED NOTES
Spoke with CW2 charge RN and he states that he needs to call PACS to have the room assignment changed     Jackson Núñez RN  01/02/20 Ney Lambert RN  01/02/20 0473

## 2020-01-02 NOTE — PLAN OF CARE
Problem: Potential for Falls  Goal: Patient will remain free of falls  Description  INTERVENTIONS:  - Assess patient frequently for physical needs  -  Identify cognitive and physical deficits and behaviors that affect risk of falls    -  [place] fall precautions as indicated by assessment   - Educate patient/family on patient safety including physical limitations  - Instruct patient to call for assistance with activity based on assessment  - Modify environment to reduce risk of injury  - Consider OT/PT consult to assist with strengthening/mobility  Outcome: Progressing

## 2020-01-03 PROBLEM — E78.5 HYPERLIPIDEMIA: Status: ACTIVE | Noted: 2020-01-03

## 2020-01-03 PROBLEM — E11.65 TYPE 2 DIABETES MELLITUS WITH HYPERGLYCEMIA, WITHOUT LONG-TERM CURRENT USE OF INSULIN (HCC): Status: ACTIVE | Noted: 2020-01-03

## 2020-01-03 LAB
ALBUMIN SERPL BCP-MCNC: 2.6 G/DL (ref 3.5–5)
ALP SERPL-CCNC: 106 U/L (ref 46–116)
ALT SERPL W P-5'-P-CCNC: 29 U/L (ref 12–78)
ANION GAP SERPL CALCULATED.3IONS-SCNC: 6 MMOL/L (ref 4–13)
ANION GAP SERPL CALCULATED.3IONS-SCNC: 8 MMOL/L (ref 4–13)
AST SERPL W P-5'-P-CCNC: 14 U/L (ref 5–45)
BASOPHILS # BLD AUTO: 0.04 THOUSANDS/ΜL (ref 0–0.1)
BASOPHILS NFR BLD AUTO: 1 % (ref 0–1)
BILIRUB DIRECT SERPL-MCNC: 0.16 MG/DL (ref 0–0.2)
BILIRUB SERPL-MCNC: 0.5 MG/DL (ref 0.2–1)
BUN SERPL-MCNC: 16 MG/DL (ref 5–25)
BUN SERPL-MCNC: 19 MG/DL (ref 5–25)
CALCIUM SERPL-MCNC: 7.9 MG/DL (ref 8.3–10.1)
CALCIUM SERPL-MCNC: 8.2 MG/DL (ref 8.3–10.1)
CHLORIDE SERPL-SCNC: 106 MMOL/L (ref 100–108)
CHLORIDE SERPL-SCNC: 106 MMOL/L (ref 100–108)
CHOLEST SERPL-MCNC: 158 MG/DL (ref 50–200)
CO2 SERPL-SCNC: 26 MMOL/L (ref 21–32)
CO2 SERPL-SCNC: 27 MMOL/L (ref 21–32)
CREAT SERPL-MCNC: 0.81 MG/DL (ref 0.6–1.3)
CREAT SERPL-MCNC: 0.81 MG/DL (ref 0.6–1.3)
CREAT UR-MCNC: 44.3 MG/DL
EOSINOPHIL # BLD AUTO: 0.18 THOUSAND/ΜL (ref 0–0.61)
EOSINOPHIL NFR BLD AUTO: 3 % (ref 0–6)
ERYTHROCYTE [DISTWIDTH] IN BLOOD BY AUTOMATED COUNT: 12.2 % (ref 11.6–15.1)
GFR SERPL CREATININE-BSD FRML MDRD: 97 ML/MIN/1.73SQ M
GFR SERPL CREATININE-BSD FRML MDRD: 97 ML/MIN/1.73SQ M
GLUCOSE SERPL-MCNC: 114 MG/DL (ref 65–140)
GLUCOSE SERPL-MCNC: 121 MG/DL (ref 65–140)
GLUCOSE SERPL-MCNC: 139 MG/DL (ref 65–140)
GLUCOSE SERPL-MCNC: 144 MG/DL (ref 65–140)
GLUCOSE SERPL-MCNC: 154 MG/DL (ref 65–140)
GLUCOSE SERPL-MCNC: 169 MG/DL (ref 65–140)
GLUCOSE SERPL-MCNC: 202 MG/DL (ref 65–140)
GLUCOSE SERPL-MCNC: 203 MG/DL (ref 65–140)
GLUCOSE SERPL-MCNC: 217 MG/DL (ref 65–140)
GLUCOSE SERPL-MCNC: 218 MG/DL (ref 65–140)
GLUCOSE SERPL-MCNC: 266 MG/DL (ref 65–140)
GLUCOSE SERPL-MCNC: 338 MG/DL (ref 65–140)
GLUCOSE SERPL-MCNC: 341 MG/DL (ref 65–140)
GLUCOSE SERPL-MCNC: 78 MG/DL (ref 65–140)
HCT VFR BLD AUTO: 35.7 % (ref 36.5–49.3)
HDLC SERPL-MCNC: 22 MG/DL
HGB BLD-MCNC: 11.6 G/DL (ref 12–17)
IMM GRANULOCYTES # BLD AUTO: 0.03 THOUSAND/UL (ref 0–0.2)
IMM GRANULOCYTES NFR BLD AUTO: 1 % (ref 0–2)
LDLC SERPL CALC-MCNC: 84 MG/DL (ref 0–100)
LYMPHOCYTES # BLD AUTO: 1.28 THOUSANDS/ΜL (ref 0.6–4.47)
LYMPHOCYTES NFR BLD AUTO: 24 % (ref 14–44)
MAGNESIUM SERPL-MCNC: 2.3 MG/DL (ref 1.6–2.6)
MCH RBC QN AUTO: 29.2 PG (ref 26.8–34.3)
MCHC RBC AUTO-ENTMCNC: 32.5 G/DL (ref 31.4–37.4)
MCV RBC AUTO: 90 FL (ref 82–98)
MICROALBUMIN UR-MCNC: <5 MG/L (ref 0–20)
MICROALBUMIN/CREAT 24H UR: <11 MG/G CREATININE (ref 0–30)
MONOCYTES # BLD AUTO: 0.47 THOUSAND/ΜL (ref 0.17–1.22)
MONOCYTES NFR BLD AUTO: 9 % (ref 4–12)
NEUTROPHILS # BLD AUTO: 3.24 THOUSANDS/ΜL (ref 1.85–7.62)
NEUTS SEG NFR BLD AUTO: 62 % (ref 43–75)
NONHDLC SERPL-MCNC: 136 MG/DL
NRBC BLD AUTO-RTO: 0 /100 WBCS
PLATELET # BLD AUTO: 245 THOUSANDS/UL (ref 149–390)
PMV BLD AUTO: 9.4 FL (ref 8.9–12.7)
POTASSIUM SERPL-SCNC: 3.4 MMOL/L (ref 3.5–5.3)
POTASSIUM SERPL-SCNC: 4.2 MMOL/L (ref 3.5–5.3)
PROT SERPL-MCNC: 6.1 G/DL (ref 6.4–8.2)
RBC # BLD AUTO: 3.97 MILLION/UL (ref 3.88–5.62)
SODIUM SERPL-SCNC: 138 MMOL/L (ref 136–145)
SODIUM SERPL-SCNC: 141 MMOL/L (ref 136–145)
T4 FREE SERPL-MCNC: 0.92 NG/DL (ref 0.76–1.46)
TRIGL SERPL-MCNC: 258 MG/DL
TSH SERPL DL<=0.05 MIU/L-ACNC: 7.91 UIU/ML (ref 0.36–3.74)
WBC # BLD AUTO: 5.24 THOUSAND/UL (ref 4.31–10.16)

## 2020-01-03 PROCEDURE — 82043 UR ALBUMIN QUANTITATIVE: CPT | Performed by: INTERNAL MEDICINE

## 2020-01-03 PROCEDURE — 99225 PR SBSQ OBSERVATION CARE/DAY 25 MINUTES: CPT | Performed by: NURSE PRACTITIONER

## 2020-01-03 PROCEDURE — 80076 HEPATIC FUNCTION PANEL: CPT | Performed by: INTERNAL MEDICINE

## 2020-01-03 PROCEDURE — 82570 ASSAY OF URINE CREATININE: CPT | Performed by: INTERNAL MEDICINE

## 2020-01-03 PROCEDURE — 82948 REAGENT STRIP/BLOOD GLUCOSE: CPT

## 2020-01-03 PROCEDURE — 84443 ASSAY THYROID STIM HORMONE: CPT | Performed by: INTERNAL MEDICINE

## 2020-01-03 PROCEDURE — 85025 COMPLETE CBC W/AUTO DIFF WBC: CPT | Performed by: INTERNAL MEDICINE

## 2020-01-03 PROCEDURE — 80048 BASIC METABOLIC PNL TOTAL CA: CPT | Performed by: INTERNAL MEDICINE

## 2020-01-03 PROCEDURE — 84439 ASSAY OF FREE THYROXINE: CPT | Performed by: INTERNAL MEDICINE

## 2020-01-03 PROCEDURE — 80061 LIPID PANEL: CPT | Performed by: INTERNAL MEDICINE

## 2020-01-03 PROCEDURE — 83735 ASSAY OF MAGNESIUM: CPT | Performed by: PHYSICIAN ASSISTANT

## 2020-01-03 RX ORDER — ATORVASTATIN CALCIUM 40 MG/1
40 TABLET, FILM COATED ORAL
Status: DISCONTINUED | OUTPATIENT
Start: 2020-01-03 | End: 2020-01-04 | Stop reason: HOSPADM

## 2020-01-03 RX ORDER — LEVOTHYROXINE SODIUM 0.07 MG/1
75 TABLET ORAL
Status: DISCONTINUED | OUTPATIENT
Start: 2020-01-04 | End: 2020-01-04 | Stop reason: HOSPADM

## 2020-01-03 RX ORDER — POTASSIUM CHLORIDE 20 MEQ/1
20 TABLET, EXTENDED RELEASE ORAL ONCE
Status: COMPLETED | OUTPATIENT
Start: 2020-01-03 | End: 2020-01-03

## 2020-01-03 RX ORDER — INSULIN GLARGINE 100 [IU]/ML
45 INJECTION, SOLUTION SUBCUTANEOUS
Status: DISCONTINUED | OUTPATIENT
Start: 2020-01-03 | End: 2020-01-04 | Stop reason: HOSPADM

## 2020-01-03 RX ORDER — POTASSIUM CHLORIDE 14.9 MG/ML
20 INJECTION INTRAVENOUS ONCE
Status: COMPLETED | OUTPATIENT
Start: 2020-01-03 | End: 2020-01-03

## 2020-01-03 RX ORDER — LEVOTHYROXINE SODIUM 0.05 MG/1
50 TABLET ORAL
Status: DISCONTINUED | OUTPATIENT
Start: 2020-01-04 | End: 2020-01-03

## 2020-01-03 RX ADMIN — SODIUM CHLORIDE 12 UNITS/HR: 9 INJECTION, SOLUTION INTRAVENOUS at 09:38

## 2020-01-03 RX ADMIN — DULOXETINE HYDROCHLORIDE 60 MG: 60 CAPSULE, DELAYED RELEASE ORAL at 08:05

## 2020-01-03 RX ADMIN — POTASSIUM CHLORIDE 20 MEQ: 14.9 INJECTION, SOLUTION INTRAVENOUS at 03:20

## 2020-01-03 RX ADMIN — SODIUM CHLORIDE, SODIUM GLUCONATE, SODIUM ACETATE, POTASSIUM CHLORIDE AND MAGNESIUM CHLORIDE 100 ML/HR: 526; 502; 368; 37; 30 INJECTION, SOLUTION INTRAVENOUS at 03:47

## 2020-01-03 RX ADMIN — POTASSIUM CHLORIDE 20 MEQ: 1500 TABLET, EXTENDED RELEASE ORAL at 03:20

## 2020-01-03 RX ADMIN — LEVOTHYROXINE SODIUM 25 MCG: 25 TABLET ORAL at 08:05

## 2020-01-03 RX ADMIN — INSULIN GLARGINE 45 UNITS: 100 INJECTION, SOLUTION SUBCUTANEOUS at 21:53

## 2020-01-03 RX ADMIN — GABAPENTIN 300 MG: 300 CAPSULE ORAL at 16:17

## 2020-01-03 RX ADMIN — Medication 1000 MG: at 08:05

## 2020-01-03 RX ADMIN — GABAPENTIN 300 MG: 300 CAPSULE ORAL at 08:05

## 2020-01-03 RX ADMIN — HEPARIN SODIUM 5000 UNITS: 5000 INJECTION INTRAVENOUS; SUBCUTANEOUS at 14:03

## 2020-01-03 RX ADMIN — GABAPENTIN 300 MG: 300 CAPSULE ORAL at 21:17

## 2020-01-03 RX ADMIN — HEPARIN SODIUM 5000 UNITS: 5000 INJECTION INTRAVENOUS; SUBCUTANEOUS at 21:18

## 2020-01-03 RX ADMIN — INSULIN LISPRO 15 UNITS: 100 INJECTION, SOLUTION INTRAVENOUS; SUBCUTANEOUS at 16:54

## 2020-01-03 RX ADMIN — HEPARIN SODIUM 5000 UNITS: 5000 INJECTION INTRAVENOUS; SUBCUTANEOUS at 06:46

## 2020-01-03 RX ADMIN — ATORVASTATIN CALCIUM 40 MG: 40 TABLET, FILM COATED ORAL at 16:17

## 2020-01-03 NOTE — SOCIAL WORK
CORNELL notified CM that pt is new to diabetes and will need medication and supplies  CM spoke with pt who would like to use meds to beds at [facility]  Pt has [employer]  Physician will enter orders once dose is known

## 2020-01-03 NOTE — ASSESSMENT & PLAN NOTE
Lab Results   Component Value Date    HGBA1C 11 5 (H) 01/02/2020       Recent Labs     01/03/20  0412 01/03/20  0602 01/03/20  0800 01/03/20  1005   POCGLU 169* 217* 341* 338*       · Patient previously diagnosed with pre diabetes with an A1c of 6 4 back in October 2019  Patient had been symptomatic for 2 weeks with polydipsia, polyuria, generalized shakiness and blurred vision  Found to have hyperglycemia without ketoacidosis  A1c 11 5  · Currently on insulin drip, plan will be to start basal/bolus regimen, timing as per Endocrinology  · Will need insulin at discharge, will discuss with case management  · Nursing to continue with insulin injection education  · Nutrition consult for diet education  · Will need outpatient screenings with eye exam, podiatry evaluation    · Avoid hypoglycemia  · Monitor Accu-Cheks  · Outpatient follow-up with Endocrinology

## 2020-01-03 NOTE — PROGRESS NOTES
Patient's K+ is 3 4, per order, I placed the insulin drip on hold and contacted [facility]  An order of oral K+ is being ordered

## 2020-01-03 NOTE — PLAN OF CARE
Problem: Potential for Falls  Goal: Patient will remain free of falls  Description  INTERVENTIONS:  - Assess patient frequently for physical needs  -  Identify cognitive and physical deficits and behaviors that affect risk of falls    -  [place] fall precautions as indicated by assessment   - Educate patient/family on patient safety including physical limitations  - Instruct patient to call for assistance with activity based on assessment  - Modify environment to reduce risk of injury  - Consider OT/PT consult to assist with strengthening/mobility  1/3/2020 0820 by Cierra Hahn RN  Outcome: Progressing  1/2/2020 1824 by Cierra Hahn RN  Outcome: Progressing

## 2020-01-03 NOTE — PROGRESS NOTES
[facility]    Progress Note - Nyasia Lazo 1960, 60 y o  male MRN: [id number]    Unit/Bed#: CW2 218-01 Encounter: [id number]    Primary Care Provider: Cherie Shelby MD   Date and time admitted to hospital: 1/2/2020  1:55 PM    * Type 2 diabetes mellitus with hyperglycemia, without long-term current use of insulin [facility])  Assessment & Plan  Lab Results   Component Value Date    HGBA1C 11 5 (H) 01/02/2020       Recent Labs     01/03/20  0412 01/03/20  0602 01/03/20  0800 01/03/20  1005   POCGLU 169* 217* 341* 338*       · Patient previously diagnosed with pre diabetes with an A1c of 6 4 back in October 2019  Patient had been symptomatic for 2 weeks with polydipsia, polyuria, generalized shakiness and blurred vision  Found to have hyperglycemia without ketoacidosis  A1c 11 5  · Currently on insulin drip, plan will be to start basal/bolus regimen, timing as per Endocrinology  · Will need insulin at discharge, will discuss with case management  · Nursing to continue with insulin injection education  · Nutrition consult for diet education  · Will need outpatient screenings with eye exam, podiatry evaluation  · Avoid hypoglycemia  · Monitor Accu-Cheks  · Outpatient follow-up with Endocrinology    Hypothyroidism  Assessment & Plan  · TSH 7 9  Increase levothyroxine to 50 mcg daily  · Repeat thyroid function testing in 4-6 weeks  Acute kidney injury [facility])  Assessment & Plan  · Secondary to dehydration in the setting of hyperglycemia  · Creatinine now normal at 0 81  · Will continue intravenous fluids for now, discontinue after this bag      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[facility] [address] )  Assessment & Plan  · Status post surgery and radiation  · Follows up with Neurosurgery outpatient  · Continue home dose gabapentin    Hyperlipidemia  Assessment & Plan  · Lipid panel shows total cholesterol 158, triglycerides 258, HDL 22, LDL 84    · Started on atorvastatin    Pharmacologic: Heparin     Mechanical VTE Prophylaxis in Place: Yes    Patient Centered Rounds: I have performed bedside rounds with nursing staff today  Discussions with Specialists or Other Care Team Provider: nursing, case management, UR, endocrinology    Education and Discussions with Family / Patient: patient and wife at bedside     Time Spent for Care: 30 minutes  More than 50% of total time spent on counseling and coordination of care as described above  Current Length of Stay: 0 day(s)    Current Patient Status: Observation   Certification Statement: Keep observation pending additional Endocrinology recommendations, close monitoring of blood sugar    Discharge Plan / Estimated Discharge Date: possibly later today or tomorrow       Code Status: Level 1 - Full Code      Subjective:   Patient offers no complaints  Feels symptomatically improved  Understands he will need to be discharged on subcutaneous insulin and is okay with this plan  Understands he will now need to be on cholesterol medication as well as oral anti diabetic medications  Objective:     Vitals:   Temp (24hrs), Av 3 °F (36 8 °C), Min:98 1 °F (36 7 °C), Max:98 4 °F (36 9 °C)    Temp:  [98 1 °F (36 7 °C)-98 4 °F (36 9 °C)] 98 1 °F (36 7 °C)  HR:  [67-86] 73  Resp:  [18-19] 18  BP: (105-137)/(49-84) 106/51  SpO2:  [94 %-98 %] 98 %  Body mass index is 28 98 kg/m²  Input and Output Summary (last 24 hours): Intake/Output Summary (Last 24 hours) at 1/3/2020 1144  Last data filed at 1/3/2020 1129  Gross per 24 hour   Intake 1580 ml   Output 550 ml   Net 1030 ml       Physical Exam:     Physical Exam   Constitutional: He is oriented to person, place, and time  No distress  HENT:   Head: Normocephalic  Eyes: Conjunctivae are normal    Neck: Normal range of motion  Cardiovascular: Normal rate  Pulmonary/Chest: Breath sounds normal    Abdominal: Bowel sounds are normal    Musculoskeletal: Normal range of motion     Neurological: He is alert and oriented to person, place, and time  Skin: Skin is warm  Psychiatric: He has a normal mood and affect  Nursing note and vitals reviewed  Additional Data:     Labs:    Results from last 7 days   Lab Units 01/03/20  0606   WBC Thousand/uL 5 24   HEMOGLOBIN g/dL 11 6*   HEMATOCRIT % 35 7*   PLATELETS Thousands/uL 245   NEUTROS PCT % 62   LYMPHS PCT % 24   MONOS PCT % 9   EOS PCT % 3     Results from last 7 days   Lab Units 01/03/20  0606   POTASSIUM mmol/L 4 2   CHLORIDE mmol/L 106   CO2 mmol/L 26   BUN mg/dL 16   CREATININE mg/dL 0 81   CALCIUM mg/dL 8 2*   ALK PHOS U/L 106   ALT U/L 29   AST U/L 14             Recent Cultures (last 7 days):           Last 24 Hours Medication List:     Current Facility-Administered Medications:  acetaminophen 650 mg Oral Q6H PRN Crystal Garcia MD    atorvastatin 40 mg Oral Daily [email or web address] CLAUDIO Gann    DULoxetine 60 mg Oral Daily Crystal Garcia MD    fish oil 1,000 mg Oral Daily Crystal Garcia MD    gabapentin 300 mg Oral TID Crystal Garcia MD    heparin (porcine) 5,000 Units Subcutaneous [facility] Crystal Garcia MD    insulin regular (HumuLIN R,NovoLIN R) infusion 0 3-21 Units/hr Intravenous Titrated Crystal Garcia MD Last Rate: 15 Units/hr (01/03/20 1015)   [START ON 1/4/2020] levothyroxine 50 mcg Oral Early Morning CLAUDIO Rangel    multi-electrolyte 100 mL/hr Intravenous Continuous Crystal Garcia MD Last Rate: 100 mL/hr (01/03/20 0347)        Today, Patient Was Seen By: CLAUDIO Deal    ** Please Note: Dragon 360 Dictation voice to text software may have been used in the creation of this document   **

## 2020-01-03 NOTE — UTILIZATION REVIEW
Initial Clinical Review    Admission: Date/Time/Statement: Observation 1/2 @ 1702  Orders Placed This Encounter   Procedures    Place in Observation     Standing Status:   Standing     Number of Occurrences:   1     Order Specific Question:   Admitting Physician     Answer:   Drea Espinoza [[id number]]     Order Specific Question:   Level of Care     Answer:   Med Surg [[id number]]     ED Arrival Information     Expected Arrival Acuity Means of Arrival Escorted By Service Admission Type    - 1/2/2020 13:40 Emergent Walk-In Self Hospitalist Emergency    Arrival Complaint    hypoglycemic        Chief Complaint   Patient presents with    Hyperglycemia - Symptomatic     pt with increased blurry vision, shaking, weakness, saw eye dr today and was told eye exam was normal and was told pt is hyperglycemic, referred to ED for eval  pt reports increased thirst and increased urination     Assessment/Plan:   60 y o  male to ED presents with worsening blurry vision, cloudiness, polyuria and polydipsia x2 wks  He went to get his eyes checked and given his symptoms, his BG was checked which was more than 500 and he was sent to ER  PMH of Diabetes, Dyslipidemia, 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and Hypothyroidism  Admit Observation level of care for Uncontrolled hyperglycemia, JUAN CARLOS, Hyponatremia  HgbA1c 6 4 in Oct/2019  Now with BG on 500-776 with symptomatic hyperglycemia K 4 6  UA shows trace ketone  IL isolye given  Start insulin infusion with accu checks q2h  Check HgbA1c and continue IVF  Trend BMP q6h  Endocrinology consult  Na 140  Check TSH   1/3 Endocrinology cons; newly diagnosed diabetes with hyperglycemia based hemoglobin A1c 11 5% without ketoacidosis  Continue carb controlled diet  Recommend to start insulin glargine 22 units then stop insulin infusion 2 hours later  Start insulin Humalog 8 units with meals     1/3 Progress notes; Currently on insulin drip, plan will be to start basal/bolus regimen, timing as per Endocrinology - D/c today  Needs insulin injection education and Nutrition consult       ED Triage Vitals   Temperature Pulse Respirations Blood Pressure SpO2   01/02/20 1356 01/02/20 1356 01/02/20 1356 01/02/20 1356 01/02/20 1356   98 4 °F (36 9 °C) 86 18 133/84 98 %      Temp Source Heart Rate Source Patient Position - Orthostatic VS BP Location FiO2 (%)   01/03/20 0835 01/02/20 1615 01/02/20 1515 01/02/20 1515 --   Oral Monitor Sitting Right arm       Pain Score       01/02/20 1356       No Pain        Wt Readings from Last 1 Encounters:   01/02/20 83 9 kg (185 lb)     Additional Vital Signs:   01/03/20 08:35:40  98 1 °F (36 7 °C)  73  18  106/51  69  98 %  None (Room air)    01/02/20 23:28:01    67  18  105/49  Abnormal   68  96 %      01/02/20 18:12:13  98 4 °F (36 9 °C)    19  111/64  80        01/02/20 1711    77  18  121/69    97 %  None (Room air)    01/02/20 1615    74  18  122/73            Pertinent Labs/Diagnostic Test Results:   Results from last 7 days   Lab Units 01/03/20  0606 01/02/20  1408   WBC Thousand/uL 5 24 6 63   HEMOGLOBIN g/dL 11 6* 13 1   HEMATOCRIT % 35 7* 40 3   PLATELETS Thousands/uL 245 264   NEUTROS ABS Thousands/µL 3 24 4 87         Results from last 7 days   Lab Units 01/03/20  0606 01/03/20  0021 01/02/20 2016 01/02/20  1717 01/02/20  1408   SODIUM mmol/L 138 141 138 136 129*   POTASSIUM mmol/L 4 2 3 4* 4 0 4 4 4 6   CHLORIDE mmol/L 106 106 104 99* 94*   CO2 mmol/L 26 27 28 29 28   ANION GAP mmol/L 6 8 6 8 7   BUN mg/dL 16 19 17 18 21   CREATININE mg/dL 0 81 0 81 0 99 1 10 1 40*   EGFR ml/min/1 73sq m 97 97 82 73 54   CALCIUM mg/dL 8 2* 7 9* 8 5 8 7 9 2   MAGNESIUM mg/dL 2 3  --   --   --   --      Results from last 7 days   Lab Units 01/03/20  0606 01/02/20  1408   AST U/L 14 7   ALT U/L 29 38   ALK PHOS U/L 106 143*   TOTAL PROTEIN g/dL 6 1* 7 5   ALBUMIN g/dL 2 6* 3 4*   TOTAL BILIRUBIN mg/dL 0 50 0 61   BILIRUBIN DIRECT mg/dL 0 16  --      Results from last 7 days   Lab Units 01/03/20  0800 01/03/20  0602 01/03/20  0412 01/03/20  0204 01/03/20  0005 01/02/20  2205 01/02/20 2002 01/02/20  1745 01/02/20  1354   POC GLUCOSE mg/dl 341* 217* 169* 139 218* 216* 337* 484* >500*     Results from last 7 days   Lab Units 01/03/20  0606 01/03/20  0021 01/02/20 2016 01/02/20  1717 01/02/20  1408   GLUCOSE RANDOM mg/dL 203* 202* 246* 460* 776*         Results from last 7 days   Lab Units 01/02/20  1408   HEMOGLOBIN A1C % 11 5*   EAG mg/dl 283     Results from last 7 days   Lab Units 01/03/20  0606   TSH 3RD GENERATON uIU/mL 7 910*     Results from last 7 days   Lab Units 01/02/20  1431 01/02/20  1429   CLARITY UA  Clear clear   COLOR UA  Yellow  --    SPEC GRAV UA  <=1 005  --    PH UA  6 0  --    GLUCOSE UA mg/dl 500 (1/2%)*  --    KETONES UA mg/dl Trace*  --    BLOOD UA  Negative  --    PROTEIN UA mg/dl Negative  --    NITRITE UA  Negative  --    BILIRUBIN UA  Negative  --    UROBILINOGEN UA E U /dl 0 2  --    LEUKOCYTES UA  Negative  --      ED Treatment:   Medication Administration from 01/02/2020 1339 to 01/02/2020 1808       Date/Time Order Dose Route Action     01/02/2020 1527 multi-electrolyte (ISOLYTE-S PH 7 4) bolus 1,000 mL 1,000 mL Intravenous New Bag     01/02/2020 1736 insulin regular (HumuLIN R,NovoLIN R) 1 Units/mL in sodium chloride 0 9 % 100 mL infusion 10 Units/hr Intravenous New Bag     01/02/2020 1719 multi-electrolyte (ISOLYTE-S PH 7 4) bolus 1,000 mL 1,000 mL Intravenous New Bag        Past Medical History:   Diagnosis Date    BPH associated with nocturia     [facility]ervical spinal mass ([facility] [address] ) 01/07/2019    cervicothoracic    Disease of thyroid gland     Herniated disc, cervical     History of radiation therapy     Hyperlipidemia     Impaired fasting glucose      Present on Admission:   Hypothyroidism   BPH associated with nocturia   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[facility] [address] )   Dyslipidemia      Admitting Diagnosis: Hyperglycemia [R73 9]  JUAN CARLOS (acute kidney injury) ([facility] [address] ) [N17 9]  Age/Sex: 60 y o  male     Admission Orders:  IP CONSULT TO ENDOCRINOLOGY    Scheduled Medications:  Medications:  DULoxetine 60 mg Oral Daily   fish oil 1,000 mg Oral Daily   gabapentin 300 mg Oral TID   heparin (porcine) 5,000 Units Subcutaneous Q8H [address]   levothyroxine 25 mcg Oral Daily     Continuous IV Infusions:  insulin regular (HumuLIN R,NovoLIN R) infusion 0 3-21 Units/hr Intravenous Titrated   multi-electrolyte 100 mL/hr Intravenous Continuous     PRN Meds:  acetaminophen 650 mg Oral Q6H PRN     Network Utilization Review Department  [email or web address]  ATTENTION: Please call with any questions or concerns to [phone] and carefully listen to the prompts so that you are directed to the right person  All voicemails are confidential   Binh Dixon all requests for admission clinical reviews, approved or denied determinations and any other requests to dedicated fax number below belonging to the campus where the patient is receiving treatment   List of dedicated fax numbers for the Facilities:  [address] [place] [address] DENIALS (Administrative/Medical Necessity) [phone]   [address] [place] [address] (Maternity/NICU/Pediatrics) [phone]   Marija Magana [phone]   Vish Grooms [phone]   [place] [phone]   Mertha Finger [phone]   [address] [place] [address] [phone]   [facility]  [phone]   [address] [facility] [address]  [address] [facility] [address] [facility] [phone]

## 2020-01-03 NOTE — ASSESSMENT & PLAN NOTE
· Status post surgery and radiation  · Follows up with Neurosurgery outpatient  · Continue home dose gabapentin

## 2020-01-03 NOTE — ASSESSMENT & PLAN NOTE
· Secondary to dehydration in the setting of hyperglycemia  · Creatinine now normal at 0 81  · Will continue intravenous fluids for now, discontinue after this bag

## 2020-01-03 NOTE — CONSULTS
Consultation - Michelle Glass III 60 y o  male MRN: [id number]    Unit/Bed#: CW2 218-01 Encounter: [id number]      Assessment/Plan     Assessment: This is a 60y o -year-old male with newly diagnosed diabetes with hyperglycemia based hemoglobin A1c 11 5% without ketoacidosis  Plan:  # Newly diagnosed type 2 diabetes mellitus with hyperglycemia  For the past average 3 years has been diagnosed with prediabetes and his hemoglobin A1c was 6 4% in October 24, 2019  He has been symptomatic for 2 weeks with polydipsia, polyuria and generalized shakiness associated with blurry vision  His family history is noted for mother with diabetes mellitus diagnosed in her 46s  - We recommend to start insulin glargine 22 units then stop insulin infusion 2 hours later   - Start insulin Humalog 8 units with meals  - Nursing communication for insulin injection education as likely he will be discharged on insulin as well as metformin     - Continue carb controlled diet level 2  - Counseling provided regarding lifestyle modification and the effect on type 2 diabetes mellitus patient an agreement and understood the benefits of exercising at least simple walking 4 times a week for biking and focusing on fresh vegetables, fresh fruit, diet high in Omega 3 fatty acids such as salmon and flax seed  He may benefit from medical nutritionist consultation     - Screening for diabetic retinopathy, diabetic foot exam, and diabetic nephropathy should be done yearly patient already has done eye exam will benefit as outpatient from scheduling this preventive measures  We can check screening for diabetic nephropathy during this admission  His goal A1c should be less than 7%      # Hypothyroidism  · His TSH is 7 9 recommend to adjust the levothyroxine to 50 mcg daily and recheck TSH in 6 weeks        # Dyslipidemia    His total cholesterol 158, triglyceride 258, HDL 22 and LDL 84  Given his diabetes and his 10 year ASCVD risk is 20% would recommend high intensity statin  CC: Diabetes Consult    History of Present Illness     HPI: Saulo Eden III is a 60y o  year old male with history of pre diabetes for 3 years now with newly diagnosed type 2 diabetes  He has been having blurry vision, increased thirst and urination associated with shakiness for the past 2 weeks he was hoping his symptoms will spontaneously go away however he went to his eye exam for blurry vision and was told his eye exam was normal however his blood sugar was checked more than 500 and recommended to go to the emergency room  He has been having neuropathy in his hands 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  He denied any nausea, vomiting, or palpitation  He admitted for exertional dyspnea and sleep disturbance due to frequent urination  His family history is noted for mother with type 2 diabetes mellitus and father with hypertension and sister with cerebral palsy as well as total with breast cancer  He is a formal smoker quit 25 years ago does not drink alcohol he is retired grocery shopping   His medical history otherwise noted for hypothyroidism and degenerative disc disease  His surgical history is noted for appendectomy and cervical spinal cord ependymoma resection  His allergy is penicillin causes hives and bee venom causes hives and swelling  Upon his arrival to the emergency room his blood sugar more than 500, he received IV fluids and his urine analysis shows trace ketones however no anion gap on BMP he has acute kidney injury and started on insulin infusion by primary team   Endocrinology were consulted to help with management of hyperglycemia however later his hemoglobin A1c came back 11 5% which qualify for diagnosis of diabetes mellitus          Inpatient consult to Endocrinology     Date/Time 1/3/2020 9:08 AM     Performed by  Ricarda Daily MD     Authorized by Chanda Mckeon MD Review of Systems   Constitutional: Negative for chills and fever  HENT: Positive for rhinorrhea  Negative for sore throat  Eyes: Positive for visual disturbance  Negative for pain  Respiratory: Positive for shortness of breath (Exertional dyspnea)  Negative for cough  Cardiovascular: Negative for chest pain and palpitations  Gastrointestinal: Negative for nausea and vomiting  Endocrine: Positive for polydipsia and polyuria  Genitourinary: Positive for frequency  Negative for difficulty urinating and hematuria  Musculoskeletal: Positive for gait problem  Negative for back pain and joint swelling  Skin: Negative for color change and rash  Allergic/Immunologic: Negative for food allergies  Penicillin allergy-Hives, bee venom allergy-edema and hives   Neurological: Positive for weakness ( right lower extremity weakness baseline) and numbness  Psychiatric/Behavioral: Positive for sleep disturbance  The patient is nervous/anxious          Historical Information   Past Medical History:   Diagnosis Date    BPH associated with nocturia     Cervical spinal mass ([facility] [address] ) 01/07/2019    cervicothoracic    Disease of thyroid gland     Herniated disc, cervical     History of radiation therapy     Hyperlipidemia     Impaired fasting glucose      Past Surgical History:   Procedure Laterality Date    APPENDECTOMY      CERVICAL FUSION      COLONOSCOPY  03/13/2012    NORMAL; RECHECK IN 5 YEARS    FL LUMBAR PUNCTURE DIAGNOSTIC  2/21/2019    OR BX/EXCIS SPIN PATEL,INDUR,INMED,CERV N/A 1/24/2019    Procedure: Posterior C4-T3 laminectomy; resection of intramedullary mass C5-T3, including fenestration of syrinx C7-T2; C3-T4 fixation/fusion; additional levels as needed;  Surgeon: Shameka Gann MD;  Location: [facility];  Service: Neurosurgery    WISDOM TOOTH EXTRACTION       Social History   Social History     Substance and Sexual Activity   Alcohol Use Yes    Comment: SOCIAL      Social History     Substance and Sexual Activity   Drug Use No     Social History     Tobacco Use   Smoking Status Former Smoker    Last attempt to quit: 1993    Years since quittin 6   Smokeless Tobacco Never Used     Family History:   Family History   Problem Relation Age of Onset    Diabetes Mother     Hypertension Father     Cerebral palsy Sister     Breast cancer Daughter        Meds/Allergies   Current Facility-Administered Medications   Medication Dose Route Frequency Provider Last Rate Last Dose    acetaminophen (TYLENOL) tablet 650 mg  650 mg Oral Q6H PRN Glenda Bridges MD        DULoxetine (CYMBALTA) delayed release capsule 60 mg  60 mg Oral Daily Glenda Bridges MD   60 mg at 20 0805    fish oil capsule 1,000 mg  1,000 mg Oral Daily Glenda Bridges MD   1,000 mg at 20 0805    gabapentin (NEURONTIN) capsule 300 mg  300 mg Oral TID Glenda Bridges MD   300 mg at 20 0805    heparin (porcine) subcutaneous injection 5,000 Units  5,000 Units Subcutaneous [facility] Glenda Bridges MD   5,000 Units at 20 0646    insulin regular (HumuLIN R,NovoLIN R) 1 Units/mL in sodium chloride 0 9 % 100 mL infusion  0 3-21 Units/hr Intravenous Titrated Glenda Bridges MD 12 mL/hr at 20 0801 12 Units/hr at 20 0801    levothyroxine tablet 25 mcg  25 mcg Oral Daily Glenda Bridges MD   25 mcg at 20 0805    multi-electrolyte (PLASMALYTE-A/ISOLYTE-S PH 7 4) IV solution  100 mL/hr Intravenous Continuous Glenad Bridges  mL/hr at 20 0347 100 mL/hr at 20 0347     Allergies   Allergen Reactions    Bee Venom Hives, Itching and Edema     Category: Allergy;     Penicillins Hives and Itching     Category: Allergy;        Objective   Vitals: Blood pressure (!) 105/49, pulse 67, temperature 98 4 °F (36 9 °C), resp  rate 18, weight 83 9 kg (185 lb), SpO2 96 %      Intake/Output Summary (Last 24 hours) at 1/3/2020 0833  Last data filed at 1/3/2020 0347  Gross per 24 hour Intake 1000 ml   Output 250 ml   Net 750 ml     Invasive Devices     Peripheral Intravenous Line            Peripheral IV 01/02/20 Right Antecubital less than 1 day                Physical Exam   Constitutional: He is oriented to person, place, and time  He appears well-developed and well-nourished  No distress  HENT:   Head: Normocephalic and atraumatic  Mouth/Throat: No oropharyngeal exudate  Eyes: Pupils are equal, round, and reactive to light  Right eye exhibits no discharge  Left eye exhibits no discharge  No scleral icterus  Neck:   Old Surgical scar in the back of the neck   Cardiovascular: Normal rate, regular rhythm and normal heart sounds  Exam reveals no friction rub  No murmur heard  Pulmonary/Chest: Effort normal and breath sounds normal  No stridor  No respiratory distress  He has no wheezes  He has no rales  Abdominal: Soft  Bowel sounds are normal  He exhibits no distension and no mass  There is no tenderness  There is no rebound and no guarding  Musculoskeletal:   Right lower extremity weakness 4/5 compared to the left   Neurological: He is alert and oriented to person, place, and time  Skin: Skin is dry  He is not diaphoretic  Multiple tattoos noted   Psychiatric: He has a normal mood and affect  His behavior is normal    Vitals reviewed  The history was obtained from the review of the chart, patient      Lab Results:   Results from last 7 days   Lab Units 01/02/20  1408   HEMOGLOBIN A1C % 11 5*     Lab Results   Component Value Date    WBC 5 24 01/03/2020    HGB 11 6 (L) 01/03/2020    HCT 35 7 (L) 01/03/2020    MCV 90 01/03/2020     01/03/2020     Lab Results   Component Value Date/Time    BUN 16 01/03/2020 06:06 AM    K 4 2 01/03/2020 06:06 AM     01/03/2020 06:06 AM    CO2 26 01/03/2020 06:06 AM    CREATININE 0 81 01/03/2020 06:06 AM    AST 14 01/03/2020 06:06 AM    ALT 29 01/03/2020 06:06 AM    ALB 2 6 (L) 01/03/2020 06:06 AM     Recent Labs 01/03/20  0606   HDL 22*   TRIG 258*     No results found for: [place]  POC Glucose (mg/dl)   Date Value   01/03/2020 217 (H)   01/03/2020 169 (H)   01/03/2020 139   01/03/2020 218 (H)   01/02/2020 216 (H)   01/02/2020 337 (H)   01/02/2020 484 (H)   01/02/2020 >500 (HH)   02/06/2019 114   02/05/2019 119       Imaging Studies:None ordered  Portions of the record may have been created with voice recognition software      Rita Glass MD  Available on thesixtyonet  THE [facility]  Internal medicine resident

## 2020-01-03 NOTE — SOCIAL WORK
CM met pt and wife oBng Goddard at bedside, introduce self and made aware of CM role at dc  Pt does not hasve a LW and POA  Primary contact is wife Bong Goddard- [phone]  Pt lives with wife Bong Goddard, dtr and son in a 2 story house with 1 LYN[address] and 1 flight of stairs to the 2nd flrbathroom and bedroom  There is a half bath on the 1st flr  Pt was [employer] with all ADL's, drive and employed  Pt has no DME  Pt has hx with [facility] for IP rehab  Pt has no hx with HHc, alc, drug and IP psych tx  PCP is Dr Melissa Kelly  Pharmacy is [facility] in [place]  Pt has transportation when dc  CM reviewed d/c planning process including the following: identifying help at home, patient preference for d/c planning needs, Discharge Lounge, [facility] Meds to Bed program, availability of treatment team to discuss questions or concerns patient and/or family may have regarding understanding medications and recognizing signs and symptoms once discharged  CM also encouraged patient to follow up with all recommended appointments after discharge  Patient advised of importance for patient and family to participate in managing patients medical well being

## 2020-01-03 NOTE — ASSESSMENT & PLAN NOTE
· Lipid panel shows total cholesterol 158, triglycerides 258, HDL 22, LDL 84    · Started on atorvastatin

## 2020-01-04 VITALS
RESPIRATION RATE: 18 BRPM | TEMPERATURE: 98.2 F | SYSTOLIC BLOOD PRESSURE: 107 MMHG | BODY MASS INDEX: 29.03 KG/M2 | DIASTOLIC BLOOD PRESSURE: 62 MMHG | OXYGEN SATURATION: 95 % | WEIGHT: 185 LBS | HEIGHT: 67 IN | HEART RATE: 89 BPM

## 2020-01-04 PROBLEM — N17.9 ACUTE KIDNEY INJURY (HCC): Status: RESOLVED | Noted: 2020-01-02 | Resolved: 2020-01-04

## 2020-01-04 LAB
ANION GAP SERPL CALCULATED.3IONS-SCNC: 4 MMOL/L (ref 4–13)
BUN SERPL-MCNC: 14 MG/DL (ref 5–25)
CALCIUM SERPL-MCNC: 8.4 MG/DL (ref 8.3–10.1)
CHLORIDE SERPL-SCNC: 108 MMOL/L (ref 100–108)
CO2 SERPL-SCNC: 25 MMOL/L (ref 21–32)
CREAT SERPL-MCNC: 0.78 MG/DL (ref 0.6–1.3)
GFR SERPL CREATININE-BSD FRML MDRD: 98 ML/MIN/1.73SQ M
GLUCOSE P FAST SERPL-MCNC: 220 MG/DL (ref 65–99)
GLUCOSE SERPL-MCNC: 220 MG/DL (ref 65–140)
GLUCOSE SERPL-MCNC: 240 MG/DL (ref 65–140)
GLUCOSE SERPL-MCNC: 242 MG/DL (ref 65–140)
GLUCOSE SERPL-MCNC: 255 MG/DL (ref 65–140)
GLUCOSE SERPL-MCNC: 264 MG/DL (ref 65–140)
GLUCOSE SERPL-MCNC: 303 MG/DL (ref 65–140)
GLUCOSE SERPL-MCNC: 310 MG/DL (ref 65–140)
POTASSIUM SERPL-SCNC: 4 MMOL/L (ref 3.5–5.3)
SODIUM SERPL-SCNC: 137 MMOL/L (ref 136–145)

## 2020-01-04 PROCEDURE — 82948 REAGENT STRIP/BLOOD GLUCOSE: CPT

## 2020-01-04 PROCEDURE — 80048 BASIC METABOLIC PNL TOTAL CA: CPT | Performed by: NURSE PRACTITIONER

## 2020-01-04 PROCEDURE — 99217 PR OBSERVATION CARE DISCHARGE MANAGEMENT: CPT | Performed by: NURSE PRACTITIONER

## 2020-01-04 RX ORDER — LEVOTHYROXINE SODIUM 0.07 MG/1
75 TABLET ORAL DAILY
Qty: 30 TABLET | Refills: 0 | Status: SHIPPED | OUTPATIENT
Start: 2020-01-04 | End: 2020-01-29 | Stop reason: SDUPTHER

## 2020-01-04 RX ORDER — INSULIN LISPRO 100 [IU]/ML
15 INJECTION, SOLUTION INTRAVENOUS; SUBCUTANEOUS
Qty: 5 PEN | Refills: 0 | Status: SHIPPED | OUTPATIENT
Start: 2020-01-04 | End: 2020-01-04 | Stop reason: HOSPADM

## 2020-01-04 RX ORDER — ATORVASTATIN CALCIUM 40 MG/1
40 TABLET, FILM COATED ORAL
Qty: 30 TABLET | Refills: 0 | Status: SHIPPED | OUTPATIENT
Start: 2020-01-04 | End: 2020-02-03 | Stop reason: SDUPTHER

## 2020-01-04 RX ORDER — PEN NEEDLE, DIABETIC 29 G X1/2"
NEEDLE, DISPOSABLE MISCELLANEOUS
Qty: 150 EACH | Refills: 0 | Status: SHIPPED | OUTPATIENT
Start: 2020-01-04 | End: 2020-02-05 | Stop reason: SDUPTHER

## 2020-01-04 RX ADMIN — INSULIN LISPRO 15 UNITS: 100 INJECTION, SOLUTION INTRAVENOUS; SUBCUTANEOUS at 06:36

## 2020-01-04 RX ADMIN — INSULIN LISPRO 4 UNITS: 100 INJECTION, SOLUTION INTRAVENOUS; SUBCUTANEOUS at 06:37

## 2020-01-04 RX ADMIN — INSULIN LISPRO 15 UNITS: 100 INJECTION, SOLUTION INTRAVENOUS; SUBCUTANEOUS at 11:30

## 2020-01-04 RX ADMIN — GABAPENTIN 300 MG: 300 CAPSULE ORAL at 09:12

## 2020-01-04 RX ADMIN — LEVOTHYROXINE SODIUM 75 MCG: 75 TABLET ORAL at 06:35

## 2020-01-04 RX ADMIN — HEPARIN SODIUM 5000 UNITS: 5000 INJECTION INTRAVENOUS; SUBCUTANEOUS at 06:32

## 2020-01-04 RX ADMIN — INSULIN LISPRO 8 UNITS: 100 INJECTION, SOLUTION INTRAVENOUS; SUBCUTANEOUS at 11:19

## 2020-01-04 RX ADMIN — Medication 1000 MG: at 09:14

## 2020-01-04 RX ADMIN — DULOXETINE HYDROCHLORIDE 60 MG: 60 CAPSULE, DELAYED RELEASE ORAL at 09:11

## 2020-01-04 NOTE — DISCHARGE INSTRUCTIONS
Diabetic Hyperglycemia   WHAT YOU NEED TO KNOW:   Diabetic hyperglycemia is a blood glucose (sugar) level that is higher than your healthcare provider recommends  You may have increased thirst and urinate more often than usual  Over time, uncontrolled diabetes can damage your nerves, blood vessels, tissues, and organs  That is why it is important to manage diabetic hyperglycemia  Without treatment, diabetic hyperglycemia can lead to diabetic ketoacidosis (DKA) or hyperglycemic hyperosmolar state (HHS)  These are serious conditions that can become life-threatening  DISCHARGE INSTRUCTIONS:   Seek care immediately if:   · You have shortness of breath  · Your breath smells fruity  · You have nausea and vomiting  · You have symptoms of dehydration, such as dark yellow urine, dry mouth and lips, and dry skin  Contact your healthcare provider if:   · You continue to have higher blood sugar levels than your healthcare provider recommends  · Your blood sugar level is over 240 mg/dl and  you have ketones in your urine  · You have questions or concerns about your condition or care  Medicines:   · Medicines  such as insulin and hypoglycemic medicine decrease blood sugar levels  · Take your medicine as directed  Contact your healthcare provider if you think your medicine is not helping or if you have side effects  Tell him or her if you are allergic to any medicine  Keep a list of the medicines, vitamins, and herbs you take  Include the amounts, and when and why you take them  Bring the list or the pill bottles to follow-up visits  Carry your medicine list with you in case of an emergency  Follow up with your healthcare provider or specialist as directed: Your healthcare provider may refer you to a dietitian or diabetes specialist  Write down your questions so you remember to ask them during your visits     Manage diabetic hyperglycemia:   · If you take diabetes medicine or insulin, take it as directed  Missed or wrong doses can cause your blood sugar to go up  · Tell your healthcare provider if you continue to have trouble managing your blood sugar  He may change the type, amount, or timing of your diabetes medicine or insulin  If you do not take diabetes medicine or insulin, you may need to start  · Work with your healthcare provider to develop a sick day plan  Illness can cause your blood sugar to rise  A sick day plan helps you control your blood sugar level when you are sick  Prevent diabetic hyperglycemia:   · Check your blood sugar levels regularly  Ask your healthcare provider how often to check your blood sugar and what your levels should be  · Follow your meal plan  Your blood sugar can go up if you eat a large meal or you eat more carbohydrates than recommended  Work with a dietitian to develop a meal plan that is right for you  · Exercise regularly  to help lower your blood sugar when it is high  It can also keep your blood sugar levels steady over time  Exercise for at least 30 minutes, 5 days a week  Include muscle strengthening activities 2 days each week  Do not sit for longer than 90 minutes at a time  Work with your healthcare provider to create an exercise plan  Children should get at least 60 minutes of physical activity each day  · Check your ketones before exercise  if your blood sugar level is above 240 mg/dl  Do not exercise if you have ketones in your urine,  because your blood sugar level may rise even more  Ask your healthcare provider how to lower your blood sugar when you have ketones  © 2017 [employer] [employer]  Information is for End User's use only and may not be sold, redistributed or otherwise used for commercial purposes  All illustrations and images included in CareNotes® are the copyrighted property of [employer] , [employer]  or [place]  The above information is an  only   It is not intended as medical advice for individual conditions or treatments  Talk to your doctor, nurse or pharmacist before following any medical regimen to see if it is safe and effective for you  Hypoglycemia in a Person with Diabetes   WHAT YOU NEED TO KNOW:   Hypoglycemia is a serious condition that happens when your blood glucose (sugar) level drops too low  The blood sugar level is usually too high in a person with diabetes, but the level can also drop too low  It is important to follow your diabetes management plan to keep your blood sugar level steady  DISCHARGE INSTRUCTIONS:   Call 911 for any of the following:   · You have a seizure or pass out  · You feel you are going to pass out  · You have trouble thinking clearly  Seek care immediately if:  · Your blood sugar is less than 50 mg/dL and does not respond to treatment  Contact your healthcare provider if:   · You have had symptoms of low blood sugar several times  · You have questions about the amount of insulin or diabetes medicine you are taking  · You have questions or concerns about your condition or care  Medicines:   · Insulin or diabetes medicine  help to keep your blood sugar under control  · Glucagon  may be needed if you have severe hypoglycemia  · Take your medicine as directed  Contact your healthcare provider if you think your medicine is not helping or if you have side effects  Tell him or her if you are allergic to any medicine  Keep a list of the medicines, vitamins, and herbs you take  Include the amounts, and when and why you take them  Bring the list or the pill bottles to follow-up visits  Carry your medicine list with you in case of an emergency  Follow up with your healthcare provider as directed: You may need dose changes to your insulin or oral diabetes medicine if you have hypoglycemia  Write down your questions so you remember to ask them during your visits     Manage hypoglycemia:   · Check your blood sugar level right away if you have symptoms of hypoglycemia  Hypoglycemia is usually 70 mg/dL or below  Ask your healthcare provider what blood sugar level is too low for you  · If your blood sugar level is too low, eat or drink 15 grams of fast-acting carbohydrate  Examples of this amount of fast-acting carbohydrate are 4 ounces (½ cup) of fruit juice or 4 ounces of regular soda  Other examples are 2 tablespoons of raisins or 3 to 4 glucose tablets  Check your blood sugar level 15 minutes later  If the level is still low (less than 100 mg/dL), have another 15 grams of carbohydrate  When the level returns to 100 mg/dL, eat a snack or meal that contains carbohydrates  This will help prevent another drop in blood sugar  Always carefully follow your healthcare provider's instructions on how to treat low blood sugar levels  · Always carry a source of fast-acting carbohydrate  If you have symptoms of hypoglycemia and you do not have a blood glucose meter, have a source of fast-acting carbohydrate anyway  Avoid carbohydrate foods that are high in fat  The fat content may make it take longer to increase your blood sugar level  Ask your healthcare provider if you should carry a glucagon kit  Glucagon is a medicine that is injected when you develop severe hypoglycemia and become unconscious  Check the expiration date every month and replace it before it expires  · Teach others how to help you if you have symptoms of hypoglycemia  Tell them about the symptoms of hypoglycemia  Ask them to give you a source of fast-acting carbohydrate if you cannot get it yourself  Ask them to give you a glucagon injection if you have symptoms of hypoglycemia and you become unconscious or have a seizure  Ask them to call 911   This is an emergency  Tell them never to try to make you swallow anything if you faint or have a seizure  · Wear medical alert jewelry  or carry a card that says you have diabetes  Ask where to get these items    Prevent hypoglycemia:   · Take diabetes medicine as directed  Take your medicine at the right time and in the right amount  Your healthcare provider may change your blood sugar goals if you get hypoglycemia often  · Eat regular meals and snacks  Talk to your dietitian or healthcare provider about a meal plan that is right for you  Do not skip meals  · Check your blood sugar level as directed  Ask your healthcare provider what your blood sugar levels should be before and after you eat  Ask when and how often to check your blood sugar level  You may need to check at least 3 times each day  Record your blood sugar level results and take the record with you when you see your healthcare provider  Your provider may use the record to make changes to your medicine, food, or exercise schedules  · Check your blood sugar level before you exercise  Exercise can decrease your blood sugar level  If your blood sugar level is less than 100 mg/dL, have a carbohydrate snack  Examples are 4 to 6 crackers, ½ banana, 8 ounces (1 cup) of nonfat or 1% milk, or 4 ounces (½ cup) of juice  If you will exercise for more than 1 hour, you may need to check your blood sugar level every 30 minutes  Your healthcare provider may also recommend that you check your blood sugar level after exercise  · Be aware of how alcohol affects your blood sugar level  Alcohol can cause your blood sugar level to drop for up to 12 hours after drinking  Ask your healthcare provider if alcohol is safe for you  If you drink alcohol, always have a snack or meal at the same time  Women should limit alcohol to 1 drink a day  Men should limit alcohol to 2 drinks a day  A drink of alcohol is 12 ounces of beer, 5 ounces of wine, or 1½ ounces of liquor  © 2017 [facility] [facility] Information is for End User's use only and may not be sold, redistributed or otherwise used for commercial purposes   All illustrations and images included in [facility] are the copyrighted property of A D A M [employer]  or [place]  The above information is an  only  It is not intended as medical advice for individual conditions or treatments  Talk to your doctor, nurse or pharmacist before following any medical regimen to see if it is safe and effective for you

## 2020-01-04 NOTE — PLAN OF CARE
Problem: Potential for Falls  Goal: Patient will remain free of falls  Description  INTERVENTIONS:  - Assess patient frequently for physical needs  -  Identify cognitive and physical deficits and behaviors that affect risk of falls    -  [place] fall precautions as indicated by assessment   - Educate patient/family on patient safety including physical limitations  - Instruct patient to call for assistance with activity based on assessment  - Modify environment to reduce risk of injury  - Consider OT/PT consult to assist with strengthening/mobility  Outcome: Progressing

## 2020-01-04 NOTE — DISCHARGE INSTR - AVS FIRST PAGE
· Please check your blood sugars before meals and at bedtime  Keep a log of these blood sugars to show your endocrinologist   The endocrinology office should be call you to schedule follow-up in about 4 weeks  They do want your blood sugar logs ahead of time in about 2 weeks  When they call you to schedule follow-up, please ask how they want you to send over this information  · See below information/education regarding hypoglycemia (low blood sugar) and hyperglycemia (high blood sugar)  · We increased her thyroid medication  Please have your thyroid function repeated in about 4-6 weeks

## 2020-01-04 NOTE — DISCHARGE SUMMARY
[facility]    Patient Information: John Weir III 60 y o  male MRN: [id number]  Unit/Bed#: CW2 218-01 Encounter: [id number]    Discharging Physician / Practitioner: CLAUDIO Velasquez  PCP: Karon Sinclair MD  Admission Date: 1/2/2020  Discharge Date: 01/04/20    Reason for Admission:  Blurred vision, polyuria, polydipsia    Diagnosis at discharge:  Uncontrolled diabetes type 2    Discharge Diagnoses:     Principal Problem:    Type 2 diabetes mellitus with hyperglycemia, without long-term current use of insulin ([facility] )  Active Problems:    Hypothyroidism    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[place] [address] )    Hyperlipidemia    BPH associated with nocturia    Dyslipidemia    uncotnrolled hyperglycemia    Hyponatremia  Resolved Problems:    Acute kidney injury ([place] [address] )      Consultations During Hospital Stay:  · Endocrinology    Procedures Performed:     · None    Significant Findings / Test Results:     · Hemoglobin A1c 11 5  · Blood glucose on arrival greater than 500  · Lipid panel total cholesterol 158, triglycerides 258, HDL 22, LDL 84  · TSH 7 910/T4 0 92    Incidental Findings:   · None    Test Results Pending at Discharge (will require follow up): · None     Outpatient Tests Requested:  · Outpatient follow-up with PCP  · Outpatient follow-up with Endocrinology in about 4 weeks  · Repeat thyroid function testing in 4-6 weeks    Complications:  None    Hospital Course:     John Weir III is a 60 y o  male patient with a past medical history of type 2 diabetes, hypothyroidism, 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 who originally presented to the hospital on 1/2/2020 due to several weeks of blurred vision, polydipsia and polyuria  Patient went to see his eye doctor and was sent to the emergency department secondary to uncontrolled hyperglycemia  Patient was not in ketoacidosis on arrival   His blood sugar was greater than 500   He was started on insulin drip and seen by Endocrinology  His blood sugars improved and he was transitioned to a subcutaneous regimen of Lantus 45 units q h s  Along with Humalog 15 units with meals  Patient was instructed to keep a blood sugar log at home checking his blood sugars before meals and at bedtime  Patient is medically stable for discharge  He should follow up with his PCP as well as Endocrinology in about 4 weeks  Nursing has educated the patient as well as myself in regards to insulin injection, signs of hypo  and hyperglycemia  Awaiting callback from pharmacy in regards to brand of insulin that is covered by insurance  Also, his levothyroxine was adjusted and he will need repeat thyroid function testing in 4-6 weeks  He should see Ophthalmology and Podiatry as an outpatient for yearly screenings  Goal A1c should be less than 7  Medications at discharge:  Start atorvastatin 40 mg p o  Daily to  Increase levothyroxine to 75 mcg p o  Daily  Start basal insulin 45 units q h s  Start prandial insulin 15 units t i d  With meals    Condition at Discharge: stable     Discharge Day Visit / Exam:     Subjective:  Patient offers no complaints  Feels well  Verbalized understanding of all discharge instructions including insulin and need for follow-up  Vitals: Blood Pressure: 107/62 (01/04/20 0717)  Pulse: 89 (01/04/20 0717)  Temperature: 98 2 °F (36 8 °C) (01/03/20 2255)  Temp Source: Oral (01/03/20 0835)  Respirations: 18 (01/04/20 0717)  Height: 5' 7" (170 2 cm)(per 11/6/19 encounter) (01/03/20 1552)  Weight - Scale: 83 9 kg (185 lb) (01/02/20 1356)  SpO2: 95 % (01/04/20 0717)     Exam:   Physical Exam   Constitutional: He is oriented to person, place, and time  No distress  HENT:   Head: Normocephalic  Eyes: Conjunctivae are normal    Neck: Normal range of motion  Cardiovascular: Normal rate  Pulmonary/Chest: Breath sounds normal    Abdominal: Bowel sounds are normal    Musculoskeletal: Normal range of motion   He exhibits no edema  Neurological: He is alert and oriented to person, place, and time  Skin: Skin is warm and dry  Psychiatric: He has a normal mood and affect  Nursing note and vitals reviewed  Discussion with Family:  Patient    Discharge instructions/Information to patient and family:   See after visit summary for information provided to patient and family  Provisions for Follow-Up Care:  See after visit summary for information related to follow-up care and any pertinent home health orders  Disposition:     Home    For Discharges to   [address] SN[facility]:   · Not Applicable to this Patient - Not Applicable to this Patient    Planned Readmission: no     Discharge Statement:  I spent 50 minutes discharging the patient  This time was spent on the day of discharge  I had direct contact with the patient on the day of discharge  Greater than 50% of the total time was spent examining patient, answering all patient questions, arranging and discussing plan of care with patient as well as directly providing post-discharge instructions  Additional time then spent on discharge activities  Discharge Medications:  See after visit summary for reconciled discharge medications provided to patient and family        ** Please Note: This note has been constructed using a voice recognition system **

## 2020-01-06 ENCOUNTER — TRANSITIONAL CARE MANAGEMENT (OUTPATIENT)
Dept: FAMILY MEDICINE CLINIC | Facility: CLINIC | Age: 60
End: 2020-01-06

## 2020-01-10 ENCOUNTER — TELEPHONE (OUTPATIENT)
Dept: FAMILY MEDICINE CLINIC | Facility: CLINIC | Age: 60
End: 2020-01-10

## 2020-01-10 ENCOUNTER — OFFICE VISIT (OUTPATIENT)
Dept: PHYSICAL THERAPY | Age: 60
End: 2020-01-10
Payer: COMMERCIAL

## 2020-01-10 DIAGNOSIS — M51.36 DDD (DEGENERATIVE DISC DISEASE), LUMBAR: ICD-10-CM

## 2020-01-10 DIAGNOSIS — M79.604 RIGHT LEG PAIN: Primary | ICD-10-CM

## 2020-01-10 DIAGNOSIS — G06.1 SPINAL CORD ABSCESS: ICD-10-CM

## 2020-01-10 DIAGNOSIS — R53.1 GENERAL WEAKNESS: ICD-10-CM

## 2020-01-10 PROCEDURE — 97110 THERAPEUTIC EXERCISES: CPT

## 2020-01-10 PROCEDURE — 97012 MECHANICAL TRACTION THERAPY: CPT

## 2020-01-10 PROCEDURE — 97112 NEUROMUSCULAR REEDUCATION: CPT

## 2020-01-10 NOTE — PROGRESS NOTES
Daily Note     Today's date: 1/10/2020  Patient name: Lili Del Castillo  : 1960  MRN: [id number]  Referring provider: Ashu Mayorga MD  Dx:   Encounter Diagnosis     ICD-10-CM    1  Right leg pain M79 604    2  General weakness R53 1    3  Spinal cord abscess G06 1    4  DDD (degenerative disc disease), lumbar M51 36                   Subjective: pt reports being hospitalized several days for new dx of IDDM, he reports his BS was 800  He reports his BS today was 111  "I'm taking insulin for now and then we will see if I can take a pill once stabilized " pt reports no burning/stinging in B hands,but still numb since      Objective: See treatment diary below  Recommended due to pt's new diabetes dx, to bring glucometer and snack with him to PT in case BS drops, also recommend he has light snack/ don't skip meals before PT session  Pt performed ex with AFO on today    Assessment: gradually progressing there ex due to medical changes, min-mod fatigue noted,      Plan: Continue per plan of care  Progress treatment as tolerated         Precautions: Cervical tumor    Daily Treatment Diary     Manual  12/3 12/5/19 12/10 12/17     11/26   PNF R LE            PNF R pelvis in L SL            CPA JF JF JF      JF   PPU OP JF JF       JF   Lumbar gapping                   Exercise Diary  12/23 12/31/19 1/2/20 1/10/20   12/20/19   RB  9' lvl2 7'lvl5 10'      SL clamshells 3*10 3x10 3x10 3x10   3x10   Biodex          STS 3*10 3x10 3x10 3x10   2x10   Step-ups          Backward amb          Tandem walk          Bridges with arms crossed    3x10x5"      R SLR 3*10 3x10 3x10 3x10   3x10   PPT          Standing abd 3*10 3x10 3x10 3x10   3x10   CLARITA          PPU          Obstacle course + cones 15 min 10 min 10 min 5 min   15 min   Low row TB          Mini squats 3*10 3x10 3x10 3x10   3x10     3x10 2x10       Sciatic nerve glides              Modalities  11/29 12/10 12/12 12/20/19 12/27/19 1/2/20 1/10/20      NMES with DF JF Mechanical tractoin  JF JF VK VK VK VK

## 2020-01-10 NOTE — TELEPHONE ENCOUNTER
Mirian Wilkes from [place] [address] PT called they need Dr Jose Cruz Ambrocio to clear him for PT   He was in the ED on 1/42020 for blood sugar over 500  Please fax  To [phone]

## 2020-01-14 ENCOUNTER — OFFICE VISIT (OUTPATIENT)
Dept: PHYSICAL THERAPY | Age: 60
End: 2020-01-14
Payer: COMMERCIAL

## 2020-01-14 DIAGNOSIS — M51.36 DDD (DEGENERATIVE DISC DISEASE), LUMBAR: ICD-10-CM

## 2020-01-14 DIAGNOSIS — R53.1 GENERAL WEAKNESS: ICD-10-CM

## 2020-01-14 DIAGNOSIS — M79.604 RIGHT LEG PAIN: Primary | ICD-10-CM

## 2020-01-14 DIAGNOSIS — G06.1 SPINAL CORD ABSCESS: ICD-10-CM

## 2020-01-14 PROCEDURE — 97110 THERAPEUTIC EXERCISES: CPT | Performed by: PHYSICAL THERAPIST

## 2020-01-14 PROCEDURE — 97112 NEUROMUSCULAR REEDUCATION: CPT | Performed by: PHYSICAL THERAPIST

## 2020-01-14 PROCEDURE — 97012 MECHANICAL TRACTION THERAPY: CPT | Performed by: PHYSICAL THERAPIST

## 2020-01-14 NOTE — PROGRESS NOTES
Daily Note     Today's date: 2020  Patient name: Hanh Dwyer  : 1960  MRN: [id number]  Referring provider: Deepak Cochran MD  Dx:   Encounter Diagnosis     ICD-10-CM    1  Right leg pain M79 604    2  General weakness R53 1    3  Spinal cord abscess G06 1    4  DDD (degenerative disc disease), lumbar M51 36        Start Time: 0845  Stop Time: 0935  Total time in clinic (min): 50 minutes    Subjective: Pt reports no new symptoms  Objective: See treatment diary below      Assessment: Tolerated treatment well  PSFS was performed to re-assess patient's progress  Patient demonstrated fatigue post treatment and would benefit from continued PT      Plan: Continue per plan of care        Precautions: Cervical tumor    Daily Treatment Diary     Manual  12/3 12/5/19 12/10 12/17     11/26   PNF R LE            PNF R pelvis in L SL            CPA JF JF JF      JF   PPU OP JF JF       JF   Lumbar gapping                   Exercise Diary  12/23 12/31/19 1/2/20 1/10/20 1/14  12/20/19   RB  9' lvl2 7'lvl5 10' 10'     SL clamshells 3*10 3x10 3x10 3x10 3*10  3x10   Biodex          STS 3*10 3x10 3x10 3x10 3*10  2x10   Step-ups          Backward amb          Tandem walk          Bridges with arms crossed    3x10x5" 3*10*5"     R SLR 3*10 3x10 3x10 3x10 3*10  3x10   PPT          Standing abd 3*10 3x10 3x10 3x10 3*10  3x10   CLARITA          PPU          Obstacle course + cones 15 min 10 min 10 min 5 min 10 min  15 min   Low row TB          Mini squats 3*10 3x10 3x10 3x10 3*10  3x10     3x10 2x10       Sciatic nerve glides              Modalities  11/29 12/10 12/12 12/20/19 12/27/19 1/2/20 1/10/20      NMES with DF JF            Mechanical tractoin  JF JF VK VK VK VK

## 2020-01-15 ENCOUNTER — TELEPHONE (OUTPATIENT)
Dept: ENDOCRINOLOGY | Facility: CLINIC | Age: 60
End: 2020-01-15

## 2020-01-15 ENCOUNTER — HOSPITAL ENCOUNTER (OUTPATIENT)
Dept: RADIOLOGY | Facility: HOSPITAL | Age: 60
Discharge: HOME | End: 2020-01-15
Attending: NEUROLOGICAL SURGERY
Payer: COMMERCIAL

## 2020-01-15 ENCOUNTER — TRANSCRIBE ORDERS (OUTPATIENT)
Dept: RADIOLOGY | Facility: HOSPITAL | Age: 60
End: 2020-01-15

## 2020-01-15 DIAGNOSIS — C72.0 MALIGNANT NEOPLASM OF SPINAL CORD (HCC): ICD-10-CM

## 2020-01-15 DIAGNOSIS — Z98.1 H/O SPINAL FUSION: ICD-10-CM

## 2020-01-15 PROCEDURE — 72156 MRI NECK SPINE W/O & W/DYE: CPT

## 2020-01-15 PROCEDURE — A9585 GADOBUTROL INJECTION: HCPCS | Performed by: NEUROLOGICAL SURGERY

## 2020-01-15 PROCEDURE — 72040 X-RAY EXAM NECK SPINE 2-3 VW: CPT

## 2020-01-15 PROCEDURE — 72072 X-RAY EXAM THORAC SPINE 3VWS: CPT

## 2020-01-15 RX ADMIN — GADOBUTROL 8 ML: 604.72 INJECTION INTRAVENOUS at 10:51

## 2020-01-15 NOTE — TELEPHONE ENCOUNTER
Left message @ Dr Isaias Jiménez office requesting an insurance referral for patients [employer] plan for upcoming appt on 1/20/2020 with Dr Mor Sutherland

## 2020-01-16 ENCOUNTER — OFFICE VISIT (OUTPATIENT)
Dept: PHYSICAL THERAPY | Age: 60
End: 2020-01-16
Payer: COMMERCIAL

## 2020-01-16 ENCOUNTER — OFFICE VISIT (OUTPATIENT)
Dept: FAMILY MEDICINE CLINIC | Facility: CLINIC | Age: 60
End: 2020-01-16
Payer: COMMERCIAL

## 2020-01-16 ENCOUNTER — TRANSCRIBE ORDERS (OUTPATIENT)
Dept: FAMILY MEDICINE CLINIC | Facility: CLINIC | Age: 60
End: 2020-01-16

## 2020-01-16 VITALS
HEIGHT: 67 IN | RESPIRATION RATE: 18 BRPM | HEART RATE: 60 BPM | SYSTOLIC BLOOD PRESSURE: 118 MMHG | TEMPERATURE: 97.6 F | WEIGHT: 189.2 LBS | BODY MASS INDEX: 29.7 KG/M2 | DIASTOLIC BLOOD PRESSURE: 72 MMHG

## 2020-01-16 DIAGNOSIS — E87.1 HYPONATREMIA: ICD-10-CM

## 2020-01-16 DIAGNOSIS — E03.9 ACQUIRED HYPOTHYROIDISM: ICD-10-CM

## 2020-01-16 DIAGNOSIS — G06.1 SPINAL CORD ABSCESS: ICD-10-CM

## 2020-01-16 DIAGNOSIS — M51.36 DDD (DEGENERATIVE DISC DISEASE), LUMBAR: ICD-10-CM

## 2020-01-16 DIAGNOSIS — C72.0 MALIGNANT NEOPLASM OF SPINAL CORD (HCC): Primary | ICD-10-CM

## 2020-01-16 DIAGNOSIS — E11.65 TYPE 2 DIABETES MELLITUS WITH HYPERGLYCEMIA, WITH LONG-TERM CURRENT USE OF INSULIN (HCC): Primary | ICD-10-CM

## 2020-01-16 DIAGNOSIS — E11.69 HYPERLIPIDEMIA ASSOCIATED WITH TYPE 2 DIABETES MELLITUS (HCC): ICD-10-CM

## 2020-01-16 DIAGNOSIS — E78.5 HYPERLIPIDEMIA ASSOCIATED WITH TYPE 2 DIABETES MELLITUS (HCC): ICD-10-CM

## 2020-01-16 DIAGNOSIS — R53.1 GENERAL WEAKNESS: ICD-10-CM

## 2020-01-16 DIAGNOSIS — M79.604 RIGHT LEG PAIN: Primary | ICD-10-CM

## 2020-01-16 DIAGNOSIS — Z79.4 TYPE 2 DIABETES MELLITUS WITH HYPERGLYCEMIA, WITH LONG-TERM CURRENT USE OF INSULIN (HCC): Primary | ICD-10-CM

## 2020-01-16 PROBLEM — R73.9 HYPERGLYCEMIA: Status: RESOLVED | Noted: 2020-01-02 | Resolved: 2020-01-16

## 2020-01-16 PROCEDURE — 97112 NEUROMUSCULAR REEDUCATION: CPT

## 2020-01-16 PROCEDURE — 97012 MECHANICAL TRACTION THERAPY: CPT

## 2020-01-16 PROCEDURE — 97110 THERAPEUTIC EXERCISES: CPT

## 2020-01-16 PROCEDURE — 1111F DSCHRG MED/CURRENT MED MERGE: CPT | Performed by: NURSE PRACTITIONER

## 2020-01-16 PROCEDURE — 99495 TRANSJ CARE MGMT MOD F2F 14D: CPT | Performed by: NURSE PRACTITIONER

## 2020-01-16 NOTE — ASSESSMENT & PLAN NOTE
Lab Results   Component Value Date    HGBA1C 11 5 (H) 01/02/2020       Patient cholesterol is not well controlled  He was started on atorvastatin 40 mg he will need lipids checked in 3-4 months

## 2020-01-16 NOTE — PROGRESS NOTES
Assessment/Plan:     Hyperlipidemia associated with type 2 diabetes mellitus ([facility] )    Lab Results   Component Value Date    HGBA1C 11 5 (H) 01/02/2020       Patient cholesterol is not well controlled  He was started on atorvastatin 40 mg he will need lipids checked in 3-4 months  Hypothyroidism  Patient thyroid was recently increased to 75 mcg  He is given blood work slip for repeat 4-6 weeks  Hyponatremia  Assess stability with bmp  Most likely will be able to resolve if next bmp normal         Diagnoses and all orders for this visit:    Type 2 diabetes mellitus with hyperglycemia, with long-term current use of insulin ([facility] )  -     Basic metabolic panel    Acquired hypothyroidism  -     TSH, 3rd generation with Free T4 reflex; Future    Hyperlipidemia associated with type 2 diabetes mellitus (HCC)    Hyponatremia  -     Basic metabolic panel         Subjective:     Patient ID: Patrick Duff III is a 60 y o  male  Patient states after his spinal surgery he lost control in watching his eating habits  Patient went to eye doctor and he was sent to the ER from their office due to retina changes and was told he was having severe insulin reaction  Patient states his blood sugars was almost 800  He was started on insulin drip was transitioned to injections and oral medications  He is taking lantus 45 units at bedtime, humalog 15 TID, checking sugars TID  His levothyroxine was also adjusted up to 75mcg  He will need tsh in 4-6 weeks  Does not have blood work script  Patient has endocrinology appointment on Monday 1/20/20  He also had eye doctor appt 1/21/2020  Today he also presents with handicapp form to be completed  Pt states his sugars have now been in 70s       Review of Systems   Constitutional: Negative for diaphoresis, fatigue and unexpected weight change  HENT: Negative for trouble swallowing  Eyes: Negative for visual disturbance     Respiratory: Negative for cough, chest tightness and shortness of breath  Cardiovascular: Negative for chest pain, palpitations and leg swelling  Gastrointestinal: Negative for constipation  Endocrine: Negative for polydipsia, polyphagia and polyuria  Genitourinary: Negative for difficulty urinating  Musculoskeletal: Negative for arthralgias and myalgias  Neurological: Positive for weakness  Negative for dizziness, light-headedness and headaches  Psychiatric/Behavioral: Negative for sleep disturbance  Objective:     Physical Exam   Constitutional: Vital signs are normal  He appears well-developed and well-nourished  He does not have a sickly appearance  He does not appear ill  HENT:   Head: Normocephalic and atraumatic  Mouth/Throat: Uvula is midline, oropharynx is clear and moist and mucous membranes are normal    Eyes: Pupils are equal, round, and reactive to light  Neck: No JVD present  Carotid bruit is not present  No thyromegaly present  Cardiovascular: Normal rate, regular rhythm, S1 normal, S2 normal and normal heart sounds  Pulses are no weak pulses  No murmur heard  Pulses:       Dorsalis pedis pulses are 2+ on the right side, and 2+ on the left side  Posterior tibial pulses are 2+ on the right side, and 2+ on the left side  Pulmonary/Chest: Effort normal and breath sounds normal    Abdominal: Soft  Normal appearance and bowel sounds are normal    Musculoskeletal:        Feet:    Feet:   Right Foot:   Skin Integrity: Negative for ulcer, skin breakdown, erythema, warmth, callus or dry skin  Left Foot:   Skin Integrity: Negative for ulcer, skin breakdown, erythema, warmth, callus or dry skin  Lymphadenopathy:     He has no cervical adenopathy  Skin: Skin is warm, dry and intact  Patient's shoes and socks removed  Right Foot/Ankle   Right Foot Inspection  Skin Exam: skin normal and skin intact no dry skin, no warmth, no callus, no erythema, no maceration, no abnormal color, no pre-ulcer, no ulcer and no callus                          Toe Exam: ROM and strength within normal limits  Sensory   Vibration: intact  Proprioception: intact   Monofilament testing: diminished  Vascular  Capillary refills: < 3 seconds  The right DP pulse is 2+  The right PT pulse is 2+  Left Foot/Ankle  Left Foot Inspection  Skin Exam: skin normal and skin intactno dry skin, no warmth, no erythema, no maceration, normal color, no pre-ulcer, no ulcer and no callus                         Toe Exam: ROM and strength within normal limits                   Sensory   Vibration: intact  Proprioception: intact  Monofilament: intact  Vascular  Capillary refills: < 3 seconds  The left DP pulse is 2+  The left PT pulse is 2+  Assign Risk Category:  No deformity present; Loss of protective sensation; No weak pulses       Risk: 2          Vitals:    01/16/20 1304   BP: 118/72   BP Location: Left arm   Patient Position: Sitting   Cuff Size: Adult   Pulse: 60   Resp: 18   Temp: 97 6 °F (36 4 °C)   TempSrc: Temporal   Weight: 85 8 kg (189 lb 3 2 oz)   Height: 5' 7" (1 702 m)       Transitional Care Management Review:  Michelle Glass III is a 60 y o  male here for TCM follow up  During the TCM phone call patient stated:    TCM Call (since 12/16/2019)     Date and time call was made  1/6/2020  [facility]    Hospital care reviewed  Records reviewed    Patient was hospitialized at  [facility]    Date of Admission  01/02/20    Date of discharge  01/04/20    Diagnosis  uncontrolled DM2    Disposition  Home    Were the patients medications reviewed and updated  Yes    Current Symptoms  None      TCM Call (since 12/16/2019)     Post hospital issues  None    Should patient be enrolled in anticoag monitoring? No    Scheduled for follow up?   Yes    Did you obtain your prescribed medications  Yes    Do you need help managing your prescriptions or medications  No    Is transportation to your appointment needed  No    I have advised the patient to call PCP with any new or worsening symptoms  AMANDA Guevara CRNP    BMI Counseling: Body mass index is 29 63 kg/m²  The BMI is above normal  Nutrition recommendations include reducing portion sizes, 3-5 servings of fruits/vegetables daily, decreasing soda and/or juice intake and moderation in carbohydrate intake

## 2020-01-16 NOTE — PATIENT INSTRUCTIONS
Low Fat Diet   AMBULATORY CARE:   A low-fat diet  is an eating plan that is low in total fat, unhealthy fat, and cholesterol  You may need to follow a low-fat diet if you have trouble digesting or absorbing fat  You may also need to follow this diet if you have high cholesterol  You can also lower your cholesterol by increasing the amount of fiber in your diet  Soluble fiber is a type of fiber that helps to decrease cholesterol levels  Different types of fat in food:   · Limit unhealthy fats  A diet that is high in cholesterol, saturated fat, and trans fat may cause unhealthy cholesterol levels  Unhealthy cholesterol levels increase your risk of heart disease  ¨ Cholesterol:  Limit intake of cholesterol to less than 200 mg per day  Cholesterol is found in meat, eggs, and dairy  ¨ Saturated fat:  Limit saturated fat to less than 7% of your total daily calories  Ask your dietitian how many calories you need each day  Saturated fat is found in butter, cheese, ice cream, whole milk, and palm oil  Saturated fat is also found in meat, such as beef, pork, chicken skin, and processed meats  Processed meats include sausage, hot dogs, and bologna  ¨ Trans fat:  Avoid trans fat as much as possible  Trans fat is used in fried and baked foods  Foods that say trans fat free on the label may still have up to 0 5 grams of trans fat per serving  · Include healthy fats  Replace foods that are high in saturated and trans fat with foods high in healthy fats  This may help to decrease high cholesterol levels  ¨ Monounsaturated fats: These are found in avocados, nuts, and vegetable oils, such as olive, canola, and sunflower oil  ¨ Polyunsaturated fats: These can be found in vegetable oils, such as soybean or corn oil  Omega-3 fats can help to decrease the risk of heart disease  Omega-3 fats are found in fish, such as salmon, herring, trout, and tuna   Omega-3 fats can also be found in plant foods, such as walnuts, flaxseed, soybeans, and canola oil    Foods to limit or avoid:   · Grains:      ¨ Snacks that are made with partially hydrogenated oils, such as chips, regular crackers, and butter-flavored popcorn    ¨ High-fat baked goods, such as biscuits, croissants, doughnuts, pies, cookies, and pastries    · Dairy:      ¨ Whole milk, 2% milk, and yogurt and ice cream made with whole milk    ¨ Half and half creamer, heavy cream, and whipping cream    ¨ Cheese, cream cheese, and sour cream    · Meats and proteins:      ¨ High-fat cuts of meat (T-bone steak, regular hamburger, and ribs)    ¨ Fried meat, poultry (turkey and chicken), and fish    ¨ Poultry (chicken and turkey) with skin    ¨ Cold cuts (salami or bologna), hot dogs, kumar, and sausage    ¨ Whole eggs and egg yolks    · Vegetables and fruits with added fat:      ¨ Fried vegetables or vegetables in butter or high-fat sauces, such as cream or cheese sauces    ¨ Fried fruit or fruit served with butter or cream    · Fats:      ¨ Butter, stick margarine, and shortening    ¨ Coconut, palm oil, and palm kernel oil  Foods to include:   · Grains:      ¨ Whole-grain breads, cereals, pasta, and brown rice    ¨ Low-fat crackers and pretzels    · Vegetables and fruits:      ¨ Fresh, frozen, or canned vegetables (no salt or low-sodium)    ¨ Fresh, frozen, dried, or canned fruit (canned in light syrup or fruit juice)    ¨ Avocado    · Low-fat dairy products:      ¨ Nonfat (skim) or 1% milk    ¨ Nonfat or low-fat cheese, yogurt, and cottage cheese    · Meats and proteins:      ¨ Chicken or turkey with no skin    ¨ Baked or broiled fish    ¨ Lean beef and pork (loin, round, extra lean hamburger)    ¨ Beans and peas, unsalted nuts, soy products    ¨ Egg whites and substitutes    ¨ Seeds and nuts    · Fats:      ¨ Unsaturated oil, such as canola, olive, peanut, soybean, or sunflower oil    ¨ Soft or liquid margarine and vegetable oil spread    ¨ Low-fat salad dressing  Other ways to decrease fat:   · Read food labels before you buy foods  Choose foods that have less than 30% of calories from fat  Choose low-fat or fat-free dairy products  Remember that fat free does not mean calorie free  These foods still contain calories, and too many calories can lead to weight gain  · Trim fat from meat and avoid fried food  Trim all visible fat from meat before you cook it  Remove the skin from poultry  Do not rodas meat, fish, or poultry  Bake, roast, boil, or broil these foods instead  Avoid fried foods  Eat a baked potato instead of Western [place] fries  Steam vegetables instead of sautéing them in butter  · Add less fat to foods  Use imitation kumar bits on salads and baked potatoes instead of regular kumar bits  Use fat-free or low-fat salad dressings instead of regular dressings  Use low-fat or nonfat butter-flavored topping instead of regular butter or margarine on popcorn and other foods  Ways to decrease fat in recipes:  Replace high-fat ingredients with low-fat or nonfat ones  This may cause baked goods to be drier than usual  You may need to use nonfat cooking spray on pans to prevent food from sticking  You also may need to change the amount of other ingredients, such as water, in the recipe  Try the following:  · Use low-fat or light margarine instead of regular margarine or shortening  · Use lean ground turkey breast or chicken, or lean ground beef (less than 5% fat) instead of hamburger  · Add 1 teaspoon of canola oil to 8 ounces of skim milk instead of using cream or half and half  · Use grated zucchini, carrots, or apples in breads instead of coconut  · Use blenderized, low-fat cottage cheese, plain tofu, or low-fat ricotta cheese instead of cream cheese  · Use 1 egg white and 1 teaspoon of canola oil, or use ¼ cup (2 ounces) of fat-free egg substitute instead of a whole egg       · Replace half of the oil that is called for in a recipe with applesauce when you bake  Use 3 tablespoons of cocoa powder and 1 tablespoon of canola oil instead of a square of baking chocolate  How to increase fiber:  Eat enough high-fiber foods to get 20 to 30 grams of fiber every day  Slowly increase your fiber intake to avoid stomach cramps, gas, and other problems  · Eat 3 ounces of whole-grain foods each day  An ounce is about 1 slice of bread  Eat whole-grain breads, such as whole-wheat bread  Whole wheat, whole-wheat flour, or other whole grains should be listed as the first ingredient on the food label  Replace white flour with whole-grain flour or use half of each in recipes  Whole-grain flour is heavier than white flour, so you may have to add more yeast or baking powder  · Eat a high-fiber cereal for breakfast   Oatmeal is a good source of soluble fiber  Look for cereals that have bran or fiber in the name  Choose whole-grain products, such as brown rice, barley, and whole-wheat pasta  · Eat more beans, peas, and lentils  For example, add beans to soups or salads  Eat at least 5 cups of fruits and vegetables each day  Eat fruits and vegetables with the peel because the peel is high in fiber  © 2017 [employer]  Information is for End User's use only and may not be sold, redistributed or otherwise used for commercial purposes  All illustrations and images included in CareNotes® are the copyrighted property of [employer]  or [place]  The above information is an  only  It is not intended as medical advice for individual conditions or treatments  Talk to your doctor, nurse or pharmacist before following any medical regimen to see if it is safe and effective for you  Basic Carbohydrate Counting   AMBULATORY CARE:   Carbohydrate counting  is a way to plan your meals by counting the amount of carbohydrate in foods  Carbohydrates are the sugars, starches, and fiber found in fruit, grains, vegetables, and milk products  Carbohydrates increase your blood sugar levels  Carbohydrate counting can help you eat the right amount of carbohydrate to keep your blood sugar levels under control  What you need to know about planning meals using carbohydrate counting:  · A dietitian or healthcare provider will help you develop a healthy meal plan that works best for you  You will be taught how much carbohydrate to eat or drink for each meal and snack  Your meal plan will be based on your age, weight, usual food intake, and physical activity level  If you have diabetes, it will also include your blood sugar levels and diabetes medicine  Once you know how much carbohydrate you should eat, you can decide what type of food you want to eat  · You will need to know what foods contain carbohydrate and how much they contain  Keep track of the amount of carbohydrate in meals and snacks in order to follow your meal plan  Do not avoid carbohydrates or skip meals  Your blood sugar may fall too low if you do not eat enough carbohydrate or you skip meals  Foods that contain carbohydrate:   · Breads:  Each serving of food listed below contains about 15 g of carbohydrate   ¨ 1 slice of bread (1 ounce) or 1 flour or corn tortilla (6 inch)    ¨ ½ of a hamburger bun or ¼ of a large bagel (about 1 ounce)    ¨ 1 pancake (about 4 inches across and ¼ inch thick)    · Cereals and grains:  Serving sizes of ready-to-eat cereals vary  Look at the serving size and the total carbohydrate amount listed on the food label  Each serving of food listed below contains about 15 g of carbohydrate   ¨ ¾ cup of dry, unsweetened, ready-to-eat cereal or ¼ cup of low-fat granola     ¨ ½ cup of oatmeal or other cooked cereal     ¨ ? cup of cooked rice or pasta    · Starchy vegetables and beans:  Each serving of food listed below contains about 15 g of carbohydrate       ¨ ½ cup of corn, green peas, sweet potatoes, or mashed potatoes    ¨ ¼ of a large baked potato    ¨ ½ cup of beans, lentils, and peas (garbanzo, costa, kidney, white, split, black-eyed)    · Crackers and snacks:  Each serving of food listed below contains about 15 g of carbohydrate   ¨ 3 gamaliel cracker squares or 8 animal crackers     ¨ 6 saltine-type crackers    ¨ 3 cups of popcorn or ¾ ounce of pretzels, potato chips, or tortilla chips    · Fruit:  Each serving of food listed below contains about 15 g of carbohydrate   ¨ 1 small (4 ounce) piece of fresh fruit or ¾ to 1 cup of fresh fruit    ¨ ½ cup of canned or frozen fruit, packed in natural juice    ¨ ½ cup (4 ounces) of unsweetened fruit juice    ¨ 2 tablespoons of dried fruit    · Desserts or sugary foods:  Each serving of food listed below contains about 15 g of carbohydrate   ¨ 2-inch square unfrosted cake or brownie     ¨ 2 small cookies    ¨ ½ cup of ice cream, frozen yogurt, or nondairy frozen yogurt    ¨ ¼ cup of sherbet or sorbet    ¨ 1 tablespoon of regular syrup, jam, or jelly    ¨ 2 tablespoons of light syrup    · Milk and yogurt:  Foods from the milk group contain about 12 g of carbohydrate per serving  ¨ 1 cup of fat-free or low-fat milk    ¨ 1 cup of soy milk    ¨ ? cup of fat-free, yogurt sweetened with artificial sweetener    · Non-starchy vegetables:  Each serving contains about 5 g of carbohydrate   Three servings of non-starch vegetables count as 1 carbohydrate serving  ¨ ½ cup of cooked vegetables or 1 cup of raw vegetables  This includes beets, broccoli, cabbage, cauliflower, cucumber, mushrooms, tomatoes, and zucchini    ¨ ½ cup of vegetable juice  How to use carbohydrate counting to plan meals:   · Count carbohydrate amounts using serving sizes:      ¨ Pasta dinner example: You plan to have pasta, tossed salad, and an 8-ounce glass of milk  Your healthcare provider tells you that you may have 4 carbohydrate servings for dinner  One carbohydrate serving of pasta is ? cup  One cup of pasta will equal 3 carbohydrate servings   An 8-ounce glass of milk will count as 1 carbohydrate serving  These amounts of food would equal 4 carbohydrate servings  One cup of tossed salad does not count toward your carbohydrate servings  · Count carbohydrate amounts using food labels:  Find the total amount of carbohydrate in a packaged food by reading the food label  Food labels tell you the serving size of the food and the total carbohydrate amount in each serving  Find the serving size on the food label and then decide how many servings you will eat  Multiply the number of servings you plan to eat by the carbohydrate amount per serving  ¨ Granola bar snack example: Your meal plan allows you to have 2 carbohydrate servings (30 grams) of carbohydrate for a snack  You plan to eat 1 package of granola bars, which contains 2 bars  According to the food label, the serving size of food in this package is 1 bar  Each serving (1 bar) contains 25 grams of carbohydrate  The total amount of carbohydrate in this package of granola bars would be 50 g  Based on your meal plan, you should eat only 1 bar  Follow up with your healthcare provider as directed:  Write down your questions so you remember to ask them during your visits  © 2017 [facility] Information is for End User's use only and may not be sold, redistributed or otherwise used for commercial purposes  All illustrations and images included in CareNotes® are the copyrighted property of [employer]  or [place]  The above information is an  only  It is not intended as medical advice for individual conditions or treatments  Talk to your doctor, nurse or pharmacist before following any medical regimen to see if it is safe and effective for you

## 2020-01-16 NOTE — PROGRESS NOTES
Daily Note     Today's date: 2020  Patient name: Pamela Umana  : 1960  MRN: [id number]  Referring provider: Chapo Oliver MD  Dx:   Encounter Diagnosis     ICD-10-CM    1  Right leg pain M79 604    2  General weakness R53 1    3  Spinal cord abscess G06 1    4  DDD (degenerative disc disease), lumbar M51 36                   Subjective:  Pt reports  He thinks he sees a a change in his hand sx since better BS controls      Objective: See treatment diary below      Assessment:  Occasion LOB during balance ex which pt was able to recover with no assistance needed      Plan: Continue per plan of care  Progress treatment as tolerated         Precautions: Cervical tumor    Daily Treatment Diary     Manual  12/3 12/5/19 12/10 12/17     11/26   PNF R LE            PNF R pelvis in L SL            CPA JF JF JF      JF   PPU OP JF JF       JF   Lumbar gapping                   Exercise Diary  12/23 12/31/19 1/2/20 1/10/20 1/14 1/16/20 12/20/19   RB  9' lvl2 7'lvl5 10' 10' 10'    SL clamshells 3*10 3x10 3x10 3x10 3*10  3x10   Biodex          STS 3*10 3x10 3x10 3x10 3*10  2x10   Step-ups          Backward amb          Tandem walk          Bridges with arms crossed    3x10x5" 3*10*5"     R SLR 3*10 3x10 3x10 3x10 3*10  3x10   PPT          Standing abd 3*10 3x10 3x10 3x10 3*10 3x10 3x10   CLARITA          PPU          Obstacle course + cones 15 min 10 min 10 min 5 min 10 min 8 min 15 min   Low row TB          Mini squats 3*10 3x10 3x10 3x10 3*10 3x10 3x10     3x10 2x10       Sciatic nerve glides              Modalities  11/29 12/10 12/12 12/20/19 12/27/19 1/2/20 1/10/20 1/16/20     NMES with DF JF            Mechanical tractoin  JF JF VK VK VK VK VK

## 2020-01-20 ENCOUNTER — OFFICE VISIT (OUTPATIENT)
Dept: ENDOCRINOLOGY | Facility: CLINIC | Age: 60
End: 2020-01-20
Payer: COMMERCIAL

## 2020-01-20 VITALS
HEIGHT: 67 IN | SYSTOLIC BLOOD PRESSURE: 128 MMHG | BODY MASS INDEX: 30.39 KG/M2 | DIASTOLIC BLOOD PRESSURE: 64 MMHG | WEIGHT: 193.6 LBS | HEART RATE: 65 BPM

## 2020-01-20 DIAGNOSIS — E78.5 HYPERLIPIDEMIA: ICD-10-CM

## 2020-01-20 DIAGNOSIS — E03.9 ACQUIRED HYPOTHYROIDISM: ICD-10-CM

## 2020-01-20 DIAGNOSIS — E11.65 TYPE 2 DIABETES MELLITUS WITH HYPERGLYCEMIA, WITHOUT LONG-TERM CURRENT USE OF INSULIN (HCC): Primary | ICD-10-CM

## 2020-01-20 PROCEDURE — 1111F DSCHRG MED/CURRENT MED MERGE: CPT | Performed by: INTERNAL MEDICINE

## 2020-01-20 PROCEDURE — 99214 OFFICE O/P EST MOD 30 MIN: CPT | Performed by: INTERNAL MEDICINE

## 2020-01-20 RX ORDER — METFORMIN HYDROCHLORIDE 500 MG/1
500 TABLET, EXTENDED RELEASE ORAL 2 TIMES DAILY WITH MEALS
Qty: 60 TABLET | Refills: 3 | Status: SHIPPED | OUTPATIENT
Start: 2020-01-20 | End: 2020-02-27 | Stop reason: SDUPTHER

## 2020-01-20 NOTE — PROGRESS NOTES
Ean Dodson III 60 y o  male MRN: [id number]    Encounter: [id number]      Assessment/Plan     Assessment: This is a 60y o -year-old male with diabetes with hyperglycemia  Plan:    Diagnoses and all orders for this visit:    Type 2 diabetes mellitus with hyperglycemia, without long-term current use of insulin ([facility] )  Lab Results   Component Value Date    HGBA1C 11 5 (H) 01/02/2020    A1c is 11 5%, suggestive of severe hyperglycemia  Discussed to start metformin 500 mg twice a day, with meals, discussed the side effects, his kidney function is okay  Lab Results   Component Value Date    CREATININE 0 78 01/04/2020    As blood sugars are tightly controlled, on current regimen, I have adjusted long-acting insulin as well as short-acting insulin as below  See instructions for sliding scale  Also discussed to see dietitian for carbohydrate counting  Discussed to check blood sugar 4 times daily and send log in 2 weeks, to make further adjustment  Also discussed the importance of following with Podiatry as well as Ophthalmology  Educated about long-term complication of uncontrolled diabetes, such as worsening of neuropathy, risk of heart attack and stroke, nephropathy, and retinopathy      -     metFORMIN (GLUCOPHAGE-XR) 500 mg 24 hr tablet; Take 1 tablet (500 mg total) by mouth 2 (two) times a day with meals  -     insulin glargine (BASAGLAR KWIKPEN) 100 units/mL injection pen; Inject 25 units at bedtime  -     insulin aspart (NOVOLOG FLEXPEN) 100 Units/mL injection pen; Inject 8 units with meals +scale  -     Basic metabolic panel; Future  -     Microalbumin / creatinine urine ratio; Future  -     Ambulatory referral to Diabetic Education; Future    Hyperlipidemia    Currently patient is on Lipitor 40 mg daily  -     Ambulatory referral to Endocrinology  -     Lipid panel Lab Collect Lab Collect; Future    Acquired hypothyroidism  -     T4, free; Future  -     TSH, 3rd generation;  Future  -     Hepatic function panel; Future        CC: Diabetes    History of Present Illness     HPI:    Gera Winter III is 60 yr old gentleman , with PMH of Prediabetes,HTN, Hyperlipidemia, Neuropathy is here for follow up of type 2 diabetes    He also has hypothyroidism, for which she is taking levothyroxine 75 mcg daily, he takes it on empty stomach 1 hour before breakfast   Denies missing doses  Lab Results   Component Value Date    HGBA1C 11 5 (H) 01/02/2020       Lab Results   Component Value Date    [id number] 7 910 (H) 01/03/2020    He was recently admitted in the hospital for hyperglycemia, and was started on insulin regimen  For type 2 diabetes he was started on insulin glargine 45 units at bedtime, and NovoLog 15 units 3 times daily  He is checking blood sugars 3 times daily, his blood sugars are usually in  range  HIS currently not taking metformin  He is also taking gabapentin 300 mg 3 times daily for diabetic neuropathy    Last eye appt- has not seen Ophthalmology            Lab Results   Component Value Date    HGBA1C 11 5 (H) 01/02/2020          Review of Systems   Constitutional: Positive for fatigue  Negative for activity change, diaphoresis, fever and unexpected weight change  HENT: Negative  Eyes: Negative for visual disturbance  Respiratory: Negative for cough, chest tightness and shortness of breath  Cardiovascular: Negative for chest pain, palpitations and leg swelling  Gastrointestinal: Negative for abdominal pain, blood in stool, constipation, diarrhea, nausea and vomiting  Endocrine: Negative for cold intolerance, heat intolerance, polydipsia, polyphagia and polyuria  Genitourinary: Negative for dysuria, enuresis, frequency and urgency  Musculoskeletal: Negative for arthralgias and myalgias  Skin: Negative for pallor, rash and wound  Allergic/Immunologic: Negative  Neurological: Positive for numbness  Negative for dizziness, tremors and weakness     Hematological: Negative  Psychiatric/Behavioral: Negative          Historical Information   Past Medical History:   Diagnosis Date    BPH associated with nocturia     Cervical spinal mass ([address] ) 2019    cervicothoracic    Disease of thyroid gland     Herniated disc, cervical     History of radiation therapy     Hyperlipidemia     Impaired fasting glucose      Past Surgical History:   Procedure Laterality Date    APPENDECTOMY      CERVICAL FUSION      COLONOSCOPY  2012    NORMAL; RECHECK IN 5 YEARS    FL LUMBAR PUNCTURE DIAGNOSTIC  2019    MT BX/EXCIS SPIN PATEL,INDUR,INMED,CERV N/A 2019    Procedure: Posterior C4-T3 laminectomy; resection of intramedullary mass C5-T3, including fenestration of syrinx C7-T2; C3-T4 fixation/fusion; additional levels as needed;  Surgeon: Yessi Pruitt MD;  Location: [facility];  Service: Neurosurgery    WISDOM TOOTH EXTRACTION       Social History   Social History     Substance and Sexual Activity   Alcohol Use Yes    Comment: SOCIAL      Social History     Substance and Sexual Activity   Drug Use No     Social History     Tobacco Use   Smoking Status Former Smoker    Last attempt to quit: 1993    Years since quittin 7   Smokeless Tobacco Never Used     Family History:   Family History   Problem Relation Age of Onset    Diabetes Mother     Hypertension Father     Cerebral palsy Sister     Breast cancer Daughter        Meds/Allergies   Current Outpatient Medications   Medication Sig Dispense Refill    atorvastatin (LIPITOR) 40 mg tablet Take 1 tablet (40 mg total) by mouth daily with dinner 30 tablet 0    DULoxetine (CYMBALTA) 60 mg delayed release capsule Take 1 capsule (60 mg total) by mouth daily 30 capsule 2    gabapentin (NEURONTIN) 300 mg capsule Take 1 capsule (300 mg total) by mouth 3 (three) times a day 90 capsule 2    insulin aspart (NOVOLOG FLEXPEN) 100 Units/mL injection pen Inject 8 units with meals +scale 5 pen 3    insulin glargine Hilaria Hivgraham) 100 units/mL injection pen Inject 25 units at bedtime 5 pen 3    Insulin Pen Needle (PEN NEEDLES 29GX1/2") 29G X 12MM MISC by Does not apply route 4 (four) times a day (before meals and at bedtime) Please substitute for what fits pen and what is covered 150 each 0    levothyroxine 75 mcg tablet Take 1 tablet (75 mcg total) by mouth daily 30 tablet 0    Omega-3 Fatty Acids (FISH OIL) 1,000 mg Take 1 capsule (1,000 mg total) by mouth daily Resume on 2/20/19 90 capsule 1    metFORMIN (GLUCOPHAGE-XR) 500 mg 24 hr tablet Take 1 tablet (500 mg total) by mouth 2 (two) times a day with meals 60 tablet 3     No current facility-administered medications for this visit  Allergies   Allergen Reactions    Bee Venom Hives, Itching and Edema     Category: Allergy;     Penicillins Hives and Itching     Category: Allergy;        Objective   Vitals: Blood pressure 128/64, pulse 65, height 5' 7" (1 702 m), weight 87 8 kg (193 lb 9 6 oz)  Physical Exam   Constitutional: He is oriented to person, place, and time  He appears well-developed and well-nourished  No distress  HENT:   Head: Normocephalic and atraumatic  Eyes: Pupils are equal, round, and reactive to light  EOM are normal    Neck: Normal range of motion  Neck supple  No thyromegaly present  Cardiovascular: Normal rate, regular rhythm and normal heart sounds  Pulmonary/Chest: Effort normal and breath sounds normal  No respiratory distress  Musculoskeletal: Normal range of motion  He exhibits no edema or deformity  Neurological: He is alert and oriented to person, place, and time  Skin: Skin is warm and dry  No erythema  Psychiatric: He has a normal mood and affect  Vitals reviewed  The history was obtained from the review of the chart, patient      Lab Results:   Lab Results   Component Value Date/Time    Hemoglobin A1C 11 5 (H) 01/02/2020 02:08 PM    Hemoglobin A1C 6 4 (H) 10/24/2019 09:07 AM    Hemoglobin A1C 6 3 05/09/2019 08:41 AM    WBC 5 24 01/03/2020 06:06 AM    WBC 6 63 01/02/2020 02:08 PM    WBC 4 71 10/24/2019 09:07 AM    Hemoglobin 11 6 (L) 01/03/2020 06:06 AM    Hemoglobin 13 1 01/02/2020 02:08 PM    Hemoglobin 13 5 10/24/2019 09:07 AM    Hematocrit 35 7 (L) 01/03/2020 06:06 AM    Hematocrit 40 3 01/02/2020 02:08 PM    Hematocrit 44 2 10/24/2019 09:07 AM    MCV 90 01/03/2020 06:06 AM    MCV 89 01/02/2020 02:08 PM    MCV 96 10/24/2019 09:07 AM    Platelets 379 87/99/0352 06:06 AM    Platelets 449 26/48/9065 02:08 PM    Platelets 316 89/24/6251 09:07 AM    BUN 14 01/04/2020 05:14 AM    BUN 16 01/03/2020 06:06 AM    BUN 19 01/03/2020 12:21 AM    Potassium 4 0 01/04/2020 05:14 AM    Potassium 4 2 01/03/2020 06:06 AM    Potassium 3 4 (L) 01/03/2020 12:21 AM    Chloride 108 01/04/2020 05:14 AM    Chloride 106 01/03/2020 06:06 AM    Chloride 106 01/03/2020 12:21 AM    CO2 25 01/04/2020 05:14 AM    CO2 26 01/03/2020 06:06 AM    CO2 27 01/03/2020 12:21 AM    Creatinine 0 78 01/04/2020 05:14 AM    Creatinine 0 81 01/03/2020 06:06 AM    Creatinine 0 81 01/03/2020 12:21 AM    AST 14 01/03/2020 06:06 AM    AST 7 01/02/2020 02:08 PM    AST 15 10/24/2019 09:07 AM    ALT 29 01/03/2020 06:06 AM    ALT 38 01/02/2020 02:08 PM    ALT 39 10/24/2019 09:07 AM    Albumin 2 6 (L) 01/03/2020 06:06 AM    Albumin 3 4 (L) 01/02/2020 02:08 PM    Albumin 3 5 10/24/2019 09:07 AM    HDL, Direct 22 (L) 01/03/2020 06:06 AM    HDL, Direct 31 (L) 10/24/2019 09:07 AM    HDL, Direct 30 (L) 05/09/2019 08:41 AM    Triglycerides 258 (H) 01/03/2020 06:06 AM    Triglycerides 189 (H) 10/24/2019 09:07 AM    Triglycerides 181 (H) 05/09/2019 08:41 AM           Imaging Studies: I have personally reviewed pertinent reports  Portions of the record may have been created with voice recognition software  Occasional wrong word or "sound a like" substitutions may have occurred due to the inherent limitations of voice recognition software   Read the chart carefully and recognize, using context, where substitutions have occurred

## 2020-01-20 NOTE — PATIENT INSTRUCTIONS
INSULIN DOSAGE INSTRUCTIONS    Name: Ok Day III                        : 1960  MRN #: [id number]    Your Current Insulin  and dose is: Before Breakfast Before Lunch Before Evening Meal Bedtime     Novolog Insulin   8 units      8 units    8 units     Regular, Apidra, Humalog orNovolog Sliding Scale:   <80              151-200 + 1 +1 +1    201-250 + 2 +2 +2 +   251-300 + 3 +3 +3 +   301-350 + 4 +4 +4 +   >350 +5 +5 +5 +              Lantus Insulin      25 units      Additional Instructions:   Please test your blood sugar:  _4_ Times per day  X_ Before Breakfast                _ Alternate Testing  X_ Before Lunch                _ 2 Hours After  Meal  X_ Before Evening Meal               _ 3 a m   x_ Before Bedtime Snack     Target Blood sugar range _70_to _140__  Call if your   blood sugar is less than _60_ or greater than _400__  Today's Date: 2020       Hypoglycemia instructions   Ok Day III  2020  [id number]    Low Blood Sugar    Steps to treat low blood sugar  1  Test blood sugar if you have symptoms of low blood sugar:   Low Blood Sugar Symptoms:  o Sweaty  o Dizzy  o Rapid heartbeat  o Shaky    o Bad mood  o Hungry      2  Treat blood sugar less than 70 with 15 grams of fast-acting carbohydrate:   Examples of 15 grams Fast-Acting Carbohydrate:  o 4 oz juice  o 4 oz regular soda  o 3-4 glucose tablets (chew)  o 3-4 hard candies (chew)              3    Wait 15 minutes and test your blood sugar again           4   If blood sugar is less than 100, repeat steps 2-3       5  When your blood sugar is 100 or more, eat a snack if it will be longer than one hour until your next meal  The snack should be 15 grams of carbohydrate and a protein:   Examples of snacks:  o ½ sandwich  o 6 crackers with cheese  o Piece of fruit with cheese or peanut butter  o 6 crackers with peanut butter

## 2020-01-20 NOTE — TELEPHONE ENCOUNTER
received insurance referral for upcoming visit today:    Referral # [id number]  Good for 6 visits  Exp: 1-

## 2020-01-21 ENCOUNTER — OFFICE VISIT (OUTPATIENT)
Dept: PHYSICAL THERAPY | Age: 60
End: 2020-01-21
Payer: COMMERCIAL

## 2020-01-21 DIAGNOSIS — R53.1 GENERAL WEAKNESS: ICD-10-CM

## 2020-01-21 DIAGNOSIS — M79.604 RIGHT LEG PAIN: Primary | ICD-10-CM

## 2020-01-21 DIAGNOSIS — G06.1 SPINAL CORD ABSCESS: ICD-10-CM

## 2020-01-21 DIAGNOSIS — M51.36 DDD (DEGENERATIVE DISC DISEASE), LUMBAR: ICD-10-CM

## 2020-01-21 PROCEDURE — 97012 MECHANICAL TRACTION THERAPY: CPT

## 2020-01-21 PROCEDURE — 97110 THERAPEUTIC EXERCISES: CPT

## 2020-01-21 PROCEDURE — 97112 NEUROMUSCULAR REEDUCATION: CPT

## 2020-01-21 NOTE — PROGRESS NOTES
Daily Note     Today's date: 2020  Patient name: Juan Patient  : 1960  MRN: [id number]  Referring provider: Charline Camarena MD  Dx:   Encounter Diagnosis     ICD-10-CM    1  Right leg pain M79 604    2  General weakness R53 1    3  Spinal cord abscess G06 1    4  DDD (degenerative disc disease), lumbar M51 36                   Subjective: pt reports L leg has been bothersome, "feels like the knee wants to buckle      Objective: See treatment diary below      Assessment: Tolerated treatment well  Patient would benefit from continued PT, no significant TTP, swelling L calf noted as per PT      Plan: Continue per plan of care  Progress treatment as tolerated         Precautions: Cervical tumor    Daily Treatment Diary     Manual  12/3 12/5/19 12/10 12/17     11/26   PNF R LE            PNF R pelvis in L SL            CPA JF JF JF      JF   PPU OP JF JF       JF   Lumbar gapping                   Exercise Diary  12/23 12/31/19 1/2/20 1/10/20 1/14 1/16/20 12/20/19 1/21/20   RB  9' lvl2 7'lvl5 10' 10' 10'  10'   SL clamshells 3*10 3x10 3x10 3x10 3*10  3x10 3x10   Biodex           STS 3*10 3x10 3x10 3x10 3*10  2x10 3x10   Step-ups           Backward amb           Tandem walk           Bridges with arms crossed    3x10x5" 3*10*5"      R SLR 3*10 3x10 3x10 3x10 3*10  3x10 3x10   PPT           Standing abd 3*10 3x10 3x10 3x10 3*10 3x10 3x10 3x10   CLARITA           PPU           Obstacle course + cones 15 min 10 min 10 min 5 min 10 min 8 min 15 min    Low row TB           Mini squats 3*10 3x10 3x10 3x10 3*10 3x10 3x10 3x10     3x10 2x10        Sciatic nerve glides               Modalities  11/29 12/10 12/12 12/20/19 12/27/19 1/2/20 1/10/20 1/16/20     NMES with DF JF            Mechanical tractoin  JF JF VK VK VK VK VK

## 2020-01-22 ENCOUNTER — OFFICE VISIT (OUTPATIENT)
Dept: NEUROSURGERY | Facility: CLINIC | Age: 60
End: 2020-01-22
Payer: COMMERCIAL

## 2020-01-22 VITALS
BODY MASS INDEX: 29 KG/M2 | TEMPERATURE: 97.4 F | HEIGHT: 67 IN | RESPIRATION RATE: 18 BRPM | HEART RATE: 73 BPM | SYSTOLIC BLOOD PRESSURE: 134 MMHG | WEIGHT: 184.8 LBS | DIASTOLIC BLOOD PRESSURE: 69 MMHG

## 2020-01-22 DIAGNOSIS — C72.0 SPINAL CORD EPENDYMOMA (HCC): ICD-10-CM

## 2020-01-22 DIAGNOSIS — C72.0 MALIGNANT NEOPLASM OF SPINAL CORD (HCC): ICD-10-CM

## 2020-01-22 DIAGNOSIS — Z98.1 H/O SPINAL FUSION: ICD-10-CM

## 2020-01-22 PROBLEM — G95.89: Status: RESOLVED | Noted: 2019-06-19 | Resolved: 2020-01-22

## 2020-01-22 PROCEDURE — 99214 OFFICE O/P EST MOD 30 MIN: CPT | Performed by: NEUROLOGICAL SURGERY

## 2020-01-22 NOTE — PROGRESS NOTES
Neurosurgery Office Note  [place] Miesha III 60 y o  male MRN: [id number]      Assessment/Plan      Diagnoses and all orders for this visit:    Malignant neoplasm of spinal cord ([facility] [address] )  -     MRI cervical spine with and without contrast; Future    H/O spinal fusion    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[facility])          Discussion:    60year old male now 12 months s/p resection, decompression, fusion for his intramedullary spinal cord ependymoma, WHO grade 2  (1/24/19)  The mass spanned from the C4-5 disc space down to the T3-4 disc space  We were able to resect this aggressively at its largest part, I e  T1-2, but did not proceed further as his neurological signals declined intraoperatively  He did well postoperatively, and was discharged to [facility]  MRI scan of brain, L-spine, and T-spine   Done after surgery showed no drop metastases  CSF testing was negative  At f/u in early 3/2019, was continuing to improve  He was using no ambulatory aids  His right DF was at least 4/5, but apractic, however this was also improving  He stated he even had some proprioception on the right, which was improved vs  Prior to surgery  He had some mild tingling in his forearms and hands, but no pain or weakness  His incision was healing well  Bowel and bladder function were back to normal  No radicular pains  Xrays were stable, and we planned for adjuvant IMRT  He completed this 5/22/19, to 45 Gy    Starting a few days after his RT, his neurological symptoms began to re-emerge  His right foot drop became more pronounced, and he was unsteady on his feet had a few falls  His balance was unsteady  His occasional radicular pain into the right leg 2-3/10, as well as left arm and right hand pains, pins and needles, 5/10  He wasnot taking gabapentin or dexamethasone  Seen with Dr Brando Lares and we felt this some RT induced myeopathy, and recommended continued PT as well as restarting gabapentin   He is presently on 300 mg TID, and while he has some P&N in his hands yet, it has improved considerably in he legs/feet  His right LE pain is just in the foot, 2/10  He has a RLE brace and a standard cane  He denies neck/back pain  No hardware failure/mechanical pain  MRI scan CSpine stable: residual tumor the superior and inferior caps, specifically from the C4-5 disc space down to the resection cavity, and then again resuming at the inferior edge of the cavity down to the T3-4 disc space  The large  Cavity present preop as not reformed, there are no obvious sign of recurrence etc  Xrays show hardware and alignment stability  We discussed the NCCN guideline recommended   Follow-up  Recommendation is for follow-up MRI scan cervical spine in 6 months, which I have ordered  I will see him after that is completed  Metrics:  [id number] [id number] or 0 791, improved from last, 0 729  VAS 50-60, KPS 70-80, ECOG 1        CHIEF COMPLAINT    Chief Complaint   Patient presents with    Follow-up     4 month follow up with new MRI and X-rays       HISTORY    History of Present Illness     60y o  year old male     HPI    See Discussion    REVIEW OF SYSTEMS    Review of Systems   Constitutional: Positive for fatigue (chronic, improved, 5-6/10)  HENT: Negative  Eyes: Negative  Respiratory: Positive for shortness of breath (with exertion)  Cardiovascular: Positive for leg swelling (right leg, occasional)  Gastrointestinal: Negative  Endocrine: Negative  DMII   Genitourinary: Negative  Musculoskeletal: Positive for gait problem (right foot drag/drop, unsteady balance, using standard cane, 1 fall since last visit, f/w PT), neck pain (positional) and neck stiffness  Skin: Negative  Allergic/Immunologic: Positive for environmental allergies     Neurological: Positive for weakness (right leg), light-headedness (getting up from lying down), numbness (left arm, right elbow and hand occasional numbness/tingling, numbness in B/L fingers and right foot ) and headaches (occasional in a m , frontal, mild)  Hematological: Bruises/bleeds easily  Psychiatric/Behavioral: The patient is nervous/anxious (slight anxiety)  Meds/Allergies     Current Outpatient Medications   Medication Sig Dispense Refill    atorvastatin (LIPITOR) 40 mg tablet Take 1 tablet (40 mg total) by mouth daily with dinner 30 tablet 0    DULoxetine (CYMBALTA) 60 mg delayed release capsule Take 1 capsule (60 mg total) by mouth daily 30 capsule 2    gabapentin (NEURONTIN) 300 mg capsule Take 1 capsule (300 mg total) by mouth 3 (three) times a day 90 capsule 2    insulin aspart (NOVOLOG FLEXPEN) 100 Units/mL injection pen Inject 8 units with meals +scale 5 pen 3    insulin glargine (BASAGLAR KWIKPEN) 100 units/mL injection pen Inject 25 units at bedtime 5 pen 3    Insulin Pen Needle (PEN NEEDLES 29GX1/2") 29G X 12MM MISC by Does not apply route 4 (four) times a day (before meals and at bedtime) Please substitute for what fits pen and what is covered 150 each 0    levothyroxine 75 mcg tablet Take 1 tablet (75 mcg total) by mouth daily 30 tablet 0    metFORMIN (GLUCOPHAGE-XR) 500 mg 24 hr tablet Take 1 tablet (500 mg total) by mouth 2 (two) times a day with meals 60 tablet 3    Omega-3 Fatty Acids (FISH OIL) 1,000 mg Take 1 capsule (1,000 mg total) by mouth daily Resume on 2/20/19 90 capsule 1     No current facility-administered medications for this visit  Allergies   Allergen Reactions    Bee Venom Hives, Itching and Edema     Category: Allergy;     Penicillins Hives and Itching     Category:  Allergy;        PAST HISTORY    Past Medical History:   Diagnosis Date    BPH associated with nocturia     Cervical spinal mass ([facility] [address] ) 01/07/2019    cervicothoracic    Disease of thyroid gland     Herniated disc, cervical     History of radiation therapy     Hyperlipidemia     Impaired fasting glucose        Past Surgical History:   Procedure Laterality Date    APPENDECTOMY      CERVICAL FUSION      COLONOSCOPY  2012    NORMAL; RECHECK IN 5 YEARS    FL LUMBAR PUNCTURE DIAGNOSTIC  2019    AR BX/EXCIS SPIN PATEL,INDUR,INMED,CERV N/A 2019    Procedure: Posterior C4-T3 laminectomy; resection of intramedullary mass C5-T3, including fenestration of syrinx C7-T2; C3-T4 fixation/fusion; additional levels as needed;  Surgeon: Karli Castle MD;  Location: [facility];  Service: Neurosurgery    WISDOM TOOTH EXTRACTION         Social History     Tobacco Use    Smoking status: Former Smoker     Last attempt to quit: 1993     Years since quittin 7    Smokeless tobacco: Never Used   Substance Use Topics    Alcohol use: Yes     Comment: SOCIAL     Drug use: No       Family History   Problem Relation Age of Onset    Diabetes Mother     Hypertension Father     Cerebral palsy Sister     Breast cancer Daughter          The following portions of the patient's history were reviewed in this encounter and updated as appropriate: Past medical, surgical, family, and social history, as well as medications, allergies, and review of systems  EXAM    Vitals:Blood pressure 134/69, pulse 73, temperature (!) 97 4 °F (36 3 °C), temperature source Tympanic, resp  rate 18, height 5' 7" (1 702 m), weight 83 8 kg (184 lb 12 8 oz)  ,Body mass index is 28 94 kg/m²  Physical Exam   Constitutional: He is oriented to person, place, and time  He appears well-developed  HENT:   Head: Normocephalic and atraumatic  Eyes: No scleral icterus  Neck:       Cardiovascular: Normal rate  Pulmonary/Chest: Effort normal    Abdominal: Normal appearance  Neurological: He is alert and oriented to person, place, and time  No sensory deficit  Skin: Skin is warm and dry  Psychiatric: He has a normal mood and affect  His speech is normal and behavior is normal    Vitals reviewed  Neurologic Exam     Mental Status   Oriented to person, place, and time     Attention: normal    Speech: speech is normal   Level of consciousness: alert    Cranial Nerves     CN VII   Facial expression full, symmetric  Motor Exam   Muscle bulk: normal  Overall muscle tone: normal   Right foot drop - braced  Gait, Coordination, and Reflexes     Tremor   Resting tremor: absent  Intention tremor: absent  Action tremor: absent  Standard cane  MEDICAL DECISION MAKING    Imaging Studies:     Xr Spine Cervical 2 Or 3 Vw Injury    Result Date: 1/20/2020  Narrative: CERVICAL SPINE INDICATION:   Z98 1: Arthrodesis status C72 0: Malignant neoplasm of spinal cord  COMPARISON:  6/17/2019 VIEWS:  XR SPINE CERVICAL 2 OR 3 VW INJURY FINDINGS: No evidence of fracture  C3-C4, C5-C6 slight retrolisthesis, otherwise anatomic alignment with posterior cervicothoracic C2-C6, T1-T5 bilateral interpedicular rods and screws, cervical and thoracic crossbars and lower cervicothoracic postoperative change  Hardware is intact  C5-C6 disc degenerative change again noted The prevertebral soft tissues are within normal limits  The lung apices are clear  Impression: Stable postoperative cervicothoracic spine  No acute osseous abnormality Workstation performed: [id number]     Xr Spine Thoracic 3 Vw    Result Date: 1/20/2020  Narrative: THORACIC SPINE INDICATION:   Z98 1: Arthrodesis status C72 0: Malignant neoplasm of spinal cord  COMPARISON:  3/1/2019 VIEWS:  XR SPINE THORACIC 3 VW FINDINGS: There is no fracture or pathologic bone lesion  Thoracic vertebral alignment is within normal limits  Thoracic kyphosis with mid mild degenerative change and small spurs  Cervicothoracic C2-T5 hardware fusion is again seen without change  There is no displacement of the paraspinal line  The pedicles appear intact  Impression: Stable cervicothoracic fusion  No acute osseous abnormality   Workstation performed: [id number]     Mri Cervical Spine With And Without Contrast    Result Date: 1/16/2020  Narrative: MRI CERVICAL SPINE WITH AND WITHOUT CONTRAST INDICATION: Z98 1: Arthrodesis status C72 0: Malignant neoplasm of spinal cord  Status post ependymoma resection  COMPARISON:  Most recent prior MRI September 11, 2019 TECHNIQUE:  Sagittal T1, sagittal T2, sagittal inversion recovery, axial 2D merge and axial T2  Sagittal T1 and axial T1 postcontrast   IV Contrast:  8 mL of Gadobutrol injection (SINGLE-DOSE) IMAGE QUALITY:  Diagnostic  FINDINGS: ALIGNMENT:  Slight C7-T1 anterolisthesis  Status post multilevel decompressive laminectomy and fusion with fusion hardware at C2 through the mid thoracic spine  MARROW SIGNAL:  Marrow is hyperintense from C2-3  The upper thoracic spine likely sequela of prior radiation therapy  CERVICAL AND VISUALIZED UPPER THORACIC CORD:  Patient status post resection of spinal cord mass with marked myelomalacia and volume loss noted  Cord thinning begins at approximately C5 with severe thinning at the C7-T1 disc level through T2-T3  No abnormal enhancement  Findings are unchanged from the prior study  PREVERTEBRAL AND PARASPINAL SOFT TISSUES:  Normal  VISUALIZED POSTERIOR FOSSA:  The visualized posterior fossa demonstrates no abnormal signal  CERVICAL DISC SPACES:  Adequate decompression of the central canal status post multilevel decompressive laminectomy  Mild disc desiccation with small marginal osteophytes mid cervical spine without significant central or foraminal narrowing  UPPER THORACIC DISC SPACES:  Normal  POSTCONTRAST IMAGING:  No abnormal enhancement  Impression: Stable postoperative appearance status post resection of ependymoma  No residual enhancing tumor identified  Marked myelomalacia and volume loss noted most significant from C7-T1 through T2-3  Workstation performed: [id number]       I have personally reviewed pertinent reports     and I have personally reviewed pertinent films in PACS

## 2020-01-23 ENCOUNTER — OFFICE VISIT (OUTPATIENT)
Dept: PHYSICAL THERAPY | Age: 60
End: 2020-01-23
Payer: COMMERCIAL

## 2020-01-23 DIAGNOSIS — R53.1 GENERAL WEAKNESS: ICD-10-CM

## 2020-01-23 DIAGNOSIS — G06.1 SPINAL CORD ABSCESS: ICD-10-CM

## 2020-01-23 DIAGNOSIS — M51.36 DDD (DEGENERATIVE DISC DISEASE), LUMBAR: ICD-10-CM

## 2020-01-23 DIAGNOSIS — M79.604 RIGHT LEG PAIN: Primary | ICD-10-CM

## 2020-01-23 PROCEDURE — 97112 NEUROMUSCULAR REEDUCATION: CPT

## 2020-01-23 PROCEDURE — 97110 THERAPEUTIC EXERCISES: CPT

## 2020-01-23 PROCEDURE — 97012 MECHANICAL TRACTION THERAPY: CPT

## 2020-01-23 NOTE — PROGRESS NOTES
Daily Note     Today's date: 2020  Patient name: Nahomy July  : 1960  MRN: [id number]  Referring provider: Alejandra Strickland MD  Dx:   Encounter Diagnosis     ICD-10-CM    1  Right leg pain M79 604    2  General weakness R53 1    3  Spinal cord abscess G06 1    4  DDD (degenerative disc disease), lumbar M51 36                   Subjective:  Pt reports back feels better, some discomfort with prolonged flexed position (ie: vacuuming), pt reports burning/sharp pain L hand today, but first time in three days"It's definitely decreasing overall"      Objective: See treatment diary below      Assessment:  Progressed dynamic balance ex with mod-max fatigue, pt performed standing ex with no AFO on, pt require min-mod assist for balance ex     Plan: Continue per plan of care  Progress treatment as tolerated         Precautions: Cervical tumor    Daily Treatment Diary     Manual  12/3 12/5/19 12/10 12/17     11/26   PNF R LE            PNF R pelvis in L SL            CPA JF JF JF      JF   PPU OP JF JF       JF   Lumbar gapping                   Exercise Diary  1/2/20 1/10/20 1/14 1/16/20 1/21/20 1/23/20   RB 7'lvl5 10' 10' 10' 10' Upright 10'   SL clamshells 3x10 3x10 3*10  3x10    Biodex         STS 3x10 3x10 3*10  3x10 3x10   Step-ups         Backward amb         Tandem walk         Bridges with arms crossed  3x10x5" 3*10*5"      R SLR 3x10 3x10 3*10  3x10    PPT         Standing abd 3x10 3x10 3*10 3x10 3x10 3x10   CLARITA         PPU         Obstacle course + cones 10 min 5 min 10 min 8 min     Low row TB      NV   (sit on PB)   Mini squats 3x10 3x10 3*10 3x10 3x10 3x10    2x10        Sciatic nerve glides         Dynamic balance: standing ball toss on rebounder      3x10   Dynamic balance: ball toss with lunge B/L      10x ea       Modalities  11/29 12/10 12/12 12/20/19 12/27/19 1/2/20 1/10/20 1/16/20 1/21/20 1/23/20   NMES with DF JF            Mechanical tractoin  JF JF VK VK VK VK VK VK VK

## 2020-01-28 ENCOUNTER — OFFICE VISIT (OUTPATIENT)
Dept: PHYSICAL THERAPY | Age: 60
End: 2020-01-28
Payer: COMMERCIAL

## 2020-01-28 DIAGNOSIS — R53.1 GENERAL WEAKNESS: ICD-10-CM

## 2020-01-28 DIAGNOSIS — M51.36 DDD (DEGENERATIVE DISC DISEASE), LUMBAR: ICD-10-CM

## 2020-01-28 DIAGNOSIS — M79.604 RIGHT LEG PAIN: Primary | ICD-10-CM

## 2020-01-28 DIAGNOSIS — C72.0 MALIGNANT NEOPLASM OF SPINAL CORD (HCC): ICD-10-CM

## 2020-01-28 DIAGNOSIS — G06.1 SPINAL CORD ABSCESS: ICD-10-CM

## 2020-01-28 PROCEDURE — 97110 THERAPEUTIC EXERCISES: CPT

## 2020-01-28 PROCEDURE — 97112 NEUROMUSCULAR REEDUCATION: CPT

## 2020-01-28 PROCEDURE — 97012 MECHANICAL TRACTION THERAPY: CPT

## 2020-01-28 NOTE — PROGRESS NOTES
Daily Note     Today's date: 2020  Patient name: Meaghan Wilkinson  : 1960  MRN: [id number]  Referring provider: Becca Villalba MD  Dx:   Encounter Diagnosis     ICD-10-CM    1  Right leg pain M79 604    2  Malignant neoplasm of spinal cord (HCC) C72 0 Ambulatory referral to Physical Therapy   3  General weakness R53 1    4  Spinal cord abscess G06 1    5  DDD (degenerative disc disease), lumbar M51 36                   Subjective:  Pt reports traction has helped with pain      Objective: See treatment diary below      Assessment: ex progressions challenging with mod fatigue noted      Plan: Continue per plan of care  Progress treatment as tolerated         Precautions: Cervical tumor    Daily Treatment Diary     Manual  12/3 12/5/19 12/10 12/17     11/26   PNF R LE            PNF R pelvis in L SL            CPA JF JF JF      JF   PPU OP JF JF       JF   Lumbar gapping                   Exercise Diary  1/2/20 1/10/20 1/14 1/16/20 1/21/20 1/23/20 1/28/20   RB 7'lvl5 10' 10' 10' 10' Upright 10' 10'   SL clamshells 3x10 3x10 3*10  3x10  3x10   Biodex          STS 3x10 3x10 3*10  3x10 3x10 3x10   Step-ups          Backward amb          Tandem walk          Bridges with arms crossed  3x10x5" 3*10*5"       R SLR 3x10 3x10 3*10  3x10  3x10   PPT          Standing abd 3x10 3x10 3*10 3x10 3x10 3x10 3x10   CLARITA          PPU          Obstacle course + cones 10 min 5 min 10 min 8 min      Low row TB      NV   (sit on PB) Rows gtb 3x10   Mini squats 3x10 3x10 3*10 3x10 3x10 3x10 3x10   Sidestepping with ball toss 2x10      2 laps   Sciatic nerve glides          Dynamic balance: standing ball toss on rebounder      3x10 STAND ON FOAM 2x15 grn ball   Dynamic balance: ball toss with lunge B/L      10x ea 5x each grn ball       Modalities  12/10 12/12 12/20/19 12/27/19 1/2/20 1/10/20 1/16/20 1/21/20 1/23/20 1/28/20   NMES with DF             Mechanical tractoin JF JF VK VK VK VK VK VK VK VK

## 2020-01-28 NOTE — PROGRESS NOTES
Palliative and [facility] 60 y o  male [phone]    Assessment/Plan:  1  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[facility] [address] )    2  H/O spinal fusion    3  Radiation-induced myelopathy ([facility] [address] )    4  Depression, unspecified depression type    5  Neuropathy    6  Hypothyroidism, unspecified type        Requested Prescriptions     Signed Prescriptions Disp Refills    gabapentin (NEURONTIN) 300 mg capsule 90 capsule 2     Sig: Take 1 capsule (300 mg total) by mouth 3 (three) times a day    DULoxetine (CYMBALTA) 60 mg delayed release capsule 30 capsule 2     Sig: Take 1 capsule (60 mg total) by mouth daily    levothyroxine 75 mcg tablet 30 tablet 0     Sig: Take 1 tablet (75 mcg total) by mouth daily     Medications Discontinued During This Encounter   Medication Reason    gabapentin (NEURONTIN) 300 mg capsule Reorder    DULoxetine (CYMBALTA) 60 mg delayed release capsule Reorder    levothyroxine 75 mcg tablet Reorder     [employer] was seen in the clinic today for f/u of chronic conditions  He continues to make improvements in function and strength, offering no new complaints today, his neuropathy is well managed   Will continue with same medication regimen at this time  Three month refills sent for gabapentin and Cymbalta    I have queried the patient's controlled substance dispensing history in the Prescription Drug Monitoring Program in compliance with regulations before I have prescribed any controlled substances  The prescription history is consistent with prescribed therapy and our practice policies  Return in about 3 months (around 4/29/2020) for symptom assessment and management  Subjective:   In attendance at this visit: Darin Gerardo III  Chief Complaint  Follow up visit for:  symptom management  HPI     Mukesh Palma is a 60 y o  male with  a 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 s/p resection and debulking at the T1/T2 level, fusion by  [place] in 1/2019  Further resection was not possible d/t loss of neurologic signals intraoperatively  Postoperatively he recovered and completed rehab at [facility], he received adjuvant radiation therapy to the C3 through T4 spinal cord, this finished on 5/22/19  However, he developed worsening right lower extremity weakness and right foot drop approximately one week after completion of  RT  He was diagnosed with post-radiation myelopathy  His symptoms have been managed with gabapentin and Cymbalta  He was last seen in November 2019  He presents today for routine follow up  He has been seen in follow up by Dr Darrow Kayser earlier this month  MRI of his C Spine is stable  He is on 6 month surveillance  Today, he reports he is doing well  He continues to work with PT 2 days a week  Neuropathy is stable, he has actually noticed a lot of improvement in his numbness  His gait is more stable  He is wearing supportive leg brace on his right leg  Mood is good  Now following with endocrinology for diabetes management  The following portions of the medical history were reviewed: past medical history, problem list, medication list, and social history      Current Outpatient Medications:     atorvastatin (LIPITOR) 40 mg tablet, Take 1 tablet (40 mg total) by mouth daily with dinner, Disp: 30 tablet, Rfl: 0    DULoxetine (CYMBALTA) 60 mg delayed release capsule, Take 1 capsule (60 mg total) by mouth daily, Disp: 30 capsule, Rfl: 2    gabapentin (NEURONTIN) 300 mg capsule, Take 1 capsule (300 mg total) by mouth 3 (three) times a day, Disp: 90 capsule, Rfl: 2    insulin aspart (NOVOLOG FLEXPEN) 100 Units/mL injection pen, Inject 8 units with meals +scale, Disp: 5 pen, Rfl: 3    insulin glargine (BASAGLAR KWIKPEN) 100 units/mL injection pen, Inject 25 units at bedtime, Disp: 5 pen, Rfl: 3    levothyroxine 75 mcg tablet, Take 1 tablet (75 mcg total) by mouth daily, Disp: 30 tablet, Rfl: 0    metFORMIN (GLUCOPHAGE-XR) 500 mg 24 hr tablet, Take 1 tablet (500 mg total) by mouth 2 (two) times a day with meals, Disp: 60 tablet, Rfl: 3    Omega-3 Fatty Acids (FISH OIL) 1,000 mg, Take 1 capsule (1,000 mg total) by mouth daily Resume on 2/20/19, Disp: 90 capsule, Rfl: 1    Insulin Pen Needle (PEN NEEDLES 29GX1/2") 29G X 12MM MISC, by Does not apply route 4 (four) times a day (before meals and at bedtime) Please substitute for what fits pen and what is covered, Disp: 150 each, Rfl: 0  Review of Systems   Musculoskeletal: Positive for gait problem  Neurological: Positive for weakness (right leg) and numbness  All other systems negative    Objective:  Vital Signs  /80 (BP Location: Left arm, Cuff Size: Standard)   Pulse 72   Temp 98 6 °F (37 °C) (Oral)   Resp 16   Wt 84 8 kg (187 lb)   SpO2 96%   BMI 29 29 kg/m²    Physical Exam    Constitutional: Appears well-developed and well-nourished  In no acute physical or emotional distress  Head: Normocephalic and atraumatic  Eyes: EOM are normal  No ocular discharge  No scleral icterus  Neck: No visible adenopathy or masses  Respiratory: Effort normal  No stridor  No respiratory distress  Gastrointestinal: No abdominal distension  Musculoskeletal: No edema  RLE leg brace  Uses cane to support gait  Neurological: Alert, oriented and appropriately conversant  Skin: No diaphoresis, no rashes seen on exposed areas of skin  Psychiatric: Displays a normal mood and affect   Behavior, judgement and thought content appear normal

## 2020-01-29 ENCOUNTER — OFFICE VISIT (OUTPATIENT)
Dept: PALLIATIVE MEDICINE | Facility: CLINIC | Age: 60
End: 2020-01-29
Payer: COMMERCIAL

## 2020-01-29 VITALS
SYSTOLIC BLOOD PRESSURE: 130 MMHG | DIASTOLIC BLOOD PRESSURE: 80 MMHG | RESPIRATION RATE: 16 BRPM | TEMPERATURE: 98.6 F | WEIGHT: 187 LBS | BODY MASS INDEX: 29.29 KG/M2 | OXYGEN SATURATION: 96 % | HEART RATE: 72 BPM

## 2020-01-29 DIAGNOSIS — G95.89 RADIATION-INDUCED MYELOPATHY (HCC): ICD-10-CM

## 2020-01-29 DIAGNOSIS — C72.0 SPINAL CORD EPENDYMOMA (HCC): Primary | ICD-10-CM

## 2020-01-29 DIAGNOSIS — E03.9 HYPOTHYROIDISM, UNSPECIFIED TYPE: ICD-10-CM

## 2020-01-29 DIAGNOSIS — G62.9 NEUROPATHY: ICD-10-CM

## 2020-01-29 DIAGNOSIS — Z98.1 H/O SPINAL FUSION: ICD-10-CM

## 2020-01-29 DIAGNOSIS — F32.A DEPRESSION, UNSPECIFIED DEPRESSION TYPE: ICD-10-CM

## 2020-01-29 PROCEDURE — 1111F DSCHRG MED/CURRENT MED MERGE: CPT | Performed by: NURSE PRACTITIONER

## 2020-01-29 PROCEDURE — 99214 OFFICE O/P EST MOD 30 MIN: CPT | Performed by: NURSE PRACTITIONER

## 2020-01-29 RX ORDER — GABAPENTIN 300 MG/1
300 CAPSULE ORAL 3 TIMES DAILY
Qty: 90 CAPSULE | Refills: 2 | Status: SHIPPED | OUTPATIENT
Start: 2020-01-29 | End: 2020-04-08 | Stop reason: SDUPTHER

## 2020-01-29 RX ORDER — DULOXETIN HYDROCHLORIDE 60 MG/1
60 CAPSULE, DELAYED RELEASE ORAL DAILY
Qty: 30 CAPSULE | Refills: 2 | Status: SHIPPED | OUTPATIENT
Start: 2020-01-29 | End: 2020-04-08 | Stop reason: SDUPTHER

## 2020-01-29 RX ORDER — LEVOTHYROXINE SODIUM 0.07 MG/1
75 TABLET ORAL DAILY
Qty: 30 TABLET | Refills: 0 | Status: SHIPPED | OUTPATIENT
Start: 2020-01-29 | End: 2020-02-23

## 2020-01-30 ENCOUNTER — TRANSCRIBE ORDERS (OUTPATIENT)
Dept: FAMILY MEDICINE CLINIC | Facility: CLINIC | Age: 60
End: 2020-01-30

## 2020-01-30 ENCOUNTER — OFFICE VISIT (OUTPATIENT)
Dept: PHYSICAL THERAPY | Age: 60
End: 2020-01-30
Payer: COMMERCIAL

## 2020-01-30 DIAGNOSIS — M79.604 RIGHT LEG PAIN: ICD-10-CM

## 2020-01-30 DIAGNOSIS — M51.36 DDD (DEGENERATIVE DISC DISEASE), LUMBAR: ICD-10-CM

## 2020-01-30 DIAGNOSIS — R53.1 GENERAL WEAKNESS: ICD-10-CM

## 2020-01-30 DIAGNOSIS — D49.2 NEOPLASM OF UNSPECIFIED BEHAVIOR OF BONE, SOFT TISSUE, AND SKIN: Primary | ICD-10-CM

## 2020-01-30 DIAGNOSIS — C72.0 MALIGNANT NEOPLASM OF SPINAL CORD (HCC): ICD-10-CM

## 2020-01-30 DIAGNOSIS — G06.1 SPINAL CORD ABSCESS: ICD-10-CM

## 2020-01-30 DIAGNOSIS — C72.0 MALIGNANT NEOPLASM OF SPINAL CORD (HCC): Primary | ICD-10-CM

## 2020-01-30 PROCEDURE — 97012 MECHANICAL TRACTION THERAPY: CPT

## 2020-01-30 PROCEDURE — 97110 THERAPEUTIC EXERCISES: CPT

## 2020-01-30 PROCEDURE — 97112 NEUROMUSCULAR REEDUCATION: CPT

## 2020-01-30 NOTE — PROGRESS NOTES
Daily Note     Today's date: 2020  Patient name: Carmenza Carolina  : 1960  MRN: [id number]  Referring provider: Dorie Dyer MD  Dx:   Encounter Diagnosis     ICD-10-CM    1  Malignant neoplasm of spinal cord ([address] ) C72 0    2  Right leg pain M79 604    3  General weakness R53 1    4  Spinal cord abscess G06 1    5  DDD (degenerative disc disease), lumbar M51 36                   Subjective: pt reports fatigue today but back feels better overall      Objective: See treatment diary below      Assessment:  Pt requires increased assist for LOB during dynamic balance ex      Plan: Continue per plan of care  Progress treatment as tolerated         Precautions: Cervical tumor    Daily Treatment Diary     Manual  12/3 12/5/19 12/10 12/17     11/26   PNF R LE            PNF R pelvis in L SL            CPA JF JF JF      JF   PPU OP JF JF       JF   Lumbar gapping                   Exercise Diary  1/2/20 1/10/20 1/14 1/16/20 1/21/20 1/23/20 1/28/20 1/30/2   RB 7'lvl5 10' 10' 10' 10' Upright 10' 10' 5'   SL clamshells 3x10 3x10 3*10  3x10  3x10 3x10   Biodex           STS 3x10 3x10 3*10  3x10 3x10 3x10 3x10   Step-ups           Backward amb           Tandem walk           Bridges with arms crossed  3x10x5" 3*10*5"     On PB 10x(neck pain)   R SLR 3x10 3x10 3*10  3x10  3x10 3x10   PPT           Standing abd 3x10 3x10 3*10 3x10 3x10 3x10 3x10 3x10   CLARITA           PPU           Obstacle course + cones 10 min 5 min 10 min 8 min       Low row TB      NV   (sit on PB) Rows gtb 3x10    Mini squats 3x10 3x10 3*10 3x10 3x10 3x10 3x10    Sidestepping with ball toss 2x10      2 laps    Sciatic nerve glides           Dynamic balance: standing ball toss on rebounder      3x10 STAND ON FOAM 2x15 grn ball Stand on foam 2x15 grn ball   Dynamic balance: ball toss with lunge B/L      10x ea 5x each grn ball 10x ea grn ball       Modalities  12/12 12/20/19 12/27/19 1/2/20 1/10/20 1/16/20 1/21/20 1/23/20 1/28/20 1/30/20   RICHIE with DF             Mechanical tractoin JF VK VK VK VK VK VK VK VK VK

## 2020-02-03 DIAGNOSIS — E78.5 HLD (HYPERLIPIDEMIA): ICD-10-CM

## 2020-02-03 RX ORDER — ATORVASTATIN CALCIUM 40 MG/1
40 TABLET, FILM COATED ORAL
Qty: 30 TABLET | Refills: 3 | Status: SHIPPED | OUTPATIENT
Start: 2020-02-03 | End: 2020-04-23

## 2020-02-04 ENCOUNTER — OFFICE VISIT (OUTPATIENT)
Dept: PHYSICAL THERAPY | Age: 60
End: 2020-02-04
Payer: COMMERCIAL

## 2020-02-04 DIAGNOSIS — M79.604 RIGHT LEG PAIN: ICD-10-CM

## 2020-02-04 DIAGNOSIS — G06.1 SPINAL CORD ABSCESS: ICD-10-CM

## 2020-02-04 DIAGNOSIS — R53.1 GENERAL WEAKNESS: ICD-10-CM

## 2020-02-04 DIAGNOSIS — C72.0 MALIGNANT NEOPLASM OF SPINAL CORD (HCC): Primary | ICD-10-CM

## 2020-02-04 DIAGNOSIS — M51.36 DDD (DEGENERATIVE DISC DISEASE), LUMBAR: ICD-10-CM

## 2020-02-04 PROCEDURE — 97140 MANUAL THERAPY 1/> REGIONS: CPT | Performed by: PHYSICAL THERAPIST

## 2020-02-04 PROCEDURE — 97112 NEUROMUSCULAR REEDUCATION: CPT | Performed by: PHYSICAL THERAPIST

## 2020-02-04 PROCEDURE — 97110 THERAPEUTIC EXERCISES: CPT | Performed by: PHYSICAL THERAPIST

## 2020-02-04 NOTE — PROGRESS NOTES
"SUBJECTIVE:  Chart reviewed, discussed with staff, pt interviewed.    Pt was playing by himself in the relaxation room building [employer].  I joined him.  He had no problems and no sexualized or aggressive behavior.  He was cooperative.  Pt said he sometimes has accidents wetting his pants during the day but doesn't want or need to wear pull ups during the day.  He wears them at night and doesn't always have accidents.  Mood \"good\", denies concerns.       Pt denies headache, stomach ache, lightheadedness or dizziness, diarrhea or constipation.      Pt went off unit with Dinah and his SIO and did well.    OBJECTIVE:  Vital signs:  Temp: 96.8  F (36  C) Temp src: Temporal BP: 98/58 Pulse: 100   Resp: 16            Estimated body mass index is 19.15 kg/m  as calculated from the following:    Height as of 5/10/18: 1.321 m (4' 4\").    Weight as of 5/12/18: 33.4 kg (73 lb 10.1 oz).    MSE:    General Appearance/ Behavior/Demeanor: sitting calmly on the floor, building [employer].        Alertness/ Orientation: alert.  Oriented to:  not asked.       Mood:  \"Good.\"                Affect:  Euthymic.                       Speech:  goal directed, without pressure.                  Language: Intact.     Thought Process:  logical  Associations:  no loose associations  Thought Content:  Pt didn't speak of A/V hallucinatory experiences - sees shadows, hears name called, not very often.  Denies PI.  Denies S/H ideation.    Insight:  Poor.         Judgment: Poor.      Attention and Concentration:  Adequate in 1:1 conversation.        Recent and Remote Memory:  Generally in tact.        Fund of Knowledge: Adequate.       Muscle Strength and Tone: normal. Psychomotor Behavior:  no evidence of tardive dyskinesia, dystonia, or tics  Gait and Station: Normal     IMPRESSION:  Pt is an 11 yo boy with significant past psychiatric hx and past trauma.  Pt is admitted for Suicidal and homicidal thoughts.  Pt talks about wanting to kill his sibs and " PT Re-Evaluation     Today's date: 2020  Patient name: Oumar De La Torre  : 1960  MRN: [id number]  Referring provider: Gerald Luu MD  Dx:   Encounter Diagnosis     ICD-10-CM    1  General weakness R53 1    2  Spinal cord abscess G06 1    3  Right leg pain M79 604        Start Time: 0845  Stop Time: 0930  Total time in clinic (min): 45 minutes    Assessment  Assessment details: Oumar De La Torre is a 60 y o  male who presents with signs and symptoms consistent of RLE weakness due to spinal compression caused by cancerous tumor  Patient has been demonstrating improvements with sensation and functional capacity but is still having difficulty with community ambulation and stair navigation  DGA was taken to measure gait assessment more closely  Pt's lumbar spine has demonstrated improvements in ROM but still has difficulty with functional ADL's  Pt would benefit from skilled PT to lumbar spine in conjuction with functional training for weakness due to spinal cords abscess  Impairments: abnormal or restricted ROM, abnormal movement, activity intolerance, impaired physical strength and weight-bearing intolerance      Lumbar goals    Short Term Goals: to be achieved by 4 weeks MET  1) Patient to be independent with basic HEP  2) Decrease pain to 1/10 at its worst   3) Increase lumbar spine ROM by minimal defieceny  in all deficient planes  4) Increase LE strength by 1/2 MMT grade in all deficient planes  Long Term Goals: to be achieved by discharge Partially met   1) FOTO equal to or greater than 66   2) Patient to be independent with comprehensive HEP  3) Lumbar spine ROM WNL all planes to improve a/iadls  4) Increase LE strength to 5/5 MMT grade in all planes to improve a/iadls  5) Patient to report no sleep interruption secondary to pain  6) Increase ambulatory to 50 min      Plan  Patient would benefit from: skilled physical therapy  Planned modality interventions: TENS and "family.  He has said he'll wait till no one is watching to hurt sister.  Mom is \"in fear for my own life\" and doesn't know what to do.  [facility] has, thus far, refused RTC for pt.     12/11:  Pt doing well on the unit thus far, remains on SIO.    12/12:  No changes noted thus far with less Concerta.  Pt is sleeping well with Trazodone 100mg at bedtime and hasn't needed the 100mg prn.    12/13:  Pt continues to do well on the unit.       DX:  Unspecified mood disorder  ADHD  PTSD  RAD, by hx  FAS, by hx  Vitamin D deficiency      PLAN:  Continue present tx.  Consider taper off Neurontin or Lithium.  I recommend pt discharge to a foster home.       Look into RTC options.    Stacey Thurman, APRN: [phone].      I spent 25 minutes face to face with the patient, >50% of the time was spent coordinating care and/or counseling which included treatment planning, medication management and psycho education.                  " cryotherapy  Planned therapy interventions: abdominal trunk stabilization, neuromuscular re-education, patient education, therapeutic activities, therapeutic exercise, stretching, strengthening, therapeutic training, transfer training, graded exercise, graded activity, home exercise program, functional ROM exercises and balance  Frequency: Twice a week for 12 weeks  Treatment plan discussed with: patient        Subjective Evaluation    History of Present Illness  Mechanism of injury: Pt suffered multiple falls and BUE numbness in January was 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  Patient had a Posterior C4-T3 laminectomy and resection of intramedullary mass and C2-T5 fixation/fusion  Patient is now medically stable and is being seen at PT following 1 month stay in sub acute rehab  Pt reports most problems with RLE weakness  Pt reports to the doctor on the  to determine if he needs to receive cancer treatment and when he is able to remove cervical collar  Going up and down steps is difficulty for patient, navigating home due to pets, and balance is an issue  Pt had no significant PMH prior to this incidence  Pain  Current pain ratin  At best pain ratin  At worst pain ratin    Patient Goals  Patient goals for therapy: increased strength, decreased pain and independence with ADLs/IADLs  Patient goal: Work, golf, bike        Objective     Lumbar:    Mod limitations with AROM rotation to both sides  Min limitations with flexion  Pt has pain with left lateral flexion  Pt also demonstrates posterior pelvic tilt on left side with long sit test and prone knee bend  Concurrent Complaints  Negative for night pain, disturbed sleep, bladder dysfunction, bowel dysfunction and saddle (S4) numbness    Stair Navigation:  Reciprocal descending + ascending stairs with unilateral hand rails  Pt can navigate stairs for longer periods of time before gait pattern breaks down       trength/Myotome Testing     Lumbar   Left   Heel walk: normal  Toe walk: normal    Right   Heel walk: normal  Toe walk: normal    Left Hip   Planes of Motion   Flexion: 5  Extension: 5  Abduction: 5  Adduction: 5  External rotation: 5  Internal rotation: 5    Right Hip   Planes of Motion   Flexion: 4+  Extension: 5  Abduction: 4  Adduction: 4  External rotation: 5  Internal rotation: 4+    Left Knee   Flexion: 5  Extension: 5    Right Knee   Flexion: 4+  Extension: 4+    Functional Assessment        Comments  Pt has little difficulty performing functional squat   Stair navigation is completed with reciprocal pattern and circumduction with RLE    5 STS: 10 seconds  TU seconds    MCTSIB:  EO firm:70% impairment   EC firm: 65% impairment  EO foam: 70% impairment  EC foam: 72% impairment     FGA:     Stair Navigation:  Reciprocal descending/ascending stairs with unilateral hand rail  Muscle activation pattern has improved since last re-evaluation      Precautions: Cervical tumor      Daily Treatment Diary       Manual  12/3 12/5/19 12/10 12/17     11/26   PNF R LE            PNF R pelvis in L SL            CPA JF JF JF      JF   PPU OP [facility]       JF   Lumbar gapping                   Exercise Diary  1/2/20 1/10/20 1/14 1/16/20 1/21/20 1/23/20 1/28/20 2/4   RB 7'lvl5 10' 10' 10' 10' Upright 10' 10' 5'   SL clamshells 3x10 3x10 3*10  3x10  3x10 3x10   Biodex           STS 3x10 3x10 3*10  3x10 3x10 3x10 3x10   Step-ups           Backward amb           Tandem walk           Bridges with arms crossed  3x10x5" 3*10*5"     On PB 10x(neck pain)   R SLR 3x10 3x10 3*10  3x10  3x10 3x10   PPT           Standing abd 3x10 3x10 3*10 3x10 3x10 3x10 3x10 3x10   CLARITA           PPU           Obstacle course + cones 10 min 5 min 10 min 8 min       Low row TB      NV   (sit on PB) Rows gtb 3x10    Mini squats 3x10 3x10 3*10 3x10 3x10 3x10 3x10    Sidestepping with ball toss 2x10      2 laps    Sciatic nerve glides           Dynamic balance: standing ball toss on rebounder      3x10 STAND ON FOAM 2x15 grn ball Stand on foam 2x15 grn ball   Dynamic balance: ball toss with lunge B/L      10x ea 5x each grn ball 10x ea grn ball       Modalities  12/12 12/20/19 12/27/19 1/2/20 1/10/20 1/16/20 1/21/20 1/23/20 1/28/20 1/30/20   NMES with DF             Mechanical tractoin JF VK VK VK VK VK VK VK VK VK

## 2020-02-05 DIAGNOSIS — E78.5 HYPERLIPIDEMIA: ICD-10-CM

## 2020-02-05 RX ORDER — PEN NEEDLE, DIABETIC 29 G X1/2"
NEEDLE, DISPOSABLE MISCELLANEOUS
Qty: 150 EACH | Refills: 0 | Status: SHIPPED | OUTPATIENT
Start: 2020-02-05 | End: 2020-04-27 | Stop reason: SDUPTHER

## 2020-02-06 ENCOUNTER — OFFICE VISIT (OUTPATIENT)
Dept: PHYSICAL THERAPY | Age: 60
End: 2020-02-06
Payer: COMMERCIAL

## 2020-02-06 DIAGNOSIS — R53.1 GENERAL WEAKNESS: ICD-10-CM

## 2020-02-06 DIAGNOSIS — C72.0 MALIGNANT NEOPLASM OF SPINAL CORD (HCC): Primary | ICD-10-CM

## 2020-02-06 DIAGNOSIS — M79.604 RIGHT LEG PAIN: ICD-10-CM

## 2020-02-06 DIAGNOSIS — G06.1 SPINAL CORD ABSCESS: ICD-10-CM

## 2020-02-06 DIAGNOSIS — M51.36 DDD (DEGENERATIVE DISC DISEASE), LUMBAR: ICD-10-CM

## 2020-02-06 PROCEDURE — 97112 NEUROMUSCULAR REEDUCATION: CPT

## 2020-02-06 PROCEDURE — 97012 MECHANICAL TRACTION THERAPY: CPT

## 2020-02-06 NOTE — PROGRESS NOTES
Daily Note     Today's date: 2020  Patient name: Nahomy July  : 1960  MRN: [id number]  Referring provider: Alejandra Strickland MD  Dx:   Encounter Diagnosis     ICD-10-CM    1  Malignant neoplasm of spinal cord ([address] ) C72 0    2  Right leg pain M79 604    3  General weakness R53 1    4  Spinal cord abscess G06 1    5  DDD (degenerative disc disease), lumbar M51 36                   Subjective: pt reports bloodsugars have been stable and hopefully will be taken off insulin and take just pill for diabetes      Objective: See treatment diary below      Assessment:  Added Fidel Ramos for dynamic balance ex, pt with mod-max fatigue with new ex and occasional assist needed for LOB during ex      Plan: Continue per plan of care  Progress treatment as tolerated    will cont to work on endurance and balance     Precautions: Cervical tumor    Daily Treatment Diary     Manual  12/3 12/5/19 12/10 12/17       PNF R LE           PNF R pelvis in L SL           CPA JF JF JF        PPU OP JF JF         Lumbar gapping                  Exercise Diary  20   RB 10' 10' 10' Upright 10' 10' 5' 5'   SL clamshells 3*10  3x10  3x10 3x10    Biodex          STS 3*10  3x10 3x10 3x10 3x10    Step-ups          Backward amb          Tandem walk          Bridges with arms crossed 3*10*5"     On PB 10x(neck pain)    R SLR 3*10  3x10  3x10 3x10    PPT          Standing abd 3*10 3x10 3x10 3x10 3x10 3x10    CLARITA          PPU          Obstacle course + cones 10 min 8 min     Step over BOSU 2x10   Low row TB    NV   (sit on PB) Rows gtb 3x10     Mini squats 3*10 3x10 3x10 3x10 3x10     Sidestepping with ball toss     2 laps     Sciatic nerve glides          Dynamic balance: standing ball toss on rebounder    3x10 STAND ON FOAM 2x15 grn ball Stand on foam 2x15 grn ball    Dynamic balance: ball toss with lunge B/L    10x ea 5x each grn ball 10x ea grn ball    trazer  LAS 90         12 targets   trazer MARY ALICE 90       18 targets   UBE       5 min       Modalities  12/27/19 1/2/20 1/10/20 1/16/20 1/21/20 1/23/20 1/28/20 1/30/20 2/6/20   NMES with DF            Mechanical tractoin VK VK VK VK VK VK VK VK VK

## 2020-02-11 ENCOUNTER — APPOINTMENT (OUTPATIENT)
Dept: PHYSICAL THERAPY | Age: 60
End: 2020-02-11
Payer: COMMERCIAL

## 2020-02-11 ENCOUNTER — OFFICE VISIT (OUTPATIENT)
Dept: DIABETES SERVICES | Facility: CLINIC | Age: 60
End: 2020-02-11

## 2020-02-11 VITALS — WEIGHT: 187 LBS | BODY MASS INDEX: 29.35 KG/M2 | HEIGHT: 67 IN

## 2020-02-11 DIAGNOSIS — E11.65 TYPE 2 DIABETES MELLITUS WITH HYPERGLYCEMIA, WITHOUT LONG-TERM CURRENT USE OF INSULIN (HCC): Primary | ICD-10-CM

## 2020-02-11 PROCEDURE — 98960 EDU&TRN PT SELF-MGMT NQHP 1: CPT | Performed by: DIETITIAN, REGISTERED

## 2020-02-11 NOTE — PROGRESS NOTES
Carbohydrate Counting Instruction    Met with Santos Cui for carbohydrate counting  Santos Cui is currently on the following insulin regimen:  NovoLog 8 units with meals plus scale; Basaglar 25 units q h s  Present at Session: patient     Patient Instructed on: Carbohydrate counting  Used [employer], Food labels, measuring cups, and other props to teach label reading, carbohydrate counting and food diary  Patient completed one day food diary exercise during session with moderate difficulty and needed a fair amount of assistance  Santos Cui had difficulty with accurately counting carbohydrates on 2 of the 3 labels  He did well distinguishing between which foods to count carbohydrates in and which foods to not count carbohydrates in  However, he did have some difficulty with portion adjustment  At this time, Santos Cui does not demonstrate the math skills necessary for successful carbohydrate counting  Diabetes Education Record  Santos Cui received the following handouts: Portion Book, 3 day food logs, how to count fiber      Patient response to instruction    Radha Macias  Expected Compliancegood    When food logs returned and reviewed,will determine if follow-up carbohydrate appointment is necessary or if any inaccuracies in carbohydrate counts can be discussed with patient via phone call  Will then have  call Santos Cui to schedule next visit if appropriate    Start- Stop: 2:18 p m  - 3:27 p m  Total Minutes: 69 Minutes  Group or Individual Instruction: DSME - I  Other: Yancy Pettit MD    Thank you for referring your patient to [facility], it was a pleasure working with them today  Please feel free to call with any questions or concerns      Carly Gill, [address]  [facility] [id number]

## 2020-02-11 NOTE — PATIENT INSTRUCTIONS
Please complete and return completed 3 day food record with times of meals/snacks, foods eaten, beverages, amounts, and carbohydrates counted in each item

## 2020-02-12 LAB
LEFT EYE DIABETIC RETINOPATHY: NORMAL
RIGHT EYE DIABETIC RETINOPATHY: NORMAL

## 2020-02-13 ENCOUNTER — OFFICE VISIT (OUTPATIENT)
Dept: PHYSICAL THERAPY | Age: 60
End: 2020-02-13
Payer: COMMERCIAL

## 2020-02-13 DIAGNOSIS — M51.36 DDD (DEGENERATIVE DISC DISEASE), LUMBAR: ICD-10-CM

## 2020-02-13 DIAGNOSIS — M79.604 RIGHT LEG PAIN: ICD-10-CM

## 2020-02-13 DIAGNOSIS — G06.1 SPINAL CORD ABSCESS: ICD-10-CM

## 2020-02-13 DIAGNOSIS — R53.1 GENERAL WEAKNESS: ICD-10-CM

## 2020-02-13 DIAGNOSIS — C72.0 MALIGNANT NEOPLASM OF SPINAL CORD (HCC): Primary | ICD-10-CM

## 2020-02-13 PROCEDURE — 97012 MECHANICAL TRACTION THERAPY: CPT

## 2020-02-13 PROCEDURE — 97112 NEUROMUSCULAR REEDUCATION: CPT

## 2020-02-13 NOTE — PROGRESS NOTES
Daily Note     Today's date: 2020  Patient name: Dick Shabazz  : 1960  MRN: [id number]  Referring provider: Kely Ruiz MD  Dx:   Encounter Diagnosis     ICD-10-CM    1  Malignant neoplasm of spinal cord ([address] ) C72 0    2  Right leg pain M79 604    3  General weakness R53 1    4  Spinal cord abscess G06 1    5  DDD (degenerative disc disease), lumbar M51 36                   Subjective: pt reports he feels better, was sick from bad food    Objective: See treatment diary below  MARY ALICE 90- 19 target hits 2'9"  SAS 20- 20 target hits 1'52"      Assessment: mod fatigue noted after dynamic ex with T[employer], LOB with increased fatigue noted    Plan: Continue per plan of care  Progress treatment as tolerated         Precautions: Cervical tumor    Daily Treatment Diary     Manual  12/3 12/5/19 12/10 12/17       PNF R LE           PNF R pelvis in L SL           CPA JF JF JF        PPU OP JF JF         Lumbar gapping                  Exercise Diary  20   RB 10' 10' Upright 10' 10' 5' 5' 5'   SL clamshells  3x10  3x10 3x10     Biodex          STS  3x10 3x10 3x10 3x10     Step-ups          Backward amb          Tandem walk          Bridges with arms crossed     On PB 10x(neck pain)     R SLR  3x10  3x10 3x10     PPT          Standing abd 3x10 3x10 3x10 3x10 3x10     CLARITA          PPU          Obstacle course + cones 8 min     Step over BOSU 2x10    Low row TB   NV   (sit on PB) Rows gtb 3x10      Mini squats 3x10 3x10 3x10 3x10      Sidestepping with ball toss    2 laps      Sciatic nerve glides          Dynamic balance: standing ball toss on rebounder   3x10 STAND ON FOAM 2x15 grn ball Stand on foam 2x15 grn ball     Dynamic balance: ball toss with lunge B/L   10x ea 5x each grn ball 10x ea grn ball     trazer  LAS 90        12 targets SAS 20  20 targets   trazer MARY ALICE 90      18 targets 19 targets   UBE      5 min 5'   trazer squats      20x 20x       Modalities 12/27/19 1/2/20 1/10/20 1/16/20 1/21/20 1/23/20 1/28/20 1/30/20 2/6/20   NMES with DF            Mechanical tractoin VK VK VK VK VK VK VK VK VK

## 2020-02-18 ENCOUNTER — LAB (OUTPATIENT)
Dept: LAB | Age: 60
End: 2020-02-18
Payer: COMMERCIAL

## 2020-02-18 ENCOUNTER — OFFICE VISIT (OUTPATIENT)
Dept: PHYSICAL THERAPY | Age: 60
End: 2020-02-18
Payer: COMMERCIAL

## 2020-02-18 DIAGNOSIS — M79.604 RIGHT LEG PAIN: ICD-10-CM

## 2020-02-18 DIAGNOSIS — G06.1 SPINAL CORD ABSCESS: ICD-10-CM

## 2020-02-18 DIAGNOSIS — E78.5 HYPERLIPIDEMIA: ICD-10-CM

## 2020-02-18 DIAGNOSIS — R53.1 GENERAL WEAKNESS: ICD-10-CM

## 2020-02-18 DIAGNOSIS — E03.9 ACQUIRED HYPOTHYROIDISM: ICD-10-CM

## 2020-02-18 DIAGNOSIS — C72.0 MALIGNANT NEOPLASM OF SPINAL CORD (HCC): Primary | ICD-10-CM

## 2020-02-18 DIAGNOSIS — E11.65 TYPE 2 DIABETES MELLITUS WITH HYPERGLYCEMIA, WITHOUT LONG-TERM CURRENT USE OF INSULIN (HCC): ICD-10-CM

## 2020-02-18 DIAGNOSIS — R73.01 IMPAIRED FASTING GLUCOSE: ICD-10-CM

## 2020-02-18 DIAGNOSIS — M51.36 DDD (DEGENERATIVE DISC DISEASE), LUMBAR: ICD-10-CM

## 2020-02-18 LAB
ALBUMIN SERPL BCP-MCNC: 3.5 G/DL (ref 3.5–5)
ALP SERPL-CCNC: 82 U/L (ref 46–116)
ALT SERPL W P-5'-P-CCNC: 27 U/L (ref 12–78)
ANION GAP SERPL CALCULATED.3IONS-SCNC: 6 MMOL/L (ref 4–13)
AST SERPL W P-5'-P-CCNC: 15 U/L (ref 5–45)
BILIRUB DIRECT SERPL-MCNC: 0.24 MG/DL (ref 0–0.2)
BILIRUB SERPL-MCNC: 0.95 MG/DL (ref 0.2–1)
BUN SERPL-MCNC: 16 MG/DL (ref 5–25)
CALCIUM SERPL-MCNC: 9.2 MG/DL (ref 8.3–10.1)
CHLORIDE SERPL-SCNC: 107 MMOL/L (ref 100–108)
CHOLEST SERPL-MCNC: 87 MG/DL (ref 50–200)
CO2 SERPL-SCNC: 29 MMOL/L (ref 21–32)
CREAT SERPL-MCNC: 0.78 MG/DL (ref 0.6–1.3)
CREAT UR-MCNC: 78 MG/DL
EST. AVERAGE GLUCOSE BLD GHB EST-MCNC: 171 MG/DL
GFR SERPL CREATININE-BSD FRML MDRD: 98 ML/MIN/1.73SQ M
GLUCOSE P FAST SERPL-MCNC: 80 MG/DL (ref 65–99)
HBA1C MFR BLD: 7.6 %
HDLC SERPL-MCNC: 28 MG/DL
LDLC SERPL CALC-MCNC: 38 MG/DL (ref 0–100)
MICROALBUMIN UR-MCNC: <5 MG/L (ref 0–20)
MICROALBUMIN/CREAT 24H UR: <6 MG/G CREATININE (ref 0–30)
NONHDLC SERPL-MCNC: 59 MG/DL
POTASSIUM SERPL-SCNC: 4.6 MMOL/L (ref 3.5–5.3)
PROT SERPL-MCNC: 7.2 G/DL (ref 6.4–8.2)
SODIUM SERPL-SCNC: 142 MMOL/L (ref 136–145)
T4 FREE SERPL-MCNC: 1.13 NG/DL (ref 0.76–1.46)
TRIGL SERPL-MCNC: 106 MG/DL
TSH SERPL DL<=0.05 MIU/L-ACNC: 1.74 UIU/ML (ref 0.36–3.74)

## 2020-02-18 PROCEDURE — 36415 COLL VENOUS BLD VENIPUNCTURE: CPT

## 2020-02-18 PROCEDURE — 97112 NEUROMUSCULAR REEDUCATION: CPT

## 2020-02-18 PROCEDURE — 80061 LIPID PANEL: CPT

## 2020-02-18 PROCEDURE — 82248 BILIRUBIN DIRECT: CPT

## 2020-02-18 PROCEDURE — 97012 MECHANICAL TRACTION THERAPY: CPT

## 2020-02-18 PROCEDURE — 82570 ASSAY OF URINE CREATININE: CPT

## 2020-02-18 PROCEDURE — 80053 COMPREHEN METABOLIC PANEL: CPT

## 2020-02-18 PROCEDURE — 84443 ASSAY THYROID STIM HORMONE: CPT

## 2020-02-18 PROCEDURE — 84439 ASSAY OF FREE THYROXINE: CPT

## 2020-02-18 PROCEDURE — 82043 UR ALBUMIN QUANTITATIVE: CPT

## 2020-02-18 PROCEDURE — 83036 HEMOGLOBIN GLYCOSYLATED A1C: CPT

## 2020-02-18 NOTE — PROGRESS NOTES
Daily Note     Today's date: 2020  Patient name: Wilman Roldan  : 1960  MRN: [id number]  Referring provider: Ben Campo MD  Dx:   Encounter Diagnosis     ICD-10-CM    1  Malignant neoplasm of spinal cord ([address] ) C72 0    2  Right leg pain M79 604    3  General weakness R53 1    4  Spinal cord abscess G06 1    5  DDD (degenerative disc disease), lumbar M51 36                   Subjective: pt reports he is running late due to had fasting bloodwork this am      Objective: See treatment diary below  MARY ALICE 23 target hits 1'59"    Assessment: Tolerated treatment well  Patient demonstrated fatigue post treatment and would benefit from continued PT, pt cont to perform ex and amb with AFO on with occasional toe catch but no significant LOB noted, incread toe catch noted with increased fatigue      Plan: Continue per plan of care  Progress treatment as tolerated         Precautions: Cervical tumor    Daily Treatment Diary     Manual  12/3 12/5/19 12/10 12/17       PNF R LE           PNF R pelvis in L SL           CPA JF JF JF        PPU OP JF JF         Lumbar gapping                  Exercise Diary  20   RB 10' Upright 10' 10' 5' 5' 5'    SL clamshells 3x10  3x10 3x10      Biodex          STS 3x10 3x10 3x10 3x10      Step-ups          Backward amb          Tandem walk          Bridges with arms crossed    On PB 10x(neck pain)      R SLR 3x10  3x10 3x10      PPT          Standing abd 3x10 3x10 3x10 3x10      CLARITA          PPU          Obstacle course + cones     Step over BOSU 2x10     Low row TB  NV   (sit on PB) Rows gtb 3x10       Mini squats 3x10 3x10 3x10       Sidestepping with ball toss   2 laps       Sciatic nerve glides          Dynamic balance: standing ball toss on rebounder  3x10 STAND ON FOAM 2x15 grn ball Stand on foam 2x15 grn ball      Dynamic balance: ball toss with lunge B/L  10x ea 5x each grn ball 10x ea grn ball      trazer  LAS 90 12 targets SAS 20  20 targets    trazer MARY ALICE 90     18 targets 19 targets 23 targets 1'59"   UBE     5 min 5' 10'   trazer squats     20x 20x 20x       Modalities  12/27/19 1/2/20 1/10/20 1/16/20 1/21/20 1/23/20 1/28/20 1/30/20 2/6/20 2/18/20   NMES with DF             Mechanical tractoin VK VK VK VK VK VK VK VK VK VK  To finish

## 2020-02-20 ENCOUNTER — OFFICE VISIT (OUTPATIENT)
Dept: PHYSICAL THERAPY | Age: 60
End: 2020-02-20
Payer: COMMERCIAL

## 2020-02-20 DIAGNOSIS — M51.36 DDD (DEGENERATIVE DISC DISEASE), LUMBAR: ICD-10-CM

## 2020-02-20 DIAGNOSIS — R53.1 GENERAL WEAKNESS: ICD-10-CM

## 2020-02-20 DIAGNOSIS — G06.1 SPINAL CORD ABSCESS: ICD-10-CM

## 2020-02-20 DIAGNOSIS — C72.0 MALIGNANT NEOPLASM OF SPINAL CORD (HCC): Primary | ICD-10-CM

## 2020-02-20 DIAGNOSIS — M79.604 RIGHT LEG PAIN: ICD-10-CM

## 2020-02-20 PROCEDURE — 97112 NEUROMUSCULAR REEDUCATION: CPT

## 2020-02-20 PROCEDURE — 97012 MECHANICAL TRACTION THERAPY: CPT

## 2020-02-23 DIAGNOSIS — E03.9 HYPOTHYROIDISM, UNSPECIFIED TYPE: ICD-10-CM

## 2020-02-23 RX ORDER — LEVOTHYROXINE SODIUM 0.07 MG/1
TABLET ORAL
Qty: 30 TABLET | Refills: 0 | Status: SHIPPED | OUTPATIENT
Start: 2020-02-23 | End: 2020-03-24

## 2020-02-24 ENCOUNTER — OFFICE VISIT (OUTPATIENT)
Dept: FAMILY MEDICINE CLINIC | Facility: CLINIC | Age: 60
End: 2020-02-24
Payer: COMMERCIAL

## 2020-02-24 VITALS
RESPIRATION RATE: 16 BRPM | HEIGHT: 67 IN | BODY MASS INDEX: 29.54 KG/M2 | WEIGHT: 188.2 LBS | SYSTOLIC BLOOD PRESSURE: 134 MMHG | HEART RATE: 68 BPM | DIASTOLIC BLOOD PRESSURE: 72 MMHG

## 2020-02-24 DIAGNOSIS — E11.65 TYPE 2 DIABETES MELLITUS WITH HYPERGLYCEMIA, WITH LONG-TERM CURRENT USE OF INSULIN (HCC): Primary | ICD-10-CM

## 2020-02-24 DIAGNOSIS — E78.5 HYPERLIPIDEMIA ASSOCIATED WITH TYPE 2 DIABETES MELLITUS (HCC): ICD-10-CM

## 2020-02-24 DIAGNOSIS — E11.69 HYPERLIPIDEMIA ASSOCIATED WITH TYPE 2 DIABETES MELLITUS (HCC): ICD-10-CM

## 2020-02-24 DIAGNOSIS — E03.9 ACQUIRED HYPOTHYROIDISM: ICD-10-CM

## 2020-02-24 DIAGNOSIS — Z79.4 TYPE 2 DIABETES MELLITUS WITH HYPERGLYCEMIA, WITH LONG-TERM CURRENT USE OF INSULIN (HCC): Primary | ICD-10-CM

## 2020-02-24 PROBLEM — D64.9 ANEMIA: Status: RESOLVED | Noted: 2019-01-31 | Resolved: 2020-02-24

## 2020-02-24 PROBLEM — E87.1 HYPONATREMIA: Status: RESOLVED | Noted: 2020-01-02 | Resolved: 2020-02-24

## 2020-02-24 PROCEDURE — 3066F NEPHROPATHY DOC TX: CPT | Performed by: FAMILY MEDICINE

## 2020-02-24 PROCEDURE — 3051F HG A1C>EQUAL 7.0%<8.0%: CPT | Performed by: FAMILY MEDICINE

## 2020-02-24 PROCEDURE — 1036F TOBACCO NON-USER: CPT | Performed by: FAMILY MEDICINE

## 2020-02-24 PROCEDURE — 99214 OFFICE O/P EST MOD 30 MIN: CPT | Performed by: FAMILY MEDICINE

## 2020-02-24 PROCEDURE — 3008F BODY MASS INDEX DOCD: CPT | Performed by: FAMILY MEDICINE

## 2020-02-24 PROCEDURE — 2022F DILAT RTA XM EVC RTNOPTHY: CPT | Performed by: FAMILY MEDICINE

## 2020-02-24 NOTE — ASSESSMENT & PLAN NOTE
Lab Results   Component Value Date    HGBA1C 7 6 (H) 02/18/2020   lab improved, sees endo next week, decrease once daily shot to 20 units and see what sugars are running when he goes there

## 2020-02-24 NOTE — PROGRESS NOTES
Assessment and Plan:    Problem List Items Addressed This Visit        Endocrine    Type 2 diabetes mellitus with hyperglycemia, with long-term current use of insulin ([facility] ) - Primary       Lab Results   Component Value Date    HGBA1C 7 6 (H) 02/18/2020   lab improved, sees endo next week, decrease once daily shot to 20 units and see what sugars are running when he goes there         Relevant Medications    insulin glargine (Basaglar KwikPen) 100 units/mL injection pen    Hyperlipidemia associated with type 2 diabetes mellitus (HCC)       Lab Results   Component Value Date    HGBA1C 7 6 (H) 02/18/2020   lipids improving         Relevant Medications    insulin glargine (Basaglar KwikPen) 100 units/mL injection pen      Other Visit Diagnoses     Type 2 diabetes mellitus with hyperglycemia, without long-term current use of insulin ([facility])        Relevant Medications    insulin glargine (Basaglar KwikPen) 100 units/mL injection pen                 Diagnoses and all orders for this visit:    Type 2 diabetes mellitus with hyperglycemia, with long-term current use of insulin ([facility])    Type 2 diabetes mellitus with hyperglycemia, without long-term current use of insulin ([facility])  -     insulin glargine (Basaglar KwikPen) 100 units/mL injection pen; Inject 20 units at bedtime    Hyperlipidemia associated with type 2 diabetes mellitus ([facility] )              Subjective:      Patient ID: Wilman Roldan is a 63 y o  male  CC:    Chief Complaint   Patient presents with    Follow-up     pt here today for a follow up and to review lab results  RASHEED Powers       HPI:    F/u diabetes and lipids, lab much improved, HDL still low but coming up      The following portions of the patient's history were reviewed and updated as appropriate: allergies, current medications, past family history, past medical history, past social history, past surgical history and problem list       Review of Systems   Constitutional: Negative for fatigue     Eyes: Negative for visual disturbance  Respiratory: Negative for shortness of breath  Cardiovascular: Negative for chest pain  Genitourinary: Negative for frequency and urgency  Musculoskeletal: Positive for arthralgias  Leg pain   Neurological: Negative for dizziness and headaches  Psychiatric/Behavioral: Negative for sleep disturbance  Data to review:       Objective:    Vitals:    02/24/20 0958   BP: 134/72   BP Location: Left arm   Patient Position: Sitting   Cuff Size: Standard   Pulse: 68   Resp: 16   Weight: 85 4 kg (188 lb 3 2 oz)   Height: 5' 7" (1 702 m)        Physical Exam   Constitutional: He appears well-developed and well-nourished  Neck: No thyromegaly present  Cardiovascular: Normal rate, regular rhythm and normal heart sounds  Pulmonary/Chest: Effort normal and breath sounds normal    Musculoskeletal: He exhibits no edema  Lymphadenopathy:     He has no cervical adenopathy  Vitals reviewed

## 2020-02-25 ENCOUNTER — OFFICE VISIT (OUTPATIENT)
Dept: PHYSICAL THERAPY | Age: 60
End: 2020-02-25
Payer: COMMERCIAL

## 2020-02-25 ENCOUNTER — TELEPHONE (OUTPATIENT)
Dept: DIABETES SERVICES | Facility: CLINIC | Age: 60
End: 2020-02-25

## 2020-02-25 DIAGNOSIS — M79.604 RIGHT LEG PAIN: ICD-10-CM

## 2020-02-25 DIAGNOSIS — R53.1 GENERAL WEAKNESS: ICD-10-CM

## 2020-02-25 DIAGNOSIS — M51.36 DDD (DEGENERATIVE DISC DISEASE), LUMBAR: ICD-10-CM

## 2020-02-25 DIAGNOSIS — C72.0 MALIGNANT NEOPLASM OF SPINAL CORD (HCC): Primary | ICD-10-CM

## 2020-02-25 DIAGNOSIS — G06.1 SPINAL CORD ABSCESS: ICD-10-CM

## 2020-02-25 PROCEDURE — 97112 NEUROMUSCULAR REEDUCATION: CPT

## 2020-02-25 PROCEDURE — 97110 THERAPEUTIC EXERCISES: CPT

## 2020-02-25 NOTE — TELEPHONE ENCOUNTER
Received patient's returned 3 day food record from carb counting appointment   Called patient to review accuracy of carb counting, left message on voicemail to call back

## 2020-02-25 NOTE — PROGRESS NOTES
Daily Note     Today's date: 2020  Patient name: Jeannette Barahona  : 1960  MRN: [id number]  Referring provider: Venkat Reynoso MD  Dx:   Encounter Diagnosis     ICD-10-CM    1  Malignant neoplasm of spinal cord ([address] ) C72 0    2  Right leg pain M79 604    3  General weakness R53 1    4  Spinal cord abscess G06 1    5  DDD (degenerative disc disease), lumbar M51 36                   Subjective: pt reports doing yard work yesterday without falling    Objective: See treatment diary below      Assessment: fatigue noted with increased cardio, increased genu recurvatum noted with increased fatigue amb on TM, pt may benefit from adjustments to AFO to help improve gait, clear toes and decreased recurvatum during WB phase    Plan:  Progress functional ex as dez     Precautions: Cervical tumor    Daily Treatment Diary           Manual  12/3 12/5/19 12/10 12/17       PNF R LE           PNF R pelvis in L SL           CPA JF JF JF        PPU OP JF JF         Lumbar gapping                  Exercise Diary  20   RB 10' Upright 10' 10' 5' 5' 5'  Upright 10' 10'   SL clamshells 3x10  3x10 3x10        TM         5'   5mph with rests   STS 3x10 3x10 3x10 3x10     3x10   Step-ups         8" 2x10 ea   CC sidestep          10# 5x ea   CC walk outs backwards         30# 5x   Tandem walk            Bridges with arms crossed    On PB 10x(neck pain)        R SLR 3x10  3x10 3x10        PPT            Standing abd 3x10 3x10 3x10 3x10     3x10     CLARITA            PPU            Obstacle course + cones     Step over BOSU 2x10       Low row TB  NV   (sit on PB) Rows gtb 3x10         Mini squats 3x10 3x10 3x10      3x10   Sidestepping with ball toss   2 laps         Sciatic nerve glides            Dynamic balance: standing ball toss on rebounder  3x10 STAND ON FOAM 2x15 grn ball Stand on foam 2x15 grn ball        Dynamic balance: ball toss with lunge B/L  10x ea 5x each grn ball 10x ea grn ball        trazer  LAS 90       12 targets SAS 20  20 targets  SAS 20 targets 1'57"    trazer MARY ALICE 90     18 targets 19 targets 23 targets 1'59" 25 targets2'8"    UBE     5 min 5' 10' standing 10' 4'/4'   trazer squats     20x 20x 20x 25x        Modalities  1/2/20 1/10/20 1/16/20 1/21/20 1/23/20 1/28/20 1/30/20 2/6/20 2/18/20 2/20/20   NMES with DF             Mechanical tractoin VK VK VK VK VK VK VK VK VK  To finish VK

## 2020-02-26 ENCOUNTER — TELEPHONE (OUTPATIENT)
Dept: DIABETES SERVICES | Facility: CLINIC | Age: 60
End: 2020-02-26

## 2020-02-26 NOTE — TELEPHONE ENCOUNTER
Spoke with patient regarding 3 day food record from carbohydrate counting appointment  The only inaccuracy in the patient's carbohydrate counting of foods he has been eating was with Melinda rice which he counted two and a half cups has 30 g of carbohydrate rather than 112 g  Other discussion entailed advising patient to only consume fruit juice if he is having a low blood sugar  Discussed the sizes of produce listed in his food record such as bananas and potatoes which patient states bananas have been the length of a dollar bill so he counted them as 15 g, and the potatoes where the size of about a golf ball so he counted them as 15 g each  Spoke with patient about options of working toward flexible insulin, and also the option of returning for medical nutrition therapy  He asks if he can call if he has any nutrition related questions to which I answered that he is more than welcome to call with questions  Patient states that he has appointment with Dr Ok Womack tomorrow and wants to see what she thinks  Reiterated to patient that he is welcome to call with any questions which he acknowledged understanding and was appreciative

## 2020-02-27 ENCOUNTER — OFFICE VISIT (OUTPATIENT)
Dept: ENDOCRINOLOGY | Facility: CLINIC | Age: 60
End: 2020-02-27
Payer: COMMERCIAL

## 2020-02-27 ENCOUNTER — OFFICE VISIT (OUTPATIENT)
Dept: PHYSICAL THERAPY | Age: 60
End: 2020-02-27
Payer: COMMERCIAL

## 2020-02-27 VITALS
SYSTOLIC BLOOD PRESSURE: 120 MMHG | HEIGHT: 67 IN | WEIGHT: 187 LBS | DIASTOLIC BLOOD PRESSURE: 70 MMHG | BODY MASS INDEX: 29.35 KG/M2

## 2020-02-27 DIAGNOSIS — R53.1 GENERAL WEAKNESS: ICD-10-CM

## 2020-02-27 DIAGNOSIS — G06.1 SPINAL CORD ABSCESS: ICD-10-CM

## 2020-02-27 DIAGNOSIS — M51.36 DDD (DEGENERATIVE DISC DISEASE), LUMBAR: ICD-10-CM

## 2020-02-27 DIAGNOSIS — E11.65 TYPE 2 DIABETES MELLITUS WITH HYPERGLYCEMIA, WITH LONG-TERM CURRENT USE OF INSULIN (HCC): ICD-10-CM

## 2020-02-27 DIAGNOSIS — E78.2 MIXED HYPERLIPIDEMIA: ICD-10-CM

## 2020-02-27 DIAGNOSIS — E11.65 TYPE 2 DIABETES MELLITUS WITH HYPERGLYCEMIA, WITHOUT LONG-TERM CURRENT USE OF INSULIN (HCC): Primary | ICD-10-CM

## 2020-02-27 DIAGNOSIS — C72.0 MALIGNANT NEOPLASM OF SPINAL CORD (HCC): Primary | ICD-10-CM

## 2020-02-27 DIAGNOSIS — M79.604 RIGHT LEG PAIN: ICD-10-CM

## 2020-02-27 DIAGNOSIS — E03.9 ACQUIRED HYPOTHYROIDISM: ICD-10-CM

## 2020-02-27 DIAGNOSIS — Z79.4 TYPE 2 DIABETES MELLITUS WITH HYPERGLYCEMIA, WITH LONG-TERM CURRENT USE OF INSULIN (HCC): ICD-10-CM

## 2020-02-27 PROCEDURE — 3051F HG A1C>EQUAL 7.0%<8.0%: CPT | Performed by: INTERNAL MEDICINE

## 2020-02-27 PROCEDURE — 2022F DILAT RTA XM EVC RTNOPTHY: CPT | Performed by: INTERNAL MEDICINE

## 2020-02-27 PROCEDURE — 1036F TOBACCO NON-USER: CPT | Performed by: INTERNAL MEDICINE

## 2020-02-27 PROCEDURE — 3066F NEPHROPATHY DOC TX: CPT | Performed by: INTERNAL MEDICINE

## 2020-02-27 PROCEDURE — 97110 THERAPEUTIC EXERCISES: CPT

## 2020-02-27 PROCEDURE — 99214 OFFICE O/P EST MOD 30 MIN: CPT | Performed by: INTERNAL MEDICINE

## 2020-02-27 PROCEDURE — 97112 NEUROMUSCULAR REEDUCATION: CPT

## 2020-02-27 RX ORDER — METFORMIN HYDROCHLORIDE 500 MG/1
1000 TABLET, EXTENDED RELEASE ORAL 2 TIMES DAILY WITH MEALS
Qty: 60 TABLET | Refills: 3
Start: 2020-02-27 | End: 2020-03-09 | Stop reason: SDUPTHER

## 2020-02-27 NOTE — PATIENT INSTRUCTIONS
Hypoglycemia instructions   Jesus Caballero III  2/27/2020  [id number]    Low Blood Sugar    Steps to treat low blood sugar  1  Test blood sugar if you have symptoms of low blood sugar:   Low Blood Sugar Symptoms:  o Sweaty  o Dizzy  o Rapid heartbeat  o Shaky    o Bad mood  o Hungry      2  Treat blood sugar less than 70 with 15 grams of fast-acting carbohydrate:   Examples of 15 grams Fast-Acting Carbohydrate:  o 4 oz juice  o 4 oz regular soda  o 3-4 glucose tablets (chew)  o 3-4 hard candies (chew)              3    Wait 15 minutes and test your blood sugar again           4   If blood sugar is less than 100, repeat steps 2-3       5  When your blood sugar is 100 or more, eat a snack if it will be longer than one hour until your next meal  The snack should be 15 grams of carbohydrate and a protein:   Examples of snacks:  o ½ sandwich  o 6 crackers with cheese  o Piece of fruit with cheese or peanut butter  o 6 crackers with peanut butter

## 2020-02-27 NOTE — PROGRESS NOTES
Daily Note     Today's date: 2020  Patient name: Wilman Roldan  : 1960  MRN: [id number]  Referring provider: Ben Campo MD  Dx:   Encounter Diagnosis     ICD-10-CM    1  Malignant neoplasm of spinal cord ([address] ) C72 0    2  Right leg pain M79 604    3  General weakness R53 1    4  Spinal cord abscess G06 1    5  DDD (degenerative disc disease), lumbar M51 36                   Subjective: pt reports he started going to the gym    Objective: See treatment diary below      Assessment: pt with increased fatigue noted during GPA 30, pt only able to complete 2 min out of 5 min; pt tends to compensate, avoiding sidestepping to the right and crosses over right leg with left to cover (limping sideways) due to R hip weakness      Plan: Continue per plan of care  Progress treatment as tolerated  Precautions: Cervical tumor    Daily Treatment Diary           Manual  12/3 12/5/19 12/10 12/17       PNF R LE           PNF R pelvis in L SL           CPA JF JF JF        PPU OP JF JF         Lumbar gapping                  Exercise Diary  20   RB 10' 5' 5' 5'  Upright 10' 10' 10'   SL clamshells 3x10 3x10         TM       5'   5mph with rests np   STS 3x10 3x10     3x10 grn PB 12x   Step-ups       8" 2x10 ea    CC sidestep        10# 5x ea 10#   CC walk outs backwards       30# 5x 20# 10x   Tandem walk           Bridges with arms crossed  On PB 10x(neck pain)         R SLR 3x10 3x10         PPT           Standing abd 3x10 3x10     3x10   STAR R LE 5x    CLARITA           PPU           Obstacle course + cones   Step over BOSU 2x10        Low row TB Rows gtb 3x10          Mini squats 3x10      3x10 HEP   Sidestepping with ball toss 2 laps       March sideways 2x20'   Sciatic nerve glides           Dynamic balance: standing ball toss on rebounder STAND ON FOAM 2x15 grn ball Stand on foam 2x15 grn ball         Dynamic balance: ball toss with lunge B/L 5x each grn ball 10x ea grn ball         trazer  LAS 90     12 targets SAS 20  20 targets  SAS 20 targets 1'57"  GPA 30  2 min target hit 30   trazer MARY ALICE 90   18 targets 19 targets 23 targets 1'59" 25 targets2'8"     UBE   5 min 5' 10' standing 10' 4'/4' 4'/4'   trazer squats   20x 20x 20x 25x         Modalities  1/2/20 1/10/20 1/16/20 1/21/20 1/23/20 1/28/20 1/30/20 2/6/20 2/18/20 2/20/20   NMES with DF             Mechanical tractoin VK VK VK VK VK VK VK VK VK  To finish VK

## 2020-02-27 NOTE — PROGRESS NOTES
Gera Winter III 63 y o  male MRN: [id number]    Encounter: [id number]      Assessment/Plan     Assessment: This is a 63y o -year-old male with diabetes with hyperglycemia  Plan:    Diagnoses and all orders for this visit:    Type 2 diabetes mellitus with hyperglycemia, without long-term current use of insulin (HCC)  Lab Results   Component Value Date    HGBA1C 7 6 (H) 02/18/2020     Continue metformin extended release 500 mg 2 tablets twice a day  Discontinue short-acting insulin with meals  Start trulicity 2 88 mg once a week, discussed the side effects an benefits of medication(patient does not have history of pancreatitis)  Reduce Basaglar 20 units at bedtime   Discussed to check blood sugar twice daily and send log in 1-2 weeks for further adjustment  Discussed to call if blood sugar less than 70 or more than 300 persistently    -     metFORMIN (GLUCOPHAGE-XR) 500 mg 24 hr tablet; Take 2 tablets (1,000 mg total) by mouth 2 (two) times a day with meals  -     Dulaglutide (Trulicity) 9 12 JI/4 1TL SOPN; Inject 0 5 mL (0 75 mg total) under the skin once a week  -     T4, free; Future  -     TSH, 3rd generation; Future    Acquired hypothyroidism  Lab Results   Component Value Date    GUS0FQHQIEHN 1 740 02/18/2020    Continue current dose of levothyroxine  Educated to take it on empty stomach 1 hour before breakfast      Mixed hyperlipidemia  Lab Results   Component Value Date    LDLCALC 38 02/18/2020    Continue statins      Type 2 diabetes mellitus with hyperglycemia, with long-term current use of insulin (HCC)  Adjustment made to antidiabetic regimen  Follow-up in 3 months with blood work prior  Discussed importance of follow-up with Ophthalmology and podiatry  -     insulin glargine (Basaglar KwikPen) 100 units/mL injection pen; Inject 20 Units under the skin daily at bedtime  -     Basic metabolic panel;  Future  -     Hemoglobin A1C; Future        CC: Diabetes    History of Present Illness HPI:  56-year-old gentleman with past medical history of prediabetes, hypertension hyperlipidemia neuropathy is here for follow-up  He also has hypothyroidism for which currently taking levothyroxine 75 mcg daily 1 hour before breakfast  Denies missing doses    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 currently under physiotherapy    He was recently admitted in the hospital for hyperglycemia and was started on insulin regimen currently is taking Lantus 20 units at bedtime and NovoLog 8  units with meals  He is checking blood sugars regularly  Denies hyperglycemia or hypoglycemia episode requiring hospitalization    Checking blood sugar 3 times daily and his blood sugars are usually in  range  He is also taking metformin 500 mg ER, 1 tablet twice a day  On the current regimen his A1c improved from 11 5% to 7 6%    Lab Results   Component Value Date    HGBA1C 7 6 (H) 02/18/2020       Component      Latest Ref Rng & Units 2/18/2020   Sodium      136 - 145 mmol/L 142   Potassium      3 5 - 5 3 mmol/L 4 6   Chloride      100 - 108 mmol/L 107   CO2      21 - 32 mmol/L 29   Anion Gap      4 - 13 mmol/L 6   BUN      5 - 25 mg/dL 16   Creatinine      0 60 - 1 30 mg/dL 0 78   GLUCOSE FASTING      65 - 99 mg/dL 80   Calcium      8 3 - 10 1 mg/dL 9 2   AST      5 - 45 U/L 15   ALT      12 - 78 U/L 27   Alkaline Phosphatase      46 - 116 U/L 82   Total Protein      6 4 - 8 2 g/dL 7 2   Albumin      3 5 - 5 0 g/dL 3 5   TOTAL BILIRUBIN      0 20 - 1 00 mg/dL 0 95   eGFR      ml/min/1 73sq m 98   Cholesterol      50 - 200 mg/dL 87   Triglycerides      <=150 mg/dL 106   HDL      >=40 mg/dL 28 (L)   LDL Direct      0 - 100 mg/dL 38   Non-HDL Cholesterol      mg/dl 59   EXT Creatinine Urine      mg/dL 78 0   MICROALBUM ,U,RANDOM      0 0 - 20 0 mg/L <5 0   MICROALBUMIN/CREATININE RATIO      0 - 30 mg/g creatinine <6   Hemoglobin A1C      Normal 3 8-5 6%;  PreDiabetic 5 7-6 4%; Diabetic >=6 5%; Glycemic control for adults with diabetes <7 0% % 7 6 (H)   EAG      mg/dl 171   TSH 3RD GENERATON      0 358 - 3 740 uIU/mL 1 740   Free T4      0 76 - 1 46 ng/dL 1 13   BILIRUBIN DIRECT      0 00 - 0 20 mg/dL 0 24 (H)        Review of Systems   Constitutional: Negative for activity change, diaphoresis, fatigue, fever and unexpected weight change  HENT: Negative  Eyes: Negative for visual disturbance  Respiratory: Negative for cough, chest tightness and shortness of breath  Cardiovascular: Negative for chest pain, palpitations and leg swelling  Gastrointestinal: Negative for abdominal pain, blood in stool, constipation, diarrhea, nausea and vomiting  Endocrine: Negative for cold intolerance, heat intolerance, polydipsia, polyphagia and polyuria  Genitourinary: Negative for dysuria, enuresis, frequency and urgency  Musculoskeletal: Negative for arthralgias and myalgias  Skin: Negative for pallor, rash and wound  Allergic/Immunologic: Negative  Neurological: Positive for numbness  Negative for dizziness, tremors and weakness  Hematological: Negative  Psychiatric/Behavioral: Negative          Historical Information   Past Medical History:   Diagnosis Date    BPH associated with nocturia     Cervical spinal mass ([facility] ) 01/07/2019    cervicothoracic    Disease of thyroid gland     Herniated disc, cervical     History of radiation therapy     Hyperlipidemia     Impaired fasting glucose     Radiation-induced myelopathy ([facility] ) 6/19/2019     Past Surgical History:   Procedure Laterality Date    APPENDECTOMY      CERVICAL FUSION      COLONOSCOPY  03/13/2012    NORMAL; RECHECK IN 5 YEARS    FL LUMBAR PUNCTURE DIAGNOSTIC  2/21/2019    IN BX/EXCIS SPIN PATEL,INDUR,INMED,CERV N/A 1/24/2019    Procedure: Posterior C4-T3 laminectomy; resection of intramedullary mass C5-T3, including fenestration of syrinx C7-T2; C3-T4 fixation/fusion; additional levels as needed; Surgeon: Alysha Sellers MD;  Location: [facility];  Service: Neurosurgery    WISDOM TOOTH EXTRACTION       Social History   Social History     Substance and Sexual Activity   Alcohol Use Yes    Comment: rare social     Social History     Substance and Sexual Activity   Drug Use No     Social History     Tobacco Use   Smoking Status Former Smoker    Last attempt to quit: 1993    Years since quittin 8   Smokeless Tobacco Never Used     Family History:   Family History   Problem Relation Age of Onset    Diabetes Mother     Hypertension Father     Cerebral palsy Sister     Breast cancer Daughter        Meds/Allergies   Current Outpatient Medications   Medication Sig Dispense Refill    atorvastatin (LIPITOR) 40 mg tablet Take 1 tablet (40 mg total) by mouth daily with dinner 30 tablet 3    DULoxetine (CYMBALTA) 60 mg delayed release capsule Take 1 capsule (60 mg total) by mouth daily 30 capsule 2    gabapentin (NEURONTIN) 300 mg capsule Take 1 capsule (300 mg total) by mouth 3 (three) times a day 90 capsule 2    insulin glargine (Basaglar KwikPen) 100 units/mL injection pen Inject 20 Units under the skin daily at bedtime      Insulin Pen Needle (PEN NEEDLES 29GX1/2") 29G X 12MM MISC by Does not apply route 4 (four) times a day (before meals and at bedtime) Please substitute for what fits pen and what is covered 150 each 0    levothyroxine 75 mcg tablet TAKE 1 TABLET BY MOUTH EVERY DAY 30 tablet 0    metFORMIN (GLUCOPHAGE-XR) 500 mg 24 hr tablet Take 2 tablets (1,000 mg total) by mouth 2 (two) times a day with meals 60 tablet 3    Omega-3 Fatty Acids (FISH OIL) 1,000 mg Take 1 capsule (1,000 mg total) by mouth daily Resume on 19 90 capsule 1    Dulaglutide (Trulicity) 1 90 PQ/6 1ZC SOPN Inject 0 5 mL (0 75 mg total) under the skin once a week 4 pen 3     No current facility-administered medications for this visit        Allergies   Allergen Reactions    Bee Venom Hives, Itching and Edema Category: Allergy;     Penicillins Hives and Itching     Category: Allergy;        Objective   Vitals: Blood pressure 120/70, height 5' 7" (1 702 m), weight 84 8 kg (187 lb)  Physical Exam   Constitutional: He is oriented to person, place, and time  He appears well-developed and well-nourished  No distress  HENT:   Head: Normocephalic and atraumatic  Eyes: Pupils are equal, round, and reactive to light  EOM are normal    Neck: Normal range of motion  Neck supple  No thyromegaly present  Cardiovascular: Normal rate, regular rhythm and normal heart sounds  Pulmonary/Chest: Effort normal and breath sounds normal  No respiratory distress  Musculoskeletal: Normal range of motion  He exhibits no edema or deformity  Neurological: He is alert and oriented to person, place, and time  He displays normal reflexes  Skin: Skin is warm and dry  No erythema  Psychiatric: He has a normal mood and affect  Vitals reviewed  The history was obtained from the review of the chart, patient      Lab Results:   Lab Results   Component Value Date/Time    Hemoglobin A1C 7 6 (H) 02/18/2020 08:41 AM    Hemoglobin A1C 11 5 (H) 01/02/2020 02:08 PM    Hemoglobin A1C 6 4 (H) 10/24/2019 09:07 AM    WBC 5 24 01/03/2020 06:06 AM    WBC 6 63 01/02/2020 02:08 PM    WBC 4 71 10/24/2019 09:07 AM    Hemoglobin 11 6 (L) 01/03/2020 06:06 AM    Hemoglobin 13 1 01/02/2020 02:08 PM    Hemoglobin 13 5 10/24/2019 09:07 AM    Hematocrit 35 7 (L) 01/03/2020 06:06 AM    Hematocrit 40 3 01/02/2020 02:08 PM    Hematocrit 44 2 10/24/2019 09:07 AM    MCV 90 01/03/2020 06:06 AM    MCV 89 01/02/2020 02:08 PM    MCV 96 10/24/2019 09:07 AM    Platelets 627 42/21/5807 06:06 AM    Platelets 847 56/62/7856 02:08 PM    Platelets 709 65/58/5012 09:07 AM    BUN 16 02/18/2020 08:41 AM    BUN 14 01/04/2020 05:14 AM    BUN 16 01/03/2020 06:06 AM    Potassium 4 6 02/18/2020 08:41 AM    Potassium 4 0 01/04/2020 05:14 AM    Potassium 4 2 01/03/2020 06:06 AM Chloride 107 02/18/2020 08:41 AM    Chloride 108 01/04/2020 05:14 AM    Chloride 106 01/03/2020 06:06 AM    CO2 29 02/18/2020 08:41 AM    CO2 25 01/04/2020 05:14 AM    CO2 26 01/03/2020 06:06 AM    Creatinine 0 78 02/18/2020 08:41 AM    Creatinine 0 78 01/04/2020 05:14 AM    Creatinine 0 81 01/03/2020 06:06 AM    AST 15 02/18/2020 08:41 AM    AST 14 01/03/2020 06:06 AM    AST 7 01/02/2020 02:08 PM    ALT 27 02/18/2020 08:41 AM    ALT 29 01/03/2020 06:06 AM    ALT 38 01/02/2020 02:08 PM    Albumin 3 5 02/18/2020 08:41 AM    Albumin 2 6 (L) 01/03/2020 06:06 AM    Albumin 3 4 (L) 01/02/2020 02:08 PM    HDL, Direct 28 (L) 02/18/2020 08:41 AM    HDL, Direct 22 (L) 01/03/2020 06:06 AM    HDL, Direct 31 (L) 10/24/2019 09:07 AM    Triglycerides 106 02/18/2020 08:41 AM    Triglycerides 258 (H) 01/03/2020 06:06 AM    Triglycerides 189 (H) 10/24/2019 09:07 AM           Imaging Studies: I have personally reviewed pertinent reports  Portions of the record may have been created with voice recognition software  Occasional wrong word or "sound a like" substitutions may have occurred due to the inherent limitations of voice recognition software  Read the chart carefully and recognize, using context, where substitutions have occurred

## 2020-02-28 ENCOUNTER — TELEPHONE (OUTPATIENT)
Dept: ENDOCRINOLOGY | Facility: CLINIC | Age: 60
End: 2020-02-28

## 2020-03-03 ENCOUNTER — TELEPHONE (OUTPATIENT)
Dept: ENDOCRINOLOGY | Facility: CLINIC | Age: 60
End: 2020-03-03

## 2020-03-03 ENCOUNTER — OFFICE VISIT (OUTPATIENT)
Dept: PHYSICAL THERAPY | Age: 60
End: 2020-03-03
Payer: COMMERCIAL

## 2020-03-03 DIAGNOSIS — C72.0 MALIGNANT NEOPLASM OF SPINAL CORD (HCC): Primary | ICD-10-CM

## 2020-03-03 DIAGNOSIS — M51.36 DDD (DEGENERATIVE DISC DISEASE), LUMBAR: ICD-10-CM

## 2020-03-03 DIAGNOSIS — G06.1 SPINAL CORD ABSCESS: ICD-10-CM

## 2020-03-03 DIAGNOSIS — M79.604 RIGHT LEG PAIN: ICD-10-CM

## 2020-03-03 DIAGNOSIS — R53.1 GENERAL WEAKNESS: ICD-10-CM

## 2020-03-03 PROCEDURE — 97112 NEUROMUSCULAR REEDUCATION: CPT

## 2020-03-03 PROCEDURE — 97110 THERAPEUTIC EXERCISES: CPT

## 2020-03-03 NOTE — PROGRESS NOTES
Daily Note     Today's date: 3/3/2020  Patient name: Hanh Dwyer  : 1960  MRN: [id number]  Referring provider: Deepak Cochran MD  Dx:   Encounter Diagnosis     ICD-10-CM    1  Malignant neoplasm of spinal cord ([address] ) C72 0    2  Right leg pain M79 604    3  General weakness R53 1    4  Spinal cord abscess G06 1    5  DDD (degenerative disc disease), lumbar M51 36                   Subjective:  Pt with no complaints at this time      Objective: See treatment diary below      Assessment: ex endurance gradually improving       Plan: Continue per plan of care  Progress treatment as tolerated  Precautions: Cervical tumor    Daily Treatment Diary           Manual  12/3 12/5/19 12/10 12/17       PNF R LE           PNF R pelvis in L SL           CPA JF JF JF        PPU OP JF JF         Lumbar gapping                  Exercise Diary  2/6/20 2/13/20 2/18/20 2/20/20 2/25/20 2/27/20 3/3/20   RB 5' 5'  Upright 10' 10' 10' 10'   SL clamshells          TM     5'   5mph with rests np 7'   STS     3x10 grn PB 12x 2x10   Step-ups     8" 2x10 ea     CC sidestep      10# 5x ea 10#    CC walk outs backwards     30# 5x 20# 10x    Tandem walk          Bridges with arms crossed          R SLR          PPT          Standing abd     3x10   STAR R LE 5x  5x   CLARITA          PPU          Obstacle course + cones Step over BOSU 2x10         Low row TB          Mini squats     3x10 HEP    Sidestepping with ball toss      March sideways 2x20' 2x20'   Sciatic nerve glides          Dynamic balance: standing ball toss on rebounder          Dynamic balance: ball toss with lunge B/L          trazer  LAS 90   12 targets SAS 20  20 targets  SAS 20 targets 1'57"  GPA 30  2 min target hit 30    trazer MARY ALICE 90 18 targets 19 targets 23 targets 1'59" 25 targets2'8"      UBE 5 min 5' 10' standing 10' 4'/4' 4'/4' 5'/5'   trazer squats 20x 20x 20x 25x          Modalities  1/2/20 1/10/20 1/16/20 1/21/20 1/23/20 1/28/20 1/30/20 2/6/20 2/18/20 2/20/20   NMES with DF             Mechanical tractoin VK VK VK VK VK VK VK VK VK  To finish VK

## 2020-03-05 ENCOUNTER — APPOINTMENT (OUTPATIENT)
Dept: PHYSICAL THERAPY | Age: 60
End: 2020-03-05
Payer: COMMERCIAL

## 2020-03-09 DIAGNOSIS — E11.65 TYPE 2 DIABETES MELLITUS WITH HYPERGLYCEMIA, WITHOUT LONG-TERM CURRENT USE OF INSULIN (HCC): ICD-10-CM

## 2020-03-10 ENCOUNTER — OFFICE VISIT (OUTPATIENT)
Dept: PHYSICAL THERAPY | Age: 60
End: 2020-03-10
Payer: COMMERCIAL

## 2020-03-10 DIAGNOSIS — R53.1 GENERAL WEAKNESS: ICD-10-CM

## 2020-03-10 DIAGNOSIS — M79.604 RIGHT LEG PAIN: ICD-10-CM

## 2020-03-10 DIAGNOSIS — M51.36 DDD (DEGENERATIVE DISC DISEASE), LUMBAR: ICD-10-CM

## 2020-03-10 DIAGNOSIS — C72.0 MALIGNANT NEOPLASM OF SPINAL CORD (HCC): Primary | ICD-10-CM

## 2020-03-10 DIAGNOSIS — G06.1 SPINAL CORD ABSCESS: ICD-10-CM

## 2020-03-10 PROCEDURE — 97110 THERAPEUTIC EXERCISES: CPT

## 2020-03-10 PROCEDURE — 97112 NEUROMUSCULAR REEDUCATION: CPT

## 2020-03-10 RX ORDER — METFORMIN HYDROCHLORIDE 500 MG/1
TABLET, EXTENDED RELEASE ORAL
Qty: 180 TABLET | Refills: 1 | OUTPATIENT
Start: 2020-03-10

## 2020-03-10 RX ORDER — METFORMIN HYDROCHLORIDE 500 MG/1
1000 TABLET, EXTENDED RELEASE ORAL 2 TIMES DAILY WITH MEALS
Qty: 360 TABLET | Refills: 1 | Status: SHIPPED | OUTPATIENT
Start: 2020-03-10 | End: 2020-07-01 | Stop reason: CLARIF

## 2020-03-10 NOTE — PROGRESS NOTES
Daily Note     Today's date: 3/10/2020  Patient name: Hanh Dwyer  : 1960  MRN: [id number]  Referring provider: Deepak Cochran MD  Dx:   Encounter Diagnosis     ICD-10-CM    1  Malignant neoplasm of spinal cord ([address] ) C72 0    2  Right leg pain M79 604    3  General weakness R53 1    4  Spinal cord abscess G06 1    5  DDD (degenerative disc disease), lumbar M51 36                   Subjective:  Pt reports he was gardening/raking yesterday, using rake for balancing when needed  Objective: See treatment diary below      Assessment:  Ex endurance improving, difficulty pushing off/WBing  on R LE during sidestepping ex      Plan: Continue per plan of care  Progress treatment as tolerated  Precautions: Cervical tumor    Daily Treatment Diary           Manual  12/3 12/5/19 12/10 12/17       PNF R LE           PNF R pelvis in L SL           CPA JF JF JF        PPU OP JF JF         Lumbar gapping                  Exercise Diary  2/6/20 2/13/20 2/18/20 2/20/20 2/25/20 2/27/20 3/3/20 3/10/20   RB 5' 5'  Upright 10' 10' 10' 10' 10'   SL clamshells           TM     5'   5mph with rests np 7'    STS     3x10 grn PB 12x 2x10 3x10   Step-ups     8" 2x10 ea      CC sidestep      10# 5x ea 10#  10# 5x ea   CC walk outs backwards     30# 5x 20# 10x  20# 10x   Tandem walk           Bridges with arms crossed           R SLR           PPT           Standing abd     3x10   STAR R LE 5x  5x 5x   CLARITA           PPU           Obstacle course + cones Step over BOSU 2x10          Low row TB           Mini squats     3x10 HEP     Sidestepping with ball toss      March sideways 2x20' 2x20'    Sciatic nerve glides           Dynamic balance: standing ball toss on rebounder           Dynamic balance: ball toss with lunge B/L           trazer  LAS 90   12 targets SAS 20  20 targets  SAS 20 targets 1'57"  GPA 30  2 min target hit 30     trazer MARY ALICE 90 18 targets 19 targets 23 targets 1'59" 25 targets2'8"       UBE 5 min 5' 10' standing 10' 4'/4' 4'/4' 5'/5' 5'/5'   trazer squats 20x 20x 20x 25x           Modalities  1/2/20 1/10/20 1/16/20 1/21/20 1/23/20 1/28/20 1/30/20 2/6/20 2/18/20 2/20/20   NMES with DF             Mechanical tractoin VK VK VK VK VK VK VK VK VK  To finish VK

## 2020-03-12 ENCOUNTER — OFFICE VISIT (OUTPATIENT)
Dept: PHYSICAL THERAPY | Age: 60
End: 2020-03-12
Payer: COMMERCIAL

## 2020-03-12 DIAGNOSIS — M51.36 DDD (DEGENERATIVE DISC DISEASE), LUMBAR: ICD-10-CM

## 2020-03-12 DIAGNOSIS — G06.1 SPINAL CORD ABSCESS: ICD-10-CM

## 2020-03-12 DIAGNOSIS — R53.1 GENERAL WEAKNESS: ICD-10-CM

## 2020-03-12 DIAGNOSIS — M79.604 RIGHT LEG PAIN: ICD-10-CM

## 2020-03-12 DIAGNOSIS — C72.0 MALIGNANT NEOPLASM OF SPINAL CORD (HCC): Primary | ICD-10-CM

## 2020-03-12 PROCEDURE — 97110 THERAPEUTIC EXERCISES: CPT

## 2020-03-12 PROCEDURE — 97112 NEUROMUSCULAR REEDUCATION: CPT

## 2020-03-12 NOTE — PROGRESS NOTES
Daily Note     Today's date: 3/12/2020  Patient name: Carmenza Carolina  : 1960  MRN: [id number]  Referring provider: Dorie Dyer MD  Dx:   Encounter Diagnosis     ICD-10-CM    1  Malignant neoplasm of spinal cord ([address] ) C72 0    2  Right leg pain M79 604    3  General weakness R53 1    4  Spinal cord abscess G06 1    5  DDD (degenerative disc disease), lumbar M51 36                   Subjective:  Pt reports L knee has been painful, "giving out" at times       Objective: See treatment diary below      Assessment: Tolerated treatment well, pt performs ex with less cueing, cont to amb with AFO without assistive device and requiring cueing to clear R toes during swing through phase    Plan: Continue per plan of care  Progress treatment as tolerated  Precautions: Cervical tumor    Daily Treatment Diary           Manual  12/3 12/5/19 12/10 12/17       PNF R LE           PNF R pelvis in L SL           CPA JF JF JF        PPU OP JF JF         Lumbar gapping                  Exercise Diary  2/6/20 2/13/20 2/18/20 2/20/20 2/25/20 2/27/20 3/3/20 3/10/20 3/12/20   RB 5' 5'  Upright 10' 10' 10' 10' 10' 10'   SL clamshells            TM     5'   5mph with rests np 7'     STS     3x10 grn PB 12x 2x10 3x10 3x10   Step-ups     8" 2x10 ea       CC sidestep      10# 5x ea 10#  10# 5x ea 10#  5x ea   CC walk outs backwards     30# 5x 20# 10x  20# 10x frwd/bkwd 20# 10x ea   Tandem walk            Bridges with arms crossed            R SLR            PPT            Standing abd     3x10   STAR R LE 5x  5x 5x abd 2x10 ea   CLARITA            PPU            Obstacle course + cones Step over BOSU 2x10           Low row TB            Mini squats     3x10 HEP      Sidestepping with ball toss      March sideways 2x20' 2x20'     Sciatic nerve glides            Dynamic balance: standing ball toss on rebounder            Dynamic balance: ball toss with lunge B/L            trazer  LAS 90   12 targets SAS 20  20 targets  SAS 20 targets 1'57"  GPA 30  2 min target hit 30      trazer MARY ALICE 90 18 targets 19 targets 23 targets 1'59" 25 targets2'8"        UBE 5 min 5' 10' standing 10' 4'/4' 4'/4' 5'/5' 5'/5' 5'/5'   trazer squats 20x 20x 20x 25x            Modalities  1/2/20 1/10/20 1/16/20 1/21/20 1/23/20 1/28/20 1/30/20 2/6/20 2/18/20 2/20/20   NMES with DF             Mechanical tractoin VK VK VK VK VK VK VK VK VK  To finish VK

## 2020-03-17 ENCOUNTER — TELEPHONE (OUTPATIENT)
Dept: ENDOCRINOLOGY | Facility: CLINIC | Age: 60
End: 2020-03-17

## 2020-03-17 ENCOUNTER — EVALUATION (OUTPATIENT)
Dept: PHYSICAL THERAPY | Age: 60
End: 2020-03-17
Payer: COMMERCIAL

## 2020-03-17 DIAGNOSIS — G06.1 SPINAL CORD ABSCESS: ICD-10-CM

## 2020-03-17 DIAGNOSIS — E11.65 TYPE 2 DIABETES MELLITUS WITH HYPERGLYCEMIA, WITH LONG-TERM CURRENT USE OF INSULIN (HCC): ICD-10-CM

## 2020-03-17 DIAGNOSIS — M79.604 RIGHT LEG PAIN: ICD-10-CM

## 2020-03-17 DIAGNOSIS — M51.36 DDD (DEGENERATIVE DISC DISEASE), LUMBAR: ICD-10-CM

## 2020-03-17 DIAGNOSIS — C72.0 MALIGNANT NEOPLASM OF SPINAL CORD (HCC): Primary | ICD-10-CM

## 2020-03-17 DIAGNOSIS — Z79.4 TYPE 2 DIABETES MELLITUS WITH HYPERGLYCEMIA, WITH LONG-TERM CURRENT USE OF INSULIN (HCC): ICD-10-CM

## 2020-03-17 DIAGNOSIS — R53.1 GENERAL WEAKNESS: ICD-10-CM

## 2020-03-17 PROCEDURE — 97116 GAIT TRAINING THERAPY: CPT | Performed by: PHYSICAL THERAPIST

## 2020-03-17 PROCEDURE — 97110 THERAPEUTIC EXERCISES: CPT | Performed by: PHYSICAL THERAPIST

## 2020-03-17 PROCEDURE — 97112 NEUROMUSCULAR REEDUCATION: CPT | Performed by: PHYSICAL THERAPIST

## 2020-03-17 NOTE — PROGRESS NOTES
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  Patient has met nearly all his functional goals and would like to progress to HEP to gain more independence  I agree this appropriate at this point and informed patient to return to PT if necessary  Impairments: abnormal or restricted ROM, abnormal movement, activity intolerance, impaired physical strength and weight-bearing intolerance      Lumbar goals    Short Term Goals: to be achieved by 4 weeks MET  1) Patient to be independent with basic HEP  2) Decrease pain to 1/10 at its worst   3) Increase lumbar spine ROM by minimal defieceny  in all deficient planes  4) Increase LE strength by 1/2 MMT grade in all deficient planes  Long Term Goals: to be achieved by discharge Partially met   1) FOTO equal to or greater than 66   2) Patient to be independent with comprehensive HEP  3) Lumbar spine ROM WNL all planes to improve a/iadls  4) Increase LE strength to 5/5 MMT grade in all planes to improve a/iadls  5) Patient to report no sleep interruption secondary to pain  6) Increase ambulatory to 50 min  Plan  Patient would benefit from: D/C PT to HEP        Subjective Evaluation    History of Present Illness  Mechanism of injury: Pt suffered multiple falls and BUE numbness in January was 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  Patient had a Posterior C4-T3 laminectomy and resection of intramedullary mass and C2-T5 fixation/fusion   Patient is now medically stable and is being seen at PT following 1 month stay in sub acute rehab  Pt reports most problems with RLE weakness  Pt reports to the doctor on the 6th to determine if he needs to receive cancer treatment and when he is able to remove cervical collar  Going up and down steps is difficulty for patient, navigating home due to pets, and balance is an issue  Pt had no significant PMH prior to this incidence  Pain  Current pain ratin  At best pain ratin  At worst pain ratin    Patient Goals  Patient goals for therapy: increased strength, decreased pain and independence with ADLs/IADLs  Patient goal: Work, golf, bike        Objective     Lumbar:    Mod limitations with AROM rotation to both sides  Min limitations with flexion  Pt has pain with left lateral flexion  Pt also demonstrates posterior pelvic tilt on left side with long sit test and prone knee bend  Concurrent Complaints  Negative for night pain, disturbed sleep, bladder dysfunction, bowel dysfunction and saddle (S4) numbness    Stair Navigation:  Reciprocal descending + ascending stairs with unilateral hand rails  Pt can navigate stairs for longer periods of time before gait pattern breaks down       trength/Myotome Testing     Lumbar   Left   Heel walk: normal  Toe walk: normal    Right   Heel walk: normal  Toe walk: normal    Left Hip   Planes of Motion   Flexion: 5  Extension: 5  Abduction: 5  Adduction: 5  External rotation: 5  Internal rotation: 5    Right Hip   Planes of Motion   Flexion: 4+  Extension: 5  Abduction: 4  Adduction: 4  External rotation: 5  Internal rotation: 4+    Left Knee   Flexion: 5  Extension: 5    Right Knee   Flexion: 4+  Extension: 4+    Functional Assessment        Comments  Pt has little difficulty performing functional squat   Stair navigation is completed with reciprocal pattern and circumduction with RLE    5 STS: 8seconds  TU seconds    MCTSIB:  EO firm:70% impairment   EC firm: 65% impairment  EO foam: 70% impairment  EC foam: 72% impairment     FGA: 27/30    Stair Navigation:  Reciprocal descending/ascending stairs with unilateral hand rail  Muscle activation pattern has improved since last re-evaluation  Precautions: Cervical tumor      Daily Treatment Diary       Exercise Diary  2/6/20 2/13/20 2/18/20 2/20/20 2/25/20 2/27/20 3/3/20 3/10/20 3/17/20   RB 5' 5'  Upright 10' 10' 10' 10' 10' 10'   SL clamshells            TM     5'   5mph with rests np 7'     STS     3x10 grn PB 12x 2x10 3x10 3x10   Step-ups     8" 2x10 ea       CC sidestep      10# 5x ea 10#  10# 5x ea 10#  5x ea   CC walk outs backwards     30# 5x 20# 10x  20# 10x frwd/bkwd 20# 10x ea   Tandem walk            Bridges with arms crossed            R SLR            PPT            Standing abd     3x10   STAR R LE 5x  5x 5x abd 2x10 ea   CLARITA            PPU            Obstacle course + cones Step over BOSU 2x10           Low row TB            Mini squats     3x10 HEP      Sidestepping with ball toss      March sideways 2x20' 2x20'     Sciatic nerve glides            Dynamic balance: standing ball toss on rebounder            Dynamic balance: ball toss with lunge B/L            trazer  LAS 90   12 targets SAS 20  20 targets  SAS 20 targets 1'57"  GPA 30  2 min target hit 30      trazer MARY ALICE 90 18 targets 19 targets 23 targets 1'59" 25 targets2'8"        UBE 5 min 5' 10' standing 10' 4'/4' 4'/4' 5'/5' 5'/5' 5'/5'   trazer squats 20x 20x 20x 25x            Modalities  1/2/20 1/10/20 1/16/20 1/21/20 1/23/20 1/28/20 1/30/20 2/6/20 2/18/20 2/20/20   NMES with DF             Mechanical tractoin VK VK VK VK VK VK VK VK VK  To finish VK

## 2020-03-17 NOTE — TELEPHONE ENCOUNTER
Received BG log via fax    Current Meds:  Trulicity   75 1x weekly  Basaglar 20 units @ bedtime  Metformin 500 mg 2 tablets 2x daily    Left message for patient to verify medications because they were written incorrectly on BG log

## 2020-03-18 NOTE — TELEPHONE ENCOUNTER
Received BG log via fax     Current Meds:  Trulicity   75 1x weekly  Basaglar 20 units @ bedtime  Metformin 500 mg 2 tablets 2x daily    Blood sugars are well controlled   Please inform patient TO reduce Basaglar to 15 units at bedtime  Continue rest of the regimen same  Follow-up for appointment as scheduled    Nidia Leyva MD

## 2020-03-18 NOTE — TELEPHONE ENCOUNTER
Left message on wife's cell phone as his phone is not working at this time    Asked if patient could call back to discuss changes

## 2020-03-19 ENCOUNTER — APPOINTMENT (OUTPATIENT)
Dept: PHYSICAL THERAPY | Age: 60
End: 2020-03-19
Payer: COMMERCIAL

## 2020-03-24 ENCOUNTER — APPOINTMENT (OUTPATIENT)
Dept: PHYSICAL THERAPY | Age: 60
End: 2020-03-24
Payer: COMMERCIAL

## 2020-03-24 DIAGNOSIS — E03.9 HYPOTHYROIDISM, UNSPECIFIED TYPE: ICD-10-CM

## 2020-03-24 RX ORDER — LEVOTHYROXINE SODIUM 0.07 MG/1
TABLET ORAL
Qty: 30 TABLET | Refills: 3 | Status: SHIPPED | OUTPATIENT
Start: 2020-03-24 | End: 2020-06-19 | Stop reason: SDUPTHER

## 2020-03-26 ENCOUNTER — APPOINTMENT (OUTPATIENT)
Dept: PHYSICAL THERAPY | Age: 60
End: 2020-03-26
Payer: COMMERCIAL

## 2020-03-31 ENCOUNTER — APPOINTMENT (OUTPATIENT)
Dept: PHYSICAL THERAPY | Age: 60
End: 2020-03-31
Payer: COMMERCIAL

## 2020-04-08 ENCOUNTER — TELEMEDICINE (OUTPATIENT)
Dept: PALLIATIVE MEDICINE | Facility: CLINIC | Age: 60
End: 2020-04-08
Payer: COMMERCIAL

## 2020-04-08 DIAGNOSIS — Z79.4 TYPE 2 DIABETES MELLITUS WITH HYPERGLYCEMIA, WITH LONG-TERM CURRENT USE OF INSULIN (HCC): ICD-10-CM

## 2020-04-08 DIAGNOSIS — C72.0 SPINAL CORD EPENDYMOMA (HCC): ICD-10-CM

## 2020-04-08 DIAGNOSIS — G62.9 NEUROPATHY: ICD-10-CM

## 2020-04-08 DIAGNOSIS — F32.A DEPRESSION, UNSPECIFIED DEPRESSION TYPE: ICD-10-CM

## 2020-04-08 DIAGNOSIS — G95.89 RADIATION-INDUCED MYELOPATHY (HCC): ICD-10-CM

## 2020-04-08 DIAGNOSIS — Z98.1 H/O SPINAL FUSION: ICD-10-CM

## 2020-04-08 DIAGNOSIS — E11.65 TYPE 2 DIABETES MELLITUS WITH HYPERGLYCEMIA, WITH LONG-TERM CURRENT USE OF INSULIN (HCC): ICD-10-CM

## 2020-04-08 PROCEDURE — 99214 OFFICE O/P EST MOD 30 MIN: CPT | Performed by: NURSE PRACTITIONER

## 2020-04-08 RX ORDER — DULOXETIN HYDROCHLORIDE 60 MG/1
60 CAPSULE, DELAYED RELEASE ORAL DAILY
Qty: 30 CAPSULE | Refills: 3 | Status: SHIPPED | OUTPATIENT
Start: 2020-04-08 | End: 2020-07-16 | Stop reason: SDUPTHER

## 2020-04-08 RX ORDER — GABAPENTIN 300 MG/1
300 CAPSULE ORAL 3 TIMES DAILY
Qty: 90 CAPSULE | Refills: 3 | Status: SHIPPED | OUTPATIENT
Start: 2020-04-08 | End: 2020-05-26

## 2020-04-23 DIAGNOSIS — E78.5 HLD (HYPERLIPIDEMIA): ICD-10-CM

## 2020-04-23 RX ORDER — ATORVASTATIN CALCIUM 40 MG/1
TABLET, FILM COATED ORAL
Qty: 90 TABLET | Refills: 1 | Status: SHIPPED | OUTPATIENT
Start: 2020-04-23 | End: 2020-10-26

## 2020-04-27 DIAGNOSIS — E78.5 HYPERLIPIDEMIA: ICD-10-CM

## 2020-04-27 RX ORDER — PEN NEEDLE, DIABETIC 29 G X1/2"
NEEDLE, DISPOSABLE MISCELLANEOUS
Qty: 100 EACH | Refills: 1 | Status: SHIPPED | OUTPATIENT
Start: 2020-04-27 | End: 2020-07-22

## 2020-05-23 DIAGNOSIS — G89.3 CANCER-RELATED PAIN: Primary | Chronic | ICD-10-CM

## 2020-05-23 DIAGNOSIS — G95.89 RADIATION-INDUCED MYELOPATHY (HCC): ICD-10-CM

## 2020-05-23 DIAGNOSIS — Z98.1 H/O SPINAL FUSION: ICD-10-CM

## 2020-05-23 DIAGNOSIS — C72.0 SPINAL CORD EPENDYMOMA (HCC): ICD-10-CM

## 2020-05-26 PROBLEM — G89.3 CANCER-RELATED PAIN: Chronic | Status: ACTIVE | Noted: 2020-05-26

## 2020-05-26 RX ORDER — GABAPENTIN 300 MG/1
300 CAPSULE ORAL 3 TIMES DAILY
Qty: 270 CAPSULE | Refills: 3 | Status: SHIPPED | OUTPATIENT
Start: 2020-05-26 | End: 2021-11-15

## 2020-05-27 ENCOUNTER — TELEPHONE (OUTPATIENT)
Dept: ENDOCRINOLOGY | Facility: CLINIC | Age: 60
End: 2020-05-27

## 2020-06-01 ENCOUNTER — LAB (OUTPATIENT)
Dept: LAB | Age: 60
End: 2020-06-01
Payer: COMMERCIAL

## 2020-06-01 DIAGNOSIS — E11.65 TYPE 2 DIABETES MELLITUS WITH HYPERGLYCEMIA, WITH LONG-TERM CURRENT USE OF INSULIN (HCC): ICD-10-CM

## 2020-06-01 DIAGNOSIS — Z79.4 TYPE 2 DIABETES MELLITUS WITH HYPERGLYCEMIA, WITH LONG-TERM CURRENT USE OF INSULIN (HCC): ICD-10-CM

## 2020-06-01 DIAGNOSIS — E78.5 HYPERLIPIDEMIA ASSOCIATED WITH TYPE 2 DIABETES MELLITUS (HCC): ICD-10-CM

## 2020-06-01 DIAGNOSIS — E11.69 HYPERLIPIDEMIA ASSOCIATED WITH TYPE 2 DIABETES MELLITUS (HCC): ICD-10-CM

## 2020-06-01 DIAGNOSIS — E11.65 TYPE 2 DIABETES MELLITUS WITH HYPERGLYCEMIA, WITHOUT LONG-TERM CURRENT USE OF INSULIN (HCC): ICD-10-CM

## 2020-06-01 LAB
ALBUMIN SERPL BCP-MCNC: 3.6 G/DL (ref 3.5–5)
ALP SERPL-CCNC: 68 U/L (ref 46–116)
ALT SERPL W P-5'-P-CCNC: 26 U/L (ref 12–78)
ANION GAP SERPL CALCULATED.3IONS-SCNC: 5 MMOL/L (ref 4–13)
AST SERPL W P-5'-P-CCNC: 11 U/L (ref 5–45)
BILIRUB SERPL-MCNC: 1.07 MG/DL (ref 0.2–1)
BUN SERPL-MCNC: 17 MG/DL (ref 5–25)
CALCIUM SERPL-MCNC: 8.8 MG/DL (ref 8.3–10.1)
CHLORIDE SERPL-SCNC: 105 MMOL/L (ref 100–108)
CHOLEST SERPL-MCNC: 76 MG/DL (ref 50–200)
CO2 SERPL-SCNC: 29 MMOL/L (ref 21–32)
CREAT SERPL-MCNC: 0.71 MG/DL (ref 0.6–1.3)
EST. AVERAGE GLUCOSE BLD GHB EST-MCNC: 114 MG/DL
GFR SERPL CREATININE-BSD FRML MDRD: 101 ML/MIN/1.73SQ M
GLUCOSE P FAST SERPL-MCNC: 80 MG/DL (ref 65–99)
HBA1C MFR BLD: 5.6 %
HDLC SERPL-MCNC: 29 MG/DL
LDLC SERPL CALC-MCNC: 36 MG/DL (ref 0–100)
NONHDLC SERPL-MCNC: 47 MG/DL
POTASSIUM SERPL-SCNC: 3.9 MMOL/L (ref 3.5–5.3)
PROT SERPL-MCNC: 7 G/DL (ref 6.4–8.2)
SODIUM SERPL-SCNC: 139 MMOL/L (ref 136–145)
T4 FREE SERPL-MCNC: 0.98 NG/DL (ref 0.76–1.46)
TRIGL SERPL-MCNC: 57 MG/DL
TSH SERPL DL<=0.05 MIU/L-ACNC: 1.35 UIU/ML (ref 0.36–3.74)

## 2020-06-01 PROCEDURE — 80053 COMPREHEN METABOLIC PANEL: CPT

## 2020-06-01 PROCEDURE — 3044F HG A1C LEVEL LT 7.0%: CPT | Performed by: NEUROLOGICAL SURGERY

## 2020-06-01 PROCEDURE — 84439 ASSAY OF FREE THYROXINE: CPT

## 2020-06-01 PROCEDURE — 83036 HEMOGLOBIN GLYCOSYLATED A1C: CPT

## 2020-06-01 PROCEDURE — 84443 ASSAY THYROID STIM HORMONE: CPT

## 2020-06-01 PROCEDURE — 80061 LIPID PANEL: CPT

## 2020-06-01 PROCEDURE — 36415 COLL VENOUS BLD VENIPUNCTURE: CPT

## 2020-06-02 ENCOUNTER — TRANSCRIBE ORDERS (OUTPATIENT)
Dept: FAMILY MEDICINE CLINIC | Facility: CLINIC | Age: 60
End: 2020-06-02

## 2020-06-02 DIAGNOSIS — E11.65 TYPE 2 DIABETES MELLITUS WITH HYPERGLYCEMIA (HCC): Primary | ICD-10-CM

## 2020-06-03 ENCOUNTER — TELEMEDICINE (OUTPATIENT)
Dept: ENDOCRINOLOGY | Facility: CLINIC | Age: 60
End: 2020-06-03
Payer: COMMERCIAL

## 2020-06-03 DIAGNOSIS — E78.2 MIXED HYPERLIPIDEMIA: ICD-10-CM

## 2020-06-03 DIAGNOSIS — E11.65 TYPE 2 DIABETES MELLITUS WITH HYPERGLYCEMIA, WITH LONG-TERM CURRENT USE OF INSULIN (HCC): Primary | ICD-10-CM

## 2020-06-03 DIAGNOSIS — Z79.4 TYPE 2 DIABETES MELLITUS WITH HYPERGLYCEMIA, WITH LONG-TERM CURRENT USE OF INSULIN (HCC): Primary | ICD-10-CM

## 2020-06-03 DIAGNOSIS — E03.9 ACQUIRED HYPOTHYROIDISM: ICD-10-CM

## 2020-06-03 PROCEDURE — 99214 OFFICE O/P EST MOD 30 MIN: CPT | Performed by: PHYSICIAN ASSISTANT

## 2020-06-09 DIAGNOSIS — E78.2 MIXED HYPERLIPIDEMIA: ICD-10-CM

## 2020-06-09 RX ORDER — CHLORAL HYDRATE 500 MG
CAPSULE ORAL
Qty: 180 CAPSULE | Refills: 1 | Status: SHIPPED | OUTPATIENT
Start: 2020-06-09 | End: 2020-12-11

## 2020-06-18 DIAGNOSIS — E03.9 HYPOTHYROIDISM, UNSPECIFIED TYPE: ICD-10-CM

## 2020-06-18 RX ORDER — LEVOTHYROXINE SODIUM 0.07 MG/1
TABLET ORAL
Qty: 90 TABLET | Refills: 1 | OUTPATIENT
Start: 2020-06-18

## 2020-06-19 RX ORDER — LEVOTHYROXINE SODIUM 0.07 MG/1
75 TABLET ORAL DAILY
Qty: 30 TABLET | Refills: 1 | Status: SHIPPED | OUTPATIENT
Start: 2020-06-19 | End: 2020-07-14 | Stop reason: SDUPTHER

## 2020-06-26 ENCOUNTER — TRANSCRIBE ORDERS (OUTPATIENT)
Dept: FAMILY MEDICINE CLINIC | Facility: CLINIC | Age: 60
End: 2020-06-26

## 2020-06-26 DIAGNOSIS — Z98.1 ARTHRODESIS STATUS: ICD-10-CM

## 2020-06-26 DIAGNOSIS — C72.0 MALIGNANT NEOPLASM OF SPINAL CORD (HCC): Primary | ICD-10-CM

## 2020-06-26 DIAGNOSIS — G95.89 OTHER SPECIFIED DISEASES OF SPINAL CORD (HCC): ICD-10-CM

## 2020-07-01 ENCOUNTER — OFFICE VISIT (OUTPATIENT)
Dept: FAMILY MEDICINE CLINIC | Facility: CLINIC | Age: 60
End: 2020-07-01
Payer: COMMERCIAL

## 2020-07-01 VITALS
TEMPERATURE: 96.4 F | HEART RATE: 70 BPM | DIASTOLIC BLOOD PRESSURE: 58 MMHG | RESPIRATION RATE: 16 BRPM | BODY MASS INDEX: 26.59 KG/M2 | SYSTOLIC BLOOD PRESSURE: 100 MMHG | WEIGHT: 169.4 LBS | HEIGHT: 67 IN

## 2020-07-01 DIAGNOSIS — R17 ELEVATED BILIRUBIN: Primary | ICD-10-CM

## 2020-07-01 PROBLEM — E78.5 HYPERLIPIDEMIA ASSOCIATED WITH TYPE 2 DIABETES MELLITUS (HCC): Status: RESOLVED | Noted: 2020-01-03 | Resolved: 2020-07-01

## 2020-07-01 PROBLEM — E11.69 HYPERLIPIDEMIA ASSOCIATED WITH TYPE 2 DIABETES MELLITUS (HCC): Status: RESOLVED | Noted: 2020-01-03 | Resolved: 2020-07-01

## 2020-07-01 PROBLEM — E78.1 ESSENTIAL HYPERTRIGLYCERIDEMIA: Status: ACTIVE | Noted: 2020-07-01

## 2020-07-01 PROBLEM — F52.21 ERECTILE DYSFUNCTION OF NON-ORGANIC ORIGIN: Status: ACTIVE | Noted: 2020-07-01

## 2020-07-01 PROCEDURE — 2022F DILAT RTA XM EVC RTNOPTHY: CPT | Performed by: FAMILY MEDICINE

## 2020-07-01 PROCEDURE — 3008F BODY MASS INDEX DOCD: CPT | Performed by: FAMILY MEDICINE

## 2020-07-01 PROCEDURE — 99213 OFFICE O/P EST LOW 20 MIN: CPT | Performed by: FAMILY MEDICINE

## 2020-07-01 PROCEDURE — 3066F NEPHROPATHY DOC TX: CPT | Performed by: FAMILY MEDICINE

## 2020-07-01 PROCEDURE — 1036F TOBACCO NON-USER: CPT | Performed by: FAMILY MEDICINE

## 2020-07-01 PROCEDURE — 3044F HG A1C LEVEL LT 7.0%: CPT | Performed by: FAMILY MEDICINE

## 2020-07-01 NOTE — PROGRESS NOTES
Assessment and Plan:    Problem List Items Addressed This Visit     None                 Diagnoses and all orders for this visit:    Elevated bilirubin  -     Hepatic function panel; Future  -     Gamma GT; Future              Subjective:      Patient ID: Francisca Phillip is a 63 y o  male  CC:    Chief Complaint   Patient presents with    Follow-up     Patient is here on a 4 month follow  HPI:    F/u lipids, Gerd and chronic pain-doing well on meds, review of lab  Shows gradual elevation of bilirubin over last 4 years, ? Due to lipitor      The following portions of the patient's history were reviewed and updated as appropriate: allergies, current medications, past family history, past medical history, past social history, past surgical history and problem list       Review of Systems   Constitutional: Positive for unexpected weight change  9 lb loss   Respiratory: Negative for cough and shortness of breath  Cardiovascular: Negative for chest pain and leg swelling  Gastrointestinal: Positive for diarrhea  Negative for abdominal pain  Had diarrhea/ fecal urgency due to coffee, stopped coffee and better now   Neurological: Negative for dizziness and headaches  Data to review:       Objective:    Vitals:    07/01/20 0951   BP: 100/58   BP Location: Left arm   Patient Position: Sitting   Cuff Size: Standard   Pulse: 70   Resp: 16   Temp: (!) 96 4 °F (35 8 °C)   Weight: 76 8 kg (169 lb 6 4 oz)   Height: 5' 7" (1 702 m)        Physical Exam   Constitutional: He appears well-developed and well-nourished  Neck: No thyromegaly present  Cardiovascular: Normal rate, regular rhythm, normal heart sounds and intact distal pulses  Pulmonary/Chest: Effort normal and breath sounds normal    Abdominal: Soft  Bowel sounds are normal    Lymphadenopathy:     He has no cervical adenopathy  Vitals reviewed

## 2020-07-01 NOTE — ASSESSMENT & PLAN NOTE
Gradual trend in elevation of bilirubin, change lipitor to every other day, recheck lfts end of July

## 2020-07-10 ENCOUNTER — TRANSCRIBE ORDERS (OUTPATIENT)
Dept: FAMILY MEDICINE CLINIC | Facility: CLINIC | Age: 60
End: 2020-07-10

## 2020-07-10 DIAGNOSIS — C72.0 MALIGNANT NEOPLASM OF SPINAL CORD (HCC): Primary | ICD-10-CM

## 2020-07-10 DIAGNOSIS — C72.0 MALIGNANT NEOPLASM OF SPINAL CORD (HCC): ICD-10-CM

## 2020-07-10 DIAGNOSIS — D49.2 NEOPLASM OF UNSPECIFIED BEHAVIOR OF BONE, SOFT TISSUE, AND SKIN: Primary | ICD-10-CM

## 2020-07-13 NOTE — PATIENT INSTRUCTIONS
PRESCRIPTION REFILL REMINDER:  All medication refills should be requested prior to [facility] on Friday  Any refill requests after noon on Friday would be addressed the following Monday  Please protect yourself from the novel Coronavirus (COVID-19)! Even though we STILL do not have a vaccine or good antiviral drugs for this infection, the following strategies can help you stay healthy:    = Wash your hands with soap and water, or hand  with at least 60% alcohol, often  = Avoid touching your face!   = Avoid close contact with others, even if they seem healthy  Avoid gatherings of more than 10 people, and don't travel unnecessarily  = Stay home, except to get medical care or other essentials  If you can eat and drink and breathe and sleep, please consider calling your doctor's office instead of visiting in person, even if you are ill   = Cover your cough with a tissue, or your elbow  = Clean frequently touched surfaces and objects daily (e g , tables, countertops, light switches, doorknobs, and cabinet handles)  Regular household detergent and water are sufficient  Numbers of cases of Coronavirus are spiking in many [place] States  This is not a more dangerous virus, but a sign that more people in a community are spreading the virus  Please check the local disease reports near you if you consider travelling this summer  We do NOT advise travel to any community or State with a rising viral caseload  Check out [employer] for [place] data that are updated daily  http://www Owlr/   Global Epidemics  Org, from [facility] (OUTPATIENT C[facility]), will give you Htqiec-iw-Nswnxl information on virus cases  Https://globalepidemics  org/

## 2020-07-14 DIAGNOSIS — E03.9 HYPOTHYROIDISM, UNSPECIFIED TYPE: ICD-10-CM

## 2020-07-14 RX ORDER — LEVOTHYROXINE SODIUM 0.07 MG/1
TABLET ORAL
Qty: 30 TABLET | Refills: 1 | OUTPATIENT
Start: 2020-07-14

## 2020-07-14 RX ORDER — LEVOTHYROXINE SODIUM 0.07 MG/1
75 TABLET ORAL DAILY
Qty: 30 TABLET | Refills: 3 | Status: SHIPPED | OUTPATIENT
Start: 2020-07-14 | End: 2020-10-08

## 2020-07-15 ENCOUNTER — HOSPITAL ENCOUNTER (OUTPATIENT)
Dept: RADIOLOGY | Facility: HOSPITAL | Age: 60
Discharge: HOME | End: 2020-07-15
Attending: NEUROLOGICAL SURGERY
Payer: COMMERCIAL

## 2020-07-15 DIAGNOSIS — C72.0 MALIGNANT NEOPLASM OF SPINAL CORD (HCC): ICD-10-CM

## 2020-07-15 PROCEDURE — 72156 MRI NECK SPINE W/O & W/DYE: CPT

## 2020-07-15 PROCEDURE — A9585 GADOBUTROL INJECTION: HCPCS | Performed by: NEUROLOGICAL SURGERY

## 2020-07-15 RX ADMIN — GADOBUTROL 7 ML: 604.72 INJECTION INTRAVENOUS at 09:58

## 2020-07-16 ENCOUNTER — OFFICE VISIT (OUTPATIENT)
Dept: PALLIATIVE MEDICINE | Facility: CLINIC | Age: 60
End: 2020-07-16
Payer: COMMERCIAL

## 2020-07-16 VITALS
OXYGEN SATURATION: 97 % | TEMPERATURE: 96.6 F | RESPIRATION RATE: 20 BRPM | DIASTOLIC BLOOD PRESSURE: 63 MMHG | SYSTOLIC BLOOD PRESSURE: 103 MMHG | WEIGHT: 171 LBS | BODY MASS INDEX: 26.78 KG/M2 | HEART RATE: 75 BPM

## 2020-07-16 DIAGNOSIS — F32.A DEPRESSION, UNSPECIFIED DEPRESSION TYPE: ICD-10-CM

## 2020-07-16 DIAGNOSIS — G62.9 NEUROPATHY: ICD-10-CM

## 2020-07-16 PROCEDURE — 3066F NEPHROPATHY DOC TX: CPT | Performed by: FAMILY MEDICINE

## 2020-07-16 PROCEDURE — 3044F HG A1C LEVEL LT 7.0%: CPT | Performed by: FAMILY MEDICINE

## 2020-07-16 PROCEDURE — 99213 OFFICE O/P EST LOW 20 MIN: CPT | Performed by: FAMILY MEDICINE

## 2020-07-16 PROCEDURE — 2022F DILAT RTA XM EVC RTNOPTHY: CPT | Performed by: FAMILY MEDICINE

## 2020-07-16 PROCEDURE — 1036F TOBACCO NON-USER: CPT | Performed by: FAMILY MEDICINE

## 2020-07-16 RX ORDER — DULOXETIN HYDROCHLORIDE 60 MG/1
60 CAPSULE, DELAYED RELEASE ORAL DAILY
Qty: 90 CAPSULE | Refills: 1 | Status: SHIPPED | OUTPATIENT
Start: 2020-07-16 | End: 2020-12-13

## 2020-07-16 NOTE — PROGRESS NOTES
Outpatient Follow-Up - Palliative and Supportive Care   Jina Cruz III 63 y o  male [id number]    Assessment & Plan  1  Depression, unspecified depression type    2  Neuropathy      Medications adjusted this encounter:  Requested Prescriptions     Signed Prescriptions Disp Refills    DULoxetine (CYMBALTA) 60 mg delayed release capsule 90 capsule 1     Sig: Take 1 capsule (60 mg total) by mouth daily     No orders of the defined types were placed in this encounter  Medications Discontinued During This Encounter   Medication Reason    DULoxetine (CYMBALTA) 60 mg delayed release capsule Reorder        Mr Maurice Clay was seen today for symptoms and planning cares related to above illnesses  I have reviewed the patient's controlled substance dispensing history in the Prescription Drug Monitoring Program in compliance with the [facility] regulations before prescribing any controlled substances  He is invited to continue to follow with us  If there are questions or concerns, please contact us through our clinic/answering service 24 hours a day, seven days a week  Christopher Goncalves MD  [facility]  [phone]      Visit Information    Accompanied By: No one    Source of History: Self    History Limitations: None    Contacts: Follow up visit for:  symptom management    History of Present Illness     This ffellow 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  He is doing well  He is finding his pains well controlled with duloxetine and gabapentin, and he is not using opioids  He is [place] his knee and foot, and is hopeful to return to athletics, if only recreationally  He is due to f/up with Dr Gilbert Gates for surveillance this month  We discussed that -- if there are no changes nor new challenges -- he may return to usual cares with his PCP, who should be able to provide gabapentin and duloxetine        He may follow with us PRN, though we hope he continues to be too well to require our assist       Past medical, surgical, social, and family histories are reviewed and pertinent updates are made  Review of Systems   Constitution: Negative for decreased appetite, weight gain and weight loss  HENT: Negative for hoarse voice and nosebleeds  Eyes: Negative for vision loss in left eye and vision loss in right eye  Cardiovascular: Negative for chest pain and dyspnea on exertion  Respiratory: Negative for cough and shortness of breath  Endocrine: Negative for polydipsia, polyphagia and polyuria  Skin: Negative for flushing and itching  Musculoskeletal: Positive for joint pain, muscle cramps and muscle weakness  Negative for falls and myalgias  Gastrointestinal: Negative for anorexia, jaundice, nausea and vomiting  Genitourinary: Negative for frequency  Neurological: Negative for dizziness  Psychiatric/Behavioral: Negative for depression and memory loss  The patient is not nervous/anxious  Vital Signs    /63 (BP Location: Left arm, Patient Position: Sitting, Cuff Size: Standard)   Pulse 75   Temp (!) 96 6 °F (35 9 °C) (Tympanic)   Resp 20   Wt 77 6 kg (171 lb)   SpO2 97%   BMI 26 78 kg/m²     Physical Exam and Objective Data  Physical Exam   Constitutional: He is oriented to person, place, and time  He appears well-developed and well-nourished  No distress  HENT:   Head: Normocephalic and atraumatic  Right Ear: External ear normal    Left Ear: External ear normal    Eyes: Pupils are equal, round, and reactive to light  Conjunctivae and EOM are normal  Right eye exhibits no discharge  Left eye exhibits no discharge  Neck: No tracheal deviation present  Pulmonary/Chest: Effort normal  No stridor  No respiratory distress  Abdominal: Soft  Musculoskeletal: He exhibits deformity (R foot orthosis in place, alleviating plantarflexion spasm)  Neurological: He is alert and oriented to person, place, and time  No cranial nerve deficit  Skin: No rash noted  He is not diaphoretic  No erythema  Psychiatric: Judgment and thought content normal          Radiology and Laboratory:  I personally reviewed and interpreted the following results: none new    15+ minutes was spent face to face with Quang Hinojosa III with greater than 50% of the time spent in counseling or coordination of care including discussions of pathogenesis of diagnosis, diagnostic results, impression, and recommendations, treatment instructions, follow up requirements and risk factors and risk reduction of disease   All of the patient's questions were answered during this discussion

## 2020-07-16 NOTE — Clinical Note
FYI, teammates, this fellow might have graduated from our care  Will appreciate Dr Andrés Wade advice and plans

## 2020-07-21 ENCOUNTER — TELEPHONE (OUTPATIENT)
Dept: NEUROSURGERY | Facility: CLINIC | Age: 60
End: 2020-07-21

## 2020-07-22 ENCOUNTER — TELEPHONE (OUTPATIENT)
Dept: NEUROSURGERY | Facility: CLINIC | Age: 60
End: 2020-07-22

## 2020-07-22 ENCOUNTER — OFFICE VISIT (OUTPATIENT)
Dept: NEUROSURGERY | Facility: CLINIC | Age: 60
End: 2020-07-22
Payer: COMMERCIAL

## 2020-07-22 ENCOUNTER — DOCUMENTATION (OUTPATIENT)
Dept: NEUROSURGERY | Facility: CLINIC | Age: 60
End: 2020-07-22

## 2020-07-22 VITALS
BODY MASS INDEX: 26.59 KG/M2 | DIASTOLIC BLOOD PRESSURE: 65 MMHG | TEMPERATURE: 98.7 F | HEART RATE: 77 BPM | SYSTOLIC BLOOD PRESSURE: 111 MMHG | HEIGHT: 67 IN | WEIGHT: 169.4 LBS | RESPIRATION RATE: 16 BRPM

## 2020-07-22 DIAGNOSIS — C72.0 SPINAL CORD EPENDYMOMA (HCC): Primary | ICD-10-CM

## 2020-07-22 DIAGNOSIS — Z98.1 H/O SPINAL FUSION: ICD-10-CM

## 2020-07-22 PROCEDURE — 1036F TOBACCO NON-USER: CPT | Performed by: NEUROLOGICAL SURGERY

## 2020-07-22 PROCEDURE — 3008F BODY MASS INDEX DOCD: CPT | Performed by: NEUROLOGICAL SURGERY

## 2020-07-22 PROCEDURE — 2022F DILAT RTA XM EVC RTNOPTHY: CPT | Performed by: NEUROLOGICAL SURGERY

## 2020-07-22 PROCEDURE — 3066F NEPHROPATHY DOC TX: CPT | Performed by: NEUROLOGICAL SURGERY

## 2020-07-22 PROCEDURE — 3044F HG A1C LEVEL LT 7.0%: CPT | Performed by: NEUROLOGICAL SURGERY

## 2020-07-22 PROCEDURE — 99214 OFFICE O/P EST MOD 30 MIN: CPT | Performed by: NEUROLOGICAL SURGERY

## 2020-07-22 NOTE — TELEPHONE ENCOUNTER
Called and left a message for PT to see if patient requires another script since [facility] had [facility]  Dr Papa Ritter will gladly give another referral if required

## 2020-07-22 NOTE — PROGRESS NOTES
Patient presented to [facility] appointment today  He requested for last office visit notes with Dr Rose Earing to be faxed to [facility] at [phone]  His claim number is [id number]  He denies any other needs at this time  He reports he is doing well  He is aware to contact this RN with questions or concerns

## 2020-07-22 NOTE — PROGRESS NOTES
Neurosurgery Office Note  Ashely Matamoros III 63 y o  male MRN: [id number]      Assessment/Plan      Diagnoses and all orders for this visit:    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[facility])  -     MRI cervical spine with and without contrast; Future    H/O spinal fusion          Discussion:    60year old male now 18 months s/p resection, decompression, fusion for his intramedullary spinal cord ependymoma, WHO grade 2  (1/24/19)  The mass spanned from the C4-5 disc space down to the T3-4 disc space  We were able to resect this aggressively at its largest part, I e  T1-2, but did not proceed further as his neurological signals declined intraoperatively  He did well postoperatively, and was discharged to [facility]  MRI scan of brain, L-spine, and T-spine   Done after surgery showed no drop metastases  CSF testing was negative  At f/u in early 3/2019, was continuing to improve  He was using no ambulatory aids  His right DF was at least 4/5, but apractic, however this was also improving  He stated he even had some proprioception on the right, which was improved vs  Prior to surgery  He had some mild tingling in his forearms and hands, but no pain or weakness  His incision was healing well  Bowel and bladder function were back to normal  No radicular pains  Xrays were stable, and we planned for adjuvant IMRT  He completed this 5/22/19, to 45 Gy    Starting a few days after his RT, his neurological symptoms began to re-emerge  His right foot drop became more pronounced, and he was unsteady on his feet had a few falls  His balance was unsteady  His occasional radicular pain into the right leg 2-3/10, as well as left arm and right hand pains, pins and needles, 5/10  He was not taking gabapentin or dexamethasone  Seen with Dr Amanda Kim at that time and we 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, and recommended continued PT as well as restarting gabapentin  He is presently on 300 mg TID, and doing well symptom-wise   Did not mention pins & needles today - and states sensation in his right proximal leg is improving  He continues to use a RLE brace and a standard cane  He denies neck/back pain  No hardware failure/mechanical pain  MRI scan CSpine stable: residual tumor the superior and inferior caps, specifically from the C4-5 disc space down to the resection cavity, and then again resuming at the inferior edge of the cavity down to the T3-4 disc space  The large cavity present preop has not reformed, there are no obvious sign of recurrence etc      He stopped PT when COVID emerged, but would like to return  I am happy to provide a referral to PT if needed  We discussed the NCCN guideline recommended   Follow-up  Recommendation is for follow-up MRI scan cervical spine in 6 months, which I have ordered  I will see him after that is completed  07/22/20 Metrics: [id number] [id number]; VAS 65; MJOA 10/17        CHIEF COMPLAINT    Chief Complaint   Patient presents with    Follow-up     6 MONTH F/U MRI CSPINE 7/15       HISTORY    History of Present Illness     63y o  year old male     HPI    See Discussion    REVIEW OF SYSTEMS    Review of Systems   Constitutional: Negative  HENT: Negative  Eyes: Negative  Respiratory: Negative  Cardiovascular: Positive for leg swelling (right leg but mild)  Gastrointestinal: Negative  Endocrine: Negative  Genitourinary: Negative  Musculoskeletal: Positive for gait problem (right foot drag/drop, unsteady balance, using standard cane, 1 fall since last visit, f/w PT)  Skin: Negative  Allergic/Immunologic: Positive for environmental allergies  Neurological: Positive for weakness (right leg), light-headedness (getting up from lying down), numbness (left arm, right elbow and hand occasional numbness/tingling, numbness in B/L fingers and right foot ) and headaches (occasional in a m , frontal, mild)  Hematological: Negative      All other systems reviewed and are negative  Meds/Allergies     Current Outpatient Medications   Medication Sig Dispense Refill    atorvastatin (LIPITOR) 40 mg tablet TAKE 1 TABLET BY MOUTH DAILY WITH DINNER 90 tablet 1    Dulaglutide (Trulicity) 1 5 BM/5 4CA SOPN Inject 0 5 mL (1 5 mg total) under the skin once a week 4 pen 5    DULoxetine (CYMBALTA) 60 mg delayed release capsule Take 1 capsule (60 mg total) by mouth daily 90 capsule 1    gabapentin (NEURONTIN) 300 mg capsule Take 1 capsule (300 mg total) by mouth 3 (three) times a day 270 capsule 3    levothyroxine 75 mcg tablet Take 1 tablet (75 mcg total) by mouth daily 30 tablet 3    Omega-3 Fatty Acids (FISH OIL) 1,000 mg 2 po q day 180 capsule 1     No current facility-administered medications for this visit  Allergies   Allergen Reactions    Bee Venom Hives, Itching and Edema     Category: Allergy;     Penicillins Hives and Itching     Category:  Allergy;        PAST HISTORY    Past Medical History:   Diagnosis Date    BPH associated with nocturia     Cervical spinal mass ([facility] ) 2019    cervicothoracic    Disease of thyroid gland     Herniated disc, cervical     History of radiation therapy     Hyperlipidemia     Impaired fasting glucose     [facility] ) 2019       Past Surgical History:   Procedure Laterality Date    APPENDECTOMY      CERVICAL FUSION      COLONOSCOPY  2012    NORMAL; RECHECK IN 5 YEARS    FL LUMBAR PUNCTURE DIAGNOSTIC  2019    [place] BX/EXCIS SPIN PATEL,INDUR,INMED,CERV N/A 2019    Procedure: Posterior C4-T3 laminectomy; resection of intramedullary mass C5-T3, including fenestration of syrinx C7-T2; C3-T4 fixation/fusion; additional levels as needed;  Surgeon: Fawad Morgan MD;  Location: [facility];  Service: Neurosurgery    WISDOM TOOTH EXTRACTION         Social History     Tobacco Use    Smoking status: Former Smoker     Last attempt to quit: 1993     Years since quittin 2    Smokeless tobacco: Never Used   Substance Use Topics    Alcohol use: Yes     Comment: rare social    Drug use: No       Family History   Problem Relation Age of Onset    Diabetes Mother     Hypertension Father     Cerebral palsy Sister     Breast cancer Daughter          The following portions of the patient's history were reviewed in this encounter and updated as appropriate: Past medical, surgical, family, and social history, as well as medications, allergies, and review of systems  EXAM    Vitals:Blood pressure 111/65, pulse 77, temperature 98 7 °F (37 1 °C), temperature source Probe, resp  rate 16, height 5' 7" (1 702 m), weight 76 8 kg (169 lb 6 4 oz)  ,Body mass index is 26 53 kg/m²  Physical Exam   Constitutional: He is oriented to person, place, and time  He appears well-developed  HENT:   Head: Normocephalic and atraumatic  Eyes: No scleral icterus  Neck: Neck supple  Cardiovascular: Normal rate  Pulmonary/Chest: Effort normal    Abdominal: Normal appearance  Neurological: He is alert and oriented to person, place, and time  No sensory deficit  Skin: Skin is warm and dry  Psychiatric: He has a normal mood and affect  His speech is normal and behavior is normal    Vitals reviewed  Neurologic Exam     Mental Status   Oriented to person, place, and time  Attention: normal    Speech: speech is normal   Level of consciousness: alert    Cranial Nerves     CN VII   Facial expression full, symmetric  Motor Exam   Muscle bulk: normal  Overall muscle tone: normal  Right MOFO brace     Gait, Coordination, and Reflexes     Tremor   Resting tremor: absent  Intention tremor: absent  Action tremor: absent  Standard cane         MEDICAL DECISION MAKING    Imaging Studies:     Mri Cervical Spine With And Without Contrast    Result Date: 7/15/2020  Narrative: MRI CERVICAL SPINE WITH AND WITHOUT CONTRAST INDICATION: C72 0: Malignant neoplasm of spinal cord     Status post ependymoma resection  Follow-up evaluation  COMPARISON:  1/15/2020; 3/1/2019 TECHNIQUE:  Sagittal T1, sagittal T2, sagittal inversion recovery, axial 2D merge and axial T2  Sagittal T1 and axial T1 postcontrast   IV Contrast:  7 mL of gadobutrol injection (MULTI-DOSE) IMAGE QUALITY:  Diagnostic  FINDINGS: ALIGNMENT:  Mild straightening of the cervical lordosis is stable  No subluxation  MARROW SIGNAL:  Extensive, diffusely contiguous T1 and T2 hyperintense marrow extending from C3 to T5 suggesting fatty marrow conversion likely related to post radiation changes  Extensive posterior decompressive laminectomies C4-T2 with extensive posterior spinal fusion C2 to at least T5, stable  Artifact from spinal hardware slightly limits evaluation  CERVICAL AND VISUALIZED UPPER THORACIC CORD:  Severe myelomalacia/ volume loss at the cervicothoracic junction redemonstrated in the region of prior ependymoma resection  A thin tendril of residual spinal cord tissue connects the somewhat gracile cord at the C7 level to the upper thoracic CORD at the inferior endplate of T2, stable in appearance from the prior study  There is no abnormal enhancement to confirm recurrent or residual tumor  PREVERTEBRAL AND PARASPINAL SOFT TISSUES:  Postoperative changes in the posterior spinal soft tissue stable  VISUALIZED POSTERIOR FOSSA:  The visualized posterior fossa demonstrates no abnormal signal  CERVICAL DISC SPACES:  Minimal spondylotic changes are stable  No large disc herniation  UPPER THORACIC DISC SPACES:  Normal  POSTCONTRAST IMAGING:  Normal      Impression: 1  Stable treatment related changes status post ependymoma resection at the cervicothoracic junction with severe myelomalacia of the local spinal cord in the operative bed /treatment cavity  No evidence of recurrent or residual tumor currently  Continued clinical and imaging surveillance recommended   2   Minimal spondylotic changes are stable without large disc herniation  Workstation performed: [id number]       I have personally reviewed pertinent reports     and I have personally reviewed pertinent films in PACS

## 2020-07-27 DIAGNOSIS — G95.89 RADIATION-INDUCED MYELOPATHY (HCC): ICD-10-CM

## 2020-07-27 DIAGNOSIS — C72.0 SPINAL CORD EPENDYMOMA (HCC): Primary | ICD-10-CM

## 2020-07-27 DIAGNOSIS — C72.0 MALIGNANT NEOPLASM OF SPINAL CORD (HCC): ICD-10-CM

## 2020-07-27 DIAGNOSIS — Z98.1 H/O SPINAL FUSION: ICD-10-CM

## 2020-07-27 NOTE — PROGRESS NOTES
Received a call from physical therapy requesting for a new script  Per notes in Epic, it is okay to place new PT script

## 2020-07-28 ENCOUNTER — APPOINTMENT (OUTPATIENT)
Dept: PHYSICAL THERAPY | Age: 60
End: 2020-07-28
Payer: COMMERCIAL

## 2020-07-30 NOTE — PROGRESS NOTES
Consultation - Geriatrics   Richmond Just 79 y o  female MRN: [id number]  Unit/Bed#: ED 26 Encounter: [id number]      Assessment/Plan  1  Ambulatory dysfunction  Fall precautions  PT/OT  Rehab post hospitalization    2  Acute pain due to trauma  Geriatric pain protocol  Acute pain service consulted  Scheduled acetaminophen  lidoderm patch  Oxycodone 2 5 mg p o  Q 4 hours p r n  Moderate pain  Oxycodone 5 mg p o  Q 4 hours p r n  Severe pain    3  Insomnia  Chronic  Patient takes trazodone 50 mg p o  Q h s  Recommend continue trazodone  Monitor QTC  Monitor sodium 137    4  Overactive bladder  Urinary incontinence  Pt wears pads at baseline  Monitor for urinary retention  Limit caffiene intake  Avoid anticholinergics, opoids, sedatives, benzodiazepines  Avoid extremes of fluid intake (< 32 ounces or >64 ounces)  Encourage scheduled tolieting q2 hours  May benefit from bladder training  Monitor for constipation    5  Frailty  Moderate  Risk factors:  Hospitalization, ambulatory dysfunction, urinary incontinence, polypharmacy  15 lb weight loss  PT/OT  Rehab post hospitalization    6  Normal age-related cognitive decline  Occasional forgetfulness  Denies memory issues  Recommend check TSH and vitamin B12  Continue supportive care    7  Delirium precautions  Alert and orient x3  Provide frequent redirection, reorientation, distraction techniques  Avoid deliriogenic medications such as tramadol, benzodiazepines, anticholinergics,  Benadryl  Treat pain, See geriatric pain protocol  Monitor for constipation and urinary retention  Encourage early and frequent moblization, OOB  Encourage Hydration/ Nutrition  Implement sleep hygiene, limit night time interuptions, group activities    8  Rib fractures  Rib fracture protocol  Acute pain service on consult  Incentive  Spirometry  PT/OT  Trauma following    9  Compression fracture of T12 vertebrae  Trauma following  PT/OT    10   Home Medication Review  Reviewed with Daily Note     Today's date: 2020  Patient name: Dylon Peck  : 1960  MRN: [id number]  Referring provider: Gela Parkinson MD  Dx:   Encounter Diagnosis     ICD-10-CM    1  Malignant neoplasm of spinal cord ([facility] ) C72 0    2  Right leg pain M79 604    3  General weakness R53 1    4  Spinal cord abscess G06 1    5  DDD (degenerative disc disease), lumbar M51 36                   Subjective:  Pt reports he feels better, less pain since beginning mechanical traction for LB, pt reports fatigue at end of session      Objective: See treatment diary below      Assessment:  Pt completed therapy with mod fatigue, occasional LOB noted with increased fatigue during dynamic balance ex      Plan: Continue per plan of care  Progress treatment as tolerated         Precautions: Cervical tumor    Daily Treatment Diary     Manual  12/3 12/5/19 12/10 12/17       PNF R LE           PNF R pelvis in L SL           CPA JF JF JF        PPU OP JF JF         Lumbar gapping                  Exercise Diary  20   RB 10' Upright 10' 10' 5' 5' 5'  Upright 10'   SL clamshells 3x10  3x10 3x10       Biodex           STS 3x10 3x10 3x10 3x10       Step-ups           Backward amb           Tandem walk           Bridges with arms crossed    On PB 10x(neck pain)       R SLR 3x10  3x10 3x10       PPT           Standing abd 3x10 3x10 3x10 3x10       CLARITA           PPU           Obstacle course + cones     Step over BOSU 2x10      Low row TB  NV   (sit on PB) Rows gtb 3x10        Mini squats 3x10 3x10 3x10        Sidestepping with ball toss   2 laps        Sciatic nerve glides           Dynamic balance: standing ball toss on rebounder  3x10 STAND ON FOAM 2x15 grn ball Stand on foam 2x15 grn ball       Dynamic balance: ball toss with lunge B/L  10x ea 5x each grn ball 10x ea grn ball       trazer  LAS 90       12 targets SAS 20  20 targets  SAS 20 targets 1'57"   trazer MARY ALICE 90 18 targets 19 targets 23 targets 1'59" 25 targets2'8"   UBE     5 min 5' 10' standing 10'   trazer squats     20x 20x 20x 25x       Modalities  1/2/20 1/10/20 1/16/20 1/21/20 1/23/20 1/28/20 1/30/20 2/6/20 2/18/20 2/20/20   NMES with DF             Mechanical tractoin VK VK VK VK VK VK VK VK VK  To finish VK daughter and patient prescription bottles brought to the hospital  Neurontin 300 mg po TID- patient takes Neurontin 300 mg p o  B i d   Norvasc 5 mg p o  Daily  Trazodone 50 mg p o  Q h s   Metoprolol 100 ER daily  Atorvastatin 20 mg p o  Q h s  Celebrex 250 mg p o  Daily  Citrical  2 daily  Biotin 2500 mcg daily  MVI daily        History of Present Illness   Physician Requesting Consult: Marlon Moore MD  Reason for Consult / Principal Problem: fall  Hx and PE limited by: NA  HPI: Myla Powers is a 79y o  year old female who presents with fall  She was visiting her daughter for the summer  She needed to use the bathroom which was down the steps  She usually walks with a cane but did not use it  She slipped on the steps and fell hit her back  She has hypertension, hyperlipidemia, chronic pain lower leg secondary to  history of bilateral hip arthroplasties  Prior to arrival pt lives in [place] with her son    She is primarily home bound  Her son takes care of IADLs  She needs assistance with ADLs  She has a home health aide that comes twice a week to assist with bathing  She ambulates with a cane  She denies history of previous falls  She wears glasses for reading  She denies issues with hearing  She states she has good dentition  She last 15 lb in the past year  She states it was intentional   She has urinary incontinence  She denies issues with her bowels  She has occasional issues with sleep secondary to pain for which she uses tramadol  Upon exam patient is lying in bed  She is alert and orient x3  Her daughter is at her bedside to review HPI      PCP- Leah Obrien [facility] [place]  [phone], office to fax progress note, HP and medication list,   Last seen in office in 10/29/2018    Inpatient consult to Gerontology  Consult performed by: SHIRLEY Hernandez  Consult ordered by: Charles Pollard MD          Review of Systems   Constitutional: Negative for unexpected weight change  HENT: Negative for dental problem and hearing loss  Eyes: Negative for visual disturbance  Respiratory: Negative for shortness of breath  Cardiovascular: Negative for chest pain  Gastrointestinal: Negative for constipation  Genitourinary:        Incontinence   Musculoskeletal: Positive for gait problem  Skin: Negative for color change  Neurological: Negative for dizziness  Psychiatric/Behavioral: Negative for sleep disturbance  Historical Information   History reviewed  No pertinent past medical history  Past Surgical History:   Procedure Laterality Date    JOINT REPLACEMENT       Social History   Social History     Substance and Sexual Activity   Alcohol Use Never    Frequency: Never     Social History     Substance and Sexual Activity   Drug Use Never     Social History     Tobacco Use   Smoking Status Never Smoker   Smokeless Tobacco Never Used         Family History:   Family History   Family history unknown: Yes       Meds/Allergies   Current meds:   Current Facility-Administered Medications   Medication Dose Route Frequency    acetaminophen (TYLENOL) tablet 650 mg  650 mg Oral Q6H PRN    amLODIPine (NORVASC) tablet 5 mg  5 mg Oral Daily    atorvastatin (LIPITOR) tablet 20 mg  20 mg Oral After Dinner    celecoxib (CeleBREX) capsule 200 mg  200 mg Oral BID    gabapentin (NEURONTIN) capsule 100 mg  100 mg Oral HS    HYDROmorphone (DILAUDID) injection 0 2 mg  0 2 mg Intravenous Q4H PRN    oxyCODONE (ROXICODONE) IR tablet 5 mg  5 mg Oral Q4H PRN    oxyCODONE (ROXICODONE) oral solution 2 5 mg  2 5 mg Oral Q4H PRN          No Known Allergies    Objective   Vitals: Blood pressure 139/74, pulse 76, temperature 97 7 °F (36 5 °C), temperature source Oral, resp  rate 16, height 5' 6" (1 676 m), weight 54 4 kg (120 lb), SpO2 97 %  ,Body mass index is 19 37 kg/m²  Physical Exam   Constitutional: She is oriented to person, place, and time   She appears well-developed and well-nourished  No distress  HENT:   Head: Normocephalic  Eyes: Right eye exhibits no discharge  Left eye exhibits no discharge  Neck: Normal range of motion  Cardiovascular: Normal rate and regular rhythm  Exam reveals no friction rub  No murmur heard  Pulmonary/Chest: Effort normal and breath sounds normal  No stridor  No respiratory distress  She has no wheezes  Abdominal: Soft  Bowel sounds are normal  She exhibits no distension  There is no tenderness  There is no guarding  Musculoskeletal: Normal range of motion  She exhibits no edema or deformity  Neurological: She is alert and oriented to person, place, and time  Skin: Skin is warm and dry  She is not diaphoretic  Psychiatric: She has a normal mood and affect  Nursing note and vitals reviewed  Lab Results:   Results from last 7 days   Lab Units 07/30/20  1001   WBC Thousand/uL 7 82   HEMOGLOBIN g/dL 10 8*   HEMATOCRIT % 32 4*   PLATELETS Thousands/uL 202        Results from last 7 days   Lab Units 07/30/20  0549   POTASSIUM mmol/L 4 4   CHLORIDE mmol/L 105   CO2 mmol/L 27   BUN mg/dL 13   CREATININE mg/dL 0 78   CALCIUM mg/dL 9 2       Imaging Studies: I have personally reviewed pertinent reports  EKG, Pathology, and Other Studies: I have personally reviewed pertinent reports      VTE Prophylaxis: Sequential compression device (Venodyne)     Code Status: Level 2 - DNAR: but accepts endotracheal intubation

## 2020-07-31 ENCOUNTER — EVALUATION (OUTPATIENT)
Dept: PHYSICAL THERAPY | Age: 60
End: 2020-07-31
Payer: COMMERCIAL

## 2020-07-31 ENCOUNTER — APPOINTMENT (OUTPATIENT)
Dept: LAB | Age: 60
End: 2020-07-31
Payer: COMMERCIAL

## 2020-07-31 DIAGNOSIS — C72.0 MALIGNANT NEOPLASM OF SPINAL CORD (HCC): ICD-10-CM

## 2020-07-31 DIAGNOSIS — R53.1 GENERALIZED WEAKNESS: Primary | ICD-10-CM

## 2020-07-31 DIAGNOSIS — G95.89 RADIATION-INDUCED MYELOPATHY (HCC): ICD-10-CM

## 2020-07-31 DIAGNOSIS — R17 ELEVATED BILIRUBIN: ICD-10-CM

## 2020-07-31 DIAGNOSIS — Z98.1 H/O SPINAL FUSION: ICD-10-CM

## 2020-07-31 DIAGNOSIS — C72.0 SPINAL CORD EPENDYMOMA (HCC): ICD-10-CM

## 2020-07-31 LAB
ALBUMIN SERPL BCP-MCNC: 3.3 G/DL (ref 3.5–5)
ALP SERPL-CCNC: 68 U/L (ref 46–116)
ALT SERPL W P-5'-P-CCNC: 20 U/L (ref 12–78)
ANION GAP SERPL CALCULATED.3IONS-SCNC: 8 MMOL/L (ref 4–13)
AST SERPL W P-5'-P-CCNC: 9 U/L (ref 5–45)
BILIRUB DIRECT SERPL-MCNC: 0.23 MG/DL (ref 0–0.2)
BILIRUB SERPL-MCNC: 0.99 MG/DL (ref 0.2–1)
BUN SERPL-MCNC: 18 MG/DL (ref 5–25)
CALCIUM SERPL-MCNC: 8.9 MG/DL (ref 8.3–10.1)
CHLORIDE SERPL-SCNC: 107 MMOL/L (ref 100–108)
CO2 SERPL-SCNC: 26 MMOL/L (ref 21–32)
CREAT SERPL-MCNC: 0.82 MG/DL (ref 0.6–1.3)
GFR SERPL CREATININE-BSD FRML MDRD: 95 ML/MIN/1.73SQ M
GGT SERPL-CCNC: 17 U/L (ref 5–85)
GLUCOSE SERPL-MCNC: 114 MG/DL (ref 65–140)
POTASSIUM SERPL-SCNC: 4.1 MMOL/L (ref 3.5–5.3)
PROT SERPL-MCNC: 7 G/DL (ref 6.4–8.2)
SODIUM SERPL-SCNC: 141 MMOL/L (ref 136–145)

## 2020-07-31 PROCEDURE — 80048 BASIC METABOLIC PNL TOTAL CA: CPT | Performed by: NURSE PRACTITIONER

## 2020-07-31 PROCEDURE — 80076 HEPATIC FUNCTION PANEL: CPT

## 2020-07-31 PROCEDURE — 36415 COLL VENOUS BLD VENIPUNCTURE: CPT | Performed by: NURSE PRACTITIONER

## 2020-07-31 PROCEDURE — 97110 THERAPEUTIC EXERCISES: CPT | Performed by: PHYSICAL THERAPIST

## 2020-07-31 PROCEDURE — 97162 PT EVAL MOD COMPLEX 30 MIN: CPT | Performed by: PHYSICAL THERAPIST

## 2020-07-31 PROCEDURE — 82977 ASSAY OF GGT: CPT

## 2020-07-31 NOTE — PROGRESS NOTES
PT Evaluation     Today's date: 2020  Patient name: Astrid Tierney  : 1960  MRN: [id number]  Referring provider: Alisia Irene MD  Dx:   Encounter Diagnosis     ICD-10-CM    1  Generalized weakness R53 1    2  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HCC) C72 0 Ambulatory referral to Physical Therapy   3  H/O spinal fusion Z98 1 Ambulatory referral to Physical Therapy   4  Malignant neoplasm of spinal cord (HCC) C72 0 Ambulatory referral to Physical Therapy   5  Radiation-induced myelopathy (HCC) G95 89 Ambulatory referral to Physical Therapy       Start Time: 1300  Stop Time: 1400  Total time in clinic (min): 60 minutes    Assessment  Assessment details: Pt is a 62 y/o male who presents with low back pain following CNS edema following tumor resection  No further referral is necessary at this time  Pt has a movement impairment diagnosis of generalized weakness following major CNS disorder causing pain, decreased strength, and ROM  Pt has  a positive prognosis due to progress from last PT episode  Performing neuro surgeon agrees patient should undergo PT at this time to achieve maximum benefits over the next year  Pt would benefit from PT to address these impairments leading to increased functional capacity and improved quality of life  Impairments: abnormal or restricted ROM, impaired physical strength, lacks appropriate home exercise program, pain with function, poor posture  and poor body mechanics  Understanding of Dx/Px/POC: good   Prognosis: good    Goals  Short Term Goals: to be achieved by 4 weeks  1) Patient to be independent with basic HEP  2) Decrease pain to 2/10 at its worst   3) Increase lumbar ROM to minimal deficit in all deficient planes  4) Increase LE strength by 1/2 MMT grade in all deficient planes      Long Term Goals: to be achieved by discharge  1) FOTO equal to or greater than 65   2) Ambulation to improve to maximal level of function  3) Stair negotiation will improve to reciprocal without UE support   4) Sit to stand transfers will improve to maximal level of function     Plan  Patient would benefit from: skilled physical therapy  Planned modality interventions: cryotherapy and thermotherapy: hydrocollator packs  Planned therapy interventions: neuromuscular re-education, patient education, stretching, strengthening, therapeutic activities, therapeutic exercise, therapeutic training, home exercise program and graded activity  Frequency: Twice a week for 10 weeks  Treatment plan discussed with: patient        Subjective Evaluation    History of Present Illness  Mechanism of injury: Since last episode of PT patient reports great improvements in functional mobility, but is still limited by weakness, deconditioning and balance deficits  Pt's main goal is to be able to improve ability to perform functional transfers and navigate uneven terrain without assistance,     Quality of life: good    Pain  Current pain ratin  At best pain ratin  At worst pain ratin  Quality: sharp and dull ache (LBP)  Aggravating factors: lifting, standing and walking    Hand dominance: right    Patient Goals  Patient goals for therapy: decreased pain, independence with ADLs/IADLs, return to sport/leisure activities, increased strength and increased motion          Objective     Concurrent Complaints  Negative for night pain, disturbed sleep, bladder dysfunction, bowel dysfunction, saddle (S4) numbness, cardiac problem, kidney problem, gallbladder problem, stomach problem, ulcer, appendix problem, spleen problem, pancreas problem, history of cancer, history of trauma and infection    Neurological Testing     Sensation     Lumbar   Left   Intact: light touch    Right   Intact: light touch  Diminished: light touch    Reflexes   Left   Patellar (L4): brisk (3+)  Achilles (S1): sustained clonus (5+)  Babinski sign: negative  Clonus sign: positive    Right   Patellar (L4): brisk (3+)  Achilles (S1): sustained clonus (5+)  Babinski sign: negative  Clonus sign: positive    Additional Neurological Details  3 beats of clonus, decreased from last episode  Pt has known PMH of neurlogical tumor  Active Range of Motion     Lumbar   Flexion:  with pain Restriction level: moderate  Extension:  Restriction level: moderate  Left lateral flexion:  Restriction level: moderate  Right lateral flexion:  Restriction level: moderate  Left rotation:  Restriction level: moderate  Right rotation:  Restriction level: moderate    Additional Active Range of Motion Details  Pt has pain with functional box lifts     Strength/Myotome Testing     Left Hip   Planes of Motion   Flexion: 4-  Extension: 4-  Abduction: 4-    Right Hip   Planes of Motion   Flexion: 3+  Extension: 4-  Abduction: 4-    Left Knee   Flexion: 4  Extension: 4    Right Knee   Flexion: 4  Extension: 4    Left Ankle/Foot   Dorsiflexion: 5    Right Ankle/Foot   Dorsiflexion: 3-    General Comments:      Lumbar Comments  MCTSIB:  EO Firm: 75%  EC Firm: 85%  EO Foam: 70%  EC foam: 73%    FGA: 24/30    5 STS: 16 seconds  Stair climbing: Reciprocal with unilateral hand rail    Leg press: Able to perform 10 reps at 95 before failure         Flowsheet Rows      Most Recent Value   PT/OT G-Codes   Current Score  58   Projected Score  64             Precautions: Cervical tumor, DM2, falls risk      Manuals                                                                 Neuro Re-Ed                                                                                                        Ther Ex 7/31            Multifdi contraction HEP            TA contract supine HEP                                                                                          Ther Activity                                       Gait Training                                       Modalities

## 2020-08-03 ENCOUNTER — TELEPHONE (OUTPATIENT)
Dept: ENDOCRINOLOGY | Facility: CLINIC | Age: 60
End: 2020-08-03

## 2020-08-03 ENCOUNTER — OFFICE VISIT (OUTPATIENT)
Dept: PHYSICAL THERAPY | Age: 60
End: 2020-08-03
Payer: COMMERCIAL

## 2020-08-03 DIAGNOSIS — C72.0 MALIGNANT NEOPLASM OF SPINAL CORD (HCC): ICD-10-CM

## 2020-08-03 DIAGNOSIS — G95.89 RADIATION-INDUCED MYELOPATHY (HCC): ICD-10-CM

## 2020-08-03 DIAGNOSIS — Z98.1 H/O SPINAL FUSION: ICD-10-CM

## 2020-08-03 DIAGNOSIS — R53.1 GENERALIZED WEAKNESS: Primary | ICD-10-CM

## 2020-08-03 DIAGNOSIS — C72.0 SPINAL CORD EPENDYMOMA (HCC): ICD-10-CM

## 2020-08-03 PROCEDURE — 97112 NEUROMUSCULAR REEDUCATION: CPT | Performed by: PHYSICAL THERAPIST

## 2020-08-03 PROCEDURE — 97110 THERAPEUTIC EXERCISES: CPT | Performed by: PHYSICAL THERAPIST

## 2020-08-03 NOTE — TELEPHONE ENCOUNTER
Sugar log reviewed  Blood sugars are in optimal range  Please inform patient to continue current regimen, Trulicity and metformin     Valente Tineo MD

## 2020-08-04 NOTE — PROGRESS NOTES
Daily Note     Today's date: 8/3/2020  Patient name: Edilson Zavaleta  : 1960  MRN: [id number]  Referring provider: Sushma Nagel MD  Dx:   Encounter Diagnosis     ICD-10-CM    1  Generalized weakness  R53 1    2  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HCC)  C72 0    3  H/O spinal fusion  Z98 1    4  Malignant neoplasm of spinal cord (HCC)  C72 0    5  Radiation-induced myelopathy ([facility] [address] )  G95 89        Start Time: 1630  Stop Time: 9828  Total time in clinic (min): 45 minutes    Subjective: Pt reports no new symptoms  Objective: See treatment diary below      Assessment: Tolerated treatment well  Patient demonstrated fatigue post treatment and would benefit from continued PT      Plan: Continue per plan of care        Precautions: Cervical tumor, DM2, falls risk      Manuals                                                                 Neuro Re-Ed 8/3            TA contraction 2x10x5            Multifidi contraction 2*10*5            Tandem stance foam 2*30" b/l            Step-ups 2*10                                                   Ther Ex 8/3            Nu step 10'            TM 5'            Leg press 3*10 95#                                                                             Ther Activity                                       Gait Training                                       Modalities

## 2020-08-07 ENCOUNTER — OFFICE VISIT (OUTPATIENT)
Dept: PHYSICAL THERAPY | Age: 60
End: 2020-08-07
Payer: COMMERCIAL

## 2020-08-07 DIAGNOSIS — G95.89 RADIATION-INDUCED MYELOPATHY (HCC): ICD-10-CM

## 2020-08-07 DIAGNOSIS — Z98.1 H/O SPINAL FUSION: ICD-10-CM

## 2020-08-07 DIAGNOSIS — C72.0 MALIGNANT NEOPLASM OF SPINAL CORD (HCC): ICD-10-CM

## 2020-08-07 DIAGNOSIS — R53.1 GENERALIZED WEAKNESS: Primary | ICD-10-CM

## 2020-08-07 DIAGNOSIS — C72.0 SPINAL CORD EPENDYMOMA (HCC): ICD-10-CM

## 2020-08-07 PROCEDURE — 97110 THERAPEUTIC EXERCISES: CPT | Performed by: PHYSICAL THERAPIST

## 2020-08-07 PROCEDURE — 97112 NEUROMUSCULAR REEDUCATION: CPT | Performed by: PHYSICAL THERAPIST

## 2020-08-07 NOTE — PROGRESS NOTES
Daily Note     Today's date: 2020  Patient name: Neftali Chawla  : 1960  MRN: [id number]  Referring provider: Leia Rivera MD  Dx:   Encounter Diagnosis     ICD-10-CM    1  Generalized weakness  R53 1    2  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HCC)  C72 0    3  H/O spinal fusion  Z98 1    4  Malignant neoplasm of spinal cord (HCC)  C72 0    5  Radiation-induced myelopathy ([facility] [address] )  G95 89        Start Time: 0800  Stop Time: 0845  Total time in clinic (min): 45 minutes    Subjective: Pt reports feeling tired this morning  Reports feeling more steady on his feet today  Objective: See treatment diary below    Assessment: Tolerated treatment well  Fatigue demonstrated with step up exercises and with stand hip extension with RLE  Recovery periods needed during session  Patient co-treated by Physical Therapy Student, Dylan Benitez, under my direct supervision  Plan: Continue per plan of care  Precautions: Cervical tumor, DM2, falls risk      Manuals                                                                 Neuro Re-Ed 8/3 8/7           TA contraction 2x10x5 2x10x5"           Multifidi contraction 2*10*5 S/l 2x10, 3" ea  Tandem stance foam 2*30" b/l 2*30" B/L           Step-ups 2*10 3x10 ea, 6"                                     Ther Ex 8/3 8/7           Nu step 10' 10'           TM 5' 5'           Leg press 3*10 95# 3x10 125#           Stand hip abd   3*10 YTB ea  Stand hip extn  3*10 YTB ea                                                    Ther Activity                                       Gait Training                                       Modalities

## 2020-08-10 ENCOUNTER — OFFICE VISIT (OUTPATIENT)
Dept: PHYSICAL THERAPY | Age: 60
End: 2020-08-10
Payer: COMMERCIAL

## 2020-08-10 DIAGNOSIS — C72.0 MALIGNANT NEOPLASM OF SPINAL CORD (HCC): ICD-10-CM

## 2020-08-10 DIAGNOSIS — R53.1 GENERALIZED WEAKNESS: Primary | ICD-10-CM

## 2020-08-10 DIAGNOSIS — C72.0 SPINAL CORD EPENDYMOMA (HCC): ICD-10-CM

## 2020-08-10 DIAGNOSIS — Z98.1 H/O SPINAL FUSION: ICD-10-CM

## 2020-08-10 DIAGNOSIS — G95.89 RADIATION-INDUCED MYELOPATHY (HCC): ICD-10-CM

## 2020-08-10 PROCEDURE — 97112 NEUROMUSCULAR REEDUCATION: CPT | Performed by: PHYSICAL THERAPIST

## 2020-08-10 PROCEDURE — 97110 THERAPEUTIC EXERCISES: CPT | Performed by: PHYSICAL THERAPIST

## 2020-08-10 NOTE — PROGRESS NOTES
Daily Note     Today's date: 8/10/2020  Patient name: Kvng Escobedo  : 1960  MRN: [id number]  Referring provider: Anabel Maldonado MD  Dx:   Encounter Diagnosis     ICD-10-CM    1  Generalized weakness  R53 1    2  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HCC)  C72 0    3  Malignant neoplasm of spinal cord (HCC)  C72 0    4  H/O spinal fusion  Z98 1    5  Radiation-induced myelopathy ([facility] [address] )  G95 89        Start Time: 6011  Stop Time: 1300  Total time in clinic (min): 45 minutes    Subjective: Pt reports ability to perform heavy outdoor activities but was extremal fatigued following them  Objective: See treatment diary below      Assessment: Tolerated treatment well  Pt's POC was progressed to include more dynamic balance to decrease risk of falls  Patient demonstrated fatigue post treatment and would benefit from continued PT      Plan: Continue per plan of care  Precautions: Cervical tumor, DM2, falls risk      Manuals                                                                 Neuro Re-Ed 8/3 8/7 810          TA contraction 2x10x5 2x10x5"           Multifidi contraction 2*10*5 S/l 2x10, 3" ea  Tandem stance foam 2*30" b/l 2*30" B/L 4 laps tandem walking          Step-ups 2*10 3x10 ea, 6" 3*10 6" ea          Biodex    LOS lv 3 4x                       Ther Ex 8/3 8/7 810          Nu step 10' 10' 10'          TM 5' 5' 5'          Leg press 3*10 95# 3x10 125# 3*10 125#          Stand hip abd   3*10 YTB ea  3*10 YTB ea  Stand hip extn  3*10 YTB ea  3*10 YTB ea                                                   Ther Activity                                       Gait Training                                       Modalities

## 2020-08-14 ENCOUNTER — OFFICE VISIT (OUTPATIENT)
Dept: PHYSICAL THERAPY | Age: 60
End: 2020-08-14
Payer: COMMERCIAL

## 2020-08-14 DIAGNOSIS — R53.1 GENERALIZED WEAKNESS: Primary | ICD-10-CM

## 2020-08-14 DIAGNOSIS — Z98.1 H/O SPINAL FUSION: ICD-10-CM

## 2020-08-14 DIAGNOSIS — C72.0 MALIGNANT NEOPLASM OF SPINAL CORD (HCC): ICD-10-CM

## 2020-08-14 DIAGNOSIS — C72.0 SPINAL CORD EPENDYMOMA (HCC): ICD-10-CM

## 2020-08-14 DIAGNOSIS — G95.89 RADIATION-INDUCED MYELOPATHY (HCC): ICD-10-CM

## 2020-08-14 PROCEDURE — 97112 NEUROMUSCULAR REEDUCATION: CPT | Performed by: PHYSICAL THERAPIST

## 2020-08-14 PROCEDURE — 97110 THERAPEUTIC EXERCISES: CPT | Performed by: PHYSICAL THERAPIST

## 2020-08-14 NOTE — PROGRESS NOTES
Daily Note     Today's date: 2020  Patient name: Francisca Phillip  : 1960  MRN: [id number]  Referring provider: Clifford Cortes MD  Dx:   Encounter Diagnosis     ICD-10-CM    1  Generalized weakness  R53 1    2  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HCC)  C72 0    3  Malignant neoplasm of spinal cord (HCC)  C72 0    4  H/O spinal fusion  Z98 1    5  Radiation-induced myelopathy ([facility] [address] )  G95 89        Start Time: 1090  Stop Time: 4400  Total time in clinic (min): 45 minutes    Subjective: Pt reports improvements ADL's  Objective: See treatment diary below      Assessment: Tolerated treatment well  Pt demonstrates improvements in balance demonstrating decreased risk of falls  Patient demonstrated fatigue post treatment and would benefit from continued PT      Plan: Continue per plan of care  Re-evaluate next visit, include more dynamic balance activites  Precautions: Cervical tumor, DM2, falls risk      Manuals                                                                 Neuro Re-Ed 8/3 8/7 8/14          TA contraction 2x10x5 2x10x5"           Multifidi contraction 2*10*5 S/l 2x10, 3" ea  Tandem stance foam 2*30" b/l 2*30" B/L 4 laps tandem walking          Step-ups 2*10 3x10 ea, 6" 3*10 6" ea          Biodex    LOS lv 3 4x 7 dynamic                       Ther Ex 8/3 8/7 8/14          Nu step 10' 10' 10'          TM 5' 5' 5'          Leg press 3*10 95# 3x10 125# 3*10 125#          Stand hip abd   3*10 YTB ea  3*10 YTB ea  Stand hip extn  3*10 YTB ea  3*10 YTB ea                                                   Ther Activity                                       Gait Training                                       Modalities

## 2020-08-17 ENCOUNTER — EVALUATION (OUTPATIENT)
Dept: PHYSICAL THERAPY | Age: 60
End: 2020-08-17
Payer: COMMERCIAL

## 2020-08-17 DIAGNOSIS — Z98.1 H/O SPINAL FUSION: ICD-10-CM

## 2020-08-17 DIAGNOSIS — C72.0 SPINAL CORD EPENDYMOMA (HCC): ICD-10-CM

## 2020-08-17 DIAGNOSIS — C72.0 MALIGNANT NEOPLASM OF SPINAL CORD (HCC): ICD-10-CM

## 2020-08-17 DIAGNOSIS — G95.89 RADIATION-INDUCED MYELOPATHY (HCC): ICD-10-CM

## 2020-08-17 DIAGNOSIS — R53.1 GENERALIZED WEAKNESS: Primary | ICD-10-CM

## 2020-08-17 PROCEDURE — 97112 NEUROMUSCULAR REEDUCATION: CPT | Performed by: PHYSICAL THERAPIST

## 2020-08-17 PROCEDURE — 97110 THERAPEUTIC EXERCISES: CPT | Performed by: PHYSICAL THERAPIST

## 2020-08-17 NOTE — PROGRESS NOTES
PT Re-Evaluation     Today's date: 2020  Patient name: Sebas Jennings  : 1960  MRN: [id number]  Referring provider: Jose Malik MD  Dx:   Encounter Diagnosis     ICD-10-CM    1  Generalized weakness R53 1    2  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HCC) C72 0 Ambulatory referral to Physical Therapy   3  H/O spinal fusion Z98 1 Ambulatory referral to Physical Therapy   4  Malignant neoplasm of spinal cord (HCC) C72 0 Ambulatory referral to Physical Therapy   5  Radiation-induced myelopathy (HCC) G95 89 Ambulatory referral to Physical Therapy       Start Time: 930  Stop Time: 1015  Total time in clinic (min): 45 minutes    Assessment  Assessment details: Pt is a 64 y/o male who presents with low back pain following CNS edema following tumor resection  No further referral is necessary at this time  Pt demonstrates improvements in pain, strength, and ROM  Performing neuro surgeon agrees patient should undergo PT at this time to achieve maximum benefits  Pt has demonstrated improvements over the past 3 weeks demonstrated in 5 STS, but still has diminished balance putting him at an increased risk for falls  Pt would benefit from PT to address these impairments leading to increased functional capacity and improved quality of life  Impairments: abnormal or restricted ROM, impaired physical strength, lacks appropriate home exercise program, pain with function, poor posture  and poor body mechanics  Understanding of Dx/Px/POC: good   Prognosis: good    Goals  Short Term Goals: to be achieved by 4 weeks Partially met   1) Patient to be independent with basic HEP  2) Decrease pain to 2/10 at its worst   3) Increase lumbar ROM to minimal deficit in all deficient planes  4) Increase LE strength by 1/2 MMT grade in all deficient planes      Long Term Goals: to be achieved by discharge Partially met   1) FOTO equal to or greater than 65   2) Ambulation to improve to maximal level of function  3) Stair negotiation will improve to reciprocal without UE support   4) Sit to stand transfers will improve to maximal level of function     Plan  Patient would benefit from: skilled physical therapy  Planned modality interventions: cryotherapy and thermotherapy: hydrocollator packs  Planned therapy interventions: neuromuscular re-education, patient education, stretching, strengthening, therapeutic activities, therapeutic exercise, therapeutic training, home exercise program and graded activity  Frequency: Twice a week for 10 weeks  Treatment plan discussed with: patient        Subjective Evaluation    History of Present Illness  Mechanism of injury: Since last episode of PT patient reports great improvements in functional mobility, but is still limited by weakness, deconditioning and balance deficits  Pt's main goal is to be able to improve ability to perform functional transfers and navigate uneven terrain without assistance,  Quality of life: good    Pain  Current pain rating:    At best pain ratin  At worst pain ratin  Quality: sharp and dull ache (LBP)  Aggravating factors: lifting, standing and walking    Hand dominance: right    Patient Goals  Patient goals for therapy: decreased pain, independence with ADLs/IADLs, return to sport/leisure activities, increased strength and increased motion          Objective     Concurrent Complaints  Negative for night pain, disturbed sleep, bladder dysfunction, bowel dysfunction, saddle (S4) numbness, cardiac problem, kidney problem, gallbladder problem, stomach problem, ulcer, appendix problem, spleen problem, pancreas problem, history of cancer, history of trauma and infection    Neurological Testing     Sensation     Lumbar   Left   Intact: light touch    Right   Intact: light touch  Diminished: light touch    Reflexes   Left   Patellar (L4): brisk (3+)  Achilles (S1): sustained clonus (5+)  Babinski sign: negative  Clonus sign: positive    Right   Patellar (L4): brisk (3+)  Achilles (S1): sustained clonus (5+)  Babinski sign: negative  Clonus sign: positive    Additional Neurological Details  3 beats of clonus, decreased from last episode  Pt has known PMH of neurlogical tumor  Active Range of Motion     Lumbar   Flexion:  with pain Restriction level: minimal  Extension:  Restriction level: moderate  Left lateral flexion:  Restriction level: min  Right lateral flexion:  Restriction level: min  Left rotation:  Restriction level: moderate  Right rotation:  Restriction level: moderate    Additional Active Range of Motion Details  Pt has pain with functional box lifts     Strength/Myotome Testing     Left Hip   Planes of Motion   Flexion: 4  Extension: 4  Abduction: 4    Right Hip   Planes of Motion   Flexion: 4-  Extension: 4-  Abduction: 4-    Left Knee   Flexion: 4+  Extension: 4+    Right Knee   Flexion: 4  Extension: 4    Left Ankle/Foot   Dorsiflexion: 5    Right Ankle/Foot   Dorsiflexion: 3-    General Comments:      Lumbar Comments  MCTSIB:  EO Firm: 78%  EC Firm: 85%  EO Foam: 75%  EC foam: 73%    FGA: 26/30    5 STS: 13 seconds  Stair climbing: Reciprocal with unilateral hand rail    Leg press: Able to perform 10 reps at 120 before failure  Flowsheet Rows      Most Recent Value   PT/OT G-Codes   Current Score  59   Projected Score  64             Precautions: Cervical tumor, DM2, falls risk        Manuals                                                                 Neuro Re-Ed 8/3 8/7 8/17          Tandem stance foam standing   2*30"          Stepping over cones   3 laps sidways walking and forward          Tandem stance foam 2*30" b/l 2*30" B/L 4 laps tandem walking          Step-ups 2*10 3x10 ea, 6"           Biodex    LOS lv 3 4x 7 dynamic          PB seated reaching   4x          Ther Ex 8/3 8/7 8/17          Nu step 10' 10' 10'          TM 5' 5'           Leg press 3*10 95# 3x10 125# 3*10 125#          Stand hip abd   3*10 YTB ea             Stand hip extn 3*10 YTB kem                                                    Ther Activity                                       Gait Training                                       Modalities

## 2020-08-21 ENCOUNTER — OFFICE VISIT (OUTPATIENT)
Dept: PHYSICAL THERAPY | Age: 60
End: 2020-08-21
Payer: COMMERCIAL

## 2020-08-21 DIAGNOSIS — R53.1 GENERALIZED WEAKNESS: Primary | ICD-10-CM

## 2020-08-21 DIAGNOSIS — C72.0 SPINAL CORD EPENDYMOMA (HCC): ICD-10-CM

## 2020-08-21 DIAGNOSIS — G95.89 RADIATION-INDUCED MYELOPATHY (HCC): ICD-10-CM

## 2020-08-21 DIAGNOSIS — C72.0 MALIGNANT NEOPLASM OF SPINAL CORD (HCC): ICD-10-CM

## 2020-08-21 DIAGNOSIS — Z98.1 H/O SPINAL FUSION: ICD-10-CM

## 2020-08-21 PROCEDURE — 97112 NEUROMUSCULAR REEDUCATION: CPT

## 2020-08-21 PROCEDURE — 97110 THERAPEUTIC EXERCISES: CPT

## 2020-08-24 ENCOUNTER — OFFICE VISIT (OUTPATIENT)
Dept: PHYSICAL THERAPY | Age: 60
End: 2020-08-24
Payer: COMMERCIAL

## 2020-08-24 DIAGNOSIS — R53.1 GENERALIZED WEAKNESS: Primary | ICD-10-CM

## 2020-08-24 DIAGNOSIS — C72.0 MALIGNANT NEOPLASM OF SPINAL CORD (HCC): ICD-10-CM

## 2020-08-24 DIAGNOSIS — G95.89 RADIATION-INDUCED MYELOPATHY (HCC): ICD-10-CM

## 2020-08-24 DIAGNOSIS — C72.0 SPINAL CORD EPENDYMOMA (HCC): ICD-10-CM

## 2020-08-24 DIAGNOSIS — Z98.1 H/O SPINAL FUSION: ICD-10-CM

## 2020-08-24 PROCEDURE — 97112 NEUROMUSCULAR REEDUCATION: CPT | Performed by: PHYSICAL THERAPIST

## 2020-08-24 NOTE — PROGRESS NOTES
Daily Note     Today's date: 2020  Patient name: Edilson Zavaleta  : 1960  MRN: [id number]  Referring provider: Sushma Nagel MD  Dx:   Encounter Diagnosis     ICD-10-CM    1  Generalized weakness  R53 1    2  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HCC)  C72 0    3  Malignant neoplasm of spinal cord (HCC)  C72 0    4  H/O spinal fusion  Z98 1    5  Radiation-induced myelopathy ([facility] [address] )  G95 89        Start Time: 930  Stop Time:   Total time in clinic (min): 45 minutes    Subjective: Pt reports improvements with balance  Objective: See treatment diary below      Assessment: Tolerated treatment well  Pt's POC was progressed to include more balance interventions with dynamic actions to increase tolerance to ADL's  Patient demonstrated fatigue post treatment and would benefit from continued PT      Plan: Continue per plan of care  Precautions: Cervical tumor, DM2, falls risk      Manuals                                                                 Neuro Re-Ed 8/3 8/7 8/14 8/21/20 8/24        Standing bolwing replication     9*91 YMB        Standing golfing replication TB     6V12 forward and backward RTB        Tandem stance foam 2*30" b/l 2*30" B/L 4 laps tandem walking 4 laps tandem walking 4 laps with heads turns, standing 2x30 b/l        Sidestepping     across floor 4x20'         Step-ups 2*10 3x10 ea, 6" 3*10 6" ea 8" 2x10 ea 8" 2*10 ea        Biodex    LOS lv 3 4x 7 dynamic LOS skill lvl3 dynamic lvl 7 x5         Sitting on PB reaching for cones    CG of 1 and assist of another for cones         Ther Ex 8/3 8/7 8/14 8/21/20         Nu step 10' 10' 10' 10'         TM 5' 5' 5' 5'         Leg press 3*10 95# 3x10 125# 3*10 125# 125# 3x10         Stand hip abd   3*10 YTB ea  3*10 YTB ea  Stand hip extn  3*10 YTB ea  3*10 YTB ea                                                   Ther Activity                                       Gait Training                                       Modalities

## 2020-08-28 ENCOUNTER — OFFICE VISIT (OUTPATIENT)
Dept: PHYSICAL THERAPY | Age: 60
End: 2020-08-28
Payer: COMMERCIAL

## 2020-08-28 DIAGNOSIS — G95.89 RADIATION-INDUCED MYELOPATHY (HCC): ICD-10-CM

## 2020-08-28 DIAGNOSIS — R53.1 GENERALIZED WEAKNESS: Primary | ICD-10-CM

## 2020-08-28 DIAGNOSIS — C72.0 MALIGNANT NEOPLASM OF SPINAL CORD (HCC): ICD-10-CM

## 2020-08-28 DIAGNOSIS — C72.0 SPINAL CORD EPENDYMOMA (HCC): ICD-10-CM

## 2020-08-28 DIAGNOSIS — Z98.1 H/O SPINAL FUSION: ICD-10-CM

## 2020-08-28 PROCEDURE — 97112 NEUROMUSCULAR REEDUCATION: CPT | Performed by: PHYSICAL THERAPIST

## 2020-08-28 NOTE — PROGRESS NOTES
Daily Note     Today's date: 2020  Patient name: Hubert Mcdonald  : 1960  MRN: [id number]  Referring provider: Justyna Dick MD  Dx:   Encounter Diagnosis     ICD-10-CM    1  Generalized weakness  R53 1    2  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HCC)  C72 0    3  Malignant neoplasm of spinal cord (HCC)  C72 0    4  H/O spinal fusion  Z98 1    5  Radiation-induced myelopathy ([facility] [address] )  G95 89        Start Time: 930  Stop Time: 1030  Total time in clinic (min): 60 minutes    Subjective: Pt continues to report improvements  Objective: See treatment diary below      Assessment: Tolerated treatment well  Pt's POC was progressed to include more dynamic balance interventions to decrease risk of falls  Patient demonstrated fatigue post treatment and would benefit from continued PT      Plan: Continue per plan of care  Precautions: Cervical tumor, DM2, falls risk      Manuals                                                                 Neuro Re-Ed 8/3 8/7 8/14 8/21/20 8/24 8/27       Standing bolwing replication     1*05 YMB        Standing golfing replication TB     1H90 forward and backward RTB        Tandem stance foam 2*30" b/l 2*30" B/L 4 laps tandem walking 4 laps tandem walking 4 laps with heads turns, standing 2x30 b/l 2 times b/l foam + 2 S/L stance       Trazer    across floor 4x20'  LAS 20 x 1 MARY ALICE x 3       Ball toss tandem stance Min asst 1       10'       Biodex    LOS lv 3 4x 7 dynamic LOS skill lvl3 dynamic lvl 7 x5   LOS skill lvl3 dynamic lvl 7 x3 + maze 2       Sitting on PB reaching for cones    CG of 1 and assist of another for cones         Ther Ex 8/3 8/7 8/14 8/21/20         Nu step 10' 10' 10' 10'         TM 5' 5' 5' 5'         Leg press 3*10 95# 3x10 125# 3*10 125# 125# 3x10         Stand hip abd   3*10 YTB ea  3*10 YTB ea  Stand hip extn  3*10 YTB ea  3*10 YTB ea                                                   Ther Activity                                       Gait Training                                       Modalities

## 2020-08-31 ENCOUNTER — OFFICE VISIT (OUTPATIENT)
Dept: PHYSICAL THERAPY | Age: 60
End: 2020-08-31
Payer: COMMERCIAL

## 2020-08-31 DIAGNOSIS — C72.0 SPINAL CORD EPENDYMOMA (HCC): ICD-10-CM

## 2020-08-31 DIAGNOSIS — G95.89 RADIATION-INDUCED MYELOPATHY (HCC): ICD-10-CM

## 2020-08-31 DIAGNOSIS — C72.0 MALIGNANT NEOPLASM OF SPINAL CORD (HCC): ICD-10-CM

## 2020-08-31 DIAGNOSIS — Z98.1 H/O SPINAL FUSION: ICD-10-CM

## 2020-08-31 DIAGNOSIS — R53.1 GENERALIZED WEAKNESS: Primary | ICD-10-CM

## 2020-08-31 PROCEDURE — 97110 THERAPEUTIC EXERCISES: CPT | Performed by: PHYSICAL THERAPIST

## 2020-08-31 PROCEDURE — 97112 NEUROMUSCULAR REEDUCATION: CPT | Performed by: PHYSICAL THERAPIST

## 2020-08-31 NOTE — PROGRESS NOTES
Daily Note     Today's date: 2020  Patient name: Naomi Sanz  : 1960  MRN: [id number]  Referring provider: Mars Chiang MD  Dx:   Encounter Diagnosis     ICD-10-CM    1  Generalized weakness  R53 1    2  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HCC)  C72 0    3  Malignant neoplasm of spinal cord (HCC)  C72 0    4  H/O spinal fusion  Z98 1    5  Radiation-induced myelopathy ([facility] [address] )  G95 89        Start Time: 0800  Stop Time: 08  Total time in clinic (min): 45 minutes     Subjective: Pt reports improvements with ADL's  Objective: See treatment diary below      Assessment: Tolerated treatment well  Pt's POC was progressed to include interventions representing ADL's on non-compliant surfaces  Patient demonstrated fatigue post treatment and would benefit from continued PT      Plan: Continue per plan of care  Precautions: Cervical tumor, DM2, falls risk      Manuals                                                                 Neuro Re-Ed 8/3 8/7 8/14 8/21/20 8/24 8/27 8/31      Standing bolwing replication     5*03 YMB  4x10, rtb, ytb, stepping on foam with BLE      Standing golfing replication TB     8R06 forward and backward RTB  Foam 3 x10 swings with SPC      Tandem stance foam 2*30" b/l 2*30" B/L 4 laps tandem walking 4 laps tandem walking 4 laps with heads turns, standing 2x30 b/l 2 times b/l foam + 2 S/L stance 2 times b/l foam + 2 S/L stance      Trazer    across floor 4x20'  LAS 20 x 1 MARY ALICE x 3       Ball toss tandem stance Min asst 1       10'       Biodex    LOS lv 3 4x 7 dynamic LOS skill lvl3 dynamic lvl 7 x5   LOS skill lvl3 dynamic lvl 7 x3 + maze 2       Sitting on PB reaching for cones    CG of 1 and assist of another for cones         Ther Ex 8/3 8/7 8/14 8/21/20   8/31      Nu step 10' 10' 10' 10'   10      TM 5' 5' 5' 5'         Leg press 3*10 95# 3x10 125# 3*10 125# 125# 3x10   125# 4x10      Stand hip abd   3*10 YTB ea  3*10 YTB ea            Stand hip extn  3*10 YTB ea  3*10 MELODY brownlee                                                   Ther Activity                                       Gait Training                                       Modalities

## 2020-09-03 ENCOUNTER — TELEPHONE (OUTPATIENT)
Dept: NEUROSURGERY | Facility: CLINIC | Age: 60
End: 2020-09-03

## 2020-09-03 NOTE — TELEPHONE ENCOUNTER
Patient requested for his last office visit note and MRI report to be faxed to his HR department at [phone]  Informed patient this RN will fax  Patient denied any other issues or concerns  He was appreciative  Faxed paperwork

## 2020-09-04 ENCOUNTER — OFFICE VISIT (OUTPATIENT)
Dept: PHYSICAL THERAPY | Age: 60
End: 2020-09-04
Payer: COMMERCIAL

## 2020-09-04 DIAGNOSIS — R53.1 GENERALIZED WEAKNESS: Primary | ICD-10-CM

## 2020-09-04 DIAGNOSIS — C72.0 SPINAL CORD EPENDYMOMA (HCC): ICD-10-CM

## 2020-09-04 DIAGNOSIS — Z98.1 H/O SPINAL FUSION: ICD-10-CM

## 2020-09-04 DIAGNOSIS — C72.0 MALIGNANT NEOPLASM OF SPINAL CORD (HCC): ICD-10-CM

## 2020-09-04 DIAGNOSIS — G95.89 RADIATION-INDUCED MYELOPATHY (HCC): ICD-10-CM

## 2020-09-04 PROCEDURE — 97530 THERAPEUTIC ACTIVITIES: CPT

## 2020-09-04 PROCEDURE — 97110 THERAPEUTIC EXERCISES: CPT

## 2020-09-04 PROCEDURE — 97112 NEUROMUSCULAR REEDUCATION: CPT

## 2020-09-04 NOTE — PROGRESS NOTES
Daily Note     Today's date: 2020  Patient name: Aris Perez  : 1960  MRN: [id number]  Referring provider: Autumn Correa MD  Dx:   Encounter Diagnosis     ICD-10-CM    1  Generalized weakness  R53 1    2  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HCC)  C72 0    3  Malignant neoplasm of spinal cord (HCC)  C72 0    4  H/O spinal fusion  Z98 1    5  Radiation-induced myelopathy (HCC)  G95 89                   Subjective: Patient reports that her is doing pretty well today, offers no new complaints  Objective: See treatment diary below      Assessment: LOB x1 with tandem stance on foam  LOB x1 with Trazer      Plan: Continue per plan of care        Precautions: Cervical tumor, DM2, falls risk      Manuals                                                                 Neuro Re-Ed 8/3 8/7 8/14 8/21/20 8/24 8/27 8/31 9/4     Standing bowling replication     7*41 YMB  4x10, rtb, ytb, stepping on foam with BLE 4x10, rtb, ytb, stepping on foam with BLE        Standing golfing replication TB     2N11 forward and backward RTB  Foam 3 x10 swings with SPC Foam 3 x10 swings with SPC     Tandem stance foam 2*30" b/l 2*30" B/L 4 laps tandem walking 4 laps tandem walking 4 laps with heads turns, standing 2x30 b/l 2 times b/l foam + 2 S/L stance 2 times b/l foam + 2 S/L stance 2 times b/l foam + 2 S/L stance     Trazer    across floor 4x20'  LAS 20 x 1 MARY ALICE x 3  LAS 20 x 1 MARY ALICE x 3        Ball toss tandem stance Min asst 1       10'       Biodex    LOS lv 3 4x 7 dynamic LOS skill lvl3 dynamic lvl 7 x5   LOS skill lvl3 dynamic lvl 7 x3 + maze 2       Sitting on PB reaching for cones    CG of 1 and assist of another for cones         Ther Ex 8/3 8/7 8/14 8/21/20   8/31 94     Nu step 10' 10' 10' 10'   10 Bike 10'     TM 5' 5' 5' 5'         Leg press 3*10 95# 3x10 125# 3*10 125# 125# 3x10   125# 4x10 125# 4x10     Stand hip abd   3*10 YTB ea  3*10 YTB ea  3* 2x10 YTB ea    3* 2x10 YTB ea        Stand hip extn  3*10 YTB ea  3*10 MELODY brownlee      3* 2x10 MELODY brownlee                                               Ther Activity                                       Gait Training                                       Modalities

## 2020-09-09 ENCOUNTER — OFFICE VISIT (OUTPATIENT)
Dept: PHYSICAL THERAPY | Age: 60
End: 2020-09-09
Payer: COMMERCIAL

## 2020-09-09 DIAGNOSIS — C72.0 SPINAL CORD EPENDYMOMA (HCC): ICD-10-CM

## 2020-09-09 DIAGNOSIS — C72.0 MALIGNANT NEOPLASM OF SPINAL CORD (HCC): ICD-10-CM

## 2020-09-09 DIAGNOSIS — R53.1 GENERALIZED WEAKNESS: Primary | ICD-10-CM

## 2020-09-09 DIAGNOSIS — Z98.1 H/O SPINAL FUSION: ICD-10-CM

## 2020-09-09 DIAGNOSIS — G95.89 RADIATION-INDUCED MYELOPATHY (HCC): ICD-10-CM

## 2020-09-09 PROCEDURE — 97112 NEUROMUSCULAR REEDUCATION: CPT | Performed by: PHYSICAL THERAPIST

## 2020-09-09 PROCEDURE — 97110 THERAPEUTIC EXERCISES: CPT | Performed by: PHYSICAL THERAPIST

## 2020-09-09 NOTE — PROGRESS NOTES
Daily Note     Today's date: 2020  Patient name: Astrid Tierney  : 1960  MRN: [id number]  Referring provider: Alisia Irene MD  Dx:   Encounter Diagnosis     ICD-10-CM    1  Generalized weakness  R53 1    2  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HCC)  C72 0    3  Malignant neoplasm of spinal cord (HCC)  C72 0    4  H/O spinal fusion  Z98 1    5  Radiation-induced myelopathy ([facility] [address] )  G95 89        Start Time: 930  Stop Time:   Total time in clinic (min): 45 minutes    Subjective: Patient reports going golfing yesterday and reports some fatigue toward the end of 18 holes  Patient reports some difficulty on inclined grass when golfing so he typically just moves the ball to a flatter piece of grass  Objective: See treatment diary below    Patient co-treated by Physical Therapy Student, Bernadine Lazo, under my direct supervision  Assessment: Patient demonstrates no LOB w/ trazer or standing balance exercises, an improvement compared to last visit  Patient able to maintain no UE support for longer duration w/ tandem stance on foam before requiring 1 UE support to maintain balance  Plan: Continue per plan of care        Precautions: Cervical tumor, DM2, falls risk      Manuals                                                  Neuro Re-Ed 20    Standing bowling replication  9*24 YMB  7E41, rtb, ytb, stepping on foam with BLE 4x10, rtb, ytb, stepping on foam with BLE    2x10 pink ball    Standing golfing replication TB  1V08 forward and backward RTB  Foam 3 x10 swings with SPC Foam 3 x10 swings with SPC Foam 3x10 swing w/ SPC    Tandem stance foam 4 laps tandem walking 4 laps with heads turns, standing 2x30 b/l 2 times b/l foam + 2 S/L stance 2 times b/l foam + 2 S/L stance 2 times b/l foam + 2 S/L stance 2x tandem, 2x SLS    Trazer across floor 4x20'  LAS 20 x 1 MARY ALICE x 3  LAS 20 x 1 MARY ALICE x 3    MARY ALICE x3    Ball toss tandem stance Min asst 1    10'       Biodex  LOS skill lvl3 dynamic lvl 7 x5   LOS skill lvl3 dynamic lvl 7 x3 + maze 2       Sitting on PB reaching for cones CG of 1 and assist of another for cones         Ther Ex 8/21/20 8/31 9/4 9/9    Nu step 10'   10 Bike 10' Bike 10'    TM 5'         Leg press 125# 3x10   125# 4x10 125# 4x10 140# 4x10    Stand hip abd  3* 2x10 YTB ea    3* 2x10 YTB ea        Stand hip extn     3* 2x10 YTB ea                                      Ther Activity                              Gait Training                              Modalities

## 2020-09-11 ENCOUNTER — OFFICE VISIT (OUTPATIENT)
Dept: PHYSICAL THERAPY | Age: 60
End: 2020-09-11
Payer: COMMERCIAL

## 2020-09-11 DIAGNOSIS — R53.1 GENERALIZED WEAKNESS: Primary | ICD-10-CM

## 2020-09-11 DIAGNOSIS — Z98.1 H/O SPINAL FUSION: ICD-10-CM

## 2020-09-11 DIAGNOSIS — C72.0 MALIGNANT NEOPLASM OF SPINAL CORD (HCC): ICD-10-CM

## 2020-09-11 DIAGNOSIS — G95.89 RADIATION-INDUCED MYELOPATHY (HCC): ICD-10-CM

## 2020-09-11 DIAGNOSIS — C72.0 SPINAL CORD EPENDYMOMA (HCC): ICD-10-CM

## 2020-09-11 PROCEDURE — 97112 NEUROMUSCULAR REEDUCATION: CPT | Performed by: PHYSICAL THERAPIST

## 2020-09-11 PROCEDURE — 97110 THERAPEUTIC EXERCISES: CPT | Performed by: PHYSICAL THERAPIST

## 2020-09-11 NOTE — PROGRESS NOTES
Daily Note     Today's date: 2020  Patient name: Kvng Escobedo  : 1960  MRN: [id number]  Referring provider: Anabel Maldonado MD  Dx:   Encounter Diagnosis     ICD-10-CM    1  Generalized weakness  R53 1    2  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HCC)  C72 0    3  Malignant neoplasm of spinal cord (HCC)  C72 0    4  H/O spinal fusion  Z98 1    5  Radiation-induced myelopathy ([facility] [address] )  G95 89        Start Time: 945  Stop Time: 1030  Total time in clinic (min): 45 minutes    Subjective: Pt reports improvements with balance when ambulating community distances  Objective: See treatment diary below      Assessment: Tolerated treatment well  Pt's POC was progressed to include more functional interventions on uneven surfaces to reduce falls  Pt did have some self-corrected LOB's that required PT guarding  Patient demonstrated fatigue post treatment and would benefit from continued PT      Plan: Continue per plan of care        Precautions: Cervical tumor, DM2, falls risk      Manuals                                                  Neuro Re-Ed 20    Standing bowling replication  1*29 YMB  6H91, rtb, ytb, stepping on foam with BLE 4x10, rtb, ytb, stepping on foam with BLE    2x10 pink ball    Standing walking replication TB  8G17 forward and backward RTB  Foam 3 x10 swings with SPC Foam 3 x10 swings with SPC Foam 3x10 swing w/ SPC    Tandem stance foam 4 laps tandem walking 4 laps with heads turns, standing 2x30 b/l 2 times b/l foam + 2 S/L stance 2 times b/l foam + 2 S/L stance 2 times b/l foam + 2 S/L stance 2x tandem, 2x SLS    Trazer across floor 4x20'  LAS 20 x 1 MARY ALICE x 3  LAS 20 x 1 MARY ALICE x 3    MARY ALICE x3    Ball toss tandem stance Min asst 1    10'       SL Stance strengthening + balance      4*5    Sitting on PB reaching for cones CG of 1 and assist of another for cones         Ther Ex 20    Nu step 10'   10 Bike 10' Bike 10'    TM 5'         Leg press 125# 3x10 125# 4x10 125# 4x10 140# 4x10    Stand hip abd  3* 2x10 YTB ea    3* 2x10 YTB ea        Stand hip extn     3* 2x10 YTB ea                                      Ther Activity                              Gait Training                              Modalities

## 2020-09-14 ENCOUNTER — OFFICE VISIT (OUTPATIENT)
Dept: PHYSICAL THERAPY | Age: 60
End: 2020-09-14
Payer: COMMERCIAL

## 2020-09-14 DIAGNOSIS — Z98.1 H/O SPINAL FUSION: ICD-10-CM

## 2020-09-14 DIAGNOSIS — R53.1 GENERALIZED WEAKNESS: Primary | ICD-10-CM

## 2020-09-14 DIAGNOSIS — G95.89 RADIATION-INDUCED MYELOPATHY (HCC): ICD-10-CM

## 2020-09-14 DIAGNOSIS — C72.0 SPINAL CORD EPENDYMOMA (HCC): ICD-10-CM

## 2020-09-14 DIAGNOSIS — C72.0 MALIGNANT NEOPLASM OF SPINAL CORD (HCC): ICD-10-CM

## 2020-09-14 PROCEDURE — 97112 NEUROMUSCULAR REEDUCATION: CPT | Performed by: PHYSICAL THERAPIST

## 2020-09-14 NOTE — PROGRESS NOTES
Daily Note     Today's date: 2020  Patient name: Nidia Carrera  : 1960  MRN: [id number]  Referring provider: Vito Roberto MD  Dx:   Encounter Diagnosis     ICD-10-CM    1  Generalized weakness  R53 1    2  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HCC)  C72 0    3  Malignant neoplasm of spinal cord (HCC)  C72 0    4  H/O spinal fusion  Z98 1    5  Radiation-induced myelopathy ([facility] [address] )  G95 89        Start Time: 930  Stop Time: 53  Total time in clinic (min): 45 minutes    Subjective: Pt reports improvements with symptoms when carrying groceries  Objective: See treatment diary below      Assessment: Tolerated treatment well  Pt's POC was progressed to include more difficult interventions focusing on dynamic balance replicating walking on uneven surfaces  Patient demonstrated fatigue post treatment and would benefit from continued PT      Plan: Continue per plan of care        Precautions: Cervical tumor, DM2, falls risk      Manuals                                                  Neuro Re-Ed 20    Standing with arm swing replicating starting   2*10 YMB  4x10, rtb, ytb, stepping on foam with BLE 4x10, rtb, ytb, stepping on foam with BLE    3x10 pink ball    Standing walking replication TB  0W18 forward and backward RTB  Foam 3 x10 swings with SPC Foam 3 x10 swings with SPC Foam 3x10 swing w/ SPC    Tandem stance foam 4 laps tandem walking 4 laps with heads turns, standing 2x30 b/l 2 times b/l foam + 2 S/L stance 2 times b/l foam + 2 S/L stance 2 times b/l foam + 2 S/L stance 2x tandem, 2x SLS    Trazer across floor 4x20'  LAS 20 x 1 MARY ALICE x 3  LAS 20 x 1 MARY ALICE x 3    MARY ALICE x3    Ball toss tandem stance Min asst 1    10'       SL Stance strengthening + balance      5*5    Sitting on PB reaching for cones CG of 1 and assist of another for cones         Ther Ex 20    Nu step 10'   10 Bike 10' Bike 10'    TM 5'         Leg press 125# 3x10   125# 4x10 125# 4x10 140# 4x10    Stand hip abd  3* 2x10 YTB ea    3* 2x10 YTB ea        Stand hip extn     3* 2x10 YTB ea                                      Ther Activity                              Gait Training                              Modalities

## 2020-09-16 ENCOUNTER — TELEPHONE (OUTPATIENT)
Dept: ENDOCRINOLOGY | Facility: CLINIC | Age: 60
End: 2020-09-16

## 2020-09-16 DIAGNOSIS — E11.65 TYPE 2 DIABETES MELLITUS WITH HYPERGLYCEMIA, WITH LONG-TERM CURRENT USE OF INSULIN (HCC): Primary | ICD-10-CM

## 2020-09-16 DIAGNOSIS — Z79.4 TYPE 2 DIABETES MELLITUS WITH HYPERGLYCEMIA, WITH LONG-TERM CURRENT USE OF INSULIN (HCC): Primary | ICD-10-CM

## 2020-09-16 NOTE — TELEPHONE ENCOUNTER
Patient called for refill on metformin to be sent to [facility] inside [address]

## 2020-09-18 ENCOUNTER — OFFICE VISIT (OUTPATIENT)
Dept: PHYSICAL THERAPY | Age: 60
End: 2020-09-18
Payer: COMMERCIAL

## 2020-09-18 DIAGNOSIS — Z98.1 H/O SPINAL FUSION: ICD-10-CM

## 2020-09-18 DIAGNOSIS — R53.1 GENERALIZED WEAKNESS: Primary | ICD-10-CM

## 2020-09-18 DIAGNOSIS — C72.0 MALIGNANT NEOPLASM OF SPINAL CORD (HCC): ICD-10-CM

## 2020-09-18 DIAGNOSIS — C72.0 SPINAL CORD EPENDYMOMA (HCC): ICD-10-CM

## 2020-09-18 DIAGNOSIS — G95.89 RADIATION-INDUCED MYELOPATHY (HCC): ICD-10-CM

## 2020-09-18 PROCEDURE — 97110 THERAPEUTIC EXERCISES: CPT | Performed by: PHYSICAL THERAPIST

## 2020-09-18 PROCEDURE — 97112 NEUROMUSCULAR REEDUCATION: CPT | Performed by: PHYSICAL THERAPIST

## 2020-09-18 NOTE — PROGRESS NOTES
Daily Note     Today's date: 2020  Patient name: Jewel Morse  : 1960  MRN: [id number]  Referring provider: Urbano Crews MD  Dx:   Encounter Diagnosis     ICD-10-CM    1  Generalized weakness  R53 1    2  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HCC)  C72 0    3  Malignant neoplasm of spinal cord (HCC)  C72 0    4  H/O spinal fusion  Z98 1    5  Radiation-induced myelopathy ([facility] [address] )  G95 89        Start Time: 930  Stop Time: 8260  Total time in clinic (min): 45 minutes    Subjective: Pt reports improvements with balance  Objective: See treatment diary below      Assessment: Tolerated treatment well  Pt's POC was progressed to include more balance interventions with dynamic actions to increase tolerance to ADL's  Patient demonstrated fatigue post treatment and would benefit from continued PT      Plan: Continue per plan of care        Precautions: Cervical tumor, DM2, falls risk      Manuals                                                  Neuro Re-Ed 20    Standing with arm swing replicating starting   2*10 YMB  4x10, rtb, ytb, stepping on foam with BLE 4x10, rtb, ytb, stepping on foam with BLE    3x10 pink ball    Standing walking replication TB  5W24 forward and backward RTB  Foam 3 x10 swings with SPC Foam 3 x10 swings with SPC Foam 3x10 swing w/ SPC    Tandem stance foam 4 laps tandem walking 4 laps with heads turns, standing 2x30 b/l 2 times b/l foam + 2 S/L stance 2 times b/l foam + 2 S/L stance 2 times b/l foam + 2 S/L stance 2x tandem, 2x SLS    Trazer across floor 4x20'  LAS 20 x 1 MARY ALICE x 3  LAS 20 x 1 MARY ALICE x 3    MARY ALICE x3    Ball toss tandem stance Min asst 1    10'       SL Stance strengthening + balance      5*5    Sitting on PB reaching for cones CG of 1 and assist of another for cones         Ther Ex 20    Nu step 10'   10 Bike 10' Bike 10'    TM 5'         Leg press 125# 3x10   125# 4x10 125# 4x10 140# 4x10    Stand hip abd  3* 2x10 MELODY brownlee    3* 2x10 YTB kem        Stand hip extn     3* 2x10 YTB kem                                      Ther Activity                              Gait Training                              Modalities

## 2020-09-21 ENCOUNTER — OFFICE VISIT (OUTPATIENT)
Dept: PHYSICAL THERAPY | Age: 60
End: 2020-09-21
Payer: COMMERCIAL

## 2020-09-21 DIAGNOSIS — C72.0 SPINAL CORD EPENDYMOMA (HCC): ICD-10-CM

## 2020-09-21 DIAGNOSIS — C72.0 MALIGNANT NEOPLASM OF SPINAL CORD (HCC): ICD-10-CM

## 2020-09-21 DIAGNOSIS — Z98.1 H/O SPINAL FUSION: ICD-10-CM

## 2020-09-21 DIAGNOSIS — G95.89 RADIATION-INDUCED MYELOPATHY (HCC): ICD-10-CM

## 2020-09-21 DIAGNOSIS — R53.1 GENERALIZED WEAKNESS: Primary | ICD-10-CM

## 2020-09-21 PROCEDURE — 97110 THERAPEUTIC EXERCISES: CPT | Performed by: PHYSICAL THERAPIST

## 2020-09-21 PROCEDURE — 97112 NEUROMUSCULAR REEDUCATION: CPT | Performed by: PHYSICAL THERAPIST

## 2020-09-21 NOTE — PROGRESS NOTES
Daily Note     Today's date: 2020  Patient name: Miguel Polanco  : 1960  MRN: [id number]  Referring provider: Roverto Emerson MD  Dx:   Encounter Diagnosis     ICD-10-CM    1  Generalized weakness  R53 1    2  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HCC)  C72 0    3  Malignant neoplasm of spinal cord (HCC)  C72 0    4  H/O spinal fusion  Z98 1    5  Radiation-induced myelopathy ([facility] [address] )  G95 89        Start Time: 4094  Stop Time: 1020  Total time in clinic (min): 45 minutes    Subjective: Pt reports improvements in symptoms  Objective: See treatment diary below      Assessment: Tolerated treatment well  Pt's POC was progressed to include more dynamic balance activites to decrease risk of falls  Patient demonstrated fatigue post treatment and would benefit from continued PT      Plan: Continue per plan of care        Precautions: Cervical tumor, DM2, falls risk      Manuals                                                  Neuro Re-Ed 20   Seated on Pball and reach for cones  2*10 YMB  4x10, rtb, ytb, stepping on foam with BLE 4x10, rtb, ytb, stepping on foam with BLE    3x10 pink ball 3*10   Standing walking replication TB  3T32 forward and backward RTB  Foam 3 x10 swings with SPC Foam 3 x10 swings with SPC Foam 3x10 swing w/ SPC oam 3x10 swing w/ SPC   Tandem stance foam 4 laps tandem walking 4 laps with heads turns, standing 2x30 b/l 2 times b/l foam + 2 S/L stance 2 times b/l foam + 2 S/L stance 2 times b/l foam + 2 S/L stance 2x tandem, 2x SLS 2x tandem, 2x SLS + reaching for post its   Trazer across floor 4x20'  LAS 20 x 1 MARY ALICE x 3  LAS 20 x 1 MARY ALICE x 3    MARY ALICE x3    Ball toss tandem stance Min asst 1    10'       SL Stance strengthening + balance      5*5    Sitting on PB reaching for cones CG of 1 and assist of another for cones         Ther Ex 20   Nu step 10'   10 Bike 10' Bike 10' Bike 10'   TM 5'         Leg press 125# 3x10   125# 4x10 125# 4x10 140# 4x10 140# 4x10   Stand hip abd  3* 2x10 YTB ea    3* 2x10 YTB ea        Stand hip extn     3* 2x10 YTB ea                                      Ther Activity                              Gait Training                              Modalities

## 2020-09-23 ENCOUNTER — TRANSCRIBE ORDERS (OUTPATIENT)
Dept: FAMILY MEDICINE CLINIC | Facility: CLINIC | Age: 60
End: 2020-09-23

## 2020-09-23 DIAGNOSIS — E11.65 TYPE 2 DIABETES MELLITUS WITH HYPERGLYCEMIA (HCC): Primary | ICD-10-CM

## 2020-09-25 ENCOUNTER — OFFICE VISIT (OUTPATIENT)
Dept: PHYSICAL THERAPY | Age: 60
End: 2020-09-25
Payer: COMMERCIAL

## 2020-09-25 DIAGNOSIS — Z98.1 H/O SPINAL FUSION: ICD-10-CM

## 2020-09-25 DIAGNOSIS — R53.1 GENERALIZED WEAKNESS: Primary | ICD-10-CM

## 2020-09-25 DIAGNOSIS — G95.89 RADIATION-INDUCED MYELOPATHY (HCC): ICD-10-CM

## 2020-09-25 DIAGNOSIS — C72.0 SPINAL CORD EPENDYMOMA (HCC): ICD-10-CM

## 2020-09-25 DIAGNOSIS — C72.0 MALIGNANT NEOPLASM OF SPINAL CORD (HCC): ICD-10-CM

## 2020-09-25 PROCEDURE — 97112 NEUROMUSCULAR REEDUCATION: CPT | Performed by: PHYSICAL THERAPIST

## 2020-09-25 NOTE — PROGRESS NOTES
Daily Note     Today's date: 2020  Patient name: Jewel Morse  : 1960  MRN: [id number]  Referring provider: Urbano Crews MD  Dx:   Encounter Diagnosis     ICD-10-CM    1  Generalized weakness  R53 1    2  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HCC)  C72 0    3  Malignant neoplasm of spinal cord (HCC)  C72 0    4  H/O spinal fusion  Z98 1    5  Radiation-induced myelopathy (HCC)  G95 89                  Pt treated 1 on 1 from 145p to 220p    Subjective: Pt reports feeling well today no changes since last visit      Objective: See treatment diary below      Assessment: Tolerated treatment well  Progressed pt with time and intensity using Dixie Priest today which he was challenged with reporting fatigue  Demonstrated good ability to self correct balance when unstable  Patient demonstrated fatigue post treatment and would benefit from continued PT  Plan: Continue per plan of care        Precautions: Cervical tumor, DM2, falls risk      Manuals                                                  Neuro Re-Ed    Seated on Pball and reach for cones    4x10, rtb, ytb, stepping on foam with BLE 4x10, rtb, ytb, stepping on foam with BLE    3x10 pink ball 3*10   Standing walking replication TB Foam golf wing w/ SPC 3x10     Foam 3 x10 swings with SPC Foam 3 x10 swings with SPC Foam 3x10 swing w/ SPC oam 3x10 swing w/ SPC   Tandem stance foam 4x tandem on foam reaching post its   2 times b/l foam + 2 S/L stance 2 times b/l foam + 2 S/L stance 2x tandem, 2x SLS 2x tandem, 2x SLS + reaching for post its   Trazer     LAS 20 x 1 MARY ALICE x 3    MARY ALICE x3 Agility 1, 2      15 min total   Ball toss tandem stance Min asst 1           SL Stance strengthening + balance      5*5    Sitting on PB reaching for cones          Ther Ex       Nu step Bike 10'   10 Bike 10' Bike 10' Bike 10'   TM          Leg press 140# 4x10   125# 4x10 125# 4x10 140# 4x10 140# 4x10   Stand hip abd      3* 2x10 YTB ea Stand hip extn     3* 2x10 YTB ea                                      Ther Activity                              Gait Training                              Modalities

## 2020-09-28 ENCOUNTER — TELEPHONE (OUTPATIENT)
Dept: ENDOCRINOLOGY | Facility: CLINIC | Age: 60
End: 2020-09-28

## 2020-09-28 ENCOUNTER — OFFICE VISIT (OUTPATIENT)
Dept: PHYSICAL THERAPY | Age: 60
End: 2020-09-28
Payer: COMMERCIAL

## 2020-09-28 DIAGNOSIS — R53.1 GENERALIZED WEAKNESS: Primary | ICD-10-CM

## 2020-09-28 DIAGNOSIS — C72.0 MALIGNANT NEOPLASM OF SPINAL CORD (HCC): ICD-10-CM

## 2020-09-28 DIAGNOSIS — Z98.1 H/O SPINAL FUSION: ICD-10-CM

## 2020-09-28 DIAGNOSIS — G95.89 RADIATION-INDUCED MYELOPATHY (HCC): ICD-10-CM

## 2020-09-28 DIAGNOSIS — C72.0 SPINAL CORD EPENDYMOMA (HCC): ICD-10-CM

## 2020-09-28 PROCEDURE — 97110 THERAPEUTIC EXERCISES: CPT | Performed by: PHYSICAL THERAPIST

## 2020-09-28 PROCEDURE — 97112 NEUROMUSCULAR REEDUCATION: CPT | Performed by: PHYSICAL THERAPIST

## 2020-09-28 NOTE — PROGRESS NOTES
Daily Note     Today's date: 2020  Patient name: Christopher Harper  : 1960  MRN: [id number]  Referring provider: Jay Lopes MD  Dx:   Encounter Diagnosis     ICD-10-CM    1  Generalized weakness  R53 1    2  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HCC)  C72 0    3  Malignant neoplasm of spinal cord (HCC)  C72 0    4  H/O spinal fusion  Z98 1    5  Radiation-induced myelopathy ([facility] [address] )  G95 89        Start Time: 930  Stop Time: 823  Total time in clinic (min): 45 minutes    Subjective: Pt has no complaints at this time  Objective: See treatment diary below      Assessment: Tolerated treatment well  POC discussed with patient, and importance of maintaining strength and mobiliy through HEP  Pt notes that he plans to return back to the gym he previously attended s/p DC  Patient demonstrated fatigue post treatment      Plan: Continue per plan of care        Precautions: Cervical tumor, DM2, falls risk      Manuals                                                       Neuro Re-Ed    Seated on Pball and reach for cones    4x10, rtb, ytb, stepping on foam with BLE 4x10, rtb, ytb, stepping on foam with BLE    3x10 pink ball 3*10    Standing walking replication TB Foam golf wing w/ SPC 3x10     Foam 3 x10 swings with SPC Foam 3 x10 swings with SPC Foam 3x10 swing w/ SPC oam 3x10 swing w/ SPC    Tandem stance foam 4x tandem on foam reaching post its   2 times b/l foam + 2 S/L stance 2 times b/l foam + 2 S/L stance 2x tandem, 2x SLS 2x tandem, 2x SLS + reaching for post its 3x b/l tandem, 3x b/l SLS and reaching for post its    Trazer     LAS 20 x 1 MARY ALICE x 3    MARY ALICE x3 Agility 1, 2      15 min total LAS and MARY ALICE      10min total   Ball toss tandem stance Min asst 1            SL Stance strengthening + balance      5*5     Sitting on PB reaching for cones           Ther Ex       Nu step Bike 10'   10 Bike 10' Bike 10' Bike 10' 10"   TM           Leg press 140# 4x10 125# 4x10 125# 4x10 140# 4x10 140# 4x10 140# b/l, 90lb u/l     Stand hip abd      3* 2x10 YTB ea         Stand hip extn     3* 2x10 YTB ea                                          Ther Activity                                 Gait Training                                 Modalities

## 2020-09-28 NOTE — TELEPHONE ENCOUNTER
Blood sugars are in acceptable range, continue current treatment  Advise patient to complete blood work prior to next visit

## 2020-10-02 ENCOUNTER — TRANSCRIBE ORDERS (OUTPATIENT)
Dept: FAMILY MEDICINE CLINIC | Facility: CLINIC | Age: 60
End: 2020-10-02

## 2020-10-02 ENCOUNTER — OFFICE VISIT (OUTPATIENT)
Dept: PHYSICAL THERAPY | Age: 60
End: 2020-10-02
Payer: COMMERCIAL

## 2020-10-02 DIAGNOSIS — C72.0 MALIGNANT NEOPLASM OF SPINAL CORD (HCC): ICD-10-CM

## 2020-10-02 DIAGNOSIS — Z98.1 ARTHRODESIS STATUS: ICD-10-CM

## 2020-10-02 DIAGNOSIS — C72.0 SPINAL CORD EPENDYMOMA (HCC): ICD-10-CM

## 2020-10-02 DIAGNOSIS — C72.0 MALIGNANT NEOPLASM OF SPINAL CORD (HCC): Primary | ICD-10-CM

## 2020-10-02 DIAGNOSIS — Z98.1 H/O SPINAL FUSION: ICD-10-CM

## 2020-10-02 DIAGNOSIS — G95.89 RADIATION-INDUCED MYELOPATHY (HCC): ICD-10-CM

## 2020-10-02 DIAGNOSIS — R53.1 GENERALIZED WEAKNESS: Primary | ICD-10-CM

## 2020-10-02 DIAGNOSIS — G95.89 OTHER SPECIFIED DISEASES OF SPINAL CORD (HCC): ICD-10-CM

## 2020-10-02 PROCEDURE — 97112 NEUROMUSCULAR REEDUCATION: CPT | Performed by: PHYSICAL THERAPIST

## 2020-10-05 ENCOUNTER — LAB (OUTPATIENT)
Dept: LAB | Age: 60
End: 2020-10-05
Payer: COMMERCIAL

## 2020-10-05 ENCOUNTER — APPOINTMENT (OUTPATIENT)
Dept: PHYSICAL THERAPY | Age: 60
End: 2020-10-05
Payer: COMMERCIAL

## 2020-10-05 DIAGNOSIS — E03.9 ACQUIRED HYPOTHYROIDISM: ICD-10-CM

## 2020-10-05 DIAGNOSIS — Z79.4 TYPE 2 DIABETES MELLITUS WITH HYPERGLYCEMIA, WITH LONG-TERM CURRENT USE OF INSULIN (HCC): ICD-10-CM

## 2020-10-05 DIAGNOSIS — E11.65 TYPE 2 DIABETES MELLITUS WITH HYPERGLYCEMIA, WITH LONG-TERM CURRENT USE OF INSULIN (HCC): ICD-10-CM

## 2020-10-05 LAB
ANION GAP SERPL CALCULATED.3IONS-SCNC: 0 MMOL/L (ref 4–13)
BUN SERPL-MCNC: 19 MG/DL (ref 5–25)
CALCIUM SERPL-MCNC: 9.2 MG/DL (ref 8.3–10.1)
CHLORIDE SERPL-SCNC: 110 MMOL/L (ref 100–108)
CO2 SERPL-SCNC: 32 MMOL/L (ref 21–32)
CREAT SERPL-MCNC: 0.77 MG/DL (ref 0.6–1.3)
EST. AVERAGE GLUCOSE BLD GHB EST-MCNC: 105 MG/DL
GFR SERPL CREATININE-BSD FRML MDRD: 98 ML/MIN/1.73SQ M
GLUCOSE P FAST SERPL-MCNC: 83 MG/DL (ref 65–99)
HBA1C MFR BLD: 5.3 %
POTASSIUM SERPL-SCNC: 5.2 MMOL/L (ref 3.5–5.3)
SODIUM SERPL-SCNC: 142 MMOL/L (ref 136–145)
T4 FREE SERPL-MCNC: 0.95 NG/DL (ref 0.76–1.46)
TSH SERPL DL<=0.05 MIU/L-ACNC: 1.35 UIU/ML (ref 0.36–3.74)

## 2020-10-05 PROCEDURE — 84439 ASSAY OF FREE THYROXINE: CPT

## 2020-10-05 PROCEDURE — 80048 BASIC METABOLIC PNL TOTAL CA: CPT

## 2020-10-05 PROCEDURE — 83036 HEMOGLOBIN GLYCOSYLATED A1C: CPT

## 2020-10-05 PROCEDURE — 36415 COLL VENOUS BLD VENIPUNCTURE: CPT

## 2020-10-05 PROCEDURE — 3044F HG A1C LEVEL LT 7.0%: CPT | Performed by: PHYSICIAN ASSISTANT

## 2020-10-05 PROCEDURE — 84443 ASSAY THYROID STIM HORMONE: CPT

## 2020-10-08 ENCOUNTER — OFFICE VISIT (OUTPATIENT)
Dept: ENDOCRINOLOGY | Facility: CLINIC | Age: 60
End: 2020-10-08
Payer: COMMERCIAL

## 2020-10-08 VITALS
HEIGHT: 67 IN | SYSTOLIC BLOOD PRESSURE: 100 MMHG | DIASTOLIC BLOOD PRESSURE: 70 MMHG | HEART RATE: 71 BPM | WEIGHT: 170 LBS | BODY MASS INDEX: 26.68 KG/M2 | TEMPERATURE: 95 F

## 2020-10-08 DIAGNOSIS — E03.9 HYPOTHYROIDISM, UNSPECIFIED TYPE: ICD-10-CM

## 2020-10-08 DIAGNOSIS — E11.65 TYPE 2 DIABETES MELLITUS WITH HYPERGLYCEMIA, WITHOUT LONG-TERM CURRENT USE OF INSULIN (HCC): Primary | ICD-10-CM

## 2020-10-08 DIAGNOSIS — E78.2 MIXED HYPERLIPIDEMIA: ICD-10-CM

## 2020-10-08 DIAGNOSIS — E03.9 ACQUIRED HYPOTHYROIDISM: ICD-10-CM

## 2020-10-08 PROCEDURE — 99214 OFFICE O/P EST MOD 30 MIN: CPT | Performed by: PHYSICIAN ASSISTANT

## 2020-10-08 PROCEDURE — 1036F TOBACCO NON-USER: CPT | Performed by: PHYSICIAN ASSISTANT

## 2020-10-08 RX ORDER — OMEGA-3-ACID ETHYL ESTERS 1 G/1
2 CAPSULE, LIQUID FILLED ORAL DAILY
COMMUNITY
Start: 2020-09-07 | End: 2021-01-06 | Stop reason: SDUPTHER

## 2020-10-08 RX ORDER — DULAGLUTIDE 0.75 MG/.5ML
0.75 INJECTION, SOLUTION SUBCUTANEOUS WEEKLY
Qty: 4 PEN | Refills: 5 | Status: SHIPPED | OUTPATIENT
Start: 2020-10-08 | End: 2021-04-06 | Stop reason: SDUPTHER

## 2020-10-08 RX ORDER — METFORMIN HYDROCHLORIDE 500 MG/1
TABLET, EXTENDED RELEASE ORAL
COMMUNITY
Start: 2020-09-16 | End: 2020-10-08

## 2020-10-08 RX ORDER — LEVOTHYROXINE SODIUM 0.07 MG/1
TABLET ORAL
Qty: 90 TABLET | Refills: 1 | Status: SHIPPED | OUTPATIENT
Start: 2020-10-08 | End: 2021-04-06

## 2020-10-09 ENCOUNTER — APPOINTMENT (OUTPATIENT)
Dept: PHYSICAL THERAPY | Age: 60
End: 2020-10-09
Payer: COMMERCIAL

## 2020-10-12 ENCOUNTER — APPOINTMENT (OUTPATIENT)
Dept: PHYSICAL THERAPY | Age: 60
End: 2020-10-12
Payer: COMMERCIAL

## 2020-10-16 ENCOUNTER — APPOINTMENT (OUTPATIENT)
Dept: PHYSICAL THERAPY | Age: 60
End: 2020-10-16
Payer: COMMERCIAL

## 2020-10-19 ENCOUNTER — APPOINTMENT (OUTPATIENT)
Dept: PHYSICAL THERAPY | Age: 60
End: 2020-10-19
Payer: COMMERCIAL

## 2020-10-23 ENCOUNTER — APPOINTMENT (OUTPATIENT)
Dept: PHYSICAL THERAPY | Age: 60
End: 2020-10-23
Payer: COMMERCIAL

## 2020-10-24 DIAGNOSIS — E78.5 HLD (HYPERLIPIDEMIA): ICD-10-CM

## 2020-10-26 ENCOUNTER — APPOINTMENT (OUTPATIENT)
Dept: PHYSICAL THERAPY | Age: 60
End: 2020-10-26
Payer: COMMERCIAL

## 2020-10-26 RX ORDER — ATORVASTATIN CALCIUM 40 MG/1
40 TABLET, FILM COATED ORAL DAILY
Qty: 50 TABLET | Refills: 1 | Status: SHIPPED | OUTPATIENT
Start: 2020-10-26 | End: 2020-12-11

## 2020-10-30 ENCOUNTER — APPOINTMENT (OUTPATIENT)
Dept: PHYSICAL THERAPY | Age: 60
End: 2020-10-30
Payer: COMMERCIAL

## 2020-12-11 ENCOUNTER — TELEPHONE (OUTPATIENT)
Dept: ENDOCRINOLOGY | Facility: CLINIC | Age: 60
End: 2020-12-11

## 2020-12-11 DIAGNOSIS — E78.5 HLD (HYPERLIPIDEMIA): ICD-10-CM

## 2020-12-11 DIAGNOSIS — E78.2 MIXED HYPERLIPIDEMIA: ICD-10-CM

## 2020-12-11 DIAGNOSIS — F32.A DEPRESSION, UNSPECIFIED DEPRESSION TYPE: ICD-10-CM

## 2020-12-11 DIAGNOSIS — G62.9 NEUROPATHY: ICD-10-CM

## 2020-12-11 RX ORDER — OMEGA-3-ACID ETHYL ESTERS 1 G/1
CAPSULE, LIQUID FILLED ORAL
Qty: 180 CAPSULE | Refills: 1 | Status: SHIPPED | OUTPATIENT
Start: 2020-12-11 | End: 2021-06-11

## 2020-12-11 RX ORDER — ATORVASTATIN CALCIUM 40 MG/1
TABLET, FILM COATED ORAL
Qty: 90 TABLET | Refills: 1 | Status: SHIPPED | OUTPATIENT
Start: 2020-12-11 | End: 2021-07-06

## 2020-12-13 RX ORDER — DULOXETIN HYDROCHLORIDE 60 MG/1
CAPSULE, DELAYED RELEASE ORAL
Qty: 90 CAPSULE | Refills: 1 | Status: SHIPPED | OUTPATIENT
Start: 2020-12-13 | End: 2021-08-13

## 2021-01-06 ENCOUNTER — OFFICE VISIT (OUTPATIENT)
Dept: FAMILY MEDICINE CLINIC | Facility: CLINIC | Age: 61
End: 2021-01-06
Payer: COMMERCIAL

## 2021-01-06 VITALS
WEIGHT: 173 LBS | SYSTOLIC BLOOD PRESSURE: 116 MMHG | RESPIRATION RATE: 18 BRPM | BODY MASS INDEX: 27.15 KG/M2 | TEMPERATURE: 97.5 F | HEIGHT: 67 IN | DIASTOLIC BLOOD PRESSURE: 62 MMHG | HEART RATE: 78 BPM

## 2021-01-06 DIAGNOSIS — Z23 NEED FOR PNEUMOCOCCAL VACCINATION: ICD-10-CM

## 2021-01-06 DIAGNOSIS — M51.36 DDD (DEGENERATIVE DISC DISEASE), LUMBAR: ICD-10-CM

## 2021-01-06 DIAGNOSIS — Z12.5 SCREENING FOR PROSTATE CANCER: ICD-10-CM

## 2021-01-06 DIAGNOSIS — Z12.11 SCREENING FOR COLON CANCER: ICD-10-CM

## 2021-01-06 DIAGNOSIS — E78.2 MIXED HYPERLIPIDEMIA: Primary | ICD-10-CM

## 2021-01-06 DIAGNOSIS — Z79.4 TYPE 2 DIABETES MELLITUS WITH HYPERGLYCEMIA, WITH LONG-TERM CURRENT USE OF INSULIN (HCC): ICD-10-CM

## 2021-01-06 DIAGNOSIS — E11.65 TYPE 2 DIABETES MELLITUS WITH HYPERGLYCEMIA, WITH LONG-TERM CURRENT USE OF INSULIN (HCC): ICD-10-CM

## 2021-01-06 DIAGNOSIS — E03.9 ACQUIRED HYPOTHYROIDISM: ICD-10-CM

## 2021-01-06 PROCEDURE — 3725F SCREEN DEPRESSION PERFORMED: CPT | Performed by: FAMILY MEDICINE

## 2021-01-06 PROCEDURE — 90732 PPSV23 VACC 2 YRS+ SUBQ/IM: CPT | Performed by: FAMILY MEDICINE

## 2021-01-06 PROCEDURE — 90471 IMMUNIZATION ADMIN: CPT | Performed by: FAMILY MEDICINE

## 2021-01-06 PROCEDURE — 99214 OFFICE O/P EST MOD 30 MIN: CPT | Performed by: FAMILY MEDICINE

## 2021-01-06 NOTE — PROGRESS NOTES
Assessment and Plan:    Problem List Items Addressed This Visit     None                 Diagnoses and all orders for this visit:    Mixed hyperlipidemia  -     Lipid panel; Future    Screening for prostate cancer  -     PSA, total and free; Future    Screening for colon cancer  -     Ambulatory referral to Gastroenterology; Future    DDD (degenerative disc disease), lumbar  -     Ambulatory referral to Pain Management; Future    Type 2 diabetes mellitus with hyperglycemia, with long-term current use of insulin (HCC)              Subjective:      Patient ID: Beatriz Beckman is a 63 y o  male  CC:    Chief Complaint   Patient presents with    Follow-up     6 month       HPI:    F/u diabetes, thyroid, would like to have eval for pain management for disc in lumbar      The following portions of the patient's history were reviewed and updated as appropriate: allergies, current medications, past family history, past medical history, past social history, past surgical history and problem list       Review of Systems   Constitutional: Negative for activity change, appetite change, chills, fatigue and fever  Respiratory: Negative for shortness of breath  Cardiovascular: Negative for chest pain  Musculoskeletal: Positive for back pain  Neurological: Positive for numbness  Negative for dizziness and headaches  R foot, nerve damage in foot         Data to review:       Objective:    Vitals:    01/06/21 1010   BP: 116/62   BP Location: Left arm   Patient Position: Sitting   Cuff Size: Adult   Pulse: 78   Resp: 18   Temp: 97 5 °F (36 4 °C)   TempSrc: Tympanic   Weight: 78 5 kg (173 lb)   Height: 5' 7" (1 702 m)        Physical Exam  Vitals signs reviewed  Constitutional:       Appearance: Normal appearance  Cardiovascular:      Rate and Rhythm: Normal rate and regular rhythm  Pulses: Normal pulses  no weak pulses          Dorsalis pedis pulses are 2+ on the right side and 2+ on the left side  Posterior tibial pulses are 2+ on the right side and 2+ on the left side  Heart sounds: Normal heart sounds  Pulmonary:      Effort: Pulmonary effort is normal       Breath sounds: Normal breath sounds  Feet:      Right foot:      Skin integrity: No ulcer, skin breakdown, erythema, warmth, callus or dry skin  Left foot:      Skin integrity: No ulcer, skin breakdown, erythema, warmth, callus or dry skin  Lymphadenopathy:      Cervical: No cervical adenopathy  Neurological:      Mental Status: He is alert  Sensory: Sensory deficit present  Motor: No weakness  Comments: r foot   Psychiatric:         Mood and Affect: Mood normal              Diabetic Foot Exam    Patient's shoes and socks removed  Right Foot/Ankle   Right Foot Inspection  Skin Exam: skin normal and skin intact no dry skin, no warmth, no callus, no erythema, no maceration, no abnormal color, no pre-ulcer, no ulcer and no callus                          Toe Exam: ROM and strength within normal limits  Sensory       Monofilament testing: diminished  Vascular  Capillary refills: < 3 seconds  The right DP pulse is 2+  The right PT pulse is 2+  Left Foot/Ankle  Left Foot Inspection  Skin Exam: skin normal and skin intactno dry skin, no warmth, no erythema, no maceration, normal color, no pre-ulcer, no ulcer and no callus                         Toe Exam: ROM and strength within normal limits                   Sensory       Monofilament: intact  Vascular  Capillary refills: < 3 seconds  The left DP pulse is 2+  The left PT pulse is 2+  Assign Risk Category:  No deformity present; Loss of protective sensation; No weak pulses       Risk: 1    BMI Counseling: Body mass index is 27 1 kg/m²  The BMI is above normal  Nutrition recommendations include reducing portion sizes, decreasing overall calorie intake, 3-5 servings of fruits/vegetables daily, reducing fast food intake and consuming healthier snacks   Exercise recommendations include exercising 3-5 times per week

## 2021-01-06 NOTE — PATIENT INSTRUCTIONS
[facility], did podiatry referral  Weight Management   AMBULATORY CARE:   Why it is important to manage your weight:  Being overweight increases your risk of health conditions such as heart disease, high blood pressure, type 2 diabetes, and certain types of cancer  It can also increase your risk for osteoarthritis, sleep apnea, and other respiratory problems  Aim for a slow, steady weight loss  Even a small amount of weight loss can lower your risk of health problems  How to lose weight safely:  A safe and healthy way to lose weight is to eat fewer calories and get regular exercise  · You can lose up about 1 pound a week by decreasing the number of calories you eat by 500 calories each day  You can decrease calories by eating smaller portion sizes or by cutting out high-calorie foods  Read labels to find out how many calories are in the foods you eat  · You can also burn calories with exercise such as walking, swimming, or biking  You will be more likely to keep weight off if you make these changes part of your lifestyle  Exercise at least 30 minutes per day on most days of the week  You can also fit in more physical activity by taking the stairs instead of the elevator or parking farther away from stores  Ask your healthcare provider about the best exercise plan for you  Healthy meal plan for weight management:  A healthy meal plan includes a variety of foods, contains fewer calories, and helps you stay healthy  A healthy meal plan includes the following:     · Eat whole-grain foods more often  A healthy meal plan should contain fiber  Fiber is the part of grains, fruits, and vegetables that is not broken down by your body  Whole-grain foods are healthy and provide extra fiber in your diet  Some examples of whole-grain foods are whole-wheat breads and pastas, oatmeal, brown rice, and bulgur  · Eat a variety of vegetables every day    Include dark, leafy greens such as spinach, kale, tino greens, and mustard greens  Eat yellow and orange vegetables such as carrots, sweet potatoes, and winter squash  · Eat a variety of fruits every day  Choose fresh or canned fruit (canned in its own juice or light syrup) instead of juice  Fruit juice has very little or no fiber  · Eat low-fat dairy foods  Drink fat-free (skim) milk or 1% milk  Eat fat-free yogurt and low-fat cottage cheese  Try low-fat cheeses such as mozzarella and other reduced-fat cheeses  · Choose meat and other protein foods that are low in fat  Choose beans or other legumes such as split peas or lentils  Choose fish, skinless poultry (chicken or turkey), or lean cuts of red meat (beef or pork)  Before you cook meat or poultry, cut off any visible fat  · Use less fat and oil  Try baking foods instead of frying them  Add less fat, such as margarine, sour cream, regular salad dressing and mayonnaise to foods  Eat fewer high-fat foods  Some examples of high-fat foods include french fries, doughnuts, ice cream, and cakes  · Eat fewer sweets  Limit foods and drinks that are high in sugar  This includes candy, cookies, regular soda, and sweetened drinks  Ways to decrease calories:   · Eat smaller portions  ? Use a small plate with smaller servings  ? Do not eat second helpings  ? When you eat at a restaurant, ask for a box and place half of your meal in the box before you eat  ? Share an entrée with someone else  · Replace high-calorie snacks with healthy, low-calorie snacks  ? Choose fresh fruit, vegetables, fat-free rice cakes, or air-popped popcorn instead of potato chips, nuts, or chocolate  ? Choose water or calorie-free drinks instead of soda or sweetened drinks  · Do not shop for groceries when you are hungry  You may be more likely to make unhealthy food choices  Take a grocery list of healthy foods and shop after you have eaten  · Eat regular meals  Do not skip meals   Skipping meals can lead to overeating later in the day  This can make it harder for you to lose weight  Eat a healthy snack in place of a meal if you do not have time to eat a regular meal  Talk with a dietitian to help you create a meal plan and schedule that is right for you  Other things to consider as you try to lose weight:   · Be aware of situations that may give you the urge to overeat, such as eating while watching television  Find ways to avoid these situations  For example, read a book, go for a walk, or do crafts  · Meet with a weight loss support group or friends who are also trying to lose weight  This may help you stay motivated to continue working on your weight loss goals  © Copyright [employer] Information is for End User's use only and may not be sold, redistributed or otherwise used for commercial purposes  All illustrations and images included in CareNotes® are the copyrighted property of A D A M , Inc  or [facility] Evin Bates   The above information is an  only  It is not intended as medical advice for individual conditions or treatments  Talk to your doctor, nurse or pharmacist before following any medical regimen to see if it is safe and effective for you

## 2021-01-15 ENCOUNTER — TRANSCRIBE ORDERS (OUTPATIENT)
Dept: RADIOLOGY | Facility: HOSPITAL | Age: 61
End: 2021-01-15

## 2021-01-15 ENCOUNTER — HOSPITAL ENCOUNTER (OUTPATIENT)
Dept: RADIOLOGY | Facility: HOSPITAL | Age: 61
Discharge: HOME | End: 2021-01-15
Attending: NEUROLOGICAL SURGERY
Payer: COMMERCIAL

## 2021-01-15 DIAGNOSIS — C72.0 SPINAL CORD EPENDYMOMA (HCC): ICD-10-CM

## 2021-01-15 PROCEDURE — A9585 GADOBUTROL INJECTION: HCPCS | Performed by: NEUROLOGICAL SURGERY

## 2021-01-15 PROCEDURE — 72156 MRI NECK SPINE W/O & W/DYE: CPT

## 2021-01-15 RX ADMIN — GADOBUTROL 7 ML: 604.72 INJECTION INTRAVENOUS at 10:59

## 2021-01-20 ENCOUNTER — OFFICE VISIT (OUTPATIENT)
Dept: NEUROSURGERY | Facility: CLINIC | Age: 61
End: 2021-01-20
Payer: COMMERCIAL

## 2021-01-20 VITALS
HEIGHT: 67 IN | HEART RATE: 82 BPM | SYSTOLIC BLOOD PRESSURE: 112 MMHG | TEMPERATURE: 97.1 F | WEIGHT: 174 LBS | BODY MASS INDEX: 27.31 KG/M2 | RESPIRATION RATE: 16 BRPM | DIASTOLIC BLOOD PRESSURE: 72 MMHG

## 2021-01-20 DIAGNOSIS — C72.0 MALIGNANT NEOPLASM OF SPINAL CORD (HCC): ICD-10-CM

## 2021-01-20 DIAGNOSIS — C72.0 SPINAL CORD EPENDYMOMA (HCC): Primary | ICD-10-CM

## 2021-01-20 DIAGNOSIS — Z98.1 H/O SPINAL FUSION: ICD-10-CM

## 2021-01-20 PROCEDURE — 99214 OFFICE O/P EST MOD 30 MIN: CPT | Performed by: NEUROLOGICAL SURGERY

## 2021-01-20 PROCEDURE — 3008F BODY MASS INDEX DOCD: CPT | Performed by: NEUROLOGICAL SURGERY

## 2021-01-20 PROCEDURE — 1036F TOBACCO NON-USER: CPT | Performed by: NEUROLOGICAL SURGERY

## 2021-01-20 NOTE — PROGRESS NOTES
Neurosurgery Office Note  Patrick Duff III 63 y o  male MRN: [id number]      Assessment/Plan      Diagnoses and all orders for this visit:    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[facility] [address] )  -     MRI cervical spine with and without contrast; Future    Malignant neoplasm of spinal cord [facility])  -     Ambulatory referral to Neurosurgery    H/O spinal fusion  -     MRI cervical spine with and without contrast; Future          Discussion:    63year old male now 2 years s/p resection, decompression, fusion for his intramedullary spinal cord ependymoma, WHO grade 2  (1/24/19)  The mass spanned from the C4-5 disc space down to the T3-4 disc space  We were able to resect this aggressively at its largest part, I e  T1-2, but did not proceed further as his neurological signals declined intraoperatively  He did well postoperatively, and was discharged to [facility]  MRI scan of brain, L-spine, and T-spine   Done after surgery showed no drop metastases  CSF testing was negative  At f/u in early 3/2019, was continuing to improve  He was using no ambulatory aids  His right DF was at least 4/5, but apractic, however this was also improving  He stated he even had some proprioception on the right, which was improved vs  Prior to surgery  He had some mild tingling in his forearms and hands, but no pain or weakness  His incision was healing well  Bowel and bladder function were back to normal  No radicular pains  Xrays were stable, and we planned for adjuvant IMRT  He completed this 5/22/19, to 45 Gy    Starting a few days after his RT, his neurological symptoms began to re-emerge  His right foot drop became more pronounced, and he was unsteady on his feet had a few falls  His balance was unsteady  His occasional radicular pain into the right leg 2-3/10, as well as left arm and right hand pains, pins and needles, 5/10  He was not taking gabapentin or dexamethasone   Seen with Dr Heidi Munguia at that time and we 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, and recommended continued PT as well as restarting gabapentin  He is presently on 300 mg TID, and continues to do relatively well symptom-wise  He does have pins & needles in his left distal arm, and right hand, but not profound  He is comfortable continuing with the gabapentin rather than trying to wean  He continues to use a RLE brace and a standard cane  He denies neck/back pain  No hardware failure/mechanical pain  MRI scan CSpine stable: residual tumor the superior and inferior caps, specifically from the C4-5 disc space down to the resection cavity, and then again resuming at the inferior edge of the cavity down to the T3-4 disc space  The large cavity present preop has not reformed, there are no obvious sign of recurrence etc      He sees Dr Jakob Bauer on 2/1/21 to discuss his low back pain (no radicular component)  MRI LSpine done in 2019 did not show any compressive pathology  We discussed the NCCN guideline recommended   Follow-up  Recommendation is for follow-up MRI scan cervical spine in 6-12 months, and we agreed on 12 months, which I have ordered  I will see him after that is completed  07/22/20 Metrics: EQ5D5L 19749=0 845; VAS 65; MJOA 10/17    01/20/21 Metrics: EQ5D5L 25642=1 732; VAS 65-70; ECOG 2; KPS 70; MJOA 12/17            CHIEF COMPLAINT    Chief Complaint   Patient presents with    Follow-up     6 MONTH F/U MRI CPINE 1/15       HISTORY    History of Present Illness     63y o  year old male     HPI    See Discussion    REVIEW OF SYSTEMS    Review of Systems   Constitutional: Negative  HENT: Negative  Eyes: Negative  Respiratory: Negative  Cardiovascular: Positive for leg swelling (right leg but mild)  Gastrointestinal: Negative  Endocrine: Negative  Genitourinary: Negative  Musculoskeletal: Positive for gait problem (right foot drag/drop, unsteady balance, using standard cane, 1 fall since last visit, f/w PT)  Skin: Negative  Allergic/Immunologic: Positive for environmental allergies  Neurological: Positive for weakness (right leg), light-headedness (getting up from lying down) and numbness (left arm, right elbow and hand occasional numbness/tingling, numbness in B/L fingers and right foot )  Negative for dizziness, seizures, syncope, speech difficulty and headaches (improved since last visit, occasional in a m , frontal, mild)  Hematological: Negative  Psychiatric/Behavioral: Positive for sleep disturbance (due to discomfort from neck)  Meds/Allergies     Current Outpatient Medications   Medication Sig Dispense Refill    atorvastatin (LIPITOR) 40 mg tablet TAKE 1 TABLET BY MOUTH EVERY DAY 90 tablet 1    Dulaglutide (Trulicity) 9 40 WX/6 4FY SOPN Inject 0 5 mL (0 75 mg total) under the skin once a week 4 pen 5    DULoxetine (CYMBALTA) 60 mg delayed release capsule TAKE 1 CAPSULE BY MOUTH EVERY DAY 90 capsule 1    gabapentin (NEURONTIN) 300 mg capsule Take 1 capsule (300 mg total) by mouth 3 (three) times a day 270 capsule 3    levothyroxine 75 mcg tablet TAKE 1 TABLET BY MOUTH EVERY DAY 90 tablet 1    metFORMIN (GLUCOPHAGE) 500 mg tablet Take 2 tablets (1000mg) twice a day with meals 360 tablet 1    omega-3-acid ethyl esters (LOVAZA) 1 g capsule TAKE 2 CAPSULES BY MOUTH EVERY  capsule 1     No current facility-administered medications for this visit  Allergies   Allergen Reactions    Bee Venom Hives, Itching and Edema     Category: Allergy;     Penicillins Hives and Itching     Category:  Allergy;        PAST HISTORY    Past Medical History:   Diagnosis Date    BPH associated with nocturia     Cervical spinal mass ([facility] ) 01/07/2019    cervicothoracic    Disease of thyroid gland     Herniated disc, cervical     History of radiation therapy     Hyperlipidemia     Impaired fasting glucose     [facility]2019       Past Surgical History:   Procedure Laterality Date  APPENDECTOMY      CERVICAL FUSION      COLONOSCOPY  2012    NORMAL; RECHECK IN 5 YEARS    FL LUMBAR PUNCTURE DIAGNOSTIC  2019    CT BX/EXCIS SPIN PATEL,INDUR,INMED,CERV N/A 2019    Procedure: Posterior C4-T3 laminectomy; resection of intramedullary mass C5-T3, including fenestration of syrinx C7-T2; C3-T4 fixation/fusion; additional levels as needed;  Surgeon: Viv Emanuel MD;  Location: [facility];  Service: Neurosurgery    WISDOM TOOTH EXTRACTION         Social History     Tobacco Use    Smoking status: Former Smoker     Quit date: 1993     Years since quittin 7    Smokeless tobacco: Never Used   Substance Use Topics    Alcohol use: Yes     Comment: rare social    Drug use: No       Family History   Problem Relation Age of Onset    Diabetes Mother     Hypertension Father     Cerebral palsy Sister     Breast cancer Daughter          The following portions of the patient's history were reviewed in this encounter and updated as appropriate: Past medical, surgical, family, and social history, as well as medications, allergies, and review of systems  EXAM    Vitals:Blood pressure 112/72, pulse 82, temperature (!) 97 1 °F (36 2 °C), resp  rate 16, height 5' 7" (1 702 m), weight 78 9 kg (174 lb)  ,Body mass index is 27 25 kg/m²  Physical Exam  Vitals signs reviewed  Constitutional:       Appearance: Normal appearance  He is well-developed  HENT:      Head: Normocephalic and atraumatic  Eyes:      General: No scleral icterus  Neck:      Musculoskeletal: Neck supple  Cardiovascular:      Rate and Rhythm: Normal rate  Pulmonary:      Effort: Pulmonary effort is normal    Skin:     General: Skin is warm and dry  Neurological:      Mental Status: He is alert and oriented to person, place, and time  Sensory: No sensory deficit     Psychiatric:         Speech: Speech normal          Behavior: Behavior normal          Neurologic Exam     Mental Status Oriented to person, place, and time  Attention: normal    Speech: speech is normal   Level of consciousness: alert    Cranial Nerves     CN VII   Facial expression full, symmetric  Motor Exam   Muscle bulk: normal  Overall muscle tone: normal  Right MOFO brace     Gait, Coordination, and Reflexes     Tremor   Resting tremor: absent  Intention tremor: absent  Action tremor: absent  Standard cane         MEDICAL DECISION MAKING    Imaging Studies:     Mri Cervical Spine With And Without Contrast    Result Date: 1/15/2021  Narrative: MRI CERVICAL SPINE WITH AND WITHOUT CONTRAST INDICATION: C72 0: Malignant neoplasm of spinal cord  COMPARISON:  MR 7/15/2020, MR 1/7/2019 TECHNIQUE:  Sagittal T1, sagittal T2, sagittal inversion recovery, axial 2D merge and axial T2  Sagittal T1 and axial T1 postcontrast   IV Contrast:  7 mL of Gadobutrol injection (SINGLE-DOSE) IMAGE QUALITY:  Diagnostic  FINDINGS: ALIGNMENT:  Stable alignment of the cervical thoracic spine  Posterior fusion C3-T5 with lateral mass screws bilaterally C3-C6 and pedicle screws T1-T5  These are stable  Decompressive laminectomy C4-T1/T2  MARROW SIGNAL:  Marrow signal changes from C3 to T5 inclusive reflecting long-term affects of radiation  CERVICAL AND VISUALIZED UPPER THORACIC CORD:  Cervical cord is stable in the appearance, and diffusely atrophic with broad right posterolateral myelotomy extending from approximately upper T1 through the mid T2 level  Fluid cavity within the cord communicating dorsally, with the CSF space  Diffuse cord atrophy with a thin ribbon of cord ventrally, and to the left  Cord remains displaced posteriorly, to the right through this region  Cord below this level also atrophic  No discernible enhancement to suggest ependymoma recurrence   PREVERTEBRAL AND PARASPINAL SOFT TISSUES:  Normal  VISUALIZED POSTERIOR FOSSA:  The visualized posterior fossa demonstrates no abnormal signal  CERVICAL DISC SPACES:  Relative preservation of vertebral body and disc height and alignment remote posterior fusion  UPPER THORACIC DISC SPACES:  Normal  POSTCONTRAST IMAGING:  Normal      Impression: Stable MR appearance of the cervical thoracic CORD and cervical thoracic junction  Treatment-related changes, diffuse cord atrophy, no enhancement to suggest tumor recurrence  Workstation performed: [id number]       I have personally reviewed pertinent reports     and I have personally reviewed pertinent films in PACS

## 2021-02-09 ENCOUNTER — TELEPHONE (OUTPATIENT)
Dept: NEUROSURGERY | Facility: CLINIC | Age: 61
End: 2021-02-09

## 2021-02-09 ENCOUNTER — LAB (OUTPATIENT)
Dept: LAB | Age: 61
End: 2021-02-09
Payer: COMMERCIAL

## 2021-02-09 ENCOUNTER — TELEPHONE (OUTPATIENT)
Dept: ENDOCRINOLOGY | Facility: CLINIC | Age: 61
End: 2021-02-09

## 2021-02-09 DIAGNOSIS — E03.9 ACQUIRED HYPOTHYROIDISM: ICD-10-CM

## 2021-02-09 DIAGNOSIS — E78.2 MIXED HYPERLIPIDEMIA: ICD-10-CM

## 2021-02-09 DIAGNOSIS — Z12.5 SCREENING FOR PROSTATE CANCER: ICD-10-CM

## 2021-02-09 DIAGNOSIS — E11.65 TYPE 2 DIABETES MELLITUS WITH HYPERGLYCEMIA, WITHOUT LONG-TERM CURRENT USE OF INSULIN (HCC): ICD-10-CM

## 2021-02-09 LAB
ANION GAP SERPL CALCULATED.3IONS-SCNC: 3 MMOL/L (ref 4–13)
BUN SERPL-MCNC: 21 MG/DL (ref 5–25)
CALCIUM SERPL-MCNC: 9.5 MG/DL (ref 8.3–10.1)
CHLORIDE SERPL-SCNC: 109 MMOL/L (ref 100–108)
CHOLEST SERPL-MCNC: 101 MG/DL (ref 50–200)
CO2 SERPL-SCNC: 31 MMOL/L (ref 21–32)
CREAT SERPL-MCNC: 0.75 MG/DL (ref 0.6–1.3)
CREAT UR-MCNC: 165 MG/DL
EST. AVERAGE GLUCOSE BLD GHB EST-MCNC: 114 MG/DL
GFR SERPL CREATININE-BSD FRML MDRD: 99 ML/MIN/1.73SQ M
GLUCOSE P FAST SERPL-MCNC: 102 MG/DL (ref 65–99)
HBA1C MFR BLD: 5.6 %
HDLC SERPL-MCNC: 34 MG/DL
LDLC SERPL CALC-MCNC: 45 MG/DL (ref 0–100)
MICROALBUMIN UR-MCNC: 8.8 MG/L (ref 0–20)
MICROALBUMIN/CREAT 24H UR: 5 MG/G CREATININE (ref 0–30)
NONHDLC SERPL-MCNC: 67 MG/DL
POTASSIUM SERPL-SCNC: 4.2 MMOL/L (ref 3.5–5.3)
SODIUM SERPL-SCNC: 143 MMOL/L (ref 136–145)
T4 FREE SERPL-MCNC: 0.95 NG/DL (ref 0.76–1.46)
TRIGL SERPL-MCNC: 109 MG/DL
TSH SERPL DL<=0.05 MIU/L-ACNC: 1.51 UIU/ML (ref 0.36–3.74)

## 2021-02-09 PROCEDURE — 84153 ASSAY OF PSA TOTAL: CPT

## 2021-02-09 PROCEDURE — 84443 ASSAY THYROID STIM HORMONE: CPT

## 2021-02-09 PROCEDURE — 84439 ASSAY OF FREE THYROXINE: CPT

## 2021-02-09 PROCEDURE — 82570 ASSAY OF URINE CREATININE: CPT

## 2021-02-09 PROCEDURE — 80061 LIPID PANEL: CPT

## 2021-02-09 PROCEDURE — 84154 ASSAY OF PSA FREE: CPT

## 2021-02-09 PROCEDURE — 80048 BASIC METABOLIC PNL TOTAL CA: CPT

## 2021-02-09 PROCEDURE — 82043 UR ALBUMIN QUANTITATIVE: CPT

## 2021-02-09 PROCEDURE — 3044F HG A1C LEVEL LT 7.0%: CPT | Performed by: PHYSICIAN ASSISTANT

## 2021-02-09 PROCEDURE — 83036 HEMOGLOBIN GLYCOSYLATED A1C: CPT

## 2021-02-09 PROCEDURE — 3061F NEG MICROALBUMINURIA REV: CPT | Performed by: PHYSICIAN ASSISTANT

## 2021-02-09 PROCEDURE — 36415 COLL VENOUS BLD VENIPUNCTURE: CPT

## 2021-02-09 NOTE — TELEPHONE ENCOUNTER
Patient called the office requesting for the office's fax number for patient to fax disability paperwork to  Provided fax number  Patient said he will fax tomorrow

## 2021-02-09 NOTE — TELEPHONE ENCOUNTER
----- Message from Lucia Lovett PA-C sent at 2/9/2021  1:13 PM EST -----  Please call the patient regarding his abnormal result    A1C 5 6%  Thyroid labs normal  Continue current treatment  Can further discuss at upcoming appointment next month

## 2021-02-11 LAB
PSA FREE MFR SERPL: 17.7 %
PSA FREE SERPL-MCNC: 0.39 NG/ML
PSA SERPL-MCNC: 2.2 NG/ML (ref 0–4)

## 2021-02-12 ENCOUNTER — CONSULT (OUTPATIENT)
Dept: PAIN MEDICINE | Facility: CLINIC | Age: 61
End: 2021-02-12
Payer: COMMERCIAL

## 2021-02-12 VITALS
TEMPERATURE: 98.6 F | WEIGHT: 174 LBS | BODY MASS INDEX: 27.31 KG/M2 | HEART RATE: 67 BPM | HEIGHT: 67 IN | DIASTOLIC BLOOD PRESSURE: 68 MMHG | SYSTOLIC BLOOD PRESSURE: 133 MMHG

## 2021-02-12 DIAGNOSIS — M51.36 DDD (DEGENERATIVE DISC DISEASE), LUMBAR: ICD-10-CM

## 2021-02-12 DIAGNOSIS — M46.1 SACROILIITIS (HCC): ICD-10-CM

## 2021-02-12 DIAGNOSIS — M54.16 LUMBAR RADICULOPATHY: ICD-10-CM

## 2021-02-12 DIAGNOSIS — M43.16 SPONDYLOLISTHESIS OF LUMBAR REGION: ICD-10-CM

## 2021-02-12 DIAGNOSIS — M47.816 LUMBAR SPONDYLOSIS: ICD-10-CM

## 2021-02-12 DIAGNOSIS — C72.0 SPINAL CORD EPENDYMOMA (HCC): Primary | ICD-10-CM

## 2021-02-12 DIAGNOSIS — M51.26 LUMBAR DISC HERNIATION: ICD-10-CM

## 2021-02-12 DIAGNOSIS — M54.40 LOW BACK PAIN WITH SCIATICA, SCIATICA LATERALITY UNSPECIFIED, UNSPECIFIED BACK PAIN LATERALITY, UNSPECIFIED CHRONICITY: ICD-10-CM

## 2021-02-12 PROCEDURE — 99204 OFFICE O/P NEW MOD 45 MIN: CPT | Performed by: ANESTHESIOLOGY

## 2021-02-12 NOTE — PROGRESS NOTES
Assessment  1  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[facility] [address] )    2  DDD (degenerative disc disease), lumbar    3  Sacroiliitis ([facility] [address] )    4  Lumbar disc herniation    5  Lumbar spondylosis    6  Spondylolisthesis of lumbar region    7  Low back pain with sciatica, sciatica laterality unspecified, unspecified back pain laterality, unspecified chronicity    8  Lumbar radiculopathy        Plan   69-year-old male with a history of cervical decompression and fusion for spinal cord ependymoma January 24, 2019, referred by Dr Alisia Multani, presenting for initial consultation regarding a long-standing history of lumbosacral back pain that radiates into the right buttock which occurred approximately 10 years ago when he was lifting a heavy box at work  He does have numbness, paresthesias, and subjective weakness on the right lower extremity with right footdrop for which he wears an AFO  Last MRI of the lumbar spine was from March 2019 revealing degenerative disc disease and spondylosis with a disc protrusion at L3-4 causing some left foraminal stenosis  There is anterolisthesis of L5 on S1 resulting in mild to moderate bilateral foraminal stenosis  No evidence of drop metastasis noted  The patient has done multiple rounds of physical therapy without any significant improvement  He does find some relief with ibuprofen  He is currently taking gabapentin 600 mg b i d  with some relief of the neuropathic symptoms  He also takes duloxetine 60 mg daily  The patient's low back pain seems to be multifactorial including myofascial, facet mediated, and sacroiliac mediated components  May also be a component of lumbar radiculopathy, however I do feel that most of his right lower extremity symptoms are secondary to cervical myelopathy  1  I will order an MRI of the lumbar spine with and without contrast  2  Patient may continue with gabapentin as prescribed  3  Patient may continue with duloxetine as prescribed  4   Patient will continue with ibuprofen p r n  should not exceed more than 2400 mg in 24 hours  5  Patient will continue with his home exercise program  6  May consider right SI joint injection in the future pending results of MRI  7  I will follow up the patient in 4-6 weeks       My impressions and treatment recommendations were discussed in detail with the patient who verbalized understanding and had no further questions  Discharge instructions were provided  I personally saw and examined the patient and I agree with the above discussed plan of care  No orders of the defined types were placed in this encounter  No orders of the defined types were placed in this encounter  History of Present Illness    Patrick Duff III is a 63 y o  male with a history of cervical decompression and fusion for spinal cord ependymoma January 24, 2019, referred by Dr Melchor Lozada, presenting for initial consultation regarding a long-standing history of lumbosacral back pain that radiates into the right buttock which occurred approximately 10 years ago when he was lifting a heavy box at work  He does have numbness, paresthesias, and subjective weakness in the right lower extremity with right footdrop for which he wears an AFO  He does have occasional bladder and bowel incontinence which is centrally stable  Last MRI of the lumbar spine was from March 2019 revealing degenerative disc disease and spondylosis with a disc protrusion at L3-4 causing some left foraminal stenosis  There is anterolisthesis of L5 on S1 resulting in mild to moderate bilateral foraminal stenosis  No evidence of drop metastasis noted  The patient has done multiple rounds of physical therapy without any significant improvement  He does find some relief with ibuprofen  He is currently taking gabapentin 600 mg b i d  with some relief of the neuropathic symptoms  He also takes duloxetine 60 mg daily  The patient rates his pain as 6/10 on the pain is constant    The pain is not follow any particular pattern throughout the day  The pain is described as aching, numbness, and pins and needles  The pain is decreased with sitting and relaxation  The pain is increased with standing, bending, walking, and exercise  He does find some relief with heat and ice application  Other than as stated above, the patient denies any interval changes in medications, medical condition, mental condition, symptoms, or allergies since the last office visit  I have personally reviewed and/or updated the patient's past medical history, past surgical history, family history, social history, current medications, allergies, and vital signs today  Review of Systems   Constitutional: Negative for fever and unexpected weight change  HENT: Negative for trouble swallowing  Eyes: Negative for visual disturbance  Respiratory: Negative for shortness of breath and wheezing  Cardiovascular: Positive for leg swelling  Negative for chest pain and palpitations  Gastrointestinal: Positive for abdominal pain  Negative for constipation, diarrhea, nausea and vomiting  Endocrine: Negative for cold intolerance, heat intolerance and polydipsia  Genitourinary: Negative for difficulty urinating and frequency  Musculoskeletal: Negative for arthralgias, gait problem, joint swelling and myalgias  Skin: Negative for rash  Neurological: Positive for numbness  Negative for dizziness, seizures, syncope, weakness and headaches  Hematological: Does not bruise/bleed easily  Psychiatric/Behavioral: Negative for dysphoric mood  All other systems reviewed and are negative        Patient Active Problem List   Diagnosis    BPH associated with nocturia    Hypothyroidism    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[facility] [address] )    H/O spinal fusion    DDD (degenerative disc disease), lumbar    Gastroesophageal reflux disease without esophagitis    Type 2 diabetes mellitus with hyperglycemia, with long-term current use of insulin (HCC)    Cancer-related pain    Mixed hyperlipidemia    Erectile dysfunction of non-organic origin    Essential hypertriglyceridemia    Elevated bilirubin       Past Medical History:   Diagnosis Date    BPH associated with nocturia     Cervical spinal mass ([facility] [address] ) 2019    cervicothoracic    Disease of thyroid gland     Herniated disc, cervical     History of radiation therapy     Hyperlipidemia     Impaired fasting glucose     Radiation-induced myelopathy ([facility] [address] ) 2019       Past Surgical History:   Procedure Laterality Date    APPENDECTOMY      CERVICAL FUSION      COLONOSCOPY  2012    NORMAL; RECHECK IN 5 YEARS    FL LUMBAR PUNCTURE DIAGNOSTIC  2019    GA BX/EXCIS SPIN PATEL,INDUR,INMED,CERV N/A 2019    Procedure: Posterior C4-T3 laminectomy; resection of intramedullary mass C5-T3, including fenestration of syrinx C7-T2; C3-T4 fixation/fusion; additional levels as needed;  Surgeon: Sylvia Myers MD;  Location: [facility];  Service: Neurosurgery    WISDOM TOOTH EXTRACTION         Family History   Problem Relation Age of Onset    Diabetes Mother     Hypertension Father     Cerebral palsy Sister    Tamara Spaulding Breast cancer Daughter        Social History     Occupational History    Occupation: Disability   Tobacco Use    Smoking status: Former Smoker     Quit date: 1993     Years since quittin 7    Smokeless tobacco: Never Used   Substance and Sexual Activity    Alcohol use: Yes     Comment: rare social    Drug use: No    Sexual activity: Not on file       Current Outpatient Medications on File Prior to Visit   Medication Sig    atorvastatin (LIPITOR) 40 mg tablet TAKE 1 TABLET BY MOUTH EVERY DAY    Dulaglutide (Trulicity) 6 66 FC/4 2TX SOPN Inject 0 5 mL (0 75 mg total) under the skin once a week    DULoxetine (CYMBALTA) 60 mg delayed release capsule TAKE 1 CAPSULE BY MOUTH EVERY DAY    gabapentin (NEURONTIN) 300 mg capsule Take 1 capsule (300 mg total) by mouth 3 (three) times a day    levothyroxine 75 mcg tablet TAKE 1 TABLET BY MOUTH EVERY DAY    metFORMIN (GLUCOPHAGE) 500 mg tablet Take 2 tablets (1000mg) twice a day with meals    omega-3-acid ethyl esters (LOVAZA) 1 g capsule TAKE 2 CAPSULES BY MOUTH EVERY DAY     No current facility-administered medications on file prior to visit  Allergies   Allergen Reactions    Bee Venom Hives, Itching and Edema     Category: Allergy;     Penicillins Hives and Itching     Category: Allergy; Physical Exam    Ht 5' 7" (1 702 m)   Wt 78 9 kg (174 lb)   BMI 27 25 kg/m²     Constitutional: normal, well developed, well nourished, alert, in no distress and non-toxic and no overt pain behavior  Eyes: anicteric  HEENT: grossly intact  Neck: supple, symmetric, trachea midline and no masses   Pulmonary:even and unlabored  Cardiovascular:No edema or pitting edema present  Skin:Normal without rashes or lesions and well hydrated  Psychiatric:Mood and affect appropriate  Neurologic:Cranial Nerves II-XII grossly intact  Musculoskeletal:Ambulates with a cane  Patient also wears AFO on the right  Right lumbar paraspinals and SI joint minimally tender to palpation  Bilateral patellar and left Achilles reflex 3-4  Right Achilles reflex absent  Clonus noted bilaterally  Left lower extremity strength 5/5 in all muscle groups  Right plantar flexion 3-5 strength  Right dorsiflexion and EHL 2/5 strength  Right quadriceps 4-5 strength  Right hamstrings 4-5 strength  Right iliopsoas 5/5 strength  Sensation diminished to light touch in the right lower extremity in no specific dermatomal fashion  Negative straight leg raise bilaterally  Positive Alberto's, Gaenslen's, and AP compression test on the right and negative on the left      Imaging    PACS Images     Show images for XR spine thoracic 3 vw   Study Result    THORACIC SPINE     INDICATION:   Z98 1: Arthrodesis status  C72 0: Malignant neoplasm of spinal cord      COMPARISON:  3/1/2019     VIEWS:  XR SPINE THORACIC 3 VW        FINDINGS:     There is no fracture or pathologic bone lesion      Thoracic vertebral alignment is within normal limits        Thoracic kyphosis with mid mild degenerative change and small spurs      Cervicothoracic C2-T5 hardware fusion is again seen without change       There is no displacement of the paraspinal line       The pedicles appear intact      IMPRESSION:     Stable cervicothoracic fusion      No acute osseous abnormality         Workstation performed: [id number]           PACS Images     Show images for MRI lumbar spine with and without contrast   Study Result    MRI LUMBAR SPINE WITH AND WITHOUT CONTRAST     INDICATION: Cervicothoracic ependymoma        COMPARISON:  11/30/2018      TECHNIQUE:  Sagittal T1, sagittal T2, sagittal inversion recovery, axial T1 and axial T2, coronal T2  Sagittal and axial T1 postcontrast      IV Contrast:  8 mL of gadobutrol injection (MULTI-DOSE)      IMAGE QUALITY:  Diagnostic     FINDINGS:     VERTEBRAL BODIES:  Grade 1 anterior spondylolisthesis of L5 upon S1 with no spondylolysis  No compression fracture  No scoliosis  Mild endplate marrow degenerative change at the L3-L4 levels      SACRUM:  Normal signal within the sacrum  No evidence of insufficiency or stress fracture      DISTAL CORD AND CONUS:  Normal size and signal within the distal cord and conus        PARASPINAL SOFT TISSUES:  Simple renal cysts are noted bilaterally      LOWER THORACIC DISC SPACES:  Normal disc height and signal   No disc herniation, canal stenosis or foraminal narrowing      LUMBAR DISC SPACES:     L1-L2:  Normal      L2-L3:  Normal      L3-L4:  Disc desiccation and loss of disc height with mild annular bulging  There is a small broad-based left foraminal disc protrusion  No canal stenosis  There is stable left foraminal narrowing      L4-L5:  Normal disc height and signal   Mild facet arthropathy    No canal stenosis or foraminal narrowing      L5-S1:  Grade 1 anterior spondylolisthesis of L5 upon S1 on the basis of facet hypertrophic degenerative change  Bilateral facet joint effusions  Slight canal stenosis without impingement of the thecal sac  Moderate left and mild right foraminal   narrowing is stable      POSTCONTRAST IMAGING:  No abnormal enhancement within the central canal or thecal sac to suggest drop metastasis      IMPRESSION:     Stable lumbar degenerative disc disease  Small left foraminal disc protrusion at L3-4 with associated foraminal narrowing      Spondylolisthesis of L5 upon S1 with stable foraminal narrowing               Workstation performed: [id number]           PACS Images     Show images for MRI thoracic spine with and without contrast   Study Result    MRI THORACIC SPINE WITH AND WITHOUT CONTRAST     INDICATION: C71 9: Malignant neoplasm of brain, unspecified      COMPARISON:  1/7/2019     TECHNIQUE:  Sagittal T1, sagittal T2, sagittal inversion recovery, axial T2,  axial 2D MERGE  Sagittal and axial T1 postcontrast      IV Contrast:  8 mL of gadobutrol injection (MULTI-DOSE)      IMAGE QUALITY:  Diagnostic      FINDINGS:     ALIGNMENT:  The patient is status post extensive posterior decompression of the lower cervical and upper thoracic spine with articulating facet and pedicle screws from C2 through T5  Artifact from hardware limits the immediately adjacent soft tissues      Stable alignment of the cervical and thoracic spine  Slight anterior subluxation of C7 upon T1  No compression fracture  No scoliosis      MARROW SIGNAL:  Normal marrow signal is identified within the visualized bony structures  No discrete marrow lesion      THORACIC CORD:  There is heterogeneous signal identified within the lower cervical cord and upper thoracic cord  The previously seen cystic component of the tumor has been decrease crest   Tumors superior extent is to the C5 superior endplate    The inferior extent of the tumor is to the T3 inferior endplate  There is very little peripheral cord tissue identified at the T1 and T2 levels  Postcontrast imaging limited by artifact from internal hardware  Minimal peripheral enhancement noted in the   upper thoracic spine      PREVERTEBRAL AND PARASPINAL SOFT TISSUES:   Expected postoperative change at the cervicothoracic decompression posteriorly without mass effect upon the thecal sac  There is fluid posterior to the musculature within the subcutaneous fat suggesting postop   seroma      THORACIC DEGENERATIVE CHANGE:  Below the T5 level there is no disc herniation, canal stenosis or foraminal narrowing      IMPRESSION:     Extensive cervical thoracic fusion and posterior decompression  The previously identified cystic cavity has been decompressed within the upper thoracic spine  The cord tumor extends superiorly to the superior endplate of C5 and inferiorly to the   inferior endplate of T3      Small postoperative seroma within the subcutaneous fat posteriorly at the operative levels            Workstation performed: [id number]     PACS Images     Show images for CT thoracic spine without contrast   Study Result    CT THORACIC SPINE     INDICATION:   D49 2: Neoplasm of unspecified behavior of bone, soft tissue, and skin      COMPARISON:  MRI thoracic spine 1/7/2019     TECHNIQUE:  Contiguous axial images were obtained  Sagittal and coronal reconstructions were performed        Radiation dose length product (DLP) for this visit:  705 mGy-cm   This examination, like all CT scans performed in the [facility], was performed utilizing techniques to minimize radiation dose exposure, including the use of iterative   reconstruction and automated exposure control         IMAGE QUALITY:  Diagnostic      FINDINGS:     ALIGNMENT:  Normal alignment of the thoracic spine   No subluxation      VERTEBRAL BODIES: No fracture      DEGENERATIVE CHANGES: Mild multilevel degenerative changes      PARASPINAL SOFT TISSUES:  Known cervicothoracic cord tumor on previous MRI is not well visualized without contrast      2-3 mm right apical pulmonary nodule  Mild basilar hypoventilatory changes      Mild fatty involution of the pancreas without acute findings  No adrenal nodules detected      IMPRESSION:     No fracture or bony destruction    Mild thoracic degenerative changes      Known cervicothoracic cord tumor on previous MRI is not well visualized on this noncontrast study      2-3 mm right apical pulmonary nodule       Workstation performed: [id number]

## 2021-02-12 NOTE — PATIENT INSTRUCTIONS
Chronic Back Pain   WHAT YOU NEED TO KNOW:   What is chronic back pain? Chronic back pain is back pain that lasts 3 months or longer  This may include pain that has not been controlled or does not improve with treatment  Your back pain may cause weakness or pain that spreads to your arms or legs  What causes or increases my risk for chronic back pain? Conditions that affect the spine, joints, or muscles can cause back pain  These may include arthritis, spinal stenosis (narrowing of the spinal column), muscle tension, or breakdown of the spinal discs  The following increase your risk for back pain:  · Aging    · Lack of regular physical activity     · Repeated bending, lifting, or twisting, or lifting heavy items    · Obesity or pregnancy     · Injury from a fall or accident    · Driving, sitting, or standing for long periods    · Bad posture while sitting or standing    How is chronic back pain diagnosed? Your healthcare provider will ask if you have any medical conditions  He or she may ask if you have a history of back pain and how it started  He or she may watch you stand and walk, and check your range of motion  Show him or her where you feel pain and what makes it better or worse  Describe the pain, how bad it is, and how long it lasts  Tell your provider if your pain worsens at night or when you lie on your back  How is chronic back pain treated? · NSAIDs  help decrease swelling and pain or fever  This medicine is available with or without a doctor's order  NSAIDs can cause stomach bleeding or kidney problems in certain people  If you take blood thinner medicine, always ask your healthcare provider if NSAIDs are safe for you  Always read the medicine label and follow directions  · Acetaminophen  decreases pain and fever  It is available without a doctor's order  Ask how much to take and how often to take it  Follow directions   Read the labels of all other medicines you are using to see if they also contain acetaminophen, or ask your doctor or pharmacist  Acetaminophen can cause liver damage if not taken correctly  Do not use more than 4 grams (4,000 milligrams) total of acetaminophen in one day  · Prescription pain medicine  called narcotics or opioids may be given for certain types of chronic pain  Ask your healthcare provider how to take this medicine safely  · Muscle relaxers  help decrease pain and muscle spasms  · Steroids  decrease inflammation that causes pain  · Anesthetic  medicines may be injected in or around a nerve to block pain signals from the nerves  · Antidepressants  may be used to help decrease or prevent the symptoms of depression or anxiety  They are also used to treat nerve pain  How can I manage my symptoms? · Apply ice for 15 to 20 minutes every hour, or as directed  Use an ice pack, or put crushed ice in a plastic bag  Cover it with a towel before you apply it to your skin  Ice decreases pain and helps prevent tissue damage  · Apply heat for 20 to 30 minutes every 2 hours, or as directed  Heat helps decrease pain and muscle spasms  · Use massage to loosen tense muscles  Massage may relieve back pain caused by tight muscles  Regular massages may help prevent this kind of back pain  · Ask about acupuncture for pain relief  Back pain is sometimes relieved with acupuncture  Talk to your healthcare provider before you get this treatment to make sure it is safe for you  What else can I do to relieve or prevent back pain? · Manage stress  Stress can cause back pain or make it worse  Some ways to reduce stress are listening to music, meditating, or using aromatherapy  It may help to talk with a therapist about anything that is causing you stress  Your healthcare provider can give you more information  · Stay active as much as you can without causing more pain  Ask your healthcare provider what exercises are right for you   Do not sit or lie down for long periods  This could make your back pain worse  Yoga or similar gentle movements may help relieve pain and tension in your back  Go slowly and do not strain your back as you do any movement  · Be careful when you lift heavy objects  Do not lift anything heavy until your pain is gone  Never strain your back when you lift a heavy item  If possible, ask someone to help you  · Go to physical therapy as directed  A physical therapist can teach you exercises to help improve movement and strength, and to decrease pain  When should I call my doctor? · You have severe pain  · You have new numbness, tingling, or weakness, especially in your lower back, legs, arms, or genital area  · You lose control of your bladder or bowel movements  · You have a fever or sudden weight loss  · You have new or worse pain  · You have questions or concerns about your condition or care  CARE AGREEMENT:   You have the right to help plan your care  Learn about your health condition and how it may be treated  Discuss treatment options with your healthcare providers to decide what care you want to receive  You always have the right to refuse treatment  The above information is an  only  It is not intended as medical advice for individual conditions or treatments  Talk to your doctor, nurse or pharmacist before following any medical regimen to see if it is safe and effective for you  © Copyright [address] Information is for End User's use only and may not be sold, redistributed or otherwise used for commercial purposes   All illustrations and images included in CareNotes® are the copyrighted property of [employer]  or [address] [place] [address]

## 2021-02-17 ENCOUNTER — TELEPHONE (OUTPATIENT)
Dept: NEUROSURGERY | Facility: CLINIC | Age: 61
End: 2021-02-17

## 2021-02-17 NOTE — TELEPHONE ENCOUNTER
Received FMLA/Disability paperwork from patient, called patient to make him aware we do not fill out this paperwork since his surgery was back in Jan 2019 and we only cover him for the 6 week period  Made him also aware he can contact his PCP to complete or we can refer him to physiatry to complete  Patient aware  He also requested the MRI C-Spine report faxed to [facility] at [phone] Claim # [id number] which I will fax over since we are the ordering provider  Asked patient to call back if he would like us to refer him to physiatry

## 2021-02-24 ENCOUNTER — OFFICE VISIT (OUTPATIENT)
Dept: PODIATRY | Facility: CLINIC | Age: 61
End: 2021-02-24
Payer: COMMERCIAL

## 2021-02-24 VITALS
BODY MASS INDEX: 27.41 KG/M2 | HEART RATE: 79 BPM | WEIGHT: 175 LBS | DIASTOLIC BLOOD PRESSURE: 67 MMHG | SYSTOLIC BLOOD PRESSURE: 108 MMHG

## 2021-02-24 DIAGNOSIS — E11.65 TYPE 2 DIABETES MELLITUS WITH HYPERGLYCEMIA, WITH LONG-TERM CURRENT USE OF INSULIN (HCC): Primary | ICD-10-CM

## 2021-02-24 DIAGNOSIS — Z79.4 TYPE 2 DIABETES MELLITUS WITH HYPERGLYCEMIA, WITH LONG-TERM CURRENT USE OF INSULIN (HCC): Primary | ICD-10-CM

## 2021-02-24 PROCEDURE — 99203 OFFICE O/P NEW LOW 30 MIN: CPT | Performed by: PODIATRIST

## 2021-02-26 NOTE — PATIENT INSTRUCTIONS
Foot Care for People with Diabetes   WHAT YOU NEED TO KNOW:   · Foot care helps protect your feet and prevent foot ulcers or sores  Long-term high blood sugar levels can damage the blood vessels and nerves in your legs and feet  This damage makes it hard to feel pressure, pain, temperature, and touch  You may not be able to feel a cut or sore, or shoes that are too tight  Foot care is needed to prevent serious problems, such as an infection or amputation  · Diabetes may cause your toes to become crooked or curved under  These changes may affect the way you walk and can lead to increased pressure on your foot  The pressure can decrease blood flow to your feet  Lack of blood flow increases your risk for a foot ulcer  Do not ignore small problems, such as dry skin or small wounds  These can become life-threatening over time without proper care  DISCHARGE INSTRUCTIONS:   Call your care team provider if:   · Your feet become numb, weak, or hard to move  · You have pus draining from a sore on your foot  · You have a wound on your foot that gets bigger, deeper, or does not heal      · You see blisters, cuts, scratches, calluses, or sores on your foot  · You have a fever, and your feet become red, warm, and swollen  · Your toenails become thick, curled, or yellow  · You find it hard to check your feet because your vision is poor  · You have questions or concerns about your condition or care  Foot care:   · Check your feet each day  Look at your whole foot, including the bottom, and between and under your toes  Check for wounds, corns, and calluses  Use a mirror to see the bottom of your feet  The skin on your feet may be shiny, tight, or darker than normal  Your feet may also be cold and pale  Feel your feet by running your hands along the tops, bottoms, sides, and between your toes  Redness, swelling, and warmth are signs of blood flow problems that can lead to a foot ulcer   Do not try to remove corns or calluses yourself  · Wash your feet each day with soap and warm water  Do not use hot water, because this can injure your foot  Dry your feet gently with a towel after you wash them  Dry between and under your toes  · Apply lotion or a moisturizer on your dry feet  Ask your care team provider what lotions are best to use  Do not put lotion or moisturizer between your toes  Moisture between your toes could lead to skin breakdown  · Cut your toenails correctly  File or cut your toenails straight across  Use a soft brush to clean around your toenails  If your toenails are very thick, you may need to have a care team provider or specialist cut them  · Protect your feet  Do not walk barefoot or wear your shoes without socks  Check your shoes for rocks or other objects that can hurt your feet  Wear cotton socks to help keep your feet dry  Wear socks without toe seams, or wear them with the seams inside out  Change your socks each day  Do not wear socks that are dirty or damp  · Wear shoes that fit well  Wear shoes that do not rub against any area of your feet  Your shoes should be ½ to ¾ inch (1 to 2 centimeters) longer than your feet  Your shoes should also have extra space around the widest part of your feet  Walking or athletic shoes with laces or straps that adjust are best  Ask your care team provider for help to choose shoes that fit you best  Ask him or her if you need to wear an insert, orthotic, or bandage on your feet  · Do not smoke  Smoking can damage your blood vessels and put you at increased risk for foot ulcers  Ask your care team provider for information if you currently smoke and need help to quit  E-cigarettes or smokeless tobacco still contain nicotine  Talk to your care team provider before you use these products  Follow up with your diabetes care team provider or foot specialist as directed:   You will need to have your feet checked at least once a alok Middletonarabella Lucio may need a foot exam more often if you have nerve damage, foot deformities, or ulcers  Write down your questions so you remember to ask them during your visits  © Copyright [address] Information is for End User's use only and may not be sold, redistributed or otherwise used for commercial purposes  All illustrations and images included in CareNotes® are the copyrighted property of [employer]  or [facility] [place] Evin Bates   The above information is an  only  It is not intended as medical advice for individual conditions or treatments  Talk to your doctor, nurse or pharmacist before following any medical regimen to see if it is safe and effective for you

## 2021-02-26 NOTE — PROGRESS NOTES
This patient was seen on 2/24/21  My role is Foot , Ankle, and Wound Specialist    SUBJECTIVE    Chief Complaint:  Annual diabetic foot examination  Patient ID: Titi Gutierrez is a 57 y o  male  Yuriy Busch is here today for an annual diabetic risk assessment  He has a history of spine surgery  He feels his diabetes in under good control currently  He denies barefoot walking in the home  No history of foot ulcers nor amputations  The following portions of the patient's history were reviewed and updated as appropriate: allergies, current medications, past family history, past medical history, past social history, past surgical history and problem list     Review of Systems   Constitutional: Negative  Respiratory: Negative  Cardiovascular: Negative  Gastrointestinal: Negative  Musculoskeletal: Positive for back pain and gait problem  Psychiatric/Behavioral: Negative  OBJECTIVE      /67   Pulse 79   Wt 79 4 kg (175 lb)   BMI 27 41 kg/m²     Foot/Ankle Musculoskeletal Exam    General      Neurological: alert    Inspection      Inspection - Right        Right foot/ankle inspection is normal        Inspection - Left        Left foot/ankle inspection is normal       Neurovascular      Neurovascular - Right        Dorsalis pedis: 3+      Posterior tibial: 2+      Neurovascular - Left        Dorsalis pedis: 3+      Posterior tibial: 2+       Physical Exam  Vitals signs and nursing note reviewed  Constitutional:       General: He is not in acute distress  Appearance: Normal appearance  He is not ill-appearing or toxic-appearing  HENT:      Head: Normocephalic  Cardiovascular:      Rate and Rhythm: Normal rate  Pulses: no weak pulses          Dorsalis pedis pulses are 3+ on the right side and 3+ on the left side  Posterior tibial pulses are 2+ on the right side and 2+ on the left side     Pulmonary:      Effort: Pulmonary effort is normal    Abdominal: General: Bowel sounds are normal    Feet:      Right foot:      Protective Sensation: 10 sites tested  10 sites sensed  Skin integrity: No ulcer, skin breakdown, erythema, warmth, callus or dry skin  Left foot:      Protective Sensation: 10 sites tested  10 sites sensed  Skin integrity: No ulcer, skin breakdown, erythema, warmth, callus or dry skin  Skin:     General: Skin is warm and dry  Capillary Refill: Capillary refill takes less than 2 seconds  Neurological:      General: No focal deficit present  Mental Status: He is alert and oriented to person, place, and time  Psychiatric:         Mood and Affect: Mood normal          Behavior: Behavior normal          Diabetic Foot Exam    Patient's shoes and socks removed  Right Foot/Ankle   Right Foot Inspection  Skin Exam: skin normal and skin intact no dry skin, no warmth, no callus, no erythema, no maceration, no abnormal color, no pre-ulcer, no ulcer and no callus                          Toe Exam: ROM and strength within normal limits  Sensory   Vibration: intact  Proprioception: intact   Monofilament testing: intact  Vascular  Capillary refills: < 3 seconds  The right DP pulse is 3+  The right PT pulse is 2+  Left Foot/Ankle  Left Foot Inspection  Skin Exam: skin normal and skin intactno dry skin, no warmth, no erythema, no maceration, normal color, no pre-ulcer, no ulcer and no callus                         Toe Exam: ROM and strength within normal limits                   Sensory   Vibration: intact  Proprioception: intact  Monofilament: intact  Vascular  Capillary refills: < 3 seconds  The left DP pulse is 3+  The left PT pulse is 2+  Assign Risk Category:  No deformity present; No loss of protective sensation;  No weak pulses       Risk: 0      ASSESSMENT     Diagnoses and all orders for this visit:    Type 2 diabetes mellitus with hyperglycemia, with long-term current use of insulin ([facility] [address] )         Problem List Items Addressed This Visit        Endocrine    Type 2 diabetes mellitus with hyperglycemia, with long-term current use of insulin (HCC) - Primary            Problems:    Stable Chronic Problem Addressed:  1  Diabetes with low risk for foot complications      PLAN    Discussion of Management /  Recommendations    High risk  foot precautions reviewed with patient including the need to wear protective shoegear at all times when walking including in the home, the need to check feet all surfaces daily with a mirror and report and skin breaks to podiatry, the need to apply an emollient to skin of feet daily  Risk of Complications and/or Morbidity or Mortality discussed with patient  1  The lifetime risk of developing a foot ulcer for someone with diabetes is 25%  [iv]  2  Every year, about 1-4% of people with diabetes develop a new foot ulcer  [iv]      iv Pascale David , Brock, PRAKASH , Lyndsay Jenkins , and Gavino MCKEON , Foot Ulcers in the Diabetic Patient, Prevention and Treatment  Vascular Health Risk Management  2007 Feb; 3(1): 65-76  v MARYLOU Anderson , RASHEED Sparks , MIKE Walker , Chevy N , Symone T T , Risk factors for lower extremity amputation in patients with diabetic foot ulcers: a hospital-based case-control study   Diabetic Foot & Ankle, 2015  6:10 [id number]

## 2021-02-27 ENCOUNTER — HOSPITAL ENCOUNTER (OUTPATIENT)
Dept: RADIOLOGY | Facility: HOSPITAL | Age: 61
Discharge: HOME | End: 2021-02-27
Attending: ANESTHESIOLOGY
Payer: COMMERCIAL

## 2021-02-27 DIAGNOSIS — M43.16 SPONDYLOLISTHESIS OF LUMBAR REGION: ICD-10-CM

## 2021-02-27 DIAGNOSIS — M54.40 LOW BACK PAIN WITH SCIATICA, SCIATICA LATERALITY UNSPECIFIED, UNSPECIFIED BACK PAIN LATERALITY, UNSPECIFIED CHRONICITY: ICD-10-CM

## 2021-02-27 DIAGNOSIS — C72.0 SPINAL CORD EPENDYMOMA (HCC): ICD-10-CM

## 2021-02-27 PROCEDURE — A9585 GADOBUTROL INJECTION: HCPCS | Performed by: ANESTHESIOLOGY

## 2021-02-27 PROCEDURE — G1004 CDSM NDSC: HCPCS

## 2021-02-27 PROCEDURE — 72158 MRI LUMBAR SPINE W/O & W/DYE: CPT

## 2021-02-27 RX ADMIN — GADOBUTROL 7 ML: 604.72 INJECTION INTRAVENOUS at 14:33

## 2021-03-02 ENCOUNTER — CONSULT (OUTPATIENT)
Dept: GASTROENTEROLOGY | Facility: CLINIC | Age: 61
End: 2021-03-02
Payer: COMMERCIAL

## 2021-03-02 VITALS
BODY MASS INDEX: 27.53 KG/M2 | WEIGHT: 175.4 LBS | TEMPERATURE: 96.4 F | HEART RATE: 75 BPM | SYSTOLIC BLOOD PRESSURE: 112 MMHG | DIASTOLIC BLOOD PRESSURE: 71 MMHG | HEIGHT: 67 IN

## 2021-03-02 DIAGNOSIS — Z12.11 SCREENING FOR COLON CANCER: ICD-10-CM

## 2021-03-02 PROCEDURE — 99203 OFFICE O/P NEW LOW 30 MIN: CPT | Performed by: INTERNAL MEDICINE

## 2021-03-02 RX ORDER — POLYETHYLENE GLYCOL 3350 17 G/17G
POWDER, FOR SOLUTION ORAL
Qty: 238 G | Refills: 0 | Status: SHIPPED | OUTPATIENT
Start: 2021-03-02 | End: 2021-04-08 | Stop reason: ALTCHOICE

## 2021-03-02 NOTE — PROGRESS NOTES
[facility] - Outpatient Consultation  Michelle Glass III 57 y o  male MRN: [id number]  Encounter: [id number]          ASSESSMENT AND PLAN:        1  Screening for colon cancer  Ambulatory referral to Gastroenterology    Colonoscopy    polyethylene glycol (GLYCOLAX) 17 GM/SCOOP powder         Will proceed with screening colonoscopy  The rationale for colonoscopy with possible biopsy, possible polypectomy, with IV sedation as well as all risks, benefits, and alternatives were discussed with the patient in detail  Risks including but not limited to perforation, bleeding, need for blood transfusion or emergent surgery, and missed neoplasm were reviewed in detail with the patient  The patient demonstrates full understanding and wishes to proceed with the colonoscopy   ______________________________________________________________    HPI:  Nahomy Fox is a 57y o  year old male who presents to the office for evaluation for colorectal cancer screening  He had colonoscopy in 2011/2012, he was told to repeat in 10 years, he is here 9-10 years later  Reported symptoms: none  Family history: no known risk factors    No blood in stool  No weight loss that was unintentional (lost weight as he had diabetes, and his diabetes is now well controlled)  No family history of colon cancer  No change in bowel habits  They are not on blood thinners  REVIEW OF SYSTEMS:    CONSTITUTIONAL: Denies any fever, chills, rigors, and weight loss  HEENT: No earache or tinnitus  Denies hearing loss or visual disturbances  CARDIOVASCULAR: No chest pain or palpitations  RESPIRATORY: Denies any cough, hemoptysis, shortness of breath or dyspnea on exertion  GASTROINTESTINAL: As noted in the History of Present Illness  GENITOURINARY: No problems with urination  Denies any hematuria or dysuria  NEUROLOGIC: No dizziness or vertigo, denies headaches     MUSCULOSKELETAL: Denies any muscle or joint pain    SKIN: Denies skin rashes or itching  ENDOCRINE: Denies excessive thirst  Denies intolerance to heat or cold  PSYCHOSOCIAL: Denies depression or anxiety  Denies any recent memory loss         Historical Information   Past Medical History:   Diagnosis Date    BPH associated with nocturia     Cervical spinal mass ([facility] [address] ) 2019    cervicothoracic    Disease of thyroid gland     Herniated disc, cervical     History of radiation therapy     Hyperlipidemia     Impaired fasting glucose     Radiation-induced myelopathy ([facility] [address] ) 2019     Past Surgical History:   Procedure Laterality Date    APPENDECTOMY      CERVICAL FUSION      COLONOSCOPY  2012    NORMAL; RECHECK IN 5 YEARS    FL LUMBAR PUNCTURE DIAGNOSTIC  2019    OK BX/EXCIS SPIN PATEL,INDUR,INMED,CERV N/A 2019    Procedure: Posterior C4-T3 laminectomy; resection of intramedullary mass C5-T3, including fenestration of syrinx C7-T2; C3-T4 fixation/fusion; additional levels as needed;  Surgeon: Yasir Lyons MD;  Location: [facility];  Service: Neurosurgery    WISDOM TOOTH EXTRACTION       Social History   Social History     Substance and Sexual Activity   Alcohol Use Yes    Comment: rare social     Social History     Substance and Sexual Activity   Drug Use No     Social History     Tobacco Use   Smoking Status Former Smoker    Quit date: 1993    Years since quittin 8   Smokeless Tobacco Never Used     Family History   Problem Relation Age of Onset    Diabetes Mother     Hypertension Father     Cerebral palsy Sister     Breast cancer Daughter        Meds/Allergies       Current Outpatient Medications:     atorvastatin (LIPITOR) 40 mg tablet    Dulaglutide (Trulicity) 2 03 /9 7ZJ SOPN    DULoxetine (CYMBALTA) 60 mg delayed release capsule    gabapentin (NEURONTIN) 300 mg capsule    levothyroxine 75 mcg tablet    metFORMIN (GLUCOPHAGE) 500 mg tablet    omega-3-acid ethyl esters (LOVAZA) 1 g capsule    Allergies   Allergen Reactions    Bee Venom Hives, Itching and Edema     Category: Allergy;     Penicillins Hives and Itching     Category: Allergy;            Objective     Blood pressure 112/71, pulse 75, temperature (!) 96 4 °F (35 8 °C), temperature source Tympanic, height 5' 7" (1 702 m), weight 79 6 kg (175 lb 6 4 oz)  Body mass index is 27 47 kg/m²  PHYSICAL EXAM:      General Appearance:   Alert, cooperative, no distress   HEENT:   Normocephalic, atraumatic, anicteric  Lungs:   Equal chest rise and unlabored breathing, normal cough   Heart:   No visualized JVD   Abdomen:   Soft, non-tender, non-distended; no masses, no organomegaly    Genitalia:   Deferred    Rectal:   Deferred    Extremities:  No cyanosis, clubbing or edema    Pulses:  Musculoskeletal:  2+ and symmetric  Normal range of motion visualized    Skin:  Neuro:  No jaundice, rashes, or lesions   Alert and appropriate       Lab Results:   No visits with results within 1 Day(s) from this visit  Latest known visit with results is:   Lab on 02/09/2021   Component Date Value    Hemoglobin A1C 02/09/2021 5 6     EAG 02/09/2021 114     Sodium 02/09/2021 143     Potassium 02/09/2021 4 2     Chloride 02/09/2021 109*    CO2 02/09/2021 31     ANION GAP 02/09/2021 3*    BUN 02/09/2021 21     Creatinine 02/09/2021 0 75     Glucose, Fasting 02/09/2021 102*    Calcium 02/09/2021 9 5     eGFR 02/09/2021 99     Creatinine, Ur 02/09/2021 165 0     Microalbum  ,U,Random 02/09/2021 8 8     Microalb Creat Ratio 02/09/2021 5     Free T4 02/09/2021 0 95     TSH 3RD GENERATON 02/09/2021 1 510     Cholesterol 02/09/2021 101     Triglycerides 02/09/2021 109     HDL, Direct 02/09/2021 34*    LDL Calculated 02/09/2021 45     Non-HDL-Chol (CHOL-HDL) 02/09/2021 67     Prostate Specific Antige* 02/09/2021 2 2     PSA, Free 02/09/2021 0 39     PSA, Free Pct 02/09/2021 17 7          Radiology Results:   No results found

## 2021-03-02 NOTE — PATIENT INSTRUCTIONS
Colon on 4/8/21 with Dr Olayinka Calderon at [facility]     Miralax/dulcolax prep instructions given by Karol Lopez

## 2021-03-08 ENCOUNTER — TELEPHONE (OUTPATIENT)
Dept: ENDOCRINOLOGY | Facility: CLINIC | Age: 61
End: 2021-03-08

## 2021-03-10 DIAGNOSIS — Z23 ENCOUNTER FOR IMMUNIZATION: ICD-10-CM

## 2021-03-11 DIAGNOSIS — E11.65 TYPE 2 DIABETES MELLITUS WITH HYPERGLYCEMIA, WITHOUT LONG-TERM CURRENT USE OF INSULIN (HCC): Primary | ICD-10-CM

## 2021-03-11 RX ORDER — METFORMIN HYDROCHLORIDE 500 MG/1
TABLET, EXTENDED RELEASE ORAL
Qty: 360 TABLET | Refills: 1 | Status: SHIPPED | OUTPATIENT
Start: 2021-03-11 | End: 2021-09-03 | Stop reason: SDUPTHER

## 2021-03-12 ENCOUNTER — OFFICE VISIT (OUTPATIENT)
Dept: PAIN MEDICINE | Facility: CLINIC | Age: 61
End: 2021-03-12
Payer: COMMERCIAL

## 2021-03-12 VITALS
BODY MASS INDEX: 27.31 KG/M2 | DIASTOLIC BLOOD PRESSURE: 62 MMHG | WEIGHT: 174 LBS | HEIGHT: 67 IN | TEMPERATURE: 98.5 F | HEART RATE: 74 BPM | SYSTOLIC BLOOD PRESSURE: 111 MMHG

## 2021-03-12 DIAGNOSIS — M47.816 LUMBAR SPONDYLOSIS: ICD-10-CM

## 2021-03-12 DIAGNOSIS — M51.36 DDD (DEGENERATIVE DISC DISEASE), LUMBAR: ICD-10-CM

## 2021-03-12 DIAGNOSIS — M46.1 SACROILIITIS (HCC): Primary | ICD-10-CM

## 2021-03-12 DIAGNOSIS — M48.061 SPINAL STENOSIS OF LUMBAR REGION WITHOUT NEUROGENIC CLAUDICATION: ICD-10-CM

## 2021-03-12 PROCEDURE — 99214 OFFICE O/P EST MOD 30 MIN: CPT | Performed by: NURSE PRACTITIONER

## 2021-03-12 NOTE — PROGRESS NOTES
Assessment:  1  Sacroiliitis ([address] ) - Right    2  DDD (degenerative disc disease), lumbar    3  Lumbar spondylosis    4  Spinal stenosis of lumbar region without neurogenic claudication        Plan:  1  Based on patient report and physical exam, the patient's symptomatology does seem to be consistent with sacroiliac mediated pain from sacroiliitis  We will schedule the patient for a right SIJ injection to decrease any inflammatory component of the patient's pain symptoms  Complete risks and benefits including bleeding, infection, tissue reaction, nerve injury and allergic reaction were discussed  The patient was agreeable and verbalized an understanding  2  The patient may continue ibuprofen p r n  and should not exceed more than 800 mg q 8 hours p r n   3  The patient may continue duloxetine 60 mg daily as prescribed  4  The patient may continue Tylenol p r n  and should not exceed more than 3000 mg in 24 hours   5  The patient is not interested in physical therapy at this time  6  May consider lumbar medial branch blocks depending on patient's response to SI joint injection  7  The patient will follow-up 4 weeks after the procedure or sooner if needed     M*Modal software was used to dictate this note  It may contain errors with dictating incorrect words or incorrect spelling  Please contact the provider directly with any questions  History of Present Illness: The patient is a 57 y o  male  With a 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 last seen on 2/12/21 who presents for a follow up office visit in regards to chronic lumbosacral back pain, right greater than left  The patient denies any radiating pain into the lower extremities, bowel or bladder incontinence or saddle anesthesia  He does complain of weakness in the right lower extremity and has right footdrop for which he wears an AFO brace  He does have occasional bladder and bowel incontinence   The patient states that has back mostly bothers him with activity, or when he is bending forward for a long period of time  Updated MRI of the lumbar spine reveals multilevel lumbar spondylosis from L2-3 to L5-S1, degenerative disc disease, mild left foraminal narrowing at L3-4, mild right foraminal narrowing at L4-5, and mild left foraminal narrowing at L5-S1  Patient states he has completed numerous rounds of physical therapy in the past without much relief  He is managing his pain with ibuprofen p r n  He is also on duloxetine 60 mg daily and gabapentin 600 mg twice a day  the patient rates his pain a 3/10 on the numeric pain rating scale  He states the pain is constant nature and bothersome in the evening  He characterizes the pain as throbbing, and pressure like  I have personally reviewed and/or updated the patient's past medical history, past surgical history, family history, social history, current medications, allergies, and vital signs today  Review of Systems:    Review of Systems   Respiratory: Negative for shortness of breath  Cardiovascular: Negative for chest pain  Gastrointestinal: Negative for constipation, diarrhea, nausea and vomiting  Musculoskeletal: Negative for arthralgias, gait problem, joint swelling and myalgias  Skin: Negative for rash  Neurological: Negative for dizziness, seizures and weakness  All other systems reviewed and are negative          Past Medical History:   Diagnosis Date    BPH associated with nocturia     Cervical spinal [facility] ([facility] [address] ) 01/07/2019    cervicothoracic    Disease of thyroid gland     Herniated disc, cervical     History of radiation therapy     Hyperlipidemia     Impaired fasting glucose     Radiation-induced myelopathy ([facility] [address] ) 6/19/2019       Past Surgical History:   Procedure Laterality Date    APPENDECTOMY      CERVICAL FUSION      COLONOSCOPY  03/13/2012    NORMAL; RECHECK IN 5 YEARS    FL LUMBAR PUNCTURE DIAGNOSTIC 2019    [place] BX/EXCIS SPIN PATEL,INDUR,INMED,CERV N/A 2019    Procedure: Posterior C4-T3 laminectomy; resection of intramedullary mass C5-T3, including fenestration of syrinx C7-T2; C3-T4 fixation/fusion; additional levels as needed;  Surgeon: Efren Farnsworth MD;  [facility];  Service: Neurosurgery    WISDOM TOOTH EXTRACTION         Family History   Problem Relation Age of Onset    Diabetes Mother     Hypertension Father     Cerebral palsy Sister     Breast cancer Daughter        Social History     Occupational History    Occupation: Disability   Tobacco Use    Smoking status: Former Smoker     Quit date: 1993     Years since quittin 8    Smokeless tobacco: Never Used   Substance and Sexual Activity    Alcohol use: Yes     Comment: rare social    Drug use: No    Sexual activity: Not on file         Current Outpatient Medications:     atorvastatin (LIPITOR) 40 mg tablet, TAKE 1 TABLET BY MOUTH EVERY DAY, Disp: 90 tablet, Rfl: 1    Dulaglutide (Trulicity) 3 62 ZL/4 9XC SOPN, Inject 0 5 mL (0 75 mg total) under the skin once a week, Disp: 4 pen, Rfl: 5    DULoxetine (CYMBALTA) 60 mg delayed release capsule, TAKE 1 CAPSULE BY MOUTH EVERY DAY, Disp: 90 capsule, Rfl: 1    gabapentin (NEURONTIN) 300 mg capsule, Take 1 capsule (300 mg total) by mouth 3 (three) times a day, Disp: 270 capsule, Rfl: 3    levothyroxine 75 mcg tablet, TAKE 1 TABLET BY MOUTH EVERY DAY, Disp: 90 tablet, Rfl: 1    metFORMIN (GLUCOPHAGE) 500 mg tablet, Take 2 tablets (1000mg) twice a day with meals, Disp: 360 tablet, Rfl: 1    metFORMIN (GLUCOPHAGE-XR) 500 mg 24 hr tablet, TAKE 2 TABLETS BY MOUTH TWICE A DAY WITH MEALS, Disp: 360 tablet, Rfl: 1    omega-3-acid ethyl esters (LOVAZA) 1 g capsule, TAKE 2 CAPSULES BY MOUTH EVERY DAY, Disp: 180 capsule, Rfl: 1    polyethylene glycol (GLYCOLAX) 17 GM/SCOOP powder, At 5pm take 5mgx2 dulcolax  At 6pm 32oz miralax in 64oz gatorade   Drink 8oz glass every 5 mins until 32oz finished  Drink remaining as rec , Disp: 238 g, Rfl: 0    Allergies   Allergen Reactions    Bee Venom Hives, Itching and Edema     Category: Allergy;     Penicillins Hives and Itching     Category: Allergy; Physical Exam:    /62   Pulse 74   Temp 98 5 °F (36 9 °C)   Ht 5' 7" (1 702 m)   Wt 78 9 kg (174 lb)   BMI 27 25 kg/m²     Constitutional:normal, well developed, well nourished, alert, in no distress and non-toxic and no overt pain behavior  Eyes:anicteric  HEENT:grossly intact  Neck:supple, symmetric, trachea midline and no masses   Pulmonary:even and unlabored  Cardiovascular:No edema or pitting edema present  Skin:Normal without rashes or lesions and well hydrated  Psychiatric:Mood and affect appropriate  Neurologic:Cranial Nerves II-XII grossly intact  Musculoskeletal:antalgic  Gait, ambulates with a cane  AFO brace present on right lower extremity  Right lumbar paraspinal musculature mildly tender to palpation  Right SI joint mildly tender to palpation  Bilateral lower extremity strength 5/5 in all muscle groups with the exception of right  Hamstring strength which is 4/5, and dorsiflexion and plantar flexion which are 3/5  Negative straight leg raise bilaterally  Positive Alberto's, Gaenslen's and AP compression test on the right and negative on the left  Positive lumbar facet loading maneuvers      Imaging  FL spine and pain procedure    (Results Pending)     MRI LUMBAR SPINE WITH AND WITHOUT CONTRAST     INDICATION: C72 0: Malignant neoplasm of spinal cord  M43 16: Spondylolisthesis, lumbar region  M54 40: Lumbago with sciatica, unspecified side  History of ependymoma  Evaluate for drop metastases      COMPARISON:  3/5/2019     TECHNIQUE:  Sagittal T1, sagittal T2, sagittal inversion recovery, axial T1 and axial T2, coronal T2    Sagittal and axial T1 postcontrast      IV Contrast:  7 mL of Gadobutrol injection (SINGLE-DOSE)      IMAGE QUALITY: Diagnostic     FINDINGS:     VERTEBRAL BODIES:  There are 5 lumbar type vertebral bodies  Trace grade 1 retrolisthesis of L3 on L4 is stable as is a grade 1 anterolisthesis of L5 on S1  Scattered degenerative endplate changes  No focally suspicious marrow lesions  No bone marrow   edema or compression abnormality      SACRUM:  Small hemangioma in the S1 vertebra  No focally suspicious marrow lesions      DISTAL CORD AND CONUS:  Normal size and signal within the distal cord and conus  The conus medullaris terminates at the inferior aspect of the L2 vertebra      PARASPINAL SOFT TISSUES:  Cyst in the right kidney noted, measuring 2 5 cm on image 12, series 8      LOWER THORACIC DISC SPACES:  Normal disc height and signal   No disc herniation, canal stenosis or foraminal narrowing      LUMBAR DISC SPACES:     L1-L2:  Tiny left paracentral disc herniation, protrusion type  No significant central canal or neural foraminal narrowing  This level is similar to the prior study      L2-L3:  There is no focal disk herniation, central canal or neural foraminal narrowing  Bilateral facet hypertrophy noted      L3-L4:  There is a left neural foraminal disc herniation, protrusion type  Mild left neural foraminal narrowing  Central canal and right neural foramen patent  This level is similar to the prior study      L4-L5:  There is bilateral facet hypertrophy  There is a right neural foraminal disc protrusion  Mild right neural foraminal narrowing  Central canal and left neural foramen patent  This level is similar to the prior study      L5-S1:  There is mild uncovering the intervertebral disc space  There is a small left neural foraminal disc protrusion  Mild left neural foraminal narrowing  There is bilateral facet hypertrophy  Central canal right neural foramen patent  This level   is similar to the prior study      POSTCONTRAST IMAGING:  No abnormal enhancement      IMPRESSION:        1    Mild spondylolistheses and scattered mild spondylotic changes are stable  2  No MR evidence of drop metastases      Orders Placed This Encounter   Procedures    FL spine and pain procedure

## 2021-03-18 ENCOUNTER — TELEPHONE (OUTPATIENT)
Dept: NEUROSURGERY | Facility: CLINIC | Age: 61
End: 2021-03-18

## 2021-03-18 NOTE — TELEPHONE ENCOUNTER
Patient returned the call  He reports he is unsure why [employer] are still sending forms to our office since his employer let him go the end of January 2021  Patient reports he is on social security  Patient states how [employer] was a supplemental disability and how they were only paying out 168 dollars per month  Informed patient how the [facility] sent the office a fax asking for Dr Pascale Callejas approval if patient can work a sedentary job if available  Informed patient Dr Jorge Sotelo is okay with patient working a sedentary job  Patient requested for this RN to send the completed form back to the [facility] just in case if a job becomes available  Will do  Patient was appreciative

## 2021-03-18 NOTE — TELEPHONE ENCOUNTER
Received two different faxes from the [facility] [place], [facility] by [facility] regarding disability and about patient returning to work with a sedentary position  Dr Mouna Orlando said he would be okay sending the patient back to work for a sedentary job  Called patient since this RN has a few questions for him regarding the disability and returning back to work or not  Patient did not answer  Left a voice message requesting for a call back  Provided my direct line

## 2021-03-25 ENCOUNTER — ANESTHESIA EVENT (OUTPATIENT)
Dept: GASTROENTEROLOGY | Facility: AMBULARY SURGERY CENTER | Age: 61
End: 2021-03-25

## 2021-03-30 ENCOUNTER — OFFICE VISIT (OUTPATIENT)
Dept: ENDOCRINOLOGY | Facility: CLINIC | Age: 61
End: 2021-03-30
Payer: COMMERCIAL

## 2021-03-30 VITALS
DIASTOLIC BLOOD PRESSURE: 72 MMHG | HEIGHT: 67 IN | WEIGHT: 174 LBS | HEART RATE: 64 BPM | SYSTOLIC BLOOD PRESSURE: 110 MMHG | BODY MASS INDEX: 27.31 KG/M2 | TEMPERATURE: 97.2 F

## 2021-03-30 DIAGNOSIS — E03.9 ACQUIRED HYPOTHYROIDISM: ICD-10-CM

## 2021-03-30 DIAGNOSIS — E11.65 TYPE 2 DIABETES MELLITUS WITH HYPERGLYCEMIA, WITHOUT LONG-TERM CURRENT USE OF INSULIN (HCC): Primary | ICD-10-CM

## 2021-03-30 DIAGNOSIS — E78.2 MIXED HYPERLIPIDEMIA: ICD-10-CM

## 2021-03-30 PROCEDURE — 99214 OFFICE O/P EST MOD 30 MIN: CPT | Performed by: PHYSICIAN ASSISTANT

## 2021-03-30 PROCEDURE — 3008F BODY MASS INDEX DOCD: CPT | Performed by: PHYSICIAN ASSISTANT

## 2021-03-30 PROCEDURE — 1036F TOBACCO NON-USER: CPT | Performed by: PHYSICIAN ASSISTANT

## 2021-03-30 NOTE — PROGRESS NOTES
Patient Progress Note      CC: DM      Referring Provider  [id number]  [place],  [address]     History of Present Illness:   Shanice Diggs III is a 57 y o  male with a history of type 2 diabetes with long term use of insulin  Diabetes course has been stable  Complications of DM: neuropathy  Denies recent illness or hospitalizations  Denies recent severe hypoglycemic or severe hyperglycemic episodes  Denies any issues with his current regimen  Home glucose monitoring: are performed regularly     No log today to review but he sent a log earlier this month for review  Home blood glucose readings: 103-129 mg/dl     Current regimen:  Metformin 1000 mg twice a day, Trulicity 1 73 mg weekly  compliant most of the time, denies any side effects from medications  Injects in: abdomen  Rotates sites: Yes  Hypoglycemic episodes: No, rare  H/o of hypoglycemia causing hospitalization or Intervention such as glucagon injection  or ambulance call :  No  Hypoglycemia symptoms: jitteriness and sweating  Treatment of hypoglycemia: juice     Medic alert tag: recommended: Yes     Diabetes education: Yes  Diet: 3 meals per day, 1 snack per day  Timing of meals is predictable  Diabetic diet compliance:  compliant most of the time  Activity: Daily activity is predictable: Yes  He tries to exercise twice a week  Ophthamology:  He has an appointment schedule  Last Feb 2020  Podiatry:  Feb 2021     Not on ACE inhibitor/ARB  Has hyperlipidemia: on statin - tolerating well, no myalgias  compliant most of the time, denies any side effects from medications  Thyroid disorders:  Hypothyroidism  Patient is taking levothyroxine 75 mcg 1 tablet daily on an empty stomach 1 hour before breakfast and at least 4 hours apart from supplements    History of pancreatitis: No    Patient Active Problem List   Diagnosis    BPH associated with nocturia    Hypothyroidism    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[address] )    H/O spinal fusion  DDD (degenerative disc disease), lumbar    Gastroesophageal reflux disease without esophagitis    Type 2 diabetes mellitus with hyperglycemia, without long-term current use of insulin (HCC)    Cancer-related pain    Mixed hyperlipidemia    Erectile dysfunction of non-organic origin    Essential hypertriglyceridemia    Elevated bilirubin      Past Medical History:   Diagnosis Date    BPH associated with nocturia     [facility]    cervicothoracic    Disease of thyroid gland     Herniated disc, cervical     History of radiation therapy     Hyperlipidemia     Impaired fasting glucose     Radiation-induced myelopathy ([facility] ) 2019      Past Surgical History:   Procedure Laterality Date    APPENDECTOMY      CERVICAL FUSION      COLONOSCOPY  2012    NORMAL; RECHECK IN 5 YEARS    FL LUMBAR PUNCTURE DIAGNOSTIC  2019    [place] BX/EXCIS SPIN PATEL,INDUR,INMED,CERV N/A 2019    Procedure: Posterior C4-T3 laminectomy; resection of intramedullary mass C5-T3, including fenestration of syrinx C7-T2; C3-T4 fixation/fusion; additional levels as needed;  Surgeon: Maggi Rivero MD;  [facility];  Service: Neurosurgery    WISDOM TOOTH EXTRACTION        Family History   Problem Relation Age of Onset    Diabetes Mother     Hypertension Father     Cerebral palsy Sister     Breast cancer Daughter      Social History     Tobacco Use    Smoking status: Former Smoker     Quit date: 1993     Years since quittin 8    Smokeless tobacco: Never Used   Substance Use Topics    Alcohol use: Yes     Comment: rare social     Allergies   Allergen Reactions    Bee Venom Hives, Itching and Edema     Category: Allergy;     Penicillins Hives and Itching     Category:  Allergy;          Current Outpatient Medications:     atorvastatin (LIPITOR) 40 mg tablet, TAKE 1 TABLET BY MOUTH EVERY DAY, Disp: 90 tablet, Rfl: 1    Dulaglutide (Trulicity) 8 97 SH/4 0HR SOPN, Inject 0 5 mL (0 75 mg total) under the skin once a week, Disp: 4 pen, Rfl: 5    DULoxetine (CYMBALTA) 60 mg delayed release capsule, TAKE 1 CAPSULE BY MOUTH EVERY DAY, Disp: 90 capsule, Rfl: 1    gabapentin (NEURONTIN) 300 mg capsule, Take 1 capsule (300 mg total) by mouth 3 (three) times a day, Disp: 270 capsule, Rfl: 3    levothyroxine 75 mcg tablet, TAKE 1 TABLET BY MOUTH EVERY DAY, Disp: 90 tablet, Rfl: 1    metFORMIN (GLUCOPHAGE-XR) 500 mg 24 hr tablet, TAKE 2 TABLETS BY MOUTH TWICE A DAY WITH MEALS, Disp: 360 tablet, Rfl: 1    omega-3-acid ethyl esters (LOVAZA) 1 g capsule, TAKE 2 CAPSULES BY MOUTH EVERY DAY, Disp: 180 capsule, Rfl: 1    polyethylene glycol (GLYCOLAX) 17 GM/SCOOP powder, At 5pm take 5mgx2 dulcolax  At 6pm 32oz miralax in 64oz gatorade  Drink 8oz glass every 5 mins until 32oz finished  Drink remaining as rec , Disp: 238 g, Rfl: 0  Review of Systems   Constitutional: Negative for activity change, appetite change, fatigue and unexpected weight change  HENT: Negative for trouble swallowing  Eyes: Negative for visual disturbance  Respiratory: Negative for shortness of breath  Cardiovascular: Negative for chest pain and palpitations  Gastrointestinal: Negative for constipation and diarrhea  Endocrine: Negative for polydipsia and polyuria  Musculoskeletal: Positive for arthralgias and back pain  Skin: Negative for wound  Neurological: Positive for numbness  Psychiatric/Behavioral: Negative  Physical Exam:  Body mass index is 27 25 kg/m²  /72   Pulse 64   Temp (!) 97 2 °F (36 2 °C) (Tympanic)   Ht 5' 7" (1 702 m)   Wt 78 9 kg (174 lb)   BMI 27 25 kg/m²    Wt Readings from Last 3 Encounters:   03/30/21 78 9 kg (174 lb)   03/12/21 78 9 kg (174 lb)   03/02/21 79 6 kg (175 lb 6 4 oz)       Physical Exam  Vitals signs and nursing note reviewed  Constitutional:       Appearance: He is well-developed  HENT:      Head: Normocephalic     Eyes:      General: No scleral icterus  Pupils: Pupils are equal, round, and reactive to light  Neck:      Musculoskeletal: Neck supple  Thyroid: No thyromegaly  Cardiovascular:      Rate and Rhythm: Normal rate and regular rhythm  Pulses:           Radial pulses are 2+ on the right side and 2+ on the left side  Heart sounds: No murmur  Pulmonary:      Effort: Pulmonary effort is normal  No respiratory distress  Breath sounds: Normal breath sounds  No wheezing  Skin:     General: Skin is warm and dry  Neurological:      Mental Status: He is alert  Patient's shoes and socks were not removed  Labs:   Component      Latest Ref Rng & Units 10/5/2020 2/9/2021   Sodium      136 - 145 mmol/L 142 143   Potassium      3 5 - 5 3 mmol/L 5 2 4 2   Chloride      100 - 108 mmol/L 110 (H) 109 (H)   CO2      21 - 32 mmol/L 32 31   Anion Gap      4 - 13 mmol/L 0 (L) 3 (L)   BUN      5 - 25 mg/dL 19 21   Creatinine      0 60 - 1 30 mg/dL 0 77 0 75   GLUCOSE FASTING      65 - 99 mg/dL 83 102 (H)   Calcium      8 3 - 10 1 mg/dL 9 2 9 5   eGFR      ml/min/1 73sq m 98 99   Cholesterol      50 - 200 mg/dL  101   Triglycerides      <=150 mg/dL  109   HDL      >=40 mg/dL  34 (L)   LDL Calculated      0 - 100 mg/dL  45   Non-HDL Cholesterol      mg/dl  67   EXT Creatinine Urine      mg/dL  165 0   MICROALBUM ,U,RANDOM      0 0 - 20 0 mg/L  8 8   MICROALBUMIN/CREATININE RATIO      0 - 30 mg/g creatinine  5   Hemoglobin A1C      Normal 3 8-5 6%; PreDiabetic 5 7-6 4%; Diabetic >=6 5%; Glycemic control for adults with diabetes <7 0% % 5 3 5 6   eAG, EST AVG Glucose      mg/dl 105 114   Free T4      0 76 - 1 46 ng/dL 0 95 0 95   TSH 3RD GENERATON      0 358 - 3 740 uIU/mL 1 350 1 510     Plan:    Diagnoses and all orders for this visit:    Type 2 diabetes mellitus with hyperglycemia, without long-term current use of insulin (HCC)  HGA1C 5 6%  Worsened    Treatment regimen: continue current treatment  Discussed risks/complications associated with uncontrolled diabetes  Advised to adhere to diabetic diet, and recommended staying active/exercising routinely as tolerated  Keep carbohydrates consistent to limit blood glucose fluctuations  Advised to call if blood sugars less than 70 mg/dl or over 300 mg/dl  Check blood glucose 1 time a day  Discussed symptoms and treatment of hypoglycemia  Recommended routine follow-up with podiatry and ophthalmology  Ordered blood work to complete prior to next visit  -     Hemoglobin A1C; Future  -     Basic metabolic panel; Future    Acquired hypothyroidism  TFTs normal, TSH 1 510 and free T4 0 95  Patient clinically and biochemically euthyroid  Continue current dose of levothyroxine  Repeat TFTs prior to next visit  -     T4, free; Future  -     TSH, 3rd generation; Future    Mixed hyperlipidemia  LDL 45  Continue statin therapy      Discussed with the patient diagnosis and treatment and all questions fully answered  He will call me if any problems arise  Counseled patient on diagnostic results, prognosis, risk and benefit of treatment options, instruction for management, importance of treatment compliance, risk factor reduction and impressions        Didi Gant PA-C

## 2021-03-30 NOTE — PATIENT INSTRUCTIONS
Hypoglycemia instructions   [facility]  3/30/2021  [id number]    Low Blood Sugar    Steps to treat low blood sugar  1  Test blood sugar if you have symptoms of low blood sugar:   Low Blood Sugar Symptoms:  o Sweaty  o Dizzy  o Rapid heartbeat  o Shaky  o Bad mood  o Hungry      2  Treat blood sugar less than 70 with 15 grams of fast-acting carbohydrate:   Examples of 15 grams Fast-Acting Carbohydrate:  o 4 oz juice  o 4 oz regular soda  o 3-4 glucose tablets (chew)  o 3-4 hard candies (chew)          3  Wait 15 minutes and test your blood sugar again     4   If blood sugar is less than 100, repeat steps 2-3     5  When your blood sugar is 100 or more, eat a snack if it will be longer than one hour until your next meal  The snack should be 15 grams of carbohydrate and a protein:   Examples of snacks:  o ½ sandwich  o 6 crackers with cheese  o Piece of fruit with cheese or peanut butter  o 6 crackers with peanut butter

## 2021-04-05 ENCOUNTER — TELEPHONE (OUTPATIENT)
Dept: ENDOCRINOLOGY | Facility: CLINIC | Age: 61
End: 2021-04-05

## 2021-04-05 ENCOUNTER — HOSPITAL ENCOUNTER (OUTPATIENT)
Dept: RADIOLOGY | Facility: CLINIC | Age: 61
Discharge: HOME | End: 2021-04-05
Attending: ANESTHESIOLOGY | Admitting: ANESTHESIOLOGY
Payer: COMMERCIAL

## 2021-04-05 VITALS
RESPIRATION RATE: 18 BRPM | SYSTOLIC BLOOD PRESSURE: 137 MMHG | TEMPERATURE: 98.1 F | DIASTOLIC BLOOD PRESSURE: 64 MMHG | HEART RATE: 64 BPM | OXYGEN SATURATION: 98 %

## 2021-04-05 DIAGNOSIS — M46.1 SACROILIITIS (HCC): ICD-10-CM

## 2021-04-05 PROCEDURE — 27096 INJECT SACROILIAC JOINT: CPT | Performed by: ANESTHESIOLOGY

## 2021-04-05 RX ORDER — METHYLPREDNISOLONE ACETATE 40 MG/ML
40 INJECTION, SUSPENSION INTRA-ARTICULAR; INTRALESIONAL; INTRAMUSCULAR; PARENTERAL; SOFT TISSUE ONCE
Status: COMPLETED | OUTPATIENT
Start: 2021-04-05 | End: 2021-04-05

## 2021-04-05 RX ORDER — LIDOCAINE HYDROCHLORIDE 10 MG/ML
5 INJECTION, SOLUTION EPIDURAL; INFILTRATION; INTRACAUDAL; PERINEURAL ONCE
Status: COMPLETED | OUTPATIENT
Start: 2021-04-05 | End: 2021-04-05

## 2021-04-05 RX ORDER — BUPIVACAINE HCL/PF 2.5 MG/ML
30 VIAL (ML) INJECTION ONCE
Status: COMPLETED | OUTPATIENT
Start: 2021-04-05 | End: 2021-04-05

## 2021-04-05 RX ADMIN — IOHEXOL 0.5 ML: 300 INJECTION, SOLUTION INTRAVENOUS at 13:28

## 2021-04-05 RX ADMIN — BUPIVACAINE HYDROCHLORIDE 1.5 ML: 2.5 INJECTION, SOLUTION EPIDURAL; INFILTRATION; INTRACAUDAL at 13:28

## 2021-04-05 RX ADMIN — METHYLPREDNISOLONE ACETATE 40 MG: 40 INJECTION, SUSPENSION INTRA-ARTICULAR; INTRALESIONAL; INTRAMUSCULAR; SOFT TISSUE at 13:28

## 2021-04-05 RX ADMIN — LIDOCAINE HYDROCHLORIDE 2 ML: 10 INJECTION, SOLUTION EPIDURAL; INFILTRATION; INTRACAUDAL; PERINEURAL at 13:27

## 2021-04-05 NOTE — TELEPHONE ENCOUNTER
He can hold his metformin the day prior to colonoscopy when he is prepping  He can continue with his weekly Trulicity  He should try to limit his clear liquid diet to options that are low in carbohydrates to avoid hyperglycemia

## 2021-04-05 NOTE — DISCHARGE INSTR - LAB

## 2021-04-05 NOTE — H&P
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[facility] )    H/O spinal fusion    DDD (degenerative disc disease), lumbar    Gastroesophageal reflux disease without esophagitis    Type 2 diabetes mellitus with hyperglycemia, without long-term current use of insulin (HCC)    Cancer-related pain    Mixed hyperlipidemia    Erectile dysfunction of non-organic origin    Essential hypertriglyceridemia    Elevated bilirubin       Past Medical History:   Diagnosis Date    BPH associated with nocturia     Cervical spinal mass ([facility] ) 01/07/2019    cervicothoracic    Disease of thyroid gland     Herniated disc, cervical     History of radiation therapy     Hyperlipidemia     Impaired fasting glucose     Radiation-induced myelopathy ([facility] ) 6/19/2019       Past Surgical History:   Procedure Laterality Date    APPENDECTOMY      CERVICAL FUSION      COLONOSCOPY  03/13/2012    NORMAL; RECHECK IN 5 YEARS    FL LUMBAR PUNCTURE DIAGNOSTIC  2/21/2019    [place] BX/EXCIS SPIN PATEL,INDUR,INMED,CERV N/A 1/24/2019    Procedure: Posterior C4-T3 laminectomy; resection of intramedullary mass C5-T3, including fenestration of syrinx C7-T2; C3-T4 fixation/fusion; additional levels as needed;  Surgeon: Fawad Morgan MD;  [facility];  Service: Neurosurgery    WISDOM TOOTH EXTRACTION           Current Outpatient Medications:     atorvastatin (LIPITOR) 40 mg tablet, TAKE 1 TABLET BY MOUTH EVERY DAY, Disp: 90 tablet, Rfl: 1    Dulaglutide (Trulicity) 0 55 WG/4 1NH SOPN, Inject 0 5 mL (0 75 mg total) under the skin once a week, Disp: 4 pen, Rfl: 5    DULoxetine (CYMBALTA) 60 mg delayed release capsule, TAKE 1 CAPSULE BY MOUTH EVERY DAY, Disp: 90 capsule, Rfl: 1    gabapentin (NEURONTIN) 300 mg capsule, Take 1 capsule (300 mg total) by mouth 3 (three) times a day, Disp: 270 capsule, Rfl: 3    levothyroxine 75 mcg tablet, TAKE 1 TABLET BY MOUTH EVERY DAY, Disp: 90 tablet, Rfl: 1    metFORMIN (GLUCOPHAGE-XR) 500 mg 24 hr tablet, TAKE 2 TABLETS BY MOUTH TWICE A DAY WITH MEALS, Disp: 360 tablet, Rfl: 1    omega-3-acid ethyl esters (LOVAZA) 1 g capsule, TAKE 2 CAPSULES BY MOUTH EVERY DAY, Disp: 180 capsule, Rfl: 1    polyethylene glycol (GLYCOLAX) 17 GM/SCOOP powder, At 5pm take 5mgx2 dulcolax  At 6pm 32oz miralax in 64oz gatorade  Drink 8oz glass every 5 mins until 32oz finished  Drink remaining as rec , Disp: 238 g, Rfl: 0    Allergies   Allergen Reactions    Bee Venom Hives, Itching and Edema     Category: Allergy;     Penicillins Hives and Itching     Category: Allergy; Physical Exam:   Vitals:    04/05/21 1310   BP: 119/67   Pulse: 71   Resp: 18   Temp: 98 1 °F (36 7 °C)   SpO2: 98%     General: Awake, Alert, Oriented x 3, Mood and affect appropriate  Respiratory: Respirations even and unlabored  Cardiovascular: Peripheral pulses intact; no edema  Musculoskeletal Exam:   Tenderness to palpation over right SI joint    ASA Score: 3    Patient/Chart Verification  Patient ID Verified: Verbal  ID Band Applied: No  Consents Confirmed: Procedural, To be obtained in the Pre-Procedure area  H&P( within 30 days) Verified: To be obtained in the Pre-Procedure area  Allergies Reviewed: Yes  Anticoag/NSAID held?: No  Currently on antibiotics?: No    Assessment:   1   Sacroiliitis ([facility] ) - Right        Plan: Right SIJ  Injection

## 2021-04-06 DIAGNOSIS — E03.9 HYPOTHYROIDISM, UNSPECIFIED TYPE: ICD-10-CM

## 2021-04-06 DIAGNOSIS — E11.65 TYPE 2 DIABETES MELLITUS WITH HYPERGLYCEMIA, WITHOUT LONG-TERM CURRENT USE OF INSULIN (HCC): ICD-10-CM

## 2021-04-06 RX ORDER — LEVOTHYROXINE SODIUM 0.07 MG/1
TABLET ORAL
Qty: 90 TABLET | Refills: 1 | Status: SHIPPED | OUTPATIENT
Start: 2021-04-06 | End: 2021-09-27

## 2021-04-06 RX ORDER — DULAGLUTIDE 0.75 MG/.5ML
0.75 INJECTION, SOLUTION SUBCUTANEOUS WEEKLY
Qty: 12 PEN | Refills: 1 | Status: SHIPPED | OUTPATIENT
Start: 2021-04-06 | End: 2021-09-17

## 2021-04-08 ENCOUNTER — ANESTHESIA (OUTPATIENT)
Dept: GASTROENTEROLOGY | Facility: AMBULARY SURGERY CENTER | Age: 61
End: 2021-04-08

## 2021-04-08 ENCOUNTER — HOSPITAL ENCOUNTER (OUTPATIENT)
Dept: GASTROENTEROLOGY | Facility: AMBULARY SURGERY CENTER | Age: 61
Setting detail: OUTPATIENT SURGERY
Discharge: HOME | End: 2021-04-08
Attending: INTERNAL MEDICINE | Admitting: INTERNAL MEDICINE
Payer: COMMERCIAL

## 2021-04-08 ENCOUNTER — TELEPHONE (OUTPATIENT)
Dept: NEUROSURGERY | Facility: CLINIC | Age: 61
End: 2021-04-08

## 2021-04-08 VITALS
HEIGHT: 67 IN | SYSTOLIC BLOOD PRESSURE: 105 MMHG | OXYGEN SATURATION: 98 % | WEIGHT: 170 LBS | RESPIRATION RATE: 21 BRPM | DIASTOLIC BLOOD PRESSURE: 63 MMHG | TEMPERATURE: 97 F | BODY MASS INDEX: 26.68 KG/M2 | HEART RATE: 73 BPM

## 2021-04-08 DIAGNOSIS — Z12.11 SCREENING FOR COLON CANCER: Primary | ICD-10-CM

## 2021-04-08 LAB — GLUCOSE SERPL-MCNC: 63 MG/DL (ref 65–140)

## 2021-04-08 PROCEDURE — 45378 DIAGNOSTIC COLONOSCOPY: CPT | Performed by: INTERNAL MEDICINE

## 2021-04-08 PROCEDURE — 82948 REAGENT STRIP/BLOOD GLUCOSE: CPT

## 2021-04-08 RX ORDER — PROPOFOL 10 MG/ML
INJECTION, EMULSION INTRAVENOUS AS NEEDED
Status: DISCONTINUED | OUTPATIENT
Start: 2021-04-08 | End: 2021-04-08

## 2021-04-08 RX ORDER — POLYETHYLENE GLYCOL 3350 17 G/17G
POWDER, FOR SOLUTION ORAL
Qty: 238 G | Refills: 0 | Status: SHIPPED | OUTPATIENT
Start: 2021-04-08 | End: 2021-05-10 | Stop reason: CLARIF

## 2021-04-08 RX ORDER — SODIUM CHLORIDE, SODIUM LACTATE, POTASSIUM CHLORIDE, CALCIUM CHLORIDE 600; 310; 30; 20 MG/100ML; MG/100ML; MG/100ML; MG/100ML
INJECTION, SOLUTION INTRAVENOUS CONTINUOUS PRN
Status: DISCONTINUED | OUTPATIENT
Start: 2021-04-08 | End: 2021-04-08

## 2021-04-08 RX ADMIN — PROPOFOL 50 MG: 10 INJECTION, EMULSION INTRAVENOUS at 08:49

## 2021-04-08 RX ADMIN — PROPOFOL 150 MG: 10 INJECTION, EMULSION INTRAVENOUS at 08:45

## 2021-04-08 RX ADMIN — PROPOFOL 100 MG: 10 INJECTION, EMULSION INTRAVENOUS at 09:00

## 2021-04-08 RX ADMIN — SODIUM CHLORIDE, SODIUM LACTATE, POTASSIUM CHLORIDE, AND CALCIUM CHLORIDE: .6; .31; .03; .02 INJECTION, SOLUTION INTRAVENOUS at 08:42

## 2021-04-08 NOTE — TELEPHONE ENCOUNTER
Bebeto Arizmendi, patient's spouse, called the office reporting the patient's long term disability was denied since his company reports the patient can perform a sedentary job  To note, this RN called the patient on 3/18/21 to notify him how the [employer] sent a fax to the office asking if the patient can return to work for a sedentary position  Dr Margarito Ruiz said yes and the patient agreed for this RN to fax the completed form to the [employer]  Bebeto Bassmichelle now reports the patient is going to appeal his denied long term disability  They requested for that form and the last office visit note to be emailed to [employer]  She was appreciative  Email was sent

## 2021-04-08 NOTE — ANESTHESIA POSTPROCEDURE EVALUATION
Post-Op Assessment Note    CV Status:  Stable  Pain Score: 0    Pain management: adequate     Mental Status:  Alert and awake   Hydration Status:  Euvolemic   PONV Controlled:  Controlled   Airway Patency:  Patent      Post Op Vitals Reviewed: Yes      Staff: CRNA         No complications documented      BP 92/59   Temp     Pulse  67   Resp   18   SpO2   100

## 2021-04-08 NOTE — H&P
History and Physical - [facility]  Baldemar Masterson III 57 y o  male MRN: [id number]                  HPI: Baldemar Masterson III is a 57y o  year old male who presents for screening colonoscopy      REVIEW OF SYSTEMS: Per the HPI, and otherwise unremarkable      Historical Information   Past Medical History:   Diagnosis Date    BPH associated with nocturia     [facility] 2019    cervicothoracic    Disease of thyroid gland     Herniated disc, cervical     History of radiation therapy     Hyperlipidemia     Impaired fasting glucose     Radiation[facility]induced myelopathy ([facility] ) 2019     Past Surgical History:   Procedure Laterality Date    APPENDECTOMY      CERVICAL FUSION      COLONOSCOPY  2012    NORMAL; RECHECK IN 5 YEARS    FL LUMBAR PUNCTURE DIAGNOSTIC  2019    [place] BX/EXCIS SPIN PATEL,INDUR,INMED,CERV N/A 2019    Procedure: Posterior C4-T3 laminectomy; resection of intramedullary mass C5-T3, including fenestration of syrinx C7-T2; C3-T4 fixation/fusion; additional levels as needed;  Surgeon: Shannan Gomez MD;  [facility];  Service: Neurosurgery    WISDOM TOOTH EXTRACTION       Social History   Social History     Substance and Sexual Activity   Alcohol Use Yes    Frequency: 2-4 times a month     Social History     Substance and Sexual Activity   Drug Use No     Social History     Tobacco Use   Smoking Status Former Smoker    Quit date: 1993    Years since quittin 9   Smokeless Tobacco Never Used     Family History   Problem Relation Age of Onset    Diabetes Mother     Hypertension Father     Cerebral palsy Sister     Breast cancer Daughter        Meds/Allergies       Current Outpatient Medications:     atorvastatin (LIPITOR) 40 mg tablet    DULoxetine (CYMBALTA) 60 mg delayed release capsule    gabapentin (NEURONTIN) 300 mg capsule    levothyroxine 75 mcg tablet    metFORMIN (GLUCOPHAGE-XR) 500 mg 24 hr tablet   omega-3-acid ethyl esters (LOVAZA) 1 g capsule    Dulaglutide (Trulicity) 0 04 ZM/4 5NS SOPN    Allergies   Allergen Reactions    Bee Venom Hives, Itching and Edema     Category: Allergy;     Penicillins Hives and Itching     Category: Allergy;        Objective     /63   Pulse 60 Comment: sr  Temp (!) 97 3 °F (36 3 °C) (Temporal)   Resp 16   Ht 5' 7" (1 702 m)   Wt 77 1 kg (170 lb)   SpO2 99%   BMI 26 63 kg/m²       PHYSICAL EXAM    Gen: NAD  CV: RRR  CHEST: Clear  ABD: soft, NT/ND  EXT: no edema      ASSESSMENT/PLAN:  This is a 57y o  year old male here for screening colonoscopy, and he is stable and optimized for his procedure

## 2021-04-08 NOTE — ANESTHESIA PREPROCEDURE EVALUATION
Procedure:  COLONOSCOPY    Relevant Problems   CARDIO   (+) Essential hypertriglyceridemia   (+) Mixed hyperlipidemia      ENDO   (+) Hypothyroidism   (+) Type 2 diabetes mellitus with hyperglycemia, without long-term current use of insulin (HCC)      GI/HEPATIC   (+) Gastroesophageal reflux disease without esophagitis      MUSCULOSKELETAL   (+) DDD (degenerative disc disease), lumbar   (+) Sacroiliitis (HCC)      NEURO/PSYCH   (+) 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HCC)      Other   (+) BPH associated with nocturia   (+) H/O spinal fusion        Physical Exam    Airway    Mallampati score: II  TM Distance: >3 FB  Neck ROM: full     Dental       Cardiovascular  Cardiovascular exam normal    Pulmonary  Pulmonary exam normal     Other Findings        Anesthesia Plan  ASA Score- 2     Anesthesia Type- IV sedation with anesthesia with ASA Monitors  Additional Monitors:   Airway Plan:           Plan Factors-Exercise tolerance (METS): <4 METS  Chart reviewed  Patient summary reviewed  Patient is not a current smoker  Patient did not smoke on day of surgery  Induction- intravenous  Postoperative Plan-     Informed Consent- Anesthetic plan and risks discussed with patient  I personally reviewed this patient with the CRNA  Discussed and agreed on the Anesthesia Plan with the SASHA Elise

## 2021-04-12 ENCOUNTER — TELEPHONE (OUTPATIENT)
Dept: PAIN MEDICINE | Facility: CLINIC | Age: 61
End: 2021-04-12

## 2021-04-19 NOTE — TELEPHONE ENCOUNTER
BG levels well controlled, continue current treatment
I let the pt know 
bg log in media
bs log  trulicity once a week  Metformin 1000 mg twice a day with meals
none

## 2021-05-03 ENCOUNTER — RA CDI HCC (OUTPATIENT)
Dept: OTHER | Facility: HOSPITAL | Age: 61
End: 2021-05-03

## 2021-05-03 NOTE — PROGRESS NOTES
[facility]  coding opportunities          Chart reviewed, no opportunity found: CHART REVIEWED, NO OPPORTUNITY FOUND              Patients insurance company:  [employer] [facility] (Medicare Advantage and Commercial)

## 2021-05-05 ENCOUNTER — OFFICE VISIT (OUTPATIENT)
Dept: PAIN MEDICINE | Facility: CLINIC | Age: 61
End: 2021-05-05
Payer: COMMERCIAL

## 2021-05-05 VITALS
HEIGHT: 67 IN | TEMPERATURE: 98.5 F | DIASTOLIC BLOOD PRESSURE: 56 MMHG | WEIGHT: 170 LBS | SYSTOLIC BLOOD PRESSURE: 97 MMHG | BODY MASS INDEX: 26.68 KG/M2 | HEART RATE: 68 BPM

## 2021-05-05 DIAGNOSIS — M47.816 LUMBAR SPONDYLOSIS: Primary | ICD-10-CM

## 2021-05-05 DIAGNOSIS — M46.1 SACROILIITIS (HCC): ICD-10-CM

## 2021-05-05 DIAGNOSIS — Z98.1 H/O SPINAL FUSION: ICD-10-CM

## 2021-05-05 DIAGNOSIS — M51.36 DDD (DEGENERATIVE DISC DISEASE), LUMBAR: ICD-10-CM

## 2021-05-05 PROCEDURE — 99214 OFFICE O/P EST MOD 30 MIN: CPT | Performed by: NURSE PRACTITIONER

## 2021-05-05 RX ORDER — CLINDAMYCIN HYDROCHLORIDE 300 MG/1
CAPSULE ORAL
COMMUNITY
Start: 2021-03-24 | End: 2021-05-10 | Stop reason: CLARIF

## 2021-05-05 NOTE — PROGRESS NOTES
Assessment:  1  Lumbar spondylosis    2  DDD (degenerative disc disease), lumbar    3  H/O spinal fusion    4  Sacroiliitis ([address] )        Plan:  1  We will schedule the patient for bilateral L2-5 medial branch blocks with intention of moving forward towards radiofrequency ablation if there is an appropriate diagnostic response  The initial blocks will be performed with 2% lidocaine and if an appropriate response is obtained upon review of the patient's pain diary, a confirmatory block will be scheduled  Complete risks and benefits including bleeding, infection, tissue reaction, nerve injury and allergic reaction were discussed  The patient was agreeable and verbalized an understanding  2  We can repeat right SI joint injection p r n    3  The patient may continue duloxetine and gabapentin as prescribed   4  Patient may continue Tylenol p r n  and should not exceed more than 3000 mg in 24 hours   5  The patient will follow-up pending results of the procedure or sooner if needed     M*Modal software was used to dictate this note  It may contain errors with dictating incorrect words or incorrect spelling  Please contact the provider directly with any questions  History of Present Illness: The patient is a 57 y o  male with a 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 last seen on 3/12/21 who presents for a follow up office visit in regards to chronic axial low back  Pain that is nonradiating into the lower extremities  The patient is status post right SI joint injection with Dr Cristian Mack on April 5, 2021 and reports an ongoing 75% relief of his back pain  He does continue with mid axial low back pain which he states is higher up and localizes to the L2-5 region  He states that the pain is exacerbated with standing or bending forward with increased activity  Currently his pain is a 1/10 on the numeric pain rating scale    States the pain is occasional nature bothersome the evening  He characterizes the pain as dull aching  MRI of the lumbar spine reveals multilevel lumbar spondylosis from L2-3 to L5-S1, degenerative disc disease, mild left foraminal narrowing at L3-4, mild right foraminal narrowing at L4-5, and mild left foraminal narrowing at L5-S1  Current pain medications includes:   Duloxetine 60 mg daily, gabapentin 600 mg twice a day, ibuprofen p r n  and Tylenol p r n       I have personally reviewed and/or updated the patient's past medical history, past surgical history, family history, social history, current medications, allergies, and vital signs today  Review of Systems:    Review of Systems   Respiratory: Negative for shortness of breath  Cardiovascular: Negative for chest pain  Gastrointestinal: Negative for constipation, diarrhea, nausea and vomiting  Musculoskeletal: Positive for back pain  Negative for arthralgias, gait problem, joint swelling and myalgias  Skin: Negative for rash  Neurological: Negative for dizziness, seizures and weakness  All other systems reviewed and are negative          Past Medical History:   Diagnosis Date    BPH associated with nocturia     C[facility] spinal [facility] ([facility] ) 01/07/2019    cervicothoracic    Disease of thyroid gland     Herniated disc, cervical     History of radiation therapy     Hyperlipidemia     Impaired fasting glucose     [facility] ) 6/19/2019       Past Surgical History:   Procedure Laterality Date    APPENDECTOMY      CERVICAL FUSION      COLONOSCOPY  03/13/2012    NORMAL; RECHECK IN 5 YEARS    FL LUMBAR PUNCTURE DIAGNOSTIC  2/21/2019    [place] BX/EXCIS SPIN PATEL,INDUR,INMED,CERV N/A 1/24/2019    Procedure: Posterior C4-T3 laminectomy; resection of intramedullary mass C5-T3, including fenestration of syrinx C7-T2; C3-T4 fixation/fusion; additional levels as needed;  Surgeon: Rosana Knox MD;  [facility];  Service: Neurosurgery    [facility] EXTRACTION         Family History   Problem Relation Age of Onset    Diabetes Mother     Hypertension Father     Cerebral palsy Sister    [place] Breast cancer Daughter        Social History     Occupational History    Occupation: Disability   Tobacco Use    Smoking status: Former Smoker     Quit date: 1993     Years since quittin 9    Smokeless tobacco: Never Used   Substance and Sexual Activity    Alcohol use: Yes     Frequency: 2-4 times a month    Drug use: No    Sexual activity: Not on file         Current Outpatient Medications:     atorvastatin (LIPITOR) 40 mg tablet, TAKE 1 TABLET BY MOUTH EVERY DAY, Disp: 90 tablet, Rfl: 1    Dulaglutide (Trulicity) 1 81 VC/3 0MN SOPN, Inject 0 5 mL (0 75 mg total) under the skin once a week, Disp: 12 pen, Rfl: 1    DULoxetine (CYMBALTA) 60 mg delayed release capsule, TAKE 1 CAPSULE BY MOUTH EVERY DAY, Disp: 90 capsule, Rfl: 1    gabapentin (NEURONTIN) 300 mg capsule, Take 1 capsule (300 mg total) by mouth 3 (three) times a day, Disp: 270 capsule, Rfl: 3    levothyroxine 75 mcg tablet, TAKE 1 TABLET BY MOUTH EVERY DAY, Disp: 90 tablet, Rfl: 1    metFORMIN (GLUCOPHAGE-XR) 500 mg 24 hr tablet, TAKE 2 TABLETS BY MOUTH TWICE A DAY WITH MEALS, Disp: 360 tablet, Rfl: 1    omega-3-acid ethyl esters (LOVAZA) 1 g capsule, TAKE 2 CAPSULES BY MOUTH EVERY DAY, Disp: 180 capsule, Rfl: 1    polyethylene glycol (GLYCOLAX) 17 GM/SCOOP powder, At 5pm take 5mgx2 dulcolax  At 6pm 32oz miralax in 64oz gatorade  Drink 8oz glass every 5 mins until 32oz finished  Drink remaining as rec , Disp: 238 g, Rfl: 0    clindamycin (CLEOCIN) 300 MG capsule, TAKE 1 CAPSULE BY MOUTH 4 TIMES A DAY UNTIL FINISHED, Disp: , Rfl:     Allergies   Allergen Reactions    Bee Venom Hives, Itching and Edema     Category: Allergy;     Penicillins Hives and Itching     Category: Allergy;         Physical Exam:    BP 97/56   Pulse 68   Temp 98 5 °F (36 9 °C)   Ht 5' 7" (1 702 m)   Wt 77 1 kg (170 lb)   BMI 26 63 kg/m²     Constitutional:normal, well developed, well nourished, alert, in no distress and non-toxic and no overt pain behavior  Eyes:anicteric  HEENT:grossly intact  Neck:supple, symmetric, trachea midline and no masses   Pulmonary:even and unlabored  Cardiovascular:No edema or pitting edema present  Skin:Normal without rashes or lesions and well hydrated  Psychiatric:Mood and affect appropriate  Neurologic:Cranial Nerves II-XII grossly intact  Musculoskeletal:normal gait  Right SI joint minimally tender to palpation, left SI joint nontender to palpation  Lumbar facet loading maneuvers reproduces patient's low back pain complaints  Negative seated straight leg raise bilaterally      Imaging  FL spine and pain procedure    (Results Pending)     MRI LUMBAR SPINE WITH AND WITHOUT CONTRAST   INDICATION: C72 0: Malignant neoplasm of spinal cord   M43 16: Spondylolisthesis, lumbar region   M54 40: Lumbago with sciatica, unspecified side  History of ependymoma  Evaluate for drop metastases  COMPARISON: 3/5/2019   TECHNIQUE: Sagittal T1, sagittal T2, sagittal inversion recovery, axial T1 and axial T2, coronal T2  Sagittal and axial T1 postcontrast    IV Contrast: 7 mL of Gadobutrol injection (SINGLE-DOSE)   IMAGE QUALITY: Diagnostic   FINDINGS:   VERTEBRAL BODIES: There are 5 lumbar type vertebral bodies  Trace grade 1 retrolisthesis of L3 on L4 is stable as is a grade 1 anterolisthesis of L5 on S1  Scattered degenerative endplate changes  No focally suspicious marrow lesions  No bone marrow   edema or compression abnormality  SACRUM: Small hemangioma in the S1 vertebra  No focally suspicious marrow lesions  DISTAL CORD AND CONUS: Normal size and signal within the distal cord and conus  The conus medullaris terminates at the inferior aspect of the L2 vertebra  PARASPINAL SOFT TISSUES: Cyst in the right kidney noted, measuring 2 5 cm on image 12, series 8     LOWER THORACIC DISC SPACES: Normal disc height and signal  No disc herniation, canal stenosis or foraminal narrowing  LUMBAR DISC SPACES:   L1-L2: Tiny left paracentral disc herniation, protrusion type  No significant central canal or neural foraminal narrowing  This level is similar to the prior study  L2-L3: There is no focal disk herniation, central canal or neural foraminal narrowing  Bilateral facet hypertrophy noted  L3-L4: There is a left neural foraminal disc herniation, protrusion type  Mild left neural foraminal narrowing  Central canal and right neural foramen patent  This level is similar to the prior study  L4-L5: There is bilateral facet hypertrophy  There is a right neural foraminal disc protrusion  Mild right neural foraminal narrowing  Central canal and left neural foramen patent  This level is similar to the prior study  L5-S1: There is mild uncovering the intervertebral disc space  There is a small left neural foraminal disc protrusion  Mild left neural foraminal narrowing  There is bilateral facet hypertrophy  Central canal right neural foramen patent  This level   is similar to the prior study  POSTCONTRAST IMAGING: No abnormal enhancement  IMPRESSION:   1  Mild spondylolistheses and scattered mild spondylotic changes are stable  2  No MR evidence of drop metastases         Orders Placed This Encounter   Procedures    FL spine and pain procedure

## 2021-05-10 ENCOUNTER — OFFICE VISIT (OUTPATIENT)
Dept: FAMILY MEDICINE CLINIC | Facility: CLINIC | Age: 61
End: 2021-05-10
Payer: COMMERCIAL

## 2021-05-10 VITALS
DIASTOLIC BLOOD PRESSURE: 68 MMHG | SYSTOLIC BLOOD PRESSURE: 118 MMHG | HEART RATE: 60 BPM | TEMPERATURE: 97 F | HEIGHT: 67 IN | BODY MASS INDEX: 27.55 KG/M2 | WEIGHT: 175.5 LBS

## 2021-05-10 DIAGNOSIS — E78.2 MIXED HYPERLIPIDEMIA: ICD-10-CM

## 2021-05-10 DIAGNOSIS — E11.65 TYPE 2 DIABETES MELLITUS WITH HYPERGLYCEMIA, WITHOUT LONG-TERM CURRENT USE OF INSULIN (HCC): Primary | ICD-10-CM

## 2021-05-10 DIAGNOSIS — M46.1 SACROILIITIS (HCC): ICD-10-CM

## 2021-05-10 DIAGNOSIS — M51.36 DDD (DEGENERATIVE DISC DISEASE), LUMBAR: ICD-10-CM

## 2021-05-10 PROBLEM — E78.1 ESSENTIAL HYPERTRIGLYCERIDEMIA: Status: RESOLVED | Noted: 2020-07-01 | Resolved: 2021-05-10

## 2021-05-10 PROCEDURE — 99214 OFFICE O/P EST MOD 30 MIN: CPT | Performed by: FAMILY MEDICINE

## 2021-05-10 PROCEDURE — 3008F BODY MASS INDEX DOCD: CPT | Performed by: FAMILY MEDICINE

## 2021-05-10 PROCEDURE — 1036F TOBACCO NON-USER: CPT | Performed by: FAMILY MEDICINE

## 2021-05-10 NOTE — PROGRESS NOTES
Assessment and Plan:    Problem List Items Addressed This Visit        Endocrine    Type 2 diabetes mellitus with hyperglycemia, without long-term current use of insulin ([facility] ) - Primary       Lab Results   Component Value Date    HGBA1C 5 6 02/09/2021   await lab            Musculoskeletal and Integument    DDD (degenerative disc disease), lumbar     Seeing pain management         Sacroiliitis ([facility] )     Seeing pain management            Other    Mixed hyperlipidemia     Lab stable                      Diagnoses and all orders for this visit:    Type 2 diabetes mellitus with hyperglycemia, without long-term current use of insulin (HCC)    Mixed hyperlipidemia    DDD (degenerative disc disease), lumbar    Sacroiliitis (HCC)              Subjective:      Patient ID: Kj Duffy III is a 57 y o  male  CC:    Chief Complaint   Patient presents with    Follow-up     for chronic conditions  mgb       HPI:    Here for f/u of diabetes, lipids, await sugar, cholesterol stable, had inadequate prep for colonoscopy so full exam not able to be done BUT fecal urgency and bowel issues gone      The following portions of the patient's history were reviewed and updated as appropriate: allergies, current medications, past family history, past medical history, past social history, past surgical history and problem list       Review of Systems   Constitutional: Negative for activity change, appetite change and fatigue  Respiratory: Negative for shortness of breath  Cardiovascular: Negative for chest pain  Gastrointestinal: Negative for constipation and diarrhea  Neurological: Negative for dizziness and headaches           Data to review:       Objective:    Vitals:    05/10/21 1024   BP: 118/68   BP Location: Left arm   Patient Position: Sitting   Cuff Size: Large   Pulse: 60   Temp: (!) 97 °F (36 1 °C)   TempSrc: Temporal   Weight: 79 6 kg (175 lb 8 oz)   Height: 5' 7" (1 702 m)        Physical Exam  Vitals signs reviewed  Constitutional:       Appearance: Normal appearance  Neck:      Vascular: No carotid bruit  Cardiovascular:      Rate and Rhythm: Normal rate and regular rhythm  Pulses: Normal pulses  Heart sounds: Normal heart sounds  Pulmonary:      Breath sounds: Normal breath sounds  Musculoskeletal:      Right lower leg: No edema  Left lower leg: No edema  Lymphadenopathy:      Cervical: No cervical adenopathy  Neurological:      Mental Status: He is alert

## 2021-05-11 ENCOUNTER — TELEPHONE (OUTPATIENT)
Dept: GASTROENTEROLOGY | Facility: CLINIC | Age: 61
End: 2021-05-11

## 2021-05-11 NOTE — TELEPHONE ENCOUNTER
----- Message from Castillo Conley MD sent at 5/10/2021 12:22 PM EDT -----  Hi all,    Patient spoke to their PCP and they are worried about the 2 day prep and diabetes  Can you speak to them and clarify any questions they may have about the procedure? Thank you!

## 2021-05-11 NOTE — TELEPHONE ENCOUNTER
Colon scheduled on 8/19/21 with Dr Nichole at [facility]  I gave pt verbal instructions/mailed   Prep-Miralax/Dulcolax (2 day)

## 2021-05-17 ENCOUNTER — HOSPITAL ENCOUNTER (OUTPATIENT)
Dept: RADIOLOGY | Facility: CLINIC | Age: 61
Discharge: HOME | End: 2021-05-17
Attending: ANESTHESIOLOGY | Admitting: ANESTHESIOLOGY
Payer: COMMERCIAL

## 2021-05-17 VITALS
TEMPERATURE: 97.3 F | SYSTOLIC BLOOD PRESSURE: 117 MMHG | OXYGEN SATURATION: 92 % | DIASTOLIC BLOOD PRESSURE: 67 MMHG | HEART RATE: 70 BPM | RESPIRATION RATE: 18 BRPM

## 2021-05-17 DIAGNOSIS — M47.816 LUMBAR SPONDYLOSIS: ICD-10-CM

## 2021-05-17 PROCEDURE — 64495 INJ PARAVERT F JNT L/S 3 LEV: CPT | Performed by: ANESTHESIOLOGY

## 2021-05-17 PROCEDURE — 64494 INJ PARAVERT F JNT L/S 2 LEV: CPT | Performed by: ANESTHESIOLOGY

## 2021-05-17 PROCEDURE — 64493 INJ PARAVERT F JNT L/S 1 LEV: CPT | Performed by: ANESTHESIOLOGY

## 2021-05-17 RX ORDER — LIDOCAINE HYDROCHLORIDE 10 MG/ML
10 INJECTION, SOLUTION EPIDURAL; INFILTRATION; INTRACAUDAL; PERINEURAL ONCE
Status: COMPLETED | OUTPATIENT
Start: 2021-05-17 | End: 2021-05-17

## 2021-05-17 RX ADMIN — LIDOCAINE HYDROCHLORIDE 4 ML: 20 INJECTION, SOLUTION EPIDURAL; INFILTRATION; INTRACAUDAL; PERINEURAL at 14:27

## 2021-05-17 RX ADMIN — LIDOCAINE HYDROCHLORIDE 8 ML: 10 INJECTION, SOLUTION EPIDURAL; INFILTRATION; INTRACAUDAL; PERINEURAL at 14:18

## 2021-05-17 NOTE — DISCHARGE INSTR - LAB
ACTIVITY  · Please do activities that will bring on the normal pain that we are rating  For example, if vacuuming or walking increases the pain, do that  This will give the most accurate response to the diary  · You may shower, but no tub baths today, or applied heat  CARE OF THE INJECTION SITE  · This area may be numb for several hours after the injection  · [facility]ify [facility] if you have any of the following:  redness, drainage, swelling, or fever above 100°F     SPECIAL INSTRUCTIONS  · Please return the MBB diary to our office by mail, fax, or drop it off  MEDICATIONS  · Please do not take any break through or short acting pain medications for 8 hours after the block  · Continue to take all routine medications  · Our office may have instructed you to hold some medications  As no general anesthesia was used in today's procedure, you should not experience any side effects related to anesthesia  If you have a problem specifically related to your procedure, please call our office at [phone]  Problems not related to your procedure should be directed to your primary care physician

## 2021-05-17 NOTE — H&P
History of Present Illness: The patient is a 57 y o  male who presents with complaints of   Low back pain      Patient Active Problem List   Diagnosis    BPH associated with nocturia    Hypothyroidism    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[facility] [address] )    H/O spinal fusion    DDD (degenerative disc disease), lumbar    Gastroesophageal reflux disease without esophagitis    Type 2 diabetes mellitus with hyperglycemia, without long-term current use of insulin (HCC)    Cancer-related pain    Mixed hyperlipidemia    Erectile dysfunction of non-organic origin    Elevated bilirubin    Sacroiliitis (HCC)       Past Medical History:   Diagnosis Date    BPH associated with nocturia     Cervical spinal mass ([facility] [address] ) 01/07/2019    cervicothoracic    Disease of thyroid gland     Herniated disc, cervical     History of radiation therapy     Hyperlipidemia     Impaired fasting glucose     Radiation-induced myelopathy ([facility] [address] ) 6/19/2019       Past Surgical History:   Procedure Laterality Date    APPENDECTOMY      CERVICAL FUSION      COLONOSCOPY  03/13/2012    NORMAL; RECHECK IN 5 YEARS    [place] LUMBAR PUNCTURE DIAGNOSTIC  2/21/2019    [place] BX/EXCIS SPIN PATEL,INDUR,INMED,CERV N/A 1/24/2019    Procedure: Posterior C4-T3 laminectomy; resection of intramedullary mass C5-T3, including fenestration of syrinx 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; C3-T4 fixation/fusion; additional levels as needed;  Surgeon: Lisy Serna MD;  [facility];  Service: Neurosurgery    WISDOM TOOTH EXTRACTION           Current Outpatient Medications:     atorvastatin (LIPITOR) 40 mg tablet, TAKE 1 TABLET BY MOUTH EVERY DAY, Disp: 90 tablet, Rfl: 1    Dulaglutide (Trulicity) 0 56 MX/8 0HN SOPN, Inject 0 5 mL (0 75 mg total) under the skin once a week, Disp: 12 pen, Rfl: 1    DULoxetine (CYMBALTA) 60 mg delayed release capsule, TAKE 1 CAPSULE BY MOUTH EVERY DAY, Disp: 90 capsule, Rfl: 1    gabapentin (NEURONTIN) 300 mg capsule, Take 1 capsule (300 mg total) by mouth 3 (three) times a day, Disp: 270 capsule, Rfl: 3    levothyroxine 75 mcg tablet, TAKE 1 TABLET BY MOUTH EVERY DAY, Disp: 90 tablet, Rfl: 1    metFORMIN (GLUCOPHAGE-XR) 500 mg 24 hr tablet, TAKE 2 TABLETS BY MOUTH TWICE A DAY WITH MEALS, Disp: 360 tablet, Rfl: 1    omega-3-acid ethyl esters (LOVAZA) 1 g capsule, TAKE 2 CAPSULES BY MOUTH EVERY DAY, Disp: 180 capsule, Rfl: 1    Current Facility-Administered Medications:     lidocaine (PF) (XYLOCAINE-MPF) 1 % injection 10 mL, 10 mL, Other, Once, Twan Jacques DO    lidocaine (PF) (XYLOCAINE-MPF) 2 % injection 4 mL, 4 mL, Other, Once, Darwin Guardado DO    Allergies   Allergen Reactions    Bee Venom Hives, Itching and Edema     Category: Allergy;     Penicillins Hives and Itching     Category: Allergy; Physical Exam:   Vitals:    05/17/21 1402   BP: 109/61   Pulse: 69   Resp: 18   Temp: (!) 97 3 °F (36 3 °C)   SpO2: 99%     General: Awake, Alert, Oriented x 3, Mood and affect appropriate  Respiratory: Respirations even and unlabored  Cardiovascular: Peripheral pulses intact; no edema  Musculoskeletal Exam:   Bilateral lumbar paraspinals tender to palpation  ASA Score: 3    Patient/Chart Verification  Patient ID Verified: Verbal  ID Band Applied: No  Consents Confirmed: Procedural, To be obtained in the Pre-Procedure area  H&P( within 30 days) Verified: To be obtained in the Pre-Procedure area  Allergies Reviewed: Yes  Anticoag/NSAID held?: No  Currently on antibiotics?: No    Assessment:   1   Lumbar spondylosis        Plan: B/L L2-5 MBB

## 2021-05-18 NOTE — PROGRESS NOTES
Internal Medicine Progress Note  Patient: Marie Fleming III  Age/sex: 60 y o  male  Medical Record #: [id number]      ASSESSMENT/PLAN:  Marie Fleming III is seen and examined and management for following issues:    Medullary mass removal with fixation/fusion of C2-3 T5 1/24/19:  Maintain cervical collar all times; steroid wean per Neurosurgery  Still has numbness of legs to hip level R>L but can feel feet on floor; some RLE weakness he says he gets when fatigued; burning has resolved hands     Post-op urinary retention/hx BPH: resolved with starting Flomax (new for him) = primary service stopped now     Hypothyroidism:  Continue Levothyroxine 25 mcg qd     Elevated fasting blood sugars; HbA1C 1/22 = 6 3:  Continue DM diet but stopped Accuchecks      Asymptomatic bradycardia:  noted postoperatively; still occurs intermitt but during sleep    ABLA:  Stable; w/o sx; cont to monitor    Heartburn:  added Protonix since on steroids and ordered prn TUMS  He gets heartburn at home if eats certain foods and gets 3x/week  Told him to go to PCP as OP when he recovers and have workup with EGD      Subjective: he denies any current complaints      ROS:   GI: denies abdominal pain, change bowel habits or reflux symptoms  Neuro: No new neurologic changes  Respiratory: No Cough, SOB  Cardiovascular: No CP, palpitations     Scheduled Meds:    Current Facility-Administered Medications:  acetaminophen 975 mg Oral Q8H [address] Nicki Dominguez MD   bisacodyl 10 mg Rectal Daily PRN Sudie Felty,    calcium carbonate 500 mg Oral Daily PRN Mickel Dakins, CRNP   senna 2 tablet Oral Daily Nicki Dominguez MD   And       docusate sodium 100 mg Oral Daily Nicki Dominguez MD   enoxaparin 40 mg Subcutaneous Daily Nicki Dominguez MD   fish oil 1,000 mg Oral Daily Nicki Dominguez MD   levothyroxine 25 mcg Oral Early Morning Nicki Dominguez MD   methocarbamol 500 mg Oral Q6H PRN Nicki Dominguez MD   oxyCODONE 10 mg Oral Q4H PRN Nicki Dominguez MD   oxyCODONE 5 mg Oral Q4H PRN Alyson Gonzalez MD   pantoprazole 40 mg Oral Early Morning CLAUDIO Kim   sodium phosphate-biphosphate 1 enema Rectal Once PRN Ana María Moreira MD       Labs:     Results from last 7 days   Lab Units 02/11/19  0506 02/07/19  0448   WBC Thousand/uL 5 96 10 42*   HEMOGLOBIN g/dL 10 2* 9 6*   HEMATOCRIT % 33 0* 30 8*   PLATELETS Thousands/uL 355 394*     Results from last 7 days   Lab Units 02/11/19  0506 02/07/19  0448   SODIUM mmol/L 139 137   POTASSIUM mmol/L 4 0 4 4   CHLORIDE mmol/L 106 104   CO2 mmol/L 29 28   BUN mg/dL 18 22   CREATININE mg/dL 0 74 0 66   CALCIUM mg/dL 8 8 8 5                  Results from last 7 days   Lab Units 02/06/19  2127 02/05/19  1108 02/05/19  0644   POC GLUCOSE mg/dl 114 119 127     [unfilled]    Labs reviewed    Physical Examination:  Vitals:   Vitals:    02/09/19 2041 02/10/19 0517 02/10/19 2039 02/11/19 0509   BP: 110/57 102/62 110/56 134/67   BP Location: Left arm Left arm Left arm Right arm   Pulse: 89 60 57 55   Resp: 20 20 18 18   Temp: 98 3 °F (36 8 °C) 97 5 °F (36 4 °C) 98 5 °F (36 9 °C) 98 5 °F (36 9 °C)   TempSrc: Oral Oral Oral Oral   SpO2: 98% 99% 98% 97%   Weight:       Height:         Constitutional:  NAD; pleasant; nontoxic  HEENT:  AT/NC; oropharynx negative for thrush on tongue   Neck: negative for JVD  CV:  +S1, S2;  RRR; no rub/murmur  Pulmonary:  BBS without crackles/wheeze/rhonci; resp are unlabored  Abdominal:  soft, +BS, ND/NT; no mass  Skin:  no rashes  Musculoskeletal:  no edema  :  no martinez  Neurological/Psych:  AAO;  CAIN 5/5 except RLE 4+/5; no depression/anxiety        [ X ] Total time spent: 30 Mins and greater than 50% of this time was spent counseling/coordinating care  ** Please Note: Dragon 360 Dictation voice to text software may have been used in the creation of this document   ** Skin normal color for race, warm, dry and intact. No evidence of rash.

## 2021-06-02 ENCOUNTER — TELEPHONE (OUTPATIENT)
Dept: PAIN MEDICINE | Facility: CLINIC | Age: 61
End: 2021-06-02

## 2021-06-02 NOTE — TELEPHONE ENCOUNTER
----- Message from Carolyn Spear III sent at 6/2/2021  7:55 AM EDT -----  Regarding: Visit Follow-Up Question  Contact: [phone]  When is my follow up appointment

## 2021-06-02 NOTE — TELEPHONE ENCOUNTER
Patient had a B/L L2-5 MBB on 5/17   He does not have a follow up scheduled  When would you like him to be seen?

## 2021-06-02 NOTE — TELEPHONE ENCOUNTER
S/w the patient and reviewed  He stated his wife faxed over the pain diary last week  He stated he received 100 % relief until about 2130 before bed and that is when it wore off  Patient aware for JW to review and to be called back to schedule

## 2021-06-07 ENCOUNTER — TELEPHONE (OUTPATIENT)
Dept: ENDOCRINOLOGY | Facility: CLINIC | Age: 61
End: 2021-06-07

## 2021-06-07 NOTE — TELEPHONE ENCOUNTER
BG levels well controlled  Continue current treatment  Only needs to send log if trend of BG changes (high or low) or BG above 200 or below 70 mg/dl

## 2021-06-09 DIAGNOSIS — M47.816 LUMBAR SPONDYLOSIS: Primary | ICD-10-CM

## 2021-06-09 NOTE — TELEPHONE ENCOUNTER
Patient contacted and scheduled for B/L L2-5 MBB # 2  All pre procedure instructions were given to patient   Nothing to eat or drink for 1 hour prior  Loose fitting clothing   NSAIDS, ANTICOAG'S OKAY   Denies Antibx   NO PRN PAIN MEDS 6 HOURS PRIOR   Needs    Patient directed to call the office if becomes sick, as we will most likely reschedule       Insurance auth received but is not a guarantee of payment per your insurance company's authorization disclaimer and it is your responsibility to verify your benefits     COVID -19 screening complete

## 2021-06-11 DIAGNOSIS — E78.2 MIXED HYPERLIPIDEMIA: ICD-10-CM

## 2021-06-11 RX ORDER — OMEGA-3-ACID ETHYL ESTERS 1 G/1
CAPSULE, LIQUID FILLED ORAL
Qty: 180 CAPSULE | Refills: 1 | Status: SHIPPED | OUTPATIENT
Start: 2021-06-11 | End: 2021-12-10

## 2021-06-23 ENCOUNTER — HOSPITAL ENCOUNTER (OUTPATIENT)
Dept: RADIOLOGY | Facility: CLINIC | Age: 61
Discharge: HOME | End: 2021-06-23
Attending: ANESTHESIOLOGY | Admitting: ANESTHESIOLOGY
Payer: COMMERCIAL

## 2021-06-23 VITALS
HEART RATE: 64 BPM | DIASTOLIC BLOOD PRESSURE: 68 MMHG | TEMPERATURE: 98.4 F | RESPIRATION RATE: 20 BRPM | OXYGEN SATURATION: 100 % | SYSTOLIC BLOOD PRESSURE: 120 MMHG

## 2021-06-23 DIAGNOSIS — M47.816 LUMBAR SPONDYLOSIS: ICD-10-CM

## 2021-06-23 PROCEDURE — 64495 INJ PARAVERT F JNT L/S 3 LEV: CPT | Performed by: ANESTHESIOLOGY

## 2021-06-23 PROCEDURE — 64493 INJ PARAVERT F JNT L/S 1 LEV: CPT | Performed by: ANESTHESIOLOGY

## 2021-06-23 PROCEDURE — 64494 INJ PARAVERT F JNT L/S 2 LEV: CPT | Performed by: ANESTHESIOLOGY

## 2021-06-23 RX ORDER — BUPIVACAINE HYDROCHLORIDE 5 MG/ML
30 INJECTION, SOLUTION EPIDURAL; INTRACAUDAL ONCE
Status: COMPLETED | OUTPATIENT
Start: 2021-06-23 | End: 2021-06-23

## 2021-06-23 RX ORDER — LIDOCAINE HYDROCHLORIDE 10 MG/ML
10 INJECTION, SOLUTION EPIDURAL; INFILTRATION; INTRACAUDAL; PERINEURAL ONCE
Status: COMPLETED | OUTPATIENT
Start: 2021-06-23 | End: 2021-06-23

## 2021-06-23 RX ADMIN — LIDOCAINE HYDROCHLORIDE 5 ML: 10 INJECTION, SOLUTION EPIDURAL; INFILTRATION; INTRACAUDAL; PERINEURAL at 14:39

## 2021-06-23 RX ADMIN — BUPIVACAINE HYDROCHLORIDE 4 ML: 5 INJECTION, SOLUTION EPIDURAL; INTRACAUDAL at 14:40

## 2021-06-23 NOTE — DISCHARGE INSTRUCTIONS
ACTIVITY  · Please do activities that will bring on the normal pain that we are rating  For example, if vacuuming or walking increases the pain, do that  This will give the most accurate response to the diary  · You may shower, but no tub baths today, or applied heat  CARE OF THE INJECTION SITE  · This area may be numb for several hours after the injection  · Notify [facility] if you have any of the following:  redness, drainage, swelling, or fever above 100°F     SPECIAL INSTRUCTIONS  · Please return the MBB diary to our office by mail, fax, or drop it off  MEDICATIONS  · Please do not take any break through or short acting pain medications for 8 hours after the block  · Continue to take all routine medications  · Our office may have instructed you to hold some medications  As no general anesthesia was used in today's procedure, you should not experience any side effects related to anesthesia  If you have a problem specifically related to your procedure, please call our office at [phone]  Problems not related to your procedure should be directed to your primary care physician

## 2021-06-23 NOTE — H&P
History of Present Illness: The patient is a 57 y o  male who presents with complaints of  Low back pain      Patient Active Problem List   Diagnosis    BPH associated with nocturia    Hypothyroidism    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[facility] [address] )    H/O spinal fusion    DDD (degenerative disc disease), lumbar    Gastroesophageal reflux disease without esophagitis    Type 2 diabetes mellitus with hyperglycemia, without long-term current use of insulin (HCC)    Cancer-related pain    Mixed hyperlipidemia    Erectile dysfunction of non-organic origin    Elevated bilirubin    Sacroiliitis (HCC)    Lumbar spondylosis       Past Medical History:   Diagnosis Date    BPH associated with nocturia     Cervical spinal mass ([facility] [address] ) 01/07/2019    cervicothoracic    Disease of thyroid gland     Herniated disc, cervical     History of radiation therapy     Hyperlipidemia     Impaired fasting glucose     Radiation-induced myelopathy ([facility] [address] ) 6/19/2019       Past Surgical History:   Procedure Laterality Date    APPENDECTOMY      CERVICAL FUSION      COLONOSCOPY  03/13/2012    NORMAL; RECHECK IN 5 YEARS    FL LUMBAR PUNCTURE DIAGNOSTIC  2/21/2019    [place] BX/EXCIS SPIN PATEL,INDUR,INMED,CERV N/A 1/24/2019    Procedure: Posterior C4-T3 laminectomy; resection of intramedullary mass C5-T3, including fenestration of syrinx C7-T2; C3-T4 fixation/fusion; additional levels as needed;  Surgeon: Krupa Magana MD;  [facility]: [facility];  Service: Neurosurgery    WISDOM TOOTH EXTRACTION           Current Outpatient Medications:     atorvastatin (LIPITOR) 40 mg tablet, TAKE 1 TABLET BY MOUTH EVERY DAY, Disp: 90 tablet, Rfl: 1    Dulaglutide (Trulicity) 8 04 TX/5 9UV SOPN, Inject 0 5 mL (0 75 mg total) under the skin once a week, Disp: 12 pen, Rfl: 1    DULoxetine (CYMBALTA) 60 mg delayed release capsule, TAKE 1 CAPSULE BY MOUTH EVERY DAY, Disp: 90 capsule, Rfl: 1    gabapentin (NEURONTIN) 300 mg capsule, Take 1 capsule (300 mg total) by mouth 3 (three) times a day, Disp: 270 capsule, Rfl: 3    levothyroxine 75 mcg tablet, TAKE 1 TABLET BY MOUTH EVERY DAY, Disp: 90 tablet, Rfl: 1    metFORMIN (GLUCOPHAGE-XR) 500 mg 24 hr tablet, TAKE 2 TABLETS BY MOUTH TWICE A DAY WITH MEALS, Disp: 360 tablet, Rfl: 1    omega-3-acid ethyl esters (LOVAZA) 1 g capsule, TAKE 2 CAPSULES BY MOUTH EVERY DAY, Disp: 180 capsule, Rfl: 1    Allergies   Allergen Reactions    Bee Venom Hives, Itching and Edema     Category: Allergy;     Penicillins Hives and Itching     Category: Allergy; Physical Exam:   Vitals:    06/23/21 1425   BP: 113/68   Pulse: 70   Resp: 20   Temp: 98 4 °F (36 9 °C)   SpO2: 99%     General: Awake, Alert, Oriented x 3, Mood and affect appropriate  Respiratory: Respirations even and unlabored  Cardiovascular: Peripheral pulses intact; no edema  Musculoskeletal Exam:   Bilateral lumbar paraspinals tender to palpation  ASA Score: 3    Patient/Chart Verification  Patient ID Verified: Verbal  ID Band Applied: No  Consents Confirmed: Procedural, To be obtained in the Pre-Procedure area  H&P( within 30 days) Verified: To be obtained in the Pre-Procedure area  Allergies Reviewed: Yes  Anticoag/NSAID held?: NA  Currently on antibiotics?: No    Assessment:   1   Lumbar spondylosis        Plan: B/L L2-5 MBB # 2

## 2021-06-30 DIAGNOSIS — M47.816 LUMBAR SPONDYLOSIS: Primary | ICD-10-CM

## 2021-07-03 DIAGNOSIS — E78.5 HLD (HYPERLIPIDEMIA): ICD-10-CM

## 2021-07-06 RX ORDER — ATORVASTATIN CALCIUM 40 MG/1
TABLET, FILM COATED ORAL
Qty: 90 TABLET | Refills: 1 | Status: SHIPPED | OUTPATIENT
Start: 2021-07-06 | End: 2022-03-09

## 2021-07-21 ENCOUNTER — HOSPITAL ENCOUNTER (OUTPATIENT)
Dept: RADIOLOGY | Facility: CLINIC | Age: 61
Discharge: HOME | End: 2021-07-21
Attending: ANESTHESIOLOGY | Admitting: ANESTHESIOLOGY
Payer: COMMERCIAL

## 2021-07-21 ENCOUNTER — TELEPHONE (OUTPATIENT)
Dept: PAIN MEDICINE | Facility: CLINIC | Age: 61
End: 2021-07-21

## 2021-07-21 VITALS
OXYGEN SATURATION: 98 % | TEMPERATURE: 97.7 F | DIASTOLIC BLOOD PRESSURE: 70 MMHG | RESPIRATION RATE: 20 BRPM | SYSTOLIC BLOOD PRESSURE: 120 MMHG | HEART RATE: 65 BPM

## 2021-07-21 DIAGNOSIS — M47.816 LUMBAR SPONDYLOSIS: ICD-10-CM

## 2021-07-21 PROCEDURE — 64636 DESTROY L/S FACET JNT ADDL: CPT | Performed by: ANESTHESIOLOGY

## 2021-07-21 PROCEDURE — 64635 DESTROY LUMB/SAC FACET JNT: CPT | Performed by: ANESTHESIOLOGY

## 2021-07-21 RX ORDER — BUPIVACAINE HYDROCHLORIDE 5 MG/ML
30 INJECTION, SOLUTION EPIDURAL; INTRACAUDAL ONCE
Status: DISCONTINUED | OUTPATIENT
Start: 2021-07-21 | End: 2021-07-25 | Stop reason: HOSPADM

## 2021-07-21 RX ORDER — LIDOCAINE HYDROCHLORIDE 10 MG/ML
10 INJECTION, SOLUTION EPIDURAL; INFILTRATION; INTRACAUDAL; PERINEURAL ONCE
Status: COMPLETED | OUTPATIENT
Start: 2021-07-21 | End: 2021-07-21

## 2021-07-21 RX ADMIN — LIDOCAINE HYDROCHLORIDE 9 ML: 10 INJECTION, SOLUTION EPIDURAL; INFILTRATION; INTRACAUDAL; PERINEURAL at 13:51

## 2021-07-21 RX ADMIN — LIDOCAINE HYDROCHLORIDE 4 ML: 20 INJECTION, SOLUTION EPIDURAL; INFILTRATION; INTRACAUDAL; PERINEURAL at 13:55

## 2021-07-21 NOTE — H&P
History of Present Illness: The patient is a 57 y o  male who presents with complaints of   Low back pain      Patient Active Problem List   Diagnosis    BPH associated with nocturia    Hypothyroidism    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[facility] [place] [address] [facility]    H/O spinal fusion    DDD (degenerative disc disease), lumbar    Gastroesophageal reflux disease without esophagitis    Type 2 diabetes mellitus with hyperglycemia, without long-term current use of insulin (HCC)    Cancer-related pain    Mixed hyperlipidemia    Erectile dysfunction of non-organic origin    Elevated bilirubin    Sacroiliitis (HCC)    Lumbar spondylosis       Past Medical History:   Diagnosis Date    BPH associated with nocturia     Cervical spinal mass ([facility] [place] [address] ) 01/07/2019    cervicothoracic    Disease of thyroid gland     Herniated disc, cervical     History of radiation therapy     Hyperlipidemia     Impaired fasting glucose     Radiation-induced myelopathy ([facility] [place] [address] ) 6/19/2019       Past Surgical History:   Procedure Laterality Date    APPENDECTOMY      CERVICAL FUSION      COLONOSCOPY  03/13/2012    NORMAL; RECHECK IN 5 YEARS    FL LUMBAR PUNCTURE DIAGNOSTIC  2/21/2019    [place] BX/EXCIS SPIN PATEL,INDUR,INMED,CERV N/A 1/24/2019    Procedure: Posterior C4-T3 laminectomy; resection of intramedullary mass C5-T3, including fenestration of syrinx C7-T2; C3-T4 fixation/fusion; additional levels as needed;  Surgeon: Adeel Naidu MD;  [facility];  Service: Neurosurgery    WISDOM TOOTH EXTRACTION           Current Outpatient Medications:     atorvastatin (LIPITOR) 40 mg tablet, TAKE 1 TABLET BY MOUTH EVERY DAY, Disp: 90 tablet, Rfl: 1    Dulaglutide (Trulicity) 9 11 LJ/5 2VG SOPN, Inject 0 5 mL (0 75 mg total) under the skin once a week, Disp: 12 pen, Rfl: 1    DULoxetine (CYMBALTA) 60 mg delayed release capsule, TAKE 1 CAPSULE BY MOUTH EVERY DAY, Disp: 90 capsule, Rfl: 1    gabapentin (NEURONTIN) 300 mg capsule, Take 1 capsule (300 mg total) by mouth 3 (three) times a day, Disp: 270 capsule, Rfl: 3    levothyroxine 75 mcg tablet, TAKE 1 TABLET BY MOUTH EVERY DAY, Disp: 90 tablet, Rfl: 1    metFORMIN (GLUCOPHAGE-XR) 500 mg 24 hr tablet, TAKE 2 TABLETS BY MOUTH TWICE A DAY WITH MEALS, Disp: 360 tablet, Rfl: 1    omega-3-acid ethyl esters (LOVAZA) 1 g capsule, TAKE 2 CAPSULES BY MOUTH EVERY DAY, Disp: 180 capsule, Rfl: 1    Current Facility-Administered Medications:     bupivacaine (PF) (MARCAINE) 0 5 % injection 30 mL, 30 mL, Injection, Once, Darwin Guardado, DO    lidocaine (PF) (XYLOCAINE-MPF) 1 % injection 10 mL, 10 mL, Other, Once, Bernice Friday, DO    lidocaine (PF) (XYLOCAINE-MPF) 2 % injection 4 mL, 4 mL, Other, Once, Darwin Guardado, DO    Allergies   Allergen Reactions    Bee Venom Hives, Itching and Edema     Category: Allergy;     Penicillins Hives and Itching     Category: Allergy; Physical Exam:   Vitals:    07/21/21 1328   BP: 106/63   Pulse: 67   Resp: 20   Temp: 97 7 °F (36 5 °C)   SpO2: 98%     General: Awake, Alert, Oriented x 3, Mood and affect appropriate  Respiratory: Respirations even and unlabored  Cardiovascular: Peripheral pulses intact; no edema  Musculoskeletal Exam:   Right lumbar paraspinals tender to palpation  ASA Score: 3         Assessment:   1   Lumbar spondylosis        Plan: RIGHT L2-L5 RFA

## 2021-07-22 NOTE — TELEPHONE ENCOUNTER
RN s/w pt  Pt s/p Right RFA 7/21  Pt states that he has some soreness last night that he took ibuprofen for and it helped  He reports that the sites look good, no fevers  He does have  a "very little bit" of sunburn like sensation at the site, "hardly noticeable" that he states is tolerable  Confirmed Left RFA 8/11

## 2021-08-02 ENCOUNTER — LAB (OUTPATIENT)
Dept: LAB | Age: 61
End: 2021-08-02
Payer: COMMERCIAL

## 2021-08-02 DIAGNOSIS — E11.65 TYPE 2 DIABETES MELLITUS WITH HYPERGLYCEMIA, WITHOUT LONG-TERM CURRENT USE OF INSULIN (HCC): ICD-10-CM

## 2021-08-02 DIAGNOSIS — E03.9 ACQUIRED HYPOTHYROIDISM: ICD-10-CM

## 2021-08-02 LAB
ANION GAP SERPL CALCULATED.3IONS-SCNC: 5 MMOL/L (ref 4–13)
BUN SERPL-MCNC: 17 MG/DL (ref 5–25)
CALCIUM SERPL-MCNC: 8.7 MG/DL (ref 8.3–10.1)
CHLORIDE SERPL-SCNC: 107 MMOL/L (ref 100–108)
CO2 SERPL-SCNC: 28 MMOL/L (ref 21–32)
CREAT SERPL-MCNC: 0.76 MG/DL (ref 0.6–1.3)
EST. AVERAGE GLUCOSE BLD GHB EST-MCNC: 108 MG/DL
GFR SERPL CREATININE-BSD FRML MDRD: 98 ML/MIN/1.73SQ M
GLUCOSE P FAST SERPL-MCNC: 82 MG/DL (ref 65–99)
HBA1C MFR BLD: 5.4 %
POTASSIUM SERPL-SCNC: 4 MMOL/L (ref 3.5–5.3)
SODIUM SERPL-SCNC: 140 MMOL/L (ref 136–145)
T4 FREE SERPL-MCNC: 0.9 NG/DL (ref 0.76–1.46)
TSH SERPL DL<=0.05 MIU/L-ACNC: 1.35 UIU/ML (ref 0.36–3.74)

## 2021-08-02 PROCEDURE — 83036 HEMOGLOBIN GLYCOSYLATED A1C: CPT

## 2021-08-02 PROCEDURE — 80048 BASIC METABOLIC PNL TOTAL CA: CPT

## 2021-08-02 PROCEDURE — 3044F HG A1C LEVEL LT 7.0%: CPT | Performed by: INTERNAL MEDICINE

## 2021-08-02 PROCEDURE — 84443 ASSAY THYROID STIM HORMONE: CPT

## 2021-08-02 PROCEDURE — 36415 COLL VENOUS BLD VENIPUNCTURE: CPT

## 2021-08-02 PROCEDURE — 84439 ASSAY OF FREE THYROXINE: CPT

## 2021-08-04 ENCOUNTER — OFFICE VISIT (OUTPATIENT)
Dept: ENDOCRINOLOGY | Facility: CLINIC | Age: 61
End: 2021-08-04
Payer: COMMERCIAL

## 2021-08-04 VITALS
TEMPERATURE: 97.6 F | BODY MASS INDEX: 27.91 KG/M2 | DIASTOLIC BLOOD PRESSURE: 68 MMHG | SYSTOLIC BLOOD PRESSURE: 112 MMHG | WEIGHT: 178.2 LBS | HEART RATE: 71 BPM

## 2021-08-04 DIAGNOSIS — E11.65 TYPE 2 DIABETES MELLITUS WITH HYPERGLYCEMIA, WITHOUT LONG-TERM CURRENT USE OF INSULIN (HCC): Primary | ICD-10-CM

## 2021-08-04 DIAGNOSIS — E03.9 ACQUIRED HYPOTHYROIDISM: ICD-10-CM

## 2021-08-04 DIAGNOSIS — E78.2 MIXED HYPERLIPIDEMIA: ICD-10-CM

## 2021-08-04 PROCEDURE — 1036F TOBACCO NON-USER: CPT | Performed by: INTERNAL MEDICINE

## 2021-08-04 PROCEDURE — 99214 OFFICE O/P EST MOD 30 MIN: CPT | Performed by: INTERNAL MEDICINE

## 2021-08-04 NOTE — PROGRESS NOTES
Lida Saenz III 57 y o  male MRN: [id number]    Encounter: [id number]      Assessment/Plan     Assessment: This is a 57y o -year-old male with diabetes with hyperglycemia  Plan:    Diagnoses and all orders for this visit:    Type 2 diabetes mellitus with hyperglycemia, without long-term current use of insulin ([facility] [address] )  Lab Results   Component Value Date    HGBA1C 5 4 08/02/2021    A1c is at goal 5 4%   continue current management metformin 1000 mg twice a day, Trulicity 5 77 mg once weekly  discussed to check blood sugars once daily and goal for blood sugar is 80 to 160 mg/dL  call if blood sugar less than 70 or more than 300 persistently   follow-up in 6 month      -     Basic metabolic panel; Future  -     Hemoglobin A1C; Future  -     Microalbumin / creatinine urine ratio; Future  -     Basic metabolic panel; Future  -     Hemoglobin A1C; Future  -     Microalbumin / creatinine urine ratio; Future    Acquired hypothyroidism  Lab Results   Component Value Date    [id number] 1 350 08/02/2021        patient is clinically and biochemically euthyroid  continue levothyroxine 75 mcg every day    -     T4, free; Future  -     TSH, 3rd generation; Future    Mixed hyperlipidemia  Lab Results   Component Value Date    LDLCALC 45 02/09/2021    continue statins      CC: Diabetes    History of Present Illness     HPI:    Lida Saenz III  Is 70-year-old gentleman with medical history of type 2 diabetes with long-term insulin use with complications such as neuropathy is here for follow-up  Diabetes course has been stable  He denies any recent illnesses or hospitalizations  Denies recent severe hypoglycemic or severe hyperglycemic episodes  He checks blood sugars regularly once or twice daily and blood sugars are usually in 100-140 mg/dL range  Current regimen is metformin 1000 mg twice a day, Trulicity 1 50 mg weekly  he admits compliance to medications  Denies side effects     his eye appointment is up-to-date, she has Podiatry regularly  He takes levothyroxine 75 mcg po daily on empty stomach 1 hour before breakfast     for hyperlipidemia he takes Lipitor 40 mg    Lab Results   Component Value Date    [id number] 1 350 08/02/2021       Wt Readings from Last 3 Encounters:   08/04/21 80 8 kg (178 lb 3 2 oz)   05/10/21 79 6 kg (175 lb 8 oz)   05/05/21 77 1 kg (170 lb)   Results for Hugh Walker (MRN [id number]) as of 8/4/2021 10:03   Ref  Range 8/2/2021 07:15   Hemoglobin A1C Latest Ref Range: Normal 3 8-5 6%; PreDiabetic 5 7-6 4%; Diabetic >=6 5%; Glycemic control for adults with diabetes <7 0% % 5 4   eAG, EST AVG Glucose Latest Units: mg/dl 108     Results for Hugh Walker (MRN [id number]) as of 8/4/2021 09:57   Ref  Range 8/2/2021 07:15   Sodium Latest Ref Range: 136 - 145 mmol/L 140   Potassium Latest Ref Range: 3 5 - 5 3 mmol/L 4 0   Chloride Latest Ref Range: 100 - 108 mmol/L 107   CO2 Latest Ref Range: 21 - 32 mmol/L 28   Anion Gap Latest Ref Range: 4 - 13 mmol/L 5   BUN Latest Ref Range: 5 - 25 mg/dL 17   Creatinine Latest Ref Range: 0 60 - 1 30 mg/dL 0 76   GLUCOSE FASTING Latest Ref Range: 65 - 99 mg/dL 82   Calcium Latest Ref Range: 8 3 - 10 1 mg/dL 8 7   eGFR Latest Units: ml/min/1 73sq m 98   Hemoglobin A1C Latest Ref Range: Normal 3 8-5 6%; PreDiabetic 5 7-6 4%; Diabetic >=6 5%; Glycemic control for adults with diabetes <7 0% % 5 4   eAG, EST AVG Glucose Latest Units: mg/dl 108   TSH 3RD GENERATON Latest Ref Range: 0 358 - 3 740 uIU/mL 1 350   Free T4 Latest Ref Range: 0 76 - 1 46 ng/dL 0 90   Results for Hugh Walker (MRN [id number]) as of 8/4/2021 10:03   Ref   Range 2/9/2021 07:24   Cholesterol Latest Ref Range: 50 - 200 mg/dL 101   Triglycerides Latest Ref Range: <=150 mg/dL 109   HDL Latest Ref Range: >=40 mg/dL 34 (L)   Non-HDL Cholesterol Latest Units: mg/dl 67   LDL Calculated Latest Ref Range: 0 - 100 mg/dL 45          Review of Systems   Constitutional: Negative for activity change, diaphoresis, fatigue, fever and unexpected weight change  HENT: Negative  Eyes: Negative for visual disturbance  Respiratory: Negative for cough, chest tightness and shortness of breath  Cardiovascular: Negative for chest pain, palpitations and leg swelling  Gastrointestinal: Negative for abdominal pain, constipation, diarrhea, nausea and vomiting  Endocrine: Negative for cold intolerance, heat intolerance, polydipsia, polyphagia and polyuria  Genitourinary: Negative for dysuria, enuresis, frequency and urgency  Musculoskeletal: Negative for arthralgias and myalgias  Skin: Negative for pallor, rash and wound  Allergic/Immunologic: Negative  Neurological: Negative for dizziness, tremors, weakness and numbness  Hematological: Negative  Psychiatric/Behavioral: Negative          Historical Information   Past Medical History:   Diagnosis Date    BPH associated with nocturia     Cervical spinal mass ([facility] ) 01/07/2019    cervicothoracic    Disease of thyroid gland     Herniated disc, cervical     History of radiation therapy     Hyperlipidemia     Impaired fasting glucose     Radiation-induced myelopathy ([facility] ) 6/19/2019     Past Surgical History:   Procedure Laterality Date    APPENDECTOMY      CERVICAL FUSION      COLONOSCOPY  03/13/2012    NORMAL; RECHECK IN 5 YEARS    FL LUMBAR PUNCTURE DIAGNOSTIC  2/21/2019    MD BX/EXCIS SPIN PATEL,INDUR,INMED,CERV N/A 1/24/2019    Procedure: Posterior C4-T3 laminectomy; resection of intramedullary mass C5-T3, including fenestration of syrinx C7-T2; C3-T4 fixation/fusion; additional levels as needed;  Surgeon: Christiana Sky MD;  [facility];  Service: Neurosurgery    WISDOM TOOTH EXTRACTION       Social History   Social History     Substance and Sexual Activity   Alcohol Use Yes     Social History     Substance and Sexual Activity   Drug Use No     Social History     Tobacco Use   Smoking Status Former Smoker    Quit date: 1993    Years since quittin 2   Smokeless Tobacco Never Used     Family History:   Family History   Problem Relation Age of Onset    Diabetes Mother     Hypertension Father     Cerebral palsy Sister     Breast cancer Daughter        Meds/Allergies   Current Outpatient Medications   Medication Sig Dispense Refill    atorvastatin (LIPITOR) 40 mg tablet TAKE 1 TABLET BY MOUTH EVERY DAY 90 tablet 1    Dulaglutide (Trulicity) 5 21 GS/9 4DS SOPN Inject 0 5 mL (0 75 mg total) under the skin once a week 12 pen 1    DULoxetine (CYMBALTA) 60 mg delayed release capsule TAKE 1 CAPSULE BY MOUTH EVERY DAY 90 capsule 1    gabapentin (NEURONTIN) 300 mg capsule Take 1 capsule (300 mg total) by mouth 3 (three) times a day 270 capsule 3    levothyroxine 75 mcg tablet TAKE 1 TABLET BY MOUTH EVERY DAY 90 tablet 1    metFORMIN (GLUCOPHAGE-XR) 500 mg 24 hr tablet TAKE 2 TABLETS BY MOUTH TWICE A DAY WITH MEALS 360 tablet 1    omega-3-acid ethyl esters (LOVAZA) 1 g capsule TAKE 2 CAPSULES BY MOUTH EVERY  capsule 1     No current facility-administered medications for this visit  Allergies   Allergen Reactions    Bee Venom Hives, Itching and Edema     Category: Allergy;     Penicillins Hives and Itching     Category: Allergy;        Objective   Vitals: Blood pressure 112/68, pulse 71, temperature 97 6 °F (36 4 °C), weight 80 8 kg (178 lb 3 2 oz)  Physical Exam  Vitals reviewed  Constitutional:       General: He is not in acute distress  Appearance: He is well-developed  HENT:      Head: Normocephalic and atraumatic  Eyes:      Pupils: Pupils are equal, round, and reactive to light  Neck:      Thyroid: No thyromegaly  Cardiovascular:      Rate and Rhythm: Normal rate and regular rhythm  Heart sounds: Normal heart sounds  Pulmonary:      Effort: Pulmonary effort is normal  No respiratory distress  Breath sounds: Normal breath sounds     Musculoskeletal:         General: No deformity  Normal range of motion  Cervical back: Normal range of motion and neck supple  Skin:     General: Skin is warm and dry  Findings: No erythema  Neurological:      General: No focal deficit present  Mental Status: He is alert and oriented to person, place, and time  Psychiatric:         Mood and Affect: Mood normal          Behavior: Behavior normal          The history was obtained from the review of the chart, patient  Lab Results:   Lab Results   Component Value Date/Time    Hemoglobin A1C 5 4 08/02/2021 07:15 AM    Hemoglobin A1C 5 6 02/09/2021 07:24 AM    Hemoglobin A1C 5 3 10/05/2020 07:52 AM    BUN 17 08/02/2021 07:15 AM    BUN 21 02/09/2021 07:24 AM    BUN 19 10/05/2020 07:52 AM    Potassium 4 0 08/02/2021 07:15 AM    Potassium 4 2 02/09/2021 07:24 AM    Potassium 5 2 10/05/2020 07:52 AM    Chloride 107 08/02/2021 07:15 AM    Chloride 109 (H) 02/09/2021 07:24 AM    Chloride 110 (H) 10/05/2020 07:52 AM    CO2 28 08/02/2021 07:15 AM    CO2 31 02/09/2021 07:24 AM    CO2 32 10/05/2020 07:52 AM    Creatinine 0 76 08/02/2021 07:15 AM    Creatinine 0 75 02/09/2021 07:24 AM    Creatinine 0 77 10/05/2020 07:52 AM    HDL, Direct 34 (L) 02/09/2021 07:24 AM    Triglycerides 109 02/09/2021 07:24 AM           Imaging Studies: I have personally reviewed pertinent reports  Portions of the record may have been created with voice recognition software  Occasional wrong word or "sound a like" substitutions may have occurred due to the inherent limitations of voice recognition software  Read the chart carefully and recognize, using context, where substitutions have occurred

## 2021-08-11 ENCOUNTER — HOSPITAL ENCOUNTER (OUTPATIENT)
Dept: RADIOLOGY | Facility: CLINIC | Age: 61
Discharge: HOME | End: 2021-08-11
Attending: ANESTHESIOLOGY | Admitting: ANESTHESIOLOGY
Payer: COMMERCIAL

## 2021-08-11 ENCOUNTER — TELEPHONE (OUTPATIENT)
Dept: PAIN MEDICINE | Facility: CLINIC | Age: 61
End: 2021-08-11

## 2021-08-11 VITALS
RESPIRATION RATE: 18 BRPM | SYSTOLIC BLOOD PRESSURE: 104 MMHG | TEMPERATURE: 97.6 F | DIASTOLIC BLOOD PRESSURE: 61 MMHG | HEART RATE: 68 BPM | OXYGEN SATURATION: 96 %

## 2021-08-11 DIAGNOSIS — M47.816 LUMBAR SPONDYLOSIS: ICD-10-CM

## 2021-08-11 PROCEDURE — 64636 DESTROY L/S FACET JNT ADDL: CPT | Performed by: ANESTHESIOLOGY

## 2021-08-11 PROCEDURE — 64635 DESTROY LUMB/SAC FACET JNT: CPT | Performed by: ANESTHESIOLOGY

## 2021-08-11 RX ORDER — LIDOCAINE HYDROCHLORIDE 10 MG/ML
10 INJECTION, SOLUTION EPIDURAL; INFILTRATION; INTRACAUDAL; PERINEURAL ONCE
Status: COMPLETED | OUTPATIENT
Start: 2021-08-11 | End: 2021-08-11

## 2021-08-11 RX ORDER — BUPIVACAINE HYDROCHLORIDE 5 MG/ML
30 INJECTION, SOLUTION EPIDURAL; INTRACAUDAL ONCE
Status: COMPLETED | OUTPATIENT
Start: 2021-08-11 | End: 2021-08-11

## 2021-08-11 RX ADMIN — LIDOCAINE HYDROCHLORIDE 10 ML: 10 INJECTION, SOLUTION EPIDURAL; INFILTRATION; INTRACAUDAL; PERINEURAL at 13:12

## 2021-08-11 RX ADMIN — BUPIVACAINE HYDROCHLORIDE 4 ML: 5 INJECTION, SOLUTION EPIDURAL; INTRACAUDAL at 13:31

## 2021-08-11 RX ADMIN — LIDOCAINE HYDROCHLORIDE 4 ML: 20 INJECTION, SOLUTION EPIDURAL; INFILTRATION; INTRACAUDAL; PERINEURAL at 13:21

## 2021-08-11 NOTE — H&P
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[address] )    H/O spinal fusion    DDD (degenerative disc disease), lumbar    Gastroesophageal reflux disease without esophagitis    Type 2 diabetes mellitus with hyperglycemia, without long-term current use of insulin (HCC)    Cancer-related pain    Mixed hyperlipidemia    Erectile dysfunction of non-organic origin    Elevated bilirubin    Sacroiliitis (HCC)    Lumbar spondylosis       Past Medical History:   Diagnosis Date    BPH associated with nocturia     Cervical spinal mass ([facility] [address] ) 01/07/2019    cervicothoracic    Disease of thyroid gland     Herniated disc, cervical     History of radiation therapy     Hyperlipidemia     Impaired fasting glucose     Radiation-induced myelopathy ([facility] [address] ) 6/19/2019       Past Surgical History:   Procedure Laterality Date    APPENDECTOMY      CERVICAL FUSION      COLONOSCOPY  03/13/2012    NORMAL; RECHECK IN 5 YEARS    FL LUMBAR PUNCTURE DIAGNOSTIC  2/21/2019    CT BX/EXCIS SPIN PATEL,INDUR,INMED,CERV N/A 1/24/2019    Procedure: Posterior C4-T3 laminectomy; resection of intramedullary mass C5-T3, including fenestration of syrinx C7-T2; C3-T4 fixation/fusion; additional levels as needed;  Surgeon: Moses Ernandez MD;  [facility];  Service: Neurosurgery    WISDOM TOOTH EXTRACTION           Current Outpatient Medications:     atorvastatin (LIPITOR) 40 mg tablet, TAKE 1 TABLET BY MOUTH EVERY DAY, Disp: 90 tablet, Rfl: 1    Dulaglutide (Trulicity) 2 02 AW/2 8RV SOPN, Inject 0 5 mL (0 75 mg total) under the skin once a week, Disp: 12 pen, Rfl: 1    DULoxetine (CYMBALTA) 60 mg delayed release capsule, TAKE 1 CAPSULE BY MOUTH EVERY DAY, Disp: 90 capsule, Rfl: 1    gabapentin (NEURONTIN) 300 mg capsule, Take 1 capsule (300 mg total) by mouth 3 (three) times a day, Disp: 270 capsule, Rfl: 3    levothyroxine 75 mcg tablet, TAKE 1 TABLET BY MOUTH EVERY DAY, Disp: 90 tablet, Rfl: 1    metFORMIN (GLUCOPHAGE-XR) 500 mg 24 hr tablet, TAKE 2 TABLETS BY MOUTH TWICE A DAY WITH MEALS, Disp: 360 tablet, Rfl: 1    omega-3-acid ethyl esters (LOVAZA) 1 g capsule, TAKE 2 CAPSULES BY MOUTH EVERY DAY, Disp: 180 capsule, Rfl: 1    Current Facility-Administered Medications:     bupivacaine (PF) (MARCAINE) 0 5 % injection 30 mL, 30 mL, Injection, Once, Darwin Guardado, DO    lidocaine (PF) (XYLOCAINE-MPF) 1 % injection 10 mL, 10 mL, Other, Once, Jocelyn Reynolds, DO    lidocaine (PF) (XYLOCAINE-MPF) 2 % injection 4 mL, 4 mL, Other, Once, Darwin Guardado, DO    Allergies   Allergen Reactions    Bee Venom Hives, Itching and Edema     Category: Allergy;     Penicillins Hives and Itching     Category: Allergy; Physical Exam:   Vitals:    08/11/21 1259   BP: 99/59   Pulse: 89   Resp: 20   Temp: 97 6 °F (36 4 °C)   SpO2: 90%     General: Awake, Alert, Oriented x 3, Mood and affect appropriate  Respiratory: Respirations even and unlabored  Cardiovascular: Peripheral pulses intact; no edema  Musculoskeletal Exam:   Left lumbar paraspinals tender to palpation  ASA Score: 3    Patient/Chart Verification  Patient ID Verified: Verbal  ID Band Applied: No  Consents Confirmed: Procedural, To be obtained in the Pre-Procedure area  H&P( within 30 days) Verified: To be obtained in the Pre-Procedure area  Allergies Reviewed: Yes  Anticoag/NSAID held?: NA  Currently on antibiotics?: No    Assessment:   1   Lumbar spondylosis        Plan: LEFT L2-L5 RFA

## 2021-08-11 NOTE — H&P (VIEW-ONLY)
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[facility] [address] )    H/O spinal fusion    DDD (degenerative disc disease), lumbar    Gastroesophageal reflux disease without esophagitis    Type 2 diabetes mellitus with hyperglycemia, without long-term current use of insulin (HCC)    Cancer-related pain    Mixed hyperlipidemia    Erectile dysfunction of non-organic origin    Elevated bilirubin    Sacroiliitis (HCC)    Lumbar spondylosis       Past Medical History:   Diagnosis Date    BPH associated with nocturia     Cervical spinal mass ([facility] [address] ) 01/07/2019    cervicothoracic    Disease of thyroid gland     Herniated disc, cervical     History of radiation therapy     Hyperlipidemia     Impaired fasting glucose     Radiation-induced myelopathy ([facility] [address] ) 6/19/2019       Past Surgical History:   Procedure Laterality Date    APPENDECTOMY      CERVICAL FUSION      COLONOSCOPY  03/13/2012    NORMAL; RECHECK IN 5 YEARS    FL LUMBAR PUNCTURE DIAGNOSTIC  2/21/2019    [place] BX/EXCIS SPIN PATEL,INDUR,INMED,CERV N/A 1/24/2019    Procedure: Posterior C4-T3 laminectomy; resection of intramedullary mass C5-T3, including fenestration of syrinx C7-T2; C3-T4 fixation/fusion; additional levels as needed;  Surgeon: Destiny Goodman MD;  [facility];  Service: Neurosurgery    WISDOM TOOTH EXTRACTION           Current Outpatient Medications:     atorvastatin (LIPITOR) 40 mg tablet, TAKE 1 TABLET BY MOUTH EVERY DAY, Disp: 90 tablet, Rfl: 1    Dulaglutide (Trulicity) 9 65 DV/5 5AL SOPN, Inject 0 5 mL (0 75 mg total) under the skin once a week, Disp: 12 pen, Rfl: 1    DULoxetine (CYMBALTA) 60 mg delayed release capsule, TAKE 1 CAPSULE BY MOUTH EVERY DAY, Disp: 90 capsule, Rfl: 1    gabapentin (NEURONTIN) 300 mg capsule, Take 1 capsule (300 mg total) by mouth 3 (three) times a day, Disp: 270 capsule, Rfl: 3    levothyroxine 75 mcg tablet, TAKE 1 TABLET BY MOUTH EVERY DAY, Disp: 90 tablet, Rfl: 1    metFORMIN (GLUCOPHAGE-XR) 500 mg 24 hr tablet, TAKE 2 TABLETS BY MOUTH TWICE A DAY WITH MEALS, Disp: 360 tablet, Rfl: 1    omega-3-acid ethyl esters (LOVAZA) 1 g capsule, TAKE 2 CAPSULES BY MOUTH EVERY DAY, Disp: 180 capsule, Rfl: 1    Current Facility-Administered Medications:     bupivacaine (PF) (MARCAINE) 0 5 % injection 30 mL, 30 mL, Injection, Once, Darwin Guardado, DO    lidocaine (PF) (XYLOCAINE-MPF) 1 % injection 10 mL, 10 mL, Other, Once, Corky Justin, DO    lidocaine (PF) (XYLOCAINE-MPF) 2 % injection 4 mL, 4 mL, Other, Once, Darwin Guardado, DO    Allergies   Allergen Reactions    Bee Venom Hives, Itching and Edema     Category: Allergy;     Penicillins Hives and Itching     Category: Allergy; Physical Exam:   Vitals:    08/11/21 1259   BP: 99/59   Pulse: 89   Resp: 20   Temp: 97 6 °F (36 4 °C)   SpO2: 90%     General: Awake, Alert, Oriented x 3, Mood and affect appropriate  Respiratory: Respirations even and unlabored  Cardiovascular: Peripheral pulses intact; no edema  Musculoskeletal Exam:   Left lumbar paraspinals tender to palpation  ASA Score: 3    Patient/Chart Verification  Patient ID Verified: Verbal  ID Band Applied: No  Consents Confirmed: Procedural, To be obtained in the Pre-Procedure area  H&P( within 30 days) Verified: To be obtained in the Pre-Procedure area  Allergies Reviewed: Yes  Anticoag/NSAID held?: NA  Currently on antibiotics?: No    Assessment:   1   Lumbar spondylosis        Plan: LEFT L2-L5 RFA

## 2021-08-11 NOTE — DISCHARGE INSTRUCTIONS

## 2021-08-12 NOTE — TELEPHONE ENCOUNTER
RN s/w pt   Pt states that he is doing well  Pt denies pain, fevers, s/sx of infection or burning sensation  Advised may take 2 weeks to notice a difference and up to 4-6 weeks to have the full effect of the procedure  Pt verbalized understanding and appreciation  6 week f/u appt scheduled

## 2021-08-13 DIAGNOSIS — F32.A DEPRESSION, UNSPECIFIED DEPRESSION TYPE: ICD-10-CM

## 2021-08-13 DIAGNOSIS — G62.9 NEUROPATHY: ICD-10-CM

## 2021-08-13 RX ORDER — DULOXETIN HYDROCHLORIDE 60 MG/1
CAPSULE, DELAYED RELEASE ORAL
Qty: 90 CAPSULE | Refills: 1 | Status: SHIPPED | OUTPATIENT
Start: 2021-08-13 | End: 2022-02-16

## 2021-08-17 ENCOUNTER — NURSE TRIAGE (OUTPATIENT)
Dept: OTHER | Facility: OTHER | Age: 61
End: 2021-08-17

## 2021-08-17 NOTE — TELEPHONE ENCOUNTER
I called Gastroenterology office to find out who the [facility] is today  Sonal Kruger told me that it is [facility]

## 2021-08-17 NOTE — TELEPHONE ENCOUNTER
Regarding: Prep not able to produce a bowel movement  ----- Message from Michael Rosa sent at 8/17/2021  1:38 PM EDT -----  " I did the prep and I still have not gone to the bathroom "

## 2021-08-17 NOTE — TELEPHONE ENCOUNTER
Reason for Disposition   [1] Caller has URGENT question or concern AND [2] triager unable to answer question    Answer Assessment - Initial Assessment Questions  1  DATE/TIME: "When did you have your colonoscopy?"       8/19/2021  2  MAIN CONCERN: "What is your main concern right now?" "What questions do you have?"      Started the Prep last night and not passing any stool  Dulcolax, 5 mg, took 2 at 1700 last night; Mixed Miralax with 64 oz of Gatorade and drank all of it at intervals 1800 last night, 0300 today and 0700 today  3  ABDOMEN PAIN: "Are you having any abdomen (belly or stomach) pain?" If Yes, ask: "How bad is it?" (e g , Scale 1-10; mild, moderate, severe)  - MILD (1-3): doesn't interfere with normal activities, abdomen soft and not tender to touch      - MODERATE (4-7): interferes with normal activities or awakens from sleep, tender to touch      - SEVERE (8-10): excruciating pain, doubled over, unable to do any normal activities        Denied  4  OTHER SYMPTOMS: "What other symptoms are you having?" (e g , rectal bleeding, bloating or feeling gassy, passing gas, vomiting, dizziness, fever)  Blood glucose: 84 @ 0700; 81 @ 1421      Protocols used: COLONOSCOPY SYMPTOMS AND QUESTIONS-ADULT-AH

## 2021-08-17 NOTE — TELEPHONE ENCOUNTER
Provider pool: patient was prescribed a 2 day Prep for colonoscopy scheduled on 8/19/21  He started prep last night (a day early) and hasn't passed any stool  See triage below  His blood glucose is 81 now and he is concerned  Please advise

## 2021-08-17 NOTE — TELEPHONE ENCOUNTER
Called and told pt to take mag citrate- pt to call if still no bm, took miralax x2 and dulcolax- no vomiting- encouraged drinking liquids with sugar

## 2021-08-17 NOTE — TELEPHONE ENCOUNTER
I paged Pratibha Solitario via [place] [employer] and advised her that patient started his Prep a day early, he hasn't passed any stool and his blood glucose is 81

## 2021-08-18 ENCOUNTER — TELEPHONE (OUTPATIENT)
Dept: GASTROENTEROLOGY | Facility: AMBULARY SURGERY CENTER | Age: 61
End: 2021-08-18

## 2021-08-19 ENCOUNTER — ANESTHESIA EVENT (OUTPATIENT)
Dept: GASTROENTEROLOGY | Facility: AMBULARY SURGERY CENTER | Age: 61
End: 2021-08-19

## 2021-08-19 ENCOUNTER — HOSPITAL ENCOUNTER (OUTPATIENT)
Dept: GASTROENTEROLOGY | Facility: AMBULARY SURGERY CENTER | Age: 61
Setting detail: OUTPATIENT SURGERY
Discharge: HOME | End: 2021-08-19
Attending: INTERNAL MEDICINE | Admitting: INTERNAL MEDICINE
Payer: COMMERCIAL

## 2021-08-19 ENCOUNTER — ANESTHESIA (OUTPATIENT)
Dept: GASTROENTEROLOGY | Facility: AMBULARY SURGERY CENTER | Age: 61
End: 2021-08-19

## 2021-08-19 VITALS
WEIGHT: 170 LBS | BODY MASS INDEX: 26.68 KG/M2 | HEIGHT: 67 IN | OXYGEN SATURATION: 99 % | RESPIRATION RATE: 20 BRPM | SYSTOLIC BLOOD PRESSURE: 111 MMHG | HEART RATE: 62 BPM | TEMPERATURE: 97.2 F | DIASTOLIC BLOOD PRESSURE: 64 MMHG

## 2021-08-19 DIAGNOSIS — Z12.11 SCREENING FOR COLON CANCER: ICD-10-CM

## 2021-08-19 LAB — GLUCOSE SERPL-MCNC: 84 MG/DL (ref 65–140)

## 2021-08-19 PROCEDURE — 82948 REAGENT STRIP/BLOOD GLUCOSE: CPT

## 2021-08-19 PROCEDURE — 45385 COLONOSCOPY W/LESION REMOVAL: CPT | Performed by: INTERNAL MEDICINE

## 2021-08-19 PROCEDURE — 88305 TISSUE EXAM BY PATHOLOGIST: CPT | Performed by: PATHOLOGY

## 2021-08-19 RX ORDER — PROPOFOL 10 MG/ML
INJECTION, EMULSION INTRAVENOUS AS NEEDED
Status: DISCONTINUED | OUTPATIENT
Start: 2021-08-19 | End: 2021-08-19

## 2021-08-19 RX ORDER — PROPOFOL 10 MG/ML
INJECTION, EMULSION INTRAVENOUS CONTINUOUS PRN
Status: DISCONTINUED | OUTPATIENT
Start: 2021-08-19 | End: 2021-08-19

## 2021-08-19 RX ORDER — LIDOCAINE HYDROCHLORIDE 20 MG/ML
INJECTION, SOLUTION EPIDURAL; INFILTRATION; INTRACAUDAL; PERINEURAL AS NEEDED
Status: DISCONTINUED | OUTPATIENT
Start: 2021-08-19 | End: 2021-08-19

## 2021-08-19 RX ORDER — SODIUM CHLORIDE, SODIUM LACTATE, POTASSIUM CHLORIDE, CALCIUM CHLORIDE 600; 310; 30; 20 MG/100ML; MG/100ML; MG/100ML; MG/100ML
125 INJECTION, SOLUTION INTRAVENOUS CONTINUOUS
Status: DISCONTINUED | OUTPATIENT
Start: 2021-08-19 | End: 2021-08-23 | Stop reason: HOSPADM

## 2021-08-19 RX ADMIN — PROPOFOL 120 MG: 10 INJECTION, EMULSION INTRAVENOUS at 07:41

## 2021-08-19 RX ADMIN — PROPOFOL 100 MCG/KG/MIN: 10 INJECTION, EMULSION INTRAVENOUS at 07:41

## 2021-08-19 RX ADMIN — SODIUM CHLORIDE, SODIUM LACTATE, POTASSIUM CHLORIDE, AND CALCIUM CHLORIDE: .6; .31; .03; .02 INJECTION, SOLUTION INTRAVENOUS at 08:00

## 2021-08-19 RX ADMIN — LIDOCAINE HYDROCHLORIDE 50 MG: 20 INJECTION, SOLUTION EPIDURAL; INFILTRATION; INTRACAUDAL at 07:41

## 2021-08-19 RX ADMIN — SODIUM CHLORIDE, SODIUM LACTATE, POTASSIUM CHLORIDE, AND CALCIUM CHLORIDE: .6; .31; .03; .02 INJECTION, SOLUTION INTRAVENOUS at 07:17

## 2021-08-19 NOTE — ANESTHESIA POSTPROCEDURE EVALUATION
Post-Op Assessment Note    CV Status:  Stable  Pain Score: 0    Pain management: adequate     Mental Status:  Alert and awake   Hydration Status:  Euvolemic   PONV Controlled:  Controlled   Airway Patency:  Patent      Post Op Vitals Reviewed: Yes      Staff: CRNA         No complications documented      BP   94/60   Temp  96 4   Pulse  70   Resp   12   SpO2   100

## 2021-08-19 NOTE — DISCHARGE INSTRUCTIONS
Colorectal Polyps   WHAT YOU NEED TO KNOW:   Colorectal polyps are small growths of tissue in the lining of the colon and rectum  Most polyps are hyperplastic polyps and are usually benign (noncancerous)  Certain types of polyps, called adenomatous polyps, may turn into cancer  DISCHARGE INSTRUCTIONS:   Follow up with your healthcare provider or gastroenterologist as directed: You may need to return for more tests, such as another colonoscopy  Write down your questions so you remember to ask them during your visits  Reduce your risk for colorectal polyps:   · Eat a variety of healthy foods:  Healthy foods include fruit, vegetables, whole-grain breads, low-fat dairy products, beans, lean meat, and fish  Ask if you need to be on a special diet  · Maintain a healthy weight:  Ask your healthcare provider if you need to lose weight and how much you need to lose  Ask for help with a weight loss program     · Exercise:  Begin to exercise slowly and do more as you get stronger  Talk with your healthcare provider before you start an exercise program      · Limit alcohol:  Your risk for polyps increases the more you drink  · Do not smoke: If you smoke, it is never too late to quit  Ask for information about how to stop  For support and more information:   · Kayli Duke ([facility])  [address], [place] [address]  Phone: 8- [phone]  Web Address: www digestive  niddk nih gov    Contact your healthcare provider or gastroenterologist if:   · You have a fever  · You have chills, a cough, or feel weak and achy  · You have abdominal pain that does not go away or gets worse after you take medicine  · Your abdomen is swollen  · You are losing weight without trying  · You have questions or concerns about your condition or care  Seek care immediately or call 911 if:   · You have sudden shortness of breath       · You have a fast heart rate, fast breathing, or are too dizzy to stand up  · You have severe abdominal pain  · You see blood in your bowel movement  © Copyright [address] Information is for End User's use only and may not be sold, redistributed or otherwise used for commercial purposes  All illustrations and images included in CareNotes® are the copyrighted property of A D A M , Inc  or [address] [place], [place] [address]   The above information is an  only  It is not intended as medical advice for individual conditions or treatments  Talk to your doctor, nurse or pharmacist before following any medical regimen to see if it is safe and effective for you  Colonoscopy   WHAT YOU NEED TO KNOW:   A colonoscopy is a procedure to examine the inside of your colon (intestine) with a scope  Polyps or tissue growths may have been removed during your colonoscopy  It is normal to feel bloated and to have some abdominal discomfort  You should be passing gas  If you have hemorrhoids or you had polyps removed, you may have a small amount of bleeding  DISCHARGE INSTRUCTIONS:   Seek care immediately if:   · You have a large amount of bright red blood in your bowel movements  · Your abdomen is hard and firm and you have severe pain  · You have sudden trouble breathing  Contact your healthcare provider if:   · You develop a rash or hives  · You have a fever within 24 hours of your procedure       · You have not had a bowel movement for 3 days after your procedure  · You have questions or concerns about your condition or care  Activity:   · Do not lift, strain, or run  for 3 days after your procedure  · Rest after your procedure  You have been given medicine to relax you  Do not  drive or make important decisions until the day after your procedure  Return to your normal activity as directed  · Relieve gas and discomfort from bloating  by lying on your right side with a heating pad on your abdomen   You may need to take short walks to help the gas move out  Eat small meals until bloating is relieved  If you had polyps removed: For 7 days after your procedure:  · Do not  take aspirin  · Do not  go on long car rides  Follow up with your healthcare provider as directed:  Write down your questions so you remember to ask them during your visits  © 2017 [address] is for End User's use only and may not be sold, redistributed or otherwise used for commercial purposes  All illustrations and images included in CareNotes® are the copyrighted property of A D A M , Inc  or [place]  The above information is an  only  It is not intended as medical advice for individual conditions or treatments  Talk to your doctor, nurse or pharmacist before following any medical regimen to see if it is safe and effective for you  Hemorrhoids   WHAT YOU NEED TO KNOW:   Hemorrhoids are swollen blood vessels inside your rectum (internal hemorrhoids) or on your anus (external hemorrhoids)  Sometimes a hemorrhoid may prolapse  This means it extends out of your anus  DISCHARGE INSTRUCTIONS:   Seek care immediately if:   · You have severe pain in your rectum or around your anus  · You have severe pain in your abdomen and you are vomiting  · You have bleeding from your anus that soaks through your underwear  Contact your healthcare provider if:   · You have frequent and painful bowel movements  · Your hemorrhoid looks or feels more swollen than usual      · You do not have a bowel movement for 2 days or more  · You see or feel tissue coming through your anus  · You have questions or concerns about your condition or care  Medicines: You may  need any of the following:  · Medicine  may be given to decrease pain, swelling, and itching  The medicine may come as a pad, cream, or ointment  · Stool softeners  help treat or prevent constipation  · NSAIDs , such as ibuprofen, help decrease swelling, pain, and fever  NSAIDs can cause stomach bleeding or kidney problems in certain people  If you take blood thinner medicine, always ask your healthcare provider if NSAIDs are safe for you  Always read the medicine label and follow directions  · Take your medicine as directed  Contact your healthcare provider if you think your medicine is not helping or if you have side effects  Tell him or her if you are allergic to any medicine  Keep a list of the medicines, vitamins, and herbs you take  Include the amounts, and when and why you take them  Bring the list or the pill bottles to follow-up visits  Carry your medicine list with you in case of an emergency  Manage your symptoms:   · Apply ice on your anus for 15 to 20 minutes every hour or as directed  Use an ice pack, or put crushed ice in a plastic bag  Cover it with a towel before you apply it to your anus  Ice helps prevent tissue damage and decreases swelling and pain  · Take a sitz bath  Fill a bathtub with 4 to 6 inches of warm water  You may also use a sitz bath pan that fits inside a toilet bowl  Sit in the sitz bath for 15 minutes  Do this 3 times a day, and after each bowel movement  The warm water can help decrease pain and swelling  · Keep your anal area clean  Gently wash the area with warm water daily  Soap may irritate the area  After a bowel movement, wipe with moist towelettes or wet toilet paper  Dry toilet paper can irritate the area  Prevent hemorrhoids:   · Do not strain to have a bowel movement  Do not sit on the toilet too long  These actions can increase pressure on the tissues in your rectum and anus  · Drink plenty of liquids  Liquids can help prevent constipation  Ask how much liquid to drink each day and which liquids are best for you  · Eat a variety of high-fiber foods  Examples include fruits, vegetables, and whole grains   Ask your healthcare provider how much fiber you need each day  You may need to take a fiber supplement  · Exercise as directed  Exercise, such as walking, may make it easier to have a bowel movement  Ask your healthcare provider to help you create an exercise plan  · Do not have anal sex  Anal sex can weaken the skin around your rectum and anus  · Avoid heavy lifting  This can cause straining and increase your risk for another hemorrhoid  Follow up with your healthcare provider as directed:  Write down your questions so you remember to ask them during your visits  © Copyright [employer] 2021 Information is for End User's use only and may not be sold, redistributed or otherwise used for commercial purposes  All illustrations and images included in CareNotes® are the copyrighted property of A D A M , Inc  or [facility] Evin Draper  The above information is an  only  It is not intended as medical advice for individual conditions or treatments  Talk to your doctor, nurse or pharmacist before following any medical regimen to see if it is safe and effective for you

## 2021-08-19 NOTE — ANESTHESIA PREPROCEDURE EVALUATION
Procedure:  COLONOSCOPY    Relevant Problems   CARDIO   (+) Mixed hyperlipidemia      ENDO   (+) Hypothyroidism   (+) Type 2 diabetes mellitus with hyperglycemia, without long-term current use of insulin (HCC)      GI/HEPATIC   (+) Gastroesophageal reflux disease without esophagitis      MUSCULOSKELETAL   (+) DDD (degenerative disc disease), lumbar   (+) Lumbar spondylosis   (+) Sacroiliitis (HCC)      NEURO/PSYCH   (+) 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HCC)      Other   (+) H/O spinal fusion        Physical Exam    Airway    Mallampati score: II  TM Distance: >3 FB  Neck ROM: limited     Dental   upper dentures,     Cardiovascular  Cardiovascular exam normal    Pulmonary  Pulmonary exam normal     Other Findings        Anesthesia Plan  ASA Score- 2     Anesthesia Type- IV sedation with anesthesia with ASA Monitors  Additional Monitors:   Airway Plan:           Plan Factors-Exercise tolerance (METS): >4 METS  Chart reviewed  Existing labs reviewed  Patient is not a current smoker  Patient not instructed to abstain from smoking on day of procedure  Patient did not smoke on day of surgery  Induction- intravenous  Postoperative Plan-     Informed Consent- Anesthetic plan and risks discussed with patient  I personally reviewed this patient with the CRNA  Discussed and agreed on the Anesthesia Plan with the CRNA  Giovanny Friedman

## 2021-09-03 DIAGNOSIS — E11.65 TYPE 2 DIABETES MELLITUS WITH HYPERGLYCEMIA, WITHOUT LONG-TERM CURRENT USE OF INSULIN (HCC): ICD-10-CM

## 2021-09-03 RX ORDER — METFORMIN HYDROCHLORIDE 500 MG/1
TABLET, EXTENDED RELEASE ORAL
Qty: 360 TABLET | Refills: 1 | Status: SHIPPED | OUTPATIENT
Start: 2021-09-03 | End: 2022-03-09

## 2021-09-17 DIAGNOSIS — E11.65 TYPE 2 DIABETES MELLITUS WITH HYPERGLYCEMIA, WITHOUT LONG-TERM CURRENT USE OF INSULIN (HCC): ICD-10-CM

## 2021-09-17 RX ORDER — DULAGLUTIDE 0.75 MG/.5ML
0.75 INJECTION, SOLUTION SUBCUTANEOUS WEEKLY
Qty: 10 ML | Refills: 3 | Status: SHIPPED | OUTPATIENT
Start: 2021-09-17

## 2021-09-20 ENCOUNTER — OFFICE VISIT (OUTPATIENT)
Dept: PAIN MEDICINE | Facility: CLINIC | Age: 61
End: 2021-09-20
Payer: COMMERCIAL

## 2021-09-20 VITALS
SYSTOLIC BLOOD PRESSURE: 115 MMHG | HEIGHT: 67 IN | HEART RATE: 65 BPM | BODY MASS INDEX: 26.37 KG/M2 | DIASTOLIC BLOOD PRESSURE: 67 MMHG | WEIGHT: 168 LBS

## 2021-09-20 DIAGNOSIS — M51.36 DDD (DEGENERATIVE DISC DISEASE), LUMBAR: ICD-10-CM

## 2021-09-20 DIAGNOSIS — M46.1 SACROILIITIS (HCC): ICD-10-CM

## 2021-09-20 DIAGNOSIS — C72.0 SPINAL CORD EPENDYMOMA (HCC): ICD-10-CM

## 2021-09-20 DIAGNOSIS — Z98.1 H/O SPINAL FUSION: ICD-10-CM

## 2021-09-20 DIAGNOSIS — M47.816 LUMBAR SPONDYLOSIS: Primary | ICD-10-CM

## 2021-09-20 PROCEDURE — 1036F TOBACCO NON-USER: CPT | Performed by: NURSE PRACTITIONER

## 2021-09-20 PROCEDURE — 3008F BODY MASS INDEX DOCD: CPT | Performed by: NURSE PRACTITIONER

## 2021-09-20 PROCEDURE — 99213 OFFICE O/P EST LOW 20 MIN: CPT | Performed by: NURSE PRACTITIONER

## 2021-09-20 NOTE — PROGRESS NOTES
Assessment:  1  Lumbar spondylosis    2  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[address] )    3  DDD (degenerative disc disease), lumbar    4  Sacroiliitis ([address] )    5  H/O spinal fusion        Plan:  1  We can repeat bilateral L2-5 RFA every 6-9 months p r n     I discussed with the patient that since there has been moderate to significant improvement in the pain symptoms, we will hold off on any repeat injections at this point in time  However, I reviewed with the patient that if their symptoms should return or worsen,  they should call our office to schedule to discuss repeating the injection  2  We can repeat right SI joint injection p r n   3  Patient may continue with his home exercise program   4  Patient will follow up on a p r n  basis at this time     M*Modal software was used to dictate this note  It may contain errors with dictating incorrect words or incorrect spelling  Please contact the provider directly with any questions  History of Present Illness: The patient is a 57 y o  male with a 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 last seen on 5/5/21 who presents for a follow up office visit in regards to chronic low back pain that is nonradiating into the lower extremities  The patient denies bowel or bladder incontinence or saddle anesthesia  The patient is status post bilateral L2-5 RFA completed on August 11, 2021 and reports a 75% improvement of his axial low back pain which is ongoing from this procedure  He has also had good relief with right SI joint injection in the past   He rates his pain a 2/10 on the numeric pain rating scale  He states he occasionally has pain at night which is described as pressure-like    He does find relief with over-the-counter Advil and Tylenol    MRI of the lumbar spine reveals multilevel lumbar spondylosis from L2-3 to L5-S1, degenerative disc disease, mild left foraminal narrowing at L3-4, mild right foraminal narrowing at L4-5, and mild left foraminal narrowing at L5-S1  I have personally reviewed and/or updated the patient's past medical history, past surgical history, family history, social history, current medications, allergies, and vital signs today  Review of Systems:    Review of Systems   Respiratory: Negative for shortness of breath  Cardiovascular: Negative for chest pain  Gastrointestinal: Negative for constipation, diarrhea, nausea and vomiting  Musculoskeletal: Positive for gait problem  Negative for arthralgias, joint swelling and myalgias  Skin: Negative for rash  Neurological: Negative for dizziness, seizures and weakness  All other systems reviewed and are negative          Past Medical History:   Diagnosis Date    BPH associated with nocturia     [facility]     Cervical spinal mass ([facility] ) 2019    cervicothoracic    Diabetes mellitus ([facility] )     Disease of thyroid gland     Herniated disc, cervical     History of radiation therapy     Hyperlipidemia     Impaired fasting glucose     [facility]       Past Surgical History:   Procedure Laterality Date    APPENDECTOMY      CERVICAL FUSION      COLONOSCOPY  2012    NORMAL; RECHECK IN 5 YEARS    FL LUMBAR PUNCTURE DIAGNOSTIC  2019    MI BX/EXCIS SPIN PATEL,INDUR,INMED,CERV N/A 2019    Procedure: Posterior C4-T3 laminectomy; resection of intramedullary mass C5-T3, including fenestration of syrinx C7-T2; C3-T4 fixation/fusion; additional levels as needed;  Surgeon: Stacey Calvillo MD;  Location: [facility];  Service: Neurosurgery    WISDOM TOOTH EXTRACTION         Family History   Problem Relation Age of Onset    Diabetes Mother     Hypertension Father     Cerebral palsy Sister     Breast cancer Daughter        Social History     Occupational History    Occupation: Disability   Tobacco Use    Smoking status: Former Smoker     Quit date: 1993     Years since quittin 3    Smokeless tobacco: Never Used   Vaping Use    Vaping Use: Never used   Substance and Sexual Activity    Alcohol use: Yes     Comment: 1 per week    Drug use: No    Sexual activity: Not on file         Current Outpatient Medications:     atorvastatin (LIPITOR) 40 mg tablet, TAKE 1 TABLET BY MOUTH EVERY DAY, Disp: 90 tablet, Rfl: 1    DULoxetine (CYMBALTA) 60 mg delayed release capsule, TAKE 1 CAPSULE BY MOUTH EVERY DAY, Disp: 90 capsule, Rfl: 1    levothyroxine 75 mcg tablet, TAKE 1 TABLET BY MOUTH EVERY DAY, Disp: 90 tablet, Rfl: 1    metFORMIN (GLUCOPHAGE-XR) 500 mg 24 hr tablet, Take 2 tablets twice a day with meals, Disp: 360 tablet, Rfl: 1    omega-3-acid ethyl esters (LOVAZA) 1 g capsule, TAKE 2 CAPSULES BY MOUTH EVERY DAY, Disp: 180 capsule, Rfl: 1    Trulicity 9 20 ZX/1 7VC SOPN, INJECT 0 5 ML (0 75 MG TOTAL) UNDER THE SKIN ONCE A WEEK, Disp: 10 mL, Rfl: 3    gabapentin (NEURONTIN) 300 mg capsule, Take 1 capsule (300 mg total) by mouth 3 (three) times a day, Disp: 270 capsule, Rfl: 3    Allergies   Allergen Reactions    Bee Venom Hives, Itching and Edema     Category: Allergy;     Penicillins Hives and Itching     Category: Allergy; Physical Exam:    /67   Pulse 65   Ht 5' 7" (1 702 m)   Wt 76 2 kg (168 lb)   BMI 26 31 kg/m²     Constitutional:normal, well developed, well nourished, alert, in no distress and non-toxic and no overt pain behavior  Eyes:anicteric  HEENT:grossly intact  Neck:supple, symmetric, trachea midline and no masses   Pulmonary:even and unlabored  Cardiovascular:No edema or pitting edema present  Skin:Normal without rashes or lesions and well hydrated  Psychiatric:Mood and affect appropriate  Neurologic:Cranial Nerves II-XII grossly intact  Musculoskeletal:Slightly antalgic gait but steady without the use of assistive devices      Imaging  No orders to display         No orders of the defined types were placed in this encounter

## 2021-09-22 ENCOUNTER — TELEPHONE (OUTPATIENT)
Dept: FAMILY MEDICINE CLINIC | Facility: CLINIC | Age: 61
End: 2021-09-22

## 2021-09-22 NOTE — TELEPHONE ENCOUNTER
PATIENT STATES INSURANCE WILL PAY FOR NEW BRACE FOR HIS LEG, PATIENT JUST NEEDS ORDER AND LETTER STATING HE STILL NEEDS THE BRACE  PATIENT HAD SPINAL SURGERY AND NEEDS BRACE TO KEEP LEG ALIGNED   PATIENT STATES ORDER AND LETTER CAN BE FAXED TO [facility]: [phone]    THEIR PHONE NUMBER [phone]

## 2021-09-26 DIAGNOSIS — E03.9 HYPOTHYROIDISM, UNSPECIFIED TYPE: ICD-10-CM

## 2021-09-27 RX ORDER — LEVOTHYROXINE SODIUM 0.07 MG/1
TABLET ORAL
Qty: 90 TABLET | Refills: 1 | Status: SHIPPED | OUTPATIENT
Start: 2021-09-27 | End: 2022-04-11

## 2021-09-28 DIAGNOSIS — C72.0 SPINAL CORD EPENDYMOMA (HCC): Primary | ICD-10-CM

## 2021-09-28 DIAGNOSIS — Z98.1 H/O SPINAL FUSION: ICD-10-CM

## 2021-09-28 DIAGNOSIS — M46.1 SACROILIITIS (HCC): ICD-10-CM

## 2021-09-28 NOTE — TELEPHONE ENCOUNTER
Patient notified that letter and DME order has been faxed to the below mentioned fax number [phone]

## 2021-09-28 NOTE — TELEPHONE ENCOUNTER
Per patient he needs a lower leg brace to support his ankle and keep it straight with a base inside his shoe

## 2021-11-08 ENCOUNTER — TELEPHONE (OUTPATIENT)
Dept: ENDOCRINOLOGY | Facility: CLINIC | Age: 61
End: 2021-11-08

## 2021-11-09 ENCOUNTER — LAB (OUTPATIENT)
Dept: LAB | Age: 61
End: 2021-11-09
Payer: COMMERCIAL

## 2021-11-09 DIAGNOSIS — E03.9 ACQUIRED HYPOTHYROIDISM: ICD-10-CM

## 2021-11-09 DIAGNOSIS — E11.65 TYPE 2 DIABETES MELLITUS WITH HYPERGLYCEMIA, WITHOUT LONG-TERM CURRENT USE OF INSULIN (HCC): ICD-10-CM

## 2021-11-09 LAB
ANION GAP SERPL CALCULATED.3IONS-SCNC: 6 MMOL/L (ref 4–13)
BUN SERPL-MCNC: 23 MG/DL (ref 5–25)
CALCIUM SERPL-MCNC: 9.2 MG/DL (ref 8.3–10.1)
CHLORIDE SERPL-SCNC: 108 MMOL/L (ref 100–108)
CO2 SERPL-SCNC: 27 MMOL/L (ref 21–32)
CREAT SERPL-MCNC: 0.81 MG/DL (ref 0.6–1.3)
CREAT UR-MCNC: 209 MG/DL
EST. AVERAGE GLUCOSE BLD GHB EST-MCNC: 108 MG/DL
GFR SERPL CREATININE-BSD FRML MDRD: 95 ML/MIN/1.73SQ M
GLUCOSE P FAST SERPL-MCNC: 84 MG/DL (ref 65–99)
HBA1C MFR BLD: 5.4 %
MICROALBUMIN UR-MCNC: 19.3 MG/L (ref 0–20)
MICROALBUMIN/CREAT 24H UR: 9 MG/G CREATININE (ref 0–30)
POTASSIUM SERPL-SCNC: 4 MMOL/L (ref 3.5–5.3)
SODIUM SERPL-SCNC: 141 MMOL/L (ref 136–145)
T4 FREE SERPL-MCNC: 1.1 NG/DL (ref 0.76–1.46)
TSH SERPL DL<=0.05 MIU/L-ACNC: 1.28 UIU/ML (ref 0.36–3.74)

## 2021-11-09 PROCEDURE — 36415 COLL VENOUS BLD VENIPUNCTURE: CPT

## 2021-11-09 PROCEDURE — 84439 ASSAY OF FREE THYROXINE: CPT

## 2021-11-09 PROCEDURE — 82043 UR ALBUMIN QUANTITATIVE: CPT

## 2021-11-09 PROCEDURE — 3044F HG A1C LEVEL LT 7.0%: CPT | Performed by: FAMILY MEDICINE

## 2021-11-09 PROCEDURE — 82570 ASSAY OF URINE CREATININE: CPT

## 2021-11-09 PROCEDURE — 84443 ASSAY THYROID STIM HORMONE: CPT

## 2021-11-09 PROCEDURE — 83036 HEMOGLOBIN GLYCOSYLATED A1C: CPT

## 2021-11-09 PROCEDURE — 3061F NEG MICROALBUMINURIA REV: CPT | Performed by: FAMILY MEDICINE

## 2021-11-09 PROCEDURE — 80048 BASIC METABOLIC PNL TOTAL CA: CPT

## 2021-11-10 ENCOUNTER — TELEPHONE (OUTPATIENT)
Dept: ENDOCRINOLOGY | Facility: CLINIC | Age: 61
End: 2021-11-10

## 2021-11-15 ENCOUNTER — OFFICE VISIT (OUTPATIENT)
Dept: FAMILY MEDICINE CLINIC | Facility: CLINIC | Age: 61
End: 2021-11-15
Payer: COMMERCIAL

## 2021-11-15 VITALS
OXYGEN SATURATION: 97 % | HEIGHT: 67 IN | WEIGHT: 170 LBS | HEART RATE: 84 BPM | SYSTOLIC BLOOD PRESSURE: 136 MMHG | DIASTOLIC BLOOD PRESSURE: 60 MMHG | BODY MASS INDEX: 26.68 KG/M2

## 2021-11-15 DIAGNOSIS — C72.0 SPINAL CORD EPENDYMOMA (HCC): ICD-10-CM

## 2021-11-15 DIAGNOSIS — E78.2 MIXED HYPERLIPIDEMIA: ICD-10-CM

## 2021-11-15 DIAGNOSIS — E11.9 TYPE 2 DIABETES MELLITUS WITHOUT COMPLICATION, WITHOUT LONG-TERM CURRENT USE OF INSULIN (HCC): ICD-10-CM

## 2021-11-15 DIAGNOSIS — R14.0 ABDOMINAL BLOATING: Primary | ICD-10-CM

## 2021-11-15 DIAGNOSIS — R10.30 LOWER ABDOMINAL PAIN: ICD-10-CM

## 2021-11-15 PROCEDURE — 1036F TOBACCO NON-USER: CPT | Performed by: FAMILY MEDICINE

## 2021-11-15 PROCEDURE — 99213 OFFICE O/P EST LOW 20 MIN: CPT | Performed by: FAMILY MEDICINE

## 2021-11-15 PROCEDURE — 3008F BODY MASS INDEX DOCD: CPT | Performed by: FAMILY MEDICINE

## 2021-11-15 PROCEDURE — 3725F SCREEN DEPRESSION PERFORMED: CPT | Performed by: FAMILY MEDICINE

## 2021-12-10 DIAGNOSIS — E78.2 MIXED HYPERLIPIDEMIA: ICD-10-CM

## 2021-12-10 RX ORDER — OMEGA-3-ACID ETHYL ESTERS 1 G/1
CAPSULE, LIQUID FILLED ORAL
Qty: 180 CAPSULE | Refills: 1 | Status: SHIPPED | OUTPATIENT
Start: 2021-12-10 | End: 2022-06-08

## 2021-12-11 ENCOUNTER — HOSPITAL ENCOUNTER (OUTPATIENT)
Dept: RADIOLOGY | Facility: HOSPITAL | Age: 61
Discharge: HOME | End: 2021-12-11
Payer: COMMERCIAL

## 2021-12-11 DIAGNOSIS — R10.30 LOWER ABDOMINAL PAIN: ICD-10-CM

## 2021-12-11 DIAGNOSIS — R14.0 ABDOMINAL BLOATING: ICD-10-CM

## 2021-12-11 PROCEDURE — 74177 CT ABD & PELVIS W/CONTRAST: CPT

## 2021-12-11 RX ADMIN — IOHEXOL 100 ML: 350 INJECTION, SOLUTION INTRAVENOUS at 13:48

## 2022-01-01 NOTE — PROGRESS NOTES
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 but other histology is not excluded, pathology pending at this time  MRI performed on January 7, 2019 demonstrated a heterogeneous neoplasm of the cervicothoracic junction consistent with a primary glial tumor, extending from C4-C5 to caudal T3     2  Syrinx of spinal cord, demonstrated on MRI from January 7, 2019  Extended from C7-T2     3  Neurologic abnormalities secondary to 1 and 2, including gait disturbance and other upper motor neuron abnormalities    4  Hypothyroidism, on levothyroxine 25 mcg daily    5  Pre-diabetes, most recent hemoglobin A1c 6%, managed by diet    6  Hyperlipidemia, managed by diet    Plan:                Neuro:     -Maintain mean arterial pressure greater than 85 per Neurosurgery, Salbador-Synephrine ordered for this purpose    -Start Decadron 4 mg every 6 hours    -Closely monitor neurologic examination, neurologic checks every 2 hours    -Analgesia with scheduled acetaminophen 975 mg, as well as PRN oxycodone 5 mg, 10 mg, and Dilaudid 0 5 mg for breakthrough pain  Neurosurgery ordered Robaxin 750 mg every 6 hours as well  CV:     -No acute issues  MAP goal greater than 85 as above  Salbador-Synephrine has been ordered  We will administer peripherally for the time being  If the patient requires a high dose of Salbador-Synephrine, will place a central line  Lung:     -No acute issues  Maintain oxygen saturation greater than 92%  -Incentive spirometry and pulmonary toilet  GI:     -No acute issues  Regular diet has been ordered  FEN:     -Regular diet has been ordered    Will not order maintenance fluids unless the patient fails bedside swallow evaluation     -Check electrolytes in the morning and replete as needed     -As above, patient has been ordered a regular diet  :     -Patient has an indwelling Mann catheter in place  Attempt removal as soon as feasible  ID:     -No acute issues  Monitor for fever and trend WBC count     -Patient received perioperative antibiotics, specifically vancomycin  Heme/Onc:     -Follow-up pathology for spinal cord neoplasm  Management per Neurosurgery  -DVT prophylaxis with Lovenox  Endo:     -Closely monitor blood glucose in the setting of prediabetes  Will order blood glucose checks every 6 hours  Will add sliding scale coverage if patient has persistent hyperglycemia  Msk/Skin:     -No acute issues  Pressure ulcer prophylaxis  Disposition:  Continue ICU care  Chief Complaint:  None offered by patient    HPI/24hr events:  Briefly, patient is a 71-year-old male with past medical history of hypothyroidism, prediabetes with a recent hemoglobin A1c of 6%, and hyperlipidemia who presents immediately postoperatively  Per review of records from Dr Thomas Gracia from Neurosurgery, the patient had noted numbness in his bilateral hands for the past several years which had been worsening  He also began to experience difficulty with his gait as well as frequent falls  Patient's PCP referred him to Dr Ebenezer Boyce regarding his gait abnormalities  MRI of his lumbar spine was negative  However, both the patient's PCP and the Orthopedic Surgeon noted findings concerning for an upper motor neuron lesion  An MRI of the patient's cervical spine was ordered and performed on January 7th  Findings are discussed above but briefly it demonstrated an extensive neoplasm of the spinal cord as well as a syrinx of the spinal cord  Patient was referred to Neurosurgery  He presents immediately postoperatively from a posterior C4-T3 laminectomy, resection of intramedullary mass, and C2-T5 fixation/fusion    During the procedure, motor evoked potentials were lost to the right foot  Resection was stopped at this point  While the dura was being clothes, signals in the right leg improved to near baseline  On evaluation in the PACU, patient was without complaints  He was awake, fully alert and oriented, and answered questions appropriately  He denied any vision changes, speech difficulty, significant neck pain, new focal weakness/numbness of his extremities, chest pain, shortness of breath, nausea, vomiting, abdominal pain, or any other symptoms  Physical Exam:    Physical Exam   Constitutional: He is oriented to person, place, and time  He appears well-developed and well-nourished  No distress  HENT:   Head: Normocephalic and atraumatic  Eyes: Pupils are equal, round, and reactive to light  EOM are normal    Neck:   Rigid cervical collar in place  Cardiovascular: Normal rate and regular rhythm  No murmur heard  DP pulses palpable bilaterally  Pulmonary/Chest: No respiratory distress  He has no wheezes  He has no rales  Abdominal: Soft  Bowel sounds are normal  There is no tenderness  There is no guarding  Genitourinary:   Genitourinary Comments: Mann catheter in place  Musculoskeletal: Normal range of motion  He exhibits no edema  Neurological: He is alert and oriented to person, place, and time  Patient fully alert and oriented  CN II-XII grossly intact  Patient has 5/5 strength in his bilateral upper extremities  He has 4-5 strength in the bilateral lower extremities with the right being weaker than the left  Skin: Skin is warm and dry  Nursing note and vitals reviewed        Vitals:    19 2115 19 2130 19 2145 19 2200   BP: 125/61 138/72 125/64 138/81   Pulse: 104 100 96 100   Resp: 20 14 21 20   Temp:   (!) 97 3 °F (36 3 °C)    TempSrc:       SpO2: 100% 100% 99% 100%     Arterial Line BP: 144/68  Arterial Line MAP (mmHg): 94 mmHg    Temperature:   Temp (24hrs), Av °F (36 7 °C), Min:97 3 °F (36 3 °C), Max:99 3 °F (37 4 °C)    Current: Temperature: (!) 97 3 °F (36 3 °C)    Weights: There is no height or weight on file to calculate BMI  Weight (last 2 days)     None          Hemodynamic Monitoring:  N/A     Non-Invasive/Invasive Ventilation Settings:  Respiratory    Lab Data (Last 4 hours)    None         O2/Vent Data (Last 4 hours)    None               SpO2: 100 %    Intake and Outputs:  I/O       01/23 0701 - 01/24 0700 01/24 0701 - 01/25 0700    I V   2500    IV Piggyback  500    Total Intake   3000    Urine  825    Drains  80    Blood  350    Total Output   1255    Net   +1745              Nutrition:        Diet Orders            Start     Ordered    01/25/19 0000  Diet NPO  Diet effective midnight     Question:  Diet Type  Answer:  NPO    01/24/19 1044    01/25/19 0000  Diet NPO; Sips with meds  Diet effective midnight     Question Answer Comment   Diet Type NPO    NPO Except: Sips with meds        01/24/19 1044    01/24/19 2159  Diet Regular; Regular House  Diet effective now     Question Answer Comment   Diet Type Regular    Regular Regular House    RD to adjust diet per protocol? Yes        01/24/19 2158          Labs:     Results from last 7 days  Lab Units 01/22/19  1523   WBC Thousand/uL 8 44   HEMOGLOBIN g/dL 13 0   HEMATOCRIT % 41 0   PLATELETS Thousands/uL 303   NEUTROS PCT % 70   MONOS PCT % 7      Results from last 7 days  Lab Units 01/22/19  1523   SODIUM mmol/L 135*   POTASSIUM mmol/L 4 2   CHLORIDE mmol/L 103   CO2 mmol/L 27   BUN mg/dL 21   CREATININE mg/dL 0 80   CALCIUM mg/dL 8 6   ALK PHOS U/L 76   ALT U/L 30   AST U/L 15                Results from last 7 days  Lab Units 01/22/19  1523   INR  0 99   PTT seconds 32     Imaging:  Personally reviewed MRI of the cervical spine  Also reviewed reports for CT of the cervical and thoracic spine  EKG:  None available for review  Allergies:    Allergies   Allergen Reactions    Bee Venom Hives, Itching and Edema     Category: Allergy;     Penicillins Hives and Itching     Category: Allergy; Medications:   Scheduled Meds:  Current Facility-Administered Medications:  albuterol 2 5 mg Nebulization Once PRN Tequila Diver, CRNA    HYDROmorphone 0 4 mg Intravenous Q5 Min PRN Tequila Diver, CRNA    lactated ringers 125 mL/hr Intravenous Continuous Heather French MD Last Rate: 125 mL/hr (01/24/19 1124)   meperidine 12 5 mg Intravenous Q10 Min PRN Tequila Diver, CRNA    ondansetron 4 mg Intravenous Once PRN Tequila Diver, CRNA    phenylephrine (EBONIE-SYNEPHRINE) 50 mg (STANDARD CONCENTRATION) in sodium chloride 0 9% 250 mL 20 mcg/min Intravenous Titrated Tequila Diver, CRNA Last Rate: 20 mcg/min (01/24/19 2145)   sodium chloride 100 mL/hr Intravenous Continuous Shannan Gomez MD Last Rate: 100 mL/hr (01/24/19 2159)     Continuous Infusions:  lactated ringers 125 mL/hr Last Rate: 125 mL/hr (01/24/19 1124)   phenylephrine (EBONIE-SYNEPHRINE) 50 mg (STANDARD CONCENTRATION) in sodium chloride 0 9% 250 mL 20 mcg/min Last Rate: 20 mcg/min (01/24/19 2145)   sodium chloride 100 mL/hr Last Rate: 100 mL/hr (01/24/19 2159)     PRN Meds:    albuterol 2 5 mg Once PRN   HYDROmorphone 0 4 mg Q5 Min PRN   meperidine 12 5 mg Q10 Min PRN   ondansetron 4 mg Once PRN       VTE Pharmacologic Prophylaxis: Enoxaparin (Lovenox)  VTE Mechanical Prophylaxis: sequential compression device    Invasive lines and devices: Invasive Devices     Peripheral Intravenous Line            Peripheral IV 01/24/19 Left Hand less than 1 day    Peripheral IV 01/24/19 Right Foot less than 1 day          Arterial Line            Arterial Line 01/24/19 Right Radial less than 1 day          Drain            Closed/Suction Drain Right Back Bulb 7 Fr  less than 1 day    Urethral Catheter Latex 16 Fr  less than 1 day                   Code Status: No Order     Portions of the record may have been created with voice recognition software    Occasional wrong word or "sound a like" substitutions may have occurred due to the inherent limitations of voice recognition software  Read the chart carefully and recognize, using context, where substitutions have occurred       Porfirio Shone, MD Statement Selected

## 2022-01-14 ENCOUNTER — HOSPITAL ENCOUNTER (OUTPATIENT)
Dept: RADIOLOGY | Facility: HOSPITAL | Age: 62
Discharge: HOME | End: 2022-01-14
Attending: NEUROLOGICAL SURGERY
Payer: COMMERCIAL

## 2022-01-14 DIAGNOSIS — Z98.1 H/O SPINAL FUSION: ICD-10-CM

## 2022-01-14 DIAGNOSIS — C72.0 SPINAL CORD EPENDYMOMA (HCC): ICD-10-CM

## 2022-01-14 PROCEDURE — 72156 MRI NECK SPINE W/O & W/DYE: CPT

## 2022-01-14 PROCEDURE — A9585 GADOBUTROL INJECTION: HCPCS | Performed by: NEUROLOGICAL SURGERY

## 2022-01-14 PROCEDURE — G1004 CDSM NDSC: HCPCS

## 2022-01-14 RX ADMIN — GADOBUTROL 7 ML: 604.72 INJECTION INTRAVENOUS at 11:46

## 2022-01-18 ENCOUNTER — DOCUMENTATION (OUTPATIENT)
Dept: NEUROSURGERY | Facility: CLINIC | Age: 62
End: 2022-01-18

## 2022-01-18 ENCOUNTER — TELEMEDICINE (OUTPATIENT)
Dept: FAMILY MEDICINE CLINIC | Facility: CLINIC | Age: 62
End: 2022-01-18
Payer: COMMERCIAL

## 2022-01-18 VITALS — TEMPERATURE: 98.7 F | WEIGHT: 170 LBS | BODY MASS INDEX: 26.63 KG/M2

## 2022-01-18 DIAGNOSIS — Z20.822 EXPOSURE TO CONFIRMED CASE OF COVID-19: ICD-10-CM

## 2022-01-18 DIAGNOSIS — J02.9 SORE THROAT: Primary | ICD-10-CM

## 2022-01-18 DIAGNOSIS — R09.81 NASAL CONGESTION: ICD-10-CM

## 2022-01-18 DIAGNOSIS — R05.9 COUGH: ICD-10-CM

## 2022-01-18 PROCEDURE — U0005 INFEC AGEN DETEC AMPLI PROBE: HCPCS | Performed by: FAMILY MEDICINE

## 2022-01-18 PROCEDURE — 99213 OFFICE O/P EST LOW 20 MIN: CPT | Performed by: FAMILY MEDICINE

## 2022-01-18 PROCEDURE — U0003 INFECTIOUS AGENT DETECTION BY NUCLEIC ACID (DNA OR RNA); SEVERE ACUTE RESPIRATORY SYNDROME CORONAVIRUS 2 (SARS-COV-2) (CORONAVIRUS DISEASE [COVID-19]), AMPLIFIED PROBE TECHNIQUE, MAKING USE OF HIGH THROUGHPUT TECHNOLOGIES AS DESCRIBED BY CMS-2020-01-R: HCPCS | Performed by: FAMILY MEDICINE

## 2022-01-18 NOTE — PROGRESS NOTES
Received a call from patient stating he needs to reschedule his [facility] appt tomorrow 1/19  Returned call to patient who stated his wife and daughter have tested positive for covid and he is currently on his way to be tested  Offered patient a virtual appt rather than in person for tomorrow 1/19  Patient agreed  He is aware of how to use "[employer]"  Patient stated he would like to use his mobile number on file  He was appreciative of the call back

## 2022-01-18 NOTE — PATIENT INSTRUCTIONS
Complete COVID test today  Remain in isolation to result is known  If positive remain in isolation 5 days from onset of symptoms and then mask for the next 5 days  Return in 1 week if still symptoms

## 2022-01-18 NOTE — PROGRESS NOTES
COVID-19 Outpatient Progress Note    Assessment/Plan:  1  Sore throat  2  Cough  3  Nasal congestion  4  Exposure COVID, wife and granddaughter are positive  Patient's sent for COVID testing today  Patient main isolation till result is known  If positive patient to isolate for 5 days from onset of symptoms then mask for the next 5 days  Patient tells me he had primary and booster of Moderna vaccine, 3 in total  Return in 1 week if still symptoms  Problem List Items Addressed This Visit     None      Visit Diagnoses     Sore throat    -  Primary    Relevant Orders    COVID Only- Collected at [facility]    Cough        Relevant Orders    COVID Only- Collected at [facility]    Nasal congestion        Relevant Orders    COVID Only- Collected at [facility]    Exposure to confirmed case of COVID-19        Relevant Orders    COVID Only- Collected at [facility]         Disposition:     Referred patient to centralized site to test for COVID-19  Patient's sent for COVID testing today  Patient main in isolation until result is known    I have spent 15 minutes directly with the patient  Encounter provider Nitish Camejo DO    Provider located at [address] [address] [phone]    Recent Visits  No visits were found meeting these conditions  Showing recent visits within past 7 days and meeting all other requirements  Today's Visits  Date Type Provider Dept   01/18/22 Telemedicine Nitish Camejo DO [facility]   Showing today's visits and meeting all other requirements  Future Appointments  No visits were found meeting these conditions  Showing future appointments within next 150 days and meeting all other requirements     This virtual check-in was done via telephone and he agrees to proceed      Patient agrees to participate in a virtual check in via telephone or video visit instead of presenting to the office to address urgent/immediate medical needs  Patient is aware this is a billable service  After connecting through Telephone, the patient was identified by name and date of birth  Brian Ruiz III was informed that this was a telemedicine visit and that the exam was being conducted confidentially over secure lines  My office door was closed  No one else was in the room  Brian Ruiz III acknowledged consent and understanding of privacy and security of the telemedicine visit  I informed the patient that I have reviewed his record in Epic and presented the opportunity for him to ask any questions regarding the visit today  The patient agreed to participate  It was my intent to perform this visit via video technology but the patient was not able to do a video connection so the visit was completed via audio telephone only  Verification of patient location:  Patient is located in the following state in which I hold an active license: [place]    Subjective:   Brian Ruiz III is a 57 y o  male who is concerned about COVID-19  Patient's symptoms include nasal congestion, sore throat and cough  Patient denies fever, chills, fatigue, malaise, rhinorrhea, anosmia, loss of taste, shortness of breath, chest tightness, abdominal pain, nausea, vomiting, diarrhea, myalgias and headaches       - Date of symptom onset: 1/13/2022  - Date of exposure: 1/18/2022      COVID-19 vaccination status: Fully vaccinated with booster    Exposure:   Contact with a person who is under investigation (PUI) for or who is positive for COVID-19 within the last 14 days?: Yes    Hospitalized recently for fever and/or lower respiratory symptoms?: No      Currently a healthcare worker that is involved in direct patient care?: No      Works in a special setting where the risk of COVID-19 transmission may be high? (this may include long-term care, correctional and custodial facilities; homeless shelters; assisted-living facilities and group homes ): No      Resident in a special setting where the risk of COVID-19 transmission may be high? (this may include long-term care, correctional and MCC facilities; homeless shelters; assisted-living facilities and group homes ): No      Patient's wife in granddaughter COVID positive patient has mild cough nasal congestion sore throat for the past 5 days    Patient tells me he did have 3 Moderna vaccines    No results found for: Tosin Ronde, SARSCORONAVI, CORONAVIRUSR, [address]  Past Medical History:   Diagnosis Date    BPH associated with nocturia     [facility] [facility]     Cervical spinal mass ([facility] ) 01/07/2019    cervicothoracic    Diabetes mellitus ([facility] [facility]     Disease of thyroid gland     Herniated disc, cervical     History of radiation therapy     Hyperlipidemia     Impaired fasting glucose     Radiation-induced myelopathy ([facility] [address] ) 6/19/2019     Past Surgical History:   Procedure Laterality Date    APPENDECTOMY      CERVICAL FUSION      COLONOSCOPY  03/13/2012    NORMAL; RECHECK IN 5 YEARS    FL LUMBAR PUNCTURE DIAGNOSTIC  2/21/2019    DE BX/EXCIS SPIN PATEL,INDUR,INMED,CERV N/A 1/24/2019    Procedure: Posterior C4-T3 laminectomy; resection of intramedullary mass C5-T3, including fenestration of syrinx C7-T2; C3-T4 fixation/fusion; additional levels as needed;  Surgeon: Krupa Magana MD;  Location: [facility];  Service: Neurosurgery    WISDOM TOOTH EXTRACTION       Current Outpatient Medications   Medication Sig Dispense Refill    atorvastatin (LIPITOR) 40 mg tablet TAKE 1 TABLET BY MOUTH EVERY DAY 90 tablet 1    DULoxetine (CYMBALTA) 60 mg delayed release capsule TAKE 1 CAPSULE BY MOUTH EVERY DAY 90 capsule 1    levothyroxine 75 mcg tablet TAKE 1 TABLET BY MOUTH EVERY DAY 90 tablet 1    metFORMIN (GLUCOPHAGE-XR) 500 mg 24 hr tablet Take 2 tablets twice a day with meals 360 tablet 1    omega-3-acid ethyl esters (LOVAZA) 1 g capsule TAKE 2 CAPSULES BY MOUTH EVERY  capsule 1    Trulicity 2 40 AF/5 6BC SOPN INJECT 0 5 ML (0 75 MG TOTAL) UNDER THE SKIN ONCE A WEEK 10 mL 3     No current facility-administered medications for this visit  Allergies   Allergen Reactions    Bee Venom Hives, Itching and Edema     Category: Allergy;     Penicillins Hives and Itching     Category: Allergy; Review of Systems   Constitutional: Negative for chills, fatigue and fever  HENT: Positive for congestion and sore throat  Negative for rhinorrhea  Respiratory: Positive for cough  Negative for chest tightness and shortness of breath  Gastrointestinal: Negative for abdominal pain, diarrhea, nausea and vomiting  Musculoskeletal: Negative for myalgias  Neurological: Negative for headaches  Objective:    Vitals:    01/18/22 1013   Temp: 98 7 °F (37 1 °C)   TempSrc: Oral   Weight: 77 1 kg (170 lb)       Physical Exam  Constitutional:       Comments: Phone call, no exam         VIRTUAL VISIT [id number] III verbally agrees to participate in [employer]  Pt is aware that [employer] could be limited without vital signs or the ability to perform a full hands-on physical Ricarda Area III understands he or the provider may request at any time to terminate the video visit and request the patient to seek care or treatment in person

## 2022-01-19 ENCOUNTER — TELEMEDICINE (OUTPATIENT)
Dept: NEUROSURGERY | Facility: CLINIC | Age: 62
End: 2022-01-19
Payer: COMMERCIAL

## 2022-01-19 VITALS — BODY MASS INDEX: 26.63 KG/M2 | HEIGHT: 67 IN | TEMPERATURE: 98.7 F

## 2022-01-19 DIAGNOSIS — Z98.1 H/O SPINAL FUSION: ICD-10-CM

## 2022-01-19 DIAGNOSIS — C72.0 SPINAL CORD EPENDYMOMA (HCC): Primary | ICD-10-CM

## 2022-01-19 PROCEDURE — 1036F TOBACCO NON-USER: CPT | Performed by: NEUROLOGICAL SURGERY

## 2022-01-19 PROCEDURE — 99214 OFFICE O/P EST MOD 30 MIN: CPT | Performed by: NEUROLOGICAL SURGERY

## 2022-01-19 RX ORDER — IBUPROFEN 200 MG
400 TABLET ORAL AS NEEDED
COMMUNITY

## 2022-01-19 NOTE — PROGRESS NOTES
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    Relevant Orders    MRI cervical spine with and without contrast       Other    H/O spinal fusion    Relevant Orders    MRI cervical spine with and without contrast        S/p resection, decompression, fusion for his intramedullary spinal cord ependymoma, WHO grade 2  (1/24/19)  The mass spanned from the C4-5 disc space down to the T3-4 disc space  We were able to resect this aggressively at its largest part, I e  T1-2, but did not proceed further as his neurological signals declined intraoperatively       He did well postoperatively, and was discharged to [place]       MRI scan of brain, L-spine, and T-spine   Done after surgery showed no drop metastases  CSF testing was negative      At f/u in early 3/2019, was continuing to improve  He was using no ambulatory aids  His right DF was at least 4/5, but apractic, however this was also improving  He stated he even had some proprioception on the right, which was improved vs  Prior to surgery  He had some mild tingling in his forearms and hands, but no pain or weakness  His incision was healing well  Bowel and bladder function were back to normal  No radicular pains  Xrays were stable, and we planned for adjuvant IMRT      He completed this 5/22/19, to 45 Gy     Starting a few days after his RT, his neurological symptoms began to re-emerge  His right foot drop became more pronounced, and he was unsteady on his feet had a few falls  His balance was unsteady  His occasional radicular pain into the right leg 2-3/10, as well as left arm and right hand pains, pins and needles, 5/10    He was not taking gabapentin or dexamethasone  Seen with Dr Heath Luna at that time and we 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, and recommended continued PT as well as restarting gabapentin  Last year he was on 300 mg t i d  of gabapentin, he has weaned off of this       MRI scan CSpine stable: residual tumor the superior and inferior caps, specifically from the C4-5 disc space down to the resection cavity, and then again resuming at the inferior edge of the cavity down to the T3-4 disc space  The large cavity present preop has not reformed, there are no obvious sign of recurrence etc       We are seeing him virtually today, as he was recently exposed to [place]  He has some URI symptoms, but is not short of breath etcetera  We discussed the NCCN guideline recommended   Follow-up  Recommendation is for follow-up MRI scan cervical spine in 6-12 months, and we agreed on 12 months, which I have ordered  I will see him after that is completed      07/22/20 Metrics: EQ5D5L 35774=6 845; VAS 65; MJOA 10/17     01/20/21 Metrics: EQ5D5L 65155=5 732; VAS 65-70; ECOG 2; KPS 70; MJOA 12/17          Reason for visit is   Chief Complaint   Patient presents with    Virtual Regular Visit     1 YR F/U MRI CSPINE 1/14        Encounter provider Sabrina Santiago MD    Provider located at [address] [place] [id number]      Recent Visits  No visits were found meeting these conditions  Showing recent visits within past 7 days and meeting all other requirements  Today's Visits  Date Type Provider Dept   01/19/22 Telemedicine Sabrina Santiago MD [facility] today's visits and meeting all other requirements  Future Appointments  No visits were found meeting these conditions  Showing future appointments within next 150 days and meeting all other requirements       The patient was identified by name and date of birth   Karis Silva III was informed that this is a telemedicine visit and that the visit is being conducted through [address] [place] [address] [facility] [employer] and patient was informed that this is a secure, HIPAA-compliant platform  He agrees to proceed     My office door was closed  No one else was in the room  He acknowledged consent and understanding of privacy and security of the video platform  The patient has agreed to participate and understands they can discontinue the visit at any time  Patient is aware this is a billable service  Subjective  Karis Silva III is a 57 y o  male see discussion         HPI     Past Medical History:   Diagnosis Date    BPH associated with nocturia     Cancer ([facility] )     Cervical spinal mass ([facility] ) 01/07/2019    cervicothoracic    Diabetes mellitus ([facility] )     Disease of thyroid gland     Herniated disc, cervical     History of radiation therapy     Hyperlipidemia     Impaired fasting glucose     Radiation-induced myelopathy ([facility] ) 6/19/2019       Past Surgical History:   Procedure Laterality Date    APPENDECTOMY      CERVICAL FUSION      COLONOSCOPY  03/13/2012    NORMAL; RECHECK IN 5 YEARS    FL LUMBAR PUNCTURE DIAGNOSTIC  2/21/2019    HI BX/EXCIS SPIN PATEL,INDUR,INMED,CERV N/A 1/24/2019    Procedure: Posterior C4-T3 laminectomy; resection of intramedullary mass C5-T3, including fenestration of syrinx C7-T2; C3-T4 fixation/fusion; additional levels as needed;  Surgeon: Deuce Perla MD;  Location: [facility];  Service: Neurosurgery    WISDOM TOOTH EXTRACTION         Current Outpatient Medications   Medication Sig Dispense Refill    atorvastatin (LIPITOR) 40 mg tablet TAKE 1 TABLET BY MOUTH EVERY DAY 90 tablet 1    DULoxetine (CYMBALTA) 60 mg delayed release capsule TAKE 1 CAPSULE BY MOUTH EVERY DAY 90 capsule 1    ibuprofen (MOTRIN) 200 mg tablet Take 400 mg by mouth as needed for mild pain      levothyroxine 75 mcg tablet TAKE 1 TABLET BY MOUTH EVERY DAY 90 tablet 1    metFORMIN (GLUCOPHAGE-XR) 500 mg 24 hr tablet Take 2 tablets twice a day with meals (Patient taking differently: Take 500 mg by mouth 2 (two) times a day with meals  ) 360 tablet 1    omega-3-acid ethyl esters (LOVAZA) 1 g capsule TAKE 2 CAPSULES BY MOUTH EVERY  capsule 1    Trulicity 7 34 QP/4 2KO SOPN INJECT 0 5 ML (0 75 MG TOTAL) UNDER THE SKIN ONCE A WEEK 10 mL 3     No current facility-administered medications for this visit  Allergies   Allergen Reactions    Bee Venom Hives, Itching and Edema     Category: Allergy;     Penicillins Hives and Itching     Category: Allergy; Review of Systems   Constitutional: Negative  HENT: Negative  Diagnosed covid positive on 1/18/22, symptoms are sore throat, running nose, slight headache   Eyes: Negative  Respiratory: Negative  Cardiovascular: Positive for leg swelling (right leg but mild)  Gastrointestinal: Negative  Endocrine: Negative  Genitourinary: Negative  Musculoskeletal: Positive for gait problem (right foot drag/drop, brace is helping, unsteady balance, using standard cane, 1 fall since last visit, f/w PT)  Negative for neck pain and neck stiffness  Skin: Negative  Allergic/Immunologic: Positive for environmental allergies (bee venom)  Neurological: Positive for weakness (right leg, drags right foot) and numbness (left arm, right (elbow-resolved since last visit) hand occasional numbness/tingling, numbness in B/L fingers and right foot, when sitting for long period of time gets numbness in entire right leg)  Negative for dizziness, seizures, syncope, speech difficulty, light-headedness (improved since last visit, getting up from lying down) and headaches  Hematological: Negative  Psychiatric/Behavioral: Negative for sleep disturbance (improved since last visit, due to discomfort from neck)         Video Exam    Vitals:    01/19/22 0955   Temp: 98 7 °F (37 1 °C)   Height: 5' 7" (1 702 m)       Physical Exam  Constitutional:       Appearance: He is well-developed  HENT:      Head: Normocephalic and atraumatic  Eyes:      General: No scleral icterus  Pulmonary:      Effort: Pulmonary effort is normal    Neurological:      Mental Status: He is alert  Psychiatric:         Behavior: Behavior normal       MDM:     MRI cervical spine with and without contrast    Result Date: 1/14/2022  Narrative: MRI CERVICAL SPINE WITH AND WITHOUT CONTRAST INDICATION: C72 0: Malignant neoplasm of spinal cord Z98 1: Arthrodesis status  COMPARISON:  MRI cervical spine with and without contrast January 15, 2021  TECHNIQUE:  Sagittal T1, sagittal T2, sagittal inversion recovery, axial 2D merge and axial T2  Sagittal T1 and axial T1 postcontrast   IV Contrast:  7 mL of Gadobutrol injection (SINGLE-DOSE) IMAGE QUALITY:  Diagnostic  FINDINGS: ALIGNMENT: Unchanged C2-T5 posterior spinal fixation hardware with posterior decompressive laminectomies from C4 to T2  Unchanged grade 1 anterolisthesis C7-T1  No compression fracture  No scoliosis  MARROW SIGNAL:  Diffuse fatty marrow changes throughout the cervical and visualized upper thoracic spine, compatible with postradiation changes  CERVICAL AND VISUALIZED UPPER THORACIC CORD: Stable cervical spinal cord  Unchanged postsurgical changes with myelotomy from upper T1 to lower T2 level and partially imaged hemosiderin deposition in surgical resection cavity  No abnormal enhancement in the surgical resection site to suggest recurrent disease  Unchanged fluid cavity and the upper thoracic spinal cord resection site with severe cord atrophy evidenced by thin sliver of ventral spinal cord from T1 to T2  Unchanged focal cord myelomalacia  in right T3 spinal cord  Unchanged mild cord atrophy in remaining visualized upper thoracic spinal cord   PREVERTEBRAL AND PARASPINAL SOFT TISSUES:  Normal  VISUALIZED POSTERIOR FOSSA:  The visualized posterior fossa demonstrates no abnormal signal  CERVICAL DISC SPACES AND UPPER THORACIC DISC SPACES:  No significant canal stenosis or foraminal narrowing in the cervical or visualized upper thoracic spine status post C2-T5 posterior hardware and posterior decompressive laminectomies from C4 to T1/T2,  similar to prior exam   POSTCONTRAST IMAGING: Susceptibility hardware artifact slightly limits evaluation of the posterior aspect of the spinal canal on the postcontrast sequences  No abnormal enhancement identified  OTHER: Small left maxillary mucus retention cyst      Impression: No evidence of recurrent tumor status post resection and postradiation changes  Unchanged appearance of cervical and thoracic spinal cord with spinal cord atrophy (most severe at T1 and T2)  C2-T5 posterior spinal fixation hardware with posterior decompressive laminectomies from C4 to T2  No significant canal stenosis or foraminal narrowing in cervical and upper thoracic spine  Workstation performed: [id number]       Study report as above personally reviewed  VIRTUAL VISIT [address] III verbally agrees to participate in [employer]  Pt is aware that [employer] could be limited without vital signs or the ability to perform a full hands-on physical Herreralars Barron III understands he or the provider may request at any time to terminate the video visit and request the patient to seek care or treatment in person

## 2022-02-07 ENCOUNTER — TELEPHONE (OUTPATIENT)
Dept: ENDOCRINOLOGY | Facility: CLINIC | Age: 62
End: 2022-02-07

## 2022-02-07 DIAGNOSIS — E78.2 MIXED HYPERLIPIDEMIA: ICD-10-CM

## 2022-02-07 DIAGNOSIS — E11.65 TYPE 2 DIABETES MELLITUS WITH HYPERGLYCEMIA, WITHOUT LONG-TERM CURRENT USE OF INSULIN (HCC): ICD-10-CM

## 2022-02-07 DIAGNOSIS — E03.9 ACQUIRED HYPOTHYROIDISM: Primary | ICD-10-CM

## 2022-02-08 NOTE — TELEPHONE ENCOUNTER
bs log     meds-  Metformin 103SU 0380WZ qd  trulicity 2 34HG/ 0 5 q week    Blood sugars are very well controlled, continue current regimen  No need to send log if blood sugar stays in  mg/dL range    Aarti Vasquez MD

## 2022-02-08 NOTE — TELEPHONE ENCOUNTER
He will need TSH, free T4 C9X, basic metabolic profile  Please order the blood work    Jono Whaley MD

## 2022-02-23 ENCOUNTER — OFFICE VISIT (OUTPATIENT)
Dept: PODIATRY | Facility: CLINIC | Age: 62
End: 2022-02-23
Payer: COMMERCIAL

## 2022-02-23 VITALS
BODY MASS INDEX: 27.72 KG/M2 | SYSTOLIC BLOOD PRESSURE: 116 MMHG | HEART RATE: 84 BPM | DIASTOLIC BLOOD PRESSURE: 69 MMHG | WEIGHT: 177 LBS

## 2022-02-23 DIAGNOSIS — E11.9 TYPE 2 DIABETES MELLITUS WITHOUT COMPLICATION, WITHOUT LONG-TERM CURRENT USE OF INSULIN (HCC): Primary | ICD-10-CM

## 2022-02-23 PROCEDURE — 1036F TOBACCO NON-USER: CPT | Performed by: PODIATRIST

## 2022-02-23 PROCEDURE — 99213 OFFICE O/P EST LOW 20 MIN: CPT | Performed by: PODIATRIST

## 2022-02-23 NOTE — PROGRESS NOTES
This patient was seen on 2/23/22  My role is Foot , Ankle, and Wound Specialist    SUBJECTIVE    Chief Complaint:  Annual diabetic foot exam     Patient ID: Heavenly aSab is a 60 y o  male  Marvin Moh is here today for an annual diabetic risk assessment  He has a history of spine surgery  He feels his diabetes in under good control currently, running in the mid-5's  He denies barefoot walking in the home  No history of foot ulcers nor amputations  The following portions of the patient's history were reviewed and updated as appropriate: allergies, current medications, past family history, past medical history, past social history, past surgical history and problem list     Review of Systems   Constitutional: Negative  Respiratory: Negative  Cardiovascular: Negative  Gastrointestinal: Negative  Musculoskeletal: Positive for back pain and gait problem  Psychiatric/Behavioral: Negative  OBJECTIVE      /69   Pulse 84   Wt 80 3 kg (177 lb)   BMI 27 72 kg/m²     Foot/Ankle Musculoskeletal Exam    General      Neurological: alert    Inspection      Inspection - Right        Right foot/ankle inspection is normal        Inspection - Left        Left foot/ankle inspection is normal       Neurovascular      Neurovascular - Right        Dorsalis pedis: 3+      Posterior tibial: 2+      Neurovascular - Left        Dorsalis pedis: 3+      Posterior tibial: 2+       Physical Exam  Vitals and nursing note reviewed  Constitutional:       General: He is not in acute distress  Appearance: Normal appearance  He is not ill-appearing or toxic-appearing  HENT:      Head: Normocephalic  Cardiovascular:      Rate and Rhythm: Normal rate  Pulses: no weak pulses          Dorsalis pedis pulses are 3+ on the right side and 3+ on the left side  Posterior tibial pulses are 2+ on the right side and 2+ on the left side     Pulmonary:      Effort: Pulmonary effort is normal    Abdominal: General: Bowel sounds are normal    Feet:      Right foot:      Protective Sensation: 10 sites tested  10 sites sensed  Skin integrity: No ulcer, skin breakdown, erythema, warmth, callus or dry skin  Left foot:      Protective Sensation: 10 sites tested  10 sites sensed  Skin integrity: No ulcer, skin breakdown, erythema, warmth, callus or dry skin  Skin:     General: Skin is warm and dry  Capillary Refill: Capillary refill takes less than 2 seconds  Neurological:      General: No focal deficit present  Mental Status: He is alert and oriented to person, place, and time  Psychiatric:         Mood and Affect: Mood normal          Behavior: Behavior normal          Diabetic Foot Exam    Patient's shoes and socks removed  Right Foot/Ankle   Right Foot Inspection  Skin Exam: skin normal and skin intact  No dry skin, no warmth, no callus, no erythema, no maceration, no abnormal color, no pre-ulcer, no ulcer and no callus  Toe Exam: ROM and strength within normal limits  Sensory   Vibration: intact  Proprioception: intact  Monofilament testing: intact    Vascular  Capillary refills: < 3 seconds  The right DP pulse is 3+  The right PT pulse is 2+  Left Foot/Ankle  Left Foot Inspection  Skin Exam: skin normal and skin intact  No dry skin, no warmth, no erythema, no maceration, normal color, no pre-ulcer, no ulcer and no callus  Toe Exam: ROM and strength within normal limits  Sensory   Vibration: intact  Proprioception: intact  Monofilament testing: intact    Vascular  Capillary refills: < 3 seconds  The left DP pulse is 3+  The left PT pulse is 2+       Assign Risk Category  No deformity present  No loss of protective sensation  No weak pulses  Risk: 0      ASSESSMENT     Diagnoses and all orders for this visit:    Type 2 diabetes mellitus without complication, without long-term current use of insulin ([facility] )         Problem List Items Addressed This Visit Endocrine    Type 2 diabetes mellitus without complication, without long-term current use of insulin ([facility] [address] ) - Primary              PLAN    He has very low risk for diabetic foot complications currently       Return 1 year for repeat exam

## 2022-03-01 ENCOUNTER — OFFICE VISIT (OUTPATIENT)
Dept: ENDOCRINOLOGY | Facility: CLINIC | Age: 62
End: 2022-03-01
Payer: COMMERCIAL

## 2022-03-01 VITALS
DIASTOLIC BLOOD PRESSURE: 74 MMHG | BODY MASS INDEX: 28.66 KG/M2 | WEIGHT: 183 LBS | TEMPERATURE: 97.8 F | SYSTOLIC BLOOD PRESSURE: 126 MMHG

## 2022-03-01 DIAGNOSIS — E03.9 HYPOTHYROIDISM, UNSPECIFIED TYPE: ICD-10-CM

## 2022-03-01 DIAGNOSIS — E11.65 TYPE 2 DIABETES MELLITUS WITH HYPERGLYCEMIA, WITHOUT LONG-TERM CURRENT USE OF INSULIN (HCC): ICD-10-CM

## 2022-03-01 DIAGNOSIS — E11.9 TYPE 2 DIABETES MELLITUS WITHOUT COMPLICATION, WITHOUT LONG-TERM CURRENT USE OF INSULIN (HCC): Primary | ICD-10-CM

## 2022-03-01 DIAGNOSIS — E78.2 MIXED HYPERLIPIDEMIA: ICD-10-CM

## 2022-03-01 PROCEDURE — 83036 HEMOGLOBIN GLYCOSYLATED A1C: CPT | Performed by: INTERNAL MEDICINE

## 2022-03-01 PROCEDURE — 99214 OFFICE O/P EST MOD 30 MIN: CPT | Performed by: INTERNAL MEDICINE

## 2022-03-01 NOTE — ASSESSMENT & PLAN NOTE
Compliant with levothyroxine 75 mcg daily  Denies any symptoms of fatigue, constipation, myalgias, visual disturbances, thermoregulatory disturbances or palpitations  Patient will provide updated TFTs that were initially requested for this encounter  Previous TSH and fT4 were within normal limits  - Continue levothyroxine 75 mcg qd  - Follow-up current TSH & fT4  Will repeat for 6-month follow-up

## 2022-03-01 NOTE — ASSESSMENT & PLAN NOTE
Lab Results   Component Value Date    HGBA1C 5 4 11/09/2021     A1c conducted in the office of 5 5  Updated labs for this encounter pending  Previous labs unremarkable for microalbuminuria and appropriate FPG  Reports compliance with dietary and exercise recommendations, limiting higher-glycemic foods, and increasing fiber intake at baseline, but occasionally "cheats"  Exercises about 3x/week  Patient is on metformin and semaglutide, and recently underwent a dose reduction by 50% of metformin, due to GI upset; patient reports significant symptomatic improvement, subsequent to this modification  Pre-meal APG measurements range from 100-120 mg/dL  Patient denies any symptoms of hypoglycemia, and states that he is aware of what to look for     - A1c at target; Will follow-up the rest of requested labwork  - Continue metformin 500 mg po bid + semaglutide 0 75 mg sq qweekly  - Continue to encourage diabetic diet and exercise

## 2022-03-01 NOTE — PROGRESS NOTES
Assessment/Plan:    Hypothyroidism  Compliant with levothyroxine 75 mcg daily  Denies any symptoms of fatigue, constipation, myalgias, visual disturbances, thermoregulatory disturbances or palpitations  Patient will provide updated TFTs that were initially requested for this encounter  Previous TSH and fT4 were within normal limits  - Continue levothyroxine 75 mcg qd  - Follow-up current TSH & fT4  Will repeat for 6-month follow-up  Type 2 diabetes mellitus without complication, without long-term current use of insulin ([facility])    Lab Results   Component Value Date    HGBA1C 5 4 11/09/2021     A1c conducted in the office of 5 5  Updated labs for this encounter pending  Previous labs unremarkable for microalbuminuria and appropriate FPG  Reports compliance with dietary and exercise recommendations, limiting higher-glycemic foods, and increasing fiber intake at baseline, but occasionally "cheats"  Exercises about 3x/week  Patient is on metformin and semaglutide, and recently underwent a dose reduction by 50% of metformin, due to GI upset; patient reports significant symptomatic improvement, subsequent to this modification  Pre-meal APG measurements range from 100-120 mg/dL  Patient denies any symptoms of hypoglycemia, and states that he is aware of what to look for     - A1c at target; Will follow-up the rest of requested labwork  - Continue metformin 500 mg po bid + semaglutide 0 75 mg sq qweekly  - Continue to encourage diabetic diet and exercise  Diagnoses and all orders for this visit:    Type 2 diabetes mellitus without complication, without long-term current use of insulin ([facility] )  -     Comprehensive metabolic panel Lab Collect; Future  -     Microalbumin / creatinine urine ratio; Future  -     Hemoglobin A1C; Future    Hypothyroidism, unspecified type  -     T4, free; Future  -     TSH, 3rd generation; Future    Mixed hyperlipidemia  -     Lipid panel Lab Collect Lab Collect;  Future Subjective:      Patient ID: Naomi Sanz is a 60 y o  male  Mr Duglas Diana is a 60year old male who follows-up with our service for the management of DM type 2 and hypothyroidism  Patient is managed for diabetes with semaglutide and metformin, and recently underwent a 50% dose reduction of the latter by his PCP, due to experiencing GI upset  Patient reports that his symptoms improved significantly after this modification  Denies any current post-prandial fullness, nausea, vomiting or early satiety  Patient states that his pre-meal APG has been at target (between 100-120 on average), and he has not experienced any episodes of hypoglycemia  Patient states that he avoids high-sugar food sources, and exercises about 3x/week  With regards to thyroid disease, patient denies any current symptoms of thyroid dysregulation, and reports compliance with levothyroxine 75 mcg daily, which he takes in the morning on an empty stomach  Previous thyroid panel was within normal limits  The following portions of the patient's history were reviewed and updated as appropriate: allergies, current medications, past family history, past medical history, past social history, past surgical history and problem list     Review of Systems   Constitutional: Negative for chills and fever  HENT: Negative for ear pain and sore throat  Eyes: Negative for pain and visual disturbance  Respiratory: Negative for cough and shortness of breath  Cardiovascular: Negative for chest pain and palpitations  Gastrointestinal: Negative for abdominal pain and vomiting  Genitourinary: Negative for dysuria and hematuria  Musculoskeletal: Negative for arthralgias and back pain  Skin: Negative for color change and rash  Neurological: Negative for seizures and syncope  All other systems reviewed and are negative          Objective:      /74   Temp 97 8 °F (36 6 °C)   Wt 83 kg (183 lb)   BMI 28 66 kg/m²          Physical Exam  Vitals reviewed  Constitutional:       Appearance: Normal appearance  HENT:      Head: Normocephalic and atraumatic  Mouth/Throat:      Mouth: Mucous membranes are moist    Eyes:      Extraocular Movements: Extraocular movements intact  Cardiovascular:      Rate and Rhythm: Normal rate and regular rhythm  Heart sounds: S1 normal and S2 normal    Pulmonary:      Effort: Pulmonary effort is normal       Breath sounds: Normal breath sounds  Abdominal:      Palpations: Abdomen is soft  Musculoskeletal:         General: Normal range of motion  Cervical back: Neck supple  Right lower leg: No edema  Left lower leg: No edema  Skin:     General: Skin is warm and dry  Neurological:      Mental Status: He is alert and oriented to person, place, and time     Psychiatric:         Mood and Affect: Mood normal          Behavior: Behavior normal

## 2022-03-02 LAB — SL AMB POCT HEMOGLOBIN AIC: 5.5 (ref ?–6.5)

## 2022-03-03 ENCOUNTER — LAB (OUTPATIENT)
Dept: LAB | Age: 62
End: 2022-03-03
Payer: COMMERCIAL

## 2022-03-03 DIAGNOSIS — E11.65 TYPE 2 DIABETES MELLITUS WITH HYPERGLYCEMIA, WITHOUT LONG-TERM CURRENT USE OF INSULIN (HCC): ICD-10-CM

## 2022-03-03 DIAGNOSIS — E78.2 MIXED HYPERLIPIDEMIA: ICD-10-CM

## 2022-03-03 DIAGNOSIS — E03.9 ACQUIRED HYPOTHYROIDISM: Primary | ICD-10-CM

## 2022-03-03 LAB
ANION GAP SERPL CALCULATED.3IONS-SCNC: 3 MMOL/L (ref 4–13)
BUN SERPL-MCNC: 18 MG/DL (ref 5–25)
CALCIUM SERPL-MCNC: 9.3 MG/DL (ref 8.3–10.1)
CHLORIDE SERPL-SCNC: 108 MMOL/L (ref 100–108)
CO2 SERPL-SCNC: 29 MMOL/L (ref 21–32)
CREAT SERPL-MCNC: 0.94 MG/DL (ref 0.6–1.3)
EST. AVERAGE GLUCOSE BLD GHB EST-MCNC: 114 MG/DL
GFR SERPL CREATININE-BSD FRML MDRD: 85 ML/MIN/1.73SQ M
GLUCOSE P FAST SERPL-MCNC: 96 MG/DL (ref 65–99)
HBA1C MFR BLD: 5.6 %
POTASSIUM SERPL-SCNC: 5 MMOL/L (ref 3.5–5.3)
SODIUM SERPL-SCNC: 140 MMOL/L (ref 136–145)
T4 FREE SERPL-MCNC: 1.04 NG/DL (ref 0.76–1.46)
TSH SERPL DL<=0.05 MIU/L-ACNC: 1.59 UIU/ML (ref 0.36–3.74)

## 2022-03-03 PROCEDURE — 83036 HEMOGLOBIN GLYCOSYLATED A1C: CPT

## 2022-03-03 PROCEDURE — 84439 ASSAY OF FREE THYROXINE: CPT

## 2022-03-03 PROCEDURE — 80048 BASIC METABOLIC PNL TOTAL CA: CPT

## 2022-03-03 PROCEDURE — 3044F HG A1C LEVEL LT 7.0%: CPT | Performed by: FAMILY MEDICINE

## 2022-03-03 PROCEDURE — 36415 COLL VENOUS BLD VENIPUNCTURE: CPT

## 2022-03-03 PROCEDURE — 84443 ASSAY THYROID STIM HORMONE: CPT

## 2022-03-09 DIAGNOSIS — E78.5 HLD (HYPERLIPIDEMIA): ICD-10-CM

## 2022-03-09 DIAGNOSIS — E11.65 TYPE 2 DIABETES MELLITUS WITH HYPERGLYCEMIA, WITHOUT LONG-TERM CURRENT USE OF INSULIN (HCC): ICD-10-CM

## 2022-03-09 RX ORDER — METFORMIN HYDROCHLORIDE 500 MG/1
TABLET, EXTENDED RELEASE ORAL
Qty: 360 TABLET | Refills: 1 | Status: SHIPPED | OUTPATIENT
Start: 2022-03-09 | End: 2022-03-14

## 2022-03-09 RX ORDER — ATORVASTATIN CALCIUM 40 MG/1
TABLET, FILM COATED ORAL
Qty: 90 TABLET | Refills: 1 | Status: SHIPPED | OUTPATIENT
Start: 2022-03-09

## 2022-03-14 DIAGNOSIS — E11.65 TYPE 2 DIABETES MELLITUS WITH HYPERGLYCEMIA, WITHOUT LONG-TERM CURRENT USE OF INSULIN (HCC): ICD-10-CM

## 2022-03-14 RX ORDER — METFORMIN HYDROCHLORIDE 500 MG/1
TABLET, EXTENDED RELEASE ORAL
Qty: 180 TABLET | Refills: 3 | Status: SHIPPED | OUTPATIENT
Start: 2022-03-14 | End: 2022-08-10

## 2022-03-18 ENCOUNTER — OFFICE VISIT (OUTPATIENT)
Dept: FAMILY MEDICINE CLINIC | Facility: CLINIC | Age: 62
End: 2022-03-18
Payer: COMMERCIAL

## 2022-03-18 VITALS
OXYGEN SATURATION: 97 % | DIASTOLIC BLOOD PRESSURE: 62 MMHG | WEIGHT: 179 LBS | BODY MASS INDEX: 28.09 KG/M2 | HEART RATE: 82 BPM | SYSTOLIC BLOOD PRESSURE: 112 MMHG | HEIGHT: 67 IN

## 2022-03-18 DIAGNOSIS — E11.9 TYPE 2 DIABETES MELLITUS WITHOUT COMPLICATION, WITHOUT LONG-TERM CURRENT USE OF INSULIN (HCC): Primary | ICD-10-CM

## 2022-03-18 DIAGNOSIS — E78.2 MIXED HYPERLIPIDEMIA: ICD-10-CM

## 2022-03-18 DIAGNOSIS — Z12.5 SCREENING FOR PROSTATE CANCER: ICD-10-CM

## 2022-03-18 DIAGNOSIS — E03.9 HYPOTHYROIDISM, UNSPECIFIED TYPE: ICD-10-CM

## 2022-03-18 PROBLEM — G89.3 CANCER-RELATED PAIN: Chronic | Status: RESOLVED | Noted: 2020-05-26 | Resolved: 2022-03-18

## 2022-03-18 LAB
LEFT EYE DIABETIC RETINOPATHY: NORMAL
LEFT EYE IMAGE QUALITY: NORMAL
LEFT EYE MACULAR EDEMA: NORMAL
LEFT EYE OTHER RETINOPATHY: NORMAL
RIGHT EYE DIABETIC RETINOPATHY: NORMAL
RIGHT EYE IMAGE QUALITY: NORMAL
RIGHT EYE MACULAR EDEMA: NORMAL
RIGHT EYE OTHER RETINOPATHY: NORMAL
SEVERITY (EYE EXAM): NORMAL

## 2022-03-18 PROCEDURE — 2025F 7 FLD RTA PHOTO W/O RTNOPTHY: CPT | Performed by: FAMILY MEDICINE

## 2022-03-18 PROCEDURE — 1036F TOBACCO NON-USER: CPT | Performed by: FAMILY MEDICINE

## 2022-03-18 PROCEDURE — 92250 FUNDUS PHOTOGRAPHY W/I&R: CPT | Performed by: FAMILY MEDICINE

## 2022-03-18 PROCEDURE — 99213 OFFICE O/P EST LOW 20 MIN: CPT | Performed by: FAMILY MEDICINE

## 2022-03-18 NOTE — PATIENT INSTRUCTIONS
Weight Management   AMBULATORY CARE:   Why it is important to manage your weight:  Being overweight increases your risk of health conditions such as heart disease, high blood pressure, type 2 diabetes, and certain types of cancer  It can also increase your risk for osteoarthritis, sleep apnea, and other respiratory problems  Aim for a slow, steady weight loss  Even a small amount of weight loss can lower your risk of health problems  Risks of being overweight:  Extra weight can cause many health problems, including the following:  · Diabetes (high blood sugar level)    · High blood pressure or high cholesterol    · Heart disease    · Stroke    · Gallbladder or liver disease    · Cancer of the colon, breast, prostate, liver, or kidney    · Sleep apnea    · Arthritis or gout    Screening  is done to check for health conditions before you have signs or symptoms  If you are 28to 79years old, your blood sugar level may be checked every 3 years for signs of prediabetes or diabetes  Your healthcare provider will check your blood pressure at each visit  High blood pressure can lead to a stroke or other problems  Your provider may check for signs of heart disease, cancer, or other health problems  How to lose weight safely:  A safe and healthy way to lose weight is to eat fewer calories and get regular exercise  · You can lose up about 1 pound a week by decreasing the number of calories you eat by 500 calories each day  You can decrease calories by eating smaller portion sizes or by cutting out high-calorie foods  Read labels to find out how many calories are in the foods you eat  · You can also burn calories with exercise such as walking, swimming, or biking  You will be more likely to keep weight off if you make these changes part of your lifestyle  Exercise at least 30 minutes per day on most days of the week   You can also fit in more physical activity by taking the stairs instead of the elevator or parking farther away from stores  Ask your healthcare provider about the best exercise plan for you  Healthy meal plan for weight management:  A healthy meal plan includes a variety of foods, contains fewer calories, and helps you stay healthy  A healthy meal plan includes the following:     · Eat whole-grain foods more often  A healthy meal plan should contain fiber  Fiber is the part of grains, fruits, and vegetables that is not broken down by your body  Whole-grain foods are healthy and provide extra fiber in your diet  Some examples of whole-grain foods are whole-wheat breads and pastas, oatmeal, brown rice, and bulgur  · Eat a variety of vegetables every day  Include dark, leafy greens such as spinach, kale, tino greens, and mustard greens  Eat yellow and orange vegetables such as carrots, sweet potatoes, and winter squash  · Eat a variety of fruits every day  Choose fresh or canned fruit (canned in its own juice or light syrup) instead of juice  Fruit juice has very little or no fiber  · Eat low-fat dairy foods  Drink fat-free (skim) milk or 1% milk  Eat fat-free yogurt and low-fat cottage cheese  Try low-fat cheeses such as mozzarella and other reduced-fat cheeses  · Choose meat and other protein foods that are low in fat  Choose beans or other legumes such as split peas or lentils  Choose fish, skinless poultry (chicken or turkey), or lean cuts of red meat (beef or pork)  Before you cook meat or poultry, cut off any visible fat  · Use less fat and oil  Try baking foods instead of frying them  Add less fat, such as margarine, sour cream, regular salad dressing and mayonnaise to foods  Eat fewer high-fat foods  Some examples of high-fat foods include french fries, doughnuts, ice cream, and cakes  · Eat fewer sweets  Limit foods and drinks that are high in sugar  This includes candy, cookies, regular soda, and sweetened drinks  Ways to decrease calories:   · Eat smaller portions  ? Use a small plate with smaller servings  ? Do not eat second helpings  ? When you eat at a restaurant, ask for a box and place half of your meal in the box before you eat  ? Share an entrée with someone else  · Replace high-calorie snacks with healthy, low-calorie snacks  ? Choose fresh fruit, vegetables, fat-free rice cakes, or air-popped popcorn instead of potato chips, nuts, or chocolate  ? Choose water or calorie-free drinks instead of soda or sweetened drinks  · Do not shop for groceries when you are hungry  You may be more likely to make unhealthy food choices  Take a grocery list of healthy foods and shop after you have eaten  · Eat regular meals  Do not skip meals  Skipping meals can lead to overeating later in the day  This can make it harder for you to lose weight  Eat a healthy snack in place of a meal if you do not have time to eat a regular meal  Talk with a dietitian to help you create a meal plan and schedule that is right for you  Other things to consider as you try to lose weight:   · Be aware of situations that may give you the urge to overeat, such as eating while watching television  Find ways to avoid these situations  For example, read a book, go for a walk, or do crafts  · Meet with a weight loss support group or friends who are also trying to lose weight  This may help you stay motivated to continue working on your weight loss goals  © Copyright [employer] 2022 Information is for End User's use only and may not be sold, redistributed or otherwise used for commercial purposes  All illustrations and images included in CareNotes® are the copyrighted property of [employer]  or [employer]   The above information is an  only  It is not intended as medical advice for individual conditions or treatments  Talk to your doctor, nurse or pharmacist before following any medical regimen to see if it is safe and effective for you    Okay on meds

## 2022-03-18 NOTE — ASSESSMENT & PLAN NOTE
Lab Results   Component Value Date    HGBA1C 5 6 03/03/2022   lab stable, would go down to 1/day on Metformin

## 2022-03-18 NOTE — PROGRESS NOTES
Assessment and Plan:    Problem List Items Addressed This Visit        Endocrine    Hypothyroidism     Thyroid stable         Type 2 diabetes mellitus without complication, without long-term current use of insulin ([facility] [address] ) - Primary       Lab Results   Component Value Date    HGBA1C 5 6 03/03/2022   lab stable, would go down to 1/day on Metformin         Relevant Orders    IRIS Diabetic eye exam (Completed)    Hemoglobin A1C    Comprehensive metabolic panel       Other    Mixed hyperlipidemia     Await f/u lab         Relevant Orders    Lipid panel      Other Visit Diagnoses     Screening for prostate cancer        Relevant Orders    PSA, Total Screen    BMI 28 0-28 9,adult                     Diagnoses and all orders for this visit:    Type 2 diabetes mellitus without complication, without long-term current use of insulin ([facility] [address] )  -     IRIS Diabetic eye exam  -     Hemoglobin A1C; Future  -     Comprehensive metabolic panel; Future    Hypothyroidism, unspecified type    Mixed hyperlipidemia  -     Lipid panel; Future    Screening for prostate cancer  -     PSA, Total Screen; Future    BMI 28 0-28 9,adult              Subjective:      Patient ID: Tommy Alatorre is a 60 y o  male  CC:    Chief Complaint   Patient presents with    Follow-up     Patient present today for his 4 month follow up  HPI:    Here for f/u  Diabetes   thyroid and lipids, feels well, no cp, no sob, HBA1C  Is good      The following portions of the patient's history were reviewed and updated as appropriate: allergies, current medications, past family history, past medical history, past social history, past surgical history and problem list       Review of Systems   Constitutional: Negative for activity change, appetite change and fatigue  Eyes: Negative for visual disturbance  Respiratory: Negative for shortness of breath  Cardiovascular: Negative for chest pain  Musculoskeletal: Positive for back pain          Occ Neurological: Negative for dizziness and headaches  Data to review:       Objective:    Vitals:    03/18/22 1105   BP: 112/62   BP Location: Right arm   Patient Position: Sitting   Cuff Size: Standard   Pulse: 82   SpO2: 97%   Weight: 81 2 kg (179 lb)   Height: 5' 7" (1 702 m)        Physical Exam  Vitals reviewed  Constitutional:       Appearance: Normal appearance  Cardiovascular:      Rate and Rhythm: Normal rate and regular rhythm  Pulses: Normal pulses  Heart sounds: Normal heart sounds  Pulmonary:      Effort: Pulmonary effort is normal       Breath sounds: Normal breath sounds  Musculoskeletal:      Right lower leg: No edema  Left lower leg: No edema  Lymphadenopathy:      Cervical: No cervical adenopathy  Neurological:      Mental Status: He is alert  Psychiatric:         Mood and Affect: Mood normal               BMI Counseling: Body mass index is 28 04 kg/m²  The BMI is above normal  Nutrition recommendations include reducing portion sizes, decreasing overall calorie intake, 3-5 servings of fruits/vegetables daily, reducing fast food intake and consuming healthier snacks  Exercise recommendations include exercising 3-5 times per week

## 2022-04-09 DIAGNOSIS — E03.9 HYPOTHYROIDISM, UNSPECIFIED TYPE: ICD-10-CM

## 2022-04-11 RX ORDER — LEVOTHYROXINE SODIUM 0.07 MG/1
TABLET ORAL
Qty: 90 TABLET | Refills: 1 | Status: SHIPPED | OUTPATIENT
Start: 2022-04-11

## 2022-04-11 NOTE — TELEPHONE ENCOUNTER
Requested Prescriptions     Pending Prescriptions Disp Refills    levothyroxine 75 mcg tablet [Pharmacy Med Name: LEVOTHYROXINE 75 MCG TABLET] 90 tablet 1     Sig: TAKE 1 TABLET BY MOUTH EVERY DAY       LOV 3/18/22, F/U 9/26/22, Labs pending

## 2022-05-24 NOTE — RESULT ENCOUNTER NOTE
Health Maintenance Due   Topic Date Due   • Shingles Vaccine (1 of 2) Never done   • Colorectal Cancer Screen-  03/18/2019   • DTaP/Tdap/Td Vaccine (2 - Td or Tdap) 08/03/2021   • Breast Cancer Screening  08/13/2021   • COVID-19 Vaccine (3 - Booster for Moderna series) 10/13/2021   • Diabetes Eye Exam  01/04/2022   • Osteoporosis Screening  Never done       Patient is due for topics as listed above but is not proceeding with Immunization(s) COVID-19, Dtap/Tdap/Td and Shingles, Colorectal Cancer Screening: Colonoscopy, Diabetes Eye Exam, Mammogram and Osteoporosis screening at this time. Education provided for Immunization(s) COVID-19, Dtap/Tdap/Td and Shingles, Colorectal Cancer Screening: Colonoscopy, Diabetes Eye Exam, Mammogram and Osteoporosis screening.     Thyroid blood work is within normal range, A1c is 5 6% normal, at goal   Basic metabolic profile showed normal electrolytes, kidney function test, anion gap is slightly low(could be from lab assay issue)   No need to repeat the blood work    TSH and free T4 is normal

## 2022-06-08 DIAGNOSIS — E78.2 MIXED HYPERLIPIDEMIA: ICD-10-CM

## 2022-06-08 RX ORDER — OMEGA-3-ACID ETHYL ESTERS 1 G/1
CAPSULE, LIQUID FILLED ORAL
Qty: 180 CAPSULE | Refills: 1 | Status: SHIPPED | OUTPATIENT
Start: 2022-06-08

## 2022-06-09 ENCOUNTER — TELEPHONE (OUTPATIENT)
Dept: RADIOLOGY | Facility: CLINIC | Age: 62
End: 2022-06-09

## 2022-06-09 NOTE — TELEPHONE ENCOUNTER
Patient sent MSG request to schedule RFA - he said his pain is the same as before  B/L back pain with radiation up  Like it did before   He currently rates his pain a 5/10 - Please advise if he needs a re eval for If he can be scheduled for repeat MBB

## 2022-06-10 NOTE — TELEPHONE ENCOUNTER
Okay to schedule repeat medial branch blocks if the pain is the same as it was before    If he has a favorable result we will proceed with RFA

## 2022-06-15 DIAGNOSIS — M47.816 LUMBAR SPONDYLOSIS: Primary | ICD-10-CM

## 2022-06-15 NOTE — TELEPHONE ENCOUNTER
Patient contacted and scheduled for REPEAT B/L L3-5 MBB   All pre procedure instructions were given to patient   Nothing to eat or drink for 1 hour prior  Loose fitting clothing   NSAIDS, ANTICOAG'S OKAY   Denies Antibx   NO PRN PAIN MEDS 6 HOURS PRIOR   Needs    Patient directed to call the office if becomes sick, as we will most likely reschedule       Insurance auth received but is not a guarantee of payment per your insurance company's authorization disclaimer and it is your responsibility to verify your benefits     COVID -19 screening complete

## 2022-07-08 ENCOUNTER — LAB (OUTPATIENT)
Dept: LAB | Age: 62
End: 2022-07-08
Payer: COMMERCIAL

## 2022-07-08 DIAGNOSIS — E78.2 MIXED HYPERLIPIDEMIA: ICD-10-CM

## 2022-07-08 DIAGNOSIS — Z12.5 SCREENING FOR PROSTATE CANCER: ICD-10-CM

## 2022-07-08 DIAGNOSIS — E03.9 HYPOTHYROIDISM, UNSPECIFIED TYPE: ICD-10-CM

## 2022-07-08 DIAGNOSIS — E11.9 TYPE 2 DIABETES MELLITUS WITHOUT COMPLICATION, WITHOUT LONG-TERM CURRENT USE OF INSULIN (HCC): ICD-10-CM

## 2022-07-08 LAB
ALBUMIN SERPL BCP-MCNC: 3.1 G/DL (ref 3.5–5)
ALP SERPL-CCNC: 68 U/L (ref 46–116)
ALT SERPL W P-5'-P-CCNC: 23 U/L (ref 12–78)
ANION GAP SERPL CALCULATED.3IONS-SCNC: 2 MMOL/L (ref 4–13)
AST SERPL W P-5'-P-CCNC: 11 U/L (ref 5–45)
BILIRUB SERPL-MCNC: 0.66 MG/DL (ref 0.2–1)
BUN SERPL-MCNC: 18 MG/DL (ref 5–25)
CALCIUM ALBUM COR SERPL-MCNC: 9.7 MG/DL (ref 8.3–10.1)
CALCIUM SERPL-MCNC: 9 MG/DL (ref 8.3–10.1)
CHLORIDE SERPL-SCNC: 111 MMOL/L (ref 100–108)
CHOLEST SERPL-MCNC: 94 MG/DL
CO2 SERPL-SCNC: 28 MMOL/L (ref 21–32)
CREAT SERPL-MCNC: 1 MG/DL (ref 0.6–1.3)
CREAT UR-MCNC: 139 MG/DL
GFR SERPL CREATININE-BSD FRML MDRD: 79 ML/MIN/1.73SQ M
GLUCOSE P FAST SERPL-MCNC: 104 MG/DL (ref 65–99)
HDLC SERPL-MCNC: 29 MG/DL
LDLC SERPL CALC-MCNC: 54 MG/DL (ref 0–100)
MICROALBUMIN UR-MCNC: <5 MG/L (ref 0–20)
MICROALBUMIN/CREAT 24H UR: <4 MG/G CREATININE (ref 0–30)
NONHDLC SERPL-MCNC: 65 MG/DL
POTASSIUM SERPL-SCNC: 4.7 MMOL/L (ref 3.5–5.3)
PROT SERPL-MCNC: 7.1 G/DL (ref 6.4–8.2)
PSA SERPL-MCNC: 2.7 NG/ML (ref 0–4)
SODIUM SERPL-SCNC: 141 MMOL/L (ref 136–145)
T4 FREE SERPL-MCNC: 1.09 NG/DL (ref 0.76–1.46)
TRIGL SERPL-MCNC: 54 MG/DL
TSH SERPL DL<=0.05 MIU/L-ACNC: 2.06 UIU/ML (ref 0.45–4.5)

## 2022-07-08 PROCEDURE — 82570 ASSAY OF URINE CREATININE: CPT

## 2022-07-08 PROCEDURE — 80053 COMPREHEN METABOLIC PANEL: CPT

## 2022-07-08 PROCEDURE — 80061 LIPID PANEL: CPT

## 2022-07-08 PROCEDURE — G0103 PSA SCREENING: HCPCS

## 2022-07-08 PROCEDURE — 83036 HEMOGLOBIN GLYCOSYLATED A1C: CPT

## 2022-07-08 PROCEDURE — 84443 ASSAY THYROID STIM HORMONE: CPT

## 2022-07-08 PROCEDURE — 36415 COLL VENOUS BLD VENIPUNCTURE: CPT

## 2022-07-08 PROCEDURE — 84439 ASSAY OF FREE THYROXINE: CPT

## 2022-07-08 PROCEDURE — 82043 UR ALBUMIN QUANTITATIVE: CPT

## 2022-07-09 LAB
EST. AVERAGE GLUCOSE BLD GHB EST-MCNC: 123 MG/DL
HBA1C MFR BLD: 5.9 %

## 2022-07-11 DIAGNOSIS — R97.20 INCREASED PROSTATE SPECIFIC ANTIGEN (PSA) VELOCITY: Primary | ICD-10-CM

## 2022-07-13 ENCOUNTER — HOSPITAL ENCOUNTER (OUTPATIENT)
Dept: RADIOLOGY | Facility: CLINIC | Age: 62
Discharge: HOME | End: 2022-07-13
Admitting: ANESTHESIOLOGY
Payer: COMMERCIAL

## 2022-07-13 VITALS
DIASTOLIC BLOOD PRESSURE: 70 MMHG | OXYGEN SATURATION: 98 % | TEMPERATURE: 98.4 F | SYSTOLIC BLOOD PRESSURE: 127 MMHG | HEART RATE: 84 BPM | RESPIRATION RATE: 20 BRPM

## 2022-07-13 DIAGNOSIS — M47.816 LUMBAR SPONDYLOSIS: ICD-10-CM

## 2022-07-13 PROCEDURE — 64493 INJ PARAVERT F JNT L/S 1 LEV: CPT | Performed by: ANESTHESIOLOGY

## 2022-07-13 PROCEDURE — 64494 INJ PARAVERT F JNT L/S 2 LEV: CPT | Performed by: ANESTHESIOLOGY

## 2022-07-13 RX ORDER — LIDOCAINE HYDROCHLORIDE 10 MG/ML
10 INJECTION, SOLUTION EPIDURAL; INFILTRATION; INTRACAUDAL; PERINEURAL ONCE
Status: COMPLETED | OUTPATIENT
Start: 2022-07-13 | End: 2022-07-13

## 2022-07-13 RX ADMIN — LIDOCAINE HYDROCHLORIDE 3 ML: 10 INJECTION, SOLUTION EPIDURAL; INFILTRATION; INTRACAUDAL; PERINEURAL at 09:36

## 2022-07-13 RX ADMIN — LIDOCAINE HYDROCHLORIDE 3 ML: 20 INJECTION, SOLUTION EPIDURAL; INFILTRATION; INTRACAUDAL; PERINEURAL at 09:37

## 2022-07-13 NOTE — DISCHARGE INSTRUCTIONS

## 2022-07-13 NOTE — H&P
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[address] )    H/O spinal fusion    DDD (degenerative disc disease), lumbar    Gastroesophageal reflux disease without esophagitis    Type 2 diabetes mellitus without complication, without long-term current use of insulin (HCC)    Mixed hyperlipidemia    Erectile dysfunction of non-organic origin    Elevated bilirubin    Sacroiliitis (HCC)    Lumbar spondylosis       Past Medical History:   Diagnosis Date    BPH associated with nocturia     Cancer ([address] )     Cervical spinal mass ([facility] [address] ) 01/07/2019    cervicothoracic    Diabetes mellitus ([address] )     Disease of thyroid gland     Herniated disc, cervical     History of radiation therapy     Hyperlipidemia     Impaired fasting glucose     Radiation-induced myelopathy ([facility] [address] ) 6/19/2019       Past Surgical History:   Procedure Laterality Date    APPENDECTOMY      CERVICAL FUSION      COLONOSCOPY  03/13/2012    NORMAL; RECHECK IN 5 YEARS    FL LUMBAR PUNCTURE DIAGNOSTIC  2/21/2019    SD BX/EXCIS SPIN PATEL,INDUR,INMED,CERV N/A 1/24/2019    Procedure: Posterior C4-T3 laminectomy; resection of intramedullary mass C5-T3, including fenestration of syrinx C7-T2; C3-T4 fixation/fusion; additional levels as needed;  Surgeon: Neisha Jean MD;  Location: [facility];  Service: Neurosurgery    WISDOM TOOTH EXTRACTION           Current Outpatient Medications:     atorvastatin (LIPITOR) 40 mg tablet, TAKE 1 TABLET BY MOUTH EVERY DAY, Disp: 90 tablet, Rfl: 1    DULoxetine (CYMBALTA) 60 mg delayed release capsule, TAKE 1 CAPSULE BY MOUTH EVERY DAY, Disp: 30 capsule, Rfl: 0    ibuprofen (MOTRIN) 200 mg tablet, Take 400 mg by mouth as needed for mild pain, Disp: , Rfl:     levothyroxine 75 mcg tablet, TAKE 1 TABLET BY MOUTH EVERY DAY, Disp: 90 tablet, Rfl: 1    metFORMIN (GLUCOPHAGE-XR) 500 mg 24 hr tablet, TAKE 1 TABLET BY MOUTH TWICE DAILY, Disp: 180 tablet, Rfl: 3    omega-3-acid ethyl esters (LOVAZA) 1 g capsule, TAKE 2 CAPSULES BY MOUTH EVERY DAY, Disp: 180 capsule, Rfl: 1    Trulicity 6 05 HY/1 1WZ SOPN, INJECT 0 5 ML (0 75 MG TOTAL) UNDER THE SKIN ONCE A WEEK, Disp: 10 mL, Rfl: 3    Allergies   Allergen Reactions    Bee Venom Hives, Itching and Edema     Category: Allergy;     Penicillins Hives and Itching     Category: Allergy;         Physical Exam:   Vitals:    07/13/22 0910   BP: 116/70   Pulse: 83   Resp: 20   Temp: 98 4 °F (36 9 °C)   SpO2: 96%     General: Awake, Alert, Oriented x 3, Mood and affect appropriate  Respiratory: Respirations even and unlabored  Cardiovascular: Peripheral pulses intact; no edema  Musculoskeletal Exam:  Bilateral lumbar paraspinals tender to palpation    ASA Score: 3         Assessment:     Plan: REPEAT B/L L3-5 MBB Hpi Title: Evaluation of Skin Lesions How Severe Are Your Spot(S)?: mild Have Your Spot(S) Been Treated In The Past?: has not been treated Year Removed: 1900

## 2022-07-19 DIAGNOSIS — M47.816 LUMBAR SPONDYLOSIS: Primary | ICD-10-CM

## 2022-08-09 NOTE — PATIENT INSTRUCTIONS
Hypoglycemia instructions   Barbara Chaparro III  8/9/2022  [id number]    Low Blood Sugar    Steps to treat low blood sugar  1  Test blood sugar if you have symptoms of low blood sugar:   Low Blood Sugar Symptoms:  o Sweaty  o Dizzy  o Rapid heartbeat  o Shaky  o Bad mood  o Hungry      2  Treat blood sugar less than 70 with 15 grams of fast-acting carbohydrate:   Examples of 15 grams Fast-Acting Carbohydrate:  o 4 oz juice  o 4 oz regular soda  o 3-4 glucose tablets (chew)  o 3-4 hard candies (chew)          3  Wait 15 minutes and test your blood sugar again     4   If blood sugar is less than 100, repeat steps 2-3     5  When your blood sugar is 100 or more, eat a snack if it will be longer than one hour until your next meal  The snack should be 15 grams of carbohydrate and a protein:   Examples of snacks:  o ½ sandwich  o 6 crackers with cheese  o Piece of fruit with cheese or peanut butter  o 6 crackers with peanut butter

## 2022-08-09 NOTE — PROGRESS NOTES
Patient Progress Note      CC: DM, hypothyroidism      Referring Provider  No referring provider defined for this encounter  History of Present Illness:   Konstantin Rico III is a 60 y o  male with a history of type 2 diabetes with long term use of insulin  Diabetes course has been stable  Complications of DM: neuropathy  Denies recent illness or hospitalizations  Denies recent severe hypoglycemic or severe hyperglycemic episodes  Denies any issues with his current regimen  Home glucose monitoring: are performed regularly, mornings     No log or meter today  Home blood glucose readings: reports readings between 100-120s mg/dl     Current regimen:  Trulicity 9 06 mg weekly  He was having abdominal pain with metformin so it was stopped by PCP  compliant most of the time, denies any side effects from medications  Injects in: abdomen  Rotates sites: Yes  Hypoglycemic episodes: No, rare  H/o of hypoglycemia causing hospitalization or Intervention such as glucagon injection  or ambulance call :  No  Hypoglycemia symptoms: jitteriness and sweating  Treatment of hypoglycemia: juice     Medic alert tag: recommended: Yes     Diabetes education: Yes  Diet: 3 meals per day, 1 snack per day  Timing of meals is predictable  Diabetic diet compliance:  compliant most of the time  Activity: Daily activity is predictable: Yes  He tries to exercise 3 times a week  Ophthamology:  March 2022  Podiatry:  Feb 2022     Not on ACE inhibitor/ARB  Has hyperlipidemia: on statin - tolerating well, no myalgias  compliant most of the time, denies any side effects from medications  Thyroid disorders:  Hypothyroidism  Patient is taking levothyroxine 75 mcg 1 tablet daily on an empty stomach 1 hour before breakfast and at least 4 hours apart from supplements    History of pancreatitis: No    Patient Active Problem List   Diagnosis    BPH associated with nocturia    Hypothyroidism    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 [address] [facility]    H/O spinal fusion    DDD (degenerative disc disease), lumbar    Gastroesophageal reflux disease without esophagitis    Type 2 diabetes mellitus without complication, without long-term current use of insulin (HCC)    Mixed hyperlipidemia    Erectile dysfunction of non-organic origin    Elevated bilirubin    Sacroiliitis (HCC)    Lumbar spondylosis      Past Medical History:   Diagnosis Date    BPH associated with nocturia     [facility] [address] [facility]     [facility] 2019    cervicothoracic    Diabetes mellitus [facility]     Disease of thyroid gland     Herniated disc, cervical     History of radiation therapy     Hyperlipidemia     Impaired fasting glucose     Radiation-induced myelopathy ([facility] ) 2019      Past Surgical History:   Procedure Laterality Date    APPENDECTOMY      CERVICAL FUSION      COLONOSCOPY  2012    NORMAL; RECHECK IN 5 YEARS    FL LUMBAR PUNCTURE DIAGNOSTIC  2019    [place] BX/EXCIS SPIN PATEL,INDUR,INMED,CERV N/A 2019    Procedure: Posterior C4-T3 laminectomy; resection of intramedullary mass C5-T3, including fenestration of syrinx C7-T2; C3-T4 fixation/fusion; additional levels as needed;  Surgeon: Fan Contreras MD;  [facility];  Service: Neurosurgery    WISDOM TOOTH EXTRACTION        Family History   Problem Relation Age of Onset    Diabetes Mother     Hypertension Father     Cerebral palsy Sister     Breast cancer Daughter      Social History     Tobacco Use    Smoking status: Former Smoker     Quit date: 1993     Years since quittin 2    Smokeless tobacco: Never Used   Substance Use Topics    Alcohol use: Yes     Comment: 1 per week     Allergies   Allergen Reactions    Bee Venom Hives, Itching and Edema     Category: Allergy;     Penicillins Hives and Itching     Category:  Allergy;          Current Outpatient Medications:     atorvastatin (LIPITOR) 40 mg tablet, TAKE 1 TABLET BY MOUTH EVERY DAY, Disp: 90 tablet, Rfl: 1   ibuprofen (MOTRIN) 200 mg tablet, Take 400 mg by mouth as needed for mild pain, Disp: , Rfl:     levothyroxine 75 mcg tablet, TAKE 1 TABLET BY MOUTH EVERY DAY, Disp: 90 tablet, Rfl: 1    omega-3-acid ethyl esters (LOVAZA) 1 g capsule, TAKE 2 CAPSULES BY MOUTH EVERY DAY, Disp: 180 capsule, Rfl: 1    Trulicity 3 90 SO/7 8LN SOPN, INJECT 0 5 ML (0 75 MG TOTAL) UNDER THE SKIN ONCE A WEEK, Disp: 10 mL, Rfl: 3    DULoxetine (CYMBALTA) 60 mg delayed release capsule, TAKE 1 CAPSULE BY MOUTH EVERY DAY, Disp: 30 capsule, Rfl: 0  Review of Systems   Constitutional: Negative for activity change, appetite change, fatigue and unexpected weight change  HENT: Negative for trouble swallowing  Eyes: Negative for visual disturbance  Respiratory: Negative for shortness of breath  Cardiovascular: Negative for chest pain and palpitations  Gastrointestinal: Negative for constipation and diarrhea  Endocrine: Negative for polydipsia and polyuria  Musculoskeletal: Positive for arthralgias  Skin: Negative for wound  Neurological: Positive for numbness  Psychiatric/Behavioral: Negative  Physical Exam:  Body mass index is 28 13 kg/m²  /70   Pulse 81   Temp 97 9 °F (36 6 °C)   Ht 5' 7" (1 702 m)   Wt 81 5 kg (179 lb 9 6 oz)   SpO2 97%   BMI 28 13 kg/m²    Wt Readings from Last 3 Encounters:   08/10/22 81 5 kg (179 lb 9 6 oz)   03/18/22 81 2 kg (179 lb)   03/01/22 83 kg (183 lb)       Physical Exam  Vitals and nursing note reviewed  Constitutional:       Appearance: He is well-developed  HENT:      Head: Normocephalic  Eyes:      General: No scleral icterus  Pupils: Pupils are equal, round, and reactive to light  Neck:      Thyroid: No thyromegaly  Cardiovascular:      Rate and Rhythm: Normal rate and regular rhythm  Pulses:           Radial pulses are 2+ on the right side and 2+ on the left side  Heart sounds: No murmur heard    Pulmonary:      Effort: Pulmonary effort is normal  No respiratory distress  Breath sounds: Normal breath sounds  No wheezing  Musculoskeletal:      Cervical back: Neck supple  Skin:     General: Skin is warm and dry  Neurological:      Mental Status: He is alert  Patient's shoes and socks were not removed  Labs:   Component      Latest Ref Rng & Units 3/3/2022 7/8/2022   Sodium      136 - 145 mmol/L 140 141   Potassium      3 5 - 5 3 mmol/L 5 0 4 7   Chloride      100 - 108 mmol/L 108 111 (H)   CO2      21 - 32 mmol/L 29 28   Anion Gap      4 - 13 mmol/L 3 (L) 2 (L)   BUN      5 - 25 mg/dL 18 18   Creatinine      0 60 - 1 30 mg/dL 0 94 1 00   GLUCOSE FASTING      65 - 99 mg/dL 96 104 (H)   Calcium      8 3 - 10 1 mg/dL 9 3 9 0   CORRECTED CALCIUM      8 3 - 10 1 mg/dL  9 7   AST      5 - 45 U/L  11   ALT      12 - 78 U/L  23   Alkaline Phosphatase      46 - 116 U/L  68   Total Protein      6 4 - 8 2 g/dL  7 1   Albumin      3 5 - 5 0 g/dL  3 1 (L)   TOTAL BILIRUBIN      0 20 - 1 00 mg/dL  0 66   eGFR      ml/min/1 73sq m 85 79   Cholesterol      See Comment mg/dL  94   Triglycerides      See Comment mg/dL  54   HDL      >=40 mg/dL  29 (L)   LDL Calculated      0 - 100 mg/dL  54   Non-HDL Cholesterol      mg/dl  65   EXT Creatinine Urine      mg/dL  139 0   MICROALBUM ,U,RANDOM      0 0 - 20 0 mg/L  <5 0   MICROALBUMIN/CREATININE RATIO      0 - 30 mg/g creatinine  <4   Hemoglobin A1C      Normal 3 8-5 6%; PreDiabetic 5 7-6 4%; Diabetic >=6 5%; Glycemic control for adults with diabetes <7 0% % 5 6 5 9 (H)   eAG, EST AVG Glucose      mg/dl 114 123   TSH 3RD GENERATON      0 450 - 4 500 uIU/mL 1 590 2 060   Free T4      0 76 - 1 46 ng/dL 1 04 1 09       Plan:    Diagnoses and all orders for this visit:    Type 2 diabetes mellitus without complication, without long-term current use of insulin (HCC)  HGA1C 5 9%  Worsened    Treatment regimen: continue current treatment  Episodes of hypoglycemia can lead to permanent disability and death   Discussed risks/complications associated with uncontrolled diabetes  Advised to adhere to diabetic diet, and recommended staying active/exercising routinely as tolerated  Keep carbohydrates consistent to limit blood glucose fluctuations  Advised to call if blood sugars less than 70 mg/dl or over 250 mg/dl  Check blood glucose 1 time a day  Discussed symptoms and treatment of hypoglycemia  Recommended routine follow-up with podiatry and ophthalmology  Send log in 1-2 weeks  Ordered blood work to complete prior to next visit  -     Hemoglobin A1C; Future  -     Basic metabolic panel; Future    Acquired hypothyroidism  TSH 2 060 and free T4 109  Continue current dose of levothyroxine  Monitor labs   -     T4, free; Future  -     TSH, 3rd generation; Future    Mixed hyperlipidemia  LDL 54  Continue statin therapy         Discussed with the patient diagnosis and treatment and all questions fully answered  He will call me if any problems arise  Counseled patient on diagnostic results, prognosis, risk and benefit of treatment options, instruction for management, importance of treatment compliance, risk factor reduction and impressions        Didi Gant PA-C

## 2022-08-10 ENCOUNTER — OFFICE VISIT (OUTPATIENT)
Dept: ENDOCRINOLOGY | Facility: CLINIC | Age: 62
End: 2022-08-10
Payer: COMMERCIAL

## 2022-08-10 ENCOUNTER — HOSPITAL ENCOUNTER (OUTPATIENT)
Dept: RADIOLOGY | Facility: CLINIC | Age: 62
Discharge: HOME | End: 2022-08-10
Admitting: ANESTHESIOLOGY
Payer: COMMERCIAL

## 2022-08-10 ENCOUNTER — TELEPHONE (OUTPATIENT)
Dept: RADIOLOGY | Facility: CLINIC | Age: 62
End: 2022-08-10

## 2022-08-10 VITALS
OXYGEN SATURATION: 98 % | SYSTOLIC BLOOD PRESSURE: 131 MMHG | TEMPERATURE: 98.7 F | HEART RATE: 67 BPM | RESPIRATION RATE: 18 BRPM | DIASTOLIC BLOOD PRESSURE: 74 MMHG

## 2022-08-10 VITALS
BODY MASS INDEX: 28.19 KG/M2 | OXYGEN SATURATION: 97 % | SYSTOLIC BLOOD PRESSURE: 110 MMHG | DIASTOLIC BLOOD PRESSURE: 70 MMHG | WEIGHT: 179.6 LBS | HEART RATE: 81 BPM | TEMPERATURE: 97.9 F | HEIGHT: 67 IN

## 2022-08-10 DIAGNOSIS — E78.2 MIXED HYPERLIPIDEMIA: ICD-10-CM

## 2022-08-10 DIAGNOSIS — E03.9 ACQUIRED HYPOTHYROIDISM: ICD-10-CM

## 2022-08-10 DIAGNOSIS — M47.816 LUMBAR SPONDYLOSIS: ICD-10-CM

## 2022-08-10 DIAGNOSIS — E11.9 TYPE 2 DIABETES MELLITUS WITHOUT COMPLICATION, WITHOUT LONG-TERM CURRENT USE OF INSULIN (HCC): Primary | ICD-10-CM

## 2022-08-10 PROCEDURE — 64636 DESTROY L/S FACET JNT ADDL: CPT | Performed by: ANESTHESIOLOGY

## 2022-08-10 PROCEDURE — 99214 OFFICE O/P EST MOD 30 MIN: CPT | Performed by: PHYSICIAN ASSISTANT

## 2022-08-10 PROCEDURE — 64635 DESTROY LUMB/SAC FACET JNT: CPT | Performed by: ANESTHESIOLOGY

## 2022-08-10 RX ORDER — BUPIVACAINE HYDROCHLORIDE 5 MG/ML
30 INJECTION, SOLUTION EPIDURAL; INTRACAUDAL ONCE
Status: COMPLETED | OUTPATIENT
Start: 2022-08-10 | End: 2022-08-10

## 2022-08-10 RX ORDER — LIDOCAINE HYDROCHLORIDE 10 MG/ML
10 INJECTION, SOLUTION EPIDURAL; INFILTRATION; INTRACAUDAL; PERINEURAL ONCE
Status: COMPLETED | OUTPATIENT
Start: 2022-08-10 | End: 2022-08-10

## 2022-08-10 RX ADMIN — LIDOCAINE HYDROCHLORIDE 3 ML: 20 INJECTION, SOLUTION EPIDURAL; INFILTRATION; INTRACAUDAL; PERINEURAL at 15:13

## 2022-08-10 RX ADMIN — LIDOCAINE HYDROCHLORIDE 8 ML: 10 INJECTION, SOLUTION EPIDURAL; INFILTRATION; INTRACAUDAL; PERINEURAL at 15:13

## 2022-08-10 RX ADMIN — BUPIVACAINE HYDROCHLORIDE 3 ML: 5 INJECTION, SOLUTION EPIDURAL; INTRACAUDAL; PERINEURAL at 15:13

## 2022-08-10 NOTE — DISCHARGE INSTR - LAB
Medial Branch Radiofrequency Ablation     WHAT YOU NEED TO KNOW:   Medial branch radiofrequency ablation (RFA) is a procedure used to treat facet joint pain in your neck, mid back, or lower back  Facet joints are found at the back of each vertebra  A needle electrode is used to send electrical currents to the nerves in your facet joint  The electrical currents create heat that damages the nerve so it cannot send pain signals  ACTIVITY  Do not drive or operate machinery today  No strenuous activity today - bending, lifting, etc   You may shower today, but do not sit in a tub of water  Resume normal activities tomorrow as tolerated  CARE OF THE INJECTION SITE  If you have soreness or pain, apply ice to the area today (20 minutes on/20 minutes off)  Starting tomorrow, you may use warm, moist heat or ice if needed  Notify the [facility] if you have any of the following: redness, drainage, swelling, or fever above 100°F     SPECIAL INSTRUCTIONS  Our office will call you tomorrow for a progress report and make an appointment for a follow up visit in 4 weeks  If you feel a sunburn-like sensation in the area of your procedure, call our office  MEDICATIONS  Continue to take all routine medications  Our office may have instructed you to hold some medications  As no general anesthesia was used in today's procedure, you should not experience any side effects related to anesthesia  If you have a problem specifically related to your procedure, please call our office at [phone]  Problems not related to your procedure should be directed to your primary care physician

## 2022-08-10 NOTE — H&P
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[address]    H/O spinal fusion    DDD (degenerative disc disease), lumbar    Gastroesophageal reflux disease without esophagitis    Type 2 diabetes mellitus without complication, without long-term current use of insulin (HCC)    Mixed hyperlipidemia    Erectile dysfunction of non-organic origin    Elevated bilirubin    Sacroiliitis (HCC)    Lumbar spondylosis       Past Medical History:   Diagnosis Date    BPH associated with nocturia     Cancer ([address]     Cervical spinal [facility] ([facility] [address] ) 01/07/2019    cervicothoracic    Diabetes mellitus ([facility] [address] )     Disease of thyroid gland     Herniated disc, cervical     History of radiation therapy     Hyperlipidemia     Impaired fasting glucose     Radiation-induced myelopathy ([facility] [address] ) 6/19/2019       Past Surgical History:   Procedure Laterality Date    APPENDECTOMY      CERVICAL FUSION      COLONOSCOPY  03/13/2012    NORMAL; RECHECK IN 5 YEARS    FL LUMBAR PUNCTURE DIAGNOSTIC  2/21/2019    [place] BX/EXCIS SPIN PATEL,INDUR,INMED,CERV N/A 1/24/2019    Procedure: Posterior C4-T3 laminectomy; resection of intramedullary mass C5-T3, including fenestration of syrinx C7-T2; C3-T4 fixation/fusion; additional levels as needed;  Surgeon: Baudilio Elizabeth MD;  [facility];  Service: Neurosurgery    WISDOM TOOTH EXTRACTION           Current Outpatient Medications:     atorvastatin (LIPITOR) 40 mg tablet, TAKE 1 TABLET BY MOUTH EVERY DAY, Disp: 90 tablet, Rfl: 1    DULoxetine (CYMBALTA) 60 mg delayed release capsule, TAKE 1 CAPSULE BY MOUTH EVERY DAY, Disp: 30 capsule, Rfl: 0    ibuprofen (MOTRIN) 200 mg tablet, Take 400 mg by mouth as needed for mild pain, Disp: , Rfl:     levothyroxine 75 mcg tablet, TAKE 1 TABLET BY MOUTH EVERY DAY, Disp: 90 tablet, Rfl: 1    omega-3-acid ethyl esters (LOVAZA) 1 g capsule, TAKE 2 CAPSULES BY MOUTH EVERY DAY, Disp: 180 capsule, Rfl: 1    Trulicity 8 73 ZM/2 1ZI SOPN, INJECT 0 5 ML (0 75 MG TOTAL) UNDER THE SKIN ONCE A WEEK, Disp: 10 mL, Rfl: 3    Current Facility-Administered Medications:     bupivacaine (PF) (MARCAINE) 0 5 % injection 30 mL, 30 mL, Injection, Once, Darwin Guardado, DO    lidocaine (PF) (XYLOCAINE-MPF) 1 % injection 10 mL, 10 mL, Other, Once, Virginia Manisha, DO    lidocaine (PF) (XYLOCAINE-MPF) 2 % injection 4 mL, 4 mL, Other, Once, Darwin Guardado, DO    Allergies   Allergen Reactions    Bee Venom Hives, Itching and Edema     Category: Allergy;     Penicillins Hives and Itching     Category: Allergy;         Physical Exam:   Vitals:    08/10/22 1451   BP: 119/67   Pulse: 71   Resp: 18   Temp: 98 7 °F (37 1 °C)   SpO2: 98%     General: Awake, Alert, Oriented x 3, Mood and affect appropriate  Respiratory: Respirations even and unlabored  Cardiovascular: Peripheral pulses intact; no edema  Musculoskeletal Exam:  Right lumbar paraspinals tender to palpation    ASA Score: 3         Assessment:  Lumbar spondylosis    Plan: RIGHT L3-5 RFA

## 2022-08-11 NOTE — TELEPHONE ENCOUNTER
S/w pt  Pt states he has some stiffness after RFA that he treated with Ibuprofen with relief  Pt has had a very little sunburn sensation and advised to try ice 20 mins off and on and if not improved to CB  Pt denies signs of infection or fever  Pt aware it may take 2 weeks to notice a difference and 4-6 weeks for the full effect     Confirmed next procedure

## 2022-08-16 NOTE — PROGRESS NOTES
8/18/2022    Karis Silva III  1960  [id number]      Assessment  -Rising PSA    Discussion/Plan  Adwoa Mccoy is a 60 y o  male who presents in consultation     1  Rising PSA- we reviewed the results of his recent PSA from 07/08/2022 which was 2 7, previously 2 2  There were no abnormal findings noted on digital rectal examination today  We discussed guidelines for routine prostate cancer screening following AUA  His rise in PSA is within normal limits  We will continue annual prostate cancer screening  He has no other urinary complaints at this time  Follow-up in 1 year with PSA and PRINCESS  He was advised to call sooner with any questions or issues     -All questions answered, patient agrees with plan      History of Present Illness  60 y o  male who presents in consultation today for evaluation of rising PSA  Patient states he was evaluated by our office many years ago for routine prostate cancer screening  He was referred back by his PCP  Recent PSA from 7/8/2022 was 2 7, previously 2 2 (2/9/2021)  Prior PSA trend noted below  He denies any lower urinary tract symptoms, gross hematuria, or dysuria  Patient denies any strong family history of prostate cancer  Additionally, he reports difficulties climaxing with sexual activity  Patient has history of spinal cord ependymoma, 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 damage  He is able to obtain erections for intercourse  PSAs    7/8/2022  2 7  2/9/2021  2 2  11/9/2018  1 7  11/4/2017  1 7  2/26/2016  1 4      Review of Systems  Review of Systems   Constitutional: Negative  HENT: Negative  Respiratory: Negative  Cardiovascular: Negative  Gastrointestinal: Negative  Genitourinary: Negative for decreased urine volume, difficulty urinating, dysuria, flank pain, frequency, hematuria and urgency  Musculoskeletal: Negative  Skin: Negative  Neurological: Negative  Psychiatric/Behavioral: Negative        [address] SYMPTOM SCORE    Flowsheet Row Most Recent Value   AUA SYMPTOM SCORE    How often have you had a sensation of not emptying your bladder completely after you finished urinating? 3 (P)     How often have you had to urinate again less than two hours after you finished urinating? 2 (P)     How often have you found you stopped and started again several times when you urinate? 3 (P)     How often have you found it difficult to postpone urination? 1 (P)     How often have you had a weak urinary stream? 3 (P)     How often have you had to push or strain to begin urination? 2 (P)     How many times did you most typically get up to urinate from the time you went to bed at night until the time you got up in the morning? 3 (P)     Quality of Life: If you were to spend the rest of your life with your urinary condition just the way it is now, how would you feel about that? 3 (P)     AUA SYMPTOM SCORE 17 (P)           Past Medical History  Past Medical History:   Diagnosis Date    BPH associated with nocturia     Cancer [facility] [address] [facility]     Cervical spinal mass ([facility] [address] ) 01/07/2019    cervicothoracic    Diabetes mellitus ([facility] [address] )     Disease of thyroid gland     Herniated disc, cervical     History of radiation therapy     Hyperlipidemia     Impaired fasting glucose     Radiation-induced myelopathy ([facility] [address] ) 6/19/2019       Past Social History  Past Surgical History:   Procedure Laterality Date    APPENDECTOMY      CERVICAL FUSION      COLONOSCOPY  03/13/2012    NORMAL; RECHECK IN 5 YEARS    FL LUMBAR PUNCTURE DIAGNOSTIC  2/21/2019    [place] BX/EXCIS SPIN PATEL,INDUR,INMED,CERV N/A 1/24/2019    Procedure: Posterior C4-T3 laminectomy; resection of intramedullary mass C5-T3, including fenestration of syrinx C7-T2; C3-T4 fixation/fusion; additional levels as needed;  Surgeon: Peewee Whitney MD;  [facility];  Service: Neurosurgery    WISDOM TOOTH EXTRACTION         Past Family History  Family History   Problem Relation Age of Onset    Diabetes Mother    Mavis Cousin Hypertension Father     Cerebral palsy Sister     Breast cancer Daughter        Past Social history  Social History     Socioeconomic History    Marital status: /Civil Union     Spouse name: Primitivo Hills Number of children: 3    Years of education: 15    Highest education level: Not on file   Occupational History    Occupation: Disability   Tobacco Use    Smoking status: Former Smoker     Quit date: 1993     Years since quittin 2    Smokeless tobacco: Never Used   Vaping Use    Vaping Use: Never used   Substance and Sexual Activity    Alcohol use: Yes     Comment: 1 per week    Drug use: No    Sexual activity: Not on file   Other Topics Concern    Not on file   Social History Narrative    Lives at home with spouse and 3 children    Has 3 biological children and 3 of spouses children total of 6 children    2 grandchildren,3 stepgrandchildren     Social Determinants of Health     Financial Resource Strain: Not on file   Food Insecurity: Not on file   Transportation Needs: Not on file   Physical Activity: Not on file   Stress: Not on file   Social Connections: Not on file   Intimate Partner Violence: Not on file   Housing Stability: Not on file       Current Medications  Current Outpatient Medications   Medication Sig Dispense Refill    atorvastatin (LIPITOR) 40 mg tablet TAKE 1 TABLET BY MOUTH EVERY DAY 90 tablet 1    DULoxetine (CYMBALTA) 60 mg delayed release capsule TAKE 1 CAPSULE BY MOUTH EVERY DAY 30 capsule 0    ibuprofen (MOTRIN) 200 mg tablet Take 400 mg by mouth as needed for mild pain      levothyroxine 75 mcg tablet TAKE 1 TABLET BY MOUTH EVERY DAY 90 tablet 1    omega-3-acid ethyl esters (LOVAZA) 1 g capsule TAKE 2 CAPSULES BY MOUTH EVERY  capsule 1    Trulicity 3 66 NV/4 6LS SOPN INJECT 0 5 ML (0 75 MG TOTAL) UNDER THE SKIN ONCE A WEEK 10 mL 3     No current facility-administered medications for this visit         Allergies  Allergies   Allergen Reactions    Bee Venom Hives, Itching and Edema     Category: Allergy;     Penicillins Hives and Itching     Category: Allergy; Past Medical History, Social History, Family History, medications and allergies were reviewed  Vitals  There were no vitals filed for this visit  Physical Exam  Physical Exam  Constitutional:       Appearance: Normal appearance  He is well-developed  HENT:      Head: Normocephalic  Eyes:      Pupils: Pupils are equal, round, and reactive to light  Pulmonary:      Effort: Pulmonary effort is normal    Abdominal:      Palpations: Abdomen is soft  Genitourinary:     Prostate: Normal       Rectum: Normal       Comments: Prostate 40gm, smooth, nontender, no nodules  Musculoskeletal:         General: Normal range of motion  Cervical back: Normal range of motion  Skin:     General: Skin is warm and dry  Neurological:      General: No focal deficit present  Mental Status: He is alert and oriented to person, place, and time  Psychiatric:         Mood and Affect: Mood normal          Behavior: Behavior normal          Thought Content: Thought content normal          Judgment: Judgment normal          Results    I have personally reviewed all pertinent lab results and reviewed with patient  Lab Results   Component Value Date    PSA 2 7 07/08/2022    PSA 2 2 02/09/2021    PSA 1 7 11/09/2018     Lab Results   Component Value Date    CALCIUM 9 0 07/08/2022    K 4 7 07/08/2022    CO2 28 07/08/2022     (H) 07/08/2022    BUN 18 07/08/2022    CREATININE 1 00 07/08/2022     Lab Results   Component Value Date    WBC 5 24 01/03/2020    HGB 11 6 (L) 01/03/2020    HCT 35 7 (L) 01/03/2020    MCV 90 01/03/2020     01/03/2020     No results found for this or any previous visit (from the past 1 hour(s))

## 2022-08-18 ENCOUNTER — OFFICE VISIT (OUTPATIENT)
Dept: UROLOGY | Facility: AMBULATORY SURGERY CENTER | Age: 62
End: 2022-08-18
Payer: COMMERCIAL

## 2022-08-18 VITALS
HEIGHT: 67 IN | SYSTOLIC BLOOD PRESSURE: 122 MMHG | HEART RATE: 77 BPM | BODY MASS INDEX: 28.44 KG/M2 | WEIGHT: 181.2 LBS | DIASTOLIC BLOOD PRESSURE: 60 MMHG

## 2022-08-18 DIAGNOSIS — Z12.5 SCREENING FOR PROSTATE CANCER: Primary | ICD-10-CM

## 2022-08-18 DIAGNOSIS — R97.20 INCREASED PROSTATE SPECIFIC ANTIGEN (PSA) VELOCITY: ICD-10-CM

## 2022-08-18 PROCEDURE — 99204 OFFICE O/P NEW MOD 45 MIN: CPT | Performed by: NURSE PRACTITIONER

## 2022-08-24 ENCOUNTER — TELEPHONE (OUTPATIENT)
Dept: PAIN MEDICINE | Facility: CLINIC | Age: 62
End: 2022-08-24

## 2022-08-24 ENCOUNTER — HOSPITAL ENCOUNTER (OUTPATIENT)
Dept: RADIOLOGY | Facility: CLINIC | Age: 62
Discharge: HOME | End: 2022-08-24
Payer: COMMERCIAL

## 2022-08-24 VITALS
SYSTOLIC BLOOD PRESSURE: 114 MMHG | DIASTOLIC BLOOD PRESSURE: 69 MMHG | OXYGEN SATURATION: 97 % | HEART RATE: 73 BPM | RESPIRATION RATE: 20 BRPM | TEMPERATURE: 97.2 F

## 2022-08-24 DIAGNOSIS — M47.816 LUMBAR SPONDYLOSIS: ICD-10-CM

## 2022-08-24 PROCEDURE — 64636 DESTROY L/S FACET JNT ADDL: CPT | Performed by: ANESTHESIOLOGY

## 2022-08-24 PROCEDURE — 64635 DESTROY LUMB/SAC FACET JNT: CPT | Performed by: ANESTHESIOLOGY

## 2022-08-24 RX ORDER — BUPIVACAINE HYDROCHLORIDE 5 MG/ML
30 INJECTION, SOLUTION EPIDURAL; INTRACAUDAL ONCE
Status: COMPLETED | OUTPATIENT
Start: 2022-08-24 | End: 2022-08-24

## 2022-08-24 RX ORDER — LIDOCAINE HYDROCHLORIDE 10 MG/ML
10 INJECTION, SOLUTION EPIDURAL; INFILTRATION; INTRACAUDAL; PERINEURAL ONCE
Status: COMPLETED | OUTPATIENT
Start: 2022-08-24 | End: 2022-08-24

## 2022-08-24 RX ADMIN — LIDOCAINE HYDROCHLORIDE 8 ML: 10 INJECTION, SOLUTION EPIDURAL; INFILTRATION; INTRACAUDAL; PERINEURAL at 14:28

## 2022-08-24 RX ADMIN — LIDOCAINE HYDROCHLORIDE 3 ML: 20 INJECTION, SOLUTION EPIDURAL; INFILTRATION; INTRACAUDAL; PERINEURAL at 14:31

## 2022-08-24 RX ADMIN — BUPIVACAINE HYDROCHLORIDE 3 ML: 5 INJECTION, SOLUTION EPIDURAL; INTRACAUDAL; PERINEURAL at 14:38

## 2022-08-24 NOTE — H&P
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[address] )    H/O spinal fusion    DDD (degenerative disc disease), lumbar    Gastroesophageal reflux disease without esophagitis    Type 2 diabetes mellitus without complication, without long-term current use of insulin (HCC)    Mixed hyperlipidemia    Erectile dysfunction of non-organic origin    Elevated bilirubin    Sacroiliitis (HCC)    Lumbar spondylosis       Past Medical History:   Diagnosis Date    BPH associated with nocturia     Cancer ([address] )     Cervical spinal [address] [facility] [facility] 01/07/2019    cervicothoracic    Diabetes mellitus ([address] )     Disease of thyroid gland     Herniated disc, cervical     History of radiation therapy     Hyperlipidemia     Impaired fasting glucose     Radiation-induced myelopathy ([facility] [address] ) 6/19/2019       Past Surgical History:   Procedure Laterality Date    APPENDECTOMY      CERVICAL FUSION      COLONOSCOPY  03/13/2012    NORMAL; RECHECK IN 5 YEARS    FL LUMBAR PUNCTURE DIAGNOSTIC  2/21/2019    [place] BX/EXCIS SPIN PATEL,INDUR,INMED,CERV N/A 1/24/2019    Procedure: Posterior C4-T3 laminectomy; resection of intramedullary mass C5-T3, including fenestration of syrinx C7-T2; C3-T4 fixation/fusion; additional levels as needed;  Surgeon: Lucila Tirado MD;  [facility];  Service: Neurosurgery    WISDOM TOOTH EXTRACTION           Current Outpatient Medications:     atorvastatin (LIPITOR) 40 mg tablet, TAKE 1 TABLET BY MOUTH EVERY DAY, Disp: 90 tablet, Rfl: 1    DULoxetine (CYMBALTA) 60 mg delayed release capsule, TAKE 1 CAPSULE BY MOUTH EVERY DAY, Disp: 30 capsule, Rfl: 0    ibuprofen (MOTRIN) 200 mg tablet, Take 400 mg by mouth as needed for mild pain, Disp: , Rfl:     levothyroxine 75 mcg tablet, TAKE 1 TABLET BY MOUTH EVERY DAY, Disp: 90 tablet, Rfl: 1    omega-3-acid ethyl esters (LOVAZA) 1 g capsule, TAKE 2 CAPSULES BY MOUTH EVERY DAY, Disp: 180 capsule, Rfl: 1    Trulicity 5 48 YF/1 0DS SOPN, INJECT 0 5 ML (0 75 MG TOTAL) UNDER THE SKIN ONCE A WEEK, Disp: 10 mL, Rfl: 3    Allergies   Allergen Reactions    Bee Venom Hives, Itching and Edema     Category: Allergy;     Penicillins Hives and Itching     Category: Allergy;         Physical Exam:   Vitals:    08/24/22 1443   BP: 114/69   Pulse: 73   Resp: 20   Temp:    SpO2: 97%     General: Awake, Alert, Oriented x 3, Mood and affect appropriate  Respiratory: Respirations even and unlabored  Cardiovascular: Peripheral pulses intact; no edema  Musculoskeletal Exam:  Left lumbar paraspinals tender to palpation    ASA Score: 3         Assessment:  Lumbar spondylosis    Plan: LEFT L3-5 RFA

## 2022-08-24 NOTE — DISCHARGE INSTRUCTIONS
Medial Branch Radiofrequency Ablation     WHAT YOU NEED TO KNOW:   Medial branch radiofrequency ablation (RFA) is a procedure used to treat facet joint pain in your neck, mid back, or lower back  Facet joints are found at the back of each vertebra  A needle electrode is used to send electrical currents to the nerves in your facet joint  The electrical currents create heat that damages the nerve so it cannot send pain signals  ACTIVITY  Do not drive or operate machinery today  No strenuous activity today - bending, lifting, etc   You may shower today, but do not sit in a tub of water  Resume normal activities tomorrow as tolerated  CARE OF THE INJECTION SITE  If you have soreness or pain, apply ice to the area today (20 minutes on/20 minutes off)  Starting tomorrow, you may use warm, moist heat or ice if needed  Notify the [facility] if you have any of the following: redness, drainage, swelling, or fever above 100°F     SPECIAL INSTRUCTIONS  Our office will call you tomorrow for a progress report and make an appointment for a follow up visit in 4 weeks  If you feel a sunburn-like sensation in the area of your procedure, call our office  MEDICATIONS  Continue to take all routine medications  Our office may have instructed you to hold some medications  As no general anesthesia was used in today's procedure, you should not experience any side effects related to anesthesia  If you have a problem specifically related to your procedure, please call our office at [phone]  Problems not related to your procedure should be directed to your primary care physician

## 2022-08-25 NOTE — TELEPHONE ENCOUNTER
S/w pt who states that he has no pain  Pt denies sunburn like sensation, fever or signs of infection  Pt aware it may take 2 weeks to notice a difference and 4-6 weeks for the full effect     Scheduled f/u [facility]

## 2022-09-10 DIAGNOSIS — E78.5 HLD (HYPERLIPIDEMIA): ICD-10-CM

## 2022-09-12 RX ORDER — ATORVASTATIN CALCIUM 40 MG/1
TABLET, FILM COATED ORAL
Qty: 90 TABLET | Refills: 1 | Status: SHIPPED | OUTPATIENT
Start: 2022-09-12

## 2022-09-19 DIAGNOSIS — E11.65 TYPE 2 DIABETES MELLITUS WITH HYPERGLYCEMIA, WITHOUT LONG-TERM CURRENT USE OF INSULIN (HCC): ICD-10-CM

## 2022-09-19 RX ORDER — DULAGLUTIDE 0.75 MG/.5ML
0.75 INJECTION, SOLUTION SUBCUTANEOUS WEEKLY
Qty: 8 ML | Refills: 1 | Status: SHIPPED | OUTPATIENT
Start: 2022-09-19

## 2022-09-26 ENCOUNTER — OFFICE VISIT (OUTPATIENT)
Dept: FAMILY MEDICINE CLINIC | Facility: CLINIC | Age: 62
End: 2022-09-26
Payer: COMMERCIAL

## 2022-09-26 VITALS
BODY MASS INDEX: 28.56 KG/M2 | DIASTOLIC BLOOD PRESSURE: 60 MMHG | WEIGHT: 182 LBS | OXYGEN SATURATION: 98 % | SYSTOLIC BLOOD PRESSURE: 112 MMHG | HEART RATE: 64 BPM | HEIGHT: 67 IN

## 2022-09-26 DIAGNOSIS — Z23 ENCOUNTER FOR IMMUNIZATION: ICD-10-CM

## 2022-09-26 DIAGNOSIS — K40.90 NON-RECURRENT INGUINAL HERNIA WITHOUT OBSTRUCTION OR GANGRENE, UNSPECIFIED LATERALITY: ICD-10-CM

## 2022-09-26 DIAGNOSIS — C72.0 SPINAL CORD EPENDYMOMA (HCC): ICD-10-CM

## 2022-09-26 DIAGNOSIS — E03.9 HYPOTHYROIDISM, UNSPECIFIED TYPE: ICD-10-CM

## 2022-09-26 DIAGNOSIS — M51.36 DDD (DEGENERATIVE DISC DISEASE), LUMBAR: ICD-10-CM

## 2022-09-26 DIAGNOSIS — Z23 NEED FOR PNEUMOCOCCAL VACCINATION: Primary | ICD-10-CM

## 2022-09-26 DIAGNOSIS — E03.9 ACQUIRED HYPOTHYROIDISM: ICD-10-CM

## 2022-09-26 DIAGNOSIS — E11.9 TYPE 2 DIABETES MELLITUS WITHOUT COMPLICATION, WITHOUT LONG-TERM CURRENT USE OF INSULIN (HCC): ICD-10-CM

## 2022-09-26 DIAGNOSIS — Z12.11 SCREENING FOR COLON CANCER: Primary | ICD-10-CM

## 2022-09-26 PROCEDURE — 90471 IMMUNIZATION ADMIN: CPT | Performed by: FAMILY MEDICINE

## 2022-09-26 PROCEDURE — 99214 OFFICE O/P EST MOD 30 MIN: CPT | Performed by: FAMILY MEDICINE

## 2022-09-26 PROCEDURE — 3725F SCREEN DEPRESSION PERFORMED: CPT | Performed by: FAMILY MEDICINE

## 2022-09-26 PROCEDURE — 90677 PCV20 VACCINE IM: CPT | Performed by: FAMILY MEDICINE

## 2022-09-26 RX ORDER — LEVOTHYROXINE SODIUM 0.07 MG/1
75 TABLET ORAL DAILY
Qty: 90 TABLET | Refills: 1 | Status: SHIPPED | OUTPATIENT
Start: 2022-09-26

## 2022-09-26 NOTE — ASSESSMENT & PLAN NOTE
Lab Results   Component Value Date    HGBA1C 5 9 (H) 07/08/2022   bs stable, discussed  Increasing Trulicity to 1 5  For  Enhanced  benefit of weight loss

## 2022-09-26 NOTE — PROGRESS NOTES
Assessment and Plan:    Problem List Items Addressed This Visit        Endocrine    Hypothyroidism     stable         Relevant Medications    levothyroxine 75 mcg tablet    Type 2 diabetes mellitus without complication, without long-term current use of insulin (HCC)       Lab Results   Component Value Date    HGBA1C 5 9 (H) 07/08/2022   bs stable, discussed  Increasing Trulicity to 1 5  For  Enhanced  benefit of weight loss            Nervous and Auditory    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HCC)     Sees pain management/neurosurgery            Musculoskeletal and Integument    DDD (degenerative disc disease), lumbar     Sees pain management/neurosurgery           Other Visit Diagnoses     Screening for colon cancer    -  Primary    Relevant Orders    Cologuard    Non-recurrent inguinal hernia without obstruction or gangrene, unspecified laterality        Relevant Orders    Ambulatory Referral to General Surgery                 Diagnoses and all orders for this visit:    Screening for colon cancer  -     Cologuard    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[address] )    DDD (degenerative disc disease), lumbar    Non-recurrent inguinal hernia without obstruction or gangrene, unspecified laterality  -     Ambulatory Referral to General Surgery; Future    Acquired hypothyroidism    Type 2 diabetes mellitus without complication, without long-term current use of insulin (HCC)    Hypothyroidism, unspecified type  -     levothyroxine 75 mcg tablet; Take 1 tablet (75 mcg total) by mouth daily            Subjective:      Patient ID: Andrei Rubio is a 60 y o  male  CC:    Chief Complaint   Patient presents with    Follow-up     Patient present today for his 6 month follow up  Patient states he will like to discuss with Dr Samia Moseley about his hiatal hernia         HPI:    F/u back stevo, lipids, thyroid, sugar, hernia acting up  on r-would liek surgery eval      The following portions of the patient's history were reviewed and updated as appropriate: allergies, current medications, past family history, past medical history, past social history, past surgical history and problem list       Review of Systems   Constitutional: Negative for activity change, appetite change, fatigue and unexpected weight change  Respiratory: Negative for shortness of breath  Cardiovascular: Negative for chest pain  Musculoskeletal: Positive for back pain and gait problem  Neurological: Negative for weakness and numbness  Data to review:       Objective:    Vitals:    09/26/22 0955   BP: 112/60   BP Location: Right arm   Patient Position: Sitting   Cuff Size: Large   Pulse: 64   SpO2: 98%   Weight: 82 6 kg (182 lb)   Height: 5' 7" (1 702 m)        Physical Exam  Vitals reviewed  Constitutional:       Appearance: Normal appearance  Neck:      Vascular: No carotid bruit  Cardiovascular:      Rate and Rhythm: Normal rate and regular rhythm  Pulses: Normal pulses  Heart sounds: Normal heart sounds  Pulmonary:      Effort: Pulmonary effort is normal       Breath sounds: Normal breath sounds  Musculoskeletal:      Right lower leg: No edema  Left lower leg: No edema  Lymphadenopathy:      Cervical: No cervical adenopathy  Neurological:      Mental Status: He is alert     Psychiatric:         Mood and Affect: Mood normal

## 2022-10-03 ENCOUNTER — OFFICE VISIT (OUTPATIENT)
Dept: PAIN MEDICINE | Facility: CLINIC | Age: 62
End: 2022-10-03
Payer: COMMERCIAL

## 2022-10-03 VITALS
HEART RATE: 67 BPM | HEIGHT: 67 IN | BODY MASS INDEX: 27.62 KG/M2 | SYSTOLIC BLOOD PRESSURE: 141 MMHG | DIASTOLIC BLOOD PRESSURE: 85 MMHG | WEIGHT: 176 LBS

## 2022-10-03 DIAGNOSIS — M47.816 LUMBAR SPONDYLOSIS: Primary | ICD-10-CM

## 2022-10-03 DIAGNOSIS — M51.36 DDD (DEGENERATIVE DISC DISEASE), LUMBAR: ICD-10-CM

## 2022-10-03 DIAGNOSIS — Z98.1 H/O SPINAL FUSION: ICD-10-CM

## 2022-10-03 DIAGNOSIS — M79.18 MYOFASCIAL PAIN SYNDROME: ICD-10-CM

## 2022-10-03 PROCEDURE — 99214 OFFICE O/P EST MOD 30 MIN: CPT | Performed by: NURSE PRACTITIONER

## 2022-10-03 RX ORDER — TIZANIDINE 2 MG/1
2 TABLET ORAL EVERY 8 HOURS PRN
Qty: 90 TABLET | Refills: 1 | Status: SHIPPED | OUTPATIENT
Start: 2022-10-03

## 2022-10-03 NOTE — PROGRESS NOTES
Assessment:  1  Lumbar spondylosis    2  DDD (degenerative disc disease), lumbar    3  H/O spinal fusion    4  Myofascial pain syndrome        Plan:  1  I will trial the patient on tizanidine 2 mg q 8 hours p r n  myofascial pain  I advised the patient that they should not drive or operate machinery while on this medication until they see how it affects them, as it could cause lethargy and mental cloudyness  I advised the patient to call our office if they experience any side effects or issues with the medication changes  The patient verbalized an understanding  2  May consider bilateral SI joint injections however he would 1st like to move forward with his abdominal surgery that is scheduled for the near future before pursuing interventional therapy   3  Patient may continue ibuprofen p r n  and should not exceed more than 2400 mg daily  4  Patient may continue Tylenol p r n   5  Patient will follow up after surgery or sooner if needed    History of Present Illness: The patient is a 60 y o  male with a history of cervical decompression and fusion for spinal cord ependymoma in January 2019 last seen on 9/20/21 who presents for a follow up office visit in regards to chronic low back pain that is nonradiating into lower extremities  Patient has right footdrop and neurologic gait dysfunction at baseline  Patient is status post bilateral L3-5 RFA completed in August 2022 without any improvement of his low back pain  His pain as a 6/10 on the numeric pain rating scale  He constantly has pain in the evening which is described as dull aching  He states he will be having hernia surgery in the near future    I have personally reviewed and/or updated the patient's past medical history, past surgical history, family history, social history, current medications, allergies, and vital signs today  Review of Systems:    Review of Systems   Respiratory: Negative for shortness of breath      Cardiovascular: Negative for chest pain  Gastrointestinal: Negative for constipation, diarrhea, nausea and vomiting  Musculoskeletal: Negative for arthralgias, gait problem, joint swelling and myalgias  Skin: Negative for rash  Neurological: Negative for dizziness, seizures and weakness  All other systems reviewed and are negative          Past Medical History:   Diagnosis Date    BPH associated with nocturia     Cancer [facility] )     Cervical spinal mass ([facility] ) 2019    cervicothoracic    Diabetes mellitus ([facility] )     Disease of thyroid gland     Herniated disc, cervical     History of radiation therapy     Hyperlipidemia     Impaired fasting glucose     Radiation-induced myelopathy ([facility] ) 2019       Past Surgical History:   Procedure Laterality Date    APPENDECTOMY      CERVICAL FUSION      COLONOSCOPY  2012    NORMAL; RECHECK IN 5 YEARS    FL LUMBAR PUNCTURE DIAGNOSTIC  2019    [place] BX/EXCIS SPIN PATEL,INDUR,INMED,CERV N/A 2019    Procedure: Posterior C4-T3 laminectomy; resection of intramedullary mass C5-T3, including fenestration of syrinx C7-T2; C3-T4 fixation/fusion; additional levels as needed;  Surgeon: Cierra Blair MD;  [facility];  Service: Neurosurgery    WISDOM TOOTH EXTRACTION         Family History   Problem Relation Age of Onset    Diabetes Mother     Hypertension Father     Cerebral palsy Sister    Whit Pall Breast cancer Daughter        Social History     Occupational History    Occupation: Disability   Tobacco Use    Smoking status: Former Smoker     Packs/day: 1 00     Years: 35 00     Pack years: 35 00     Quit date: 1993     Years since quittin 4    Smokeless tobacco: Never Used   Vaping Use    Vaping Use: Never used   Substance and Sexual Activity    Alcohol use: Yes     Comment: Occasionaly    Drug use: No    Sexual activity: Yes         Current Outpatient Medications:     atorvastatin (LIPITOR) 40 mg tablet, TAKE 1 TABLET BY MOUTH EVERY DAY, Disp: 90 tablet, Rfl: 1    ibuprofen (MOTRIN) 200 mg tablet, Take 400 mg by mouth as needed for mild pain, Disp: , Rfl:     levothyroxine 75 mcg tablet, Take 1 tablet (75 mcg total) by mouth daily, Disp: 90 tablet, Rfl: 1    omega-3-acid ethyl esters (LOVAZA) 1 g capsule, TAKE 2 CAPSULES BY MOUTH EVERY DAY, Disp: 180 capsule, Rfl: 1    tiZANidine (ZANAFLEX) 2 mg tablet, Take 1 tablet (2 mg total) by mouth every 8 (eight) hours as needed for muscle spasms, Disp: 90 tablet, Rfl: 1    Trulicity 5 15 CM/1 7YM SOPN, INJECT 0 5 ML (0 75 MG TOTAL) UNDER THE SKIN ONCE A WEEK, Disp: 8 mL, Rfl: 1    Allergies   Allergen Reactions    Bee Venom Hives, Itching and Edema     Category: Allergy;     Penicillins Hives and Itching     Category: Allergy; Physical Exam:    /85   Pulse 67   Ht 5' 7" (1 702 m)   Wt 79 8 kg (176 lb)   BMI 27 57 kg/m²     Constitutional:normal, well developed, well nourished, alert, in no distress and non-toxic and no overt pain behavior  Eyes:anicteric  HEENT:grossly intact  Neck:supple, symmetric, trachea midline and no masses   Pulmonary:even and unlabored  Cardiovascular:No edema or pitting edema present  Skin:Normal without rashes or lesions and well hydrated  Psychiatric:Mood and affect appropriate  Neurologic:Cranial Nerves II-XII grossly intact  Musculoskeletal:antalgic gait  Negative straight leg raise bilaterally  Positive Alberto's test bilaterally      Imaging  No orders to display         No orders of the defined types were placed in this encounter

## 2022-10-06 LAB — COLOGUARD RESULT REPORTABLE: NEGATIVE

## 2022-10-25 ENCOUNTER — CONSULT (OUTPATIENT)
Dept: SURGERY | Facility: CLINIC | Age: 62
End: 2022-10-25
Payer: COMMERCIAL

## 2022-10-25 VITALS
DIASTOLIC BLOOD PRESSURE: 73 MMHG | WEIGHT: 184.4 LBS | HEIGHT: 67 IN | BODY MASS INDEX: 28.94 KG/M2 | RESPIRATION RATE: 16 BRPM | HEART RATE: 75 BPM | SYSTOLIC BLOOD PRESSURE: 116 MMHG | TEMPERATURE: 97.6 F

## 2022-10-25 DIAGNOSIS — K40.90 NON-RECURRENT INGUINAL HERNIA WITHOUT OBSTRUCTION OR GANGRENE, UNSPECIFIED LATERALITY: ICD-10-CM

## 2022-10-25 PROCEDURE — 99203 OFFICE O/P NEW LOW 30 MIN: CPT | Performed by: SURGERY

## 2022-10-28 PROBLEM — K40.90 NON-RECURRENT INGUINAL HERNIA WITHOUT OBSTRUCTION OR GANGRENE: Status: ACTIVE | Noted: 2022-10-28

## 2022-10-28 NOTE — PROGRESS NOTES
Assessment/Plan:    Non-recurrent inguinal hernia without obstruction or gangrene  I reviewed his CT scan and he does have a fat containing right inguinal hernia although he deferred exam at this time  He denies pain is not interested in inguinal hernia repair  As far as abdominal pains across abdomen and asymmetry I would not certain the saw because of his pains  The asymmetry may be slightly some denervation of then oblique muscles from his spinal issues and some asymmetrical abdominal intra-abdominal fatty disposition  However there is no anatomic abnormality that I can offer repair  Told to follow up with his primary care physician and possible Gastroenterology for his abdominal complaints  He can follow-up p r n  for his inguinal hernia       Diagnoses and all orders for this visit:    Non-recurrent inguinal hernia without obstruction or gangrene, unspecified laterality  -     Ambulatory Referral to General Surgery          Subjective:      Patient ID: Ramsey Mcdonald III is a 60 y o  male  Mr Nannette Henning is a 60 old gentleman here for evaluation of abdominal pain and inguinal hernia  He has a significant history of spinal surgery and radiation for a tumor with some resultant neurologic deficits  He has some footdrop and other issues  His main complaint is abdominal pain and asymmetry of his abdominal wall  He has a right inguinal hernia is not about for years but is asymptomatic  Denies nausea vomiting fevers or chills      The following portions of the patient's history were reviewed and updated as appropriate: allergies, current medications, past family history, past medical history, past social history, past surgical history and problem list     Review of Systems   Constitutional: Negative for chills and fever  HENT: Negative for ear pain and sore throat  Eyes: Negative for pain and visual disturbance  Respiratory: Negative for cough and shortness of breath      Cardiovascular: Negative for chest pain and palpitations  Gastrointestinal: Positive for abdominal pain  Negative for vomiting  Genitourinary: Negative for dysuria and hematuria  Musculoskeletal: Positive for back pain and gait problem  Negative for arthralgias  Skin: Negative for color change and rash  Neurological: Positive for weakness and numbness  Negative for seizures and syncope  Psychiatric/Behavioral: Negative for agitation and behavioral problems  All other systems reviewed and are negative  Objective:      /73   Pulse 75   Temp 97 6 °F (36 4 °C)   Resp 16   Ht 5' 7" (1 702 m)   Wt 83 6 kg (184 lb 6 4 oz)   BMI 28 88 kg/m²          Physical Exam  Vitals and nursing note reviewed  Constitutional:       General: He is not in acute distress  Appearance: He is well-developed  He is not diaphoretic  HENT:      Head: Normocephalic and atraumatic  Eyes:      Pupils: Pupils are equal, round, and reactive to light  Cardiovascular:      Rate and Rhythm: Normal rate and regular rhythm  Heart sounds: Normal heart sounds  No murmur heard  Pulmonary:      Effort: Pulmonary effort is normal  No respiratory distress  Breath sounds: Normal breath sounds  No wheezing  Abdominal:      General: Bowel sounds are normal       Palpations: Abdomen is soft  Comments: His abdominal wall is protuberant and slightly asymmetrical but this may be due to some denervation from his spinal injuries and asymmetrical gait    He he deferred inguinal exam at this time   Musculoskeletal:         General: Normal range of motion  Cervical back: Normal range of motion and neck supple  Skin:     General: Skin is warm and dry  Neurological:      Mental Status: He is alert and oriented to person, place, and time     Psychiatric:         Behavior: Behavior normal

## 2022-10-28 NOTE — ASSESSMENT & PLAN NOTE
I reviewed his CT scan and he does have a fat containing right inguinal hernia although he deferred exam at this time  He denies pain is not interested in inguinal hernia repair  As far as abdominal pains across abdomen and asymmetry I would not certain the saw because of his pains  The asymmetry may be slightly some denervation of then oblique muscles from his spinal issues and some asymmetrical abdominal intra-abdominal fatty disposition  However there is no anatomic abnormality that I can offer repair  Told to follow up with his primary care physician and possible Gastroenterology for his abdominal complaints    He can follow-up p r n  for his inguinal hernia

## 2022-12-11 DIAGNOSIS — E78.2 MIXED HYPERLIPIDEMIA: ICD-10-CM

## 2022-12-12 RX ORDER — OMEGA-3-ACID ETHYL ESTERS 1 G/1
CAPSULE, LIQUID FILLED ORAL
Qty: 180 CAPSULE | Refills: 1 | Status: SHIPPED | OUTPATIENT
Start: 2022-12-12

## 2022-12-28 ENCOUNTER — LAB (OUTPATIENT)
Dept: LAB | Age: 62
End: 2022-12-28

## 2022-12-28 DIAGNOSIS — E03.9 ACQUIRED HYPOTHYROIDISM: ICD-10-CM

## 2022-12-28 DIAGNOSIS — E11.9 TYPE 2 DIABETES MELLITUS WITHOUT COMPLICATION, WITHOUT LONG-TERM CURRENT USE OF INSULIN (HCC): ICD-10-CM

## 2022-12-28 LAB
ANION GAP SERPL CALCULATED.3IONS-SCNC: 2 MMOL/L (ref 4–13)
BUN SERPL-MCNC: 17 MG/DL (ref 5–25)
CALCIUM SERPL-MCNC: 9.4 MG/DL (ref 8.3–10.1)
CHLORIDE SERPL-SCNC: 109 MMOL/L (ref 96–108)
CO2 SERPL-SCNC: 29 MMOL/L (ref 21–32)
CREAT SERPL-MCNC: 0.93 MG/DL (ref 0.6–1.3)
EST. AVERAGE GLUCOSE BLD GHB EST-MCNC: 128 MG/DL
GFR SERPL CREATININE-BSD FRML MDRD: 87 ML/MIN/1.73SQ M
GLUCOSE P FAST SERPL-MCNC: 136 MG/DL (ref 65–99)
HBA1C MFR BLD: 6.1 %
POTASSIUM SERPL-SCNC: 5 MMOL/L (ref 3.5–5.3)
SODIUM SERPL-SCNC: 140 MMOL/L (ref 135–147)
T4 FREE SERPL-MCNC: 0.89 NG/DL (ref 0.76–1.46)
TSH SERPL DL<=0.05 MIU/L-ACNC: 3.24 UIU/ML (ref 0.45–4.5)

## 2023-01-11 ENCOUNTER — OFFICE VISIT (OUTPATIENT)
Dept: ENDOCRINOLOGY | Facility: CLINIC | Age: 63
End: 2023-01-11

## 2023-01-11 VITALS
DIASTOLIC BLOOD PRESSURE: 70 MMHG | BODY MASS INDEX: 28.98 KG/M2 | HEART RATE: 90 BPM | WEIGHT: 185 LBS | SYSTOLIC BLOOD PRESSURE: 132 MMHG

## 2023-01-11 DIAGNOSIS — E11.65 TYPE 2 DIABETES MELLITUS WITH HYPERGLYCEMIA, WITHOUT LONG-TERM CURRENT USE OF INSULIN (HCC): Primary | ICD-10-CM

## 2023-01-11 DIAGNOSIS — E03.9 ACQUIRED HYPOTHYROIDISM: ICD-10-CM

## 2023-01-11 DIAGNOSIS — E78.2 MIXED HYPERLIPIDEMIA: ICD-10-CM

## 2023-01-11 NOTE — PROGRESS NOTES
Maire Flatness III 60 y o  male MRN: [id number]    Encounter: [id number]      Assessment/Plan     Assessment: This is a 60y o -year-old male with diabetes with hyperglycemia  Plan:    Diagnoses and all orders for this visit:    Type 2 diabetes mellitus with hyperglycemia, without long-term current use of insulin ([facility] [address] )  Lab Results   Component Value Date    HGBA1C 6 1 (H) 12/28/2022   A1c is well controlled  Continue current management  Continue lifestyle modifications  Follow-up in 6 months  Discussed the importance of follow-up with podiatry and ophthalmology  Discussed importance of monitoring blood sugars once daily and goal is 80 to 160 mg per DL       -     Basic metabolic panel; Future  -     Microalbumin / creatinine urine ratio; Future  -     Hemoglobin A1C; Future    Acquired hypothyroidism  Patient is clinically and biochemically euthyroid  Continue current dose of levothyroxine   -     T4, free; Future  -     TSH, 3rd generation; Future    Mixed hyperlipidemia  Managed by PCP  Currently on statins  LDL is at goal  Continue lifestyle modifications  Lab Results   Component Value Date    LDLCALC 54 07/08/2022           CC: Diabetes    History of Present Illness     HPI:    Marie Flatness III 50-year-old gentleman with medical history of type 2 diabetes, hyperlipidemia, hypothyroidism is here for follow-up  For diabetes management, he takes Trulicity 4 37 mg once a week  He is tolerating it well denies side effects  His last A1c 6 1%  Diabetes course has been stable    Denies any hospitalization for hyperglycemia or hypoglycemia    PT could not tolerate metformin as he was having diarrhea     For hyperlipidemia, he takes Lipitor 40 mg daily, denies side effects managed by PCP  Also takes fish oil 2000 mg daily  For hypothyroidism he takes levothyroxine 75 mcg daily on empty stomach 1 hour before breakfast     Lab Results   Component Value Date    HGBA1C 6 1 (H) 12/28/2022     Wt Readings from Last 3 Encounters:   01/11/23 83 9 kg (185 lb)   10/25/22 83 6 kg (184 lb 6 4 oz)   10/03/22 79 8 kg (176 lb)     Component      Latest Ref Rng & Units 12/28/2022   Sodium      135 - 147 mmol/L 140   Potassium      3 5 - 5 3 mmol/L 5 0   Chloride      96 - 108 mmol/L 109 (H)   CO2      21 - 32 mmol/L 29   Anion Gap      4 - 13 mmol/L 2 (L)   BUN      5 - 25 mg/dL 17   Creatinine      0 60 - 1 30 mg/dL 0 93   GLUCOSE FASTING      65 - 99 mg/dL 136 (H)   Calcium      8 3 - 10 1 mg/dL 9 4   eGFR      ml/min/1 73sq m 87   Hemoglobin A1C      Normal 3 8-5 6%; PreDiabetic 5 7-6 4%; Diabetic >=6 5%; Glycemic control for adults with diabetes <7 0% % 6 1 (H)   eAG, EST AVG Glucose      mg/dl 128   Free T4      0 76 - 1 46 ng/dL 0 89   TSH 3RD GENERATON      0 450 - 4 500 uIU/mL 3 240        Review of Systems   Constitutional: Positive for activity change (back pain)  Negative for diaphoresis, fatigue, fever and unexpected weight change  HENT: Negative  Eyes: Negative for visual disturbance  Respiratory: Negative for cough, chest tightness and shortness of breath  Cardiovascular: Negative for chest pain, palpitations and leg swelling  Gastrointestinal: Negative for abdominal pain, blood in stool, constipation, diarrhea, nausea and vomiting  Endocrine: Negative for cold intolerance, heat intolerance, polydipsia, polyphagia and polyuria  Genitourinary: Negative for dysuria, enuresis, frequency and urgency  Musculoskeletal: Positive for arthralgias, back pain and gait problem (walks with cane)  Negative for myalgias  Skin: Negative for pallor, rash and wound  Allergic/Immunologic: Negative  Neurological: Negative for dizziness, tremors, weakness and numbness  Hematological: Negative  Psychiatric/Behavioral: Negative          Historical Information   Past Medical History:   Diagnosis Date   • BPH associated with nocturia    • Cancer [facility])    • Cervical spinal mass ([facility] [address] ) 01/07/2019 cervicothoracic   • Diabetes mellitus ([facility] [address] )    • Disease of thyroid gland    • Herniated disc, cervical    • History of radiation therapy    • Hyperlipidemia    • Impaired fasting glucose    • Radiation-induced myelopathy (HCC) 2019     Past Surgical History:   Procedure Laterality Date   • APPENDECTOMY     • CERVICAL FUSION     • COLONOSCOPY  2012    NORMAL; RECHECK IN 5 YEARS   • FL LUMBAR PUNCTURE DIAGNOSTIC  2019   • [place] TY BX/EXC ISPI EBONIE IDRL IMED CERVICAL N/A 2019    Procedure: Posterior C4-T3 laminectomy; resection of intramedullary mass C5-T3, including fenestration of syrinx C7-T2; C3-T4 fixation/fusion; additional levels as needed;  Surgeon: Kleber Boyd MD;  [facility]: [facility];  Service: Neurosurgery   • WISDOM TOOTH EXTRACTION       Social History   Social History     Substance and Sexual Activity   Alcohol Use Yes    Comment: Occasionaly     Social History     Substance and Sexual Activity   Drug Use No     Social History     Tobacco Use   Smoking Status Former   • Packs/day: 1 00   • Years: 35 00   • Pack years: 35 00   • Types: Cigarettes   • Quit date: 1993   • Years since quittin 6   Smokeless Tobacco Never     Family History:   Family History   Problem Relation Age of Onset   • Diabetes Mother    • Hypertension Father    • Cerebral palsy Sister    • Breast cancer Daughter        Meds/Allergies   Current Outpatient Medications   Medication Sig Dispense Refill   • atorvastatin (LIPITOR) 40 mg tablet TAKE 1 TABLET BY MOUTH EVERY DAY 90 tablet 1   • ibuprofen (MOTRIN) 200 mg tablet Take 400 mg by mouth as needed for mild pain     • levothyroxine 75 mcg tablet Take 1 tablet (75 mcg total) by mouth daily 90 tablet 1   • omega-3-acid ethyl esters (LOVAZA) 1 g capsule TAKE 2 CAPSULES BY MOUTH EVERY  capsule 1   • tiZANidine (ZANAFLEX) 2 mg tablet Take 1 tablet (2 mg total) by mouth every 8 (eight) hours as needed for muscle spasms 90 tablet 1   • Trulicity  MG/0 5ML SOPN INJECT 0 5 ML (0 75 MG TOTAL) UNDER THE SKIN ONCE A WEEK 8 mL 1     No current facility-administered medications for this visit  Allergies   Allergen Reactions   • Bee Venom Hives, Itching and Edema     Category: Allergy;    • Penicillins Hives and Itching     Category: Allergy;        Objective   Vitals: Blood pressure 132/70, pulse 90, weight 83 9 kg (185 lb)  Physical Exam  Vitals reviewed  Constitutional:       General: He is not in acute distress  Appearance: Normal appearance  He is well-developed  Comments: Overweight   HENT:      Head: Normocephalic and atraumatic  Eyes:      Pupils: Pupils are equal, round, and reactive to light  Neck:      Thyroid: No thyromegaly  Cardiovascular:      Rate and Rhythm: Normal rate and regular rhythm  Heart sounds: Normal heart sounds  Pulmonary:      Effort: Pulmonary effort is normal  No respiratory distress  Breath sounds: Normal breath sounds  Musculoskeletal:         General: No deformity  Normal range of motion  Cervical back: Normal range of motion and neck supple  Skin:     General: Skin is warm and dry  Findings: No erythema  Neurological:      General: No focal deficit present  Mental Status: He is alert and oriented to person, place, and time  Psychiatric:         Mood and Affect: Mood normal          Behavior: Behavior normal          The history was obtained from the review of the chart, patient      Lab Results:   Lab Results   Component Value Date/Time    Hemoglobin A1C 6 1 (H) 12/28/2022 07:58 AM    Hemoglobin A1C 5 9 (H) 07/08/2022 08:58 AM    Hemoglobin A1C 5 6 03/03/2022 08:45 AM    BUN 17 12/28/2022 07:58 AM    BUN 18 07/08/2022 08:58 AM    BUN 18 03/03/2022 08:45 AM    Potassium 5 0 12/28/2022 07:58 AM    Potassium 4 7 07/08/2022 08:58 AM    Potassium 5 0 03/03/2022 08:45 AM    Chloride 109 (H) 12/28/2022 07:58 AM    Chloride 111 (H) 07/08/2022 08:58 AM    Chloride 108 03/03/2022 08:45 AM    CO2 29 12/28/2022 07:58 AM    CO2 28 07/08/2022 08:58 AM    CO2 29 03/03/2022 08:45 AM    Creatinine 0 93 12/28/2022 07:58 AM    Creatinine 1 00 07/08/2022 08:58 AM    Creatinine 0 94 03/03/2022 08:45 AM    AST 11 07/08/2022 08:58 AM    ALT 23 07/08/2022 08:58 AM    Albumin 3 1 (L) 07/08/2022 08:58 AM    HDL, Direct 29 (L) 07/08/2022 08:58 AM    Triglycerides 54 07/08/2022 08:58 AM           Imaging Studies: I have personally reviewed pertinent reports  Portions of the record may have been created with voice recognition software  Occasional wrong word or "sound a like" substitutions may have occurred due to the inherent limitations of voice recognition software  Read the chart carefully and recognize, using context, where substitutions have occurred

## 2023-01-12 ENCOUNTER — VBI (OUTPATIENT)
Dept: ADMINISTRATIVE | Facility: OTHER | Age: 63
End: 2023-01-12

## 2023-01-13 ENCOUNTER — HOSPITAL ENCOUNTER (OUTPATIENT)
Dept: RADIOLOGY | Facility: HOSPITAL | Age: 63
Discharge: HOME | End: 2023-01-13
Attending: NEUROLOGICAL SURGERY

## 2023-01-13 DIAGNOSIS — C72.0 SPINAL CORD EPENDYMOMA (HCC): ICD-10-CM

## 2023-01-13 DIAGNOSIS — Z98.1 H/O SPINAL FUSION: ICD-10-CM

## 2023-01-13 RX ADMIN — GADOBUTROL 8 ML: 604.72 INJECTION INTRAVENOUS at 11:35

## 2023-01-18 ENCOUNTER — OFFICE VISIT (OUTPATIENT)
Dept: NEUROSURGERY | Facility: CLINIC | Age: 63
End: 2023-01-18

## 2023-01-18 VITALS
WEIGHT: 187 LBS | TEMPERATURE: 98.4 F | HEART RATE: 69 BPM | HEIGHT: 67 IN | RESPIRATION RATE: 18 BRPM | BODY MASS INDEX: 29.35 KG/M2 | SYSTOLIC BLOOD PRESSURE: 123 MMHG | DIASTOLIC BLOOD PRESSURE: 76 MMHG

## 2023-01-18 DIAGNOSIS — G95.9 MYELOPATHY (HCC): ICD-10-CM

## 2023-01-18 DIAGNOSIS — Z98.1 H/O SPINAL FUSION: ICD-10-CM

## 2023-01-18 DIAGNOSIS — C72.0 SPINAL CORD EPENDYMOMA (HCC): Primary | ICD-10-CM

## 2023-01-18 NOTE — PROGRESS NOTES
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, WHO grade 2  (1/24/19)  The mass spanned from the C4-5 disc space down to the T3-4 disc space  We were able to resect this aggressively at its largest part, I e  T1-2, but did not proceed further as his neurological signals declined intraoperatively       He did well postoperatively, and was discharged to [facility]       MRI scan of brain, L-spine, and T-spine   Done after surgery showed no drop metastases  CSF testing was negative      At f/u in early 3/2019, was continuing to improve  He was using no ambulatory aids  His right DF was at least 4/5, but apractic, however this was also improving  He stated he even had some proprioception on the right, which was improved vs  Prior to surgery  He had some mild tingling in his forearms and hands, but no pain or weakness  His incision was healing well  Bowel and bladder function were back to normal  No radicular pains  Xrays were stable, and we planned for adjuvant IMRT      He completed this 5/22/19, to 45 Gy     Starting a few days after his RT, his neurological symptoms began to re-emerge  His right foot drop became more pronounced, and he was unsteady on his feet had a few falls  His balance was unsteady  His occasional radicular pain into the right leg 2-3/10, as well as left arm and right hand pains, pins and needles, 5/10  He was not taking gabapentin or dexamethasone   Seen with Dr Tyler Rodriguez at that time and we 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, and recommended continued PT as well as restarting gabapentin  He at times was on 300 mg t i d  of gabapentin, he has weaned off of this  He does mention some tightness around his abdomen, which is typical is situations such as his        F/u MRI scan CSpine at 3 y postop is stable  The large cavity present preop has not reformed, there are no obvious sign of recurrence etc       NCCN recommendation is for follow-up MRI scan cervical spine in 6-12 months, and we agreed on 12 months, which I have ordered  I will see him after that is completed      07/22/20 Metrics: EQ5D5L 85510=7 845; VAS 65; MJOA 10/17     01/20/21 Metrics: EQ5D5L 01992=0 732; VAS 65-70; ECOG 2; KPS 70; MJOA 12/17 01/19/22 Metrics: EQ5D5L 39739=5 778; VAS 70-75; ECOG 2; KPS 70; MJOA 11/17 01/18/23 Metrics: EQ5D5L 98314=2 784; VAS 70; MJOA 9/17        CHIEF COMPLAINT    Chief Complaint   Patient presents with   • Follow-up     1 YR FOLLOW UP MRI C SPINE SL 1/13/23       HISTORY    History of Present Illness     60y o  year old male     HPI    See Discussion    REVIEW OF SYSTEMS    Review of Systems   Constitutional: Negative  HENT: Negative  Diagnosed covid positive on 1/18/22, symptoms are sore throat, running nose, slight headache   Eyes: Negative  Respiratory: Negative  Cardiovascular: Positive for leg swelling (unchanged since last visit, right leg/ankle but mild)  Gastrointestinal: Negative  Urgency with passing bowels, has had 1 accident within a year   Endocrine: Negative  Genitourinary: Negative for frequency (nocturia x1)  Musculoskeletal: Positive for gait problem (unchanged since last visit, right foot drag/drop, brace is helping, unsteady balance, using standard cane, 1 fall since last visit, f/w PT)  Currently going to the gym x2-3 a week   Skin: Negative  Allergic/Immunologic: Positive for environmental allergies (bee venom)     Neurological: Positive for weakness (right leg, drags right foot) and numbness (unchanged since last visit, left arm, right (elbow-resolved since last visit) hand occasional numbness/tingling, numbness in B/L fingers and right foot, when sitting for long period of time gets numbness in entire right leg)  Negative for dizziness, tremors, seizures, syncope, speech difficulty, light-headedness (improved since last visit, getting up from lying down) and headaches  Hematological: Negative  Psychiatric/Behavioral: Positive for sleep disturbance (due to pain in neck during sleep position)  Meds/Allergies     Current Outpatient Medications   Medication Sig Dispense Refill   • atorvastatin (LIPITOR) 40 mg tablet TAKE 1 TABLET BY MOUTH EVERY DAY 90 tablet 1   • ibuprofen (MOTRIN) 200 mg tablet Take 400 mg by mouth as needed for mild pain     • levothyroxine 75 mcg tablet Take 1 tablet (75 mcg total) by mouth daily 90 tablet 1   • omega-3-acid ethyl esters (LOVAZA) 1 g capsule TAKE 2 CAPSULES BY MOUTH EVERY  capsule 1   • tiZANidine (ZANAFLEX) 2 mg tablet Take 1 tablet (2 mg total) by mouth every 8 (eight) hours as needed for muscle spasms 90 tablet 1   • Trulicity 8 29 ME/1 7HX SOPN INJECT 0 5 ML (0 75 MG TOTAL) UNDER THE SKIN ONCE A WEEK 8 mL 1     No current facility-administered medications for this visit  Allergies   Allergen Reactions   • Bee Venom Hives, Itching and Edema     Category: Allergy;    • Penicillins Hives and Itching     Category:  Allergy;        PAST HISTORY    Past Medical History:   Diagnosis Date   • BPH associated with nocturia    • Cancer [facility])    • [facility] ) 01/07/2019    cervicothoracic   • Diabetes mellitus ([facility] [address] )    • Disease of thyroid gland    • Herniated disc, cervical    • History of radiation therapy    • Hyperlipidemia    • Impaired fasting glucose    • Radiation-induced myelopathy ([facility] [address] ) 6/19/2019       Past Surgical History:   Procedure Laterality Date   • APPENDECTOMY     • CERVICAL FUSION     • COLONOSCOPY  2012    NORMAL; RECHECK IN 5 YEARS   • FL LUMBAR PUNCTURE DIAGNOSTIC  2019   • [place] TY BX/EXC ISPI EBONIE IDRL IMED CERVICAL N/A 2019    Procedure: Posterior C4-T3 laminectomy; resection of intramedullary mass C5-T3, including fenestration of syrinx C7-T2; C3-T4 fixation/fusion; additional levels as needed;  Surgeon: Daylin Orantes MD;  Location: [facility];  Service: Neurosurgery   • WISDOM TOOTH EXTRACTION         Social History     Tobacco Use   • Smoking status: Former     Packs/day: 1 00     Years: 35 00     Pack years: 35 00     Types: Cigarettes     Quit date: 1993     Years since quittin 6   • Smokeless tobacco: Never   Vaping Use   • Vaping Use: Never used   Substance Use Topics   • Alcohol use: Yes     Comment: Occasionaly   • Drug use: No       Family History   Problem Relation Age of Onset   • Diabetes Mother    • Hypertension Father    • Cerebral palsy Sister    • Breast cancer Daughter          The following portions of the patient's history were reviewed in this encounter and updated as appropriate: Past medical, surgical, family, and social history, as well as medications, allergies, and review of systems  EXAM    Vitals:Blood pressure 123/76, pulse 69, temperature 98 4 °F (36 9 °C), resp  rate 18, height 5' 7" (1 702 m), weight 84 8 kg (187 lb)  ,Body mass index is 29 29 kg/m²  Physical Exam    Neurologic Exam      MEDICAL DECISION MAKING    Imaging Studies:     MRI cervical spine with and without contrast    Result Date: 2023  Narrative: MRI CERVICAL SPINE WITH AND WITHOUT CONTRAST INDICATION: C72 0: Malignant neoplasm of spinal cord Z98 1: Arthrodesis status  COMPARISON:  MRI cervical spine 2022 TECHNIQUE:  Multiplanar, multisequence imaging of the cervical spine was performed before and after gadolinium administration     IV Contrast:  8 mL of Gadobutrol injection (SINGLE-DOSE) IMAGE QUALITY:  Diagnostic   FINDINGS: POSTSURGICAL FINDINGS: Stable posterior cervicothoracic fixation hardware including bilateral transpedicular screws at the C2 through the T5 levels with C4-T4 laminectomies  Stable appearance of the myelotomy bed at the T1-2 level with no underlying pathologic enhancement to suggest residual or recurrent tumor  Stable myelomalacia within the right hemicord at the T3 level  ALIGNMENT:  Straightening of the mid to cervical lordosis  No compression fracture  Stable grade 1 degenerative anterolisthesis is noted at the C7-T1 level  No scoliosis  MARROW SIGNAL:  Normal marrow signal is identified within the visualized bony structures  No discrete marrow lesion  PREVERTEBRAL AND PARASPINAL SOFT TISSUES:  Normal  VISUALIZED POSTERIOR FOSSA:  The visualized posterior fossa demonstrates no abnormal signal  CERVICAL DISC SPACES: C2-3 - C4-5: No focal disc herniation  The central canal is patent status post bilateral laminectomies  No neural foraminal stenosis  C5-6: Stable central disc protrusion with mild bilateral neural foraminal narrowing  The central canal is patent status post bilateral laminectomies  C6-7: Stable disc bulge with mild bilateral neural foraminal narrowing  The central canal is patent status post bilateral laminectomies  C7-T1: No focal disc herniation or neural foraminal narrowing  The central canal is patent status post bilateral laminectomies  UPPER THORACIC DISC SPACES:  Normal      Impression: 1  Stable postsurgical changes and fixation hardware with unchanged myelotomy bed at the T1-2 level with no evidence of residual or recurrent tumor  Stable right hemicord myelomalacia at the T3 level  2  Unchanged disc bulges at the C5-6 and C6-7 levels  There is no new disc herniation  Workstation performed: [id number]       I have personally reviewed pertinent reports  PLEASE NOTE:  This encounter may have been completed utilizing the M- Modal/Fluency Direct Speech Voice Recognition Software  Grammatical errors, random word insertions, pronoun errors and incomplete sentences are occasional consequences of the system due to software limitations, ambient noise and hardware issues  These may be missed by proof reading prior to affixing electronic signature  Any questions or concerns about the content, text or information contained within the body of this dictation should be directly addressed to the advanced practitioner or physician for clarification

## 2023-01-30 ENCOUNTER — OFFICE VISIT (OUTPATIENT)
Dept: FAMILY MEDICINE CLINIC | Facility: CLINIC | Age: 63
End: 2023-01-30

## 2023-01-30 VITALS
HEIGHT: 67 IN | DIASTOLIC BLOOD PRESSURE: 76 MMHG | BODY MASS INDEX: 29.6 KG/M2 | HEART RATE: 71 BPM | SYSTOLIC BLOOD PRESSURE: 136 MMHG | WEIGHT: 188.6 LBS | TEMPERATURE: 97.5 F | RESPIRATION RATE: 16 BRPM

## 2023-01-30 DIAGNOSIS — C72.0 SPINAL CORD EPENDYMOMA (HCC): ICD-10-CM

## 2023-01-30 DIAGNOSIS — E03.9 ACQUIRED HYPOTHYROIDISM: ICD-10-CM

## 2023-01-30 DIAGNOSIS — E78.2 MIXED HYPERLIPIDEMIA: Primary | ICD-10-CM

## 2023-01-30 DIAGNOSIS — E11.9 TYPE 2 DIABETES MELLITUS WITHOUT COMPLICATION, WITHOUT LONG-TERM CURRENT USE OF INSULIN (HCC): ICD-10-CM

## 2023-01-30 NOTE — PROGRESS NOTES
Name: Gurmeet Foote III      : 1960      MRN: [id number]  Encounter Provider: April Ryder MD  Encounter Date: 2023   Encounter department: [facility]    Assessment & Plan     1  Mixed hyperlipidemia  -     Lipid panel; Future; Expected date: 2023    2  Acquired hypothyroidism  Assessment & Plan:  stable      3  Type 2 diabetes mellitus without complication, without long-term current use of insulin (HCC)  Assessment & Plan:    Lab Results   Component Value Date    HGBA1C 6 1 (H) 2022   bs  stable      4  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[facility])  Assessment & Plan:  Stable, sees neurosurgery           Subjective      Here  For  F/u diabetes, lipids, thyroid, all stable, back pain about the  Same, no cp , no sob, no headaches    Review of Systems   Constitutional: Positive for unexpected weight change  Negative for activity change, appetite change and fatigue  2 lb gain   Respiratory: Negative for shortness of breath  Cardiovascular: Negative for chest pain  Musculoskeletal: Positive for back pain  Neurological: Negative for dizziness and headaches         Current Outpatient Medications on File Prior to Visit   Medication Sig   • atorvastatin (LIPITOR) 40 mg tablet TAKE 1 TABLET BY MOUTH EVERY DAY   • ibuprofen (MOTRIN) 200 mg tablet Take 400 mg by mouth as needed for mild pain   • levothyroxine 75 mcg tablet Take 1 tablet (75 mcg total) by mouth daily   • omega-3-acid ethyl esters (LOVAZA) 1 g capsule TAKE 2 CAPSULES BY MOUTH EVERY DAY   • tiZANidine (ZANAFLEX) 2 mg tablet Take 1 tablet (2 mg total) by mouth every 8 (eight) hours as needed for muscle spasms   • Trulicity 1 20 EA/6 0IS SOPN INJECT 0 5 ML (0 75 MG TOTAL) UNDER THE SKIN ONCE A WEEK       Objective     /76 (BP Location: Right arm, Patient Position: Sitting, Cuff Size: Adult)   Pulse 71   Temp 97 5 °F (36 4 °C) (Temporal)   Resp 16   Ht 5' 7" (1 702 m)   Wt 85 5 kg (188 lb 9 6 oz)   BMI 29 54 kg/m²     Physical Exam  Vitals reviewed  Constitutional:       Appearance: Normal appearance  Neck:      Vascular: No carotid bruit  Cardiovascular:      Rate and Rhythm: Normal rate and regular rhythm  Pulses: Normal pulses  no weak pulses          Dorsalis pedis pulses are 2+ on the right side and 2+ on the left side  Posterior tibial pulses are 2+ on the right side and 2+ on the left side  Heart sounds: Normal heart sounds  Pulmonary:      Effort: Pulmonary effort is normal       Breath sounds: Normal breath sounds  Musculoskeletal:      Right lower leg: No edema  Feet:      Right foot:      Skin integrity: No ulcer, skin breakdown, erythema, warmth, callus or dry skin  Left foot:      Skin integrity: No ulcer, skin breakdown, erythema, warmth, callus or dry skin  Lymphadenopathy:      Cervical: No cervical adenopathy  Neurological:      Mental Status: He is alert  Psychiatric:         Mood and Affect: Mood normal           Diabetic Foot Exam    Patient's shoes and socks removed  Right Foot/Ankle   Right Foot Inspection  Skin Exam: skin normal and skin intact  No dry skin, no warmth, no callus, no erythema, no maceration, no abnormal color, no pre-ulcer, no ulcer and no callus  Toe Exam: ROM and strength within normal limits  Sensory   Monofilament testing: intact    Vascular  Capillary refills: < 3 seconds  The right DP pulse is 2+  The right PT pulse is 2+  Left Foot/Ankle  Left Foot Inspection  Skin Exam: skin normal and skin intact  No dry skin, no warmth, no erythema, no maceration, normal color, no pre-ulcer, no ulcer and no callus  Toe Exam: ROM and strength within normal limits  Sensory   Monofilament testing: intact    Vascular  Capillary refills: < 3 seconds  The left DP pulse is 2+  The left PT pulse is 2+       Assign Risk Category  No deformity present  No loss of protective sensation  No weak pulses  Risk: 0    Bladimir Ward MD

## 2023-03-01 ENCOUNTER — OFFICE VISIT (OUTPATIENT)
Dept: PODIATRY | Facility: CLINIC | Age: 63
End: 2023-03-01

## 2023-03-01 VITALS
HEART RATE: 69 BPM | WEIGHT: 159.2 LBS | DIASTOLIC BLOOD PRESSURE: 89 MMHG | BODY MASS INDEX: 24.99 KG/M2 | HEIGHT: 67 IN | SYSTOLIC BLOOD PRESSURE: 142 MMHG

## 2023-03-01 DIAGNOSIS — E11.9 TYPE 2 DIABETES MELLITUS WITHOUT COMPLICATION, WITHOUT LONG-TERM CURRENT USE OF INSULIN (HCC): Primary | ICD-10-CM

## 2023-03-01 NOTE — PROGRESS NOTES
This patient was seen on 3/1/23  My role is Foot , Ankle, and Wound Specialist    SUBJECTIVE    Chief Complaint:  Diabetic foot check     Patient ID: Hattie Levy is a 58 y o  male  Trever Nina is here today for an annual diabetic risk assessment  He has a history of spine surgery  He feels his diabetes in under good control currently, running in the mid-5's  He denies barefoot walking in the home  No history of foot ulcers nor amputations  Diabetes        The following portions of the patient's history were reviewed and updated as appropriate: allergies, current medications, past family history, past medical history, past social history, past surgical history and problem list     Review of Systems   Constitutional: Negative  Respiratory: Negative  Cardiovascular: Negative  Gastrointestinal: Negative  Musculoskeletal: Positive for back pain and gait problem  Psychiatric/Behavioral: Negative  OBJECTIVE      /89   Pulse 69   Ht 5' 7" (1 702 m) Comment: verbal  Wt 72 2 kg (159 lb 3 2 oz)   BMI 24 93 kg/m²     Foot/Ankle Musculoskeletal Exam    Inspection    Right      Right foot/ankle inspection is normal      Left      Left foot/ankle inspection is normal       Neurovascular    Right      Dorsalis pedis: 3+      Posterior tibial: 2+    Left      Dorsalis pedis: 3+      Posterior tibial: 2+    General    Neurological: alert       Physical Exam  Vitals and nursing note reviewed  Constitutional:       General: He is not in acute distress  Appearance: Normal appearance  He is not ill-appearing or toxic-appearing  HENT:      Head: Normocephalic  Cardiovascular:      Rate and Rhythm: Normal rate  Pulses: no weak pulses          Dorsalis pedis pulses are 3+ on the right side and 3+ on the left side  Posterior tibial pulses are 2+ on the right side and 2+ on the left side     Pulmonary:      Effort: Pulmonary effort is normal    Abdominal:      General: Bowel sounds are normal    Feet:      Right foot:      Protective Sensation: 10 sites tested  10 sites sensed  Skin integrity: No ulcer, skin breakdown, erythema, warmth, callus or dry skin  Left foot:      Protective Sensation: 10 sites tested  10 sites sensed  Skin integrity: No ulcer, skin breakdown, erythema, warmth, callus or dry skin  Skin:     General: Skin is warm and dry  Capillary Refill: Capillary refill takes less than 2 seconds  Neurological:      General: No focal deficit present  Mental Status: He is alert and oriented to person, place, and time  Psychiatric:         Mood and Affect: Mood normal          Behavior: Behavior normal          Diabetic Foot Exam    Patient's shoes and socks removed  Right Foot/Ankle   Right Foot Inspection  Skin Exam: skin normal and skin intact  No dry skin, no warmth, no callus, no erythema, no maceration, no abnormal color, no pre-ulcer, no ulcer and no callus  Toe Exam: right toe deformity  No swelling, no tenderness and erythema    Sensory   Vibration: intact  Proprioception: intact  Monofilament testing: intact    Vascular  Capillary refills: < 3 seconds  The right DP pulse is 3+  The right PT pulse is 2+  Left Foot/Ankle  Left Foot Inspection  Skin Exam: skin normal and skin intact  No dry skin, no warmth, no erythema, no maceration, normal color, no pre-ulcer, no ulcer and no callus  Toe Exam: ROM and strength within normal limits  Sensory   Vibration: intact  Proprioception: intact  Monofilament testing: intact    Vascular  Capillary refills: < 3 seconds  The left DP pulse is 3+  The left PT pulse is 2+       Assign Risk Category  Deformity present  No loss of protective sensation  No weak pulses  Risk: 0      ASSESSMENT     Diagnoses and all orders for this visit:    Type 2 diabetes mellitus without complication, without long-term current use of insulin ([facility] )         Problem List Items Addressed This Visit Endocrine    Type 2 diabetes mellitus without complication, without long-term current use of insulin ([facility] ) - Primary           PLAN    Foot precautions reviewed with patient including the need to wear protective shoegear at all times when walking including in the home, the need to check feet all surfaces daily with a mirror and report and skin breaks to podiatry, the need to apply an emollient to skin of feet daily

## 2023-03-08 DIAGNOSIS — E11.65 TYPE 2 DIABETES MELLITUS WITH HYPERGLYCEMIA, WITHOUT LONG-TERM CURRENT USE OF INSULIN (HCC): ICD-10-CM

## 2023-03-08 RX ORDER — DULAGLUTIDE 0.75 MG/.5ML
INJECTION, SOLUTION SUBCUTANEOUS
Qty: 6 ML | Refills: 2 | Status: SHIPPED | OUTPATIENT
Start: 2023-03-08

## 2023-03-20 DIAGNOSIS — E03.9 HYPOTHYROIDISM, UNSPECIFIED TYPE: ICD-10-CM

## 2023-03-20 RX ORDER — LEVOTHYROXINE SODIUM 0.07 MG/1
TABLET ORAL
Qty: 90 TABLET | Refills: 1 | Status: SHIPPED | OUTPATIENT
Start: 2023-03-20

## 2023-05-08 ENCOUNTER — APPOINTMENT (OUTPATIENT)
Dept: LAB | Facility: HOSPITAL | Age: 63
End: 2023-05-08

## 2023-05-08 DIAGNOSIS — E03.9 ACQUIRED HYPOTHYROIDISM: ICD-10-CM

## 2023-05-08 DIAGNOSIS — E78.2 MIXED HYPERLIPIDEMIA: ICD-10-CM

## 2023-05-08 DIAGNOSIS — E11.65 TYPE 2 DIABETES MELLITUS WITH HYPERGLYCEMIA, WITHOUT LONG-TERM CURRENT USE OF INSULIN (HCC): ICD-10-CM

## 2023-05-08 LAB
CHOLEST SERPL-MCNC: 90 MG/DL
HDLC SERPL-MCNC: 28 MG/DL
LDLC SERPL CALC-MCNC: 42 MG/DL (ref 0–100)
NONHDLC SERPL-MCNC: 62 MG/DL
TRIGL SERPL-MCNC: 100 MG/DL

## 2023-06-05 ENCOUNTER — OFFICE VISIT (OUTPATIENT)
Dept: FAMILY MEDICINE CLINIC | Facility: CLINIC | Age: 63
End: 2023-06-05
Payer: COMMERCIAL

## 2023-06-05 VITALS
HEIGHT: 67 IN | RESPIRATION RATE: 16 BRPM | TEMPERATURE: 97.5 F | HEART RATE: 70 BPM | BODY MASS INDEX: 29.32 KG/M2 | SYSTOLIC BLOOD PRESSURE: 112 MMHG | DIASTOLIC BLOOD PRESSURE: 82 MMHG | WEIGHT: 186.8 LBS

## 2023-06-05 DIAGNOSIS — E11.9 TYPE 2 DIABETES MELLITUS WITHOUT COMPLICATION, WITHOUT LONG-TERM CURRENT USE OF INSULIN (HCC): Primary | ICD-10-CM

## 2023-06-05 DIAGNOSIS — M51.36 DDD (DEGENERATIVE DISC DISEASE), LUMBAR: ICD-10-CM

## 2023-06-05 DIAGNOSIS — E78.2 MIXED HYPERLIPIDEMIA: ICD-10-CM

## 2023-06-05 DIAGNOSIS — E03.9 ACQUIRED HYPOTHYROIDISM: ICD-10-CM

## 2023-06-05 PROCEDURE — 99214 OFFICE O/P EST MOD 30 MIN: CPT | Performed by: FAMILY MEDICINE

## 2023-06-05 RX ORDER — OMEGA-3-ACID ETHYL ESTERS 1 G/1
2 CAPSULE, LIQUID FILLED ORAL DAILY
Qty: 180 CAPSULE | Refills: 1 | Status: SHIPPED | OUTPATIENT
Start: 2023-06-05

## 2023-06-05 NOTE — PATIENT INSTRUCTIONS
Await   lab, refill [facility]  Weight Management   AMBULATORY CARE:   Why it is important to manage your weight:  Being overweight increases your risk of health conditions such as heart disease, high blood pressure, type 2 diabetes, and certain types of cancer  It can also increase your risk for osteoarthritis, sleep apnea, and other respiratory problems  Aim for a slow, steady weight loss  Even a small amount of weight loss can lower your risk of health problems  Risks of being overweight:  Extra weight can cause many health problems, including the following:  Diabetes (high blood sugar level)    High blood pressure or high cholesterol    Heart disease    Stroke    Gallbladder or liver disease    Cancer of the colon, breast, prostate, liver, or kidney    Sleep apnea    Arthritis or gout    Screening  is done to check for health conditions before you have signs or symptoms  If you are 28to 79years old, your blood sugar level may be checked every 3 years for signs of prediabetes or diabetes  Your healthcare provider will check your blood pressure at each visit  High blood pressure can lead to a stroke or other problems  Your provider may check for signs of heart disease, cancer, or other health problems  How to lose weight safely:  A safe and healthy way to lose weight is to eat fewer calories and get regular exercise  You can lose up about 1 pound a week by decreasing the number of calories you eat by 500 calories each day  You can decrease calories by eating smaller portion sizes or by cutting out high-calorie foods  Read labels to find out how many calories are in the foods you eat  You can also burn calories with exercise such as walking, swimming, or biking  You will be more likely to keep weight off if you make these changes part of your lifestyle  Exercise at least 30 minutes per day on most days of the week   You can also fit in more physical activity by taking the stairs instead of the elevator or parking farther away from stores  Ask your healthcare provider about the best exercise plan for you  Healthy meal plan for weight management:  A healthy meal plan includes a variety of foods, contains fewer calories, and helps you stay healthy  A healthy meal plan includes the following:     Eat whole-grain foods more often  A healthy meal plan should contain fiber  Fiber is the part of grains, fruits, and vegetables that is not broken down by your body  Whole-grain foods are healthy and provide extra fiber in your diet  Some examples of whole-grain foods are whole-wheat breads and pastas, oatmeal, brown rice, and bulgur  Eat a variety of vegetables every day  Include dark, leafy greens such as spinach, kale, tino greens, and mustard greens  Eat yellow and orange vegetables such as carrots, sweet potatoes, and winter squash  Eat a variety of fruits every day  Choose fresh or canned fruit (canned in its own juice or light syrup) instead of juice  Fruit juice has very little or no fiber  Eat low-fat dairy foods  Drink fat-free (skim) milk or 1% milk  Eat fat-free yogurt and low-fat cottage cheese  Try low-fat cheeses such as mozzarella and other reduced-fat cheeses  Choose meat and other protein foods that are low in fat  Choose beans or other legumes such as split peas or lentils  Choose fish, skinless poultry (chicken or turkey), or lean cuts of red meat (beef or pork)  Before you cook meat or poultry, cut off any visible fat  Use less fat and oil  Try baking foods instead of frying them  Add less fat, such as margarine, sour cream, regular salad dressing and mayonnaise to foods  Eat fewer high-fat foods  Some examples of high-fat foods include french fries, doughnuts, ice cream, and cakes  Eat fewer sweets  Limit foods and drinks that are high in sugar  This includes candy, cookies, regular soda, and sweetened drinks  Ways to decrease calories:   Eat smaller portions       Use a small plate with smaller servings  Do not eat second helpings  When you eat at a restaurant, ask for a box and place half of your meal in the box before you eat  Share an entrée with someone else  Replace high-calorie snacks with healthy, low-calorie snacks  Choose fresh fruit, vegetables, fat-free rice cakes, or air-popped popcorn instead of potato chips, nuts, or chocolate  Choose water or calorie-free drinks instead of soda or sweetened drinks  Do not shop for groceries when you are hungry  You may be more likely to make unhealthy food choices  Take a grocery list of healthy foods and shop after you have eaten  Eat regular meals  Do not skip meals  Skipping meals can lead to overeating later in the day  This can make it harder for you to lose weight  Eat a healthy snack in place of a meal if you do not have time to eat a regular meal  Talk with a dietitian to help you create a meal plan and schedule that is right for you  Other things to consider as you try to lose weight:   Be aware of situations that may give you the urge to overeat, such as eating while watching television  Find ways to avoid these situations  For example, read a book, go for a walk, or do crafts  Meet with a weight loss support group or friends who are also trying to lose weight  This may help you stay motivated to continue working on your weight loss goals  © Copyright [employer] 2022 Information is for End User's use only and may not be sold, redistributed or otherwise used for commercial purposes  The above information is an  only  It is not intended as medical advice for individual conditions or treatments  Talk to your doctor, nurse or pharmacist before following any medical regimen to see if it is safe and effective for you

## 2023-06-05 NOTE — PROGRESS NOTES
Name: Aristeo Olson III      : 1960      MRN: [id number]  Encounter Provider: Sulma Falcon MD  Encounter Date: 2023   Encounter department: [facility] PRIMARY CARE    Assessment & Plan     1  Type 2 diabetes mellitus without complication, without long-term current use of insulin ([facility] )  Assessment & Plan:    Lab Results   Component Value Date    HGBA1C 6 1 (H) 2022   await lab, sees endo, needs to schedule   Eye  Exam, due  For urine    Orders:  -     Albumin / creatinine urine ratio; Future    2  Acquired hypothyroidism  Assessment & Plan:  Await  lab      3  Mixed hyperlipidemia  Assessment & Plan:  HDL still low, decrease carbs, increase exercise    Orders:  -     omega-3-acid ethyl esters (LOVAZA) 1 g capsule; Take 2 capsules (2 g total) by mouth daily    4  BMI 29 0-29 9,adult    5  DDD (degenerative disc disease), lumbar  Assessment & Plan:  Not  Going to pain management  Anymore, our  Office  Will take over  Muscle relaxant, do aquatherapy           Subjective      Here  For  F/u diabetes, lipids, hypothyroid, due  For lab, will be going this week, back has  Been bothering  Him, will be  Doing therapy in pool    Review of Systems   Constitutional: Negative for activity change, appetite change and fatigue  Respiratory: Negative for shortness of breath  Cardiovascular: Negative for chest pain  Musculoskeletal: Positive for arthralgias and back pain  Leg pain   Neurological: Negative for dizziness and headaches  Psychiatric/Behavioral: The patient is not nervous/anxious          Current Outpatient Medications on File Prior to Visit   Medication Sig   • atorvastatin (LIPITOR) 40 mg tablet TAKE 1 TABLET BY MOUTH EVERY DAY   • ibuprofen (MOTRIN) 200 mg tablet Take 400 mg by mouth as needed for mild pain   • levothyroxine 75 mcg tablet TAKE 1 TABLET BY MOUTH EVERY DAY   • tiZANidine (ZANAFLEX) 2 mg tablet Take 1 tablet (2 mg total) by mouth every 8 (eight) hours as needed "for muscle spasms   • Trulicity 5 10 UU/9 1DF injection INJECT 0 5 MILLILITERS UNDER THE SKIN ONE TIME PER WEEK   • [DISCONTINUED] omega-3-acid ethyl esters (LOVAZA) 1 g capsule TAKE 2 CAPSULES BY MOUTH EVERY DAY       Objective     /82 (BP Location: Right arm, Patient Position: Sitting, Cuff Size: Adult)   Pulse 70   Temp 97 5 °F (36 4 °C) (Temporal)   Resp 16   Ht 5' 7\" (1 702 m)   Wt 84 7 kg (186 lb 12 8 oz)   BMI 29 26 kg/m²     Physical Exam  Vitals reviewed  Constitutional:       Appearance: Normal appearance  Neck:      Vascular: No carotid bruit  Cardiovascular:      Rate and Rhythm: Normal rate and regular rhythm  Pulses: Normal pulses  Heart sounds: Normal heart sounds  Pulmonary:      Effort: Pulmonary effort is normal       Breath sounds: Normal breath sounds  Musculoskeletal:      Right lower leg: No edema  Left lower leg: No edema  Lymphadenopathy:      Cervical: No cervical adenopathy  Neurological:      Mental Status: He is alert  Psychiatric:         Mood and Affect: Mood normal        Gloria Holland MD  BMI Counseling: Body mass index is 29 26 kg/m²  The BMI is above normal  Nutrition recommendations include reducing portion sizes, decreasing overall calorie intake, 3-5 servings of fruits/vegetables daily, reducing fast food intake and consuming healthier snacks  Exercise recommendations include exercising 3-5 times per week    "

## 2023-06-05 NOTE — ASSESSMENT & PLAN NOTE
Lab Results   Component Value Date    HGBA1C 6 1 (H) 12/28/2022   await lab, sees martín, needs to schedule   Eye  Exam, due  For urine

## 2023-06-05 NOTE — ASSESSMENT & PLAN NOTE
Not  Going to pain management  Anymore, our  Office  Will take over  Muscle relaxant, do aquatherapy

## 2023-06-28 ENCOUNTER — APPOINTMENT (OUTPATIENT)
Dept: LAB | Age: 63
End: 2023-06-28

## 2023-06-28 DIAGNOSIS — E11.9 TYPE 2 DIABETES MELLITUS WITHOUT COMPLICATION, WITHOUT LONG-TERM CURRENT USE OF INSULIN (HCC): ICD-10-CM

## 2023-06-28 LAB
ANION GAP SERPL CALCULATED.3IONS-SCNC: 5 MMOL/L
BUN SERPL-MCNC: 19 MG/DL (ref 5–25)
CALCIUM SERPL-MCNC: 8.8 MG/DL (ref 8.3–10.1)
CHLORIDE SERPL-SCNC: 115 MMOL/L (ref 96–108)
CO2 SERPL-SCNC: 26 MMOL/L (ref 21–32)
CREAT SERPL-MCNC: 0.92 MG/DL (ref 0.6–1.3)
CREAT UR-MCNC: 245 MG/DL
EST. AVERAGE GLUCOSE BLD GHB EST-MCNC: 128 MG/DL
GFR SERPL CREATININE-BSD FRML MDRD: 87 ML/MIN/1.73SQ M
GLUCOSE P FAST SERPL-MCNC: 119 MG/DL (ref 65–99)
HBA1C MFR BLD: 6.1 %
MICROALBUMIN UR-MCNC: 13.8 MG/L (ref 0–20)
MICROALBUMIN/CREAT 24H UR: 6 MG/G CREATININE (ref 0–30)
POTASSIUM SERPL-SCNC: 4.9 MMOL/L (ref 3.5–5.3)
SODIUM SERPL-SCNC: 146 MMOL/L (ref 135–147)
T4 FREE SERPL-MCNC: 0.84 NG/DL (ref 0.61–1.12)
TSH SERPL DL<=0.05 MIU/L-ACNC: 1.77 UIU/ML (ref 0.45–4.5)

## 2023-07-19 ENCOUNTER — OFFICE VISIT (OUTPATIENT)
Dept: ENDOCRINOLOGY | Facility: CLINIC | Age: 63
End: 2023-07-19
Payer: COMMERCIAL

## 2023-07-19 VITALS
BODY MASS INDEX: 28.44 KG/M2 | SYSTOLIC BLOOD PRESSURE: 104 MMHG | WEIGHT: 181.2 LBS | DIASTOLIC BLOOD PRESSURE: 62 MMHG | HEIGHT: 67 IN

## 2023-07-19 DIAGNOSIS — E03.9 ACQUIRED HYPOTHYROIDISM: ICD-10-CM

## 2023-07-19 DIAGNOSIS — E11.9 TYPE 2 DIABETES MELLITUS WITHOUT COMPLICATION, WITHOUT LONG-TERM CURRENT USE OF INSULIN (HCC): Primary | ICD-10-CM

## 2023-07-19 DIAGNOSIS — E78.2 MIXED HYPERLIPIDEMIA: ICD-10-CM

## 2023-07-19 LAB — SL AMB POCT HEMOGLOBIN AIC: 6 (ref ?–6.5)

## 2023-07-19 PROCEDURE — 83036 HEMOGLOBIN GLYCOSYLATED A1C: CPT | Performed by: PHYSICIAN ASSISTANT

## 2023-07-19 PROCEDURE — 99214 OFFICE O/P EST MOD 30 MIN: CPT | Performed by: PHYSICIAN ASSISTANT

## 2023-07-19 NOTE — PROGRESS NOTES
Patient Progress Note      Chief Complaint   Patient presents with   • Diabetes Mellitus   • Diabetes Type 2      Referring Provider  Mazin Carver, [address] [facility],  [address]     History of Present Illness:   Isidra Herr III is a 58 y.o. male with a history of type 2 diabetes with long term use of insulin. Diabetes course has been stable. Complications of DM: neuropathy. Denies recent illness or hospitalizations. Denies recent severe hypoglycemic or severe hyperglycemic episodes. Denies any issues with his current regimen. Home glucose monitoring: are performed regularly, morning     No log or meter today  Home blood glucose readings: he reports readings in the low 100s mg/dl. Today it was 107 mg/dl. Current regimen:  Trulicity 8.73 mg weekly  He was having abdominal pain with metformin so it was stopped by his PCP in the past  compliant most of the time, denies any side effects from medications. Injects in: abdomen. Rotates sites: Yes  Hypoglycemic episodes: No, rare  H/o of hypoglycemia causing hospitalization or Intervention such as glucagon injection  or ambulance call :  No  Hypoglycemia symptoms: jitteriness and sweating  Treatment of hypoglycemia: juice     Medic alert tag: recommended: Yes     Diabetes education: Yes  Diet: 3 meals per day, 0-1 snack per day. Timing of meals is predictable. Diabetic diet compliance:  compliant most of the time  Activity: Daily activity is predictable: Yes. He tries to exercise 2-3 times a week. Ophthamology:  March 2022. He has an appointment for this year. Podiatry:  March 2023     Not on ACE inhibitor/ARB  Has hyperlipidemia: on statin - tolerating well, no myalgias. compliant most of the time, denies any side effects from medications. Thyroid disorders:  Hypothyroidism.   Patient is taking levothyroxine 75 mcg 1 tablet daily on an empty stomach 1 hour before breakfast and at least 4 hours apart from supplements. History of pancreatitis: No    Patient Active Problem List   Diagnosis   • BPH associated with nocturia   • Hypothyroidism   • 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HCC)   • H/O spinal fusion   • DDD (degenerative disc disease), lumbar   • Gastroesophageal reflux disease without esophagitis   • Type 2 diabetes mellitus without complication, without long-term current use of insulin ([address])   • Mixed hyperlipidemia   • Erectile dysfunction of non-organic origin   • Elevated bilirubin   • Sacroiliitis (HCC)   • Lumbar spondylosis   • Non-recurrent inguinal hernia without obstruction or gangrene      Past Medical History:   Diagnosis Date   • BPH associated with nocturia    • Cancer [facility])    • Cervical spinal mass ([address]) 2019    cervicothoracic   • Diabetes mellitus (HCC)    • Disease of thyroid gland    • Herniated disc, cervical    • History of radiation therapy    • Hyperlipidemia    • Impaired fasting glucose    • Radiation-induced myelopathy ([address]) 2019      Past Surgical History:   Procedure Laterality Date   • APPENDECTOMY     • CERVICAL FUSION     • COLONOSCOPY  2012    NORMAL; RECHECK IN 5 YEARS   • FL LUMBAR PUNCTURE DIAGNOSTIC  2019   • KS TY BX/EXC ISPI EBONIE IDRL IMED CERVICAL N/A 2019    Procedure: Posterior C4-T3 laminectomy; resection of intramedullary mass C5-T3, including fenestration of syrinx C7-T2; C3-T4 fixation/fusion; additional levels as needed;  Surgeon: Joel Guerin MD;  Location:  MAIN OR;  Service: Neurosurgery   • WISDOM TOOTH EXTRACTION        Family History   Problem Relation Age of Onset   • Diabetes Mother    • Hypertension Father    • Cerebral palsy Sister    • Breast cancer Daughter      Social History     Tobacco Use   • Smoking status: Former     Packs/day: 1.00     Years: 35.00     Total pack years: 35.00     Types: Cigarettes     Quit date: 1993     Years since quittin.1   • Smokeless tobacco: Never   Substance Use Topics   • Alcohol use:  Yes Comment: Occasionaly     Allergies   Allergen Reactions   • Bee Venom Hives, Itching and Edema     Category: Allergy;    • Penicillins Hives and Itching     Category: Allergy;          Current Outpatient Medications:   •  atorvastatin (LIPITOR) 40 mg tablet, TAKE 1 TABLET BY MOUTH EVERY DAY, Disp: 90 tablet, Rfl: 1  •  ibuprofen (MOTRIN) 200 mg tablet, Take 400 mg by mouth as needed for mild pain, Disp: , Rfl:   •  levothyroxine 75 mcg tablet, TAKE 1 TABLET BY MOUTH EVERY DAY, Disp: 90 tablet, Rfl: 1  •  omega-3-acid ethyl esters (LOVAZA) 1 g capsule, Take 2 capsules (2 g total) by mouth daily, Disp: 180 capsule, Rfl: 1  •  tiZANidine (ZANAFLEX) 2 mg tablet, Take 1 tablet (2 mg total) by mouth every 8 (eight) hours as needed for muscle spasms, Disp: 90 tablet, Rfl: 1  •  Trulicity 9.24 DE/3.0AK injection, INJECT 0.5 MILLILITERS UNDER THE SKIN ONE TIME PER WEEK, Disp: 6 mL, Rfl: 2  Review of Systems   Constitutional: Negative for activity change, appetite change, fatigue and unexpected weight change. HENT: Negative for trouble swallowing. Eyes: Negative for visual disturbance. Respiratory: Negative for shortness of breath. Cardiovascular: Negative for chest pain and palpitations. Gastrointestinal: Negative for constipation and diarrhea. Endocrine: Negative for polydipsia and polyuria. Musculoskeletal: Positive for arthralgias. Skin: Negative for wound. Neurological: Positive for numbness (hands). Psychiatric/Behavioral: Negative. Physical Exam:  Body mass index is 28.38 kg/m². /62 (BP Location: Left arm, Patient Position: Sitting, Cuff Size: Adult)   Ht 5' 7" (1.702 m)   Wt 82.2 kg (181 lb 3.2 oz)   BMI 28.38 kg/m²    Wt Readings from Last 3 Encounters:   07/19/23 82.2 kg (181 lb 3.2 oz)   06/05/23 84.7 kg (186 lb 12.8 oz)   03/01/23 72.2 kg (159 lb 3.2 oz)       Physical Exam  Vitals and nursing note reviewed. Constitutional:       Appearance: He is well-developed.    HENT: Head: Normocephalic. Eyes:      General: No scleral icterus. Pupils: Pupils are equal, round, and reactive to light. Neck:      Thyroid: No thyromegaly. Cardiovascular:      Rate and Rhythm: Normal rate and regular rhythm. Pulses:           Radial pulses are 2+ on the right side and 2+ on the left side. Heart sounds: No murmur heard. Pulmonary:      Effort: Pulmonary effort is normal. No respiratory distress. Breath sounds: Normal breath sounds. No wheezing. Musculoskeletal:      Cervical back: Neck supple. Skin:     General: Skin is warm and dry. Neurological:      Mental Status: He is alert. Patient's shoes and socks were not removed. Labs:   Component      Latest Ref Rng 12/28/2022   Sodium      135 - 147 mmol/L 140    Potassium      3.5 - 5.3 mmol/L 5.0    Chloride      96 - 108 mmol/L 109 (H)    CO2      21 - 32 mmol/L 29    Anion Gap      mmol/L 2 (L)    BUN      5 - 25 mg/dL 17    Creatinine      0.60 - 1.30 mg/dL 0.93    GLUCOSE FASTING      65 - 99 mg/dL 136 (H)    Calcium      8.3 - 10.1 mg/dL 9.4    eGFR      ml/min/1.73sq m 87    Cholesterol      See Comment mg/dL    Triglycerides      See Comment mg/dL    HDL      >=40 mg/dL    LDL Calculated      0 - 100 mg/dL    Non-HDL Cholesterol      mg/dl    EXT Creatinine Urine      mg/dL    MICROALBUM.,U,RANDOM      0.0 - 20.0 mg/L    MICROALBUMIN/CREATININE RATIO      0 - 30 mg/g creatinine    Hemoglobin A1C      Normal 3.8-5.6%; PreDiabetic 5.7-6.4%;  Diabetic >=6.5%; Glycemic control for adults with diabetes <7.0% % 6.1 (H)    eAG, EST AVG Glucose      mg/dl 128    Free T4      0.61 - 1.12 ng/dL 0.89    TSH 3RD GENERATON      0.450 - 4.500 uIU/mL 3.240      Component      Latest Ref Rng 5/8/2023   Sodium      135 - 147 mmol/L    Potassium      3.5 - 5.3 mmol/L    Chloride      96 - 108 mmol/L    CO2      21 - 32 mmol/L    Anion Gap      mmol/L    BUN      5 - 25 mg/dL    Creatinine      0.60 - 1.30 mg/dL GLUCOSE FASTING      65 - 99 mg/dL    Calcium      8.3 - 10.1 mg/dL    eGFR      ml/min/1.73sq m    Cholesterol      See Comment mg/dL 90    Triglycerides      See Comment mg/dL 100    HDL      >=40 mg/dL 28 (L)    LDL Calculated      0 - 100 mg/dL 42    Non-HDL Cholesterol      mg/dl 62    EXT Creatinine Urine      mg/dL    MICROALBUM.,U,RANDOM      0.0 - 20.0 mg/L    MICROALBUMIN/CREATININE RATIO      0 - 30 mg/g creatinine    Hemoglobin A1C      Normal 3.8-5.6%; PreDiabetic 5.7-6.4%; Diabetic >=6.5%; Glycemic control for adults with diabetes <7.0% %    eAG, EST AVG Glucose      mg/dl    Free T4      0.61 - 1.12 ng/dL    TSH 3RD GENERATON      0.450 - 4.500 uIU/mL      Component      Latest Ref Rng 6/28/2023   Sodium      135 - 147 mmol/L 146    Potassium      3.5 - 5.3 mmol/L 4.9    Chloride      96 - 108 mmol/L 115 (H)    CO2      21 - 32 mmol/L 26    Anion Gap      mmol/L 5    BUN      5 - 25 mg/dL 19    Creatinine      0.60 - 1.30 mg/dL 0.92    GLUCOSE FASTING      65 - 99 mg/dL 119 (H)    Calcium      8.3 - 10.1 mg/dL 8.8    eGFR      ml/min/1.73sq m 87    Cholesterol      See Comment mg/dL    Triglycerides      See Comment mg/dL    HDL      >=40 mg/dL    LDL Calculated      0 - 100 mg/dL    Non-HDL Cholesterol      mg/dl    EXT Creatinine Urine      mg/dL 245.0    MICROALBUM.,U,RANDOM      0.0 - 20.0 mg/L 13.8    MICROALBUMIN/CREATININE RATIO      0 - 30 mg/g creatinine 6    Hemoglobin A1C      Normal 3.8-5.6%; PreDiabetic 5.7-6.4%; Diabetic >=6.5%; Glycemic control for adults with diabetes <7.0% % 6.1 (H)    eAG, EST AVG Glucose      mg/dl 128    Free T4      0.61 - 1.12 ng/dL 0.84    TSH 3RD GENERATON      0.450 - 4.500 uIU/mL 1.774       Legend:  (H) High  (L) Low    Plan:    Sylvia Perez was seen today for diabetes mellitus and diabetes type 2. Diagnoses and all orders for this visit:    Type 2 diabetes mellitus without complication, without long-term current use of insulin (HCC)  HGA1C 6.0%. Improved  Treatment regimen: continue current treatment   Episodes of hypoglycemia can lead to permanent disability and death. Discussed risks/complications associated with uncontrolled diabetes. Advised to adhere to diabetic diet, and recommended staying active/exercising routinely as tolerated. Keep carbohydrates consistent to limit blood glucose fluctuations. Advised to call if blood sugars less than 70 mg/dl or over 250 mg/dl. Check blood glucose 1 time a day  Discussed symptoms and treatment of hypoglycemia. Recommended routine follow-up with podiatry and ophthalmology. Ordered blood work to complete prior to next visit. -     POCT hemoglobin A1c  -     Hemoglobin A1C; Future  -     Albumin / creatinine urine ratio; Future  -     Comprehensive metabolic panel; Future    Acquired hypothyroidism  TSH 1.774 and free T4 0.84  Continue current dose of levothyroxine  Monitor labs  -     POCT hemoglobin A1c  -     T4, free; Future  -     TSH, 3rd generation; Future    Mixed hyperlipidemia  LDL previously 42  Continue statin therapy  -     POCT hemoglobin A1c  -     Lipid panel; Future           Discussed with the patient diagnosis and treatment and all questions fully answered. He will call me if any problems arise. Counseled patient on diagnostic results, prognosis, risk and benefit of treatment options, instruction for management, importance of treatment compliance, risk factor reduction and impressions.       Didi Gant PA-C

## 2023-07-19 NOTE — PATIENT INSTRUCTIONS
Hypoglycemia instructions   [facility]  7/19/2023  [id number]    Low Blood Sugar    Steps to treat low blood sugar. 1. Test blood sugar if you have symptoms of low blood sugar:   Low Blood Sugar Symptoms:  o Sweaty  o Dizzy  o Rapid heartbeat  o Shaky  o Bad mood  o Hungry      2. Treat blood sugar less than 70 with 15 grams of fast-acting carbohydrate:   Examples of 15 grams Fast-Acting Carbohydrate:  o 4 oz juice  o 4 oz regular soda  o 3-4 glucose tablets (chew)  o 3-4 hard candies (chew)          3. Wait 15 minutes and test your blood sugar again     4.  If blood sugar is less than 100, repeat steps 2-3.    5. When your blood sugar is 100 or more, eat a snack if it will be longer than one hour until your next meal. The snack should be 15 grams of carbohydrate and a protein:   Examples of snacks:  o ½ sandwich  o 6 crackers with cheese  o Piece of fruit with cheese or peanut butter  o 6 crackers with peanut butter

## 2023-07-20 ENCOUNTER — TELEPHONE (OUTPATIENT)
Dept: FAMILY MEDICINE CLINIC | Facility: CLINIC | Age: 63
End: 2023-07-20

## 2023-07-20 NOTE — TELEPHONE ENCOUNTER
Patient walks with a cane but would like to get an electric scooter. Patient would Kye Sosa write a script for it. Please call him when ready. He is aware that she is out of the office.

## 2023-07-24 NOTE — TELEPHONE ENCOUNTER
Spoke to [employer] and he said its from [employer], and so I provided him with our fax# so that they can fax ppw to us and he said he will call back to schedule appt to get ppw completed

## 2023-08-16 LAB
LEFT EYE DIABETIC RETINOPATHY: NORMAL
RIGHT EYE DIABETIC RETINOPATHY: NORMAL

## 2023-08-16 PROCEDURE — 2023F DILAT RTA XM W/O RTNOPTHY: CPT | Performed by: NURSE PRACTITIONER

## 2023-08-23 ENCOUNTER — OFFICE VISIT (OUTPATIENT)
Dept: FAMILY MEDICINE CLINIC | Facility: CLINIC | Age: 63
End: 2023-08-23
Payer: COMMERCIAL

## 2023-08-23 VITALS
WEIGHT: 183.6 LBS | HEART RATE: 73 BPM | SYSTOLIC BLOOD PRESSURE: 108 MMHG | BODY MASS INDEX: 28.82 KG/M2 | DIASTOLIC BLOOD PRESSURE: 72 MMHG | RESPIRATION RATE: 16 BRPM | TEMPERATURE: 97.8 F | HEIGHT: 67 IN

## 2023-08-23 DIAGNOSIS — E11.9 TYPE 2 DIABETES MELLITUS WITHOUT COMPLICATION, WITHOUT LONG-TERM CURRENT USE OF INSULIN (HCC): ICD-10-CM

## 2023-08-23 DIAGNOSIS — C72.0 SPINAL CORD EPENDYMOMA (HCC): ICD-10-CM

## 2023-08-23 DIAGNOSIS — K52.9 CHRONIC DIARRHEA OF UNKNOWN ORIGIN: Primary | ICD-10-CM

## 2023-08-23 DIAGNOSIS — K63.5 POLYP OF COLON, UNSPECIFIED PART OF COLON, UNSPECIFIED TYPE: ICD-10-CM

## 2023-08-23 DIAGNOSIS — K59.03 DRUG-INDUCED CONSTIPATION: ICD-10-CM

## 2023-08-23 DIAGNOSIS — K21.9 GASTROESOPHAGEAL REFLUX DISEASE WITHOUT ESOPHAGITIS: ICD-10-CM

## 2023-08-23 DIAGNOSIS — E03.9 HYPOTHYROIDISM, UNSPECIFIED TYPE: ICD-10-CM

## 2023-08-23 DIAGNOSIS — E03.9 ACQUIRED HYPOTHYROIDISM: ICD-10-CM

## 2023-08-23 PROCEDURE — 99214 OFFICE O/P EST MOD 30 MIN: CPT | Performed by: NURSE PRACTITIONER

## 2023-08-23 RX ORDER — POLYETHYLENE GLYCOL 3350 17 G/17G
POWDER, FOR SOLUTION ORAL
COMMUNITY
Start: 2023-08-23

## 2023-08-23 RX ORDER — LEVOTHYROXINE SODIUM 0.07 MG/1
75 TABLET ORAL DAILY
Qty: 90 TABLET | Refills: 1 | Status: SHIPPED | OUTPATIENT
Start: 2023-08-23

## 2023-08-23 NOTE — ASSESSMENT & PLAN NOTE
Had single polyp in colonoscopy in 2021. Advised patient he should continue screening colonoscopies as this is the most appropriate. His right side of colon could not be visualized.    Will refer back to GI based on testing and response to other measure to try to improve diarrhea

## 2023-08-23 NOTE — ASSESSMENT & PLAN NOTE
Will get fecal testing and food allergy testing. Follow up after results. Consider GI. He is currently having constipation from imodium. No more imodium. Once the constipation is resolved I would like patient to start metamucil this will help with bulking. I have also given him a hand out regarding FODMAP to try food elimination diet.

## 2023-08-23 NOTE — PROGRESS NOTES
Name: Marlo Lepe      : 1960      MRN: [id number]  Encounter Provider: CLAUDIO Martínez  Encounter Date: 2023   Encounter department: [facility]    Assessment & Plan     1. Chronic diarrhea of unknown origin  Assessment & Plan: Will get fecal testing and food allergy testing. Follow up after results. Consider GI. He is currently having constipation from imodium. No more imodium. Once the constipation is resolved I would like patient to start metamucil this will help with bulking. I have also given him a hand out regarding FODMAP to try food elimination diet. Orders:  -     Stool Enteric Bacterial Panel by PCR; Future  -     H. pylori antigen, stool; Future  -     Calprotectin,Fecal; Future  -     Fecal fat, qualitative; Future  -     Reducing substances, stool; Future  -     Ova and parasite examination; Future  -     Celiac Disease Antibody Profile; Future  -     Food Allergy Profile; Future  -     Pancreatic elastase, fecal; Future    2. Hypothyroidism, unspecified type  Assessment & Plan:  Patient is on levothyroxine 75mcg. His recent tsh is normal.     Orders:  -     levothyroxine 75 mcg tablet; Take 1 tablet (75 mcg total) by mouth daily    3. Acquired hypothyroidism  Assessment & Plan:  Patient is on levothyroxine 75mcg. His recent tsh is normal.       4. Type 2 diabetes mellitus without complication, without long-term current use of insulin ([address])  Assessment & Plan:  Well controlled. Lab Results   Component Value Date    HGBA1C 6.0 2023         5. 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[facility])  Assessment & Plan:  Stable but possible cause of bowel issues. 6. Drug-induced constipation  Assessment & Plan:  If no response in 48 hours please contact the office. 3 capfulls of mirilax with one exlax and full bottle of water  Since no BM in the last 5 days     Orders:  -     polyethylene glycol (MIRALAX) 17 g packet;  Take 3 capfuls with full bottle of water with one exlax. 7. Polyp of colon, unspecified part of colon, unspecified type  Assessment & Plan:  Had single polyp in colonoscopy in 2021. Advised patient he should continue screening colonoscopies as this is the most appropriate. His right side of colon could not be visualized. Will refer back to GI based on testing and response to other measure to try to improve diarrhea         8. Gastroesophageal reflux disease without esophagitis  Assessment & Plan:  No current gastritis symptoms. Does have belching and bloating. Check h pylori  Currently on otc omeprazole  No history of EGD    Orders:  -     H. pylori antigen, stool; Future           Subjective      Patient reports that he has been issues with diarrhea for the last few months on and off    Patient has history of tumor wrapped around his spine had surgery and since then he had issues with controlling his bowels. He reports the last month it has been very consistent. He Took imodium 5 days ago and now he is very constipated. He is having stool accidents about 5 times because he just couldn't make it to the bathroom. He also reports that there is still some residual tumor and did complete radiation 5 years ago. He has tried to eat better. Cut out soda, [employer]. He has bloating and tightness. No nausea/vomiting. He had loud gurgling and churning. The stool has mucous and chunks and can also be watery. He is up to date on his colonoscopy   Had suspected pasta but not sure. Does have cereal every morning with milk. He can sleep through the night. Most times associated after having food. He does take acid reflux medication regularly which when he is not taking this medication it does flare up. Review of Systems   Constitutional: Negative for appetite change and fever. Respiratory: Negative for chest tightness and shortness of breath. Cardiovascular: Negative for chest pain, palpitations and leg swelling.    Gastrointestinal: Positive for abdominal distention (gas), constipation and diarrhea. Negative for abdominal pain (abdominal tightness). Genitourinary: Negative for difficulty urinating. Musculoskeletal: Negative for arthralgias and myalgias. Skin: Negative for wound. Neurological: Negative for dizziness, light-headedness and headaches. Psychiatric/Behavioral: Negative for dysphoric mood and sleep disturbance. The patient is not nervous/anxious. Current Outpatient Medications on File Prior to Visit   Medication Sig   • atorvastatin (LIPITOR) 40 mg tablet TAKE 1 TABLET BY MOUTH EVERY DAY   • ibuprofen (MOTRIN) 200 mg tablet Take 400 mg by mouth as needed for mild pain   • omega-3-acid ethyl esters (LOVAZA) 1 g capsule Take 2 capsules (2 g total) by mouth daily   • tiZANidine (ZANAFLEX) 2 mg tablet Take 1 tablet (2 mg total) by mouth every 8 (eight) hours as needed for muscle spasms   • Trulicity 7.18 NP/2.2QG injection INJECT 0.5 MILLILITERS UNDER THE SKIN ONE TIME PER WEEK   • [DISCONTINUED] levothyroxine 75 mcg tablet TAKE 1 TABLET BY MOUTH EVERY DAY       Objective     /72 (BP Location: Left arm, Patient Position: Sitting, Cuff Size: Adult)   Pulse 73   Temp 97.8 °F (36.6 °C) (Temporal)   Resp 16   Ht 5' 7" (1.702 m)   Wt 83.3 kg (183 lb 9.6 oz)   BMI 28.76 kg/m²     Physical Exam  Vitals and nursing note reviewed. Constitutional:       Appearance: Normal appearance. He is well-developed. He is not ill-appearing. HENT:      Head: Normocephalic and atraumatic. Eyes:      Extraocular Movements: Extraocular movements intact. Conjunctiva/sclera: Conjunctivae normal.      Pupils: Pupils are equal.   Cardiovascular:      Rate and Rhythm: Normal rate and regular rhythm. Heart sounds: S1 normal and S2 normal. No murmur heard. Pulmonary:      Effort: Pulmonary effort is normal. No respiratory distress. Breath sounds: Normal breath sounds. Abdominal:      General: Abdomen is flat.  Bowel sounds are normal.      Palpations: Abdomen is soft. Tenderness: There is no abdominal tenderness. Neurological:      Mental Status: He is alert and oriented to person, place, and time. Psychiatric:         Mood and Affect: Mood normal.         Thought Content:  Thought content normal.          Pauline Marshall

## 2023-08-23 NOTE — ASSESSMENT & PLAN NOTE
If no response in 48 hours please contact the office.    3 capfulls of mirilax with one exlax and full bottle of water  Since no BM in the last 5 days

## 2023-08-23 NOTE — PATIENT INSTRUCTIONS
Problem List Items Addressed This Visit          Digestive    Gastroesophageal reflux disease without esophagitis     No current gastritis symptoms. Does have belching and bloating. Check h pylori  Currently on otc omeprazole  No history of EGD         Relevant Orders    H. pylori antigen, stool    Drug-induced constipation     If no response in 48 hours please contact the office. 3 capfulls of mirilax with one exlax and full bottle of water  Since no BM in the last 5 days          Relevant Medications    polyethylene glycol (MIRALAX) 17 g packet    Colon polyp     Had single polyp in colonoscopy in 2021. Advised patient he should continue screening colonoscopies as this is the most appropriate. His right side of colon could not be visualized. Will refer back to GI based on testing and response to other measure to try to improve diarrhea            Chronic diarrhea of unknown origin - Primary     Will get fecal testing and food allergy testing. Follow up after results. Consider GI. He is currently having constipation from imodium. No more imodium. Once the constipation is resolved I would like patient to start metamucil this will help with bulking. I have also given him a hand out regarding FODMAP to try food elimination diet. Relevant Orders    Stool Enteric Bacterial Panel by PCR    H. pylori antigen, stool    Calprotectin,Fecal    Fecal fat, qualitative    Reducing substances, stool    Ova and parasite examination    Celiac Disease Antibody Profile    Food Allergy Profile    Pancreatic elastase, fecal       Endocrine    Acquired hypothyroidism     Patient is on levothyroxine 75mcg. His recent tsh is normal.          Relevant Medications    levothyroxine 75 mcg tablet    Type 2 diabetes mellitus without complication, without long-term current use of insulin ([address])     Well controlled.      Lab Results   Component Value Date    HGBA1C 6.0 07/19/2023               Nervous and Auditory    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[address])     Stable but possible cause of bowel issues.

## 2023-08-23 NOTE — ASSESSMENT & PLAN NOTE
No current gastritis symptoms. Does have belching and bloating.  Check h pylori  Currently on otc omeprazole  No history of EGD

## 2023-09-11 DIAGNOSIS — E11.65 TYPE 2 DIABETES MELLITUS WITH HYPERGLYCEMIA, WITHOUT LONG-TERM CURRENT USE OF INSULIN (HCC): ICD-10-CM

## 2023-09-11 DIAGNOSIS — E78.5 HLD (HYPERLIPIDEMIA): ICD-10-CM

## 2023-09-11 RX ORDER — DULAGLUTIDE 0.75 MG/.5ML
INJECTION, SOLUTION SUBCUTANEOUS
Qty: 6 ML | Refills: 0 | Status: SHIPPED | OUTPATIENT
Start: 2023-09-11 | End: 2023-09-12

## 2023-09-12 RX ORDER — DULAGLUTIDE 0.75 MG/.5ML
INJECTION, SOLUTION SUBCUTANEOUS
Qty: 6 ML | Refills: 0 | Status: SHIPPED | OUTPATIENT
Start: 2023-09-12

## 2023-09-25 ENCOUNTER — TELEPHONE (OUTPATIENT)
Dept: FAMILY MEDICINE CLINIC | Facility: CLINIC | Age: 63
End: 2023-09-25

## 2023-09-25 NOTE — TELEPHONE ENCOUNTER
Patient called in and left message on vm line, did try to call back to get clarification , no answer lmom     ". I need a prescription for a prosthetic that I'm due to get through [facility]. Give me a call back. Their phone number there is [phone]. That's at [address]  It's in [place], [place]   Please advise thank you

## 2023-09-26 NOTE — TELEPHONE ENCOUNTER
Pt stated he spoke to someone last night about a rx for a brace for his leg, and that he was informed order was being faxed to a number that whomever he spoke to had. I informed pt if he didn't hear back from us by end of week to give us a call as I did not see any documentation in regards to this.

## 2023-09-28 ENCOUNTER — APPOINTMENT (OUTPATIENT)
Dept: LAB | Age: 63
End: 2023-09-28
Payer: COMMERCIAL

## 2023-09-28 DIAGNOSIS — K52.9 CHRONIC DIARRHEA OF UNKNOWN ORIGIN: ICD-10-CM

## 2023-09-28 DIAGNOSIS — K21.9 GASTROESOPHAGEAL REFLUX DISEASE WITHOUT ESOPHAGITIS: ICD-10-CM

## 2023-09-28 PROCEDURE — 86364 TISS TRNSGLTMNASE EA IG CLAS: CPT

## 2023-09-28 PROCEDURE — 86258 DGP ANTIBODY EACH IG CLASS: CPT

## 2023-09-28 PROCEDURE — 36415 COLL VENOUS BLD VENIPUNCTURE: CPT

## 2023-09-28 PROCEDURE — 86008 ALLG SPEC IGE RECOMB EA: CPT

## 2023-09-28 PROCEDURE — 87505 NFCT AGENT DETECTION GI: CPT

## 2023-09-28 PROCEDURE — 82784 ASSAY IGA/IGD/IGG/IGM EACH: CPT

## 2023-09-28 PROCEDURE — 86231 EMA EACH IG CLASS: CPT

## 2023-09-28 PROCEDURE — 86003 ALLG SPEC IGE CRUDE XTRC EA: CPT

## 2023-09-28 PROCEDURE — 82785 ASSAY OF IGE: CPT

## 2023-09-29 ENCOUNTER — APPOINTMENT (OUTPATIENT)
Dept: LAB | Age: 63
End: 2023-09-29
Payer: COMMERCIAL

## 2023-09-29 DIAGNOSIS — E11.9 TYPE 2 DIABETES MELLITUS WITHOUT COMPLICATION, WITHOUT LONG-TERM CURRENT USE OF INSULIN (HCC): ICD-10-CM

## 2023-09-29 DIAGNOSIS — E78.2 MIXED HYPERLIPIDEMIA: ICD-10-CM

## 2023-09-29 DIAGNOSIS — E78.2 MIXED HYPERLIPIDEMIA: Primary | ICD-10-CM

## 2023-09-29 LAB
ALMOND IGE QN: 6.05 KUA/I
ARA H6 PEANUT: 0.22 KUA/I
CASHEW NUT IGE QN: 0.56 KUA/I
CODFISH IGE QN: 0.14 KUA/I
EGG WHITE IGE QN: <0.1 KUA/I
ENDOMYSIUM IGA SER QL: NEGATIVE
GLIADIN PEPTIDE IGA SER-ACNC: 4 UNITS (ref 0–19)
GLIADIN PEPTIDE IGG SER-ACNC: 2 UNITS (ref 0–19)
GLUTEN IGE QN: 5.9 KUA/I
HAZELNUT IGE QN: 5.43 KUA/L
IGA SERPL-MCNC: 404 MG/DL (ref 61–437)
MILK IGE QN: <0.1 KUA/I
PEANUT (RARA H) 1 IGE QN: 0.15 KUA/I
PEANUT (RARA H) 2 IGE QN: <0.1 KUA/I
PEANUT (RARA H) 3 IGE QN: <0.1 KUA/I
PEANUT (RARA H) 8 IGE QN: 0.18 KUA/I
PEANUT (RARA H) 9 IGE QN: 0.27 KUA/I
PEANUT IGE QN: 6.58 KUA/I
SALMON IGE QN: 0.18 KUA/I
SCALLOP IGE QN: 4.95 KUA/L
SESAME SEED IGE QN: 6.04 KUA/I
SHRIMP IGE QN: 2.39 KUA/L
SOYBEAN IGE QN: 4.82 KUA/I
TOTAL IGE SMQN RAST: 119 KU/L (ref 0–113)
TTG IGA SER-ACNC: <2 U/ML (ref 0–3)
TTG IGG SER-ACNC: <2 U/ML (ref 0–5)
TUNA IGE QN: <0.1 KUA/I
WALNUT IGE QN: 5.29 KUA/I
WHEAT IGE QN: 6.02 KUA/I

## 2023-09-29 PROCEDURE — 82653 EL-1 FECAL QUANTITATIVE: CPT

## 2023-09-29 PROCEDURE — 87338 HPYLORI STOOL AG IA: CPT

## 2023-09-29 PROCEDURE — 84377 SUGARS MULTIPLE QUAL: CPT

## 2023-09-29 PROCEDURE — 87177 OVA AND PARASITES SMEARS: CPT

## 2023-09-29 PROCEDURE — 82705 FATS/LIPIDS FECES QUAL: CPT

## 2023-09-29 PROCEDURE — 83993 ASSAY FOR CALPROTECTIN FECAL: CPT

## 2023-09-29 PROCEDURE — 87209 SMEAR COMPLEX STAIN: CPT

## 2023-09-29 RX ORDER — OMEGA-3-ACID ETHYL ESTERS 1 G/1
2 CAPSULE, LIQUID FILLED ORAL DAILY
Qty: 180 CAPSULE | Refills: 1 | Status: SHIPPED | OUTPATIENT
Start: 2023-09-29

## 2023-09-29 NOTE — TELEPHONE ENCOUNTER
Pt was in office for wife's appointment, came to check-in to ask if we had any news regarding the brace, and that he was expecting the office to contact us. Called number pt had left ([phone]) to ask for clarification. Spoke with Rakesh Villalobos pt was most likely talking about brace he had gotten 2 years ago, which we have orders for. She spoke with clinicians on site and stated that there was nothing she found that referred to requesting a new brace from any of the MD's, and insurance would most likely not cover it, but we could fax over order to [phone] if pt needed adjustments to it.

## 2023-09-29 NOTE — TELEPHONE ENCOUNTER
Last seen 8/23/23   F/u scheduled for 10/4/23     Endocrinology:  Nutritional Agents Passed 09/29/2023 09:18 AM   Protocol Details  Valid encounter within last 12 months

## 2023-09-30 LAB
CAMPYLOBACTER DNA SPEC NAA+PROBE: NORMAL
H PYLORI AG STL QL IA: NEGATIVE
SALMONELLA DNA SPEC QL NAA+PROBE: NORMAL
SHIGA TOXIN STX GENE SPEC NAA+PROBE: NORMAL
SHIGELLA DNA SPEC QL NAA+PROBE: NORMAL

## 2023-10-01 LAB
FAT STL QL: NORMAL
NEUTRAL FAT STL QL: NORMAL

## 2023-10-03 LAB
CALPROTECTIN STL-MCNT: 15 UG/G (ref 0–120)
ELASTASE PANC STL-MCNT: 192 UG ELAST./G

## 2023-10-04 ENCOUNTER — OFFICE VISIT (OUTPATIENT)
Dept: FAMILY MEDICINE CLINIC | Facility: CLINIC | Age: 63
End: 2023-10-04
Payer: COMMERCIAL

## 2023-10-04 VITALS
BODY MASS INDEX: 29.16 KG/M2 | SYSTOLIC BLOOD PRESSURE: 136 MMHG | WEIGHT: 185.8 LBS | HEART RATE: 61 BPM | RESPIRATION RATE: 16 BRPM | TEMPERATURE: 97.3 F | DIASTOLIC BLOOD PRESSURE: 80 MMHG | HEIGHT: 67 IN

## 2023-10-04 DIAGNOSIS — K86.89 PANCREATIC INSUFFICIENCY: ICD-10-CM

## 2023-10-04 DIAGNOSIS — C72.0 SPINAL CORD EPENDYMOMA (HCC): ICD-10-CM

## 2023-10-04 DIAGNOSIS — K52.9 CHRONIC DIARRHEA OF UNKNOWN ORIGIN: Primary | ICD-10-CM

## 2023-10-04 DIAGNOSIS — R29.898 RIGHT LEG WEAKNESS: ICD-10-CM

## 2023-10-04 LAB — SCAN RESULT: NORMAL

## 2023-10-04 PROCEDURE — 99214 OFFICE O/P EST MOD 30 MIN: CPT | Performed by: NURSE PRACTITIONER

## 2023-10-04 NOTE — PROGRESS NOTES
Name: Meg Jordan      : 1960      MRN: [id number]  Encounter Provider: CLAUDIO Qureshi  Encounter Date: 10/4/2023   Encounter department: [facility] PRIMARY CARE    Assessment & Plan     1. Chronic diarrhea of unknown origin  Assessment & Plan:  Patient has noticed no association with food but the symptoms appear to be associated with digestion. 2. 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 Devices MISC; MAFO for Right leg    4. Right leg weakness  -     Misc. Devices MISC; MAFO for Right leg           Subjective      Patient presents today  With eating he has no issues. At the end of the night his stomach starts gurgling and if he doesn't go the gurgling gets worse and then comes on more suddenly. He noticed that the diarrhea is now more mushy light brown in color floats with abdominal distension. He did the fodmap diet and didn't notice any change    Review of Systems   Constitutional: Negative for appetite change and fever. Respiratory: Negative for chest tightness and shortness of breath. Cardiovascular: Negative for chest pain, palpitations and leg swelling. Gastrointestinal: Negative for abdominal pain. Genitourinary: Negative for difficulty urinating. Musculoskeletal: Negative for arthralgias and myalgias. Skin: Negative for wound. Neurological: Negative for dizziness, light-headedness and headaches.    Psychiatric/Behavioral: Negative for dysphoric mood and sleep disturbance. The patient is not nervous/anxious. Current Outpatient Medications on File Prior to Visit   Medication Sig   • atorvastatin (LIPITOR) 40 mg tablet TAKE 1 TABLET BY MOUTH EVERY DAY   • dulaglutide (Trulicity) 3.30 PA/3.2RA injection INJECT 0.5 MILLILITERS UNDER THE SKIN ONE TIME PER WEEK   • ibuprofen (MOTRIN) 200 mg tablet Take 400 mg by mouth as needed for mild pain   • levothyroxine 75 mcg tablet Take 1 tablet (75 mcg total) by mouth daily   • omega-3-acid ethyl esters (LOVAZA) 1 g capsule Take 2 capsules (2 g total) by mouth daily   • polyethylene glycol (MIRALAX) 17 g packet Take 3 capfuls with full bottle of water with one exlax. • tiZANidine (ZANAFLEX) 2 mg tablet Take 1 tablet (2 mg total) by mouth every 8 (eight) hours as needed for muscle spasms       Objective     /80 (BP Location: Left arm, Patient Position: Sitting, Cuff Size: Adult)   Pulse 61   Temp (!) 97.3 °F (36.3 °C) (Temporal)   Resp 16   Ht 5' 7" (1.702 m)   Wt 84.3 kg (185 lb 12.8 oz)   BMI 29.10 kg/m²     Physical Exam  Vitals and nursing note reviewed. Constitutional:       Appearance: Normal appearance. He is well-developed. He is not ill-appearing. HENT:      Head: Normocephalic and atraumatic. Eyes:      Extraocular Movements: Extraocular movements intact. Conjunctiva/sclera: Conjunctivae normal.      Pupils: Pupils are equal.   Cardiovascular:      Rate and Rhythm: Normal rate and regular rhythm. Heart sounds: S1 normal and S2 normal. No murmur heard. Pulmonary:      Effort: Pulmonary effort is normal. No respiratory distress. Breath sounds: Normal breath sounds. Neurological:      Mental Status: He is alert and oriented to person, place, and time. Gait: Gait abnormal.   Psychiatric:         Mood and Affect: Mood normal.         Thought Content:  Thought content normal.          Nettie Schaumann

## 2023-10-06 DIAGNOSIS — R29.898 RIGHT LEG WEAKNESS: ICD-10-CM

## 2023-10-06 DIAGNOSIS — M46.1 SACROILIITIS (HCC): ICD-10-CM

## 2023-10-06 DIAGNOSIS — M51.36 DDD (DEGENERATIVE DISC DISEASE), LUMBAR: ICD-10-CM

## 2023-10-06 DIAGNOSIS — C72.0 SPINAL CORD EPENDYMOMA (HCC): Primary | ICD-10-CM

## 2023-10-06 PROBLEM — K86.89 PANCREATIC INSUFFICIENCY: Status: ACTIVE | Noted: 2023-10-06

## 2023-10-06 NOTE — ASSESSMENT & PLAN NOTE
Patient has noticed no association with food but the symptoms appear to be associated with digestion.

## 2023-10-06 NOTE — ASSESSMENT & PLAN NOTE
Patient's enzymes are mildly suppressed and his symptomology appears consistent with pancreatic insufficiency unsure at this time if treatment is warranted symptoms are slowly improving his weight is stable and he does not appear to have any nutritional deficiencies encourage patient to discuss with GI also still awaiting other lab results that are still pending

## 2023-10-12 ENCOUNTER — TELEPHONE (OUTPATIENT)
Dept: FAMILY MEDICINE CLINIC | Facility: CLINIC | Age: 63
End: 2023-10-12

## 2023-10-12 DIAGNOSIS — K52.9 CHRONIC DIARRHEA OF UNKNOWN ORIGIN: Primary | ICD-10-CM

## 2023-10-12 DIAGNOSIS — K86.89 PANCREATIC INSUFFICIENCY: ICD-10-CM

## 2023-10-12 NOTE — TELEPHONE ENCOUNTER
Spoke to patient regarding test results and disused his prosthesis Script should have been sent to [employer] [facility] their fax is [phone]

## 2023-10-12 NOTE — TELEPHONE ENCOUNTER
----- Message from Js Park MD sent at 10/12/2023  8:55 AM EDT -----  Regarding: FW: Naveed  Contact: [phone]  I did  order through [employer] [facility] [employer] not [place]  because  couldn't order through [place], can you  find out where  order  was faxed  to and  get an idea  of when he  will get  brace. Thanks  ----- Message -----  From: Criselda Miner  Sent: 10/11/2023   7:00 PM EDT  To: Js Park MD  Subject: Ha Fitting: Brace                                          ----- Message -----  From: Julien Vna "Dwight Pond"  Sent: 10/11/2023   5:41 PM EDT  To: [facility]  Subject: Brace                                            Still haven't heard anything about my brace yet. can you look into it for me.  Thanks

## 2023-10-15 DIAGNOSIS — K86.89 PANCREATIC INSUFFICIENCY: ICD-10-CM

## 2023-10-15 DIAGNOSIS — K52.9 CHRONIC DIARRHEA OF UNKNOWN ORIGIN: ICD-10-CM

## 2023-10-22 ENCOUNTER — PATIENT MESSAGE (OUTPATIENT)
Dept: FAMILY MEDICINE CLINIC | Facility: CLINIC | Age: 63
End: 2023-10-22

## 2023-10-22 DIAGNOSIS — K52.9 CHRONIC DIARRHEA OF UNKNOWN ORIGIN: Primary | ICD-10-CM

## 2023-10-24 RX ORDER — COLESEVELAM 180 1/1
1875 TABLET ORAL 2 TIMES DAILY WITH MEALS
Qty: 180 TABLET | Refills: 2 | Status: SHIPPED | OUTPATIENT
Start: 2023-10-24

## 2023-11-06 DIAGNOSIS — E11.65 TYPE 2 DIABETES MELLITUS WITH HYPERGLYCEMIA, WITHOUT LONG-TERM CURRENT USE OF INSULIN (HCC): ICD-10-CM

## 2023-11-07 RX ORDER — DULAGLUTIDE 0.75 MG/.5ML
INJECTION, SOLUTION SUBCUTANEOUS
Qty: 6 ML | Refills: 0 | Status: SHIPPED | OUTPATIENT
Start: 2023-11-07

## 2023-11-15 DIAGNOSIS — K52.9 CHRONIC DIARRHEA OF UNKNOWN ORIGIN: ICD-10-CM

## 2023-11-15 RX ORDER — COLESEVELAM 180 1/1
1875 TABLET ORAL 2 TIMES DAILY WITH MEALS
Qty: 540 TABLET | Refills: 1 | Status: SHIPPED | OUTPATIENT
Start: 2023-11-15

## 2023-11-28 ENCOUNTER — VBI (OUTPATIENT)
Dept: ADMINISTRATIVE | Facility: OTHER | Age: 63
End: 2023-11-28

## 2023-12-21 ENCOUNTER — PATIENT MESSAGE (OUTPATIENT)
Dept: FAMILY MEDICINE CLINIC | Facility: CLINIC | Age: 63
End: 2023-12-21

## 2023-12-21 DIAGNOSIS — K52.9 CHRONIC DIARRHEA OF UNKNOWN ORIGIN: ICD-10-CM

## 2023-12-21 DIAGNOSIS — K86.89 PANCREATIC INSUFFICIENCY: ICD-10-CM

## 2023-12-21 RX ORDER — COLESEVELAM 180 1/1
1250 TABLET ORAL 2 TIMES DAILY WITH MEALS
Qty: 540 TABLET | Refills: 1 | Status: SHIPPED | OUTPATIENT
Start: 2023-12-21

## 2023-12-22 DIAGNOSIS — K52.9 CHRONIC DIARRHEA OF UNKNOWN ORIGIN: ICD-10-CM

## 2023-12-22 DIAGNOSIS — K86.89 PANCREATIC INSUFFICIENCY: ICD-10-CM

## 2023-12-22 NOTE — TELEPHONE ENCOUNTER
Pharmacy comment: Script Clarification:PLEASE SEND A NEW RX  TABS, PHARMACY CAN NOT LONGER OPEN CREON BOTTLES PER FDA REGULATIONS.

## 2023-12-23 DIAGNOSIS — E78.2 MIXED HYPERLIPIDEMIA: ICD-10-CM

## 2023-12-23 DIAGNOSIS — E78.5 HLD (HYPERLIPIDEMIA): ICD-10-CM

## 2023-12-24 DIAGNOSIS — E03.9 HYPOTHYROIDISM, UNSPECIFIED TYPE: ICD-10-CM

## 2023-12-26 RX ORDER — OMEGA-3-ACID ETHYL ESTERS 1 G/1
2 CAPSULE, LIQUID FILLED ORAL DAILY
Qty: 180 CAPSULE | Refills: 1 | Status: SHIPPED | OUTPATIENT
Start: 2023-12-26

## 2023-12-26 RX ORDER — LEVOTHYROXINE SODIUM 0.07 MG/1
75 TABLET ORAL DAILY
Qty: 90 TABLET | Refills: 1 | Status: SHIPPED | OUTPATIENT
Start: 2023-12-26

## 2023-12-26 RX ORDER — ATORVASTATIN CALCIUM 40 MG/1
40 TABLET, FILM COATED ORAL DAILY
Qty: 90 TABLET | Refills: 0 | Status: SHIPPED | OUTPATIENT
Start: 2023-12-26

## 2023-12-28 RX ORDER — PANCRELIPASE 30000; 6000; 19000 [USP'U]/1; [USP'U]/1; [USP'U]/1
CAPSULE, DELAYED RELEASE PELLETS ORAL
Qty: 270 CAPSULE | Refills: 1 | OUTPATIENT
Start: 2023-12-28

## 2024-01-09 ENCOUNTER — TELEPHONE (OUTPATIENT)
Dept: NEUROSURGERY | Facility: CLINIC | Age: 64
End: 2024-01-09

## 2024-01-09 DIAGNOSIS — Z01.818 PRE-PROCEDURAL EXAMINATION: Primary | ICD-10-CM

## 2024-01-09 NOTE — TELEPHONE ENCOUNTER
Received request for orders for BUN & creatinine for MRI on 1/20. Called patient to make him aware. He expressed understanding and stated he would have bloodwork completed sometime this week.

## 2024-01-11 ENCOUNTER — APPOINTMENT (OUTPATIENT)
Dept: LAB | Age: 64
End: 2024-01-11
Payer: COMMERCIAL

## 2024-01-11 DIAGNOSIS — E11.9 TYPE 2 DIABETES MELLITUS WITHOUT COMPLICATION, WITHOUT LONG-TERM CURRENT USE OF INSULIN (HCC): ICD-10-CM

## 2024-01-11 DIAGNOSIS — Z01.818 PRE-PROCEDURAL EXAMINATION: ICD-10-CM

## 2024-01-11 DIAGNOSIS — E03.9 ACQUIRED HYPOTHYROIDISM: ICD-10-CM

## 2024-01-11 LAB
ALBUMIN SERPL BCP-MCNC: 4 G/DL (ref 3.5–5)
ALP SERPL-CCNC: 71 U/L (ref 34–104)
ALT SERPL W P-5'-P-CCNC: 40 U/L (ref 7–52)
ANION GAP SERPL CALCULATED.3IONS-SCNC: 10 MMOL/L
AST SERPL W P-5'-P-CCNC: 21 U/L (ref 13–39)
BILIRUB SERPL-MCNC: 1.26 MG/DL (ref 0.2–1)
BUN SERPL-MCNC: 18 MG/DL (ref 5–25)
CALCIUM SERPL-MCNC: 8.6 MG/DL (ref 8.4–10.2)
CHLORIDE SERPL-SCNC: 107 MMOL/L (ref 96–108)
CHOLEST SERPL-MCNC: 76 MG/DL
CO2 SERPL-SCNC: 28 MMOL/L (ref 21–32)
CREAT SERPL-MCNC: 0.9 MG/DL (ref 0.6–1.3)
CREAT UR-MCNC: 196.4 MG/DL
EST. AVERAGE GLUCOSE BLD GHB EST-MCNC: 148 MG/DL
GFR SERPL CREATININE-BSD FRML MDRD: 89 ML/MIN/1.73SQ M
GLUCOSE P FAST SERPL-MCNC: 114 MG/DL (ref 65–99)
HBA1C MFR BLD: 6.8 %
HDLC SERPL-MCNC: 34 MG/DL
LDLC SERPL CALC-MCNC: 25 MG/DL (ref 0–100)
MICROALBUMIN UR-MCNC: 10.5 MG/L
MICROALBUMIN/CREAT 24H UR: 5 MG/G CREATININE (ref 0–30)
NONHDLC SERPL-MCNC: 42 MG/DL
POTASSIUM SERPL-SCNC: 5.1 MMOL/L (ref 3.5–5.3)
PROT SERPL-MCNC: 6.6 G/DL (ref 6.4–8.4)
SODIUM SERPL-SCNC: 145 MMOL/L (ref 135–147)
T4 FREE SERPL-MCNC: 0.72 NG/DL (ref 0.61–1.12)
TRIGL SERPL-MCNC: 85 MG/DL
TSH SERPL DL<=0.05 MIU/L-ACNC: 6.85 UIU/ML (ref 0.45–4.5)

## 2024-01-11 PROCEDURE — 84443 ASSAY THYROID STIM HORMONE: CPT

## 2024-01-11 PROCEDURE — 82570 ASSAY OF URINE CREATININE: CPT

## 2024-01-11 PROCEDURE — 83036 HEMOGLOBIN GLYCOSYLATED A1C: CPT

## 2024-01-11 PROCEDURE — 82043 UR ALBUMIN QUANTITATIVE: CPT

## 2024-01-11 PROCEDURE — 84439 ASSAY OF FREE THYROXINE: CPT

## 2024-01-17 ENCOUNTER — OFFICE VISIT (OUTPATIENT)
Dept: FAMILY MEDICINE CLINIC | Facility: CLINIC | Age: 64
End: 2024-01-17
Payer: COMMERCIAL

## 2024-01-17 VITALS
HEART RATE: 68 BPM | BODY MASS INDEX: 29.19 KG/M2 | HEIGHT: 67 IN | DIASTOLIC BLOOD PRESSURE: 76 MMHG | WEIGHT: 186 LBS | SYSTOLIC BLOOD PRESSURE: 114 MMHG

## 2024-01-17 DIAGNOSIS — K63.5 POLYP OF COLON, UNSPECIFIED PART OF COLON, UNSPECIFIED TYPE: ICD-10-CM

## 2024-01-17 DIAGNOSIS — K86.89 PANCREATIC INSUFFICIENCY: ICD-10-CM

## 2024-01-17 DIAGNOSIS — G95.9 MYELOPATHY (HCC): ICD-10-CM

## 2024-01-17 DIAGNOSIS — E03.9 ACQUIRED HYPOTHYROIDISM: Primary | ICD-10-CM

## 2024-01-17 DIAGNOSIS — K52.9 CHRONIC DIARRHEA OF UNKNOWN ORIGIN: ICD-10-CM

## 2024-01-17 DIAGNOSIS — C72.0 SPINAL CORD EPENDYMOMA (HCC): ICD-10-CM

## 2024-01-17 DIAGNOSIS — E11.9 TYPE 2 DIABETES MELLITUS WITHOUT COMPLICATION, WITHOUT LONG-TERM CURRENT USE OF INSULIN (HCC): ICD-10-CM

## 2024-01-17 DIAGNOSIS — E78.2 MIXED HYPERLIPIDEMIA: ICD-10-CM

## 2024-01-17 PROCEDURE — 99214 OFFICE O/P EST MOD 30 MIN: CPT | Performed by: FAMILY MEDICINE

## 2024-01-17 NOTE — ASSESSMENT & PLAN NOTE
Improved on combination of WelChol and pancreatic enzymes do recommend that he see GI because of the pancreatic insufficiency

## 2024-01-17 NOTE — PROGRESS NOTES
Assessment/Plan:    Acquired hypothyroidism  Tsh  6.48, no sx on current  dose  of  levothyroxine, wouldn't  change  dose  until gets  to 10, separate  levothyroxine by Welchol  by  4 hours-suggest  right  at bedtime    Chronic diarrhea of unknown origin  Improved on combination of WelChol and pancreatic enzymes do recommend that he see GI because of the pancreatic insufficiency    Pancreatic insufficiency  Improved on combination of WelChol and pancreatic enzymes do recommend that he see GI because of the pancreatic insufficiency    Type 2 diabetes mellitus without complication, without long-term current use of insulin (HCC)    Lab Results   Component Value Date    HGBA1C 6.8 (H) 01/11/2024   Sugar  up  due to holidays,sees  martín, on Trulicity  0.75, will discuss  possible dose  change with endo    Mixed hyperlipidemia  Stable  on Atorvastatin 40 and  Lovaza    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HCC)  stable    Myelopathy (HCC)  Wears brace       Diagnoses and all orders for this visit:    Acquired hypothyroidism    Pancreatic insufficiency  -     Ambulatory Referral to Gastroenterology; Future    Chronic diarrhea of unknown origin    Type 2 diabetes mellitus without complication, without long-term current use of insulin (HCC)    Myelopathy (HCC)    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HCC)    Mixed hyperlipidemia    Polyp of colon, unspecified part of colon, unspecified type  -     Ambulatory Referral to Gastroenterology; Future          Subjective:      Patient ID: Joseph Ibrahim III is a 63 y.o. male.    Patient presents with:  Diabetes: Routine f/u offers no complaints  Patient was having chronic diarrhea and workup showed pancreatic insufficiency he has been on pancreatic enzymes and WelChol and  the diarrhea has resolved he also had some food allergies and has adjusted diet.  He has been on Trulicity for his diabetes but I not sure if that would be a factor in this pancreatic issues.  He is due to see GI for follow-up of colon  "polyp and he should speak with them about the etiology behind the pancreatic issue.  He feels well otherwise diabetes control spiked up a little bit with the holidays but his lipids are are perfect. bACK PAIN IS  MANAGEABLE        The following portions of the patient's history were reviewed and updated as appropriate: allergies, current medications, past family history, past medical history, past social history, past surgical history, and problem list.    Review of Systems   Constitutional:  Negative for activity change, appetite change and fatigue.   Respiratory:  Negative for shortness of breath.    Cardiovascular:  Negative for chest pain.   Musculoskeletal:  Positive for back pain.   Neurological:  Negative for dizziness, light-headedness and headaches.   Hematological:  Negative for adenopathy.         Objective:      /76 (BP Location: Left arm, Patient Position: Sitting, Cuff Size: Standard)   Pulse 68   Ht 5' 7\" (1.702 m) Comment: on file  Wt 84.4 kg (186 lb)   BMI 29.13 kg/m²          Physical Exam  Vitals reviewed.   Constitutional:       Appearance: Normal appearance.   Neck:      Vascular: No carotid bruit.   Cardiovascular:      Rate and Rhythm: Normal rate and regular rhythm.      Pulses: Normal pulses.      Heart sounds: Normal heart sounds.   Pulmonary:      Effort: Pulmonary effort is normal.      Breath sounds: Normal breath sounds.   Abdominal:      General: Abdomen is flat. Bowel sounds are normal.      Palpations: Abdomen is soft.   Musculoskeletal:      Right lower leg: No edema.      Left lower leg: No edema.   Lymphadenopathy:      Cervical: No cervical adenopathy.   Neurological:      Mental Status: He is alert.   Psychiatric:         Mood and Affect: Mood normal.            "

## 2024-01-17 NOTE — ASSESSMENT & PLAN NOTE
Tsh  6.48, no sx on current  dose  of  levothyroxine, wouldn't  change  dose  until gets  to 10, separate  levothyroxine by Welchol  by  4 hours-suggest  right  at bedtime

## 2024-01-17 NOTE — ASSESSMENT & PLAN NOTE
Lab Results   Component Value Date    HGBA1C 6.8 (H) 01/11/2024   Sugar  up  due to holidays,seejass resendiz, on Trulicity  0.75, will discuss  possible dose  change with martín

## 2024-01-20 ENCOUNTER — HOSPITAL ENCOUNTER (OUTPATIENT)
Dept: RADIOLOGY | Facility: HOSPITAL | Age: 64
Discharge: HOME | End: 2024-01-20
Attending: NEUROLOGICAL SURGERY
Payer: COMMERCIAL

## 2024-01-20 DIAGNOSIS — C72.0 SPINAL CORD EPENDYMOMA (HCC): ICD-10-CM

## 2024-01-20 DIAGNOSIS — G95.9 MYELOPATHY (HCC): ICD-10-CM

## 2024-01-20 DIAGNOSIS — Z98.1 H/O SPINAL FUSION: ICD-10-CM

## 2024-01-20 PROCEDURE — G1004 CDSM NDSC: HCPCS

## 2024-01-20 PROCEDURE — A9585 GADOBUTROL INJECTION: HCPCS | Performed by: NEUROLOGICAL SURGERY

## 2024-01-20 PROCEDURE — 72156 MRI NECK SPINE W/O & W/DYE: CPT

## 2024-01-20 RX ORDER — GADOBUTROL 604.72 MG/ML
8 INJECTION INTRAVENOUS
Status: COMPLETED | OUTPATIENT
Start: 2024-01-20 | End: 2024-01-20

## 2024-01-20 RX ADMIN — GADOBUTROL 8 ML: 604.72 INJECTION INTRAVENOUS at 09:09

## 2024-01-23 ENCOUNTER — OFFICE VISIT (OUTPATIENT)
Dept: ENDOCRINOLOGY | Facility: CLINIC | Age: 64
End: 2024-01-23
Payer: COMMERCIAL

## 2024-01-23 VITALS
WEIGHT: 189 LBS | HEART RATE: 63 BPM | DIASTOLIC BLOOD PRESSURE: 74 MMHG | OXYGEN SATURATION: 98 % | BODY MASS INDEX: 29.6 KG/M2 | SYSTOLIC BLOOD PRESSURE: 136 MMHG

## 2024-01-23 DIAGNOSIS — E03.9 ACQUIRED HYPOTHYROIDISM: ICD-10-CM

## 2024-01-23 DIAGNOSIS — E11.65 TYPE 2 DIABETES MELLITUS WITH HYPERGLYCEMIA, WITHOUT LONG-TERM CURRENT USE OF INSULIN (HCC): Primary | ICD-10-CM

## 2024-01-23 DIAGNOSIS — E78.2 MIXED HYPERLIPIDEMIA: ICD-10-CM

## 2024-01-23 PROCEDURE — 99214 OFFICE O/P EST MOD 30 MIN: CPT | Performed by: INTERNAL MEDICINE

## 2024-01-23 RX ORDER — DULAGLUTIDE 1.5 MG/.5ML
1.5 INJECTION, SOLUTION SUBCUTANEOUS
Qty: 2 ML | Refills: 5 | Status: SHIPPED | OUTPATIENT
Start: 2024-01-23

## 2024-01-23 NOTE — PROGRESS NOTES
Joseph Ibrahim III 63 y.o. male MRN: [id number]    Encounter: [id number]      Assessment/Plan     Assessment:  This is a 63 y.o.-year-old male with diabetes with hyperglycemia.    Plan:    Diagnoses and all orders for this visit:    Type 2 diabetes mellitus with hyperglycemia, without long-term current use of insulin ([facility])  Lab Results   Component Value Date    HGBA1C 6.8 (H) 01/11/2024   A1c 6.8%, worsened from last time.  Patient stated that he was not following diet during holidays.  Now again he has started following low carbohydrate diet and his blood sugars are usually in 80 to 160 mg per DL range  Will increase Trulicity to 1.5 mg once a week  Educated about lifestyle modification, dietary adherence  Follow-up in 6 months    -     Hemoglobin A1C; Future  -     Basic metabolic panel; Future  -     dulaglutide (Trulicity) 1.5 MG/0.5ML injection; Inject 0.5 mL (1.5 mg total) under the skin every 7 days    Mixed hyperlipidemia  Continue current management  Acquired hypothyroidism  He is clinically and biochemically euthyroid.  He was taking levothyroxine with WelChol which could affect absorption of levothyroxine.  Educated to take medication 4-hour apart     Follow-up in 6 months with blood work prior  CC: Diabetes    History of Present Illness     HPI:    Joseph Ibrahim III is 63-year-old gentleman with medical history of type 2 diabetes, hyperlipidemia, hypothyroidism is here for follow-up.  For diabetes management he takes Trulicity 0.75 mg once a week.  He is tolerating it well, denies side effects.  Last A1c 6.8%.  Diabetes course has been stable.  Denies hospitalization for hyperglycemia or hypoglycemia.    Patient could not tolerate metformin because of GI symptoms    For hyperlipidemia he takes Lipitor 40 mg daily, denies side effects managed by PCP  Takes fish oil 2000 mg daily  For hypothyroidism he takes levothyroxine 75 mcg daily on empty stomach 1 hour before breakfast    Lab Results    Component Value Date    [id number] 6.848 (H) 01/11/2024     Component      Latest Ref Rng 1/11/2024   Cholesterol      See Comment mg/dL 76    Triglycerides      See Comment mg/dL 85    HDL      >=40 mg/dL 34 (L)    LDL Calculated      0 - 100 mg/dL 25    Non-HDL Cholesterol      mg/dl 42       Legend:  (L) Low  Lab Results   Component Value Date    HGBA1C 6.8 (H) 01/11/2024     Component      Latest Ref Rng 1/11/2024   Sodium      135 - 147 mmol/L 145    Potassium      3.5 - 5.3 mmol/L 5.1    Chloride      96 - 108 mmol/L 107    CO2      21 - 32 mmol/L 28    Anion Gap      mmol/L 10    BUN      5 - 25 mg/dL 18    Creatinine      0.60 - 1.30 mg/dL 0.90    GLUCOSE FASTING      65 - 99 mg/dL 114 (H)    Calcium      8.4 - 10.2 mg/dL 8.6    AST      13 - 39 U/L 21    ALT      7 - 52 U/L 40    Alkaline Phosphatase      34 - 104 U/L 71    Total Protein      6.4 - 8.4 g/dL 6.6    Albumin      3.5 - 5.0 g/dL 4.0    TOTAL BILIRUBIN      0.20 - 1.00 mg/dL 1.26 (H)    eGFR      ml/min/1.73sq m 89    Cholesterol      See Comment mg/dL 76    Triglycerides      See Comment mg/dL 85    HDL      >=40 mg/dL 34 (L)    LDL Calculated      0 - 100 mg/dL 25    Non-HDL Cholesterol      mg/dl 42    EXT Creatinine Urine      Reference range not established. mg/dL 196.4    Albumin,U,Random      <20.0 mg/L 10.5    Albumin Creat Ratio      0 - 30 mg/g creatinine 5    Hemoglobin A1C      Normal 4.0-5.6%; PreDiabetic 5.7-6.4%; Diabetic >=6.5%; Glycemic control for adults with diabetes <7.0% % 6.8 (H)    eAG, EST AVG Glucose      mg/dl 148    Free T4      0.61 - 1.12 ng/dL 0.72    TSH 3RD GENERATON      0.450 - 4.500 uIU/mL 6.848 (H)            Review of Systems   Constitutional:  Negative for activity change, diaphoresis, fatigue, fever and unexpected weight change.   HENT: Negative.     Eyes:  Negative for visual disturbance.   Respiratory:  Negative for cough, chest tightness and shortness of breath.    Cardiovascular:  Negative for chest  pain, palpitations and leg swelling.   Gastrointestinal:  Negative for abdominal pain, blood in stool, constipation, diarrhea, nausea and vomiting.   Endocrine: Negative for cold intolerance, heat intolerance, polydipsia, polyphagia and polyuria.   Genitourinary:  Negative for dysuria, enuresis, frequency and urgency.   Musculoskeletal:  Negative for arthralgias and myalgias.   Skin:  Negative for pallor, rash and wound.   Allergic/Immunologic: Negative.    Neurological:  Negative for dizziness, tremors, weakness and numbness.   Hematological: Negative.    Psychiatric/Behavioral: Negative.         Historical Information   Past Medical History:   Diagnosis Date    BPH associated with nocturia     Cancer (HCC)     Cervical spinal mass (HCC) 01/07/2019    cervicothoracic    Colon polyp 8/23/2023    Diabetes mellitus (HCC)     Disease of thyroid gland     Herniated disc, cervical     History of radiation therapy     Hyperlipidemia     Impaired fasting glucose     Radiation-induced myelopathy (HCC) 6/19/2019     Past Surgical History:   Procedure Laterality Date    APPENDECTOMY      CERVICAL FUSION      COLONOSCOPY  03/13/2012    NORMAL; RECHECK IN 5 YEARS    FL LUMBAR PUNCTURE DIAGNOSTIC  2/21/2019    WA TY BX/EXC ISPI EBONIE IDRL IMED CERVICAL N/A 1/24/2019    Procedure: Posterior C4-T3 laminectomy; resection of intramedullary mass C5-T3, including fenestration of syrinx C7-T2; C3-T4 fixation/fusion; additional levels as needed;  Surgeon: Joe Bradshaw MD;  Location: [facility];  Service: Neurosurgery    WISDOM TOOTH EXTRACTION       Social History   Social History     Substance and Sexual Activity   Alcohol Use Yes    Alcohol/week: 2.0 standard drinks of alcohol    Types: 2 Cans of beer per week    Comment: Occasionaly     Social History     Substance and Sexual Activity   Drug Use No     Social History     Tobacco Use   Smoking Status Former    Current packs/day: 0.00    Types: Cigarettes    Quit date: 5/16/1993     Years since quittin.7   Smokeless Tobacco Never     Family History:   Family History   Problem Relation Age of Onset    Diabetes Mother     Hypertension Father     Cerebral palsy Sister     Breast cancer Daughter        Meds/Allergies   Current Outpatient Medications   Medication Sig Dispense Refill    atorvastatin (LIPITOR) 40 mg tablet Take 1 tablet (40 mg total) by mouth daily 90 tablet 0    colesevelam (WELCHOL) 625 mg tablet Take 2 tablets (1,250 mg total) by mouth 2 (two) times a day with meals 540 tablet 1    dulaglutide (Trulicity) 1.5 MG/0.5ML injection Inject 0.5 mL (1.5 mg total) under the skin every 7 days 2 mL 5    ibuprofen (MOTRIN) 200 mg tablet Take 400 mg by mouth as needed for mild pain      levothyroxine 75 mcg tablet TAKE 1 TABLET BY MOUTH EVERY DAY 90 tablet 1    omega-3-acid ethyl esters (LOVAZA) 1 g capsule Take 2 capsules (2 g total) by mouth daily 180 capsule 1    Pancrelipase, Lip-Prot-Amyl, (CREON) 3014-8481 units CPEP delayed release capsule Take 2 capsules (6,000 Units total) by mouth 3 (three) times a day with meals 270 capsule 1    tiZANidine (ZANAFLEX) 2 mg tablet Take 1 tablet (2 mg total) by mouth every 8 (eight) hours as needed for muscle spasms 90 tablet 1     No current facility-administered medications for this visit.     Allergies   Allergen Reactions    Bee Venom Hives, Itching and Edema     Category: Allergy;     Penicillins Hives and Itching     Category: Allergy;        Objective   Vitals: Blood pressure 136/74, pulse 63, weight 85.7 kg (189 lb), SpO2 98%.    Physical Exam  Constitutional:       General: He is not in acute distress.     Appearance: Normal appearance. He is not ill-appearing.   HENT:      Head: Normocephalic and atraumatic.      Nose: Nose normal.   Eyes:      Extraocular Movements: Extraocular movements intact.      Conjunctiva/sclera: Conjunctivae normal.   Pulmonary:      Effort: No respiratory distress.   Musculoskeletal:      Cervical back: Normal  "range of motion.   Neurological:      General: No focal deficit present.      Mental Status: He is alert and oriented to person, place, and time.   Psychiatric:         Mood and Affect: Mood normal.         Behavior: Behavior normal.         The history was obtained from the review of the chart, patient.    Lab Results:   Lab Results   Component Value Date/Time    Hemoglobin A1C 6.8 (H) 01/11/2024 08:18 AM    Hemoglobin A1C 6.0 07/19/2023 09:00 AM    Hemoglobin A1C 6.1 (H) 06/28/2023 08:38 AM    BUN 18 01/11/2024 08:18 AM    BUN 19 06/28/2023 08:38 AM    Potassium 5.1 01/11/2024 08:18 AM    Potassium 4.9 06/28/2023 08:38 AM    Chloride 107 01/11/2024 08:18 AM    Chloride 115 (H) 06/28/2023 08:38 AM    CO2 28 01/11/2024 08:18 AM    CO2 26 06/28/2023 08:38 AM    Creatinine 0.90 01/11/2024 08:18 AM    Creatinine 0.92 06/28/2023 08:38 AM    AST 21 01/11/2024 08:18 AM    ALT 40 01/11/2024 08:18 AM    Total Protein 6.6 01/11/2024 08:18 AM    Albumin 4.0 01/11/2024 08:18 AM    HDL, Direct 34 (L) 01/11/2024 08:18 AM    HDL, Direct 28 (L) 05/08/2023 09:17 AM    Triglycerides 85 01/11/2024 08:18 AM    Triglycerides 100 05/08/2023 09:17 AM           Imaging Studies: I have personally reviewed pertinent reports.      Portions of the record may have been created with voice recognition software. Occasional wrong word or \"sound a like\" substitutions may have occurred due to the inherent limitations of voice recognition software. Read the chart carefully and recognize, using context, where substitutions have occurred.    "

## 2024-01-26 ENCOUNTER — OFFICE VISIT (OUTPATIENT)
Dept: NEUROSURGERY | Facility: CLINIC | Age: 64
End: 2024-01-26
Payer: COMMERCIAL

## 2024-01-26 ENCOUNTER — TELEPHONE (OUTPATIENT)
Dept: NEUROSURGERY | Facility: CLINIC | Age: 64
End: 2024-01-26

## 2024-01-26 VITALS
BODY MASS INDEX: 29.03 KG/M2 | HEART RATE: 82 BPM | HEIGHT: 67 IN | DIASTOLIC BLOOD PRESSURE: 80 MMHG | OXYGEN SATURATION: 100 % | RESPIRATION RATE: 20 BRPM | SYSTOLIC BLOOD PRESSURE: 110 MMHG | TEMPERATURE: 98.2 F | WEIGHT: 185 LBS

## 2024-01-26 DIAGNOSIS — Z98.1 ARTHRODESIS STATUS: ICD-10-CM

## 2024-01-26 DIAGNOSIS — Z98.1 STATUS POST CERVICAL SPINAL FUSION: ICD-10-CM

## 2024-01-26 DIAGNOSIS — C72.0 SPINAL CORD EPENDYMOMA (HCC): ICD-10-CM

## 2024-01-26 DIAGNOSIS — M21.371 RIGHT FOOT DROP: Primary | ICD-10-CM

## 2024-01-26 PROCEDURE — 99214 OFFICE O/P EST MOD 30 MIN: CPT | Performed by: NURSE PRACTITIONER

## 2024-01-26 NOTE — TELEPHONE ENCOUNTER
I called [facility] [phone] spoke with sheri she gave me fax number - I faxed DME script to [facility] raj galaviz [facility] [phone] att ORTHO ,  pt will  next week also gave pt a copy and faxed it while pt was here at checkout -ba

## 2024-01-26 NOTE — PROGRESS NOTES
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  He reported and demonstrated the ability for minimal dorsiflexion in the right foot and plantar.  Reports increased movement and right foot.  Weakness in right lower extremity 3/5 HF, KF, and DP  Ambulates with a limp, and right  He denies bowel or bladder dysfunction from a neurosurgical surgical standpoint.  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  Wears an AFO brace for the right lower extremity as needed.  Reports that toe of the brace broke, and the strap on the AFO (broke, was repaired by the DME company and broke again therefore has a mild fitting AFO brace.       Plan  Reviewed MRI cervical with and without contrast, no evidence of recurrent ependymoma, evidence of cystic myelomalacia versus.  Discussed with patient we will continue to follow NCCN guidelines for a follow-up in 1 year MRI cervical spine with and without contrast due on or shortly after 1/20/2024.  Will plan for follow-up appointment 3 days to 1 week post completion.  2024 imaging is the 5-year joselin post surgery.  Discuss at next visit extending surveillance imaging past the annual joselin.  Ordered DME for new AFO explained circumstances to support necessity as he is less than 2 years from receiving the AFO.  Provided patient with a list of DME vendors, he plans to use [employer] on [facility].  Discussed with the patient in detail the appeals  process with his insurance company for coverage before the 2-year joselin due to defects and AFO breaking in several areas.  Explained to patient he should argue medical necessity and quality of life to support the need for a functional AFO.  Advised patient if he has worsening symptoms to the neurosurgery office for a sooner appointment.  Advised patient if he has additional questions or concerns contact the neurosurgery office.  Dr. Bradshaw met with patient refer to his attestation statement for details.            Subjective: 1 year f/u w/ imaining surveillance     Patient ID: Joseph Ibrahim III is a 63 y.o. male     HPI   1/11/2019 initial consult visit with Dr. Bradshaw he reported multiple neuropathic/myelopathic symptoms occasional burning in the left forearm and hand, accelerated dragging the right leg over the past 2 to 3, years numbness in the right more than left leg and bilateral feet over this time as well , and a spastic gait.  He was referred to this office from orthopedics Dr. Wang    Imaging  1/7/2019 MRI Cervical w/wo  Heterogeneous neoplasm of the cervicothoracic junction consistent with primary glial tumor.  Pathology extends from the C4-C5 disc space to the caudal T3 level.  Mild peripheral enhancement. * I personally telephoned this result to RUSTAM WANG on 1/8/2019 11:16 AM.    Case was reviewed at [facility], and 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, although astrocytoma and metastasis are not excluded.    History certainly most consistent with ependymoma.     S/p resection, decompression, fusion for his intramedullary spinal cord ependymoma, WHO grade 2  (1/24/19). The mass spanned from the C4-5 disc space down to the T3-4 disc space. We were able to resect this aggressively at its largest part, I.e. T1-2, but did not proceed further as his neurological signals declined intraoperatively.    He did well postoperatively, and was discharged to [facility].     MRI scan of brain, L-spine, and  T-spine   Done after surgery showed no drop metastases.  CSF testing was negative.       At f/u in early 3/2019, was continuing to improve. He was using no ambulatory aids. His right DF was at least 4/5, but apractic, however this was also improving. He stated he even had some proprioception on the right, which was improved vs. Prior to surgery. He had some mild tingling in his forearms and hands, but no pain or weakness. His incision was healing well. Bowel and bladder function were back to normal. No radicular pains. Xrays were stable, and we planned for adjuvant IMRT.     He completed this 5/22/19, to 45 Gy    Starting a few days after his RT, his neurological symptoms began to re-emerge.  His right foot drop became more pronounced, and he was unsteady on his feet had a few falls.  His balance was unsteady.  His occasional radicular pain into the right leg 2-3/10, as well as left arm and right hand pains, pins and needles, 5/10     Of note he reports today has lumbar disc herniations  . Has treared  with Dr. Guardado , interventional treatments of injections a  RFA       NCCN guideline  Recommendation is for follow-up MRI scan cervical spine in 6-12 months, and we agreed on 12 months, which I have ordered.  I will see him after that is completed.     07/22/20 Metrics: EQ5D5L 14777=3.845; VAS 65; MJOA 10/17     01/20/21 Metrics: EQ5D5L 70680=1.732; VAS 65-70; ECOG 2; KPS 70; MJOA 12/17 01/19/22 Metrics: EQ5D5L 31168=5.778; VAS 70-75; ECOG 2; KPS 70; MJOA 11/17 01/18/23 Metrics: EQ5D5L 89355=2.784; VAS 70; MJOA 9/17 01/26/24 Metrics EQ5D5L  3,2,3,3, 1+  =0.704, VAS 64 , ECOG 2, KPS 60  MJOA 8/17            REVIEW OF SYSTEMS  Review of Systems   Constitutional: Negative.    HENT: Negative.          Diagnosed covid positive on 1/18/22, symptoms are sore throat, running nose, slight headache   Eyes: Negative.    Respiratory: Negative.     Cardiovascular:  Positive for leg swelling (unchanged since last visit,  right leg/ankle but mild).   Gastrointestinal: Negative.         Urgency with passing bowels, has had 1 accident within a year   Endocrine: Negative.    Genitourinary:  Negative for frequency (nocturia x1).   Musculoskeletal:  Positive for back pain (soreness) and gait problem (unchanged since last visit, right foot drag/drop, brace is helping, unsteady balance, using standard cane, 1 fall since last visit, f/w PT).        Currently going to the gym x2-3 a week   Skin: Negative.    Allergic/Immunologic: Positive for environmental allergies (bee venom).   Neurological:  Positive for weakness (right leg, drags right foot) and numbness (unchanged since last visit, left arm, right (elbow-resolved since last visit) hand occasional numbness/tingling, numbness in B/L fingers and right foot, when sitting for long period of time gets numbness in entire right leg). Negative for dizziness, tremors, seizures, syncope, speech difficulty, light-headedness (improved since last visit, getting up from lying down) and headaches.   Hematological: Negative.    Psychiatric/Behavioral:  Positive for sleep disturbance (due to pain in neck during sleep position).          Meds/Allergies     Current Outpatient Medications   Medication Sig Dispense Refill    atorvastatin (LIPITOR) 40 mg tablet Take 1 tablet (40 mg total) by mouth daily 90 tablet 0    colesevelam (WELCHOL) 625 mg tablet Take 2 tablets (1,250 mg total) by mouth 2 (two) times a day with meals 540 tablet 1    dulaglutide (Trulicity) 1.5 MG/0.5ML injection Inject 0.5 mL (1.5 mg total) under the skin every 7 days 2 mL 5    ibuprofen (MOTRIN) 200 mg tablet Take 400 mg by mouth as needed for mild pain      levothyroxine 75 mcg tablet TAKE 1 TABLET BY MOUTH EVERY DAY 90 tablet 1    omega-3-acid ethyl esters (LOVAZA) 1 g capsule Take 2 capsules (2 g total) by mouth daily 180 capsule 1    Pancrelipase, Lip-Prot-Amyl, (CREON) 6438-9233 units CPEP delayed release capsule Take 2 capsules  (6,000 Units total) by mouth 3 (three) times a day with meals 270 capsule 1    tiZANidine (ZANAFLEX) 2 mg tablet Take 1 tablet (2 mg total) by mouth every 8 (eight) hours as needed for muscle spasms 90 tablet 1     No current facility-administered medications for this visit.       Allergies   Allergen Reactions    Bee Venom Hives, Itching and Edema     Category: Allergy;     Penicillins Hives and Itching     Category: Allergy;        PAST HISTORY    Past Medical History:   Diagnosis Date    BPH associated with nocturia     Cancer (HCC)     Cervical spinal mass (HCC) 2019    cervicothoracic    Colon polyp 2023    Diabetes mellitus (HCC)     Disease of thyroid gland     Herniated disc, cervical     History of radiation therapy     Hyperlipidemia     Impaired fasting glucose     Radiation-induced myelopathy (HCC) 2019       Past Surgical History:   Procedure Laterality Date    APPENDECTOMY      CERVICAL FUSION      COLONOSCOPY  2012    NORMAL; RECHECK IN 5 YEARS    FL LUMBAR PUNCTURE DIAGNOSTIC  2019    SC TY BX/EXC ISPI EBONIE IDRL IMED CERVICAL N/A 2019    Procedure: Posterior C4-T3 laminectomy; resection of intramedullary mass C5-T3, including fenestration of syrinx C7-T2; C3-T4 fixation/fusion; additional levels as needed;  Surgeon: Joe Bradshaw MD;  Location: [facility];  Service: Neurosurgery    WISDOM TOOTH EXTRACTION         Social History     Tobacco Use    Smoking status: Former     Current packs/day: 0.00     Types: Cigarettes     Quit date: 1993     Years since quittin.7    Smokeless tobacco: Never   Vaping Use    Vaping status: Never Used   Substance Use Topics    Alcohol use: Yes     Alcohol/week: 2.0 standard drinks of alcohol     Types: 2 Cans of beer per week     Comment: Occasionaly    Drug use: No       Family History   Problem Relation Age of Onset    Diabetes Mother     Hypertension Father     Cerebral palsy Sister     Breast cancer Daughter        The  "following portions of the patient's history were reviewed and updated as appropriate: allergies, current medications, past family history, past medical history, past social history, past surgical history and problem list.      EXAM    Vitals:Blood pressure 110/80, pulse 82, temperature 98.2 °F (36.8 °C), temperature source Temporal, resp. rate 20, height 5' 7\" (1.702 m), weight 83.9 kg (185 lb), SpO2 100%.,Body mass index is 28.98 kg/m².     Physical Exam  Vitals and nursing note reviewed.   HENT:      Head: Normocephalic and atraumatic.   Eyes:      General: No scleral icterus.        Right eye: No discharge.         Left eye: No discharge.   Pulmonary:      Effort: Pulmonary effort is normal. No respiratory distress.   Musculoskeletal:      Right lower leg: No edema.      Left lower leg: No edema.   Neurological:      Mental Status: He is alert and oriented to person, place, and time.      Sensory: No sensory deficit.      Motor: Weakness present.      Coordination: Coordination abnormal.      Gait: Gait abnormal.   Psychiatric:         Mood and Affect: Mood normal.         Speech: Speech normal.         Behavior: Behavior normal.         Neurologic Exam     Mental Status   Oriented to person, place, and time.   Attention: normal. Concentration: normal.   Speech: speech is normal   Level of consciousness: alert  Knowledge: good.     Motor Exam   Muscle bulk: decreased    Strength   Right iliopsoas: 3/5  Left iliopsoas: 5/5  Right quadriceps: 3/5  Left quadriceps: 5/5  Right hamstring: 3/5  Left hamstrin/5  Right glutei: 4/5  Left glutei: 5/5  Right anterior tibial: 3/5  Left anterior tibial: 5/5  Right posterior tibial: 3/5  Left posterior tibial: 5/5  Right peroneal: 3/5  Left peroneal: 5/5  Left gastroc: 5/5    Sensory Exam   Light touch normal.       Imaging Studies  MRI cervical spine with and without contrast    Result Date: 2024  Narrative: MRI CERVICAL SPINE WITH AND WITHOUT CONTRAST INDICATION: " C72.0: Malignant neoplasm of spinal cord Z98.1: Arthrodesis status G95.9: Disease of spinal cord, unspecified. Ependymoma. COMPARISON: MRI dated 1/13/2023. TECHNIQUE:  Multiplanar, multisequence imaging of the cervical spine was performed before and after gadolinium administration. . IV Contrast:  8 mL of Gadobutrol injection (SINGLE-DOSE) IMAGE QUALITY:  Diagnostic. FINDINGS: ALIGNMENT: Stable postoperative changes status post posterior fusion from C2-T5 with decompressive laminectomies from C4-T4. Grade 1 anterolisthesis at C7-T1 is unchanged. MARROW SIGNAL: Fatty marrow replacement from C3-T5, likely sequela of prior radiation. CERVICAL AND VISUALIZED UPPER THORACIC CORD: Stable cystic myelomalacia at T1 and T2. Stable atrophy of the lower cervical cord. Mild atrophy and focal cystic encephalomalacia in the right aspect of the cord at T3 is unchanged. No recurrent mass or abnormal enhancement. PREVERTEBRAL AND PARASPINAL SOFT TISSUES:  Normal. VISUALIZED POSTERIOR FOSSA:  The visualized posterior fossa demonstrates no abnormal signal. CERVICAL AND UPPER THORACIC DISC SPACES: Multilevel decompressive laminectomies. No canal or foraminal stenosis. Stable small disc bulges C3-4 to C5-6 that minimally indents the thecal sac. POSTCONTRAST IMAGING:  Normal. OTHER FINDINGS:  None.     Impression: Stable posttreatment changes status post resection of ependymoma with cystic myelomalacia at T1/T2.. No residual/recurrent disease. Workstation performed: [id number]       I have personally reviewed pertinent reports.   and I have personally reviewed pertinent films in PACS    I have spent a total time of 30 minutes on 01/26/24 in caring for this patient including Diagnostic results, Risks and benefits of tx options, Instructions for management, Patient and family education, Importance of tx compliance, Risk factor reductions, Impressions, Counseling / Coordination of care, Documenting in the medical record, Reviewing / ordering  tests, medicine, procedures  , Obtaining or reviewing history  , and Communicating with other healthcare professionals .

## 2024-01-26 NOTE — ASSESSMENT & PLAN NOTE
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

## 2024-01-26 NOTE — ASSESSMENT & PLAN NOTE
As addressed in HPI  Continues with right lower extremity dropfoot, slight eversion right foot.  He reported and demonstrated the ability for minimal dorsiflexion in the right foot and plantar.  Reports increased movement and right foot.  Weakness in right lower extremity 3/5 HF, KF, and DP  Ambulates with a limp, and right  He denies bowel or bladder dysfunction from a neurosurgical surgical standpoint.  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  Wears an AFO brace for the right lower extremity as needed.  Reports that toe of the brace broke, and the strap on the AFO (broke, was repaired by the DME company and broke again therefore has a mild fitting AFO brace.       Plan  Reviewed MRI cervical with and without contrast, no evidence of recurrent ependymoma, evidence of cystic myelomalacia versus.  Discussed with patient we will continue to follow NCCN guidelines for a follow-up in 1 year MRI cervical spine with and without contrast due on or shortly after 1/20/2024.  Will plan for follow-up appointment 3 days to 1 week post completion.  2024 imaging is the 5-year joselin post surgery.  Discuss at next visit extending surveillance imaging past the annual joselin.  Ordered DME for new AFO explained circumstances to support necessity as he is less than 2 years from receiving the AFO.  Provided patient with a list of DME vendors, he plans to use [employer] on [facility].  Discussed with the patient in detail the appeals process with his insurance company for coverage before the 2-year joselin due to defects and AFO breaking in several areas.  Explained to patient he should argue medical necessity and quality of life to support the need for a functional AFO.  Advised patient if he has worsening symptoms to the neurosurgery office for a sooner appointment.  Advised patient if he has additional questions or concerns contact the neurosurgery office.  Dr. Bradshaw met with  patient refer to his attestation statement for details.

## 2024-02-07 DIAGNOSIS — K52.9 CHRONIC DIARRHEA OF UNKNOWN ORIGIN: ICD-10-CM

## 2024-02-07 DIAGNOSIS — K86.89 PANCREATIC INSUFFICIENCY: ICD-10-CM

## 2024-03-06 DIAGNOSIS — K86.89 PANCREATIC INSUFFICIENCY: ICD-10-CM

## 2024-03-06 DIAGNOSIS — K52.9 CHRONIC DIARRHEA OF UNKNOWN ORIGIN: ICD-10-CM

## 2024-03-06 DIAGNOSIS — E11.65 TYPE 2 DIABETES MELLITUS WITH HYPERGLYCEMIA, WITHOUT LONG-TERM CURRENT USE OF INSULIN (HCC): ICD-10-CM

## 2024-03-06 RX ORDER — DULAGLUTIDE 1.5 MG/.5ML
1.5 INJECTION, SOLUTION SUBCUTANEOUS
Qty: 2 ML | Refills: 0 | Status: SHIPPED | OUTPATIENT
Start: 2024-03-06

## 2024-03-08 DIAGNOSIS — K86.89 PANCREATIC INSUFFICIENCY: ICD-10-CM

## 2024-03-08 DIAGNOSIS — K52.9 CHRONIC DIARRHEA OF UNKNOWN ORIGIN: ICD-10-CM

## 2024-03-08 RX ORDER — PANCRELIPASE 15000; 3000; 9500 [USP'U]/1; [USP'U]/1; [USP'U]/1
CAPSULE, DELAYED RELEASE ORAL
Qty: 280 CAPSULE | Refills: 0 | Status: SHIPPED | OUTPATIENT
Start: 2024-03-08 | End: 2024-03-08

## 2024-03-08 RX ORDER — PANCRELIPASE 15000; 3000; 9500 [USP'U]/1; [USP'U]/1; [USP'U]/1
CAPSULE, DELAYED RELEASE ORAL
Qty: 280 CAPSULE | Refills: 0 | Status: SHIPPED | OUTPATIENT
Start: 2024-03-08 | End: 2024-03-11 | Stop reason: SDUPTHER

## 2024-03-09 ENCOUNTER — PATIENT MESSAGE (OUTPATIENT)
Dept: FAMILY MEDICINE CLINIC | Facility: CLINIC | Age: 64
End: 2024-03-09

## 2024-03-09 DIAGNOSIS — K52.9 CHRONIC DIARRHEA OF UNKNOWN ORIGIN: ICD-10-CM

## 2024-03-09 DIAGNOSIS — K86.89 PANCREATIC INSUFFICIENCY: ICD-10-CM

## 2024-03-11 ENCOUNTER — PATIENT MESSAGE (OUTPATIENT)
Dept: FAMILY MEDICINE CLINIC | Facility: CLINIC | Age: 64
End: 2024-03-11

## 2024-03-11 DIAGNOSIS — K52.9 CHRONIC DIARRHEA OF UNKNOWN ORIGIN: Primary | ICD-10-CM

## 2024-03-11 DIAGNOSIS — K86.89 PANCREATIC INSUFFICIENCY: ICD-10-CM

## 2024-03-11 RX ORDER — PANCRELIPASE 15000; 3000; 9500 [USP'U]/1; [USP'U]/1; [USP'U]/1
6000 CAPSULE, DELAYED RELEASE ORAL
Qty: 280 CAPSULE | Refills: 0 | Status: SHIPPED | OUTPATIENT
Start: 2024-03-11 | End: 2024-03-15 | Stop reason: ALTCHOICE

## 2024-03-15 RX ORDER — PANCRELIPASE LIPASE, PANCRELIPASE PROTEASE, PANCRELIPASE AMYLASE 5000; 17000; 24000 [USP'U]/1; [USP'U]/1; [USP'U]/1
1 CAPSULE, DELAYED RELEASE ORAL
Qty: 90 CAPSULE | Refills: 1 | Status: SHIPPED | OUTPATIENT
Start: 2024-03-15

## 2024-03-25 DIAGNOSIS — E78.5 HLD (HYPERLIPIDEMIA): ICD-10-CM

## 2024-03-25 DIAGNOSIS — E11.65 TYPE 2 DIABETES MELLITUS WITH HYPERGLYCEMIA, WITHOUT LONG-TERM CURRENT USE OF INSULIN (HCC): ICD-10-CM

## 2024-03-26 RX ORDER — DULAGLUTIDE 1.5 MG/.5ML
1.5 INJECTION, SOLUTION SUBCUTANEOUS
Qty: 2 ML | Refills: 5 | Status: SHIPPED | OUTPATIENT
Start: 2024-03-26

## 2024-03-26 RX ORDER — ATORVASTATIN CALCIUM 40 MG/1
40 TABLET, FILM COATED ORAL DAILY
Qty: 90 TABLET | Refills: 1 | Status: SHIPPED | OUTPATIENT
Start: 2024-03-26

## 2024-03-30 ENCOUNTER — PATIENT MESSAGE (OUTPATIENT)
Dept: ENDOCRINOLOGY | Facility: CLINIC | Age: 64
End: 2024-03-30

## 2024-04-01 ENCOUNTER — TELEPHONE (OUTPATIENT)
Age: 64
End: 2024-04-01

## 2024-04-01 DIAGNOSIS — E11.65 TYPE 2 DIABETES MELLITUS WITH HYPERGLYCEMIA, WITHOUT LONG-TERM CURRENT USE OF INSULIN (HCC): Primary | ICD-10-CM

## 2024-04-01 NOTE — TELEPHONE ENCOUNTER
Patient called about his trulicity. It is out of stock and he us unable to get it. He wants to know what he can take in place of that?    Please advise and call back at [phone]

## 2024-04-02 NOTE — TELEPHONE ENCOUNTER
Please inform patient that instead of Trulicity we have sent Ozempic which is equivalent, not sure if it will be covered by his insurance

## 2024-04-03 ENCOUNTER — TELEPHONE (OUTPATIENT)
Dept: ENDOCRINOLOGY | Facility: CLINIC | Age: 64
End: 2024-04-03

## 2024-04-05 NOTE — TELEPHONE ENCOUNTER
Patient calling about this.  Has been out of med for 2 weeks.  Please keep patient posted on the status of the prior auth.

## 2024-04-09 ENCOUNTER — TELEPHONE (OUTPATIENT)
Age: 64
End: 2024-04-09

## 2024-04-09 NOTE — TELEPHONE ENCOUNTER
PA for Ozempic    Submitted via    []CMM-KEY [id number]  []Surescripts-Case ID #   []Faxed to plan   []Other website   []Phone call Case ID #     Office notes sent, clinical questions answered. Awaiting determination    Turnaround time for your insurance to make a decision on your Prior Authorization can take 7-21 business days.

## 2024-04-14 NOTE — TELEPHONE ENCOUNTER
PA for OZEMPIC    Submitted via    [x]CMM-KEY [id number]  []SurescriIDx-Case ID #    []Faxed to plan   []Other website    []Phone call Case ID #      Office notes sent, clinical questions answered. Awaiting determination    Turnaround time for your insurance to make a decision on your Prior Authorization can take 7-21 business days.

## 2024-04-18 DIAGNOSIS — K52.9 CHRONIC DIARRHEA OF UNKNOWN ORIGIN: ICD-10-CM

## 2024-04-18 DIAGNOSIS — K86.89 PANCREATIC INSUFFICIENCY: ICD-10-CM

## 2024-04-18 RX ORDER — PANCRELIPASE LIPASE, PANCRELIPASE PROTEASE, PANCRELIPASE AMYLASE 5000; 17000; 24000 [USP'U]/1; [USP'U]/1; [USP'U]/1
1 CAPSULE, DELAYED RELEASE ORAL
Qty: 90 CAPSULE | Refills: 5 | Status: SHIPPED | OUTPATIENT
Start: 2024-04-18

## 2024-05-11 DIAGNOSIS — E11.65 TYPE 2 DIABETES MELLITUS WITH HYPERGLYCEMIA, WITHOUT LONG-TERM CURRENT USE OF INSULIN (HCC): ICD-10-CM

## 2024-05-21 DIAGNOSIS — K86.89 PANCREATIC INSUFFICIENCY: ICD-10-CM

## 2024-05-21 DIAGNOSIS — K52.9 CHRONIC DIARRHEA OF UNKNOWN ORIGIN: ICD-10-CM

## 2024-05-21 RX ORDER — PANCRELIPASE LIPASE, PANCRELIPASE PROTEASE, PANCRELIPASE AMYLASE 5000; 17000; 24000 [USP'U]/1; [USP'U]/1; [USP'U]/1
1 CAPSULE, DELAYED RELEASE ORAL
Qty: 90 CAPSULE | Refills: 1 | Status: SHIPPED | OUTPATIENT
Start: 2024-05-21 | End: 2024-05-24

## 2024-05-23 DIAGNOSIS — K52.9 CHRONIC DIARRHEA OF UNKNOWN ORIGIN: ICD-10-CM

## 2024-05-23 DIAGNOSIS — E03.9 HYPOTHYROIDISM, UNSPECIFIED TYPE: ICD-10-CM

## 2024-05-23 DIAGNOSIS — K86.89 PANCREATIC INSUFFICIENCY: ICD-10-CM

## 2024-05-24 RX ORDER — LEVOTHYROXINE SODIUM 0.07 MG/1
75 TABLET ORAL DAILY
Qty: 90 TABLET | Refills: 1 | Status: SHIPPED | OUTPATIENT
Start: 2024-05-24

## 2024-05-24 RX ORDER — PANCRELIPASE LIPASE, PANCRELIPASE PROTEASE, PANCRELIPASE AMYLASE 5000; 17000; 24000 [USP'U]/1; [USP'U]/1; [USP'U]/1
1 CAPSULE, DELAYED RELEASE ORAL
Qty: 90 CAPSULE | Refills: 1 | Status: SHIPPED | OUTPATIENT
Start: 2024-05-24

## 2024-05-29 ENCOUNTER — PATIENT MESSAGE (OUTPATIENT)
Dept: FAMILY MEDICINE CLINIC | Facility: CLINIC | Age: 64
End: 2024-05-29

## 2024-05-29 DIAGNOSIS — K86.89 PANCREATIC INSUFFICIENCY: ICD-10-CM

## 2024-05-29 DIAGNOSIS — K52.9 CHRONIC DIARRHEA OF UNKNOWN ORIGIN: ICD-10-CM

## 2024-06-05 DIAGNOSIS — K86.89 PANCREATIC INSUFFICIENCY: ICD-10-CM

## 2024-06-05 DIAGNOSIS — K52.9 CHRONIC DIARRHEA OF UNKNOWN ORIGIN: ICD-10-CM

## 2024-06-05 RX ORDER — PANCRELIPASE 15000; 3000; 9500 [USP'U]/1; [USP'U]/1; [USP'U]/1
6000 CAPSULE, DELAYED RELEASE ORAL
Qty: 280 CAPSULE | Refills: 0 | Status: SHIPPED | OUTPATIENT
Start: 2024-06-05 | End: 2024-06-06

## 2024-06-06 DIAGNOSIS — K86.89 PANCREATIC INSUFFICIENCY: ICD-10-CM

## 2024-06-06 DIAGNOSIS — K52.9 CHRONIC DIARRHEA OF UNKNOWN ORIGIN: ICD-10-CM

## 2024-06-06 RX ORDER — PANCRELIPASE 15000; 3000; 9500 [USP'U]/1; [USP'U]/1; [USP'U]/1
CAPSULE, DELAYED RELEASE ORAL
Qty: 270 CAPSULE | Refills: 1 | Status: SHIPPED | OUTPATIENT
Start: 2024-06-06

## 2024-06-06 RX ORDER — PANCRELIPASE 15000; 3000; 9500 [USP'U]/1; [USP'U]/1; [USP'U]/1
CAPSULE, DELAYED RELEASE ORAL
Qty: 270 CAPSULE | Refills: 1 | Status: SHIPPED | OUTPATIENT
Start: 2024-06-06 | End: 2024-06-06

## 2024-06-07 ENCOUNTER — TELEPHONE (OUTPATIENT)
Age: 64
End: 2024-06-07

## 2024-06-12 DIAGNOSIS — E11.65 TYPE 2 DIABETES MELLITUS WITH HYPERGLYCEMIA, WITHOUT LONG-TERM CURRENT USE OF INSULIN (HCC): ICD-10-CM

## 2024-06-30 DIAGNOSIS — E78.2 MIXED HYPERLIPIDEMIA: ICD-10-CM

## 2024-06-30 RX ORDER — OMEGA-3-ACID ETHYL ESTERS 1 G/1
2 CAPSULE, LIQUID FILLED ORAL DAILY
Qty: 180 CAPSULE | Refills: 1 | Status: SHIPPED | OUTPATIENT
Start: 2024-06-30

## 2024-07-11 ENCOUNTER — LAB (OUTPATIENT)
Dept: LAB | Age: 64
End: 2024-07-11
Payer: COMMERCIAL

## 2024-07-11 DIAGNOSIS — E11.65 TYPE 2 DIABETES MELLITUS WITH HYPERGLYCEMIA, WITHOUT LONG-TERM CURRENT USE OF INSULIN (HCC): ICD-10-CM

## 2024-07-11 LAB
ANION GAP SERPL CALCULATED.3IONS-SCNC: 9 MMOL/L (ref 4–13)
BUN SERPL-MCNC: 23 MG/DL (ref 5–25)
CALCIUM SERPL-MCNC: 9 MG/DL (ref 8.4–10.2)
CHLORIDE SERPL-SCNC: 107 MMOL/L (ref 96–108)
CO2 SERPL-SCNC: 25 MMOL/L (ref 21–32)
CREAT SERPL-MCNC: 0.92 MG/DL (ref 0.6–1.3)
EST. AVERAGE GLUCOSE BLD GHB EST-MCNC: 128 MG/DL
GFR SERPL CREATININE-BSD FRML MDRD: 86 ML/MIN/1.73SQ M
GLUCOSE P FAST SERPL-MCNC: 91 MG/DL (ref 65–99)
HBA1C MFR BLD: 6.1 %
POTASSIUM SERPL-SCNC: 4.6 MMOL/L (ref 3.5–5.3)
SODIUM SERPL-SCNC: 141 MMOL/L (ref 135–147)

## 2024-07-11 PROCEDURE — 80048 BASIC METABOLIC PNL TOTAL CA: CPT

## 2024-07-11 PROCEDURE — 83036 HEMOGLOBIN GLYCOSYLATED A1C: CPT

## 2024-07-11 PROCEDURE — 36415 COLL VENOUS BLD VENIPUNCTURE: CPT

## 2024-07-17 ENCOUNTER — OFFICE VISIT (OUTPATIENT)
Dept: FAMILY MEDICINE CLINIC | Facility: CLINIC | Age: 64
End: 2024-07-17
Payer: COMMERCIAL

## 2024-07-17 VITALS
DIASTOLIC BLOOD PRESSURE: 64 MMHG | HEIGHT: 67 IN | WEIGHT: 177 LBS | OXYGEN SATURATION: 99 % | BODY MASS INDEX: 27.78 KG/M2 | HEART RATE: 63 BPM | SYSTOLIC BLOOD PRESSURE: 100 MMHG

## 2024-07-17 DIAGNOSIS — E78.2 MIXED HYPERLIPIDEMIA: ICD-10-CM

## 2024-07-17 DIAGNOSIS — R17 ELEVATED BILIRUBIN: ICD-10-CM

## 2024-07-17 DIAGNOSIS — M79.18 MYOFASCIAL PAIN SYNDROME: ICD-10-CM

## 2024-07-17 DIAGNOSIS — E03.9 ACQUIRED HYPOTHYROIDISM: Primary | ICD-10-CM

## 2024-07-17 DIAGNOSIS — K52.9 CHRONIC DIARRHEA OF UNKNOWN ORIGIN: ICD-10-CM

## 2024-07-17 DIAGNOSIS — E11.9 TYPE 2 DIABETES MELLITUS WITHOUT COMPLICATION, WITHOUT LONG-TERM CURRENT USE OF INSULIN (HCC): ICD-10-CM

## 2024-07-17 DIAGNOSIS — Z12.5 SCREENING FOR PROSTATE CANCER: ICD-10-CM

## 2024-07-17 PROCEDURE — 99214 OFFICE O/P EST MOD 30 MIN: CPT | Performed by: FAMILY MEDICINE

## 2024-07-17 RX ORDER — COLESEVELAM 180 1/1
1250 TABLET ORAL 2 TIMES DAILY WITH MEALS
Qty: 540 TABLET | Refills: 1 | Status: SHIPPED | OUTPATIENT
Start: 2024-07-17

## 2024-07-17 RX ORDER — TIZANIDINE 2 MG/1
2 TABLET ORAL EVERY 8 HOURS PRN
Qty: 90 TABLET | Refills: 1 | Status: SHIPPED | OUTPATIENT
Start: 2024-07-17

## 2024-07-17 NOTE — PROGRESS NOTES
Ambulatory Visit  Name: Joseph Ibrahim III      : 1960      MRN: [id number]  Encounter Provider: Argelia Juarez MD  Encounter Date: 2024   Encounter department: [facility]    Assessment & Plan   Diabetic Foot Exam    Patient's shoes and socks removed.    Right Foot/Ankle   Right Foot Inspection  Skin Exam: skin normal and skin intact. No dry skin, no warmth, no callus, no erythema, no maceration, no abnormal color, no pre-ulcer, no ulcer and no callus.     Toe Exam: ROM and strength within normal limits.     Sensory   Monofilament testing: intact    Vascular  Capillary refills: < 3 seconds  The right DP pulse is 2+. The right PT pulse is 2+.     Left Foot/Ankle  Left Foot Inspection  Skin Exam: skin normal and skin intact. No dry skin, no warmth, no erythema, no maceration, normal color, no pre-ulcer, no ulcer and no callus.     Toe Exam: ROM and strength within normal limits.     Sensory   Monofilament testing: intact    Vascular  Capillary refills: < 3 seconds  The left DP pulse is 2+. The left PT pulse is 2+.     Assign Risk Category  No deformity present  No loss of protective sensation  No weak pulses  Risk: 0       History of Present Illness     Patient presents with:  Follow-up: 6 months follow up due  for  physical end  of  month for wife's  insurance, will need  lab before that, feels  well, no cp, no sob, no headaches, back pain reasonably  well controlled  Diabetes: FOOT EXAM COMP BR            Review of Systems   Constitutional:  Positive for fatigue. Negative for activity change and appetite change.   Respiratory:  Negative for cough.    Cardiovascular:  Negative for chest pain.   Musculoskeletal:  Positive for back pain.   Neurological:  Negative for dizziness, light-headedness and headaches.   Hematological:  Negative for adenopathy.   Psychiatric/Behavioral:  The patient is not nervous/anxious.        Objective     /64 (BP Location: Left arm, Patient Position:  "Sitting, Cuff Size: Standard)   Pulse 63   Ht 5' 7\" (1.702 m)   Wt 80.3 kg (177 lb)   SpO2 99%   BMI 27.72 kg/m²     Physical Exam  Vitals reviewed.   Constitutional:       Appearance: Normal appearance.   Neck:      Vascular: No carotid bruit.   Cardiovascular:      Rate and Rhythm: Normal rate and regular rhythm.      Pulses: Normal pulses. no weak pulses.           Dorsalis pedis pulses are 2+ on the right side and 2+ on the left side.        Posterior tibial pulses are 2+ on the right side and 2+ on the left side.      Heart sounds: Normal heart sounds.   Pulmonary:      Effort: Pulmonary effort is normal.      Breath sounds: Normal breath sounds.   Musculoskeletal:      Right lower leg: No edema.      Left lower leg: No edema.   Feet:      Right foot:      Skin integrity: No ulcer, skin breakdown, erythema, warmth, callus or dry skin.      Left foot:      Skin integrity: No ulcer, skin breakdown, erythema, warmth, callus or dry skin.   Lymphadenopathy:      Cervical: No cervical adenopathy.   Neurological:      Mental Status: He is alert.   Psychiatric:         Mood and Affect: Mood normal.       Administrative Statements     "

## 2024-07-30 ENCOUNTER — APPOINTMENT (OUTPATIENT)
Dept: LAB | Age: 64
End: 2024-07-30
Payer: COMMERCIAL

## 2024-07-30 DIAGNOSIS — R17 ELEVATED BILIRUBIN: Primary | ICD-10-CM

## 2024-07-30 DIAGNOSIS — R17 ELEVATED BILIRUBIN: ICD-10-CM

## 2024-07-30 LAB
ALBUMIN SERPL BCG-MCNC: 3.6 G/DL (ref 3.5–5)
ALP SERPL-CCNC: 67 U/L (ref 34–104)
ALT SERPL W P-5'-P-CCNC: 24 U/L (ref 7–52)
AST SERPL W P-5'-P-CCNC: 14 U/L (ref 13–39)
BILIRUB DIRECT SERPL-MCNC: 0.32 MG/DL (ref 0–0.2)
BILIRUB SERPL-MCNC: 1.32 MG/DL (ref 0.2–1)
CHOLEST SERPL-MCNC: 73 MG/DL
HDLC SERPL-MCNC: 28 MG/DL
LDLC SERPL CALC-MCNC: 21 MG/DL (ref 0–100)
NONHDLC SERPL-MCNC: 45 MG/DL
PROT SERPL-MCNC: 6.2 G/DL (ref 6.4–8.4)
PSA SERPL-MCNC: 2.65 NG/ML (ref 0–4)
TRIGL SERPL-MCNC: 121 MG/DL
TSH SERPL DL<=0.05 MIU/L-ACNC: 1.52 UIU/ML (ref 0.45–4.5)

## 2024-07-30 PROCEDURE — 80076 HEPATIC FUNCTION PANEL: CPT

## 2024-07-30 PROCEDURE — 36415 COLL VENOUS BLD VENIPUNCTURE: CPT

## 2024-07-30 NOTE — PATIENT INSTRUCTIONS
Hypoglycemia instructions   Joseph Ibrahim III  7/30/2024  [id number]    Low Blood Sugar    Steps to treat low blood sugar.    1. Test blood sugar if you have symptoms of low blood sugar:   Low Blood Sugar Symptoms:  o Sweaty  o Dizzy  o Rapid heartbeat  o Shaky  o Bad mood  o Hungry      2. Treat blood sugar less than 70 with 15 grams of fast-acting carbohydrate:   Examples of 15 grams Fast-Acting Carbohydrate:  o 4 oz juice  o 4 oz regular soda  o 3-4 glucose tablets (chew)  o 3-4 hard candies (chew)          3.  Wait 15 minutes and test your blood sugar again     4. If blood sugar is less than 100, repeat steps 2-3.    5. When your blood sugar is 100 or more, eat a snack if it will be longer than one hour until your next meal. The snack should be 15 grams of carbohydrate and a protein:   Examples of snacks:  o ½ sandwich  o 6 crackers with cheese  o Piece of fruit with cheese or peanut butter  o 6 crackers with peanut butter

## 2024-07-30 NOTE — PROGRESS NOTES
Patient Progress Note    CC: DM, hypothyroidism    Referring Provider  Argelia Juarez Md  [address] [facility]  [address]  [place] [address]     History of Present Illness:     Joseph Ibrahim III is a 64 y.o. male with a history of type 2 diabetes without long term use of insulin. Diabetes course has been stable. Complications of DM: neuropathy. Denies recent illness or hospitalizations. Denies recent severe hypoglycemic or severe hyperglycemic episodes. Denies any issues with his current regimen. Home glucose monitoring: are performed regularly, morning     No log or meter today  Home blood glucose readings: reports readings in the 110s mg/dl     Current regimen:  Ozempic 0.5 mg weekly  He was having abdominal pain with metformin so it was stopped by his PCP in the past  compliant most of the time, denies any side effects from medications.  Injects in: abdomen. Rotates sites: Yes  Hypoglycemic episodes: No, rare  H/o of hypoglycemia causing hospitalization or Intervention such as glucagon injection  or ambulance call :  No  Hypoglycemia symptoms: jitteriness and sweating  Treatment of hypoglycemia: juice     Medic alert tag: recommended: Yes     Diabetes education: Yes  Diet: 3 meals per day, 1 snack per day. Timing of meals is predictable.   Diabetic diet compliance:  compliant most of the time  Activity: Daily activity is predictable: Yes. He tries to exercise 2 times a week.     Ophthamology:  August 2023  Podiatry:  July 2024     Not on ACE inhibitor/ARB  Has hyperlipidemia: on statin - tolerating well, no myalgias. compliant most of the time, denies any side effects from medications.  Thyroid disorders:  Hypothyroidism.  Patient is taking levothyroxine 75 mcg 1 tablet daily on an empty stomach 1 hour before breakfast and at least 4 hours apart from supplements.  History of pancreatitis: No        Patient Active Problem List   Diagnosis    BPH associated with nocturia    Acquired hypothyroidism    Spinal  cord ependymoma (HCC)    H/O spinal fusion    DDD (degenerative disc disease), lumbar    Gastroesophageal reflux disease without esophagitis    Type 2 diabetes mellitus with hyperglycemia, without long-term current use of insulin (HCC)    Mixed hyperlipidemia    Erectile dysfunction of non-organic origin    Elevated bilirubin    Sacroiliitis (HCC)    Lumbar spondylosis    Non-recurrent inguinal hernia without obstruction or gangrene    Drug-induced constipation    Colon polyp    Chronic diarrhea of unknown origin    Pancreatic insufficiency    Right leg weakness    Myelopathy (HCC)    Status post cervical spinal fusion    Right foot drop     Past Medical History:   Diagnosis Date    BPH associated with nocturia     Cancer (HCC)     Cervical spinal mass (HCC) 2019    cervicothoracic    Colon polyp 2023    Diabetes mellitus (HCC)     Disease of thyroid gland     Herniated disc, cervical     History of radiation therapy     Hyperlipidemia     Impaired fasting glucose     Radiation-induced myelopathy (HCC) 2019      Past Surgical History:   Procedure Laterality Date    APPENDECTOMY      CERVICAL FUSION      COLONOSCOPY  2012    NORMAL; RECHECK IN 5 YEARS    FL LUMBAR PUNCTURE DIAGNOSTIC  2019    WV TY BX/EXC ISPI EBONIE IDRL IMED CERVICAL N/A 2019    Procedure: Posterior C4-T3 laminectomy; resection of intramedullary mass C5-T3, including fenestration of syrinx C7-T2; C3-T4 fixation/fusion; additional levels as needed;  Surgeon: Joe Bradshaw MD;  Location: [facility];  Service: Neurosurgery    WISDOM TOOTH EXTRACTION        Family History   Problem Relation Age of Onset    Diabetes Mother     Hypertension Father     Cerebral palsy Sister     Breast cancer Daughter      Social History     Tobacco Use    Smoking status: Former     Current packs/day: 0.00     Types: Cigarettes     Quit date: 1993     Years since quittin.2    Smokeless tobacco: Never   Substance Use Topics     Alcohol use: Yes     Alcohol/week: 2.0 standard drinks of alcohol     Types: 2 Cans of beer per week     Comment: Occasionaly     Allergies   Allergen Reactions    Bee Venom Hives, Itching and Edema     Category: Allergy;     Penicillins Hives and Itching     Category: Allergy;      Current Outpatient Medications   Medication Sig Dispense Refill    atorvastatin (LIPITOR) 40 mg tablet TAKE 1 TABLET BY MOUTH EVERY DAY 90 tablet 1    colesevelam (WELCHOL) 625 mg tablet Take 2 tablets (1,250 mg total) by mouth 2 (two) times a day with meals 540 tablet 1    ibuprofen (MOTRIN) 200 mg tablet Take 400 mg by mouth as needed for mild pain      levothyroxine 75 mcg tablet TAKE 1 TABLET BY MOUTH EVERY DAY 90 tablet 1    omega-3-acid ethyl esters (LOVAZA) 1 g capsule TAKE 2 CAPSULES BY MOUTH DAILY. 180 capsule 1    Pancrelipase, Lip-Prot-Amyl, (Zenpep) 5000-82194 units CPEP Take 1 capsule by mouth 3 (three) times a day with meals 90 capsule 5    semaglutide, 0.25 or 0.5 mg/dose, (Ozempic, 0.25 or 0.5 MG/DOSE,) 2 mg/3 mL injection pen Inject 0.75 mL (0.5 mg total) under the skin every 7 days 9 mL 1    tiZANidine (ZANAFLEX) 2 mg tablet Take 1 tablet (2 mg total) by mouth every 8 (eight) hours as needed for muscle spasms 90 tablet 1     No current facility-administered medications for this visit.         Review of Systems   Constitutional:  Negative for activity change, appetite change, fatigue and unexpected weight change.   HENT:  Negative for trouble swallowing.    Eyes:  Negative for visual disturbance.   Respiratory:  Negative for shortness of breath.    Cardiovascular:  Negative for chest pain and palpitations.   Gastrointestinal:  Negative for constipation and diarrhea.   Endocrine: Negative for polydipsia and polyuria.   Musculoskeletal: Negative.    Skin:  Negative for wound.   Neurological:  Positive for numbness (Right LE and hands).   Psychiatric/Behavioral: Negative.         Physical Exam:  Body mass index is 27.94  "kg/m².  /78   Pulse 75   Ht 5' 7\" (1.702 m)   Wt 80.9 kg (178 lb 6.4 oz)   SpO2 98%   BMI 27.94 kg/m²    Wt Readings from Last 3 Encounters:   07/31/24 80.9 kg (178 lb 6.4 oz)   07/17/24 80.3 kg (177 lb)   01/26/24 83.9 kg (185 lb)       Physical Exam  Vitals and nursing note reviewed.   Constitutional:       Appearance: He is well-developed.   HENT:      Head: Normocephalic.   Eyes:      General: No scleral icterus.     Extraocular Movements: EOM normal.   Neck:      Thyroid: No thyromegaly.   Cardiovascular:      Rate and Rhythm: Normal rate and regular rhythm.      Pulses:           Radial pulses are 2+ on the right side and 2+ on the left side.      Heart sounds: No murmur heard.  Pulmonary:      Effort: Pulmonary effort is normal. No respiratory distress.      Breath sounds: Normal breath sounds. No wheezing.   Musculoskeletal:      Cervical back: Neck supple.   Skin:     General: Skin is warm and dry.   Neurological:      Mental Status: He is alert.   Psychiatric:         Mood and Affect: Mood and affect normal.       Patient's shoes and socks were not removed.          Labs:   Component      Latest Ref Rng 1/11/2024 7/11/2024 7/30/2024   Sodium      135 - 147 mmol/L 145  141     Potassium      3.5 - 5.3 mmol/L 5.1  4.6     Chloride      96 - 108 mmol/L 107  107     Carbon Dioxide      21 - 32 mmol/L 28  25     ANION GAP      4 - 13 mmol/L 10  9     BUN      5 - 25 mg/dL 18  23     Creatinine      0.60 - 1.30 mg/dL 0.90  0.92     GLUCOSE, FASTING      65 - 99 mg/dL 114 (H)  91     Calcium      8.4 - 10.2 mg/dL 8.6  9.0     AST      13 - 39 U/L 21   14    ALT      7 - 52 U/L 40   24    ALK PHOS      34 - 104 U/L 71   67    Total Protein      6.4 - 8.4 g/dL 6.6   6.2 (L)    Albumin      3.5 - 5.0 g/dL 4.0   3.6    Total Bilirubin      0.20 - 1.00 mg/dL 1.26 (H)   1.32 (H)    GFR, Calculated      ml/min/1.73sq m 89  86     BILIRUBIN DIRECT      0.00 - 0.20 mg/dL   0.32 (H)    Cholesterol      See " Comment mg/dL 76   73    Triglycerides      See Comment mg/dL 85   121    HDL      >=40 mg/dL 34 (L)   28 (L)    LDL Calculated      0 - 100 mg/dL 25   21    Non-HDL Cholesterol      mg/dl 42   45    EXT Creatinine Urine      Reference range not established. mg/dL 196.4      Albumin,U,Random      <20.0 mg/L 10.5      Albumin Creat Ratio      0 - 30 mg/g creatinine 5      Hemoglobin A1C      Normal 4.0-5.6%; PreDiabetic 5.7-6.4%; Diabetic >=6.5%; Glycemic control for adults with diabetes <7.0% % 6.8 (H)  6.1 (H)     eAG, EST AVG Glucose      mg/dl 148  128     FREE T4      0.61 - 1.12 ng/dL 0.72      TSH 3RD GENERATON      0.450 - 4.500 uIU/mL 6.848 (H)   1.525       Legend:  (H) High  (L) Low      Plan:    Diagnoses and all orders for this visit:    Type 2 diabetes mellitus with hyperglycemia, without long-term current use of insulin ([facility])  HGA1C 6.1%. Improved.  Treatment regimen: continue current treatment  Episodes of hypoglycemia can lead to permanent disability and death.  Discussed risks/complications associated with uncontrolled diabetes.    Advised to adhere to diabetic diet, and recommended staying active/exercising routinely as tolerated.  Keep carbohydrates consistent to limit blood glucose fluctuations.  Advised to call if blood sugars less than 70 mg/dl or over 300 mg/dl.   Check blood glucose 1-2 times a day  Discussed symptoms and treatment of hypoglycemia.   Recommended routine follow-up with podiatry and ophthalmology.   Ordered blood work to complete prior to next visit.  -     Hemoglobin A1C; Future  -     Albumin / creatinine urine ratio; Future  -     Basic metabolic panel; Future    Acquired hypothyroidism  TSH 1.525  Continue current dose of levothyroxine  Monitor labs  -     T4, free; Future  -     TSH, 3rd generation; Future    Mixed hyperlipidemia  LDL 21  On statin therapy          Discussed with the patient and all questions fully answered. He will call me if any problems  arise.    Counseled patient on diagnostic results, prognosis, risk and benefit of treatment options, instruction for management, importance of treatment compliance, risk  factor reduction and impressions      Didi Gant PA-C

## 2024-07-31 ENCOUNTER — OFFICE VISIT (OUTPATIENT)
Dept: ENDOCRINOLOGY | Facility: CLINIC | Age: 64
End: 2024-07-31
Payer: COMMERCIAL

## 2024-07-31 ENCOUNTER — OFFICE VISIT (OUTPATIENT)
Dept: FAMILY MEDICINE CLINIC | Facility: CLINIC | Age: 64
End: 2024-07-31
Payer: COMMERCIAL

## 2024-07-31 VITALS
HEART RATE: 88 BPM | BODY MASS INDEX: 27.94 KG/M2 | DIASTOLIC BLOOD PRESSURE: 68 MMHG | SYSTOLIC BLOOD PRESSURE: 102 MMHG | OXYGEN SATURATION: 98 % | HEIGHT: 67 IN | WEIGHT: 178 LBS

## 2024-07-31 VITALS
DIASTOLIC BLOOD PRESSURE: 78 MMHG | HEART RATE: 75 BPM | WEIGHT: 178.4 LBS | HEIGHT: 67 IN | SYSTOLIC BLOOD PRESSURE: 110 MMHG | OXYGEN SATURATION: 98 % | BODY MASS INDEX: 28 KG/M2

## 2024-07-31 DIAGNOSIS — E03.9 ACQUIRED HYPOTHYROIDISM: ICD-10-CM

## 2024-07-31 DIAGNOSIS — E11.65 TYPE 2 DIABETES MELLITUS WITH HYPERGLYCEMIA, WITHOUT LONG-TERM CURRENT USE OF INSULIN (HCC): Primary | ICD-10-CM

## 2024-07-31 DIAGNOSIS — E78.2 MIXED HYPERLIPIDEMIA: ICD-10-CM

## 2024-07-31 DIAGNOSIS — Z00.00 WELL ADULT EXAM: Primary | ICD-10-CM

## 2024-07-31 DIAGNOSIS — H91.93 BILATERAL HEARING LOSS, UNSPECIFIED HEARING LOSS TYPE: Primary | ICD-10-CM

## 2024-07-31 PROBLEM — K59.03 DRUG-INDUCED CONSTIPATION: Status: RESOLVED | Noted: 2023-08-23 | Resolved: 2024-07-31

## 2024-07-31 PROCEDURE — 99214 OFFICE O/P EST MOD 30 MIN: CPT | Performed by: PHYSICIAN ASSISTANT

## 2024-07-31 PROCEDURE — 99397 PER PM REEVAL EST PAT 65+ YR: CPT | Performed by: FAMILY MEDICINE

## 2024-07-31 PROCEDURE — 3725F SCREEN DEPRESSION PERFORMED: CPT | Performed by: FAMILY MEDICINE
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
## 2024-07-31 NOTE — PATIENT INSTRUCTIONS
Rsv shot  with flu shot, utd  on tetanus  until 8/26, rec  magnesium 250  mg  over the counter  for leg spasms

## 2024-08-07 ENCOUNTER — HOSPITAL ENCOUNTER (OUTPATIENT)
Dept: RADIOLOGY | Facility: IMAGING CENTER | Age: 64
Discharge: HOME | End: 2024-08-07
Payer: COMMERCIAL

## 2024-08-07 DIAGNOSIS — R17 ELEVATED BILIRUBIN: ICD-10-CM

## 2024-08-07 PROCEDURE — 76700 US EXAM ABDOM COMPLETE: CPT

## 2024-08-08 DIAGNOSIS — M79.18 MYOFASCIAL PAIN SYNDROME: ICD-10-CM

## 2024-08-09 RX ORDER — TIZANIDINE 2 MG/1
2 TABLET ORAL EVERY 8 HOURS PRN
Qty: 270 TABLET | Refills: 1 | Status: SHIPPED | OUTPATIENT
Start: 2024-08-09

## 2024-08-14 ENCOUNTER — OFFICE VISIT (OUTPATIENT)
Dept: AUDIOLOGY | Age: 64
End: 2024-08-14
Payer: COMMERCIAL

## 2024-08-14 DIAGNOSIS — H91.93 BILATERAL HEARING LOSS, UNSPECIFIED HEARING LOSS TYPE: ICD-10-CM

## 2024-08-14 DIAGNOSIS — H90.3 SENSORY HEARING LOSS, BILATERAL: Primary | ICD-10-CM

## 2024-08-14 PROCEDURE — 92567 TYMPANOMETRY: CPT | Performed by: AUDIOLOGIST-HEARING AID FITTER

## 2024-08-14 PROCEDURE — 92557 COMPREHENSIVE HEARING TEST: CPT | Performed by: AUDIOLOGIST-HEARING AID FITTER

## 2024-08-14 NOTE — PROGRESS NOTES
Diagnostic Hearing Evaluation    Name:  Joseph Ibrahim III  :  1960  Age:  64 y.o.   MRN:  [id number]  Date of Evaluation: 24     HISTORY:     Reason for visit: Tinnitus    Joseph Ibrahim III is being seen today at the request of Dr. Juarez for an initial  evaluation of hearing. The patient reports minimal trouble hearing but his wife is concerned for his hearing. Joseph reported bilateral tinnitus that he has had for a long time. This is not bothersome. The patient  denies ear pain and aural fullness. There is a history of loud noise exposure.    EVALUATION:    Otoscopic Evaluation:   Right Ear: Unremarkable, canal clear   Left Ear: Unremarkable, canal clear    Tympanometry:   Right Ear: Type A; normal middle ear pressure and static compliance    Left Ear: Type A; normal middle ear pressure and static compliance     Speech Audiometry:  Speech Reception (SRT)    Right Ear: 15 dB HL    Left Ear: 15 dB HL    Word Recognition Scores (WRS):  Right Ear: excellent (100 % correct)     Left Ear: excellent (100 % correct)    Stimuli: [id number]    Pure Tone Audiometry:  Conventional pure tone audiometry from 250 - 8000 Hz  was obtained with good reliability and revealed the following:     Right Ear: Mild sensorineural hearing loss (SNHL)    Left Ear: Mild sloping to moderate sensorineural hearing loss (SNHL)     *see attached audiogram    IMPRESSIONS:   High frequency SNHL    RECOMMENDATIONS:  Annual hearing eval and Return to [facility]. for F/U    PATIENT EDUCATION:   The results of today's results and recommendations were reviewed with the patient and his hearing thresholds were explained at length. Recommended consistent use of hearing protection. Questions were addressed and the patient was encouraged to contact our department should concerns arise.      Brandy Rod, CCC-A  Clinical Audiologist  [facility]  [address] [place] [address]  NEEMA HADLEY [phone]

## 2024-09-25 DIAGNOSIS — E78.5 HLD (HYPERLIPIDEMIA): ICD-10-CM

## 2024-09-25 RX ORDER — ATORVASTATIN CALCIUM 40 MG/1
40 TABLET, FILM COATED ORAL DAILY
Qty: 90 TABLET | Refills: 1 | Status: SHIPPED | OUTPATIENT
Start: 2024-09-25

## 2024-10-03 DIAGNOSIS — K86.89 PANCREATIC INSUFFICIENCY: ICD-10-CM

## 2024-10-03 DIAGNOSIS — K52.9 CHRONIC DIARRHEA OF UNKNOWN ORIGIN: ICD-10-CM

## 2024-10-03 RX ORDER — PANCRELIPASE LIPASE, PANCRELIPASE PROTEASE, PANCRELIPASE AMYLASE 5000; 17000; 24000 [USP'U]/1; [USP'U]/1; [USP'U]/1
1 CAPSULE, DELAYED RELEASE ORAL
Qty: 90 CAPSULE | Refills: 0 | Status: SHIPPED | OUTPATIENT
Start: 2024-10-03

## 2024-12-06 ENCOUNTER — TELEPHONE (OUTPATIENT)
Age: 64
End: 2024-12-06

## 2024-12-06 DIAGNOSIS — E11.65 TYPE 2 DIABETES MELLITUS WITH HYPERGLYCEMIA, WITHOUT LONG-TERM CURRENT USE OF INSULIN (HCC): Primary | ICD-10-CM

## 2024-12-06 NOTE — TELEPHONE ENCOUNTER
Patient called bc he tried to reorder his ozempic through mychart a  few weeks ago and it disappeared from his list and he still has not received it. Medication is not showing on his current med list    Ozempic 0.75/ 0.5mg  90 days with refills  Patient only has 1 shot left  [facility] [address]

## 2024-12-18 DIAGNOSIS — K52.9 CHRONIC DIARRHEA OF UNKNOWN ORIGIN: ICD-10-CM

## 2024-12-18 DIAGNOSIS — E78.2 MIXED HYPERLIPIDEMIA: ICD-10-CM

## 2024-12-18 DIAGNOSIS — K86.89 PANCREATIC INSUFFICIENCY: ICD-10-CM

## 2024-12-19 RX ORDER — OMEGA-3-ACID ETHYL ESTERS 1 G/1
2 CAPSULE, LIQUID FILLED ORAL DAILY
Qty: 180 CAPSULE | Refills: 1 | Status: SHIPPED | OUTPATIENT
Start: 2024-12-19

## 2024-12-19 RX ORDER — PANCRELIPASE LIPASE, PANCRELIPASE PROTEASE, PANCRELIPASE AMYLASE 5000; 17000; 24000 [USP'U]/1; [USP'U]/1; [USP'U]/1
1 CAPSULE, DELAYED RELEASE ORAL
Qty: 90 CAPSULE | Refills: 1 | Status: SHIPPED | OUTPATIENT
Start: 2024-12-19

## 2025-01-10 ENCOUNTER — TELEPHONE (OUTPATIENT)
Age: 65
End: 2025-01-10

## 2025-01-10 DIAGNOSIS — Z01.818 PRE-PROCEDURAL EXAMINATION: Primary | ICD-10-CM

## 2025-01-10 NOTE — TELEPHONE ENCOUNTER
Ezequiel from [facility] radiology called to request orders for bun/creatinine  be place for pt who is having MRI CSPINE on 1/18/25.     Please make pt aware he needs to have the labs done.     Thank you.

## 2025-01-14 ENCOUNTER — RESULTS FOLLOW-UP (OUTPATIENT)
Dept: ENDOCRINOLOGY | Facility: CLINIC | Age: 65
End: 2025-01-14

## 2025-01-14 ENCOUNTER — APPOINTMENT (OUTPATIENT)
Dept: LAB | Age: 65
End: 2025-01-14
Payer: COMMERCIAL

## 2025-01-14 DIAGNOSIS — Z01.818 PRE-PROCEDURAL EXAMINATION: ICD-10-CM

## 2025-01-14 DIAGNOSIS — E11.65 TYPE 2 DIABETES MELLITUS WITH HYPERGLYCEMIA, WITHOUT LONG-TERM CURRENT USE OF INSULIN (HCC): ICD-10-CM

## 2025-01-14 DIAGNOSIS — E03.9 ACQUIRED HYPOTHYROIDISM: ICD-10-CM

## 2025-01-14 LAB
ANION GAP SERPL CALCULATED.3IONS-SCNC: 8 MMOL/L (ref 4–13)
BUN SERPL-MCNC: 18 MG/DL (ref 5–25)
CALCIUM SERPL-MCNC: 8.5 MG/DL (ref 8.4–10.2)
CHLORIDE SERPL-SCNC: 105 MMOL/L (ref 96–108)
CO2 SERPL-SCNC: 28 MMOL/L (ref 21–32)
CREAT SERPL-MCNC: 0.85 MG/DL (ref 0.6–1.3)
CREAT UR-MCNC: 198 MG/DL
EST. AVERAGE GLUCOSE BLD GHB EST-MCNC: 143 MG/DL
GFR SERPL CREATININE-BSD FRML MDRD: 91 ML/MIN/1.73SQ M
GLUCOSE P FAST SERPL-MCNC: 128 MG/DL (ref 65–99)
HBA1C MFR BLD: 6.6 %
MICROALBUMIN UR-MCNC: 17.9 MG/L
MICROALBUMIN/CREAT 24H UR: 9 MG/G CREATININE (ref 0–30)
POTASSIUM SERPL-SCNC: 4.7 MMOL/L (ref 3.5–5.3)
SODIUM SERPL-SCNC: 141 MMOL/L (ref 135–147)
T4 FREE SERPL-MCNC: 0.72 NG/DL (ref 0.61–1.12)
TSH SERPL DL<=0.05 MIU/L-ACNC: 3.94 UIU/ML (ref 0.45–4.5)

## 2025-01-14 PROCEDURE — 84439 ASSAY OF FREE THYROXINE: CPT

## 2025-01-14 PROCEDURE — 80048 BASIC METABOLIC PNL TOTAL CA: CPT

## 2025-01-14 PROCEDURE — 82570 ASSAY OF URINE CREATININE: CPT

## 2025-01-14 PROCEDURE — 36415 COLL VENOUS BLD VENIPUNCTURE: CPT

## 2025-01-14 PROCEDURE — 84443 ASSAY THYROID STIM HORMONE: CPT

## 2025-01-14 PROCEDURE — 82043 UR ALBUMIN QUANTITATIVE: CPT

## 2025-01-14 PROCEDURE — 83036 HEMOGLOBIN GLYCOSYLATED A1C: CPT

## 2025-01-18 ENCOUNTER — HOSPITAL ENCOUNTER (OUTPATIENT)
Dept: RADIOLOGY | Facility: HOSPITAL | Age: 65
Discharge: HOME | End: 2025-01-18
Payer: COMMERCIAL

## 2025-01-18 DIAGNOSIS — Z98.1 ARTHRODESIS STATUS: ICD-10-CM

## 2025-01-18 DIAGNOSIS — Z98.1 STATUS POST CERVICAL SPINAL FUSION: ICD-10-CM

## 2025-01-18 DIAGNOSIS — C72.0 SPINAL CORD EPENDYMOMA (HCC): ICD-10-CM

## 2025-01-18 PROCEDURE — G1004 CDSM NDSC: HCPCS

## 2025-01-18 PROCEDURE — A9585 GADOBUTROL INJECTION: HCPCS | Performed by: FAMILY MEDICINE

## 2025-01-18 PROCEDURE — 72156 MRI NECK SPINE W/O & W/DYE: CPT

## 2025-01-18 RX ORDER — GADOBUTROL 604.72 MG/ML
8 INJECTION INTRAVENOUS
Status: COMPLETED | OUTPATIENT
Start: 2025-01-18 | End: 2025-01-18

## 2025-01-18 RX ADMIN — GADOBUTROL 8 ML: 604.72 INJECTION INTRAVENOUS at 09:31

## 2025-01-27 DIAGNOSIS — K86.89 PANCREATIC INSUFFICIENCY: ICD-10-CM

## 2025-01-27 DIAGNOSIS — K52.9 CHRONIC DIARRHEA OF UNKNOWN ORIGIN: ICD-10-CM

## 2025-01-27 RX ORDER — PANCRELIPASE LIPASE, PANCRELIPASE PROTEASE, PANCRELIPASE AMYLASE 5000; 17000; 24000 [USP'U]/1; [USP'U]/1; [USP'U]/1
1 CAPSULE, DELAYED RELEASE ORAL
Qty: 270 CAPSULE | Refills: 1 | Status: SHIPPED | OUTPATIENT
Start: 2025-01-27

## 2025-01-28 ENCOUNTER — OFFICE VISIT (OUTPATIENT)
Dept: NEUROSURGERY | Facility: CLINIC | Age: 65
End: 2025-01-28
Payer: COMMERCIAL

## 2025-01-28 VITALS
SYSTOLIC BLOOD PRESSURE: 136 MMHG | BODY MASS INDEX: 28.25 KG/M2 | WEIGHT: 180 LBS | TEMPERATURE: 97.7 F | DIASTOLIC BLOOD PRESSURE: 62 MMHG | HEIGHT: 67 IN

## 2025-01-28 DIAGNOSIS — Z98.1 STATUS POST CERVICAL SPINAL FUSION: ICD-10-CM

## 2025-01-28 DIAGNOSIS — C72.0 SPINAL CORD EPENDYMOMA (HCC): Primary | ICD-10-CM

## 2025-01-28 DIAGNOSIS — Z01.818 PRE-PROCEDURAL EXAMINATION: Primary | ICD-10-CM

## 2025-01-28 DIAGNOSIS — G95.9 MYELOPATHY (HCC): ICD-10-CM

## 2025-01-28 DIAGNOSIS — Z92.3 HISTORY OF EXTERNAL BEAM RADIATION THERAPY: ICD-10-CM

## 2025-01-28 PROCEDURE — 99214 OFFICE O/P EST MOD 30 MIN: CPT | Performed by: NEUROLOGICAL SURGERY

## 2025-01-28 NOTE — PROGRESS NOTES
Name: Joseph Ibrahim III      : 1960      MRN: [id number]  Encounter Provider: Joe Bradshaw MD  Encounter Date: 2025   Encounter department: [facility]  :  Assessment & Plan  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HCC)    Orders:  •  MRI cervical spine with and without contrast; Future    Status post cervical spinal fusion    Orders:  •  MRI cervical spine with and without contrast; Future    History of external beam radiation therapy    Orders:  •  MRI cervical spine with and without contrast; Future    Myelopathy (HCC)         Pain Score: 0-No pain    S/p resection, decompression, fusion for his intramedullary spinal cord ependymoma, 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 The mass spanned from the C4-5 disc space down to the T3-4 disc space. We were able to resect this aggressively at its largest part, I.e. T1-2, but did not proceed further as his neurological signals declined intraoperatively.      He did well postoperatively, and was discharged to [facility].      MRI scan of brain, L-spine, and T-spine   Done after surgery showed no drop metastases.  CSF testing was negative.     At f/u in early 3/2019, was continuing to improve. He was using no ambulatory aids. His right DF was at least 4/5, but apractic, however this was also improving. He stated he even had some proprioception on the right, which was improved vs. Prior to surgery. He had some mild tingling in his forearms and hands, but no pain or weakness. His incision was healing well. Bowel and bladder function were back to normal. No radicular pains. Xrays were stable, and we planned for adjuvant IMRT.     He completed this 19, to 45 Gy     Starting a few days after his RT, his neurological symptoms began to re-emerge.  His right foot drop became more pronounced, and he was unsteady on his feet had a few falls.  His balance was unsteady.  His occasional radicular pain into the right leg 2-3/10, as well as left arm and right  "hand pains, pins and needles, 5/10.  He was not taking gabapentin or dexamethasone. Seen with Dr. Hernandez at that time and we 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, and recommended continued PT as well as restarting gabapentin. He at times was on 300 mg t.i.d. of gabapentin, he has weaned off of this.     Since his last visit he stopped using his right ankle orthotic.  He feels it was giving him pain.  Since discontinuing that, his pain is discontinued, and he feels his walking is actually better.  I have no issue with this.  He states he is using virtual PT about 4 times a week , \"[employer]\", and going to the gym about 1 times per week.  He is bowling, riding his motorcycle with a side car, golfing in the summers.      F/u MRI scan CSpine at 6 y postop is stable. The large cavity present preop has not reformed, there are no obvious sign of recurrence etc.      NCCN recommendation is for follow-up MRI scan cervical spine in 6-12 months, and we agreed on 12 months, which I have ordered.  I will see him after that is completed.     07/22/20 Metrics: EQ5D5L 99483=2.845; VAS 65; MJOA 10/17     01/20/21 Metrics: EQ5D5L 32867=7.732; VAS 65-70; ECOG 2; KPS 70; MJOA 12/17 01/19/22 Metrics: EQ5D5L 38679=3.778; VAS 70-75; ECOG 2; KPS 70; MJOA 11/17 01/18/23 Metrics: EQ5D5L 83143=1.784; VAS 70; MJOA 9/17 01/28/25 Metrics: EQ5D5L 97996=8.810; VAS 60; ECOG 1; KPS 80; MJOA 11/17     History of Present Illness   HPI  Review of Systems   Constitutional: Negative.    HENT: Negative.          Diagnosed covid positive on 1/18/22, symptoms are sore throat, running nose, slight headache   Eyes: Negative.    Respiratory: Negative.     Cardiovascular:  Positive for leg swelling (unchanged since last visit, right leg/ankle but mild).   Gastrointestinal: Negative.         Urgency with passing bowels, has had 1 accident within a year   Endocrine: Negative.    Genitourinary:  Frequency: nocturia x1.   Musculoskeletal:  Positive for " "back pain (soreness) and gait problem (unchanged since last visit, right foot drag/drop, unsteady balance, using standard cane, 1 fall since last visit, f/w PT).        Currently going to the gym x2-3 a week   Skin: Negative.    Neurological:  Positive for weakness (right leg, drags right foot) and numbness (Neuropathy - BL hands/R-foot - sharp stabbing pain). Light-headedness: improved since last visit, getting up from lying down.  Hematological: Negative.    Psychiatric/Behavioral:  Sleep disturbance: due to pain in neck during sleep position.    All other systems reviewed and are negative.   I have personally reviewed the MA's review of systems and made changes as necessary.             Objective   /62 (BP Location: Right arm, Patient Position: Sitting, Cuff Size: Adult)   Temp 97.7 °F (36.5 °C) (Temporal)   Ht 5' 6.5\" (1.689 m)   Wt 81.6 kg (180 lb)   BMI 28.62 kg/m²     Physical Exam  Neurological Exam    Radiology Results Review: I have reviewed the following images/report studies in PACS: MRI C-spine, see discussion      "

## 2025-02-09 DIAGNOSIS — E03.9 HYPOTHYROIDISM, UNSPECIFIED TYPE: ICD-10-CM

## 2025-02-10 RX ORDER — AZITHROMYCIN 250 MG/1
250 TABLET, FILM COATED ORAL
COMMUNITY
Start: 2024-11-18

## 2025-02-11 ENCOUNTER — OFFICE VISIT (OUTPATIENT)
Dept: ENDOCRINOLOGY | Facility: CLINIC | Age: 65
End: 2025-02-11
Payer: COMMERCIAL

## 2025-02-11 VITALS
HEIGHT: 67 IN | WEIGHT: 181 LBS | HEART RATE: 66 BPM | SYSTOLIC BLOOD PRESSURE: 116 MMHG | DIASTOLIC BLOOD PRESSURE: 68 MMHG | BODY MASS INDEX: 28.41 KG/M2 | OXYGEN SATURATION: 98 %

## 2025-02-11 DIAGNOSIS — E11.65 TYPE 2 DIABETES MELLITUS WITH HYPERGLYCEMIA, WITHOUT LONG-TERM CURRENT USE OF INSULIN (HCC): Primary | ICD-10-CM

## 2025-02-11 DIAGNOSIS — E03.9 ACQUIRED HYPOTHYROIDISM: ICD-10-CM

## 2025-02-11 DIAGNOSIS — E78.2 MIXED HYPERLIPIDEMIA: ICD-10-CM

## 2025-02-11 PROCEDURE — 99214 OFFICE O/P EST MOD 30 MIN: CPT | Performed by: INTERNAL MEDICINE

## 2025-02-11 RX ORDER — LEVOTHYROXINE SODIUM 75 UG/1
75 TABLET ORAL DAILY
Qty: 90 TABLET | Refills: 1 | Status: SHIPPED | OUTPATIENT
Start: 2025-02-11

## 2025-02-11 NOTE — PROGRESS NOTES
Joseph Ibrahim III 64 y.o. male MRN: [id number]    Encounter: [id number]      Assessment & Plan     Assessment:  This is a 64 y.o.-year-old male with diabetes with hyperglycemia.    Plan:    Diagnoses and all orders for this visit:    Type 2 diabetes mellitus with hyperglycemia, without long-term current use of insulin ([facility])    Lab Results   Component Value Date    HGBA1C 6.6 (H) 01/14/2025   Hba1c is 6.6% at goal   Continue Ozempic 0.5 mg once a week   Discissed to check blood sugar once daily and goal is  mg/dl   Obtain blood work in 6 months       -     Basic metabolic panel; Future  -     Hemoglobin A1C; Future    Acquired hypothyroidism  Lab Results   Component Value Date    DPT5ZPETAEAO 3.943 01/14/2025   PT is clinically and biochemically euthyroid   Continue current dose of levothyroxine   '  -     T4, free; Future  -     TSH, 3rd generation; Future    Mixed hyperlipidemia  Lab Results   Component Value Date    LDLCALC 21 07/30/2024    Continue statins   Other orders  -     azithromycin (ZITHROMAX) 250 mg tablet; Take 250 mg by mouth       CC: Diabetes    History of Present Illness     HPI:    Joseph Ibrahim III is 64 yr old male with medical Hx of Type 2 diabetes, Hypertension and hyperlipidemia is here for follow up.   Diabetes course is stable   Current meds include Ozempic 0.5 mg once a week, he is tolerating it well. He denies side effects.  He checks blood sugars occasionally and blood sugars are in  mg/dl     For hypothyroidism, He takes levothyroxine 75 mcg po daily, on empty stomach     He takes Lovaza 2 gm daily           Component      Latest Ref Rng 1/14/2025   Sodium      135 - 147 mmol/L 141    Potassium      3.5 - 5.3 mmol/L 4.7    Chloride      96 - 108 mmol/L 105    Carbon Dioxide      21 - 32 mmol/L 28    ANION GAP      4 - 13 mmol/L 8    BUN      5 - 25 mg/dL 18    Creatinine      0.60 - 1.30 mg/dL 0.85    GLUCOSE, FASTING      65 - 99 mg/dL 128 (H)    Calcium      8.4 -  10.2 mg/dL 8.5    GFR, Calculated      ml/min/1.73sq m 91    EXT Creatinine Urine      Reference range not established. mg/dL 198.0    Albumin,U,Random      <20.0 mg/L 17.9    Albumin Creat Ratio      0 - 30 mg/g creatinine 9    Hemoglobin A1C      Normal 4.0-5.6%; PreDiabetic 5.7-6.4%; Diabetic >=6.5%; Glycemic control for adults with diabetes <7.0% % 6.6 (H)    eAG, EST AVG Glucose      mg/dl 143    FREE T4      0.61 - 1.12 ng/dL 0.72    TSH 3RD GENERATON      0.450 - 4.500 uIU/mL 3.943      Lab Results   Component Value Date    HGBA1C 6.6 (H) 01/14/2025          Wt Readings from Last 3 Encounters:   02/11/25 82.1 kg (181 lb)   01/28/25 81.6 kg (180 lb)   07/31/24 80.7 kg (178 lb)      Review of Systems   Constitutional:  Negative for activity change, diaphoresis, fatigue, fever and unexpected weight change.   HENT: Negative.     Eyes:  Negative for visual disturbance.   Respiratory:  Negative for cough, chest tightness and shortness of breath.    Cardiovascular:  Negative for chest pain, palpitations and leg swelling.   Gastrointestinal:  Negative for abdominal pain, blood in stool, constipation, diarrhea, nausea and vomiting.   Endocrine: Negative for cold intolerance, heat intolerance, polydipsia, polyphagia and polyuria.   Genitourinary:  Negative for dysuria, enuresis, frequency and urgency.   Musculoskeletal:  Negative for arthralgias and myalgias.   Skin:  Negative for pallor, rash and wound.   Allergic/Immunologic: Negative.    Neurological:  Negative for dizziness, tremors, weakness and numbness.   Hematological: Negative.    Psychiatric/Behavioral: Negative.         Historical Information   Past Medical History:   Diagnosis Date    BPH associated with nocturia     Cancer (HCC)     Cervical spinal mass (HCC) 01/07/2019    cervicothoracic    Colon polyp 8/23/2023    Diabetes mellitus (HCC)     Disease of thyroid gland     Herniated disc, cervical     History of radiation therapy     Hyperlipidemia      Impaired fasting glucose     Radiation-induced myelopathy (HCC) 2019     Past Surgical History:   Procedure Laterality Date    APPENDECTOMY      CERVICAL FUSION      COLONOSCOPY  2012    NORMAL; RECHECK IN 5 YEARS    FL LUMBAR PUNCTURE DIAGNOSTIC  2019    AK YT BX/EXC ISPI EBONIE IDRL IMED CERVICAL N/A 2019    Procedure: Posterior C4-T3 laminectomy; resection of intramedullary mass C5-T3, including fenestration of syrinx C7-T2; C3-T4 fixation/fusion; additional levels as needed;  Surgeon: Joe Bradshaw MD;  Location: [facility];  Service: Neurosurgery    SPINE SURGERY  1019    WISDOM TOOTH EXTRACTION       Social History   Social History     Substance and Sexual Activity   Alcohol Use Yes    Alcohol/week: 2.0 standard drinks of alcohol    Types: 2 Cans of beer per week    Comment: Occasionaly     Social History     Substance and Sexual Activity   Drug Use No     Social History     Tobacco Use   Smoking Status Former    Current packs/day: 0.00    Average packs/day: 1 pack/day for 15.0 years (15.0 ttl pk-yrs)    Types: Cigarettes    Quit date: 1993    Years since quittin.7   Smokeless Tobacco Never     Family History:   Family History   Problem Relation Age of Onset    Diabetes Mother     Hypertension Father     Cerebral palsy Sister     Breast cancer Daughter        Meds/Allergies   Current Outpatient Medications   Medication Sig Dispense Refill    atorvastatin (LIPITOR) 40 mg tablet TAKE 1 TABLET BY MOUTH EVERY DAY 90 tablet 1    azithromycin (ZITHROMAX) 250 mg tablet Take 250 mg by mouth      colesevelam (WELCHOL) 625 mg tablet Take 2 tablets (1,250 mg total) by mouth 2 (two) times a day with meals 540 tablet 1    ibuprofen (MOTRIN) 200 mg tablet Take 400 mg by mouth as needed for mild pain      levothyroxine 75 mcg tablet TAKE 1 TABLET BY MOUTH EVERY DAY 90 tablet 1    omega-3-acid ethyl esters (LOVAZA) 1 g capsule Take 2 capsules (2 g total) by mouth daily 180 capsule 1     "Pancrelipase, Lip-Prot-Amyl, (Zenpep) 5000-77778 units CPEP Take 1 capsule by mouth 3 (three) times a day with meals 270 capsule 1    semaglutide, 0.25 or 0.5 mg/dose, (Ozempic, 0.25 or 0.5 MG/DOSE,) 2 mg/3 mL injection pen Inject 0.75 mL (0.5 mg total) under the skin every 7 days 9 mL 1    tiZANidine (ZANAFLEX) 2 mg tablet TAKE 1 TABLET BY MOUTH EVERY 8 HOURS AS NEEDED FOR MUSCLE SPASMS. 270 tablet 1     No current facility-administered medications for this visit.     Allergies   Allergen Reactions    Bee Venom Hives, Itching and Edema     Category: Allergy;     Penicillins Hives and Itching     Category: Allergy;        Objective   Vitals: Blood pressure 116/68, pulse 66, height 5' 6.5\" (1.689 m), weight 82.1 kg (181 lb), SpO2 98%.    Physical Exam  Vitals reviewed.   Constitutional:       General: He is not in acute distress.     Appearance: Normal appearance. He is not ill-appearing.   HENT:      Head: Normocephalic and atraumatic.      Nose: Nose normal.   Eyes:      Extraocular Movements: Extraocular movements intact.      Conjunctiva/sclera: Conjunctivae normal.   Pulmonary:      Effort: No respiratory distress.   Musculoskeletal:      Cervical back: Normal range of motion.   Neurological:      General: No focal deficit present.      Mental Status: He is alert and oriented to person, place, and time.   Psychiatric:         Mood and Affect: Mood normal.         Behavior: Behavior normal.         The history was obtained from the review of the chart, patient.    Lab Results:   Lab Results   Component Value Date/Time    Hemoglobin A1C 6.6 (H) 01/14/2025 08:22 AM    Hemoglobin A1C 6.1 (H) 07/11/2024 08:16 AM    BUN 18 01/14/2025 08:22 AM    BUN 23 07/11/2024 08:16 AM    Potassium 4.7 01/14/2025 08:22 AM    Potassium 4.6 07/11/2024 08:16 AM    Chloride 105 01/14/2025 08:22 AM    Chloride 107 07/11/2024 08:16 AM    CO2 28 01/14/2025 08:22 AM    CO2 25 07/11/2024 08:16 AM    Creatinine 0.85 01/14/2025 08:22 AM    " "Creatinine 0.92 07/11/2024 08:16 AM    AST 14 07/30/2024 09:32 AM    ALT 24 07/30/2024 09:32 AM    Total Protein 6.2 (L) 07/30/2024 09:32 AM    Albumin 3.6 07/30/2024 09:32 AM    HDL, Direct 28 (L) 07/30/2024 09:32 AM    Triglycerides 121 07/30/2024 09:32 AM           Imaging Studies: Results Review Statement: No pertinent imaging studies reviewed.    Portions of the record may have been created with voice recognition software. Occasional wrong word or \"sound a like\" substitutions may have occurred due to the inherent limitations of voice recognition software. Read the chart carefully and recognize, using context, where substitutions have occurred.    "

## 2025-02-27 ENCOUNTER — OFFICE VISIT (OUTPATIENT)
Dept: FAMILY MEDICINE CLINIC | Facility: CLINIC | Age: 65
End: 2025-02-27
Payer: COMMERCIAL

## 2025-02-27 VITALS
DIASTOLIC BLOOD PRESSURE: 66 MMHG | OXYGEN SATURATION: 97 % | BODY MASS INDEX: 28.35 KG/M2 | TEMPERATURE: 97.5 F | SYSTOLIC BLOOD PRESSURE: 126 MMHG | WEIGHT: 180.6 LBS | HEART RATE: 70 BPM | HEIGHT: 67 IN

## 2025-02-27 DIAGNOSIS — E11.65 TYPE 2 DIABETES MELLITUS WITH HYPERGLYCEMIA, WITHOUT LONG-TERM CURRENT USE OF INSULIN (HCC): ICD-10-CM

## 2025-02-27 DIAGNOSIS — E03.9 ACQUIRED HYPOTHYROIDISM: ICD-10-CM

## 2025-02-27 DIAGNOSIS — E78.2 MIXED HYPERLIPIDEMIA: Primary | ICD-10-CM

## 2025-02-27 PROCEDURE — 99214 OFFICE O/P EST MOD 30 MIN: CPT | Performed by: FAMILY MEDICINE

## 2025-02-27 NOTE — ASSESSMENT & PLAN NOTE
On welchol   (primarily  for  diarrhea), lipitor  and  lovaza, ldl  goal , last one  21, if  lab still with low  ldl will decrease  lipitor  to 20    Orders:    Lipid panel

## 2025-02-27 NOTE — PROGRESS NOTES
"Name: Joseph Ibrahim III      : 1960      MRN: [id number]  Encounter Provider: Argelia Juarez MD  Encounter Date: 2025   Encounter department: [facility]  :  Assessment & Plan  Type 2 diabetes mellitus with hyperglycemia, without long-term current use of insulin ([facility])    Lab Results   Component Value Date    HGBA1C 6.6 (H) 2025     Sees  endo last HBA1c excellent  Orders:    semaglutide, 0.25 or 0.5 mg/dose, (Ozempic, 0.25 or 0.5 MG/DOSE,) 2 mg/3 mL injection pen; Inject 0.75 mL (0.5 mg total) under the skin every 7 days    Acquired hypothyroidism  Tsh stable  on  75         Mixed hyperlipidemia  On welchol   (primarily  for  diarrhea), lipitor  and  lovaza, ldl  goal , last one  21, if  lab still with low  ldl will decrease  lipitor  to 20    Orders:    Lipid panel           History of Present Illness   Patient presents with:  Follow-up         Review of Systems   Constitutional:  Negative for activity change, appetite change, fatigue and unexpected weight change.   HENT:  Positive for congestion.    Respiratory:  Negative for shortness of breath.    Cardiovascular:  Negative for chest pain.   Musculoskeletal:  Positive for arthralgias. Negative for back pain.        R leg  pain   Neurological:  Negative for dizziness, light-headedness and headaches.   Hematological:  Negative for adenopathy.   Psychiatric/Behavioral:  Negative for dysphoric mood. The patient is not nervous/anxious.        Objective   /66 (BP Location: Left arm, Patient Position: Sitting, Cuff Size: Adult)   Pulse 70   Temp 97.5 °F (36.4 °C) (Temporal)   Ht 5' 6.5\" (1.689 m)   Wt 81.9 kg (180 lb 9.6 oz)   SpO2 97%   BMI 28.71 kg/m²      Physical Exam  Vitals reviewed.   Constitutional:       Appearance: Normal appearance.   HENT:      Nose: Congestion present. No rhinorrhea.      Mouth/Throat:      Pharynx: No oropharyngeal exudate or posterior oropharyngeal erythema.   Cardiovascular:      " Rate and Rhythm: Normal rate and regular rhythm.      Pulses: Normal pulses.      Heart sounds: Normal heart sounds.   Pulmonary:      Effort: Pulmonary effort is normal.      Breath sounds: Normal breath sounds.   Musculoskeletal:      Right lower leg: No edema.      Left lower leg: No edema.   Lymphadenopathy:      Cervical: No cervical adenopathy.   Neurological:      Mental Status: He is alert.   Psychiatric:         Mood and Affect: Mood normal.

## 2025-02-27 NOTE — ASSESSMENT & PLAN NOTE
Lab Results   Component Value Date    HGBA1C 6.6 (H) 01/14/2025     Sees  endo last HBA1c excellent  Orders:    semaglutide, 0.25 or 0.5 mg/dose, (Ozempic, 0.25 or 0.5 MG/DOSE,) 2 mg/3 mL injection pen; Inject 0.75 mL (0.5 mg total) under the skin every 7 days

## 2025-03-04 ENCOUNTER — APPOINTMENT (OUTPATIENT)
Dept: LAB | Age: 65
End: 2025-03-04
Payer: COMMERCIAL

## 2025-03-04 LAB
CHOLEST SERPL-MCNC: 77 MG/DL (ref ?–200)
HDLC SERPL-MCNC: 30 MG/DL
LDLC SERPL CALC-MCNC: 30 MG/DL (ref 0–100)
NONHDLC SERPL-MCNC: 47 MG/DL
TRIGL SERPL-MCNC: 83 MG/DL (ref ?–150)

## 2025-03-04 PROCEDURE — 80061 LIPID PANEL: CPT | Performed by: FAMILY MEDICINE

## 2025-03-04 PROCEDURE — 36415 COLL VENOUS BLD VENIPUNCTURE: CPT | Performed by: FAMILY MEDICINE

## 2025-03-05 ENCOUNTER — RESULTS FOLLOW-UP (OUTPATIENT)
Dept: FAMILY MEDICINE CLINIC | Facility: CLINIC | Age: 65
End: 2025-03-05

## 2025-03-23 DIAGNOSIS — E78.5 HLD (HYPERLIPIDEMIA): ICD-10-CM

## 2025-03-24 RX ORDER — ATORVASTATIN CALCIUM 40 MG/1
40 TABLET, FILM COATED ORAL DAILY
Qty: 90 TABLET | Refills: 1 | Status: SHIPPED | OUTPATIENT
Start: 2025-03-24

## 2025-05-24 DIAGNOSIS — E11.65 TYPE 2 DIABETES MELLITUS WITH HYPERGLYCEMIA, WITHOUT LONG-TERM CURRENT USE OF INSULIN (HCC): ICD-10-CM

## 2025-05-28 DIAGNOSIS — E11.65 TYPE 2 DIABETES MELLITUS WITH HYPERGLYCEMIA, WITHOUT LONG-TERM CURRENT USE OF INSULIN (HCC): ICD-10-CM

## 2025-05-28 NOTE — TELEPHONE ENCOUNTER
Reason for call:   [x] Refill   [] Prior Auth  [] Other:     Office:   [x] PCP/Provider -   [] Specialty/Provider -     Medication:   semaglutide, 0.25 or 0.5 mg/dose, (Ozempic, 0.25 or 0.5 MG/DOSE,) 2 mg/3 mL injection pen     Dose/Frequency:  Inject 0.75 mL (0.5 mg total) under the skin every 7 days     Quantity: 9 ml    Pharmacy: [facility]/pharmacy #[id number] - [place] - [id number]  [facility] [place] [address]  Phone: [phone]  Fax: [phone]     Local Pharmacy   Does the patient have enough for 3 days?   [x] Yes   [] No - Send as HP to POD    Mail Away Pharmacy   Does the patient have enough for 10 days?   [] Yes   [] No - Send as HP to POD

## 2025-05-31 DIAGNOSIS — E11.65 TYPE 2 DIABETES MELLITUS WITH HYPERGLYCEMIA, WITHOUT LONG-TERM CURRENT USE OF INSULIN (HCC): ICD-10-CM

## 2025-06-02 ENCOUNTER — TELEPHONE (OUTPATIENT)
Age: 65
End: 2025-06-02

## 2025-06-02 NOTE — TELEPHONE ENCOUNTER
Patient calling in regard to ozempic    Patient provided:  RXBIN: [id number]  RXPCN: [id number]  RXGROUP:   MEMBER ID: [id number]    Patient requesting update as soon as one is available

## 2025-06-02 NOTE — TELEPHONE ENCOUNTER
PA for Ozempic 0.25 or 0.5MG/DOSE 2mg/3mL APPROVED     Date(s) approved 06/02/2025-06/02/2026    Case #[id number]    Patient advised by          []MyChart Message  []Phone call   []LMOM  []L/M to call office as no active Communication consent on file  []Unable to leave detailed message as VM not approved on Communication consent       Pharmacy advised by    [x]Fax  []Phone call  []Secure Chat    Approval letter scanned into Media Yes

## 2025-06-02 NOTE — TELEPHONE ENCOUNTER
PA for Ozempic 0.25 or 0.5MG/DOSE 2mg/3mL SUBMITTED to [employer]    via    [x]CMM-KEY: [id number]  []Surescripts-Case ID #   []Availity-Auth ID # NDC #   []Faxed to plan   []Other website   []Phone call Case ID #     [x]PA sent as URGENT    All office notes, labs and other pertaining documents and studies sent. Clinical questions answered. Awaiting determination from insurance company.     Turnaround time for your insurance to make a decision on your Prior Authorization can take 7-21 business days.

## 2025-06-02 NOTE — TELEPHONE ENCOUNTER
There is no information in the patient's chart regarding his [employer] benefit details. Please obtain pharmacy benefit information for this patient's [employer] insurance. The prior authorization team will need rxbin, rxpcn, rxgroup, Member ID number and phone number.  Thank you.

## 2025-06-02 NOTE — TELEPHONE ENCOUNTER
Reason for call:   [x] Prior Auth    Caller:  [x] Patient  Callback Number: [phone]  [] Pharmacy  Name:   Callback Number:   [] Insurance  Name:   Callback Number:     Medication: Ozempic 0.5    Is the patient's insurance updated in EPIC?   [x] Yes   [] No     Patient states Prior authorization goes through [employer]    Patient is due to have his next injection on Thursday with no mediation left.     Patient is asking if this can be marked Urgent and completed ASAP    Patient asking for call back with update.

## 2025-06-02 NOTE — TELEPHONE ENCOUNTER
Patient needs a prior auth for Ozempic. His PA has . Please see notation under Media- dated 24 the previous PA.     Current order is for Ozempic 0.5 mg inject 0.75 ml (0.5 mg total) every 7 days. Patient does have a diagnosis of Type 2 DM with hyperglycemia without long-term complications     Message has been sent to PCP office and also to the prior auth team.

## 2025-06-14 DIAGNOSIS — E78.2 MIXED HYPERLIPIDEMIA: ICD-10-CM

## 2025-06-15 RX ORDER — OMEGA-3-ACID ETHYL ESTERS 1 G/1
2 CAPSULE, LIQUID FILLED ORAL DAILY
Qty: 180 CAPSULE | Refills: 1 | Status: SHIPPED | OUTPATIENT
Start: 2025-06-15

## 2025-07-02 DIAGNOSIS — K52.9 CHRONIC DIARRHEA OF UNKNOWN ORIGIN: ICD-10-CM

## 2025-07-02 DIAGNOSIS — K86.89 PANCREATIC INSUFFICIENCY: ICD-10-CM

## 2025-07-02 RX ORDER — PANCRELIPASE LIPASE, PANCRELIPASE PROTEASE, PANCRELIPASE AMYLASE 5000; 17000; 24000 [USP'U]/1; [USP'U]/1; [USP'U]/1
1 CAPSULE, DELAYED RELEASE ORAL
Qty: 270 CAPSULE | Refills: 1 | Status: SHIPPED | OUTPATIENT
Start: 2025-07-02

## 2025-08-07 ENCOUNTER — APPOINTMENT (OUTPATIENT)
Dept: LAB | Age: 65
End: 2025-08-07
Payer: COMMERCIAL

## 2025-08-13 ENCOUNTER — OFFICE VISIT (OUTPATIENT)
Dept: ENDOCRINOLOGY | Facility: CLINIC | Age: 65
End: 2025-08-13
Payer: COMMERCIAL

## 2025-08-17 DIAGNOSIS — E11.65 TYPE 2 DIABETES MELLITUS WITH HYPERGLYCEMIA, WITHOUT LONG-TERM CURRENT USE OF INSULIN (HCC): ICD-10-CM

## 2025-08-27 PROBLEM — M79.641 PAIN IN BOTH HANDS: Status: ACTIVE | Noted: 2025-08-27

## 2025-08-27 PROBLEM — K52.9 CHRONIC DIARRHEA OF UNKNOWN ORIGIN: Status: RESOLVED | Noted: 2023-08-23 | Resolved: 2025-08-27

## 2025-08-27 PROBLEM — M79.642 PAIN IN BOTH HANDS: Status: ACTIVE | Noted: 2025-08-27

## 2025-08-27 PROBLEM — M79.671 RIGHT FOOT PAIN: Status: ACTIVE | Noted: 2025-08-27

## (undated) DEVICE — DRAPE SURGIKIT SADDLE BAG

## (undated) DEVICE — JACKSON-PRATT 100CC BULB RESERVOIR: Brand: CARDINAL HEALTH

## (undated) DEVICE — TOOL 14BA20 LEGEND 14CM 2MM BA: Brand: MIDAS REX ™

## (undated) DEVICE — ELECTRODE BLADE MOD E-Z CLEAN 4IN -0014AM

## (undated) DEVICE — DISPOSABLE SLIM BIPOLAR FORCEPS, NON-STICK,: Brand: SPETZLER-MALIS

## (undated) DEVICE — DRAPE EQUIPMENT RF WAND

## (undated) DEVICE — NEURO PATTIES 1/2 X 3

## (undated) DEVICE — SNAP KOVER: Brand: UNBRANDED

## (undated) DEVICE — TELFA NON-ADHERENT ABSORBENT DRESSING: Brand: TELFA

## (undated) DEVICE — GAUZE SPONGES,16 PLY: Brand: CURITY

## (undated) DEVICE — PROXIMATE PLUS MD MULTI-DIRECTIONAL RELEASE SKIN STAPLERS CONTAINS 35 STAINLESS STEEL STAPLES APPROXIMATE CLOSED DIMENSIONS: 6.9MM X 3.9MM WIDE: Brand: PROXIMATE

## (undated) DEVICE — NEURO PATTIES 1/2 X 1 1/2

## (undated) DEVICE — PREP SURGICAL PURPREP 26ML

## (undated) DEVICE — FLOSEAL HEMOSTATIC MATRIX, 5 ML: Brand: FLOSEAL

## (undated) DEVICE — REM POLYHESIVE ADULT PATIENT RETURN ELECTRODE: Brand: VALLEYLAB

## (undated) DEVICE — PENCIL ELECTROSURG E-Z CLEAN -0035H

## (undated) DEVICE — GLOVE INDICATOR PI UNDERGLOVE SZ 8 BLUE

## (undated) DEVICE — NEEDLE 25G X 1 1/2

## (undated) DEVICE — SURGIFOAM 8.5 X 12.5

## (undated) DEVICE — LIGHT HANDLE COVER SLEEVE DISP BLUE STELLAR

## (undated) DEVICE — SILVER-COATED ANTIMICROBIAL BARRIER DRESSING: Brand: ACTICOAT   4" X 8"

## (undated) DEVICE — GLOVE SRG BIOGEL ECLIPSE 8

## (undated) DEVICE — BIPOLAR SEALER 23-113-1 AQM 2.3: Brand: AQUAMANTYS™

## (undated) DEVICE — MAYFIELD® DISPOSABLE ADULT SKULL PIN (PLASTIC BASE): Brand: MAYFIELD®

## (undated) DEVICE — TRAY FOLEY 16FR URIMETER SURESTEP

## (undated) DEVICE — DURASEAL EXACT SPINE 5ML

## (undated) DEVICE — DRAPE C-ARMOUR

## (undated) DEVICE — ANTIBACTERIAL VIOLET BRAIDED (POLYGLACTIN 910), SYNTHETIC ABSORBABLE SUTURE: Brand: COATED VICRYL

## (undated) DEVICE — TOOL 14MH30 LEGEND 14CM 3MM: Brand: MIDAS REX ™

## (undated) DEVICE — INTENDED FOR TISSUE SEPARATION, AND OTHER PROCEDURES THAT REQUIRE A SHARP SURGICAL BLADE TO PUNCTURE OR CUT.: Brand: BARD-PARKER ® CARBON RIB-BACK BLADES

## (undated) DEVICE — SUT NUROLON 4-0 TF CR/8 18 IN C584D

## (undated) DEVICE — BOWL ASSY BM210 DUAL BLADE DISPOSABLE: Brand: MIDAS REX™

## (undated) DEVICE — MINOR PROCEDURE DRAPE: Brand: CONVERTORS

## (undated) DEVICE — PLUMEPEN PRO 10FT

## (undated) DEVICE — NEURO PATTIES 1/4 X 1/4

## (undated) DEVICE — DRILL BIT G3606010 2.4MM

## (undated) DEVICE — NEURO PATTIES 1/4 X 3

## (undated) DEVICE — BLADE ELECTRODE: Brand: EDGE

## (undated) DEVICE — INTENDED FOR TISSUE SEPARATION, AND OTHER PROCEDURES THAT REQUIRE A SHARP SURGICAL BLADE TO PUNCTURE OR CUT.: Brand: BARD-PARKER SAFETY BLADES SIZE 10, STERILE

## (undated) DEVICE — SPECIMEN CONTAINER STERILE PEEL PACK

## (undated) DEVICE — BETADINE OINTMENT FOIL PACK

## (undated) DEVICE — DRAPE MICROSCOPE OPMI PENTERO

## (undated) DEVICE — JP PERF DRN SIL FLT 7MM FULL: Brand: CARDINAL HEALTH

## (undated) DEVICE — BETHLEHEM UNIVERSAL SPINE, KIT: Brand: CARDINAL HEALTH

## (undated) DEVICE — 3M™ TEGADERM™ TRANSPARENT FILM DRESSING FRAME STYLE, 1628, 6 IN X 8 IN (15 CM X 20 CM), 10/CT 8CT/CASE: Brand: 3M™ TEGADERM™

## (undated) DEVICE — SPONGE PVP SCRUB WING STERILE

## (undated) DEVICE — SET SCREW 3600315 STANDARD
Type: IMPLANTABLE DEVICE | Site: POSTERIOR CERVICAL | Status: NON-FUNCTIONAL
Brand: INFINITY™ OCCIPITOCERVICAL UPPER THORACIC SYSTEM